# Patient Record
Sex: FEMALE | Race: WHITE | Employment: OTHER | URBAN - METROPOLITAN AREA
[De-identification: names, ages, dates, MRNs, and addresses within clinical notes are randomized per-mention and may not be internally consistent; named-entity substitution may affect disease eponyms.]

---

## 2017-04-09 ENCOUNTER — HOSPITAL ENCOUNTER (INPATIENT)
Age: 77
LOS: 1 days | Discharge: HOME | DRG: 066 | End: 2017-04-11
Attending: EMERGENCY MEDICINE | Admitting: FAMILY MEDICINE
Payer: MEDICARE

## 2017-04-09 DIAGNOSIS — R53.1 RIGHT SIDED WEAKNESS: Primary | ICD-10-CM

## 2017-04-09 DIAGNOSIS — I15.9 SECONDARY HYPERTENSION: ICD-10-CM

## 2017-04-09 DIAGNOSIS — G45.9 TRANSIENT CEREBRAL ISCHEMIA, UNSPECIFIED TYPE: ICD-10-CM

## 2017-04-09 PROCEDURE — 82962 GLUCOSE BLOOD TEST: CPT

## 2017-04-09 PROCEDURE — 99285 EMERGENCY DEPT VISIT HI MDM: CPT

## 2017-04-09 NOTE — IP AVS SNAPSHOT
Jamie Dose 
 
 
 [address] 1007 [facility] 
[phone] Patient: Jorge Quiñonez MRN: [id number] WMK:[birth year] You are allergic to the following Allergen Reactions Benadryl Allergy/Cold (Diphenhyd-Pe-Acetaminophen) Hives Penicillins Hives Was treated with Augmentin in June 2015, tolerated medication well. Recent Documentation Height Weight BMI OB Status Smoking Status 1.524 m 45.9 kg 19.78 kg/m2 Postmenopausal Former Smoker Unresulted Labs Order Current Status LIPID PANEL In process Emergency Contacts Name Discharge Info Relation Home Work Mobile Deepthi Scott DISCHARGE CAREGIVER [3] Daughter [21] [phone] About your hospitalization You were admitted on:  April 10, 2017 You last received care in the:  [facility]  MED SURG 2 You were discharged on:  April 11, 2017 Unit phone number:  [phone] Why you were hospitalized Your primary diagnosis was:  Acute Cva (Cerebrovascular Accident) (Hcc) Your diagnoses also included:  Hypertension, Cerebral Atrophy, Hyperlipidemia Ldl Goal <70, Cva (Cerebral Vascular Accident) (Hcc) Providers Seen During Your Hospitalizations Provider Role Specialty Primary office phone Dagoberto Iglesias DO Attending Provider Emergency Medicine [phone] Mariajose Newby MD Attending Provider [facility] [phone] Your Primary Care Physician (PCP) Primary Care Physician Office Phone Office Fax Lily Mao [phone] Follow-up Information Follow up With Details Comments Contact Info Duane Jose MD On 5/22/2017 3:20 pm  [address] [place] [phone] 1007 [facility] 
[phone] Layla Llanes NP Go on 4/26/2017 Neurology appt, post stroke, Arrive at: 130pm  for  2pm appt  [address] [address] [phone] Silvino Pool MD On 4/13/2017 hospital follow-up at 10:50am John Nesbitts 906 1007 [facility] 
[phone] Your Appointments Thursday April 13, 2017 10:50 AM EDT TRANSITIONAL CARE MANAGEMENT with Silvino Pool MD  
[address])  
 [address] 1007 [facility]  
[phone] Wednesday April 26, 2017  2:00 PM EDT Any with Dayanara Bourgeois NP Neurology [address]) [place] [address] [facility] 250 [facility] [id number] [phone] Monday May 22, 2017  3:20 PM EDT HOSPITAL DISCHARGE with Sudha Quispe MD  
[facility] [address] [facility] [place] [address] 1007 [facility]  
[phone] Current Discharge Medication List  
  
START taking these medications Dose & Instructions Dispensing Information Comments Morning Noon Evening Bedtime  
 atorvastatin 40 mg tablet Commonly known as:  LIPITOR Your last dose was: Your next dose is:    
   
   
 Dose:  40 mg Take 1 Tab by mouth daily. Quantity:  30 Tab Refills:  2  
     
   
   
   
  
 clopidogrel 75 mg Tab Commonly known as:  PLAVIX Your last dose was: Your next dose is:    
   
   
 Dose:  75 mg Take 1 Tab by mouth daily. Quantity:  30 Tab Refills:  1  
     
   
   
   
  
 lisinopril 20 mg tablet Commonly known as:  Armaan Lofty Your last dose was: Your next dose is:    
   
   
 Dose:  20 mg Take 1 Tab by mouth daily. Quantity:  30 Tab Refills:  2 Where to Get Your Medications Information on where to get these meds will be given to you by the nurse or doctor. ! Ask your nurse or doctor about these medications  
  atorvastatin 40 mg tablet  
 clopidogrel 75 mg Tab  
 lisinopril 20 mg tablet Discharge Instructions Yvette will mail you a heart monitor after discharge. Instructions will be included with the monitor. HOME DISCHARGE INSTRUCTIONS Simona Delvalle / [id number] : 1940 Admission date: 2017 Discharge date: 2017 Please bring this form with you to show your care provider at your follow-up appointment. Primary care provider:  Marcos Poon MD 
 
Discharging provider:  Ar Martinez MD  - Family Medicine Resident Anupam Guzman MD - Attending, Family Medicine You have been admitted to the hospital with the following diagnoses: 
 
ACUTE DIAGNOSES: 
TIA 
CVA (cerebral vascular accident) ([facility] [address] Hedy Jonas . . . . . . . . . . . . . . . . . . . . . . . . . . . . . . . . . . . . . . . . . . . . . . . . . . . . . . . . . . . . . . . . . . . . . . . MEDICATIONS: 
-Start Plavix 75mg - 1 tab daily. This is to prevent future strokes. You will have some labwork done at your neurology appointment to make further adjustments to your medication but this is a lifelong medication so you will need refills by either your neurologist or PCP. I gave you 1 refill.   
-Start Lipitor 40mg -1 tab daily. This is for your cholesterol.  
-Start Lisinopril 20mg - 1 tab daily. This is for your BP. Further adjustments to be made by PCP. -Stop taking Aspirin. FOLLOW-UP CARE RECOMMENDATIONS: 
 
Appointments Follow-up Information Follow up With Details Comments Contact Info Nehemiah Issa MD On 2017 3:20 pm  [address] [facility] [phone] 1007 [facility] 
[phone] Tash Gruber NP Go on 2017 Neurology appt, post stroke, Arrive at: 130pm  for  2pm appt  [place] [address] [place] [address] [address] [address] 
[phone] Marcos Poon MD On 2017 hospital follow-up at 10:50am John Coreasus 906 1007 [facility] 
[phone] Follow-up tests needed: to be determined Pending test results: At the time of your discharge the following test results are still pending: Lipid panel Please make sure you review these results with your outpatient follow-up provider(s). Specific symptoms to watch for: chest pain, shortness of breath, fever, chills, nausea, vomiting, diarrhea, change in mentation, falling, weakness, bleeding. DIET/what to eat:  Low fat, Low cholesterol ACTIVITY:  Activity as tolerated Wound care: None Equipment needed: You will be mailed your heart monitor and it will come with instructions What to do if new or unexpected symptoms occur? If you experience any of the above symptoms (or should other concerns or questions arise after discharge) please call your primary care physician. Return to the emergency room if you cannot get hold of your doctor. · It is very important that you keep your follow-up appointment(s). · Please bring discharge papers, medication list (and/or medication bottles) to your follow-up appointments for review by your outpatient provider(s). · Please check the list of medications and be sure it includes every medication (even non-prescription medications) that your provider wants you to take. · It is important that you take the medication exactly as they are prescribed. · Keep your medication in the bottles provided by the pharmacist and keep a list of the medication names, dosages, and times to be taken in your wallet. · Do not take other medications without consulting your doctor. · If you have any questions about your medications or other instructions, please talk to your nurse or care provider before you leave the hospital.  
 
Information obtained by:  
 
I understand that if any problems occur once I am at home I am to contact my physician. These instructions were explained to me and I had the opportunity to ask questions. I understand and acknowledge receipt of the instructions indicated above. Physician's or R.N.'s Signature                                                                  Date/Time Patient or Representative Signature                                                          Date/Time Stroke: Care Instructions Your Care Instructions You have had a stroke. This means that the blood flow to a part of your brain was blocked for some time, which damages the nerve cells in that part of the brain. The part of your body controlled by that part of your brain may not function properly now. The brain is an amazing organ that can heal itself to some degree. The stroke you had damaged part of your brain. But other parts of your brain may take over in some way for the damaged areas. You have already started this process. Your doctor will talk with you about what you can do to prevent another stroke. High blood pressure, high cholesterol, and diabetes are all risk factors for stroke. If you have any of these conditions, work with your doctor to make sure they are under control. Other risk factors for stroke include being overweight, smoking, and not getting regular exercise. Going home may be hard for you and your family. The more you can try to do for yourself, the better. Remember to take each day one at a time. Follow-up care is a key part of your treatment and safety. Be sure to make and go to all appointments, and call your doctor if you are having problems. It's also a good idea to know your test results and keep a list of the medicines you take. How can you care for yourself at home?  
· Enter a stroke rehabilitation (rehab) program, if your doctor recommends it. Physical, speech, and occupational therapies can help you manage bathing, dressing, eating, and other basics of daily living. · Do not drive until your doctor says it is okay. · It is normal to feel sad or depressed after a stroke. If these feelings last, talk to your doctor. · If you are having problems with urine leakage, go to the bathroom at regular times, including when you first wake up and at bedtime. Also, limit fluids after dinner. · If you are constipated, drink plenty of fluids, enough so that your urine is light yellow or clear like water. If you have kidney, heart, or liver disease and have to limit fluids, talk with your doctor before you increase the amount of fluids you drink. Set up a regular time for using the toilet. If you continue to have constipation, your doctor may suggest using a bulking agent, such as Metamucil, or a stool softener, laxative, or enema. Medicines · Take your medicines exactly as prescribed. Call your doctor if you think you are having a problem with your medicine. You may be taking several medicines. ACE (angiotensin-converting enzyme) inhibitors, angiotensin II receptor blockers (ARBs), beta-blockers, diuretics (water pills), and calcium channel blockers control your blood pressure. Statins help lower cholesterol. Your doctor may also prescribe medicines for depression, pain, sleep problems, anxiety, or agitation. · If your doctor has given you a blood thinner to prevent another stroke, be sure you get instructions about how to take your medicine safely. Blood thinners can cause serious bleeding problems. · Do not take any over-the-counter medicines or herbal products without talking to your doctor first. 
· If you take birth control pills or hormone therapy, talk to your doctor about whether they are right for you. For family members and caregivers · Make the home safe.  Set up a room so that your loved one does not have to climb stairs. Be sure the bathroom is on the same floor. Move throw rugs and furniture that could cause falls. Make sure that the lighting is good. Put grab bars and seats in tubs and showers. · Find out what your loved one can do and what he or she needs help with. Try not to do things for your loved one that your loved one can do on his or her own. Help him or her learn and practice new skills. · Visit and talk with your loved one often. Try doing activities together that you both enjoy, such as playing cards or board games. Keep in touch with your loved one's friends as much as you can. Encourage them to visit. · Take care of yourself. Do not try to do everything yourself. Ask other family members to help. Eat well, get enough rest, and take time to do things that you enjoy. Keep up with your own doctor visits, and make sure to take your medicines regularly. Get out of the house as much as you can. Join a local support group. Find out if you qualify for home health care visits to help with rehab or for adult day care. When should you call for help? Call 911 anytime you think you may need emergency care. For example, call if: 
· You have signs of another stroke. These may include: 
¨ Sudden numbness, tingling, weakness, or loss of movement in your face, arm, or leg, especially on only one side of your body. ¨ Sudden vision changes. ¨ Sudden trouble speaking. ¨ Sudden confusion or trouble understanding simple statements. ¨ Sudden problems with walking or balance. ¨ A sudden, severe headache that is different from past headaches. Call 911 even if these symptoms go away in a few minutes. Call your doctor now or seek immediate medical care if: 
· You have new symptoms that may be related to your stroke, such as falls or trouble swallowing. Watch closely for changes in your health, and be sure to contact your doctor if you have any problems. Where can you learn more? Go to http://isaura-giovanna.info/. Enter M759 in the search box to learn more about \"Stroke: Care Instructions. \" Current as of: June 4, 2016 Content Version: 11.2 © 7218-6213 [employer]. Care instructions adapted under license by [employer] (which disclaims liability or warranty for this information). If you have questions about a medical condition or this instruction, always ask your healthcare professional. [address] any warranty or liability for your use of this information. Learning About Stroke Rehabilitation What is stroke rehabilitation? Stroke rehabilitation (rehab) is training and therapy to help you relearn to do everyday things you have not been able to do since your stroke. The focus will depend on how the stroke has affected your ability to do the things you want and need to do. Rehab begins in the hospital. It starts as soon as you are able. You will have a team of doctors, nurses, and therapists to help you relearn daily activities. This can include eating, bathing, and dressing. You also may need help to learn how to walk or talk again. If the stroke damaged your memory, you will learn ways to improve it. You will get better faster if you begin rehab soon after the stroke. But even with rehab, you may not be able to do all the things you could before the stroke. You may recover the most in the first few weeks or months after your stroke. But you can keep getting better for years. It just may happen more slowly. And it may take a long time and a lot of hard work. Don't give up hope. After the hospital, you may go to a rehab facility or a nursing home for a while. Or you may go home. Wherever you go, keep working on your rehab and do a little every day. It's going to be important for you to get the support you need. Let your loved ones help you. Involve them in your treatment.  Talk to others who have had a stroke, and find out how they handled ups and downs. How can stroke rehab specialists help? Your stroke rehab team will include doctors and nurses who specialize in stroke rehab, as well as other professionals. Each team member will help you in specific ways. The team may include the following professionals. Rehab doctor A rehab doctor is a specialist in charge of your rehab program. He or she may also work on special problems, such as muscle cramps and spasms. Rehab nurses Rehab nurses can help you relearn basic activities of daily life. For example, they can help you learn how to: · Take care of your health, including a schedule for medicine. · Get from your bed to a wheelchair. · Bathe. · Control your bowels or bladder. Physical therapist 
A stroke often takes away a person's ability to move in certain ways. A physical therapist helps you get back as much movement, balance, and coordination as possible. Physical therapy usually includes exercises. The exercises can help you get back your ability to walk and move as much as possible. It's important to practice these exercises over and over again. Your therapist may also help you learn to use a wheelchair or walker. And he or she may teach you how to use stairs safely. Occupational therapist 
An occupational therapist helps you practice daily tasks like eating, bathing, dressing, and writing. For example, he or she may help you learn how to: · Prepare meals and clean your house. · Drive your car. · Use tools and devices that can help if you no longer have full use of both hands. For example, velcro can replace buttons on clothing. · Get grab bars for your bathroom. · Make your home safe if you have strength, balance, or vision problems. Speech therapist 
A speech therapist can help you relearn how to talk or find new ways to express yourself. Swallowing is sometimes a problem after a stroke.  This therapist can help you improve your ability to swallow. A speech therapist can also help you work on reading and writing skills. Psychologist or counselor Emotions like fear, anxiety, anger, sadness, frustration, and grief are common after a stroke. A psychologist or counselor can help you deal with your emotions. They can also help you get treatment if you have depression. Vocational counselor Stroke can leave you with disabilities that make it hard to do your job. A vocational counselor can help you return to your job or find a new one. He or she can help you: 
· Identify your current skills and prepare a new resume. · Search for a job. · Understand the laws that protect disabled workers. Recreational therapist 
A recreational therapist helps you return to doing things you enjoy. This may include the arts, hobbies, sports, or leisure activities. Follow-up care is a key part of your treatment and safety. Be sure to make and go to all appointments, and call your doctor if you are having problems. It's also a good idea to know your test results and keep a list of the medicines you take. Where can you learn more? Go to http://isaura-giovanna.info/. Enter Z606 in the search box to learn more about \"Learning About Stroke Rehabilitation. \" Current as of: November 10, 2016 Content Version: 11.2 © 1936-3520 [employer]. Care instructions adapted under license by [employer] (which disclaims liability or warranty for this information). If you have questions about a medical condition or this instruction, always ask your healthcare professional. [place] [address] any warranty or liability for your use of this information. Learning About Long-Term Care for Stroke What is long-term care for stroke?  
Long-term care for stroke is care for people who are leaving the hospital after a stroke but who still need some medical care or other help while they work on getting better. Choosing the right type of long-term care is a very personal decision. It's important to look carefully at your options. You want to be sure that the level of care is right and that you will feel comfortable. Your doctor or other members of your medical team can help you find which type of long-term care would be best for you. What are the types of long-term care for stroke patients? Each type of care center offers a different level of care. These centers may have shared or private rooms. An assisted living center or residential care center: 
· Has a range of services. These may include meals, cleaning, and laundry. And they may offer help with personal needs like bathing, grooming, and dressing. · Often includes oversight by a nurse. You may be able to get help with basic care, such as getting medicines. A skilled nursing center: · Offers nursing care up to 24 hours a day. · Provides meals and laundry. · Provides help with dressing, bathing, using the toilet, and other daily tasks. · Offers rehab therapy. A long-term acute care hospital: · Is for stroke patients who have special medical problems. This may include things like chronic pain or not being able to breathe on your own. Follow-up care is a key part of your treatment and safety. Be sure to make and go to all appointments, and call your doctor if you are having problems. It's also a good idea to know your test results and keep a list of the medicines you take. Where can you learn more? Go to http://isaura-giovanna.info/. Enter B491 in the search box to learn more about \"Learning About Long-Term Care for Stroke. \" Current as of: October 17, 2016 Content Version: 11.2 © 5917-2591 [employer]. Care instructions adapted under license by [employer] (which disclaims liability or warranty for this information).  If you have questions about a medical condition or this instruction, always ask your healthcare professional. [address] any warranty or liability for your use of this information. Discharge Orders None [facility] Announcement We are excited to announce that we are making your provider's discharge notes available to you in [facility]. You will see these notes when they are completed and signed by the physician that discharged you from your recent hospital stay. If you have any questions or concerns about any information you see in [facility], please call the Health Information Department where you were seen or reach out to your Primary Care Provider for more information about your plan of care. Introducing [facility]! Dear Nehemias Constantino: Thank you for requesting a [facility] account. Our records indicate that you already have an active [facility] account. You can access your account anytime at [email or web address] Did you know that you can access your hospital and ER discharge instructions at any time in [facility]? You can also review all of your test results from your hospital stay or ER visit. Additional Information If you have questions, please visit the Frequently Asked Questions section of the [facility] website at [email or web address] Remember, [facility] is NOT to be used for urgent needs. For medical emergencies, dial 911. Now available from your iPhone and Android! General Information Please provide this summary of care documentation to your next provider. Patient Signature:  ____________________________________________________________ Date:  ____________________________________________________________  
  
Aimee Worthy Provider Signature:  ____________________________________________________________ Date:  ____________________________________________________________

## 2017-04-09 NOTE — IP AVS SNAPSHOT
Current Discharge Medication List  
  
START taking these medications Dose & Instructions Dispensing Information Comments Morning Noon Evening Bedtime  
 atorvastatin 40 mg tablet Commonly known as:  LIPITOR Your last dose was: Your next dose is:    
   
   
 Dose:  40 mg Take 1 Tab by mouth daily. Quantity:  30 Tab Refills:  2  
     
   
   
   
  
 clopidogrel 75 mg Tab Commonly known as:  PLAVIX Your last dose was: Your next dose is:    
   
   
 Dose:  75 mg Take 1 Tab by mouth daily. Quantity:  30 Tab Refills:  1  
     
   
   
   
  
 lisinopril 20 mg tablet Commonly known as:  Jeff Headings Your last dose was: Your next dose is:    
   
   
 Dose:  20 mg Take 1 Tab by mouth daily. Quantity:  30 Tab Refills:  2 Where to Get Your Medications Information on where to get these meds will be given to you by the nurse or doctor. ! Ask your nurse or doctor about these medications  
  atorvastatin 40 mg tablet  
 clopidogrel 75 mg Tab  
 lisinopril 20 mg tablet

## 2017-04-10 ENCOUNTER — APPOINTMENT (OUTPATIENT)
Dept: GENERAL RADIOLOGY | Age: 77
DRG: 066 | End: 2017-04-10
Attending: EMERGENCY MEDICINE
Payer: MEDICARE

## 2017-04-10 ENCOUNTER — APPOINTMENT (OUTPATIENT)
Dept: MRI IMAGING | Age: 77
DRG: 066 | End: 2017-04-10
Attending: HOSPITALIST
Payer: MEDICARE

## 2017-04-10 ENCOUNTER — APPOINTMENT (OUTPATIENT)
Dept: CT IMAGING | Age: 77
DRG: 066 | End: 2017-04-10
Attending: EMERGENCY MEDICINE
Payer: MEDICARE

## 2017-04-10 ENCOUNTER — APPOINTMENT (OUTPATIENT)
Dept: CT IMAGING | Age: 77
DRG: 066 | End: 2017-04-10
Attending: HOSPITALIST
Payer: MEDICARE

## 2017-04-10 PROBLEM — I63.9 ACUTE CVA (CEREBROVASCULAR ACCIDENT) (HCC): Status: ACTIVE | Noted: 2017-04-10

## 2017-04-10 PROBLEM — G31.9 CEREBRAL ATROPHY (HCC): Status: ACTIVE | Noted: 2017-04-10

## 2017-04-10 PROBLEM — I63.9 CVA (CEREBRAL VASCULAR ACCIDENT) (HCC): Status: ACTIVE | Noted: 2017-04-10

## 2017-04-10 PROBLEM — E78.5 HYPERLIPIDEMIA LDL GOAL <70: Status: ACTIVE | Noted: 2017-04-10

## 2017-04-10 LAB
ALBUMIN SERPL BCP-MCNC: 3.4 G/DL (ref 3.5–5)
ALBUMIN/GLOB SERPL: 1 {RATIO} (ref 1.1–2.2)
ALP SERPL-CCNC: 70 U/L (ref 45–117)
ALT SERPL-CCNC: 21 U/L (ref 12–78)
ANION GAP BLD CALC-SCNC: 8 MMOL/L (ref 5–15)
AST SERPL W P-5'-P-CCNC: 21 U/L (ref 15–37)
ATRIAL RATE: 72 BPM
BASOPHILS # BLD AUTO: 0.1 K/UL (ref 0–0.1)
BASOPHILS # BLD: 1 % (ref 0–1)
BILIRUB SERPL-MCNC: 0.4 MG/DL (ref 0.2–1)
BUN SERPL-MCNC: 7 MG/DL (ref 6–20)
BUN/CREAT SERPL: 9 (ref 12–20)
CALCIUM SERPL-MCNC: 8.8 MG/DL (ref 8.5–10.1)
CALCULATED P AXIS, ECG09: 35 DEGREES
CALCULATED R AXIS, ECG10: -3 DEGREES
CALCULATED T AXIS, ECG11: 77 DEGREES
CHLORIDE SERPL-SCNC: 105 MMOL/L (ref 97–108)
CO2 SERPL-SCNC: 28 MMOL/L (ref 21–32)
CREAT SERPL-MCNC: 0.79 MG/DL (ref 0.55–1.02)
DIAGNOSIS, 93000: NORMAL
EOSINOPHIL # BLD: 0.4 K/UL (ref 0–0.4)
EOSINOPHIL NFR BLD: 6 % (ref 0–7)
ERYTHROCYTE [DISTWIDTH] IN BLOOD BY AUTOMATED COUNT: 14.4 % (ref 11.5–14.5)
EST. AVERAGE GLUCOSE BLD GHB EST-MCNC: 128 MG/DL
GLOBULIN SER CALC-MCNC: 3.5 G/DL (ref 2–4)
GLUCOSE BLD STRIP.AUTO-MCNC: 90 MG/DL (ref 65–100)
GLUCOSE SERPL-MCNC: 99 MG/DL (ref 65–100)
HBA1C MFR BLD: 6.1 % (ref 4.2–6.3)
HCT VFR BLD AUTO: 39.2 % (ref 35–47)
HGB BLD-MCNC: 13.6 G/DL (ref 11.5–16)
INR BLD: 1 (ref 0.9–1.2)
LDLC SERPL DIRECT ASSAY-MCNC: 106 MG/DL (ref 0–100)
LYMPHOCYTES # BLD AUTO: 28 % (ref 12–49)
LYMPHOCYTES # BLD: 2 K/UL (ref 0.8–3.5)
MCH RBC QN AUTO: 32.8 PG (ref 26–34)
MCHC RBC AUTO-ENTMCNC: 34.7 G/DL (ref 30–36.5)
MCV RBC AUTO: 94.5 FL (ref 80–99)
MONOCYTES # BLD: 0.7 K/UL (ref 0–1)
MONOCYTES NFR BLD AUTO: 10 % (ref 5–13)
NEUTS SEG # BLD: 3.9 K/UL (ref 1.8–8)
NEUTS SEG NFR BLD AUTO: 55 % (ref 32–75)
P-R INTERVAL, ECG05: 164 MS
PLATELET # BLD AUTO: 224 K/UL (ref 150–400)
POTASSIUM SERPL-SCNC: 3.5 MMOL/L (ref 3.5–5.1)
PROT SERPL-MCNC: 6.9 G/DL (ref 6.4–8.2)
Q-T INTERVAL, ECG07: 436 MS
QRS DURATION, ECG06: 80 MS
QTC CALCULATION (BEZET), ECG08: 477 MS
RBC # BLD AUTO: 4.15 M/UL (ref 3.8–5.2)
SERVICE CMNT-IMP: NORMAL
SODIUM SERPL-SCNC: 141 MMOL/L (ref 136–145)
VENTRICULAR RATE, ECG03: 72 BPM
WBC # BLD AUTO: 7 K/UL (ref 3.6–11)

## 2017-04-10 PROCEDURE — 99218 HC RM OBSERVATION: CPT

## 2017-04-10 PROCEDURE — 65660000000 HC RM CCU STEPDOWN

## 2017-04-10 PROCEDURE — 74011250637 HC RX REV CODE- 250/637: Performed by: FAMILY MEDICINE

## 2017-04-10 PROCEDURE — 70496 CT ANGIOGRAPHY HEAD: CPT

## 2017-04-10 PROCEDURE — 70551 MRI BRAIN STEM W/O DYE: CPT

## 2017-04-10 PROCEDURE — 74011250636 HC RX REV CODE- 250/636: Performed by: NURSE PRACTITIONER

## 2017-04-10 PROCEDURE — G8980 MOBILITY D/C STATUS: HCPCS

## 2017-04-10 PROCEDURE — G8978 MOBILITY CURRENT STATUS: HCPCS

## 2017-04-10 PROCEDURE — G8979 MOBILITY GOAL STATUS: HCPCS

## 2017-04-10 PROCEDURE — 74011636320 HC RX REV CODE- 636/320: Performed by: RADIOLOGY

## 2017-04-10 PROCEDURE — 85025 COMPLETE CBC W/AUTO DIFF WBC: CPT | Performed by: EMERGENCY MEDICINE

## 2017-04-10 PROCEDURE — 97165 OT EVAL LOW COMPLEX 30 MIN: CPT

## 2017-04-10 PROCEDURE — 97530 THERAPEUTIC ACTIVITIES: CPT

## 2017-04-10 PROCEDURE — 71010 XR CHEST PORT: CPT

## 2017-04-10 PROCEDURE — 36415 COLL VENOUS BLD VENIPUNCTURE: CPT | Performed by: EMERGENCY MEDICINE

## 2017-04-10 PROCEDURE — 93005 ELECTROCARDIOGRAM TRACING: CPT

## 2017-04-10 PROCEDURE — 85610 PROTHROMBIN TIME: CPT

## 2017-04-10 PROCEDURE — 92610 EVALUATE SWALLOWING FUNCTION: CPT

## 2017-04-10 PROCEDURE — 83036 HEMOGLOBIN GLYCOSYLATED A1C: CPT | Performed by: NURSE PRACTITIONER

## 2017-04-10 PROCEDURE — 80053 COMPREHEN METABOLIC PANEL: CPT | Performed by: EMERGENCY MEDICINE

## 2017-04-10 PROCEDURE — 97112 NEUROMUSCULAR REEDUCATION: CPT

## 2017-04-10 PROCEDURE — 74011250637 HC RX REV CODE- 250/637: Performed by: SPECIALIST

## 2017-04-10 PROCEDURE — 93306 TTE W/DOPPLER COMPLETE: CPT

## 2017-04-10 PROCEDURE — 97535 SELF CARE MNGMENT TRAINING: CPT

## 2017-04-10 PROCEDURE — 97116 GAIT TRAINING THERAPY: CPT

## 2017-04-10 PROCEDURE — 83721 ASSAY OF BLOOD LIPOPROTEIN: CPT | Performed by: NURSE PRACTITIONER

## 2017-04-10 PROCEDURE — 70450 CT HEAD/BRAIN W/O DYE: CPT

## 2017-04-10 PROCEDURE — 97161 PT EVAL LOW COMPLEX 20 MIN: CPT

## 2017-04-10 RX ORDER — ATORVASTATIN CALCIUM 10 MG/1
40 TABLET, FILM COATED ORAL DAILY
Status: DISCONTINUED | OUTPATIENT
Start: 2017-04-10 | End: 2017-04-11 | Stop reason: HOSPADM

## 2017-04-10 RX ORDER — ACETAMINOPHEN 325 MG/1
650 TABLET ORAL
Status: DISCONTINUED | OUTPATIENT
Start: 2017-04-10 | End: 2017-04-11 | Stop reason: HOSPADM

## 2017-04-10 RX ORDER — LISINOPRIL 20 MG/1
20 TABLET ORAL DAILY
Status: DISCONTINUED | OUTPATIENT
Start: 2017-04-10 | End: 2017-04-11 | Stop reason: HOSPADM

## 2017-04-10 RX ORDER — POTASSIUM CHLORIDE 1.5 G/1.77G
40 POWDER, FOR SOLUTION ORAL ONCE
Status: COMPLETED | OUTPATIENT
Start: 2017-04-10 | End: 2017-04-10

## 2017-04-10 RX ORDER — SODIUM CHLORIDE 0.9 % (FLUSH) 0.9 %
5 SYRINGE (ML) INJECTION EVERY 8 HOURS
Status: DISCONTINUED | OUTPATIENT
Start: 2017-04-10 | End: 2017-04-11 | Stop reason: HOSPADM

## 2017-04-10 RX ORDER — ENOXAPARIN SODIUM 100 MG/ML
40 INJECTION SUBCUTANEOUS EVERY 24 HOURS
Status: DISCONTINUED | OUTPATIENT
Start: 2017-04-10 | End: 2017-04-11 | Stop reason: HOSPADM

## 2017-04-10 RX ORDER — SODIUM CHLORIDE 0.9 % (FLUSH) 0.9 %
5 SYRINGE (ML) INJECTION AS NEEDED
Status: DISCONTINUED | OUTPATIENT
Start: 2017-04-10 | End: 2017-04-11 | Stop reason: HOSPADM

## 2017-04-10 RX ORDER — LABETALOL HYDROCHLORIDE 5 MG/ML
10 INJECTION, SOLUTION INTRAVENOUS
Status: DISCONTINUED | OUTPATIENT
Start: 2017-04-10 | End: 2017-04-10

## 2017-04-10 RX ORDER — SODIUM CHLORIDE 0.9 % (FLUSH) 0.9 %
5-10 SYRINGE (ML) INJECTION AS NEEDED
Status: DISCONTINUED | OUTPATIENT
Start: 2017-04-10 | End: 2017-04-11 | Stop reason: HOSPADM

## 2017-04-10 RX ORDER — TRIPROLIDINE/PSEUDOEPHEDRINE 2.5MG-60MG
600 TABLET ORAL
Status: DISCONTINUED | OUTPATIENT
Start: 2017-04-10 | End: 2017-04-11 | Stop reason: HOSPADM

## 2017-04-10 RX ORDER — ACETAMINOPHEN 650 MG/1
650 SUPPOSITORY RECTAL
Status: DISCONTINUED | OUTPATIENT
Start: 2017-04-10 | End: 2017-04-11 | Stop reason: HOSPADM

## 2017-04-10 RX ORDER — ASPIRIN 325 MG
325 TABLET ORAL DAILY
Status: DISCONTINUED | OUTPATIENT
Start: 2017-04-10 | End: 2017-04-10

## 2017-04-10 RX ORDER — CLOPIDOGREL BISULFATE 75 MG/1
75 TABLET ORAL DAILY
Status: DISCONTINUED | OUTPATIENT
Start: 2017-04-11 | End: 2017-04-11 | Stop reason: HOSPADM

## 2017-04-10 RX ORDER — SODIUM CHLORIDE 0.9 % (FLUSH) 0.9 %
5-10 SYRINGE (ML) INJECTION EVERY 8 HOURS
Status: DISCONTINUED | OUTPATIENT
Start: 2017-04-10 | End: 2017-04-11 | Stop reason: HOSPADM

## 2017-04-10 RX ORDER — PANTOPRAZOLE SODIUM 40 MG/1
40 TABLET, DELAYED RELEASE ORAL DAILY
Status: DISCONTINUED | OUTPATIENT
Start: 2017-04-10 | End: 2017-04-10

## 2017-04-10 RX ADMIN — PANTOPRAZOLE SODIUM 40 MG: 40 TABLET, DELAYED RELEASE ORAL at 06:52

## 2017-04-10 RX ADMIN — Medication 10 ML: at 14:00

## 2017-04-10 RX ADMIN — POTASSIUM CHLORIDE 40 MEQ: 1.5 POWDER, FOR SOLUTION ORAL at 06:53

## 2017-04-10 RX ADMIN — ASPIRIN 325 MG: 325 TABLET ORAL at 11:09

## 2017-04-10 RX ADMIN — Medication 5 ML: at 14:00

## 2017-04-10 RX ADMIN — ENOXAPARIN SODIUM 40 MG: 40 INJECTION SUBCUTANEOUS at 11:09

## 2017-04-10 RX ADMIN — Medication 10 ML: at 22:24

## 2017-04-10 RX ADMIN — Medication 5 ML: at 04:31

## 2017-04-10 RX ADMIN — Medication 5 ML: at 22:24

## 2017-04-10 RX ADMIN — LISINOPRIL 20 MG: 20 TABLET ORAL at 12:27

## 2017-04-10 RX ADMIN — IOPAMIDOL 100 ML: 755 INJECTION, SOLUTION INTRAVENOUS at 08:49

## 2017-04-10 RX ADMIN — ATORVASTATIN CALCIUM 40 MG: 20 TABLET, FILM COATED ORAL at 11:09

## 2017-04-10 NOTE — CONSULTS
[facility]  [address]  [phone]     Inpatient Neurology Consult  ISABEL Perez    Name:   Pietro Oreilly   Medical record #: [id number]  Admission Date: 4/9/2017  Consult Date:  04/10/17    Referring Provider: Dr. Yovany Jc  Chief Complaint:  R facial droop, R arm weakness   Source of Hx:  Chart, pt  _____________________________________________________________________    HISTORY OF PRESENT ILLNESS:   Subjective  Pietro Oreilly is a 64 y.o. female with PMH of HTN, tobacco abuse hx and HLD. The Neurology Service is asked to evaluate for admitting symptoms of R arm weakness and R facial droop. Pt states she was at home last night and all of a sudden lost her balance around 11pm (1hr prior arriving to ED at 12pm) and fell onto the bed. She can't recall if she was weak on one side or another. States she isn't weak at this time. Per ED chart note she had R arm weakness and R facial droop in ED but it acutely resolved prior to her receiving TPA. Denies other events like this happening in past.  Was not taking medications on medication list PTA but states she had a RX for Lipitor 20mg PTA but wasn't taking it regularly. No ASA PTA.     Past Medical History:   Diagnosis Date    Acute CVA (cerebrovascular accident) ([address] 4/10/2017    Per MRI:  Posterior L frontal/parietal territory    Acute CVA (cerebrovascular accident) ([address] 4/10/2017    MRI review:  Posterior L frontal/parietal infarct    Cerebral atrophy 4/10/2017    Hypercholesteremia     Hyperlipidemia LDL goal <70 4/10/2017    Hypertension     TIA (transient ischemic attack)      Past Surgical History:   Procedure Laterality Date    HX HEENT      Tonsilectomy age 8     Family History   Problem Relation Age of Onset    Dementia Mother 61    Heart Attack Father 79     Social History     Social History    Marital status:      Spouse name: N/A    Number of children: N/A    Years of education: N/A     Occupational History    Not on file. Social History Main Topics    Smoking status: Former Smoker     Quit date: 7/7/1999    Smokeless tobacco: Never Used    Alcohol use 10.5 oz/week     21 Glasses of wine per week      Comment: 3 glasses of port per day    Drug use: No    Sexual activity: No     Other Topics Concern    Not on file     Social History Narrative       Objective  Allergies: Allergies   Allergen Reactions    Benadryl Allergy/Cold [Diphenhyd-Pe-Acetaminophen] Hives    Penicillins Hives     Was treated with Augmentin in June 2015, tolerated medication well. Outpatient Meds  No current facility-administered medications on file prior to encounter. No current outpatient prescriptions on file prior to encounter.        Inpatient Meds    Current Facility-Administered Medications:     sodium chloride (NS) flush 5-10 mL, 5-10 mL, IntraVENous, Q8H, Morna Kamron, DO    sodium chloride (NS) flush 5-10 mL, 5-10 mL, IntraVENous, PRN, Morna Kamron, DO    sodium chloride (NS) flush 5 mL, 5 mL, IntraVENous, Q8H, Morna Kamron, DO, 5 mL at 04/10/17 0431    sodium chloride (NS) flush 5 mL, 5 mL, IntraVENous, PRN, Morna Kamron, DO    sodium chloride (NS) flush 5-10 mL, 5-10 mL, IntraVENous, Q8H, Yossi Stephen MD    sodium chloride (NS) flush 5-10 mL, 5-10 mL, IntraVENous, PRN, Yossi Stephen MD    acetaminophen (TYLENOL) tablet 650 mg, 650 mg, Oral, Q4H PRN **OR** acetaminophen (TYLENOL) solution 650 mg, 650 mg, Per NG tube, Q4H PRN **OR** acetaminophen (TYLENOL) suppository 650 mg, 650 mg, Rectal, Q4H PRN, Yossi Stephen MD    ibuprofen (MOTRIN) tablet 600 mg, 600 mg, Oral, Q6H PRN **OR** ibuprofen (ADVIL;MOTRIN) 100 mg/5 mL oral suspension 600 mg, 600 mg, Per NG tube, Q6H PRN, Yossi Stephen MD    meperidine (DEMEROL) injection 12.5 mg, 12.5 mg, IntraVENous, Q4H PRN, Yossi Stephen MD    pantoprazole (PROTONIX) tablet 40 mg, 40 mg, Oral, DAILY, Xenia Lynnae Fothergill, MD, 40 mg at 04/10/17 3297    atorvastatin (LIPITOR) tablet 40 mg, 40 mg, Oral, DAILY, Monroe Haynes MD, 40 mg at 04/10/17 1109    aspirin (ASPIRIN) tablet 325 mg, 325 mg, Oral, DAILY, Monroe Haynes MD, 325 mg at 04/10/17 1109    enoxaparin (LOVENOX) injection 40 mg, 40 mg, SubCUTAneous, Q24H, Zeeshan Arteaga NP, 40 mg at 04/10/17 1109    lisinopril (PRINIVIL, ZESTRIL) tablet 20 mg, 20 mg, Oral, DAILY, Sade Ac MD, 20 mg at 04/10/17 1227    PHYSICAL EXAM  Patient Vitals for the past 12 hrs:   Temp Pulse Resp BP SpO2   04/10/17 1115 97.6 °F (36.4 °C) 87 17 (!) 180/94 97 %   04/10/17 0814 97.9 °F (36.6 °C) 77 18 (!) 163/99 96 %   04/10/17 0700 - (!) 102 - - -   04/10/17 0600 - 84 - 171/85 95 %   04/10/17 0500 - 94 - 179/90 92 %   04/10/17 0400 - 78 - 159/87 93 %   04/10/17 0350 98 °F (36.7 °C) 85 20 160/82 95 %   04/10/17 0332 - 88 - - -   04/10/17 0300 - 82 - (!) 182/98 96 %   04/10/17 0245 - 77 20 178/90 95 %   04/10/17 0215 - 81 24 (!) 174/95 94 %   04/10/17 0147 - 80 18 (!) 182/107 96 %   04/10/17 0115 - 82 21 (!) 172/100 95 %        General: older thin white female in NAD, providing decent history    Psych: Affect is calm, cooperative, pleasant   Neck: supple, nontender,  No bruit   Heart: regular rhythm and rate    Lungs: clear BBS   Extremities: no LE edema   Skin: no rashes      Neurological Examination:    Mental Status:  Alert, oriented x 3, decent insight and judgement    Commands:  following    Language: Comprehension: intact    Dysarthria: none                  Speech:   no  aphasia     Cranial Nerves:            I: smell   Not tested    II: visual acuity    deferred    II: visual fields   Full to confrontation    II: pupils   Equal, round, reactive to light    II: optic disc   Not examined    III,VII:   no ptosis of either eyelid    III,IV,VI: extraocular muscles    Full EOM, no nystagmus, no intranuclear opthalmoplegia    V: mastication   symmetrical    V: facial sensation:    Equal V1, V2 and V3 bilaterally with LT    VII: facial muscle function     Symmetric, no facial droop    VIII: hearing   Equal bilaterally    IX: soft palate elevation    Uvula midline, elevates symetrically    XI: trapezius strength    5/5    XI: sternocleidomastoid strength   5/5    XI: neck flexion strength    5/5     XII: tongue    Protrudes midline, no fasciculations or atrophy      Strength/Motor   Drift:       None         Bulk:  appears symmetric            Tone:  normal      Deltoid Biceps Triceps Wrist Extension Finger Abduction   L 5 5 5 5 5   R 5 5 5 5 5      Hip Flexion Hip Extension Knee Flexion Knee Extension Ankle Dorsiflexion Ankle Plantarflexion   L 5 5 5 5 5 5   R 5 5 5 5 5 5      Reflexes:    BR Brachial Patellar Achilles Babinski Startle Glabellar   L 2/4 2/4 2/4 NT  downgoing NT NT   R 2/4 2/4 2/4 NT downgoing NT NT      Sensory: intact on proximal & distal extremity w/ LT, pressure, temp, and proprioception bilaterally   Coordination: FNF: Intact bilaterally    Heel to shin:  Intact bilaterally    Tremors:  no resting tremors, no intention tremors   Gait: deferred. Labs Reviewed  No results found for this or any previous visit (from the past 12 hour(s)). Imaging  Reviewed:   CT Results (recent):    Results from Hospital Encounter encounter on 04/09/17   CTA HEAD NECK W CONT   Narrative EXAM:  CTA HEAD NECK W CONT  INDICATION:  Patient with prior history of TIA presented this morning acute  onset of extremity weakness. Patient found unable to use right upper extremity. Disoriented and slurred speech. TECHNIQUE: Axial spiral acquired CT angiography was performed from the aortic  arch up through the calvarium. This was performed during intravenous bolus  infusion 100 mL of Isovue 370. Post-processing with  MIP reconstructions as well  as 3 D surface shaded reconstructions  from aortic arch to the skull base.  CT  dose reduction was achieved through use of a standardized protocol tailored for  this examination and automatic exposure control for dose modulation. COMPARISON: CT head 4/10/17  FINDINGS:  Atherosclerotic calcification in aortic arch and proximal brachiocephalic  arteries. No significant stenosis at the origin of the brachiocephalic arteries. Left vertebral artery is dominant. Atherosclerotic calcified plaque at the  origin of both vertebral arteries. This is resulted in up to 50% stenosis of the  origin of both vertebral arteries. The cervical vertebral arteries are  unremarkable. Both vertebral arteries contribute supply to the basilar artery. The basilar artery is normal and supplies both posterior cerebral arteries. The common carotid arteries are normal.  Atherosclerotic calcified plaque is present in both carotid bifurcations. No  stenosis on the right by NASCET criteria. . On the left minimum diameter stenosis  of 3.6 mm with distal left cervical internal carotid artery measuring 4.5 mm  corresponding to a 20% stenosis by NASCET criteria. The cervical internal carotid arteries are mildly tortuous and otherwise  unremarkable. Atherosclerotic calcification in the siphon without associated stenosis. Symmetric opacification of middle and anterior cerebral arteries. No intraluminal filling defect, stenosis or occlusion of the arteries  demonstrated. Again noted is prominent multifocal white matter small vessel ischemic change. No abnormal intracranial enhancement. Chronic findings of cervical spondylosis and spinal stenosis. Impression IMPRESSION:  1. Approximately 20% left internal carotid artery origin stenosis by NASCET  criteria. 2. Mild stenosis origin of bilateral vertebral arteries. 3. No arterial occlusion, intraluminal filling defect or other acute arterial  abnormality.         MRI Results (recent):    Results from [place] encounter on 04/09/17   MRI BRAIN WO CONT   Narrative EXAM:  MRI BRAIN WO CONT  INDICATION:  TIA, acute right upper extremity weakness, slurred speech,  TECHNIQUE: Sagittal T1, axial FLAIR, T2,T1 and gradient echo images as well as  coronal T2 weighted images and axial diffusion weighted images of the head were  obtained. COMPARISON:  CTA head. MRI 7/7/13  FINDINGS:  Generalized prominence of ventricles and sulci consistent with cerebral volume  loss. Findings similar to the prior exam.  Multiple small foci of restricted diffusion and some associated T2  hyperintensity in the left posterior frontal cortex. This is the left middle  cerebral artery territory and would be consistent with acute cortical  infarction. Multifocal and confluent T2 hyperintensity in the cerebral white matter in a  pattern consistent with chronic small vessel type ischemic change similar to  prior exams. No evidence of hemorrhage, mass or abnormal extra-axial fluid collections. Flow voids are present in the vertebral basilar and carotid artery systems. The craniocervical junction is normal.   The structures at the cranial base including paranasal sinuses are unremarkable. Impression IMPRESSION:   1. Multiple foci of acute infarction left posterior frontal lobe and left middle  cerebral artery territory. 2. Again noted is chronic small vessel ischemic change and cerebral volume loss  greater than expected for age.        _____________________________________________________________________    Review of Systems: 10 point ROS was performed. Pertinent positives listed in HPI. Denies:  angina, palpitations, paresthesias, vision loss, aphasia, fever, chills, falls, headache, diplopia, back pain, neck pain, prior episodes of vertigo, hallucinations, new medications or dosage changes. _____________________________________________________________________  Impression  64 y.o. female with onset of R facial droop and R arm weakness. Exam findings of mild R facial droop. Imaging reviewed:  CT head without acute findings and MRI brain with acute LMCA infarct.    Labs are stable other than elevated LDL. Pt had tobacco abuse hx and likely HTN that wasn't diagnosed PTA which increased her risk of her acute CVA--BP in ED was 166/106. Refer to plan below. Assessment:  1. Acute CVA:  L frontoparietal  2.   R arm weakness (in ED)-- resolved  3.  R facial droop--very mild  4.  hyperlipidemia, LDL goal <70  5. Tobacco abuse hx    Plan  Testing Pending:  OT cx    NEEDS improved BP mgmt with home HTN meds before discharge after 24 hour hypertensive allowance--post CVA s/s onset    · Medications now and post discharge:   Agree with starting ASA 325mg and Lipitor 40mg for LDL > then LDL goal of < 70  · Neurovascular checks and fall precautions  · BP goals x 24hr post CVA: GOAL:  BP <220/120   Post CVA 24hr BP goal= < 140/90 or defined by IM/FP/Cardiology (hx DM/geriatric etc)  · ST, OT, PT CX: 12hrs of CVA or TIA.:   Do not  feel IP rehab is needed BUT will await notes from THERAPY CX for their input  · Case management consult:  discharge planning  · Daily stroke education by RN in chart please. Educated on BEFAST and when to call 911. · Risk factor discussion: medication changes per Plan, individual TIA or CVA risk factors noted in Assessment and secondary risk factor reduction on OP basis with PCP  Scheduled OP Neurology appt in Clinic, 2w post discharge, placed in discharge ppw  ·   ·   Continue great care by collaborating care team and nursing staff. ·  Testing results discussed with patient and/or family and any questions were answered. May be discharged after hearing Therapy Recs  ---if needs therapy IP or OP, Attending team will need to take care of ordering  My collaborating care team physician may have further recommendations.     On DVT Prophylaxis yes no   Continue lovenox while inpatient x      Care Plan discussed with:  Patient x   Family    RN x   Care Manager    Consultant/Specialist:  x   Patient will be discussed with  Shazia  ______________________________________________________________  Hospital Problems  Date Reviewed: 4/10/2017          Codes Class Noted POA    * (Principal)Acute CVA (cerebrovascular accident) [facility]) ICD-10-CM: I63.9  ICD-9-CM: 434.91  4/10/2017 Yes    Overview Signed 4/10/2017 12:14 PM by Rajinder Blanton NP     MRI review:  Posterior L frontal/parietal infarct             Cerebral atrophy ICD-10-CM: G31.9  ICD-9-CM: 331.9  4/10/2017 Yes    Overview Signed 4/10/2017  1:01 PM by Rajinder Blanton NP     Greater than expected for age             Hyperlipidemia LDL goal <70 ICD-10-CM: E78.5  ICD-9-CM: 272.4  4/10/2017 Yes        Hypertension ICD-10-CM: I10  ICD-9-CM: 401.9  11/15/2013 Yes              *Thank you for allowing [facility], to participate in the care of your patient.    ================================================    ADDENDUM--> Collaborating Care Team Physician:    I have reviewed the documentation provided by the nurse practitioner, Ms. Hitesh Gentile, and we have discussed her findings and the clinical impression. I have formulated with her the proposed management plans for this patient. Additionally,  I have personally evaluated the patient to verify the history and to confirm physical findings.  Below are my additional comments:    Pt with acute onset right sided stroke sxs and had NIHSS 3 and no exclusions to TPA  TPA being prepared and sxs resolved and TPA held  MRI with left sided infarcts MCA territory  CTA Head and neck without significant stenosis  Echo EF 40% and global hypokinesis  Being seen by therapies    On ASA 325mg at home  Smoker but quit 20 yrs ago she says  Cards has seen and will arrange 30 day event monitor    She is a pleasant lady  Awake, alert and oriented  nml speech and language  A bilt clumsy right with some drift    Will change to plavix  cont statin  30 day event monitor  OP therapy as determined by PT/OT  Follow in stroke clinic at [facility] with me to discharge home Tues if no other issues    Carlene Hickey MD

## 2017-04-10 NOTE — H&P
[facility]   Admission H&P    Date of admission: 4/9/2017    Patient name: Monique Gupta  MRN: [id number]  YOB: 1940  Age: 64 y.o. Primary care provider:  Benjamin Vivas MD     Source of Information: patient, medical records    Chief complaint:  \"I think I had a stroke\"    History of Present Illness  Monique Gupta is a 64 y.o. female with history of HTN, HLD, TIA (2013) who presents to the ER complaining of R facial droop, right arm weakness below the elbow, and a GLF. She was last seen normal at 10:30pm. Pt walked upstairs; about 15 minutes later, her daughter heard a thump and found her mother on the ground. Pt had GLF; was found to be face down with contusion on R forehead. Daughter is unsure if there was LOC; pt was not initially verbally responsive when found face down. No urinary or bowel incontinence. From the time pt was found to ER arrival was about 1 hour. Of note, pt has been off anti-hypertensives for several months b/c she had been unable to reschedule an appointment with PCP and refill BP medications; pt has BP monitor at home, but has not checked her BP and is unsure of its normal range. Pt has been taking daily aspirin 325mg and Lipitor 20 mg. She is only followed by PCP; no other specialists. On arrival patient had significant focal neurological deficits and code stroke was called at 11:56 PM; while in ED, pt was still seen to have R facial droop and R arm paralysis from elbow down. tPA was prepared per Teleneuro recommendations, Dr. Kiesha Woo; However, before it could be administered, patient recovered right hand strength, facial droop, speech, and confusion improved. At that point Neuro was reconsulted and recommended to discontinue the tPA order. Patient denies headache, dizziness, lightheadedness, vision change, dysphagia, CP, palpitations, SOB, abdominal pain, n/v.      In the ER, vital signs were remarkable for HTN 160s-180s/90s-100s. Labs remarkable for K 3.5; LA, glucose wnl. CT head w/o contrast showed no acute abnormality. Pt did not require treatment in ED; no tPA given. Home Medications   Prior to Admission medications    Medication Sig Start Date End Date Taking? Authorizing Provider   hydrochlorothiazide (HYDRODIURIL) 25 mg tablet Take 1 Tab by mouth daily. 1/6/16   Otho Meigs, MD   MULTIVIT &MINERALS/FERROUS FUM (MULTI VITAMIN PO) Take  by mouth. Historical Provider   ZINC PO Take  by mouth. Historical Provider   doxycycline (VIBRAMYCIN) 100 mg capsule Take two capsules now for tick exposure, then take one capsule twice daily for 10 days for cellulitis. 8/8/15   Marta Sharma MD   atorvastatin (LIPITOR) 20 mg tablet Take 1 tablet by mouth daily. 2/2/15   Ivy Price MD   aspirin (ASPIRIN) 325 mg tablet Take 325 mg by mouth daily. Historical Provider     Allergies   Allergies   Allergen Reactions    Benadryl Allergy/Cold [Diphenhyd-Pe-Acetaminophen] Hives    Penicillins Hives     Was treated with Augmentin in June 2015, tolerated medication well. Past Medical History:   Diagnosis Date    Hypercholesteremia     Hypertension     TIA (transient ischemic attack)      Past Surgical History:   Procedure Laterality Date    HX HEENT      Tonsilectomy age 8     Family History   Problem Relation Age of Onset    Dementia Mother 61    Heart Attack Father 79     Social History   Patient resides    Independently    x  With family care      Assisted living      SNF      Ambulates  x  Independently      With cane       Assisted walker           Alcohol history     None   x  Social - 1 glass of wine nightly     Chronic     Smoking history    None   x  Former smoker - quit 1999     Current smoker     Drug history  x  None     Former drug user     Current drug user     Code status  x  Full code     DNR/DNI     Partial      Code status discussed with the patient/caregivers.   Status is tentative. Patient indicates she isn't totally sure what she'd like to do and wants time to think, but notes some concerns with resuscitation. Review of Systems  Review of Systems   Constitutional: Negative for chills, fever and weight loss. HENT: Negative for sore throat. Eyes: Negative for blurred vision and double vision. Respiratory: Negative for cough, hemoptysis and wheezing. Cardiovascular: Negative for chest pain, orthopnea and leg swelling. Gastrointestinal: Negative for abdominal pain, diarrhea, heartburn, nausea and vomiting. Genitourinary: Negative for dysuria, frequency and urgency. Musculoskeletal: Negative for joint pain and myalgias. Skin: Negative for itching and rash. Neurological: Positive for speech change and focal weakness. Negative for dizziness, tingling, tremors, sensory change, seizures and headaches. Endo/Heme/Allergies: Negative for polydipsia. Does not bruise/bleed easily. Psychiatric/Behavioral: Negative for depression, substance abuse and suicidal ideas. Physical Exam  Visit Vitals    /90    Pulse 77    Temp 97.7 °F (36.5 °C)    Resp 20    Ht 5' (1.524 m)    Wt 103 lb 6.3 oz (46.9 kg)    SpO2 95%    BMI 20.19 kg/m2      Physical Exam   Constitutional: She appears well-developed and well-nourished. No distress. HENT:   Normocephalic. Area of erythema on R forehead, ~ 2x2\"; no lacerations. External ear normal b/l, nose normal. Moist mucus membrane. Eyes: Conjunctivae and EOM are normal. No scleral icterus. Neck: Normal range of motion. Cardiovascular: Normal rate, regular rhythm and intact distal pulses. No murmur heard. Pulmonary/Chest: Effort normal and breath sounds normal. No respiratory distress. She has no wheezes. She exhibits no tenderness. Abdominal: Soft. She exhibits no distension. There is no tenderness. There is no rebound and no guarding.    Genitourinary:   Genitourinary Comments: Deferred    Musculoskeletal: R hand  4/5, L hand  5/5;  improved from on admission. BLE 5/5 strength, 5/5 plantar and dorsiflexion. Sensation intact. No pronator drift. R hand with questionable ability to supinate at baseline. Neurological:   Mild R facial droop; POA, improved at time of exam. CN 2-12 grossly intact. Sensation intact. Finger to nose intact. Normal gait; witnessed pt ambulating to restroom with assistance from ED nurse. Skin: Skin is warm and dry. She is not diaphoretic. No erythema. Psychiatric: She has a normal mood and affect. Her behavior is normal. Judgment and thought content normal.   Nursing note and vitals reviewed. Laboratory Data  Recent Results (from the past 24 hour(s))   GLUCOSE, POC    Collection Time: 04/09/17 11:58 PM   Result Value Ref Range    Glucose (POC) 90 65 - 100 mg/dL    Performed by Swetha Peñaloza    POC LACTIC ACID    Collection Time: 04/10/17 12:01 AM   Result Value Ref Range    Lactic Acid (POC) 1.3 0.4 - 2.0 mmol/L   CBC WITH AUTOMATED DIFF    Collection Time: 04/10/17 12:16 AM   Result Value Ref Range    WBC 7.0 3.6 - 11.0 K/uL    RBC 4.15 3.80 - 5.20 M/uL    HGB 13.6 11.5 - 16.0 g/dL    HCT 39.2 35.0 - 47.0 %    MCV 94.5 80.0 - 99.0 FL    MCH 32.8 26.0 - 34.0 PG    MCHC 34.7 30.0 - 36.5 g/dL    RDW 14.4 11.5 - 14.5 %    PLATELET 938 463 - 743 K/uL    NEUTROPHILS 55 32 - 75 %    LYMPHOCYTES 28 12 - 49 %    MONOCYTES 10 5 - 13 %    EOSINOPHILS 6 0 - 7 %    BASOPHILS 1 0 - 1 %    ABS. NEUTROPHILS 3.9 1.8 - 8.0 K/UL    ABS. LYMPHOCYTES 2.0 0.8 - 3.5 K/UL    ABS. MONOCYTES 0.7 0.0 - 1.0 K/UL    ABS. EOSINOPHILS 0.4 0.0 - 0.4 K/UL    ABS.  BASOPHILS 0.1 0.0 - 0.1 K/UL   METABOLIC PANEL, COMPREHENSIVE    Collection Time: 04/10/17 12:16 AM   Result Value Ref Range    Sodium 141 136 - 145 mmol/L    Potassium 3.5 3.5 - 5.1 mmol/L    Chloride 105 97 - 108 mmol/L    CO2 28 21 - 32 mmol/L    Anion gap 8 5 - 15 mmol/L    Glucose 99 65 - 100 mg/dL    BUN 7 6 - 20 MG/DL    Creatinine 0. 79 0.55 - 1.02 MG/DL    BUN/Creatinine ratio 9 (L) 12 - 20      GFR est AA >60 >60 ml/min/1.73m2    GFR est non-AA >60 >60 ml/min/1.73m2    Calcium 8.8 8.5 - 10.1 MG/DL    Bilirubin, total 0.4 0.2 - 1.0 MG/DL    ALT (SGPT) 21 12 - 78 U/L    AST (SGOT) 21 15 - 37 U/L    Alk. phosphatase 70 45 - 117 U/L    Protein, total 6.9 6.4 - 8.2 g/dL    Albumin 3.4 (L) 3.5 - 5.0 g/dL    Globulin 3.5 2.0 - 4.0 g/dL    A-G Ratio 1.0 (L) 1.1 - 2.2     POC INR    Collection Time: 04/10/17 12:23 AM   Result Value Ref Range    INR (POC) 1.0 <1.2     EKG, 12 LEAD, INITIAL    Collection Time: 04/10/17 12:50 AM   Result Value Ref Range    Ventricular Rate 72 BPM    Atrial Rate 72 BPM    P-R Interval 164 ms    QRS Duration 80 ms    Q-T Interval 436 ms    QTC Calculation (Bezet) 477 ms    Calculated P Axis 35 degrees    Calculated R Axis -3 degrees    Calculated T Axis 77 degrees    Diagnosis       Normal sinus rhythm  Possible Left atrial enlargement  Left ventricular hypertrophy  Anteroseptal infarct (cited on or before 07-JUL-2013)  Abnormal ECG  When compared with ECG of 07-JUL-2013 00:38,  Questionable change in initial forces of Septal leads         Imaging  CT Results  (Last 48 hours)               04/10/17 0008  CT CODE NEURO HEAD WO CONTRAST Final result    Impression:  IMPRESSION:    There is no acute intracranial abnormality. Narrative:  EXAM:  CT code neuro head without contrast       INDICATION: Extremity weakness. COMPARISON: CT head and MRI brain 7/7/2013       TECHNIQUE: Axial noncontrast head CT from foramen magnum to vertex. Coronal and   sagittal reformatted images were obtained. CT dose reduction was achieved   through use of a standardized protocol tailored for this examination and   automatic exposure control for dose modulation. FINDINGS:  There is diffuse age-related parenchymal volume loss. The ventricles   and sulci are age-appropriate without hydrocephalus.  There is no mass effect or midline shift. There is no intracranial hemorrhage or extra-axial fluid   collection. Areas of low attenuation in the periventricular white matter   represent stable chronic microvascular ischemic changes. The gray-white matter   differentiation is maintained. The basal cisterns are patent. There is   intracranial atherosclerosis. The osseous structures are intact. The visualized paranasal sinuses and mastoid   air cells are clear. CXR Results  (Last 48 hours)               04/10/17 0045  XR CHEST PORT Final result    Impression:  IMPRESSION:       No acute process. Stable exam.           Narrative:  EXAM:  XR CHEST PORT       INDICATION:  Chest pain. COMPARISON: 7/7/2013       TECHNIQUE: Portable AP upright chest view at 0036 hours       FINDINGS: Cardiac monitoring wires overlie the thorax. The cardiomediastinal   contours are stable. The pulmonary vasculature is within normal limits. The lungs and pleural spaces are clear. The bones and upper abdomen are stable. EKG: NSR, regular rhythm. Rate 72 bpm. Normal axis, no ST changes. LVH, possible LAE, anteroseptal infarct. Assessment and Plan   Sharyn Ferreira is a 64 y.o. female with hx of TIA (2013), HTN, HLD who is admitted for TIA workup. Pt improved, but not at baseline, while in ED and did not require tPA. TIA - POA; improved in terms of strength, facial droop, and speech from on admission. Possibly 2/2 uncontrolled HTN not on medication. Also will need to r/o sources of emboli, as this is her 2nd TIA; will evaluate for paroxysmal Afib and carotid stenosis as possible sources of emboli. CT negative for acute intracranial process. Vahid Engel was consulted while in ED and agreed with not requiring tPA as pt drastically improved while in ED. NIHSS 3 at time of evaluation.   - admit to stepdown    - bedside swallow  - continue aspirin 325mg daily   - increase to Lipitor 40 mg daily   - MRI w and wo contrast - Carotid doppler to evaluate for carotid stenosis   - TTE with bubble study to evaluate for cardiac etiology  - f/u HbA1c, lipid panel   - allow for permissive HTN during initial 24 hour period. Allow for /120  - consult to neurology, appreciate recs  - consult to cardiology for possible cardiac source of emboli, appreciate recs. Although pt NSR on initial EKG, could consider outpatient holter monitor  - consult to SLP, PT, OT    Hypertension - 166/106 POA; has ranged from 160s-180s/ 90s-100s during admission. As code stroke initially called and pt with TIA, will allow for permissive hypertension for the initial 24 hour period. Pt off anti-hypertensives at home for the last several months, likely contributing to her TIA. - after outside of permissive HTN window, consider resuming home HCTZ. HLD - on Lipitor 20mg at home. Last lipid panel (8/2014) Total 203, , LDL 82, TG 74  - increased to Lipitor 40 mg, given her 2nd known TIA episode       FEN/GI - CLD after passing bedside swallow. SLP consulted. Advance as tolerated  Activity - ambulate with assistance.  PT/ OT consult  DVT prophylaxis - SCDs  GI prophylaxis -  Protonix  Disposition - Plan to d/c to home when stable    CODE STATUS:  Currently Full Code; pt stated she would be amenable to being full code currently, but would like time to be able to reassess her decision       Patient discussed Dr. Leonard Lebron, [facility] Problems  Date Reviewed: 1/9/2016          Codes Class Noted POA    Hypertension ICD-10-CM: I10  ICD-9-CM: 401.9  11/15/2013 Yes        * (Principal)TIA (transient ischemic attack) ICD-10-CM: G45.9  ICD-9-CM: 435.9  7/7/2013 Yes

## 2017-04-10 NOTE — PROGRESS NOTES
Problem: Dysphagia (Adult)  Goal: *Acute Goals and Plan of Care (Insert Text)  Swallowing goals initiated 4-10-16:  1) TOLERATE dental soft diet, thin liquids without s/s aspiration by 6-46-48  SPEECH LANGUAGE PATHOLOGY BEDSIDE SWALLOW EVALUATION  Patient: Haleigh Stark (44 y.o. female)  Date: 4/10/2017  Primary Diagnosis: TIA        Precautions: aspiration         ASSESSMENT :  Based on the objective data described below, the patient presents with functional swallow. She needs her lower dentures. Patient c/o aphasia or speech apraxia event yesterday. Patient admitted with CVA/TIA. Patient will benefit from skilled intervention to address the above impairments. Patients rehabilitation potential is considered to be Good  Factors which may influence rehabilitation potential include:   [ ]            None noted  [X]            Mental ability/status  [X]            Medical condition  [ ]            Home/family situation and support systems  [ ]            Safety awareness  [ ]            Pain tolerance/management  [ ]            Other:        PLAN :  Recommendations and Planned Interventions:  mech soft diet, thins (due to missing dentures)  Frequency/Duration: Patient will be followed by speech-language pathology 2 times a week to address goals. Discharge Recommendations: To Be Determined       SUBJECTIVE:   Patient stated yesterday, I had trouble talking, but it didn't last long.       OBJECTIVE:       Past Medical History:   Diagnosis Date    Hypercholesteremia      Hypertension      TIA (transient ischemic attack)       Past Surgical History:   Procedure Laterality Date    HX HEENT         Tonsilectomy age 8     Prior Level of Function/Home Situation:   Home Situation  Home Environment: Private residence  # Steps to Enter: 5  One/Two Story Residence: Two story  Living Alone: No  Support Systems: Child(viviana)  Patient Expects to be Discharged to[de-identified] Private residence  Current DME Used/Available at Home: None  Diet prior to admission: regular, thins  Current Diet:  Clear liquids   Cognitive and Communication Status:  Neurologic State: Alert  Orientation Level: Oriented to person, Oriented to place, Oriented to situation, Oriented to time  Cognition: Follows commands  Perception: Appears intact  Perseveration: No perseveration noted     Oral Assessment:  Oral Assessment  Labial: No impairment  Dentition: Limited;Natural;Upper dentures (she has lower teeth which she needs to eat )  Oral Hygiene: WFL  Lingual: No impairment  Mandible: No impairment  P.O. Trials:  Patient Position: upright in bed  Vocal quality prior to P.O.: No impairment  Consistency Presented: Thin liquid; Solid;Puree;Pill/Tablet (with RN)  How Presented: Self-fed/presented;Spoon;Straw   ORAL PHASE:   Bolus Acceptance:  (diffiuclty feeding self pills with nondominant, affected hand)  Bolus Formation/Control: Impaired (c/o slow chewing without lower dentures)  Type of Impairment: Mastication  Propulsion: Delayed (# of seconds)  Oral Residue: None   PHARYNGEAL PHASE:   Initiation of Swallow: No impairment  Laryngeal Elevation: Weak (mildly)  Aspiration Signs/Symptoms: None  Pharyngeal Phase Characteristics: No impairment, issues, or problems                        NOMS:   The NOMS functional outcome measure was used to quantify this patient's level of swallowing impairment. Based on the NOMS, the patient was determined to be at level 6 for swallow function      G Codes: In compliance with CMSs Claims Based Outcome Reporting, the following G-code set was chosen for this patient based the use of the NOMS functional outcome to quantify this patient's level of swallowing impairment. Using the NOMS, the patient was determined to be at level 6 for swallow function which correlates with the CI= 1-19% level of severity.      Based on the objective assessment provided within this note, the current, goal, and discharge g-codes are as follows: Swallow  Swallowing:   Swallow Current Status CI= 1-19%   Swallow Goal Status CI= 1-19%         NOMS Swallowing Levels:  Level 1 (CN): NPO  Level 2 (CM): NPO but takes consistency in therapy  Level 3 (CL): Takes less than 50% of nutrition p.o. and continues with nonoral feedings; and/or safe with mod cues; and/or max diet restriction  Level 4 (CK): Safe swallow but needs mod cues; and/or mod diet restriction; and/or still requires some nonoral feeding/supplements  Level 5 (CJ): Safe swallow with min diet restriction; and/or needs min cues  Level 6 (CI): Independent with p.o.; rare cues; usually self cues; may need to avoid some foods or needs extra time  Level 7 ([address] [place] [address]): Independent for all p.o.  ALEJANDRO. (2003). National Outcomes Measurement System (NOMS): Adult Speech-Language Pathology User's Guide. Pain:  Pain Scale 1: Numeric (0 - 10)  Pain Intensity 1: 0     After treatment:   [ ]            Patient left in no apparent distress sitting up in chair  [ ]            Patient left in no apparent distress in bed  [ ]            Call bell left within reach  [ ]            Nursing notified  [ ]            Caregiver present  [ ]            Bed alarm activated      COMMUNICATION/EDUCATION:   The patients plan of care including recommendations, planned interventions, and recommended diet changes were discussed with: Registered Nurse. Patient was educated regarding Her deficit(s) of POSSIBLE DYSPHAGIA as this relates to Her diagnosis of TIA/CVA. She demonstrated Good understanding as evidenced by DISCUSSION. Lova Mean \"I eat for nutrition; not for pleasure:  [ ]            Posted safety precautions in patient's room. [X]            Patient/family have participated as able in goal setting and plan of care. [X]            Patient/family agree to work toward stated goals and plan of care. [ ]            Patient understands intent and goals of therapy, but is neutral about his/her participation.   [ ] Patient is unable to participate in goal setting and plan of care.      Thank you for this referral.  LUIS Hoffman  Time Calculation: 20 mins

## 2017-04-10 NOTE — PROGRESS NOTES
04/10/17  2:46 PM    MSW met with the patient to complete initial assessment, discharge discharge plans and notify of OBS status. Address on face sheet confirmed. Patient lives with her daughter in a two story home with 5 steps to enter and 12 steps to her 2nd floor bedroom. She has been independent and driving PTA. She does not use any medical equipment or had [facility] and she does not want [facility] upon discharge. She has a prescription plan goes to [address] [place] [address] on [facility]. She was going to Dr. Sudeep Gr but wants to change doctors. MSW offered to make a follow up appt but she declined saying she wants to make it herself. She also declined on  Physician list. OBS letters given, signed and in chart. Daughter will transport her home. At this time, no discharge needs are expected. Care Management Interventions  PCP Verified by CM: Yes (Dr. Sudeep Gr)  Palliative Care Consult (Criteria: CHF and RRAT>21): No  Reason for No Palliative Care Consult:  Other (see comment) (not relevant)  Transition of Care Consult (CM Consult): Discharge Planning (obs letter)  Discharge Durable Medical Equipment: No  Physical Therapy Consult: Yes  Occupational Therapy Consult: Yes  Speech Therapy Consult: Yes  Current Support Network: Relative's Home (daughter)  Plan discussed with Pt/Family/Caregiver: Yes  Discharge Location  Discharge Placement: Home       KELTON Cheng

## 2017-04-10 NOTE — PROGRESS NOTES
TRANSFER - IN REPORT:    Verbal report received from Joy(name) on Nereida May  being received from ED(unit) for routine progression of care      Report consisted of patients Situation, Background, Assessment and   Recommendations(SBAR). Information from the following report(s) SBAR, Kardex, Intake/Output, MAR and Cardiac Rhythm NSR was reviewed with the receiving nurse. Opportunity for questions and clarification was provided. Assessment completed upon patients arrival to unit and care assumed. Dual Skin assessment performed by Kady Ochoa (primary nurse) and Kailey Ross (secondary nurse). Patient noted to have multiple scabs and scratches, including a small laceration on posterior thigh. Please see skin doc for detailed report. maddy score = 20    0730  Bedside and Verbal shift change report given to Kassidy Cristobal (oncoming nurse) by Jose D Youssef (offgoing nurse). Report included the following information SBAR, Kardex, MAR and Recent Results.

## 2017-04-10 NOTE — PROGRESS NOTES
Occupational Therapy order received and acknowledged. Chart reviewed, nursing consulted. Patient off floor for tests, will continue to follow and complete evaluation as appropriate/available.

## 2017-04-10 NOTE — PROGRESS NOTES
[facility] MEDICINE RESIDENCY PROGRAM   Daily Progress Note    Date: 4/10/2017    Assessment/Plan:   Yovanny Juan is a 64 y.o. female with hx of TIA (2013), HTN, HLD who is admitted for TIA workup. Pt improved, but not at baseline, while in ED and did not require tPA. Overnight Events: No acute events.     TIA - Right upper extremity weakness persists. CT head negative. - Continue aspirin 325mg daily   - increase to Lipitor 40 mg daily   - MRI wo contrast and CTA head    - TTE with bubble study to evaluate for cardiac etiology  - f/u HbA1c, lipid panel   - Will be cautious with decreasing BP  - Consult to neurology, appreciate recs  - Consult to cardiology for possible cardiac source of emboli, appreciate recs. Although pt NSR on initial EKG, could consider outpatient holter monitor  - Consult to SLP, PT, OT     Hypertension - 166/106 POA; has ranged from 160s-180s/ 90s-100s during admission. As code stroke initially called and pt with TIA, will allow for permissive hypertension for the initial 24 hour period. Pt off anti-hypertensives at home for the last several months, likely contributing to her TIA. - after outside of permissive HTN window, consider resuming home HCTZ.      HLD - on Lipitor 20mg at home. Last lipid panel (8/2014) Total 203, , LDL 82, TG 74  - increased to Lipitor 40 mg, given her 2nd known TIA episode      FEN/GI - CLD   Activity - Ambulate with assistance. PT/ OT consult  DVT prophylaxis - SCDs  GI prophylaxis - Protonix  Disposition - Plan to d/c to home when stable     CODE STATUS:  Currently Full Code; pt stated she would be amenable to being full code currently, but would like time to be able to reassess her decision    James Bond MD  Family Medicine Resident          Subjective  No acute events overnight. Says she has an improvement of symptoms from yesterday but right UE still weak.  Patient denies chills, headaches, chest pain, shortness of breath, palpitations, abdominal pain, nausea and vomiting, and LE edema. Tolerating CLD diet. Inpatient Medications  Current Facility-Administered Medications   Medication Dose Route Frequency    sodium chloride (NS) flush 5-10 mL  5-10 mL IntraVENous Q8H    sodium chloride (NS) flush 5-10 mL  5-10 mL IntraVENous PRN    sodium chloride (NS) flush 5 mL  5 mL IntraVENous Q8H    sodium chloride (NS) flush 5 mL  5 mL IntraVENous PRN    sodium chloride (NS) flush 5-10 mL  5-10 mL IntraVENous Q8H    sodium chloride (NS) flush 5-10 mL  5-10 mL IntraVENous PRN    acetaminophen (TYLENOL) tablet 650 mg  650 mg Oral Q4H PRN    Or    acetaminophen (TYLENOL) solution 650 mg  650 mg Per NG tube Q4H PRN    Or    acetaminophen (TYLENOL) suppository 650 mg  650 mg Rectal Q4H PRN    ibuprofen (MOTRIN) tablet 600 mg  600 mg Oral Q6H PRN    Or    ibuprofen (ADVIL;MOTRIN) 100 mg/5 mL oral suspension 600 mg  600 mg Per NG tube Q6H PRN    meperidine (DEMEROL) injection 12.5 mg  12.5 mg IntraVENous Q4H PRN    pantoprazole (PROTONIX) tablet 40 mg  40 mg Oral DAILY    atorvastatin (LIPITOR) tablet 40 mg  40 mg Oral DAILY    aspirin (ASPIRIN) tablet 325 mg  325 mg Oral DAILY    potassium chloride (KLOR-CON) packet 40 mEq  40 mEq Oral ONCE         Allergies  Allergies   Allergen Reactions    Benadryl Allergy/Cold [Diphenhyd-Pe-Acetaminophen] Hives    Penicillins Hives     Was treated with Augmentin in June 2015, tolerated medication well.           Objective  Vitals:  Patient Vitals for the past 8 hrs:   Temp Pulse Resp BP SpO2   04/10/17 0500 - 94 - 179/90 92 %   04/10/17 0400 - 78 - 159/87 93 %   04/10/17 0350 98 °F (36.7 °C) 85 20 160/82 95 %   04/10/17 0332 - 88 - - -   04/10/17 0300 - 82 - (!) 182/98 96 %   04/10/17 0245 - 77 20 178/90 95 %   04/10/17 0215 - 81 24 (!) 174/95 94 %   04/10/17 0147 - 80 18 (!) 182/107 96 %   04/10/17 0115 - 82 21 (!) 172/100 95 %   04/10/17 0047 - 74 18 168/89 96 %   04/10/17 0037 - 86 21 - 96 %   04/10/17 0036 - 86 26 (!) 170/92 96 %   04/09/17 2357 97.7 °F (36.5 °C) 87 18 (!) 166/106 98 %         I/O:  No intake or output data in the 24 hours ending 04/10/17 0634  Last shift:       Last 3 shifts:         Physical Exam:  Constitutional: She appears well-developed and well-nourished. No distress. HENT:   Normocephalic. Area of erythema on R forehead, ~ 2x2\"; no lacerations. External ear normal b/l, nose normal. Moist mucus membrane. Eyes: Conjunctivae and EOM are normal. No scleral icterus. Neck: Normal range of motion. Cardiovascular: Normal rate, regular rhythm and intact distal pulses. No murmur heard. Pulmonary/Chest: Effort normal and breath sounds normal. No respiratory distress. She has no wheezes. She exhibits no tenderness. Abdominal: Soft. She exhibits no distension. There is no tenderness. There is no rebound and no guarding. Musculoskeletal:   R hand  3/5, L hand  5/5. BLE 5/5 strength, 5/5 plantar and dorsiflexion. Sensation intact. No pronator drift. R hand with questionable ability to supinate. Neurological:   CN 2-12 grossly intact. Sensation intact. Finger to nose intact. Skin: Skin is warm and dry. She is not diaphoretic. No erythema. Psychiatric: She has a normal mood and affect. Her behavior is normal. Judgment and thought content normal.   Nursing note and vitals reviewed.     Laboratory Data  Recent Results (from the past 12 hour(s))   GLUCOSE, POC    Collection Time: 04/09/17 11:58 PM   Result Value Ref Range    Glucose (POC) 90 65 - 100 mg/dL    Performed by Cristin Mast    POC LACTIC ACID    Collection Time: 04/10/17 12:01 AM   Result Value Ref Range    Lactic Acid (POC) 1.3 0.4 - 2.0 mmol/L   CBC WITH AUTOMATED DIFF    Collection Time: 04/10/17 12:16 AM   Result Value Ref Range    WBC 7.0 3.6 - 11.0 K/uL    RBC 4.15 3.80 - 5.20 M/uL    HGB 13.6 11.5 - 16.0 g/dL    HCT 39.2 35.0 - 47.0 %    MCV 94.5 80.0 - 99.0 FL    MCH 32.8 26.0 - 34.0 PG    MCHC 34.7 30.0 - 36.5 g/dL    RDW 14.4 11.5 - 14.5 %    PLATELET 430 081 - 015 K/uL    NEUTROPHILS 55 32 - 75 %    LYMPHOCYTES 28 12 - 49 %    MONOCYTES 10 5 - 13 %    EOSINOPHILS 6 0 - 7 %    BASOPHILS 1 0 - 1 %    ABS. NEUTROPHILS 3.9 1.8 - 8.0 K/UL    ABS. LYMPHOCYTES 2.0 0.8 - 3.5 K/UL    ABS. MONOCYTES 0.7 0.0 - 1.0 K/UL    ABS. EOSINOPHILS 0.4 0.0 - 0.4 K/UL    ABS. BASOPHILS 0.1 0.0 - 0.1 K/UL   METABOLIC PANEL, COMPREHENSIVE    Collection Time: 04/10/17 12:16 AM   Result Value Ref Range    Sodium 141 136 - 145 mmol/L    Potassium 3.5 3.5 - 5.1 mmol/L    Chloride 105 97 - 108 mmol/L    CO2 28 21 - 32 mmol/L    Anion gap 8 5 - 15 mmol/L    Glucose 99 65 - 100 mg/dL    BUN 7 6 - 20 MG/DL    Creatinine 0.79 0.55 - 1.02 MG/DL    BUN/Creatinine ratio 9 (L) 12 - 20      GFR est AA >60 >60 ml/min/1.73m2    GFR est non-AA >60 >60 ml/min/1.73m2    Calcium 8.8 8.5 - 10.1 MG/DL    Bilirubin, total 0.4 0.2 - 1.0 MG/DL    ALT (SGPT) 21 12 - 78 U/L    AST (SGOT) 21 15 - 37 U/L    Alk.  phosphatase 70 45 - 117 U/L    Protein, total 6.9 6.4 - 8.2 g/dL    Albumin 3.4 (L) 3.5 - 5.0 g/dL    Globulin 3.5 2.0 - 4.0 g/dL    A-G Ratio 1.0 (L) 1.1 - 2.2     POC INR    Collection Time: 04/10/17 12:23 AM   Result Value Ref Range    INR (POC) 1.0 <1.2     EKG, 12 LEAD, INITIAL    Collection Time: 04/10/17 12:50 AM   Result Value Ref Range    Ventricular Rate 72 BPM    Atrial Rate 72 BPM    P-R Interval 164 ms    QRS Duration 80 ms    Q-T Interval 436 ms    QTC Calculation (Bezet) 477 ms    Calculated P Axis 35 degrees    Calculated R Axis -3 degrees    Calculated T Axis 77 degrees    Diagnosis       Normal sinus rhythm  Possible Left atrial enlargement  Left ventricular hypertrophy  Anteroseptal infarct (cited on or before 07-JUL-2013)  Abnormal ECG  When compared with ECG of 07-JUL-2013 00:38,  Questionable change in initial forces of Septal leads         Imaging      Hospital Problems:  Hospital Problems  Date Reviewed: 1/9/2016 Codes Class Noted POA    Hypertension ICD-10-CM: I10  ICD-9-CM: 401.9  11/15/2013 Yes        * (Principal)TIA (transient ischemic attack) ICD-10-CM: G45.9  ICD-9-CM: 435.9  7/7/2013 Yes

## 2017-04-10 NOTE — PROGRESS NOTES
[facility]: MED RECONCILIATION    Comments/Recommendations:    Patient is alert, awake and aware of the visit. Allergies verified; patient is unsure about the allergy to penicillins.  Patient does not take any medications at home.  She has no fill records at [facility] in term of maintenance medications.  All of her medications are OTC (aspirin 325 mg, multivitamin, zinc, vitamin B, C, E, potassium, calcium) which she only takes when she remembers.  Patient does not have a PCP now. It has been more than a year since she last saw a PCP.  Patient does not want to be a burden to her daughter, and she would like to take care of things herself ( such as driving, medication management, making appointment with the physician)   She was counseled on importance of maintenance medications on prevention of cardiovascular disease especially stroke. Patient states she is not very concern about it due to \"almost being [de-identified]years old\"     Medications added:     · None     Medications removed:    · Aspirin 325 mg daily  · Atorvastatin 20 mg daily   · HCTZ 25 mg daily   · Multivitamin   · Zinc  · Doxycycline 100 mg    Medications adjusted:    · None     Information obtained from: Patient, rx query     Chief Complaint for this Admission:   Chief Complaint   Patient presents with    Extremity Weakness         Allergies: Benadryl allergy/cold [diphenhyd-pe-acetaminophen] and Penicillins    Prior to Admission Medications:     Medication Documentation Review Audit       Reviewed by Dayday Silver (Pharmacy Student) on 04/10/17 at 0836         Medication Sig Documenting Provider Last Dose Status Taking? **No Medications to Display**                                    Thank you  Judy Bustillos  Pharm. D.  Candidate 2017

## 2017-04-10 NOTE — PROGRESS NOTES
CV addendum:  Echo reviewed: Compared to 2013, there is clearcut new diffuse mild LV hypocontractility, EF about 40%. Most likely from hypertensive heart disease, could be from CAD but given frail status and absence of symptoms, I'm not inclined to pursue this with Cardiolite at present. Sugg: Control BP with ACEI. Betablocker not necessary in absence of CHF decompensation. Previously on Prinivil 20mg daily, will resume now. ? Home tomorrow with FU in our office after Loop recorder.   Future Appointments  Date Time Provider Juanis Bustillo   5/22/2017 3:20 PM Sylvia Alegria MD [address] [place] [address]

## 2017-04-10 NOTE — PROGRESS NOTES
physical Therapy EVALUATION/Discharge neuro population    Patient: Antonio Dhillon (20 y.o. female)  Date: 4/10/2017  Primary Diagnosis: TIA        Precautions:   Fall    ASSESSMENT :  ASSESSMENT :  Based on the objective data described below, the patient presents with Left UE coordination, strength deficits following admission for TIA. Patient presented to the ER with right facial droop and  deficit. Patient fell at home and has abrasions to her LE. She presented with increased blood pressure that required medications for control. She has been ambulating in room, no deficits per nursing. Patient received exiting the bathroom. Overall she is MOD I for transfers and ambulation. She had no loss of balance or instability with upright activity. Her KU balance test places her at a low risk for falls with a score of 50/56. Her blood pressure with activity is improved but still with an elevated diastolic rate, see below- and nursing notified. She has continued Right hand strength and coordination deficits where she is unable to hold or manipulate objects. Patient would benefit from outpatient NEURO OT at discharge. Skilled physical therapy is not indicated at this time. Patient will benefit from skilled intervention to address the above impairments.   Patients rehabilitation potential is considered to be Good  Factors which may influence rehabilitation potential include:   [x]           None noted  []           Mental ability/status  []           Medical condition  []           Home/family situation and support systems  []           Safety awareness  []           Pain tolerance/management  []           Other:      PLAN :  Recommendations and Planned Interventions:    Discharge Recommendations: Outpatient Neuro OT  Further Equipment Recommendations for Discharge:           PLAN :  Discharge Recommendations: Outpatient Neuro OT  Further Equipment Recommendations for Discharge: none     SUBJECTIVE: Patient stated Manolo Dooley can't keep the hair out of my face.     OBJECTIVE DATA SUMMARY:   HISTORY:    Past Medical History:   Diagnosis Date    Acute CVA (cerebrovascular accident) ([facility] [address] 4/10/2017    Per MRI:  Posterior L frontal/parietal territory    Acute CVA (cerebrovascular accident) ([facility] [address] 4/10/2017    MRI review:  Posterior L frontal/parietal infarct    Cerebral atrophy 4/10/2017    Hypercholesteremia     Hyperlipidemia LDL goal <70 4/10/2017    Hypertension     TIA (transient ischemic attack)      Past Surgical History:   Procedure Laterality Date    HX HEENT      Tonsilectomy age 8     Prior Level of Function/Home Situation: see above  Personal factors and/or comorbidities impacting plan of care:     Home Situation  Home Environment: Private residence  # Steps to Enter: 5  One/Two Story Residence: Two story  Living Alone: No  Support Systems: Child(viviana)  Patient Expects to be Discharged to[de-identified] Private residence  Current DME Used/Available at Home: None    EXAMINATION/PRESENTATION/DECISION MAKING:   Vitals   Blood pressure  Heart rate  Initial 180/63   102  During 170/94   94  Final 180/113   98    Critical Behavior:  Neurologic State: Alert  Orientation Level: Oriented to person, Oriented to place, Oriented to situation, Oriented to time  Cognition: Follows commands     Hearing: Auditory  Auditory Impairment: None  Skin:  All exposed intact  Edema: none noted  Range Of Motion:  AROM: Within functional limits           PROM: Within functional limits           Strength:    Strength:  Within functional limits        RLE Strength  R Hip Flexion: 4+  R Knee Flexion: 4+  R Knee Extension: 4+  R Ankle Dorsiflexion: 4+  R Ankle Plantar Flexion: 4+        LLE Strength  L Hip Flexion: 4+  L Knee Flexion: 5  L Knee Extension: 5  L Ankle Dorsiflexion: 5  L Ankle Plantar Flexion: 5  Tone & Sensation:   Tone: Normal              Sensation: Intact               Coordination:  Coordination: Within functional limits  Vision:      Functional Mobility:  Bed Mobility:  Rolling: Independent  Supine to Sit: Independent  Sit to Supine: Independent     Transfers:  Sit to Stand: Independent  Stand to Sit: Independent        Bed to Chair: Independent              Balance:   Sitting: Intact  Standing: Intact  Ambulation/Gait Training:  Distance (ft): 100 Feet (ft)  Assistive Device: Gait belt  Ambulation - Level of Assistance: Supervision     Gait Description (WDL): Exceptions to WDL                                              Stairs: Therapeutic Exercises:       Functional Measure:  Ku Balance Test:    Sitting to Standin  Standing Unsupported: 4  Sitting with Back Unsupported: 4  Standing to Sittin  Transfers: 4  Standing Unsupported with Eyes Closed: 4  Standing Unsupported with Feet Together: 4  Reach Forward with Outstretched Arm: 4   Object: 3  Turn to Look Over Shoulders: 4  Turn 360 Degrees: 4  Alternate Foot on Step/Stool: 3  Standing Unsupported One Foot in Front: 2  Stand on One Le  Total: 50         56=Maximum possible score;   0-20=High fall risk  21-40=Moderate fall risk   41-56=Low fall risk     Ku Balance Test and G-code impairment scale:  Percentage of Impairment CH    0%   CI    1-19% CJ    20-39% CK    40-59% CL    60-79% CM    80-99% CN     100%   Ku   Score 0-56 56 45-55 34-44 23-33 12-22 1-11 0         G codes: In compliance with CMSs Claims Based Outcome Reporting, the following G-code set was chosen for this patient based on their primary functional limitation being treated: The outcome measure chosen to determine the severity of the functional limitation was the KU with a score of 50/56 which was correlated with the impairment scale.     ? Mobility - Walking and Moving Around:     - CURRENT STATUS: CI - 1%-19% impaired, limited or restricted    - GOAL STATUS: CI - 1%-19% impaired, limited or restricted    - D/C STATUS:  CI - 1%-19% impaired, limited or restricted        Physical Therapy Evaluation Charge Determination   History Examination Presentation Decision-Making   HIGH Complexity :3+ comorbidities / personal factors will impact the outcome/ POC  MEDIUM Complexity : 3 Standardized tests and measures addressing body structure, function, activity limitation and / or participation in recreation  LOW Complexity : Stable, uncomplicated  Other outcome measures KU  LOW       Based on the above components, the patient evaluation is determined to be of the following complexity level: LOW     Pain:  Pain Scale 1: Numeric (0 - 10)  Pain Intensity 1: 0     Activity Tolerance:   No complications with upright activity  Please refer to the flowsheet for vital signs taken during this treatment. After treatment:   [x]   Patient left in no apparent distress sitting up in chair  []   Patient left in no apparent distress in bed  [x]   Call bell left within reach  [x]   Nursing notified  []   Caregiver present  []   Bed alarm activated    COMMUNICATION/EDUCATION:   The patients plan of care was discussed with: Registered Nurse. Patient was educated regarding Her deficit(s) of strength and UE as this relates to Her diagnosis of CVA. She demonstrated Fair understanding as evidenced by verbal feedback. Patient and/or family was verbally educated on the BE FAST acronym for signs/symptoms of CVA and TIA. All questions answered with patient indicating good understanding. [x]  Fall prevention education was provided and the patient/caregiver indicated understanding. [x]  Patient/family have participated as able in goal setting and plan of care. [x]  Patient/family agree to work toward stated goals and plan of care. []  Patient understands intent and goals of therapy, but is neutral about his/her participation. []  Patient is unable to participate in goal setting and plan of care.     Thank you for this referral.  Alfredo Kee, PT, DPT   Time Calculation: 22 mins

## 2017-04-10 NOTE — PROGRESS NOTES
1700: Primary Nurse Eugenia Moss, RN and Jessy Beasley RN performed a dual skin assessment on this patient. Impairment noted- see wound doc flow sheet. Pt claims her cat scratched the back of her legs. Diego score is 21.  1940: Bedside and Verbal shift change report given to [address] [place] [address] Drive (oncoming nurse) by Diamond WONG (offgoing nurse). Report included the following information SBAR, Kardex, Intake/Output, Recent Results and Cardiac Rhythm NSR.

## 2017-04-10 NOTE — ED NOTES
In with patient to assist with bedpan. Patient able to use right hand and resolving of symptoms presented with initially. Dr. Jerome Adams made aware of this; spoke with Neurology and gives orders to hold TPA at this time.

## 2017-04-10 NOTE — DISCHARGE SUMMARY
[facility] FAMILY MEDICINE RESIDENCY PROGRAM   Discharge/Transfer/Off-Service Note    Date: April 10, 2017    Janette Garcia  MRN: [id number]  YOB: 1940  Age: 64 y.o. Date of admission: 4/9/2017     Date of discharge/transfer: 4/10/2017    Primary Care Physician: Tanner Boyd MD     Attending physician at admission: Luís Charles MD     Attending physician at discharge/transfer: uLís Charles MD     Resident physician at discharge/transfer: Brad Tamez MD     Consultants during hospitalization  Neurlogy: Dr. Beth Ards  Cardiology: Dr. Cabrera Francis     Admission diagnoses   TIA    Discharge diagnoses  Hospital Problems  Date Reviewed: 4/10/2017          Codes Class Noted POA    * (Principal)Acute CVA (cerebrovascular accident) [facility]) ICD-10-CM: I63.9  ICD-9-CM: 434.91  4/10/2017 Yes    Overview Signed 4/10/2017 12:14 PM by Martina Tinsley NP     MRI review:  Posterior L frontal/parietal infarct             Cerebral atrophy ICD-10-CM: G31.9  ICD-9-CM: 331.9  4/10/2017 Yes    Overview Signed 4/10/2017  1:01 PM by Martina Tinsley NP     Greater than expected for age             Hyperlipidemia LDL goal <70 ICD-10-CM: E78.5  ICD-9-CM: 272.4  4/10/2017 Yes        Hypertension ICD-10-CM: I10  ICD-9-CM: 401.9  11/15/2013 Yes               History of Present Illness  Per Dr. Basil Pham:  Aretha Jordan is a 64 y.o. female with history of HTN, HLD, TIA (2013) who presents to the ER complaining of R facial droop, right arm weakness below the elbow, and a GLF. She was last seen normal at 10:30pm. Pt walked upstairs; about 15 minutes later, her daughter heard a thump and found her mother on the ground. Pt had GLF; was found to be face down with contusion on R forehead. Daughter is unsure if there was LOC; pt was not initially verbally responsive when found face down. No urinary or bowel incontinence. From the time pt was found to ER arrival was about 1 hour.  Of note, pt has been off anti-hypertensives for several months b/c she had been unable to reschedule an appointment with PCP and refill BP medications; pt has BP monitor at home, but has not checked her BP and is unsure of its normal range. Pt has been taking daily aspirin 325mg and Lipitor 20 mg. She is only followed by PCP; no other specialists.      On arrival patient had significant focal neurological deficits and code stroke was called at 11:56 PM; while in ED, pt was still seen to have R facial droop and R arm paralysis from elbow down. tPA was prepared per Teleneuro recommendations, Dr. Diana Simon; However, before it could be administered, patient recovered right hand strength, facial droop, speech, and confusion improved. At that point Neuro was reconsulted and recommended to discontinue the tPA order. Patient denies headache, dizziness, lightheadedness, vision change, dysphagia, CP, palpitations, SOB, abdominal pain, n/v.      In the ER, vital signs were remarkable for HTN 160s-180s/90s-100s. Labs remarkable for K 3.5; LA, glucose wnl. CT head w/o contrast showed no acute abnormality. Pt did not require treatment in ED; no tPA given. \"    Hospital course  Amy Kaplan is a 64 y.o. female with hx of TIA (2013), HTN, HLD who is admitted for TIA workup. Pt improved, but not at baseline, while in ED and did not require tPA since she showed significant improvement.       CVA- Right upper extremity weakness and clumsiness. CT head negative. MRI showed left sided infarcts MCA territory. Echo EF 40% and global hypokinesis. . Followed by neurology who started plavix and lipitor while inpatient. Per neurology recs only treatment with plavix and will hold aspirin for now. Will need lifelong treatment with plavix-will have P2Y12 checked at her outpatient neurology appt to see if she is a plavix responder. PT/OT recommended outpatient occupational therapy.  Also consulted cardiology to assess for cardiac etiology of CVA and pt has been set up with an outpatient event monitor.   - Start Plavix 75mg daily  - Start Lipitor 40mg daily   - Hold aspirin   - F/U appts made with neurology and cardiology    - Provided patient script for outpatient occupational therapy      Hypertension - Initially elevated on admission. Per pt, she has been out of her antihypertensive for months. Lisinopril started by cardiology during this admission and BP well controlled since then. - Lisinopril 20mg daily; follow-up BP at outpatient clinic appt and further adjustments to be made by PCP      HLD - . Lipid panel still pending. Goal LDL <70.  - Lipitor 40 mg daily  - F/U lipid panel     Physical exam at discharge:   Constitutional: She appears well-developed and well-nourished. No distress. HENT:  Normocephalic. Area of erythema on R forehead, ~ 2x2\"; no lacerations. External ear normal b/l, nose normal. Moist mucus membrane. Eyes: Conjunctivae and EOM are normal. No scleral icterus. Cardiovascular: Normal rate, regular rhythm and intact distal pulses. No murmur heard. Pulmonary/Chest: Effort normal and breath sounds normal. No respiratory distress. She has no wheezes. She exhibits no tenderness. Abdominal: Soft. She exhibits no distension. There is no tenderness. There is no rebound and no guarding. Neurological: CN 2-12 grossly intact. Sensation intact. Finger to nose intact on right; diminished on left. R hand  3/5, L hand  5/5. BLE 5/5 strength, 5/5 plantar and dorsiflexion. Sensation intact. No pronator drift. R hand with questionable ability to pronate and supinate; finger  on left diminished; improvement in ability to make fist today but still with diminished princer grasp. Skin: Skin is warm and dry. She is not diaphoretic. No erythema. Psychiatric: She has a normal mood and affect.  Her behavior is normal. Judgment and thought content normal.   Nursing note and vitals reviewed.     Condition at discharge: Stable    Labs  Recent Labs      04/10/17   0016   WBC  7.0   HGB  13.6   HCT  39.2   PLT  224     Recent Labs      04/10/17   0016   NA  141   K  3.5   CL  105   CO2  28   BUN  7   CREA  0.79   GLU  99   CA  8.8     Recent Labs      04/10/17   0016   SGOT  21   ALT  21   AP  70   TBILI  0.4   TP  6.9   ALB  3.4*   GLOB  3.5     Recent Labs      04/10/17   0023   INR  1.0      No results for input(s): FE, TIBC, PSAT, FERR in the last 72 hours. No results for input(s): PH, PCO2, PO2 in the last 72 hours. No results for input(s): CPK, CKMB in the last 72 hours. No lab exists for component: TROPONINI  Lab Results   Component Value Date/Time    Glucose (POC) 90 04/09/2017 11:58 PM    Glucose (POC) 110 07/07/2013 12:47 AM       Diagnostic studies  -CT head w/o contrast (4/9): There is no acute intracranial abnormality  -CXR (4/10): No acute process. Stable exam.  -CTA head and neck with contrast (4/10): 1. Approximately 20% left internal carotid artery origin stenosis by NASCET Criteria. Mild stenosis origin of bilateral vertebral arteries. No arterial occlusion, intraluminal filling defect or other acute arterial abnormality. -MRI head w/o contrast (4/10): 1. Multiple foci of acute infarction left posterior frontal lobe and left middle  cerebral artery territory.  Again noted is chronic small vessel ischemic change and cerebral volume loss  greater than expected for age.     Procedures  -None    Disposition:  Home    Discharge/Transfer Medications  Current Discharge Medication List          Recommended follow-up tests after discharge  · BP check, other lab tests to be determined by neurolgy      Diet:  Regular diet    Activity:  As tolerated     Discharge instructions to patient/family  Please seek medical attention for any new or worsening symptoms particularly fever, chest pain, shortness of breath, abdominal pain, nausea, vomiting    Follow up plans/appointments  Follow-up Information     Follow up With Details Comments Contact Info Charles Billy MD On 5/22/2017 3:20 pm  [address] [place] [address]  [phone]      Marylou Cao NP Go on 4/26/2017 Neurology appt, post stroke, Arrive at: 130pm  for  2pm appt  [address]  [facility] [address] [facility] 170      Ary Oh MD On 4/13/2017 hospital follow-up at 10:50am [address] [facility] 11               Jose Be MD  Family Medicine Resident    Cc: Ary Oh MD

## 2017-04-10 NOTE — PROGRESS NOTES
Chart accessed. Patient being transferred during nurse change over. This nurse did not take over due to this new transfer order.

## 2017-04-10 NOTE — PROGRESS NOTES
0700: Bedside SBAR report received from JUDITH Paz. Labs and plan of care reviewed at this time. Pt stable. ADLs addressed. Call light within reach, bed exit alarm on. Will continue to monitor. 0805: Initial assessment completed  0825: Spoke with Family practice and received order that patient may travel to MRI and CT without RN. Patient on telemetry monitor. 0830: Transport at bedside to take patient to CT   1055:Patient back in room from MRI and CT. Patient tolerated well. 1130: Xu Alan with speech therapy at bedside doing a swallow evaluation  1230: Melinda Samano NP with neurology at bedside speaking with patient. 1255: Bedside SBAR report given to Antonette Siddiqui. Labs and plan of care reviewed at this time. Call light within reach, bed exit alarm on. Will continue to monitor. 1450: TRANSFER - OUT REPORT:    Verbal report given to Brad Garcia on Sharyn Ferreira  being received from [phone] for routine progression of care      Report consisted of patients Situation, Background, Assessment and   Recommendations(SBAR). Information from the following report(s) SBAR, ED Summary, Med Rec Status and Cardiac Rhythm SR was reviewed with the receiving nurse. Opportunity for questions and clarification was provided.

## 2017-04-10 NOTE — PROGRESS NOTES
Senior resident admission addendum    Patient w hx of TIA 2013, brief staring episode followed by admission and workup. Was off HTN meds due to miscommunication with PCP office, patient called for refill but says she never was called back. Last normal at 1030 last night presented \"an hour\" after she fell from a sitting position and was immediately attended to by her daughter. Right  deficit and facial droop on admission. TPA prepared but patient made marked improvement and recommendation per Tele Neuro consultant to hold alteplase. Markedly hypertensive  Patient in NAD  Right  strength 4/5   Slight downsloping of right corner of mouth at rest with some slurring of words, symmetric smile, other neuro exam intact    Admitting patient for TIA/stroke, depending on full or partial resolution of deficits. Not requiring alteplase. Extremely hypertensive, but not greater than 220/120.   NSR on monitor.  -stepdown level of care  -monitor BP  -image head and neck, Echo, lipids, A1c, etc  -Neuro consult for AM  -Cardiology consulted to be on board for eval for PAF in case patient completes workup and is discharged before adequate time on telemetry monitoring to assess    Please see Dr. Miki Goddard H&P for full details

## 2017-04-10 NOTE — ED NOTES
TRANSFER - OUT REPORT:    Verbal report given to JUDITH Paz (name) on Rico Vela  being transferred to third floor room 322. (unit) for routine progression of care       Report consisted of patients Situation, Background, Assessment and   Recommendations(SBAR). Information from the following report(s) SBAR, Kardex, ED Summary, Procedure Summary, Intake/Output, MAR, Recent Results, Med Rec Status and Cardiac Rhythm NSR was reviewed with the receiving nurse. Lines:   Peripheral IV 07/07/13 Right Antecubital (Active)       Peripheral IV 04/10/17 Left Antecubital (Active)   Site Assessment Clean, dry, & intact 4/10/2017 12:11 AM   Phlebitis Assessment 0 4/10/2017 12:11 AM   Infiltration Assessment 0 4/10/2017 12:11 AM   Dressing Status Clean, dry, & intact 4/10/2017 12:11 AM   Dressing Type Transparent 4/10/2017 12:11 AM   Hub Color/Line Status Green;Flushed;Patent 4/10/2017 12:11 AM   Action Taken Blood drawn 4/10/2017 12:11 AM        Opportunity for questions and clarification was provided.       Patient transported with:   Monitor  Registered Nurse

## 2017-04-10 NOTE — PROGRESS NOTES
Physical Therapy:    Patient currently off the floor for test, we will continue to follow and attempt evaluation later as able.   Thank you  Perla Moffett PT,DPT

## 2017-04-10 NOTE — CONSULTS
Margot Villalba MD   [facility] [address]    Office [phone]  Mobile [phone]   Cardiology Consultation:    Re Flores is a 64 y.o. female admitted on 4/9/2017 11:56 PM for TIA. I am asked by [facility] to evaluate for possible AFib, cardiogenic source of embolus      IMPRESSION:   1: CVA yesterday, largely recovered, with previous TIA:      Likely primary vascular event, but could have been cardiogenic, possibly from occult PAFib.  2: HBP with medication noncompliance  3: No obvious structural heart disease with echo 2013 normal, repeat pending. RECOMMENDATIONS / PLAN:   Optimize HBP in standard fashion. Check current repeat echo. If not MAJOR surprises on the echo, then no obstacle to discharge. My office will arrange for a 30day loop recorder to be sent to her at home to look for occult PAFib, with FU office visit to discuss results. History of present illness:  Hx remote TIA. HBP with poor medication compliance. Yesterday episode of fall and right arm weakness, latter largely resolving in ER. Question of possible cardiogenic embolus has arisen. Past Medical History:   Diagnosis Date    Hypercholesteremia     Hypertension     TIA (transient ischemic attack)       Past Surgical History:   Procedure Laterality Date    HX HEENT      Tonsilectomy age 8     Family History   Problem Relation Age of Onset    Dementia Mother 61    Heart Attack Father 79      Social History   Substance Use Topics    Smoking status: Former Smoker     Quit date: 7/7/1999    Smokeless tobacco: Never Used    Alcohol use 10.5 oz/week     21 Glasses of wine per week      Comment: 3 glasses of port per day       Primary care physician:  Justice Jean-Baptiste MD     Medications before admission:  No prescriptions prior to admission. Review of systems:  No chest pains, SOB, palpitations, dizzy spells.   All other systems reviewed and are negative except as above.    Physical Exam:  Visit Vitals    BP (!) 163/99    Pulse 77    Temp 97.9 °F (36.6 °C)    Resp 18    Ht 5' (1.524 m)    Wt 101 lb 4.8 oz (45.9 kg)    SpO2 96%    BMI 19.78 kg/m2        Appearance: frail    Cardiovascular: normal pulses and heart sounds without murmurs    Lungs: clear    Abdomen: nontender    Extremities: no edema       Laboratory and Imaging have been personally reviewed and are notable for:    EKG: Telem NSR. EKG NSR, possible old ASMI, similar to 2013 when normal WM on echo.     X Ray: clear    Labs:    Recent Labs      04/10/17   0016   NA  141   K  3.5   CL  105   BUN  7   CREA  0.79   GLU  99   CA  8.8     Recent Labs      04/10/17   0016   WBC  7.0   HGB  13.6   HCT  39.2   PLT  224

## 2017-04-10 NOTE — ED TRIAGE NOTES
Patient arrives with daughter who states \"I think my mother had a stroke\"; last seen normal approximately an hour ago; daughter heard a \"thump\" upstairs and found patient face down on the floor; per daughter, patient was alert but disoriented. Patient presents with slurred speech and evident weakness to R hand; AOx3. Patient taken immediately to CT after BG.

## 2017-04-10 NOTE — PROGRESS NOTES
Occupational Therapy EVALUATION/Discharge  Patient: Sharyn Ferreira (60 y.o. female)  Date: 4/10/2017  Primary Diagnosis: TIA        Precautions:  Fall, Stroke    ASSESSMENT :  Based on the objective data described below, the patient presents with right sided weakness and r/o for TIA/CVA. Patient is alert and oriented, follows commands. Patient has no c/o pain, tremor or numbness to RUE, scores 46/66 for RUE with significant radial side deficits with thumb and index finger, pronation/supination of forearm. Patient describes prior limitations to BUE ROm and strength in  degrees of flexion, full strength at beginning and end ROM. Patient states she ran out of her blood pressure medication and had been taking smaller doses as the medication ran out prior to this episode. Patient lives with her daughter, has assist as needed at home. Patient and/or family was verbally educated on the BE FAST acronym for signs/symptoms of CVA and TIA. BE FAST was written on patient's communication board  for visual education and reinforcement. All questions answered with patient indicating understanding. Recommend outpatient neuro OT and follow up for hypertension. Discharge Recommendations: Outpatient  Further Equipment Recommendations for Discharge: none     SUBJECTIVE:   Patient stated I guess i shouldn't hacve just stopped taking that medicine.     OBJECTIVE DATA SUMMARY:   HISTORY:   Past Medical History:   Diagnosis Date    Acute CVA (cerebrovascular accident) ([address] 4/10/2017    Per MRI:  Posterior L frontal/parietal territory    Acute CVA (cerebrovascular accident) ([address] 4/10/2017    MRI review:  Posterior L frontal/parietal infarct    Cerebral atrophy 4/10/2017    Hypercholesteremia     Hyperlipidemia LDL goal <70 4/10/2017    Hypertension     TIA (transient ischemic attack)      Past Surgical History:   Procedure Laterality Date    HX HEENT      Tonsilectomy age 8       Prior Level of Function/Home Situation: I with ADLS and IADLS  Expanded or extensive additional review of patient history:     Home Situation  Home Environment: Private residence  # Steps to Enter: 5  One/Two Story Residence: Two story  Living Alone: No  Support Systems: Child(viviana)  Patient Expects to be Discharged to[de-identified] Private residence  Current DME Used/Available at Home: None  [x]  Right hand dominant   []  Left hand dominant    EXAMINATION OF PERFORMANCE DEFICITS:  Cognitive/Behavioral Status:  Neurologic State: Alert  Orientation Level: Oriented to person;Oriented to place;Oriented to situation;Oriented to time  Cognition: Follows commands  Perception: Appears intact  Perseveration: No perseveration noted       Skin: appears intact UE    Edema: none noted UE    Hearing: Auditory  Auditory Impairment: None    Vision/Perceptual:       WDL                               Range of Motion:    AROM: Within functional limits, limited thumb and index finger  and adduction right hand  PROM: Within functional limits                      Strength:    Strength: Within functional limits                Coordination:  Coordination: Within functional limits            Tone & Sensation:    Tone: Normal  Sensation: Intact                      Balance:  Sitting: Intact  Standing: Intact    Functional Mobility and Transfers for ADLs:  Bed Mobility:  Rolling: Independent  Supine to Sit: Independent  Sit to Supine: Independent    Transfers:  Sit to Stand: Contact guard assistance       ADL Assessment:   I with UE and LE ADLS, difficulty with tying laces with limited RUE  strength and fine motor control                                         ADL Intervention and task modifications:    Patient instructed to don all clothing while sitting prior to standing, doff all clothing to knees while standing, then sit to doff clothing off from knees to feet in order to facilitate fall prevention, pain management, and energy conservation with ADLS.  Patient indicated understanding/recalled strategies with min instruction. Patient instructed and indicated understanding the benefits of maintaining activity tolerance, functional mobility, and independence with self care tasks during acute stay to ensure safe return home and to baseline. Encouraged patient to increase frequency and duration OOB, be out of bed for all meals, perform daily ADLs (as approved by RN/MD regarding bathing etc), and performing functional mobility to/from bathroom.                                                    Functional Measure:   Fugl-Ontiveros Assessment of Motor Recovery after Stroke:     Reflex Activity  Flexors/Biceps/Fingers: Can be elicited  Extensors/Triceps: Can be elicited  Reflex Subtotal: 4    Volitional Movement Within Synergies  Shoulder Retraction: Full  Shoulder Elevation: Full  Shoulder Abduction (90 degrees): Full  Shoulder External Rotation: Partial  Elbow Flexion: Partial  Forearm Supination: Partial  Shoulder Adduction/Internal Rotation: Full  Elbow Extension: Full  Forearm Pronation: Partial  Subtotal: 14    Volitional Movement Mixing Synergies  Hand to Lumbar Spine: Full  Shoulder Flexion (0-90 degrees): Full  Pronation-Supination: Partial  Subtotal: 5    Volitional Movement With Little or No Synergy  Shoulder Abduction (0-90 degrees): Full  Shoulder Flexion ( degrees): Full  Pronation/Supination: Full  Subtotal : 6    Normal Reflex Activity  Biceps, Triceps, Finger Flexors: Partial  Subtotal : 1    Upper Extremity Total   Upper Extremity Total: 30    Wrist  Stability at 15 Degree Dorsiflexion: Partial  Repeated Dorsiflexion/ Volar Flexion: Partial  Stability at 15 Degree Dorsiflexion: Partial  Repeated Dorsiflexion/ Volar Flexion: Partial  Circumduction: Full  Wrist Total: 6    Hand  Mass Flexion: Partial  Mass Extension: Partial  Grasp A: Partial  Grasp B: Partial  Grasp C: Partial  Grasp D: Partial  Grasp E: Partial  Hand Total: 7    Coordination/Speed  Tremor: None  Dysmetria: Slight  Time: >5s  Coordination/Speed Total : 3    Total A-D  Total A-D (Motor Function): 46/66     Percentage of impairment CH  0% CI  1-19% CJ  20-39% CK  40-59% CL  60-79% CM  80-99% CN  100%   Fugl-Ontiveros score: 0-66 66 53-65 39-52 26-38 13-25 1-12   0      This is a reliable/valid measure of arm function after a neurological event. It has established value to characterize functional status and for measuring spontaneous and therapy-induced recovery; tests proximal and distal motor functions. Fugl-Ontiveros Assessment  UE scores recorded between five and 30 days post neurologic event can be used to predict UE recovery at six months post neurologic event. Severe = 0-21 points   Moderately Severe = 22-33 points   Moderate = 34-47 points   Mild = 48-66 points  MIAH Benson, YOLIS Verduzco, & SANTOSH Ngo (1992). Measurement of motor recovery after stroke: Outcome assessment and sample size requirements. Stroke, 23, pp. 7534-6494.   ------------------------------------------------------------------------------------------------------------------------------------------------------------------  MCID:  Stroke:   Charbel Bazzi et al, 2001; n = 171; mean age 79 (5) years; assessed within 16 (12) days of stroke, Acute Stroke)  FMA Motor Scores from Admission to Discharge   10 point increase in FMA Upper Extremity = 1.5 change in discharge FIM   10 point increase in FMA Lower Extremity = 1.9 change in discharge FIM  MDC:   Stroke:   Gladys Mckeon et al, 2008, n = 14, mean age = 59.9 (14.6) years, assessed on average 14 (6.5) months post stroke, Chronic Stroke)   FMA = 5.2 points for the Upper Extremity portion of the assessment     G codes: In compliance with CMSs Claims Based Outcome Reporting, the following G-code set was chosen for this patient based on their primary functional limitation being treated:     The outcome measure chosen to determine the severity of the functional limitation was the [employer] with a score of 46/66 which was correlated with the impairment scale. ? Other PT/OT Primary Functional Limitations:     - CURRENT STATUS: CJ - 20%-39% impaired, limited or restricted    - GOAL STATUS: CJ - 20%-39% impaired, limited or restricted    - D/C STATUS:  CJ - 20%-39% impaired, limited or restricted      Occupational Therapy Evaluation Charge Determination   History Examination Decision-Making   LOW Complexity : Brief history review  LOW Complexity : 1-3 performance deficits relating to physical, cognitive , or psychosocial skils that result in activity limitations and / or participation restrictions  LOW Complexity : No comorbidities that affect functional and no verbal or physical assistance needed to complete eval tasks       Based on the above components, the patient evaluation is determined to be of the following complexity level: LOW     Pain:  Pain Scale 1: Numeric (0 - 10)  Pain Intensity 1: 0              Activity Tolerance:   Patient completed multiple sit to stand transfers, mobility around room and bathroom  in completion of ADLS with no LOB or break  Please refer to the flowsheet for vital signs taken during this treatment. After treatment:   []  Patient left in no apparent distress sitting up in chair  [x]  Patient left in no apparent distress to transfer to fifth floor  [x]  Call bell left within reach  [x]  Nursing notified  []  Caregiver present  []  Bed alarm activated    COMMUNICATION/EDUCATION:   The patients plan of care was discussed with: Physical Therapist, Registered Nurse and . Patient was educated regarding Her deficit(s) of RUE ROM as this relates to Her diagnosis of stroke. She demonstrated Good understanding. Patient and/or family was verbally educated on the BE FAST acronym for signs/symptoms of CVA and TIA. BE FAST was written on patient's communication board  for visual education and reinforcement.   All questions answered with patient indicating understanding. [x]      Home safety education was provided and the patient/caregiver indicated understanding. [x]      Patient/family have participated as able and agree with findings and recommendations. []      Patient is unable to participate in plan of care at this time.         Thank you for this referral.  Uri Said, OT

## 2017-04-10 NOTE — ED PROVIDER NOTES
HPI Comments: 64 y.o. female with past medical history significant for HTN, hypercholesteremia, TIA who presents accompanied by daughter with chief complaint of extremity weakness. Daughter states patient went up to her bed approx 1 hour ago when she heard a \"thump\" like she had fallen and found her face down in a sitting position next to the bed and unable to use her right arm. Daughter states patient was disoriented and her speech is slurred now. Daughter states patient is able to lift her arm now slightly which is a slight improvement. Daughter states patient has not been on blood pressure medications for a few weeks and is not on blood thinners, \"just vitamins. \" BG 90 in the ED. Patient denies any pain. There are no other acute medical concerns at this time. Social hx: former smoker, current alcohol drinker  PCP: Marcos Poon MD    Note written by Claudette Ng, as dictated by Roberta Arellano, DO 12:00 AM     The history is provided by the patient and a relative. No  was used. Past Medical History:   Diagnosis Date    Hypercholesteremia     Hypertension     TIA (transient ischemic attack)        Past Surgical History:   Procedure Laterality Date    HX HEENT      Tonsilectomy age 8         Family History:   Problem Relation Age of Onset    Dementia Mother 61    Heart Attack Father 79       Social History     Social History    Marital status:      Spouse name: N/A    Number of children: N/A    Years of education: N/A     Occupational History    Not on file.      Social History Main Topics    Smoking status: Former Smoker     Quit date: 7/7/1999    Smokeless tobacco: Never Used    Alcohol use 10.5 oz/week     21 Glasses of wine per week      Comment: 3 glasses of port per day    Drug use: No    Sexual activity: No     Other Topics Concern    Not on file     Social History Narrative     ALLERGIES: Benadryl allergy/cold [diphenhyd-pe-acetaminophen] and Penicillins    Review of Systems   Constitutional: Negative for appetite change, chills, fever and unexpected weight change. HENT: Negative for ear pain, hearing loss, rhinorrhea and trouble swallowing. Eyes: Negative for pain and visual disturbance. Respiratory: Negative for cough, chest tightness and shortness of breath. Cardiovascular: Negative for chest pain and palpitations. Gastrointestinal: Negative for abdominal distention, abdominal pain, blood in stool and vomiting. Genitourinary: Negative for dysuria, hematuria and urgency. Musculoskeletal: Negative for back pain and myalgias. Skin: Negative for rash. Neurological: Positive for speech difficulty and weakness. Negative for dizziness, syncope and numbness. Psychiatric/Behavioral: Positive for confusion. Negative for suicidal ideas. All other systems reviewed and are negative. Vitals:    04/09/17 2357 04/10/17 0034   BP: (!) 166/106    Pulse: 87    Resp: 18    Temp: 97.7 °F (36.5 °C)    SpO2: 98%    Weight:  46.9 kg (103 lb 6.3 oz)   Height:  5' (1.524 m)            Physical Exam   Constitutional: She is oriented to person, place, and time. She appears well-developed and well-nourished. No distress. HENT:   Head: Normocephalic and atraumatic. Right Ear: External ear normal.   Left Ear: External ear normal.   Nose: Nose normal.   Mouth/Throat: Oropharynx is clear and moist. No oropharyngeal exudate. Eyes: Conjunctivae and EOM are normal. Pupils are equal, round, and reactive to light. Right eye exhibits no discharge. Left eye exhibits no discharge. No scleral icterus. Neck: Normal range of motion. Neck supple. No JVD present. No tracheal deviation present. Cardiovascular: Normal rate, regular rhythm, normal heart sounds and intact distal pulses. Exam reveals no gallop and no friction rub. No murmur heard. Pulmonary/Chest: Effort normal and breath sounds normal. No stridor.  No respiratory distress. She has no decreased breath sounds. She has no wheezes. She has no rhonchi. She has no rales. She exhibits no tenderness. Abdominal: Soft. Bowel sounds are normal. She exhibits no distension. There is no tenderness. There is no rebound and no guarding. Musculoskeletal: Normal range of motion. She exhibits no edema or tenderness. Neurological: She is alert and oriented to person, place, and time. She has normal strength and normal reflexes. No cranial nerve deficit or sensory deficit. She exhibits normal muscle tone. GCS eye subscore is 4. GCS verbal subscore is 5. GCS motor subscore is 6. Moderate right sided weakness with mild drift in right lower extremity  Right sided facial droop with forehead sparing  Slightly slurred speech   Skin: Skin is warm and dry. No rash noted. She is not diaphoretic. No erythema. No pallor. Psychiatric: She has a normal mood and affect. Her behavior is normal. Judgment and thought content normal.   Nursing note and vitals reviewed.   Note written by Claudette Pitts, as dictated by Chad Wise DO 12:04 AM      MDM  Number of Diagnoses or Management Options  Right sided weakness:   Secondary hypertension:   Transient cerebral ischemia, unspecified type:      Amount and/or Complexity of Data Reviewed  Clinical lab tests: ordered and reviewed  Tests in the radiology section of CPT®: ordered and reviewed  Tests in the medicine section of CPT®: ordered and reviewed  Discussion of test results with the performing providers: yes  Decide to obtain previous medical records or to obtain history from someone other than the patient: yes  Obtain history from someone other than the patient: yes (daughter)  Review and summarize past medical records: yes  Discuss the patient with other providers: yes (Family practice, tele-neurology)  Independent visualization of images, tracings, or specimens: yes (ekg)    Risk of Complications, Morbidity, and/or Mortality  Presenting problems: high  Diagnostic procedures: moderate  Management options: high    Critical Care  Total time providing critical care: 30-74 minutes (45 minutes of CCT regardless of any procedures that might have been done.)    Patient Progress  Patient progress: improved    ED Course       Procedures    CONSULT NOTE:  12:02 AM Daleen Sicard, DO spoke with Dr. Hiwot Jessica for Neurology. Discussed available diagnostic tests and clinical findings. He is in agreement with care plans as outlined. Dr. Sade Conroy will evaluate the patient. PROGRESS NOTE:  12:45 AM  Discussed risks and benefits of TPA with patient. Patient consents so will proceed with TPA and admission to [facility] for further evaluation and treatment. CONSULT NOTE:  12:47 AM Daleen Sicard, DO spoke with Consult for [facility]. Discussed available diagnostic tests and clinical findings. He is in agreement with care plans as outlined. Family Practice will admit for stroke workup and treatment. PROGRESS NOTE:  12:58 AM  Patient has increased strength in right upper extremity and less pronounced right facial droop. Will consult Neurology. CONSULT NOTE:  1:00 AM Daleen Sicard, DO spoke with Dr. Sade Conroy, Consult for Neurology. Discussed available diagnostic tests and clinical findings. He is in agreement with care plans as outlined. Dr. Sade Conroy recommends holding off on TPA due to the patient's increased strength. Chief Complaint   Patient presents with    Extremity Weakness       2:04 AM  The patients presenting problems have been discussed, and they are in agreement with the care plan formulated and outlined with them. I have encouraged them to ask questions as they arise throughout their visit.     MEDICATIONS GIVEN:  Medications   sodium chloride (NS) flush 5-10 mL (not administered)   sodium chloride (NS) flush 5-10 mL (not administered)   sodium chloride (NS) flush 5 mL (not administered) sodium chloride (NS) flush 5 mL (not administered)       LABS REVIEWED:  Recent Results (from the past 24 hour(s))   GLUCOSE, POC    Collection Time: 04/09/17 11:58 PM   Result Value Ref Range    Glucose (POC) 90 65 - 100 mg/dL    Performed by Kale Hamilton    POC LACTIC ACID    Collection Time: 04/10/17 12:01 AM   Result Value Ref Range    Lactic Acid (POC) 1.3 0.4 - 2.0 mmol/L   CBC WITH AUTOMATED DIFF    Collection Time: 04/10/17 12:16 AM   Result Value Ref Range    WBC 7.0 3.6 - 11.0 K/uL    RBC 4.15 3.80 - 5.20 M/uL    HGB 13.6 11.5 - 16.0 g/dL    HCT 39.2 35.0 - 47.0 %    MCV 94.5 80.0 - 99.0 FL    MCH 32.8 26.0 - 34.0 PG    MCHC 34.7 30.0 - 36.5 g/dL    RDW 14.4 11.5 - 14.5 %    PLATELET 604 796 - 541 K/uL    NEUTROPHILS 55 32 - 75 %    LYMPHOCYTES 28 12 - 49 %    MONOCYTES 10 5 - 13 %    EOSINOPHILS 6 0 - 7 %    BASOPHILS 1 0 - 1 %    ABS. NEUTROPHILS 3.9 1.8 - 8.0 K/UL    ABS. LYMPHOCYTES 2.0 0.8 - 3.5 K/UL    ABS. MONOCYTES 0.7 0.0 - 1.0 K/UL    ABS. EOSINOPHILS 0.4 0.0 - 0.4 K/UL    ABS. BASOPHILS 0.1 0.0 - 0.1 K/UL   METABOLIC PANEL, COMPREHENSIVE    Collection Time: 04/10/17 12:16 AM   Result Value Ref Range    Sodium 141 136 - 145 mmol/L    Potassium 3.5 3.5 - 5.1 mmol/L    Chloride 105 97 - 108 mmol/L    CO2 28 21 - 32 mmol/L    Anion gap 8 5 - 15 mmol/L    Glucose 99 65 - 100 mg/dL    BUN 7 6 - 20 MG/DL    Creatinine 0.79 0.55 - 1.02 MG/DL    BUN/Creatinine ratio 9 (L) 12 - 20      GFR est AA >60 >60 ml/min/1.73m2    GFR est non-AA >60 >60 ml/min/1.73m2    Calcium 8.8 8.5 - 10.1 MG/DL    Bilirubin, total 0.4 0.2 - 1.0 MG/DL    ALT (SGPT) 21 12 - 78 U/L    AST (SGOT) 21 15 - 37 U/L    Alk.  phosphatase 70 45 - 117 U/L    Protein, total 6.9 6.4 - 8.2 g/dL    Albumin 3.4 (L) 3.5 - 5.0 g/dL    Globulin 3.5 2.0 - 4.0 g/dL    A-G Ratio 1.0 (L) 1.1 - 2.2     POC INR    Collection Time: 04/10/17 12:23 AM   Result Value Ref Range    INR (POC) 1.0 <1.2     EKG, 12 LEAD, INITIAL    Collection Time: 04/10/17 12:50 AM   Result Value Ref Range    Ventricular Rate 72 BPM    Atrial Rate 72 BPM    P-R Interval 164 ms    QRS Duration 80 ms    Q-T Interval 436 ms    QTC Calculation (Bezet) 477 ms    Calculated P Axis 35 degrees    Calculated R Axis -3 degrees    Calculated T Axis 77 degrees    Diagnosis       Normal sinus rhythm  Possible Left atrial enlargement  Left ventricular hypertrophy  Anteroseptal infarct (cited on or before 07-JUL-2013)  Abnormal ECG  When compared with ECG of 07-JUL-2013 00:38,  Questionable change in initial forces of Septal leads         VITAL SIGNS:  Patient Vitals for the past 12 hrs:   Temp Pulse Resp BP SpO2   04/10/17 0147 - 80 18 (!) 182/107 96 %   04/10/17 0115 - 82 21 (!) 172/100 95 %   04/10/17 0047 - 74 18 168/89 96 %   04/10/17 0037 - 86 21 - 96 %   04/10/17 0036 - 86 26 (!) 170/92 96 %   04/09/17 2357 97.7 °F (36.5 °C) 87 18 (!) 166/106 98 %       RADIOLOGY RESULTS:  The following have been ordered and reviewed:  XR CHEST PORT   Final Result      CT CODE NEURO HEAD WO CONTRAST   Final Result        Study Result      EXAM: CT code neuro head without contrast     INDICATION: Extremity weakness.     COMPARISON: CT head and MRI brain 7/7/2013     TECHNIQUE: Axial noncontrast head CT from foramen magnum to vertex. Coronal and  sagittal reformatted images were obtained. CT dose reduction was achieved  through use of a standardized protocol tailored for this examination and  automatic exposure control for dose modulation.     FINDINGS: There is diffuse age-related parenchymal volume loss. The ventricles  and sulci are age-appropriate without hydrocephalus. There is no mass effect or  midline shift. There is no intracranial hemorrhage or extra-axial fluid  collection. Areas of low attenuation in the periventricular white matter  represent stable chronic microvascular ischemic changes. The gray-white matter  differentiation is maintained. The basal cisterns are patent.  There is  intracranial atherosclerosis.     The osseous structures are intact. The visualized paranasal sinuses and mastoid  air cells are clear.     IMPRESSION  IMPRESSION:   There is no acute intracranial abnormality. Study Result      EXAM: XR CHEST PORT     INDICATION: Chest pain.     COMPARISON: 7/7/2013     TECHNIQUE: Portable AP upright chest view at 0036 hours     FINDINGS: Cardiac monitoring wires overlie the thorax. The cardiomediastinal  contours are stable. The pulmonary vasculature is within normal limits.      The lungs and pleural spaces are clear. The bones and upper abdomen are stable.     IMPRESSION  IMPRESSION:     No acute process. Stable exam.     ED EKG interpretation:  Rhythm: normal sinus rhythm; and regular . Rate (approx.): 72; Axis: normal; P wave: normal; QRS interval: normal ; ST/T wave: normal; Other findings: left ventricular hypertrophy, possible LAE, anteroseptal infarct. This EKG was interpreted by Alvaro Darling DO,ED Provider. CONSULTATIONS:   Family practice, tele-neurology    PROGRESS NOTES:  Pt's symptoms drastically improved just prior to tPA. Discussed results and plan with patient. Patient will be admitted/observed for further evaluation and treatment. DIAGNOSIS:    1. Right sided weakness    2. Transient cerebral ischemia, unspecified type    3. Secondary hypertension        PLAN:  Admit/obs    ED COURSE: The patients hospital course has been uncomplicated.

## 2017-04-11 VITALS
SYSTOLIC BLOOD PRESSURE: 133 MMHG | HEIGHT: 60 IN | OXYGEN SATURATION: 95 % | WEIGHT: 101.3 LBS | TEMPERATURE: 97.9 F | BODY MASS INDEX: 19.89 KG/M2 | HEART RATE: 66 BPM | DIASTOLIC BLOOD PRESSURE: 62 MMHG | RESPIRATION RATE: 18 BRPM

## 2017-04-11 LAB
ANION GAP BLD CALC-SCNC: 7 MMOL/L (ref 5–15)
BUN SERPL-MCNC: 11 MG/DL (ref 6–20)
BUN/CREAT SERPL: 13 (ref 12–20)
CALCIUM SERPL-MCNC: 8.9 MG/DL (ref 8.5–10.1)
CHLORIDE SERPL-SCNC: 105 MMOL/L (ref 97–108)
CHOLEST SERPL-MCNC: 208 MG/DL
CO2 SERPL-SCNC: 29 MMOL/L (ref 21–32)
CREAT SERPL-MCNC: 0.86 MG/DL (ref 0.55–1.02)
ERYTHROCYTE [DISTWIDTH] IN BLOOD BY AUTOMATED COUNT: 14.7 % (ref 11.5–14.5)
GLUCOSE SERPL-MCNC: 103 MG/DL (ref 65–100)
HCT VFR BLD AUTO: 39.9 % (ref 35–47)
HDLC SERPL-MCNC: 109 MG/DL
HDLC SERPL: 1.9 {RATIO} (ref 0–5)
HGB BLD-MCNC: 13.4 G/DL (ref 11.5–16)
LDLC SERPL CALC-MCNC: 87.8 MG/DL (ref 0–100)
LIPID PROFILE,FLP: ABNORMAL
MCH RBC QN AUTO: 31.8 PG (ref 26–34)
MCHC RBC AUTO-ENTMCNC: 33.6 G/DL (ref 30–36.5)
MCV RBC AUTO: 94.5 FL (ref 80–99)
PLATELET # BLD AUTO: 202 K/UL (ref 150–400)
POTASSIUM SERPL-SCNC: 4.2 MMOL/L (ref 3.5–5.1)
RBC # BLD AUTO: 4.22 M/UL (ref 3.8–5.2)
SODIUM SERPL-SCNC: 141 MMOL/L (ref 136–145)
TRIGL SERPL-MCNC: 56 MG/DL (ref ?–150)
VLDLC SERPL CALC-MCNC: 11.2 MG/DL
WBC # BLD AUTO: 7.8 K/UL (ref 3.6–11)

## 2017-04-11 PROCEDURE — 36415 COLL VENOUS BLD VENIPUNCTURE: CPT | Performed by: FAMILY MEDICINE

## 2017-04-11 PROCEDURE — G8998 SWALLOW D/C STATUS: HCPCS | Performed by: PHYSICAL MEDICINE & REHABILITATION

## 2017-04-11 PROCEDURE — 80048 BASIC METABOLIC PNL TOTAL CA: CPT | Performed by: FAMILY MEDICINE

## 2017-04-11 PROCEDURE — 74011250637 HC RX REV CODE- 250/637: Performed by: FAMILY MEDICINE

## 2017-04-11 PROCEDURE — 85027 COMPLETE CBC AUTOMATED: CPT | Performed by: FAMILY MEDICINE

## 2017-04-11 PROCEDURE — 74011250637 HC RX REV CODE- 250/637: Performed by: SPECIALIST

## 2017-04-11 PROCEDURE — 80061 LIPID PANEL: CPT | Performed by: FAMILY MEDICINE

## 2017-04-11 PROCEDURE — 74011250636 HC RX REV CODE- 250/636: Performed by: NURSE PRACTITIONER

## 2017-04-11 PROCEDURE — 74011250637 HC RX REV CODE- 250/637: Performed by: PSYCHIATRY & NEUROLOGY

## 2017-04-11 PROCEDURE — 92526 ORAL FUNCTION THERAPY: CPT | Performed by: PHYSICAL MEDICINE & REHABILITATION

## 2017-04-11 RX ORDER — ATORVASTATIN CALCIUM 40 MG/1
40 TABLET, FILM COATED ORAL DAILY
Qty: 30 TAB | Refills: 2 | Status: SHIPPED | OUTPATIENT
Start: 2017-04-11 | End: 2017-04-28 | Stop reason: SDUPTHER

## 2017-04-11 RX ORDER — LISINOPRIL 20 MG/1
20 TABLET ORAL DAILY
Qty: 30 TAB | Refills: 2 | Status: SHIPPED | OUTPATIENT
Start: 2017-04-11 | End: 2017-04-24 | Stop reason: SDUPTHER

## 2017-04-11 RX ORDER — CLOPIDOGREL BISULFATE 75 MG/1
75 TABLET ORAL DAILY
Qty: 30 TAB | Refills: 1 | Status: SHIPPED | OUTPATIENT
Start: 2017-04-11 | End: 2017-04-28 | Stop reason: SDUPTHER

## 2017-04-11 RX ADMIN — ATORVASTATIN CALCIUM 40 MG: 20 TABLET, FILM COATED ORAL at 09:05

## 2017-04-11 RX ADMIN — CLOPIDOGREL 75 MG: 75 TABLET, FILM COATED ORAL at 09:04

## 2017-04-11 RX ADMIN — Medication 10 ML: at 06:00

## 2017-04-11 RX ADMIN — ENOXAPARIN SODIUM 40 MG: 40 INJECTION SUBCUTANEOUS at 09:05

## 2017-04-11 RX ADMIN — Medication 5 ML: at 06:00

## 2017-04-11 RX ADMIN — LISINOPRIL 20 MG: 20 TABLET ORAL at 09:05

## 2017-04-11 NOTE — PROGRESS NOTES
Problem: Dysphagia (Adult)  Goal: *Acute Goals and Plan of Care (Insert Text)  Swallowing goals initiated 4-10-16:  1) TOLERATE dental soft diet, thin liquids without s/s aspiration by 4-17-17 MET 4/11/17  SPEECH LANGUAGE PATHOLOGY DYSPHAGIA TREATMENT/DISCHARGE  Patient: Milad Amaro (75 y.o. female)  Date: 4/11/2017  Diagnosis: TIA  CVA (cerebral vascular accident) ([facility] [address] Acute CVA (cerebrovascular accident) [facility])       Precautions:  Fall      ASSESSMENT:  Patient tolerating current diet without difficulty. She continues with mild oral phase dysphagia secondary to slow but thorough mastication. This is related to decreased dentition. Patient stated her lower dentures are at home. Patient with good oral clearance and timely propulsion. No pharyngeal deficits noted. Observed patient take medication whole with thins without difficulty. Progression toward goals:  [X]           Improving appropriately and progressing toward goals  [ ]           Improving slowly and progressing toward goals  [ ]           Not making progress toward goals and plan of care will be adjusted       PLAN:  Continue soft diet secondary to lower dentures not at hospital.  Patient will be discharged from speech therapy at this time. Rationale for discharge:  [X]      Goals Achieved  [ ]      Rachel Corral  [ ]      Patient not participating in therapy  [ ]      Other:  Discharge Recommendations:  None       SUBJECTIVE:   Patient stated I eat because I have to not because I enjoy eating. OBJECTIVE:   Cognitive and Communication Status:  Neurologic State: Alert  Orientation Level: Oriented X4  Cognition: Follows commands    Perception: Appears intact    Perseveration: No perseveration noted       Dysphagia Treatment:     P.O. Trials:  Patient Position: sitting edge of bed  Vocal quality prior to P.O.: No impairment  Consistency Presented: Solid; Thin liquid;Pill/Tablet  How Presented: Cup/sip; Successive swallows; Self-fed/presented;Spoon     Bolus Acceptance: No impairment  Bolus Formation/Control: Impaired  Type of Impairment: Delayed;Mastication  Propulsion: No impairment  Oral Residue: None  Initiation of Swallow: No impairment  Laryngeal Elevation: Functional  Aspiration Signs/Symptoms: None  Pharyngeal Phase Characteristics: No impairment, issues, or problems            Oral Phase Severity: Mild  Pharyngeal Phase Severity : No impairment                                                                                                           G Codes: In compliance with CMSs Claims Based Outcome Reporting, the following G-code set was chosen for this patient based the use of the NOMS functional outcome to quantify this patient's level of swallowing impairment. Using the NOMS, the patient was determined to be at level 6 for swallow function which correlates with the CI= 1-19% level of severity. Based on the objective assessment provided within this note, the current, goal, and discharge g-codes are as follows:     Swallow  Swallowing:   Swallow D/C Status CI= 1-19%         NOMS Swallowing Levels:  Level 1 (CN): NPO  Level 2 (CM): NPO but takes consistency in therapy  Level 3 (CL): Takes less than 50% of nutrition p.o. and continues with nonoral feedings; and/or safe with mod cues; and/or max diet restriction  Level 4 (CK): Safe swallow but needs mod cues; and/or mod diet restriction; and/or still requires some nonoral feeding/supplements  Level 5 (CJ): Safe swallow with min diet restriction; and/or needs min cues  Level 6 (CI): Independent with p.o.; rare cues; usually self cues; may need to avoid some foods or needs extra time  Level 7 ([address] [place] [address]): Independent for all p.o.  MIKO (2003). National Outcomes Measurement System (NOMS): Adult Speech-Language Pathology User's Guide.            Pain:  Pain Scale 1: Numeric (0 - 10)  Pain Intensity 1: 0     After treatment:   [ ]                Patient left in no apparent distress sitting up in chair  [X]                Patient left in no apparent distress in bed  [X]                Call bell left within reach  [X]                Nursing notified  [ ]                Caregiver present  [ ]                Bed alarm activated      COMMUNICATION/EDUCATION:   Patient was educated regarding Her deficit(s) of dysphagia as this relates to Her diagnosis. She demonstrated Good understanding as evidenced by verbalization. The patients plan of care including recommendations, planned interventions, and recommended diet changes were discussed with: Registered Nurse.         LUIS Brennan  Time Calculation: 10 mins

## 2017-04-11 NOTE — PROGRESS NOTES
Pharmacist Discharge Medication Reconciliation    Discharge Provider:  Veronica Jay MD       Discharge Medications:      Current Discharge Medication List        START taking these medications         Dose & Instructions Dispensing Information Comments   Morning Noon Evening Bedtime      atorvastatin 40 mg tablet   Commonly known as:  LIPITOR       Your last dose was: Your next dose is:              Dose:  40 mg   Take 1 Tab by mouth daily. Quantity:  30 Tab   Refills:  2                         clopidogrel 75 mg Tab   Commonly known as:  PLAVIX       Your last dose was: Your next dose is:              Dose:  75 mg   Take 1 Tab by mouth daily. Quantity:  30 Tab   Refills:  1                         lisinopril 20 mg tablet   Commonly known as:  Salvadore Dine       Your last dose was: Your next dose is:              Dose:  20 mg   Take 1 Tab by mouth daily. Quantity:  30 Tab   Refills:  2                                  Where to Get Your Medications        Information on where to get these meds will be given to you by the nurse or doctor. !  Ask your nurse or doctor about these medications     atorvastatin 40 mg tablet    clopidogrel 75 mg Tab    lisinopril 20 mg tablet             The patient's chart, MAR, and AVS were reviewed by   JERRY Dunn [facility],   Contact: [phone]

## 2017-04-11 NOTE — PROGRESS NOTES
Bedside and Verbal shift change report given to JUDITH Matta (oncoming nurse) by Kandace Walton (offgoing nurse). Report included the following information SBAR, Kardex, Procedure Summary, Intake/Output, MAR, Recent Results and Med Rec Status.

## 2017-04-11 NOTE — CDMP QUERY
Dr. Desirae Vazquez:    The diagnoses of TIA/CVA have been documented for this patient who presented with right sided weakness. Based on your medical judgment, could you please clarify if     =>Right sided weakness due to CVA  =>Right sided weakness due to TIA  =>Other Explanation of clinical findings  =>Unable to Determine (no explanation of clinical findings)    Please clarify and document your clinical opinion in the progress notes and discharge summary including the definitive and/or presumptive diagnosis, (suspected or probable), related to the above clinical findings. Please include clinical findings supporting your diagnosis.     Jaida Ontiveros [facility], [address] [place] [address]  [email or web address]

## 2017-04-11 NOTE — DISCHARGE SUMMARY
[facility] FAMILY MEDICINE RESIDENCY PROGRAM   Discharge/Transfer/Off-Service Note    Date: April 11, 2017    Khanh Monae  MRN: [id number]  YOB: 1940  Age: 64 y.o. Date of admission: 4/9/2017     Date of discharge/transfer: 4/11/2017    Primary Care Physician: Maki Miller MD     Attending physician at admission: Silvia Murray MD     Attending physician at discharge/transfer: Silvia Murray MD     Resident physician at discharge/transfer: Brittnee Alvarez MD     Consultants during hospitalization  Neurlogy: Dr. Manuel Su  Cardiology: Dr. Kaden Chairez     Admission diagnoses   TIA  CVA (cerebral vascular accident) [facility])    Discharge diagnoses  Hospital Problems  Date Reviewed: 4/10/2017          Codes Class Noted POA    * (Principal)Acute CVA (cerebrovascular accident) [facility]) ICD-10-CM: I63.9  ICD-9-CM: 434.91  4/10/2017 Yes    Overview Signed 4/10/2017 12:14 PM by Mellissa Campos NP     MRI review:  Posterior L frontal/parietal infarct             Cerebral atrophy ICD-10-CM: G31.9  ICD-9-CM: 331.9  4/10/2017 Yes    Overview Signed 4/10/2017  1:01 PM by Mellissa Campos NP     Greater than expected for age             Hyperlipidemia LDL goal <70 ICD-10-CM: E78.5  ICD-9-CM: 272.4  4/10/2017 Yes        CVA (cerebral vascular accident) [facility]) ICD-10-CM: I63.9  ICD-9-CM: 434.91  4/10/2017 Unknown        Hypertension ICD-10-CM: I10  ICD-9-CM: 401.9  11/15/2013 Yes               History of Present Illness  Per Dr. Bear Trevino:  Karen Casey is a 64 y.o. female with history of HTN, HLD, TIA (2013) who presents to the ER complaining of R facial droop, right arm weakness below the elbow, and a GLF. She was last seen normal at 10:30pm. Pt walked upstairs; about 15 minutes later, her daughter heard a thump and found her mother on the ground. Pt had GLF; was found to be face down with contusion on R forehead.  Daughter is unsure if there was LOC; pt was not initially verbally responsive when found face down. No urinary or bowel incontinence. From the time pt was found to ER arrival was about 1 hour. Of note, pt has been off anti-hypertensives for several months b/c she had been unable to reschedule an appointment with PCP and refill BP medications; pt has BP monitor at home, but has not checked her BP and is unsure of its normal range. Pt has been taking daily aspirin 325mg and Lipitor 20 mg. She is only followed by PCP; no other specialists.      On arrival patient had significant focal neurological deficits and code stroke was called at 11:56 PM; while in ED, pt was still seen to have R facial droop and R arm paralysis from elbow down. tPA was prepared per Teleneuro recommendations, Dr. Milla Baptiste; However, before it could be administered, patient recovered right hand strength, facial droop, speech, and confusion improved. At that point Neuro was reconsulted and recommended to discontinue the tPA order. Patient denies headache, dizziness, lightheadedness, vision change, dysphagia, CP, palpitations, SOB, abdominal pain, n/v.      In the ER, vital signs were remarkable for HTN 160s-180s/90s-100s. Labs remarkable for K 3.5; LA, glucose wnl. CT head w/o contrast showed no acute abnormality. Pt did not require treatment in ED; no tPA given. \"    Hospital course  Dalia Brown is a 64 y.o. female with hx of TIA (2013), HTN, HLD who is admitted for TIA workup. Pt improved, but not at baseline, while in ED and did not require tPA since she showed significant improvement.       CVA- Right upper extremity weakness and clumsiness. CT head negative. MRI showed left sided infarcts MCA territory. Echo EF 40% and global hypokinesis. . Followed by neurology who started plavix and lipitor while inpatient. Per neurology recs only treatment with plavix and will hold aspirin for now.  Will need lifelong treatment with plavix-will have P2Y12 checked at her outpatient neurology appt to see if she is a plavix responder. PT/OT recommended outpatient occupational therapy. Also consulted cardiology to assess for cardiac etiology of CVA and pt has been set up with an outpatient event monitor.   - Start Plavix 75mg daily  - Start Lipitor 40mg daily   - Hold aspirin   - F/U appts made with neurology and cardiology    - Provided patient script for outpatient occupational therapy      Hypertension - Initially elevated on admission. Per pt, she has been out of her antihypertensive for months. Lisinopril started by cardiology during this admission and BP well controlled since then. - Lisinopril 20mg daily; follow-up BP at outpatient clinic appt and further adjustments to be made by PCP      HLD - . Lipid panel still pending. Goal LDL <70.  - Lipitor 40 mg daily  - F/U lipid panel     Physical exam at discharge:   Constitutional: She appears well-developed and well-nourished. No distress. HENT:  Normocephalic. Area of erythema on R forehead, ~ 2x2\"; no lacerations. External ear normal b/l, nose normal. Moist mucus membrane. Eyes: Conjunctivae and EOM are normal. No scleral icterus. Cardiovascular: Normal rate, regular rhythm and intact distal pulses. No murmur heard. Pulmonary/Chest: Effort normal and breath sounds normal. No respiratory distress. She has no wheezes. She exhibits no tenderness. Abdominal: Soft. She exhibits no distension. There is no tenderness. There is no rebound and no guarding. Neurological: CN 2-12 grossly intact. Sensation intact. Finger to nose intact on right; diminished on left. R hand  3/5, L hand  5/5. BLE 5/5 strength, 5/5 plantar and dorsiflexion. Sensation intact. No pronator drift. R hand with questionable ability to pronate and supinate; finger  on left diminished; improvement in ability to make fist today but still with diminished princer grasp. Skin: Skin is warm and dry. She is not diaphoretic. No erythema. Psychiatric: She has a normal mood and affect.  Her behavior is normal. Judgment and thought content normal.   Nursing note and vitals reviewed.     Condition at discharge: Stable    Labs  Recent Labs      04/11/17   0625  04/10/17   0016   WBC  7.8  7.0   HGB  13.4  13.6   HCT  39.9  39.2   PLT  202  224     Recent Labs      04/11/17   0625  04/10/17   0016   NA  141  141   K  4.2  3.5   CL  105  105   CO2  29  28   BUN  11  7   CREA  0.86  0.79   GLU  103*  99   CA  8.9  8.8     Recent Labs      04/10/17   0016   SGOT  21   ALT  21   AP  70   TBILI  0.4   TP  6.9   ALB  3.4*   GLOB  3.5     Recent Labs      04/10/17   0023   INR  1.0      No results for input(s): FE, TIBC, PSAT, FERR in the last 72 hours. No results for input(s): PH, PCO2, PO2 in the last 72 hours. No results for input(s): CPK, CKMB in the last 72 hours. No lab exists for component: TROPONINI  Lab Results   Component Value Date/Time    Glucose (POC) 90 04/09/2017 11:58 PM    Glucose (POC) 110 07/07/2013 12:47 AM       Diagnostic studies  -CT head w/o contrast (4/9): There is no acute intracranial abnormality  -CXR (4/10): No acute process. Stable exam.  -CTA head and neck with contrast (4/10): 1. Approximately 20% left internal carotid artery origin stenosis by NASCET Criteria. Mild stenosis origin of bilateral vertebral arteries. No arterial occlusion, intraluminal filling defect or other acute arterial abnormality. -MRI head w/o contrast (4/10): 1. Multiple foci of acute infarction left posterior frontal lobe and left middle  cerebral artery territory. Again noted is chronic small vessel ischemic change and cerebral volume loss  greater than expected for age.     Procedures  -None    Disposition:  Home    Discharge/Transfer Medications  Current Discharge Medication List      START taking these medications    Details   lisinopril (PRINIVIL, ZESTRIL) 20 mg tablet Take 1 Tab by mouth daily. Qty: 30 Tab, Refills: 2      clopidogrel (PLAVIX) 75 mg tab Take 1 Tab by mouth daily.   Qty: 30 Tab, Refills: 1      atorvastatin (LIPITOR) 40 mg tablet Take 1 Tab by mouth daily.   Qty: 30 Tab, Refills: 2             Recommended follow-up tests after discharge  · BP check, other lab tests to be determined by neurolgy      Diet:  Regular diet    Activity:  As tolerated     Discharge instructions to patient/family  Please seek medical attention for any new or worsening symptoms particularly fever, chest pain, shortness of breath, abdominal pain, nausea, vomiting    Follow up plans/appointments  Follow-up Information     Follow up With Details Comments Contact Info    Rudy Larsen MD On 5/22/2017 3:20 pm  [id number] [address] [facility] [address]  [phone]      Demario Kang NP Go on 4/26/2017 Neurology appt, post stroke, Arrive at: 130pm  for  2pm appt  [place] [address]  [facility] [address]      Benjamin Vivas MD On 4/13/2017 hospital follow-up at 10:50am [address] [facility] [address]               Fredis Granados MD  Family Medicine Resident    Cc: Benjamin Vivas MD

## 2017-04-11 NOTE — CDMP QUERY
Dr. Rustam Gr:    Please clarify if this patient is being treated/managed for:    =>Chronic HFrEF  =>Chronic systolic CHF  =>Other Explanation of clinical findings  =>Unable to Determine (no explanation of clinical findings)    The medical record reflects the following clinical findings, treatment, and risk factors:    Risk Factors:  HTN, XOL  Clinical Indicators: Echo: LVEF 40% with mild diffuse hypokinesis  Treatment: Cardiology consulted, Zestril 20 qday. Per cardiology, betablocker not necessary. Please clarify and document your clinical opinion in the progress notes and discharge summary including the definitive and/or presumptive diagnosis, (suspected or probable), related to the above clinical findings. Please include clinical findings supporting your diagnosis.     Timothy Pop, On license of [facility], [address] [place] [address]  [email or web address]

## 2017-04-11 NOTE — CDMP QUERY
Dr. Alvaro Anderson:    This patient presented with slurred speech and right sided weakness. Dr. Michelle Roberts diagnosed \"TIA\" while Dr. Victoria Beckham diagnosed this patient with \"CVA\"  Based on your medical judgment, can you please clarify in the medical record, whether you agree with the diagnosis of     =>CVA  =>Other Explanation of clinical findings  =>Unable to Determine (no explanation of clinical findings)    The medical record reflects the following clinical findings, treatment, and risk factors:    Risk Factors:  PMH HTN, XOL, TIA  Clinical Indicators: presents with slurred speech, R sided weakness. MRI brain showed multiple focal of acute infarct left posterior frontal lobe & left MCA territory. Treatment: Neuro consult, Plavix, Lipitor, PT/OT/SLP c/s, CVA education    Please clarify and document your clinical opinion in the progress notes and discharge summary including the definitive and/or presumptive diagnosis, (suspected or probable), related to the above clinical findings. Please include clinical findings supporting your diagnosis.     Mary GoodwinJefferson Abington Hospital, [address] [place] [address]  [email or web address]

## 2017-04-11 NOTE — DISCHARGE INSTRUCTIONS
[facility] will mail you a heart monitor after discharge. Instructions will be included with the monitor. HOME DISCHARGE INSTRUCTIONS    Charissa Andrade / [id number] : 1940    Admission date: 2017 Discharge date: 2017     Please bring this form with you to show your care provider at your follow-up appointment. Primary care provider:  Noah Pizarro MD    Discharging provider:  Bipin Banerjee MD  - Family Medicine Resident  Genevieve Richardson MD - Attending, Family Medicine     You have been admitted to the hospital with the following diagnoses:    ACUTE DIAGNOSES:  TIA  CVA (cerebral vascular accident) ([facility] [address]  . . . . . . . . . . . . . . . . . . . . . . . . . . . . . . . . . . . . . . . . . . . . . . . . . . . . . . . . . . . . . . . . . . . . . . . . MEDICATIONS:  -Start Plavix 75mg - 1 tab daily. This is to prevent future strokes. You will have some labwork done at your neurology appointment to make further adjustments to your medication but this is a lifelong medication so you will need refills by either your neurologist or PCP. I gave you 1 refill.    -Start Lipitor 40mg -1 tab daily. This is for your cholesterol.   -Start Lisinopril 20mg - 1 tab daily. This is for your BP. Further adjustments to be made by PCP. -Stop taking Aspirin. FOLLOW-UP CARE RECOMMENDATIONS:    Appointments  Follow-up Information     Follow up With Details Comments Contact Info    Pam Hyde MD On 2017 3:20 pm  [address] [facility] [address]  [phone]      Eligio Barrera NP Go on 2017 Neurology appt, post stroke, Arrive at: 130pm  for  2pm appt  [place] [address]  [facility] [place] [address]      Noah Pizarro MD On 2017 hospital follow-up at 10:50am [address] [place] [address] 33  [phone]             Follow-up tests needed: to be determined     Pending test results:    At the time of your discharge the following test results are still pending: Lipid panel  Please make sure you review these results with your outpatient follow-up provider(s). Specific symptoms to watch for: chest pain, shortness of breath, fever, chills, nausea, vomiting, diarrhea, change in mentation, falling, weakness, bleeding. DIET/what to eat:  Low fat, Low cholesterol    ACTIVITY:  Activity as tolerated    Wound care: None    Equipment needed: You will be mailed your heart monitor and it will come with instructions    What to do if new or unexpected symptoms occur? If you experience any of the above symptoms (or should other concerns or questions arise after discharge) please call your primary care physician. Return to the emergency room if you cannot get hold of your doctor. · It is very important that you keep your follow-up appointment(s). · Please bring discharge papers, medication list (and/or medication bottles) to your follow-up appointments for review by your outpatient provider(s). · Please check the list of medications and be sure it includes every medication (even non-prescription medications) that your provider wants you to take. · It is important that you take the medication exactly as they are prescribed. · Keep your medication in the bottles provided by the pharmacist and keep a list of the medication names, dosages, and times to be taken in your wallet. · Do not take other medications without consulting your doctor. · If you have any questions about your medications or other instructions, please talk to your nurse or care provider before you leave the hospital.     Information obtained by:     I understand that if any problems occur once I am at home I am to contact my physician. These instructions were explained to me and I had the opportunity to ask questions. I understand and acknowledge receipt of the instructions indicated above. [de-identified] or R.N.'s Signature                                                                  Date/Time                                                                                                                                              Patient or Representative Signature                                                          Date/Time           Stroke: Care Instructions  Your Care Instructions    You have had a stroke. This means that the blood flow to a part of your brain was blocked for some time, which damages the nerve cells in that part of the brain. The part of your body controlled by that part of your brain may not function properly now. The brain is an amazing organ that can heal itself to some degree. The stroke you had damaged part of your brain. But other parts of your brain may take over in some way for the damaged areas. You have already started this process. Your doctor will talk with you about what you can do to prevent another stroke. High blood pressure, high cholesterol, and diabetes are all risk factors for stroke. If you have any of these conditions, work with your doctor to make sure they are under control. Other risk factors for stroke include being overweight, smoking, and not getting regular exercise. Going home may be hard for you and your family. The more you can try to do for yourself, the better. Remember to take each day one at a time. Follow-up care is a key part of your treatment and safety. Be sure to make and go to all appointments, and call your doctor if you are having problems. It's also a good idea to know your test results and keep a list of the medicines you take. How can you care for yourself at home? · Enter a stroke rehabilitation (rehab) program, if your doctor recommends it.  Physical, speech, and occupational therapies can help you manage bathing, dressing, eating, and other basics of daily living. · Do not drive until your doctor says it is okay. · It is normal to feel sad or depressed after a stroke. If these feelings last, talk to your doctor. · If you are having problems with urine leakage, go to the bathroom at regular times, including when you first wake up and at bedtime. Also, limit fluids after dinner. · If you are constipated, drink plenty of fluids, enough so that your urine is light yellow or clear like water. If you have kidney, heart, or liver disease and have to limit fluids, talk with your doctor before you increase the amount of fluids you drink. Set up a regular time for using the toilet. If you continue to have constipation, your doctor may suggest using a bulking agent, such as Metamucil, or a stool softener, laxative, or enema. Medicines  · Take your medicines exactly as prescribed. Call your doctor if you think you are having a problem with your medicine. You may be taking several medicines. ACE (angiotensin-converting enzyme) inhibitors, angiotensin II receptor blockers (ARBs), beta-blockers, diuretics (water pills), and calcium channel blockers control your blood pressure. Statins help lower cholesterol. Your doctor may also prescribe medicines for depression, pain, sleep problems, anxiety, or agitation. · If your doctor has given you a blood thinner to prevent another stroke, be sure you get instructions about how to take your medicine safely. Blood thinners can cause serious bleeding problems. · Do not take any over-the-counter medicines or herbal products without talking to your doctor first.  · If you take birth control pills or hormone therapy, talk to your doctor about whether they are right for you. For family members and caregivers  · Make the home safe. Set up a room so that your loved one does not have to climb stairs. Be sure the bathroom is on the same floor. Move throw rugs and furniture that could cause falls.  Make sure that the lighting is good. Put grab bars and seats in tubs and showers. · Find out what your loved one can do and what he or she needs help with. Try not to do things for your loved one that your loved one can do on his or her own. Help him or her learn and practice new skills. · Visit and talk with your loved one often. Try doing activities together that you both enjoy, such as playing cards or board games. Keep in touch with your loved one's friends as much as you can. Encourage them to visit. · Take care of yourself. Do not try to do everything yourself. Ask other family members to help. Eat well, get enough rest, and take time to do things that you enjoy. Keep up with your own doctor visits, and make sure to take your medicines regularly. Get out of the house as much as you can. Join a local support group. Find out if you qualify for home health care visits to help with rehab or for adult day care. When should you call for help? Call 911 anytime you think you may need emergency care. For example, call if:  · You have signs of another stroke. These may include:  ¨ Sudden numbness, tingling, weakness, or loss of movement in your face, arm, or leg, especially on only one side of your body. ¨ Sudden vision changes. ¨ Sudden trouble speaking. ¨ Sudden confusion or trouble understanding simple statements. ¨ Sudden problems with walking or balance. ¨ A sudden, severe headache that is different from past headaches. Call 911 even if these symptoms go away in a few minutes. Call your doctor now or seek immediate medical care if:  · You have new symptoms that may be related to your stroke, such as falls or trouble swallowing. Watch closely for changes in your health, and be sure to contact your doctor if you have any problems. Where can you learn more? Go to http://isaura-giovanna.info/. Enter Q060 in the search box to learn more about \"Stroke: Care Instructions. \"  Current as of: June 4, 2016  Content Version: 11.2  © 2204-6865 [employer], Incorporated. Care instructions adapted under license by [employer] (which disclaims liability or warranty for this information). If you have questions about a medical condition or this instruction, always ask your healthcare professional. [address] any warranty or liability for your use of this information. Learning About Stroke Rehabilitation  What is stroke rehabilitation? Stroke rehabilitation (rehab) is training and therapy to help you relearn to do everyday things you have not been able to do since your stroke. The focus will depend on how the stroke has affected your ability to do the things you want and need to do. Rehab begins in the hospital. It starts as soon as you are able. You will have a team of doctors, nurses, and therapists to help you relearn daily activities. This can include eating, bathing, and dressing. You also may need help to learn how to walk or talk again. If the stroke damaged your memory, you will learn ways to improve it. You will get better faster if you begin rehab soon after the stroke. But even with rehab, you may not be able to do all the things you could before the stroke. You may recover the most in the first few weeks or months after your stroke. But you can keep getting better for years. It just may happen more slowly. And it may take a long time and a lot of hard work. Don't give up hope. After the hospital, you may go to a rehab facility or a nursing home for a while. Or you may go home. Wherever you go, keep working on your rehab and do a little every day. It's going to be important for you to get the support you need. Let your loved ones help you. Involve them in your treatment. Talk to others who have had a stroke, and find out how they handled ups and downs. How can stroke rehab specialists help?   Your stroke rehab team will include doctors and nurses who specialize in stroke rehab, as well as other professionals. Each team member will help you in specific ways. The team may include the following professionals. Rehab doctor  A rehab doctor is a specialist in charge of your rehab program. He or she may also work on special problems, such as muscle cramps and spasms. Rehab nurses  Rehab nurses can help you relearn basic activities of daily life. For example, they can help you learn how to:  · Take care of your health, including a schedule for medicine. · Get from your bed to a wheelchair. · Bathe. · Control your bowels or bladder. Physical therapist  A stroke often takes away a person's ability to move in certain ways. A physical therapist helps you get back as much movement, balance, and coordination as possible. Physical therapy usually includes exercises. The exercises can help you get back your ability to walk and move as much as possible. It's important to practice these exercises over and over again. Your therapist may also help you learn to use a wheelchair or walker. And he or she may teach you how to use stairs safely. Occupational therapist  An occupational therapist helps you practice daily tasks like eating, bathing, dressing, and writing. For example, he or she may help you learn how to:  · Prepare meals and clean your house. · Drive your car. · Use tools and devices that can help if you no longer have full use of both hands. For example, velcro can replace buttons on clothing. · Get grab bars for your bathroom. · Make your home safe if you have strength, balance, or vision problems. Speech therapist  A speech therapist can help you relearn how to talk or find new ways to express yourself. Swallowing is sometimes a problem after a stroke. This therapist can help you improve your ability to swallow. A speech therapist can also help you work on reading and writing skills.   Psychologist or counselor  Emotions like fear, anxiety, anger, sadness, frustration, and grief are common after a stroke. A psychologist or counselor can help you deal with your emotions. They can also help you get treatment if you have depression. Vocational counselor  Stroke can leave you with disabilities that make it hard to do your job. A vocational counselor can help you return to your job or find a new one. He or she can help you:  · Identify your current skills and prepare a new resume. · Search for a job. · Understand the laws that protect disabled workers. Recreational therapist  A recreational therapist helps you return to doing things you enjoy. This may include the arts, hobbies, sports, or leisure activities. Follow-up care is a key part of your treatment and safety. Be sure to make and go to all appointments, and call your doctor if you are having problems. It's also a good idea to know your test results and keep a list of the medicines you take. Where can you learn more? Go to http://isaura-giovanna.info/. Enter P807 in the search box to learn more about \"Learning About Stroke Rehabilitation. \"  Current as of: November 10, 2016  Content Version: 11.2  © 9360-6965 [employer]. Care instructions adapted under license by [employer] (which disclaims liability or warranty for this information). If you have questions about a medical condition or this instruction, always ask your healthcare professional. [address] any warranty or liability for your use of this information. Learning About Long-Term Care for Stroke  What is long-term care for stroke? Long-term care for stroke is care for people who are leaving the hospital after a stroke but who still need some medical care or other help while they work on getting better. Choosing the right type of long-term care is a very personal decision. It's important to look carefully at your options.  You want to be sure that the level of care is right and that you will feel comfortable. Your doctor or other members of your medical team can help you find which type of long-term care would be best for you. What are the types of long-term care for stroke patients? Each type of care center offers a different level of care. These centers may have shared or private rooms. An assisted living center or residential care center:  · Has a range of services. These may include meals, cleaning, and laundry. And they may offer help with personal needs like bathing, grooming, and dressing. · Often includes oversight by a nurse. You may be able to get help with basic care, such as getting medicines. A skilled nursing center:  · Offers nursing care up to 24 hours a day. · Provides meals and laundry. · Provides help with dressing, bathing, using the toilet, and other daily tasks. · Offers rehab therapy. A long-term acute care hospital:  · Is for stroke patients who have special medical problems. This may include things like chronic pain or not being able to breathe on your own. Follow-up care is a key part of your treatment and safety. Be sure to make and go to all appointments, and call your doctor if you are having problems. It's also a good idea to know your test results and keep a list of the medicines you take. Where can you learn more? Go to http://isaura-giovanna.info/. Enter W641 in the search box to learn more about \"Learning About Long-Term Care for Stroke. \"  Current as of: October 17, 2016  Content Version: 11.2  © 6138-0371 [employer]. Care instructions adapted under license by [employer] (which disclaims liability or warranty for this information). If you have questions about a medical condition or this instruction, always ask your healthcare professional. [address] any warranty or liability for your use of this information.

## 2017-04-11 NOTE — PROGRESS NOTES
4/11/2017 12:48 PM Script for outpatient therapy has been faxed to [facility]. OLGA Marie     4/11/2017 12:07 PM SW met with this pt. Pt aware that she is going home. Pt reports that she understands that she has been given a script for outpatient therapy. Md wrote a script for outpatient occupational therapy at the [address] [place] [address] location. Pt states that she will ask her dtr how to get there.    OLGA Marie

## 2017-04-12 ENCOUNTER — PATIENT OUTREACH (OUTPATIENT)
Dept: FAMILY MEDICINE CLINIC | Age: 77
End: 2017-04-12

## 2017-04-12 LAB — LACTATE BLD-SCNC: NORMAL MMOL/L (ref 0.4–2)

## 2017-04-12 NOTE — PROGRESS NOTES
NNTOCIP (Transitions of Care Inpatient)    Hospital Discharge Follow-Up      Date/Time:     2017 3:01 PM    Patient listed on [facility] report on 17. Patient discharged from [address] [place] [address] for Right sided weakness. RRAT score: Low Risk            12       Total Score        4 More than 1 Admission in calendar year    8 Charlson Comorbidity Score        Criteria that do not apply:    Relationship with PCP    Patient Living Status    Patient Length of Stay > 5    Patient Insurance is Medicare, Medicaid or Self Pay        Medical History:     Past Medical History:   Diagnosis Date    Acute CVA (cerebrovascular accident) ([facility] [address] 4/10/2017    Per MRI:  Posterior L frontal/parietal territory    Acute CVA (cerebrovascular accident) ([facility] [address] 4/10/2017    MRI review:  Posterior L frontal/parietal infarct    Cerebral atrophy 4/10/2017    Hypercholesteremia     Hyperlipidemia LDL goal <70 4/10/2017    Hypertension     TIA (transient ischemic attack)        Nurse Navigator(NN) contacted the patient by telephone to perform post hospital discharge assessment. Verified  and address with patient as identifiers. Provided introduction to self, and explanation of the Nurses Navigator role. Diet:   Patient reports: Regular Diet    Activity:    Patient reports: mostly moving around the house and somewalking outside the house    Medication:   Performed medication reconciliation with patient, and patient verbalizes understanding of administration of home medications. There were no barriers to obtaining medications identified at this time. Support system:  Patient    Discharge Instructions :  Reviewed discharge instructions with patient. Patient verbalizes understanding of discharge instructions and follow-up care. Red Flags:  · You have signs of another stroke.  These may include:  ¨ Sudden numbness, tingling, weakness, or loss of movement in your face, arm, or leg, especially on only one side of your body. ¨ Sudden vision changes. ¨ Sudden trouble speaking. ¨ Sudden confusion or trouble understanding simple statements. ¨ Sudden problems with walking or balance. ¨ A sudden, severe headache that is different from past headaches. Labs Reviewed:  Recent Labs      04/11/17   0625  04/10/17   0016   WBC  7.8  7.0   HGB  13.4  13.6   HCT  39.9  39.2   PLT  202  224     Recent Labs      04/11/17   0625  04/10/17   0023  04/10/17   0016   NA  141   --   141   K  4.2   --   3.5   CL  105   --   105   CO2  29   --   28   GLU  103*   --   99   BUN  11   --   7   CREA  0.86   --   0.79   CA  8.9   --   8.8   ALB   --    --   3.4*   SGOT   --    --   21   ALT   --    --   21   INR   --   1.0   --      Recent Labs      04/10/17   0023   INR  1.0     Advance Care Planning:   Patient was offered the opportunity to discuss advance care planning:  yes     Does patient have an Advance Directive:  no   If no, did you provide information on Caring Connections? yes     PCP/Specialist follow up: Patient scheduled to follow up with Adam No MD on 4/17/17 at 10:50 AM.  Reviewed red flags with patient, and patient verbalizes understanding. Patient given an opportunity to ask questions. No other clinical/social/functional needs noted. The patient agrees to contact the PCP office for questions related to their healthcare. The patient expressed thanks, offered no additional questions and ended the call.

## 2017-04-12 NOTE — PATIENT INSTRUCTIONS
Goals        General     Follow ups (pt-stated)            Patient states that she will make it to her follow up appointment with PCP and neurology. Post Hospitalization     Attends follow-up appointments as directed.  Identification of barriers to adherence to a plan of care such as inability to afford medications, lack of insurance, lack of transportation, etc.      Prevent complications post hospitalization.

## 2017-04-17 ENCOUNTER — OFFICE VISIT (OUTPATIENT)
Dept: FAMILY MEDICINE CLINIC | Age: 77
End: 2017-04-17

## 2017-04-17 ENCOUNTER — TELEPHONE (OUTPATIENT)
Dept: FAMILY MEDICINE CLINIC | Age: 77
End: 2017-04-17

## 2017-04-17 VITALS
BODY MASS INDEX: 20.96 KG/M2 | SYSTOLIC BLOOD PRESSURE: 138 MMHG | DIASTOLIC BLOOD PRESSURE: 54 MMHG | OXYGEN SATURATION: 95 % | WEIGHT: 104 LBS | RESPIRATION RATE: 18 BRPM | HEART RATE: 80 BPM | TEMPERATURE: 98.3 F | HEIGHT: 59 IN

## 2017-04-17 DIAGNOSIS — I10 ESSENTIAL HYPERTENSION: ICD-10-CM

## 2017-04-17 DIAGNOSIS — I63.9 ACUTE CVA (CEREBROVASCULAR ACCIDENT) (HCC): Primary | ICD-10-CM

## 2017-04-17 DIAGNOSIS — E78.5 HYPERLIPIDEMIA LDL GOAL <70: ICD-10-CM

## 2017-04-17 RX ORDER — ASPIRIN 325 MG
325 TABLET ORAL DAILY
COMMUNITY
End: 2017-04-17

## 2017-04-17 NOTE — PROGRESS NOTES
Chief Complaint   Patient presents with   Marychuy Srinivas Cerebrovascular Accident     4/9/17     1. Have you been to the ER, urgent care clinic since your last visit? Hospitalized since your last visit? Yes. [facility]. 2. Have you seen or consulted any other health care providers outside of the [address] [place] [address] since your last visit? Include any pap smears or colon screening.  No.

## 2017-04-17 NOTE — MR AVS SNAPSHOT
Visit Information Date & Time Provider Department Dept. Phone Encounter #  
 4/17/2017 10:50 AM Ary Oh MD [address] [place] [address] [phone] [id number] Follow-up Instructions Return in about 1 week (around 4/24/2017) for Blood pressure follow up. Your Appointments 4/26/2017  2:00 PM  
Any with Marylou Cao NP Neurology [facility] [address]) Appt Note: hospital f/up stroke 04/10/17 hb  
 [place] [address] [facility] [address] [facility] 99 [id number] [phone]  
  
   
 [place] [address] [id number] 5/22/2017  3:20 PM  
HOSPITAL DISCHARGE with Charles Billy MD  
[facility] ([address]) Appt Note: hospital follow up from [facility] [address] [facility] [address] [place] [address] Road  
54 [facility] [address] [address] Upcoming Health Maintenance Date Due ZOSTER VACCINE AGE 60> 11/7/1998 GLAUCOMA SCREENING Q2Y 11/7/2003 OSTEOPOROSIS SCREENING (DEXA) 11/7/2003 MEDICARE YEARLY EXAM 11/7/2003 INFLUENZA AGE 9 TO ADULT 8/1/2016 Pneumococcal 65+ Low/Medium Risk (2 of 2 - PPSV23) 12/21/2016 DTaP/Tdap/Td series (2 - Td) 6/22/2024 Allergies as of 4/17/2017  Review Complete On: 4/17/2017 By: Kaelyn Baca LPN Severity Noted Reaction Type Reactions Benadryl Allergy/cold [Diphenhyd-pe-acetaminophen]  06/11/2013    Hives Penicillins  06/11/2013    Hives Was treated with Augmentin in June 2015, tolerated medication well. Current Immunizations  Never Reviewed Name Date Pneumococcal Vaccine (Unspecified Type) 12/21/2011 TD Vaccine 12/21/2011 Tdap 6/22/2014 Not reviewed this visit You Were Diagnosed With   
  
 Codes Comments Acute CVA (cerebrovascular accident) ([facility] [address]    -  Primary ICD-10-CM: I63.9 ICD-9-CM: 434.91   
 Vitals BP Pulse Temp Resp Height(growth percentile) Weight(growth percentile) 138/54 80 98.3 °F (36.8 °C) (Oral) 18 4' 11\" (1.499 m) 104 lb (47.2 kg) SpO2 BMI OB Status Smoking Status 95% 21.01 kg/m2 Postmenopausal Former Smoker Vitals History BMI and BSA Data Body Mass Index Body Surface Area 21.01 kg/m 2 1.4 m 2 Preferred Pharmacy Pharmacy Name Phone [facility] [address], [address] [facility] [address] [phone] Your Updated Medication List  
  
   
This list is accurate as of: 4/17/17 12:13 PM.  Always use your most recent med list.  
  
  
  
  
 aspirin 325 mg tablet Commonly known as:  ASPIRIN Take 325 mg by mouth daily. atorvastatin 40 mg tablet Commonly known as:  LIPITOR Take 1 Tab by mouth daily. clopidogrel 75 mg Tab Commonly known as:  PLAVIX Take 1 Tab by mouth daily. lisinopril 20 mg tablet Commonly known as:  Joann Gear Take 1 Tab by mouth daily. We Performed the Following REFERRAL TO OCCUPATIONAL THERAPY [REF53 Custom] Comments:  
 Please evaluate patient for CVA rehab. REFERRAL TO PHYSICAL THERAPY [YTZ31 Custom] Comments:  
 Please evaluate patient for CVA rehab. Follow-up Instructions Return in about 1 week (around 4/24/2017) for Blood pressure follow up. Referral Information Referral ID Referred By Referred To  
  
 [id number] Ciaraia Phlegm L Not Available Visits Status Start Date End Date 1 New Request 4/17/17 4/17/18 If your referral has a status of pending review or denied, additional information will be sent to support the outcome of this decision. Referral ID Referred By Referred To  
 [id number] Ciaraia Phlegm L Not Available Visits Status Start Date End Date 1 New Request 4/17/17 4/17/18  If your referral has a status of pending review or denied, additional information will be sent to support the outcome of this decision. Patient Instructions Your Neurology Appointment is on 4/26 at 2 pm at 
[address] [facility] [address], [place], [address] [place] [address] Street Your Cardiology Appointment is on 5/22 at 3:20 pm at [address] [facility] at [address], [address] [place], [address] [facility] Phone: [phone] Fax: [phone] Hours: Monday Thursday 7:00 am  7:00 pm, Friday 7:00 am  1:30 pm 
 
Please check your blood pressure daily before you take your blood pressure medicine. Keep a log of these numbers. Please bring this log and your blood pressure monitoring device with you to your next appointment. Your blood pressure goal is less than 140/90. Please let me know if your blood pressure is consistently higher than this at home. DASH Diet: Care Instructions Your Care Instructions The DASH diet is an eating plan that can help lower your blood pressure. DASH stands for Dietary Approaches to Stop Hypertension. Hypertension is high blood pressure. The DASH diet focuses on eating foods that are high in calcium, potassium, and magnesium. These nutrients can lower blood pressure. The foods that are highest in these nutrients are fruits, vegetables, low-fat dairy products, nuts, seeds, and legumes. But taking calcium, potassium, and magnesium supplements instead of eating foods that are high in those nutrients does not have the same effect. The DASH diet also includes whole grains, fish, and poultry. The DASH diet is one of several lifestyle changes your doctor may recommend to lower your high blood pressure. Your doctor may also want you to decrease the amount of sodium in your diet. Lowering sodium while following the DASH diet can lower blood pressure even further than just the DASH diet alone. Follow-up care is a key part of your treatment and safety.  Be sure to make and go to all appointments, and call your doctor if you are having problems. It's also a good idea to know your test results and keep a list of the medicines you take. How can you care for yourself at home? Following the DASH diet · Eat 4 to 5 servings of fruit each day. A serving is 1 medium-sized piece of fruit, ½ cup chopped or canned fruit, 1/4 cup dried fruit, or 4 ounces (½ cup) of fruit juice. Choose fruit more often than fruit juice. · Eat 4 to 5 servings of vegetables each day. A serving is 1 cup of lettuce or raw leafy vegetables, ½ cup of chopped or cooked vegetables, or 4 ounces (½ cup) of vegetable juice. Choose vegetables more often than vegetable juice. · Get 2 to 3 servings of low-fat and fat-free dairy each day. A serving is 8 ounces of milk, 1 cup of yogurt, or 1 ½ ounces of cheese. · Eat 6 to 8 servings of grains each day. A serving is 1 slice of bread, 1 ounce of dry cereal, or ½ cup of cooked rice, pasta, or cooked cereal. Try to choose whole-grain products as much as possible. · Limit lean meat, poultry, and fish to 2 servings each day. A serving is 3 ounces, about the size of a deck of cards. · Eat 4 to 5 servings of nuts, seeds, and legumes (cooked dried beans, lentils, and split peas) each week. A serving is 1/3 cup of nuts, 2 tablespoons of seeds, or ½ cup of cooked beans or peas. · Limit fats and oils to 2 to 3 servings each day. A serving is 1 teaspoon of vegetable oil or 2 tablespoons of salad dressing. · Limit sweets and added sugars to 5 servings or less a week. A serving is 1 tablespoon jelly or jam, ½ cup sorbet, or 1 cup of lemonade. · Eat less than 2,300 milligrams (mg) of sodium a day. If you limit your sodium to 1,500 mg a day, you can lower your blood pressure even more. Tips for success · Start small. Do not try to make dramatic changes to your diet all at once.  You might feel that you are missing out on your favorite foods and then be more likely to not follow the plan. Make small changes, and stick with them. Once those changes become habit, add a few more changes. · Try some of the following: ¨ Make it a goal to eat a fruit or vegetable at every meal and at snacks. This will make it easy to get the recommended amount of fruits and vegetables each day. ¨ Try yogurt topped with fruit and nuts for a snack or healthy dessert. ¨ Add lettuce, tomato, cucumber, and onion to sandwiches. ¨ Combine a ready-made pizza crust with low-fat mozzarella cheese and lots of vegetable toppings. Try using tomatoes, squash, spinach, broccoli, carrots, cauliflower, and onions. ¨ Have a variety of cut-up vegetables with a low-fat dip as an appetizer instead of chips and dip. ¨ Sprinkle sunflower seeds or chopped almonds over salads. Or try adding chopped walnuts or almonds to cooked vegetables. ¨ Try some vegetarian meals using beans and peas. Add garbanzo or kidney beans to salads. Make burritos and tacos with mashed kilgore beans or black beans. Where can you learn more? Go to http://isaura-giovanna.info/. Enter G738 in the search box to learn more about \"DASH Diet: Care Instructions. \" Current as of: March 23, 2016 Content Version: 11.2 © 5822-5998 [employer]. Care instructions adapted under license by [employer] (which disclaims liability or warranty for this information). If you have questions about a medical condition or this instruction, always ask your healthcare professional. [place] [address] any warranty or liability for your use of this information. Learning About FAST: Stroke Warning Signs What is FAST? FAST is a simple way to remember the main symptoms of stroke. Recognizing these symptoms helps you know when to call for medical help. FAST stands for: 
· Face drooping. · Arm weakness. · Speech difficulty. · Time to call 911. What happens when you have a stroke? A stroke occurs when a blood vessel to the brain bursts or is blocked by a blood clot. Within minutes, the nerve cells in that part of the brain die. As a result, the part of the body controlled by those cells cannot work properly. The effects of a stroke may range from mild to severe. They may get better, or they may last the rest of your life. A stroke can affect vision, speech, behavior, thought processes, and your ability to move. It can cause symptoms that may include: 
· Sudden numbness, tingling, weakness, or loss of movement in your face, arm, or leg, especially on only one side of your body. · Sudden vision changes. · Sudden trouble speaking. · Sudden confusion or trouble understanding simple statements. · Sudden problems with walking or balance. · A sudden, severe headache that is different from past headaches. Why is it important to get help FAST? Quick treatment may save your life. And it may reduce the damage in your brain so that you have fewer problems after the stroke. When you know the FAST symptoms, you will know when it's important to call for medical help. Where can you learn more? Go to http://isaura-giovanna.info/. Enter N514 in the search box to learn more about \"Learning About FAST: Stroke Warning Signs. \" Current as of: June 4, 2016 Content Version: 11.2 © 6800-9702 [employer]. Care instructions adapted under license by YABUY (which disclaims liability or warranty for this information). If you have questions about a medical condition or this instruction, always ask your healthcare professional. [place] [address] any warranty or liability for your use of this information. Introducing [facility]! Dear Kiara Jacinto: Thank you for requesting a ENOVIX account. Our records indicate that you already have an active ENOVIX account. You can access your account anytime at [email or web address] Did you know that you can access your hospital and ER discharge instructions at any time in PerSay? You can also review all of your test results from your hospital stay or ER visit. Additional Information If you have questions, please visit the Frequently Asked Questions section of the PerSay website at [email or web address] Remember, PerSay is NOT to be used for urgent needs. For medical emergencies, dial 911. Now available from your iPhone and Android! Please provide this summary of care documentation to your next provider. Your primary care clinician is listed as Adam No. If you have any questions after today's visit, please call [phone].

## 2017-04-17 NOTE — PROGRESS NOTES
Hospital follow-up visit  HPI today  [place] Johnson is a 64 y.o. female who presents with daughter Fabiano Garcia for transition of care. See details of her admission below. Left-sided MCA territory infarcts on MRI: Taking plavix, lipitor, lisinopril as listed below without negative SE. Today pt says numbness and weakness of her right hand are much better. Can squeeze her hands fully. Carrying pails of food to feed animals at home. She is left handed, so she can do most things as normal. Pt says she doesn't know when her Neuro and Cards f/u appts are. Cardio will arrange for 30-day event monitor. HTN: Was not regularly taking her BP medication prior to CVA (ran out and did not f/u). Her daughter has BP cuff available to check her BP. HLD: Taking lipitor since hospital without negative SE. LDL was 88 in hospital.    ROS:   Review of Systems   Constitutional: Negative for fever. HENT: Negative for congestion. Eyes: Negative for blurred vision. Respiratory: Negative for shortness of breath. Cardiovascular: Negative for chest pain and leg swelling. Gastrointestinal: Negative for abdominal pain. Musculoskeletal: Negative for falls, myalgias and neck pain. Neurological: Positive for weakness. Negative for dizziness, sensory change, speech change and headaches. Psychiatric/Behavioral: Negative for substance abuse. Transition of Care documentation:  Date of hospital discharge: 4/11 (admitted: 4/9)  Date of professional contact: 4/12  Copy details of contact:  Below  \"Patient scheduled to follow up with Janel Patrick MD on 4/17/17 at 10:50 AM.  Reviewed red flags with patient, and patient verbalizes understanding.  Patient given an opportunity to ask questions. No other clinical/social/functional needs noted. The patient agrees to contact the PCP office for questions related to their healthcare.  The patient expressed thanks, offered no additional questions and ended the call. \"    FTF visit: Patient was instructed to f/u with PT/OT as an outpatient. Complexity of MDM: moderate    Hospital Course per discharge summary:  Joshua Garcia is a 64 y.o. female with hx of TIA (2013), HTN, HLD who is admitted for TIA workup. Pt improved, but not at baseline, while in ED and did not require tPA since she showed significant improvement.       CVA- Right upper extremity weakness and clumsiness. CT head negative. MRI showed left sided infarcts MCA territory. Echo EF 40% and global hypokinesis. . Followed by neurology who started plavix and lipitor while inpatient. Per neurology recs only treatment with plavix and will hold aspirin for now. Will need lifelong treatment with plavix-will have P2Y12 checked at her outpatient neurology appt to see if she is a plavix responder. PT/OT recommended outpatient occupational therapy. Also consulted cardiology to assess for cardiac etiology of CVA and pt has been set up with an outpatient event monitor.   - Start Plavix 75mg daily  - Start Lipitor 40mg daily   - Hold aspirin   - F/U appts made with neurology and cardiology   - Provided patient script for outpatient occupational therapy      Hypertension - Initially elevated on admission. Per pt, she has been out of her antihypertensive for months. Lisinopril started by cardiology during this admission and BP well controlled since then. - Lisinopril 20mg daily; follow-up BP at outpatient clinic appt and further adjustments to be made by PCP      HLD - . Lipid panel still pending. Goal LDL <70.  - Lipitor 40 mg daily  - F/U lipid panel \"    Allergies: Allergies   Allergen Reactions    Benadryl Allergy/Cold [Diphenhyd-Pe-Acetaminophen] Hives    Penicillins Hives     Was treated with Augmentin in June 2015, tolerated medication well. Meds:   Current Outpatient Prescriptions   Medication Sig Dispense Refill    lisinopril (PRINIVIL, ZESTRIL) 20 mg tablet Take 1 Tab by mouth daily.  30 Tab 2  clopidogrel (PLAVIX) 75 mg tab Take 1 Tab by mouth daily. 30 Tab 1    atorvastatin (LIPITOR) 40 mg tablet Take 1 Tab by mouth daily. 27 Tab 2       PMH:  Past Medical History:   Diagnosis Date    Acute CVA (cerebrovascular accident) ([facility] [address] 4/10/2017    Per MRI:  Posterior L frontal/parietal territory    Acute CVA (cerebrovascular accident) ([facility] [address] 4/10/2017    MRI review:  Posterior L frontal/parietal infarct    Cerebral atrophy 4/10/2017    Hypercholesteremia     Hyperlipidemia LDL goal <70 4/10/2017    Hypertension     TIA (transient ischemic attack)        SH:  Smoker:  History   Smoking Status    Former Smoker    Quit date: 7/7/1999   Smokeless Tobacco    Never Used       ETOH:   History   Alcohol Use    10.5 oz/week    21 Glasses of wine per week     Comment: 3 glasses of port per day       FH:   Family History   Problem Relation Age of Onset    Dementia Mother 61    Heart Attack Father 79       Physical Exam:  Visit Vitals    /54    Pulse 80    Temp 98.3 °F (36.8 °C) (Oral)    Resp 18    Ht 4' 11\" (1.499 m)    Wt 104 lb (47.2 kg)    SpO2 95%    BMI 21.01 kg/m2     Gen: No apparent distress. Pleasant and conversational.  HEENT: Normocephalic, pupils equally round and reactive, extraocular eye movements intact, hearing grossly normal bilaterally, nose normal and patent, mucous membranes moist, pharynx normal without lesions  Neck: Supple, no lymph nodes, masses, or thyromegaly appreciated  Lungs: Respirations unlabored, clear to auscultation bilaterally  Cardio: Regular, rate, and rhythm without murmurs, rubs, or gallops   Abdomen: Normoactive bowel sounds, soft, nontender, nondistended  Ext: No peripheral edema  Neuro: Alert and responds to all questions appropriately with normal speech and language. No facial droop. CN II-XII intact. Strength and sensation appear are grossly equal bilaterally and 5/5 throughout UE and LE. Gait grossly intact.     Assessment and Plan:     Encounter Diagnoses:    ICD-10-CM ICD-9-CM    1. Acute CVA (cerebrovascular accident) ([facility] [address] I63.9 434.91 REFERRAL TO PHYSICAL THERAPY      REFERRAL TO OCCUPATIONAL THERAPY   2. Essential hypertension I10 401.9    3. Hyperlipidemia LDL goal <70 E78.5 272.4      Sxs from acute CVA continue to improve. BP initially above goal when pt first roomed but improved on recheck toward end of visit. Continue current BP medication regimen. Instructions given to log home BP and rtc in one week with log to determine if need to adjust medication dose. Reviewed goal BP is <140/90 after CVA to reduce CV risk. Continue statin. Goal LDL <70. Referrals to PT/OT given. Instructions on f/u Neuro and Cards appts given. Discussed diagnoses in detail with patient. Patient expressed understanding of and agreement to above plan. All questions and concerns addressed. Medication risks/benefits/side effects discussed with patient. Patient is counseled to return to the office and/or ED if symptoms do not improve as expected. Patient discussed and seen with Dr. Rupert Montelongo, Attending Physician.     Prince Morales MD  Family Medicine Resident, PGY-2

## 2017-04-17 NOTE — TELEPHONE ENCOUNTER
----- Message from Maria C Holcomb sent at 4/17/2017  4:45 PM EDT -----  Regarding: Dr. Pretty Ansari  Pt is calling back to let the practice know that she is not allergic to \"Penicillin\" or \"Benadryl\". Pt stated that she was asked that question when she was in the office earlier but forgot to let them know. Best contact number is [phone].

## 2017-04-17 NOTE — PATIENT INSTRUCTIONS
Your Neurology Appointment is on 4/26 at 2 pm at  [address] [facility] [address], [place], [address] [place] [address] Street    Your Cardiology Appointment is on 5/22 at 3:20 pm at [address] at [address]  [place] [place], [address] [facility]  Phone: [phone]  Fax: [phone]  Hours: Monday- Thursday 7:00 am - 7:00 pm, Friday 7:00 am - 1:30 pm    Please check your blood pressure daily before you take your blood pressure medicine. Keep a log of these numbers. Please bring this log and your blood pressure monitoring device with you to your next appointment. Your blood pressure goal is less than 140/90. Please let me know if your blood pressure is consistently higher than this at home. DASH Diet: Care Instructions  Your Care Instructions  The DASH diet is an eating plan that can help lower your blood pressure. DASH stands for Dietary Approaches to Stop Hypertension. Hypertension is high blood pressure. The DASH diet focuses on eating foods that are high in calcium, potassium, and magnesium. These nutrients can lower blood pressure. The foods that are highest in these nutrients are fruits, vegetables, low-fat dairy products, nuts, seeds, and legumes. But taking calcium, potassium, and magnesium supplements instead of eating foods that are high in those nutrients does not have the same effect. The DASH diet also includes whole grains, fish, and poultry. The DASH diet is one of several lifestyle changes your doctor may recommend to lower your high blood pressure. Your doctor may also want you to decrease the amount of sodium in your diet. Lowering sodium while following the DASH diet can lower blood pressure even further than just the DASH diet alone. Follow-up care is a key part of your treatment and safety. Be sure to make and go to all appointments, and call your doctor if you are having problems.  It's also a good idea to know your test results and keep a list of the medicines you take. How can you care for yourself at home? Following the DASH diet  · Eat 4 to 5 servings of fruit each day. A serving is 1 medium-sized piece of fruit, ½ cup chopped or canned fruit, 1/4 cup dried fruit, or 4 ounces (½ cup) of fruit juice. Choose fruit more often than fruit juice. · Eat 4 to 5 servings of vegetables each day. A serving is 1 cup of lettuce or raw leafy vegetables, ½ cup of chopped or cooked vegetables, or 4 ounces (½ cup) of vegetable juice. Choose vegetables more often than vegetable juice. · Get 2 to 3 servings of low-fat and fat-free dairy each day. A serving is 8 ounces of milk, 1 cup of yogurt, or 1 ½ ounces of cheese. · Eat 6 to 8 servings of grains each day. A serving is 1 slice of bread, 1 ounce of dry cereal, or ½ cup of cooked rice, pasta, or cooked cereal. Try to choose whole-grain products as much as possible. · Limit lean meat, poultry, and fish to 2 servings each day. A serving is 3 ounces, about the size of a deck of cards. · Eat 4 to 5 servings of nuts, seeds, and legumes (cooked dried beans, lentils, and split peas) each week. A serving is 1/3 cup of nuts, 2 tablespoons of seeds, or ½ cup of cooked beans or peas. · Limit fats and oils to 2 to 3 servings each day. A serving is 1 teaspoon of vegetable oil or 2 tablespoons of salad dressing. · Limit sweets and added sugars to 5 servings or less a week. A serving is 1 tablespoon jelly or jam, ½ cup sorbet, or 1 cup of lemonade. · Eat less than 2,300 milligrams (mg) of sodium a day. If you limit your sodium to 1,500 mg a day, you can lower your blood pressure even more. Tips for success  · Start small. Do not try to make dramatic changes to your diet all at once. You might feel that you are missing out on your favorite foods and then be more likely to not follow the plan. Make small changes, and stick with them. Once those changes become habit, add a few more changes.   · Try some of the following:  ¨ Make it a goal to eat a fruit or vegetable at every meal and at snacks. This will make it easy to get the recommended amount of fruits and vegetables each day. ¨ Try yogurt topped with fruit and nuts for a snack or healthy dessert. ¨ Add lettuce, tomato, cucumber, and onion to sandwiches. ¨ Combine a ready-made pizza crust with low-fat mozzarella cheese and lots of vegetable toppings. Try using tomatoes, squash, spinach, broccoli, carrots, cauliflower, and onions. ¨ Have a variety of cut-up vegetables with a low-fat dip as an appetizer instead of chips and dip. ¨ Sprinkle sunflower seeds or chopped almonds over salads. Or try adding chopped walnuts or almonds to cooked vegetables. ¨ Try some vegetarian meals using beans and peas. Add garbanzo or kidney beans to salads. Make burritos and tacos with mashed kilgore beans or black beans. Where can you learn more? Go to http://isauraTotus Powergiovanna.info/. Enter K552 in the search box to learn more about \"DASH Diet: Care Instructions. \"  Current as of: March 23, 2016  Content Version: 11.2  © 6350-0987 [employer]. Care instructions adapted under license by [employer] (which disclaims liability or warranty for this information). If you have questions about a medical condition or this instruction, always ask your healthcare professional. [place] [address] any warranty or liability for your use of this information. Learning About FAST: Stroke Warning Signs  What is FAST? FAST is a simple way to remember the main symptoms of stroke. Recognizing these symptoms helps you know when to call for medical help. FAST stands for:  · Face drooping. · Arm weakness. · Speech difficulty. · Time to call 911. What happens when you have a stroke? A stroke occurs when a blood vessel to the brain bursts or is blocked by a blood clot. Within minutes, the nerve cells in that part of the brain die.  As a result, the part of the body controlled by those cells cannot work properly. The effects of a stroke may range from mild to severe. They may get better, or they may last the rest of your life. A stroke can affect vision, speech, behavior, thought processes, and your ability to move. It can cause symptoms that may include:  · Sudden numbness, tingling, weakness, or loss of movement in your face, arm, or leg, especially on only one side of your body. · Sudden vision changes. · Sudden trouble speaking. · Sudden confusion or trouble understanding simple statements. · Sudden problems with walking or balance. · A sudden, severe headache that is different from past headaches. Why is it important to get help FAST? Quick treatment may save your life. And it may reduce the damage in your brain so that you have fewer problems after the stroke. When you know the FAST symptoms, you will know when it's important to call for medical help. Where can you learn more? Go to http://isaura-giovanna.info/. Enter K022 in the search box to learn more about \"Learning About FAST: Stroke Warning Signs. \"  Current as of: June 4, 2016  Content Version: 11.2  © 1177-8284 [employer]. Care instructions adapted under license by [employer] (which disclaims liability or warranty for this information). If you have questions about a medical condition or this instruction, always ask your healthcare professional. [place] [address] any warranty or liability for your use of this information.

## 2017-04-18 ENCOUNTER — TELEPHONE (OUTPATIENT)
Dept: FAMILY MEDICINE CLINIC | Age: 77
End: 2017-04-18

## 2017-04-24 ENCOUNTER — TELEPHONE (OUTPATIENT)
Dept: FAMILY MEDICINE CLINIC | Age: 77
End: 2017-04-24

## 2017-04-24 ENCOUNTER — OFFICE VISIT (OUTPATIENT)
Dept: FAMILY MEDICINE CLINIC | Age: 77
End: 2017-04-24

## 2017-04-24 VITALS
HEART RATE: 100 BPM | DIASTOLIC BLOOD PRESSURE: 85 MMHG | TEMPERATURE: 97.7 F | OXYGEN SATURATION: 98 % | HEIGHT: 59 IN | RESPIRATION RATE: 16 BRPM | WEIGHT: 105 LBS | SYSTOLIC BLOOD PRESSURE: 172 MMHG | BODY MASS INDEX: 21.17 KG/M2

## 2017-04-24 DIAGNOSIS — E78.5 HYPERLIPIDEMIA LDL GOAL <70: ICD-10-CM

## 2017-04-24 DIAGNOSIS — I63.9 ACUTE CVA (CEREBROVASCULAR ACCIDENT) (HCC): ICD-10-CM

## 2017-04-24 DIAGNOSIS — I10 ESSENTIAL HYPERTENSION: Primary | ICD-10-CM

## 2017-04-24 RX ORDER — ACETAMINOPHEN 500 MG
1 TABLET ORAL ONCE
Qty: 1 KIT | Refills: 0 | Status: SHIPPED | OUTPATIENT
Start: 2017-04-24 | End: 2017-04-24 | Stop reason: SDUPTHER

## 2017-04-24 RX ORDER — ACETAMINOPHEN 500 MG
1 TABLET ORAL ONCE
Qty: 1 KIT | Refills: 0 | Status: SHIPPED | OUTPATIENT
Start: 2017-04-24 | End: 2017-04-24

## 2017-04-24 RX ORDER — LISINOPRIL 40 MG/1
40 TABLET ORAL DAILY
Qty: 30 TAB | Refills: 5 | Status: SHIPPED | OUTPATIENT
Start: 2017-04-24 | End: 2017-08-05

## 2017-04-24 NOTE — PROGRESS NOTES
ALEK Dhillon is a 64 y.o. female who presents for f/u of HTN. Was recently admitted for CVA with uncontrolled BP likely contributing. Khanh Owusu HTN: Taking lisinopril 20mg/d since hospitalization. Home BPs usually 140s/80s or higher (pt brought log today). Doesn't eat out much. Tries to eat fruit and veggies. Drinking plenty of water. Uses cherry juice. Hasn't had PT come by yet to help with safe physical activity. CVA: Left-sided MCA infarcts. See hospital DC summary from 4/12 for full details. Taking plavix and lipitor as prescribed without negative SE. Feels like  and strength in right hand continue to get better. Will call to schedule PT appointment. Has Neuro and Cards f/u planned. SH: Has been living with daughter Ortiz Vincent since 2011. ROS:   No fevers  No chest pain  No SOB  No dizziness or lightheadedness    Allergies: Allergies   Allergen Reactions    Benadryl Allergy/Cold [Diphenhyd-Pe-Acetaminophen] Hives    Penicillins Hives     Was treated with Augmentin in June 2015, tolerated medication well. Meds:   Current Outpatient Prescriptions   Medication Sig Dispense Refill    lisinopril (PRINIVIL, ZESTRIL) 40 mg tablet Take 1 Tab by mouth daily. 30 Tab 5    clopidogrel (PLAVIX) 75 mg tab Take 1 Tab by mouth daily. 30 Tab 1    atorvastatin (LIPITOR) 40 mg tablet Take 1 Tab by mouth daily. 30 Tab 2    Blood Pressure Monitor (BLOOD PRESSURE KIT) kit 1 Units by Does Not Apply route once for 1 dose. 1 Kit 0    miscellaneous medical supply misc Please dispense one shower chair for patient with history of cerebrovascular accident.  1 Each 0       PMH:  Past Medical History:   Diagnosis Date    Acute CVA (cerebrovascular accident) ([address] 4/10/2017    Per MRI:  Posterior L frontal/parietal territory    Acute CVA (cerebrovascular accident) ([address] 4/10/2017    MRI review:  Posterior L frontal/parietal infarct    Cerebral atrophy 4/10/2017    Hypercholesteremia     Hyperlipidemia LDL goal <70 4/10/2017    Hypertension     TIA (transient ischemic attack)        SH:  Smoker:  History   Smoking Status    Former Smoker    Quit date: 7/7/1999   Smokeless Tobacco    Never Used       ETOH:   History   Alcohol Use    10.5 oz/week    21 Glasses of wine per week     Comment: 3 glasses of port per day       FH:   Family History   Problem Relation Age of Onset    Dementia Mother 61    Heart Attack Father 79       Physical Exam:  Visit Vitals    /85    Pulse 100    Temp 97.7 °F (36.5 °C) (Oral)    Resp 16    Ht 4' 11\" (1.499 m)    Wt 105 lb (47.6 kg)    SpO2 98%    BMI 21.21 kg/m2     Gen: No apparent distress. Pleasant and conversational.  HEENT: Normocephalic, pupils equally round and reactive, extraocular eye movements intact, hearing grossly normal bilaterally, nose normal and patent, mucous membranes moist, pharynx normal without lesions  Neck: Supple, no lymph nodes, masses, or thyromegaly appreciated  Lungs: Respirations unlabored, clear to auscultation bilaterally  Cardio: Regular, rate, and rhythm without murmurs, rubs, or gallops   Ext: No peripheral edema  Neuro: Alert and responds to all questions appropriately with normal speech and language. No facial droop. CN II-XII grossly intact. Strength and sensation appear are grossly equal bilaterally and 5/5 throughout UE and LE. Gait grossly intact. Assessment and Plan:     Encounter Diagnoses:    ICD-10-CM ICD-9-CM    1. Essential hypertension I10 401.9    2. Acute CVA (cerebrovascular accident) ([facility] [address] I63.9 434.91    3. Hyperlipidemia LDL goal <70 E78.5 272.4      1. BP not at the goal s/p CVA of <140/90 today (also not at goal at home), so will increase lisinopril to 40mg/d. Pt to let me know if she experiences negative SE such as dizziness. Script and coupon for home BP cuff given because it appears that pt's home monitor results does not match our office readings (home log is consistently above goal).  Healthy diet reviewed. 2. Sxs from acute CVA continue to improve. Pt will f/u with Neuro and Cards as scheduled and call PT to set up home appt. Finger exercises given. Script given for shower chair. 3. Continue statin. Rtc in 1-2 weeks for Medicare Wellness visit. Will f/u BP at that time. Discussed diagnoses in detail with patient. Patient expressed understanding of and agreement to above plan. All questions and concerns addressed. Medication risks/benefits/side effects discussed with patient. Patient is counseled to return to the office if symptoms do not improve as expected. Patient seen and discussed with Dr. Karina Calderon, Attending Physician.     Janel Patrick MD  Family Medicine Resident, PGY-2

## 2017-04-24 NOTE — MR AVS SNAPSHOT
Visit Information Date & Time Provider Department Dept. Phone Encounter #  
 4/24/2017  9:10 AM Constantin Vicente MD [address] [place] [address] [phone] [id number] Follow-up Instructions Return in about 1 week (around 5/1/2017) for Medicare Wellness. Your Appointments 4/28/2017 11:30 AM  
Any with Ignacio Jamison NP Neurology [address] [facility]) Appt Note: hospital f/up stroke 04/10/17 hb; hospital f/up stroke 04/10/17, ET, 04/17/17  
 [place] [address] [facility] [address] [place] [address] [phone]  
  
   
 Bari Zepeda  
  
    
 4/28/2017  1:40 PM  
HOSPITAL DISCHARGE with Nilsa Manriquez MD  
[facility] ([facility]) Appt Note: hospital follow up from - PAS  - per Atha Deeds; hospital follow up from - PAS - per Atha Deeds [address] [facility] 600 1007 [facility]  
54 [facility] [address] [address] Upcoming Health Maintenance Date Due ZOSTER VACCINE AGE 60> 11/7/1998 GLAUCOMA SCREENING Q2Y 11/7/2003 OSTEOPOROSIS SCREENING (DEXA) 11/7/2003 MEDICARE YEARLY EXAM 11/7/2003 INFLUENZA AGE 9 TO ADULT 8/1/2016 Pneumococcal 65+ Low/Medium Risk (2 of 2 - PPSV23) 12/21/2016 DTaP/Tdap/Td series (2 - Td) 6/22/2024 Allergies as of 4/24/2017  Review Complete On: 4/24/2017 By: Mireille Gtz LPN Severity Noted Reaction Type Reactions Benadryl Allergy/cold [Diphenhyd-pe-acetaminophen]  06/11/2013    Hives Penicillins  06/11/2013    Hives Was treated with Augmentin in June 2015, tolerated medication well. Current Immunizations  Never Reviewed Name Date Pneumococcal Vaccine (Unspecified Type) 12/21/2011 TD Vaccine 12/21/2011 Tdap 6/22/2014 Not reviewed this visit You Were Diagnosed With   
  
 Codes Comments Essential hypertension    -  Primary ICD-10-CM: I10 
ICD-9-CM: 401.9 Acute CVA (cerebrovascular accident) ([facility] [address]     ICD-10-CM: I63.9 ICD-9-CM: 434.91 Vitals BP Pulse Temp Resp Height(growth percentile) Weight(growth percentile) 172/85 100 97.7 °F (36.5 °C) (Oral) 16 4' 11\" (1.499 m) 105 lb (47.6 kg) SpO2 BMI OB Status Smoking Status 98% 21.21 kg/m2 Postmenopausal Former Smoker Vitals History BMI and BSA Data Body Mass Index Body Surface Area  
 21.21 kg/m 2 1.41 m 2 Preferred Pharmacy Pharmacy Name [facility] [address], [address] [phone] Your Updated Medication List  
  
   
This list is accurate as of: 17  9:59 AM.  Always use your most recent med list.  
  
  
  
  
 atorvastatin 40 mg tablet Commonly known as:  LIPITOR Take 1 Tab by mouth daily. Blood Pressure Monitor Kit Commonly known as:  BLOOD PRESSURE KIT  
1 Units by Does Not Apply route once for 1 dose. clopidogrel 75 mg Tab Commonly known as:  PLAVIX Take 1 Tab by mouth daily. lisinopril 40 mg tablet Commonly known as:  Roxianne Daughters Take 1 Tab by mouth daily. miscellaneous medical supply Misc Please dispense one shower chair for patient with history of cerebrovascular accident. Prescriptions Printed Refills  
 miscellaneous medical supply misc 0 Sig: Please dispense one shower chair for patient with history of cerebrovascular accident. Class: Print Prescriptions Sent to Pharmacy Refills  
 lisinopril (PRINIVIL, ZESTRIL) 40 mg tablet 5 Sig: Take 1 Tab by mouth daily. Class: Normal  
 Pharmacy: [facility]  [address], [address] Ph #: [phone] Route: Oral  
 Blood Pressure Monitor (BLOOD PRESSURE KIT) kit 0 Si Units by Does Not Apply route once for 1 dose.   
 Class: Normal  
 Pharmacy: [address], [address] [facility] #: [phone] Route: Does Not Apply Follow-up Instructions Return in about 1 week (around 5/1/2017) for Medicare Wellness. Patient Instructions Medical Supply Stores:  
 
[facility] (CPAP supplies) [address] [facility] Phone: [phone] Fax: [phone] [facility] [address] [facility] [facility] [address] Phone: [phone] Note: does supply wheelchairs [employer] [address] [facility] [place], [address] [place] [address] Phone: [phone] Same fax number Note: does not supply wheelchairs [facility] [address] [facility], [address] [place] [address] Phone: [phone] 
[employer] Note: does supply wheelchairs; does not bill [facility], [address] [place] [address] Avenue Phone: [phone] Fax: [phone] or [phone] [facility] Henrique Foy [address] [place] [address] Phone: [phone] DASH Diet: Care Instructions Your Care Instructions The DASH diet is an eating plan that can help lower your blood pressure. DASH stands for Dietary Approaches to Stop Hypertension. Hypertension is high blood pressure. The DASH diet focuses on eating foods that are high in calcium, potassium, and magnesium. These nutrients can lower blood pressure. The foods that are highest in these nutrients are fruits, vegetables, low-fat dairy products, nuts, seeds, and legumes. But taking calcium, potassium, and magnesium supplements instead of eating foods that are high in those nutrients does not have the same effect. The DASH diet also includes whole grains, fish, and poultry. The DASH diet is one of several lifestyle changes your doctor may recommend to lower your high blood pressure.  Your doctor may also want you to decrease the amount of sodium in your diet. Lowering sodium while following the DASH diet can lower blood pressure even further than just the DASH diet alone. Follow-up care is a key part of your treatment and safety. Be sure to make and go to all appointments, and call your doctor if you are having problems. It's also a good idea to know your test results and keep a list of the medicines you take. How can you care for yourself at home? Following the DASH diet · Eat 4 to 5 servings of fruit each day. A serving is 1 medium-sized piece of fruit, ½ cup chopped or canned fruit, 1/4 cup dried fruit, or 4 ounces (½ cup) of fruit juice. Choose fruit more often than fruit juice. · Eat 4 to 5 servings of vegetables each day. A serving is 1 cup of lettuce or raw leafy vegetables, ½ cup of chopped or cooked vegetables, or 4 ounces (½ cup) of vegetable juice. Choose vegetables more often than vegetable juice. · Get 2 to 3 servings of low-fat and fat-free dairy each day. A serving is 8 ounces of milk, 1 cup of yogurt, or 1 ½ ounces of cheese. · Eat 6 to 8 servings of grains each day. A serving is 1 slice of bread, 1 ounce of dry cereal, or ½ cup of cooked rice, pasta, or cooked cereal. Try to choose whole-grain products as much as possible. · Limit lean meat, poultry, and fish to 2 servings each day. A serving is 3 ounces, about the size of a deck of cards. · Eat 4 to 5 servings of nuts, seeds, and legumes (cooked dried beans, lentils, and split peas) each week. A serving is 1/3 cup of nuts, 2 tablespoons of seeds, or ½ cup of cooked beans or peas. · Limit fats and oils to 2 to 3 servings each day. A serving is 1 teaspoon of vegetable oil or 2 tablespoons of salad dressing. · Limit sweets and added sugars to 5 servings or less a week. A serving is 1 tablespoon jelly or jam, ½ cup sorbet, or 1 cup of lemonade. · Eat less than 2,300 milligrams (mg) of sodium a day.  If you limit your sodium to 1,500 mg a day, you can lower your blood pressure even more. Tips for success · Start small. Do not try to make dramatic changes to your diet all at once. You might feel that you are missing out on your favorite foods and then be more likely to not follow the plan. Make small changes, and stick with them. Once those changes become habit, add a few more changes. · Try some of the following: ¨ Make it a goal to eat a fruit or vegetable at every meal and at snacks. This will make it easy to get the recommended amount of fruits and vegetables each day. ¨ Try yogurt topped with fruit and nuts for a snack or healthy dessert. ¨ Add lettuce, tomato, cucumber, and onion to sandwiches. ¨ Combine a ready-made pizza crust with low-fat mozzarella cheese and lots of vegetable toppings. Try using tomatoes, squash, spinach, broccoli, carrots, cauliflower, and onions. ¨ Have a variety of cut-up vegetables with a low-fat dip as an appetizer instead of chips and dip. ¨ Sprinkle sunflower seeds or chopped almonds over salads. Or try adding chopped walnuts or almonds to cooked vegetables. ¨ Try some vegetarian meals using beans and peas. Add garbanzo or kidney beans to salads. Make burritos and tacos with mashed kilgore beans or black beans. Where can you learn more? Go to http://isaura-giovanna.info/. Enter B899 in the search box to learn more about \"DASH Diet: Care Instructions. \" Current as of: March 23, 2016 Content Version: 11.2 © 7257-0633 [employer]. Care instructions adapted under license by Likewise Software (which disclaims liability or warranty for this information). If you have questions about a medical condition or this instruction, always ask your healthcare professional. [place] [address] any warranty or liability for your use of this information. Finger: Exercises Your Care Instructions Here are some examples of exercises for your fingers. Start each exercise slowly. Ease off the exercise if you start to have pain. Your doctor or your physical or occupational therapist will tell you when you can start these exercises and which ones will work best for you. How to do the exercises Tendon glides In this exercise, the steps follow one another to make a continuous movement. 1. With one hand, point your fingers and thumb straight up. Your wrist should be relaxed, following the line of your fingers and thumb. 2. Curl your fingers so that the top two joints in them are bent, and your fingers wrap down. Your fingertips should touch or be near the base of your fingers. Your fingers will look like a hook. 3. Make a fist by bending your knuckles. Your thumb can gently rest against your index (pointing) finger. 4. Unwind your fingers slightly so that your fingertips can touch the base of your palm. Your thumb can rest against your index finger. Hold that position for about 6 seconds. 5. Move back to your starting position, with your fingers and thumb pointing up. 6. Repeat the series of motions 8 to 12 times. 7. Switch hands, and repeat steps 1 through 6. Thumb flexion/extension 1. Place your forearm and hand on a table with your thumb pointing up. 2. Bend your thumb downward and across your palm so that your thumb touches the base of your little finger. Hold that position for about 6 seconds. Then straighten your thumb. 3. Repeat 8 to 12 times. 4. Switch hands, and repeat steps 1 through 3. Thumb abduction/adduction 1. With one hand, point your fingers and thumb straight up. Your wrist should be relaxed, following the line of your fingers and thumb. 2. Pull your thumb away from your palm as far as you can. Hold that position for about 6 seconds. Then slowly move your thumb back to the starting position, with your thumb resting against your index (pointing) finger. 3. Repeat 8 to 12 times. 4. Switch hands, and repeat steps 1 through 3. Finger opposition 1. With one hand, point your fingers and thumb straight up. Your wrist should be relaxed, following the line of your fingers and thumb. 2. Touch your thumb to each finger, one finger at a time. This will look like an \"okay\" sign, but try to keep your other fingers straight and pointing upward as much as you can. 3. Repeat 8 to 12 times. 4. Switch hands, and repeat steps 1 through 3. Follow-up care is a key part of your treatment and safety. Be sure to make and go to all appointments, and call your doctor if you are having problems. It's also a good idea to know your test results and keep a list of the medicines you take. Where can you learn more? Go to http://isaura-giovanna.info/. Enter N858 in the search box to learn more about \"Finger: Exercises. \" Current as of: May 23, 2016 Content Version: 11.2 © 7765-2782 [employer]. Care instructions adapted under license by [employer] (which disclaims liability or warranty for this information). If you have questions about a medical condition or this instruction, always ask your healthcare professional. [address] any warranty or liability for your use of this information. Introducing \[facility] [place] [facility]\"" & HEALTH SERVICES! Dear Lori Smith: Thank you for requesting a [employer] account. Our records indicate that you already have an active UserApp account. You can access your account anytime at [email or web address] Did you know that you can access your hospital and ER discharge instructions at any time in UserApp? You can also review all of your test results from your hospital stay or ER visit. Additional Information If you have questions, please visit the Frequently Asked Questions section of the UserApp website at [email or web address] Remember, MyChart is NOT to be used for urgent needs. For medical emergencies, dial 911. Now available from your iPhone and Android! Please provide this summary of care documentation to your next provider. Your primary care clinician is listed as Bevely Ellis. If you have any questions after today's visit, please call [phone].

## 2017-04-24 NOTE — PROGRESS NOTES
Chief Complaint   Patient presents with    Blood Pressure Check     1. Have you been to the ER, urgent care clinic since your last visit? Hospitalized since your last visit? No    2. Have you seen or consulted any other health care providers outside of the [address] [place] [address] since your last visit? Include any pap smears or colon screening. Going to [facility] f/u this week.

## 2017-04-24 NOTE — PATIENT INSTRUCTIONS
Medical Supply Stores:     [facility] (CPAP supplies)   [address]   Phone: [phone]   Fax: [phone]     [address] [facility]  [address] [place], [address]  Phone: [phone]  Note: does supply wheelchairs    [facility]   [address] [facility], [place], [address] [facility]   Phone: [phone]   Same fax number   Note: does not supply wheelchairs    [facility]   [address] [facility] [facility] [address]  Phone: [phone]  [place]   Note: does supply wheelchairs; does not bill [address]  Phone: [phone]  Fax: [phone] or [phone]   Phone: [phone]          DASH Diet: Care Instructions  Your Care Instructions  The DASH diet is an eating plan that can help lower your blood pressure. DASH stands for Dietary Approaches to Stop Hypertension. Hypertension is high blood pressure. The DASH diet focuses on eating foods that are high in calcium, potassium, and magnesium. These nutrients can lower blood pressure. The foods that are highest in these nutrients are fruits, vegetables, low-fat dairy products, nuts, seeds, and legumes. But taking calcium, potassium, and magnesium supplements instead of eating foods that are high in those nutrients does not have the same effect. The DASH diet also includes whole grains, fish, and poultry. The DASH diet is one of several lifestyle changes your doctor may recommend to lower your high blood pressure. Your doctor may also want you to decrease the amount of sodium in your diet. Lowering sodium while following the DASH diet can lower blood pressure even further than just the DASH diet alone. Follow-up care is a key part of your treatment and safety.  Be sure to make and go to all appointments, and call your doctor if you are having problems. It's also a good idea to know your test results and keep a list of the medicines you take. How can you care for yourself at home? Following the DASH diet  · Eat 4 to 5 servings of fruit each day. A serving is 1 medium-sized piece of fruit, ½ cup chopped or canned fruit, 1/4 cup dried fruit, or 4 ounces (½ cup) of fruit juice. Choose fruit more often than fruit juice. · Eat 4 to 5 servings of vegetables each day. A serving is 1 cup of lettuce or raw leafy vegetables, ½ cup of chopped or cooked vegetables, or 4 ounces (½ cup) of vegetable juice. Choose vegetables more often than vegetable juice. · Get 2 to 3 servings of low-fat and fat-free dairy each day. A serving is 8 ounces of milk, 1 cup of yogurt, or 1 ½ ounces of cheese. · Eat 6 to 8 servings of grains each day. A serving is 1 slice of bread, 1 ounce of dry cereal, or ½ cup of cooked rice, pasta, or cooked cereal. Try to choose whole-grain products as much as possible. · Limit lean meat, poultry, and fish to 2 servings each day. A serving is 3 ounces, about the size of a deck of cards. · Eat 4 to 5 servings of nuts, seeds, and legumes (cooked dried beans, lentils, and split peas) each week. A serving is 1/3 cup of nuts, 2 tablespoons of seeds, or ½ cup of cooked beans or peas. · Limit fats and oils to 2 to 3 servings each day. A serving is 1 teaspoon of vegetable oil or 2 tablespoons of salad dressing. · Limit sweets and added sugars to 5 servings or less a week. A serving is 1 tablespoon jelly or jam, ½ cup sorbet, or 1 cup of lemonade. · Eat less than 2,300 milligrams (mg) of sodium a day. If you limit your sodium to 1,500 mg a day, you can lower your blood pressure even more. Tips for success  · Start small. Do not try to make dramatic changes to your diet all at once. You might feel that you are missing out on your favorite foods and then be more likely to not follow the plan. Make small changes, and stick with them.  Once those changes become habit, add a few more changes. · Try some of the following:  ¨ Make it a goal to eat a fruit or vegetable at every meal and at snacks. This will make it easy to get the recommended amount of fruits and vegetables each day. ¨ Try yogurt topped with fruit and nuts for a snack or healthy dessert. ¨ Add lettuce, tomato, cucumber, and onion to sandwiches. ¨ Combine a ready-made pizza crust with low-fat mozzarella cheese and lots of vegetable toppings. Try using tomatoes, squash, spinach, broccoli, carrots, cauliflower, and onions. ¨ Have a variety of cut-up vegetables with a low-fat dip as an appetizer instead of chips and dip. ¨ Sprinkle sunflower seeds or chopped almonds over salads. Or try adding chopped walnuts or almonds to cooked vegetables. ¨ Try some vegetarian meals using beans and peas. Add garbanzo or kidney beans to salads. Make burritos and tacos with mashed kilgore beans or black beans. Where can you learn more? Go to http://isauraXangagiovanna.info/. Enter E526 in the search box to learn more about \"DASH Diet: Care Instructions. \"  Current as of: March 23, 2016  Content Version: 11.2  © 2551-4440 [employer]. Care instructions adapted under license by Strev[employer] (which disclaims liability or warranty for this information). If you have questions about a medical condition or this instruction, always ask your healthcare professional. [place] [address] any warranty or liability for your use of this information. Finger: Exercises  Your Care Instructions  Here are some examples of exercises for your fingers. Start each exercise slowly. Ease off the exercise if you start to have pain. Your doctor or your physical or occupational therapist will tell you when you can start these exercises and which ones will work best for you.   How to do the exercises  Tendon natalie    In this exercise, the steps follow one another to make a continuous movement. 1. With one hand, point your fingers and thumb straight up. Your wrist should be relaxed, following the line of your fingers and thumb. 2. Curl your fingers so that the top two joints in them are bent, and your fingers wrap down. Your fingertips should touch or be near the base of your fingers. Your fingers will look like a hook. 3. Make a fist by bending your knuckles. Your thumb can gently rest against your index (pointing) finger. 4. Unwind your fingers slightly so that your fingertips can touch the base of your palm. Your thumb can rest against your index finger. Hold that position for about 6 seconds. 5. Move back to your starting position, with your fingers and thumb pointing up. 6. Repeat the series of motions 8 to 12 times. 7. Switch hands, and repeat steps 1 through 6. Thumb flexion/extension    1. Place your forearm and hand on a table with your thumb pointing up. 2. Bend your thumb downward and across your palm so that your thumb touches the base of your little finger. Hold that position for about 6 seconds. Then straighten your thumb. 3. Repeat 8 to 12 times. 4. Switch hands, and repeat steps 1 through 3. Thumb abduction/adduction    1. With one hand, point your fingers and thumb straight up. Your wrist should be relaxed, following the line of your fingers and thumb. 2. Pull your thumb away from your palm as far as you can. Hold that position for about 6 seconds. Then slowly move your thumb back to the starting position, with your thumb resting against your index (pointing) finger. 3. Repeat 8 to 12 times. 4. Switch hands, and repeat steps 1 through 3. Finger opposition    1. With one hand, point your fingers and thumb straight up. Your wrist should be relaxed, following the line of your fingers and thumb. 2. Touch your thumb to each finger, one finger at a time.  This will look like an \"okay\" sign, but try to keep your other fingers straight and pointing upward as much as you can. 3. Repeat 8 to 12 times. 4. Switch hands, and repeat steps 1 through 3. Follow-up care is a key part of your treatment and safety. Be sure to make and go to all appointments, and call your doctor if you are having problems. It's also a good idea to know your test results and keep a list of the medicines you take. Where can you learn more? Go to http://isaura-giovanna.info/. Enter W449 in the search box to learn more about \"Finger: Exercises. \"  Current as of: May 23, 2016  Content Version: 11.2  © 6486-2876 Spark [employer]. Care instructions adapted under license by Plynked (which disclaims liability or warranty for this information). If you have questions about a medical condition or this instruction, always ask your healthcare professional. [address] any warranty or liability for your use of this information.

## 2017-04-24 NOTE — TELEPHONE ENCOUNTER
Per call from patient daughter Daniel Lanza) states that [address] [facility] doesn't dispense blood pressure monitor kit or shower chair. [facility] will dispense. Daughter asking if you can resend to [address] [place] [address].     [address] [phone]      thanks

## 2017-04-25 NOTE — TELEPHONE ENCOUNTER
Coni Rodriguez  Cell [phone],  called to say that [facility] did not receive the order for the glucose monitor. Told her that they did receive only one order and it was  for the chair    Please call the pharmacy as physician notes stated that both were ordered at the same time. Daughter wants a call back regarding this concern for her mother so she can go to the pharmacy and get it.

## 2017-04-28 ENCOUNTER — OFFICE VISIT (OUTPATIENT)
Dept: NEUROLOGY | Age: 77
End: 2017-04-28

## 2017-04-28 ENCOUNTER — TELEPHONE (OUTPATIENT)
Dept: NEUROLOGY | Age: 77
End: 2017-04-28

## 2017-04-28 ENCOUNTER — OFFICE VISIT (OUTPATIENT)
Dept: CARDIOLOGY CLINIC | Age: 77
End: 2017-04-28

## 2017-04-28 VITALS
BODY MASS INDEX: 20.76 KG/M2 | OXYGEN SATURATION: 96 % | HEART RATE: 70 BPM | HEIGHT: 59 IN | DIASTOLIC BLOOD PRESSURE: 80 MMHG | WEIGHT: 103 LBS | RESPIRATION RATE: 20 BRPM | SYSTOLIC BLOOD PRESSURE: 124 MMHG

## 2017-04-28 VITALS
DIASTOLIC BLOOD PRESSURE: 70 MMHG | SYSTOLIC BLOOD PRESSURE: 160 MMHG | BODY MASS INDEX: 21.17 KG/M2 | RESPIRATION RATE: 20 BRPM | HEIGHT: 59 IN | WEIGHT: 105 LBS

## 2017-04-28 DIAGNOSIS — I63.9 ACUTE CVA (CEREBROVASCULAR ACCIDENT) (HCC): Primary | ICD-10-CM

## 2017-04-28 DIAGNOSIS — I10 ESSENTIAL HYPERTENSION: ICD-10-CM

## 2017-04-28 DIAGNOSIS — E78.5 HYPERLIPIDEMIA LDL GOAL <70: ICD-10-CM

## 2017-04-28 RX ORDER — CLOPIDOGREL BISULFATE 75 MG/1
75 TABLET ORAL DAILY
Qty: 30 TAB | Refills: 5 | Status: SHIPPED | OUTPATIENT
Start: 2017-04-28 | End: 2017-08-08 | Stop reason: SDUPTHER

## 2017-04-28 RX ORDER — ATORVASTATIN CALCIUM 40 MG/1
40 TABLET, FILM COATED ORAL DAILY
Qty: 30 TAB | Refills: 5 | Status: SHIPPED | OUTPATIENT
Start: 2017-04-28 | End: 2017-11-03 | Stop reason: SDUPTHER

## 2017-04-28 NOTE — MR AVS SNAPSHOT
Visit Information Date & Time Provider Department Dept. Phone Encounter #  
 4/28/2017 11:30 AM Saul Felipe NP Neurology [address] [facility] [phone] [id number] Follow-up Instructions Return if symptoms worsen or fail to improve. Your Appointments 4/28/2017  1:40 PM  
HOSPITAL DISCHARGE with Annel Piper MD  
[facility] ([facility]AGE SCHEDULING) Appt Note: hospital follow up from - PAS  - per Cathye Pill; hospital follow up from - PAS - per Cathye Pill [address] [place] [address] [facility] [address]  
[phone]  
  
   
 [address] [facility] [place] [address] [address] [place]  
  
    
 5/12/2017 10:50 AM  
Medicare Physical with Kwesi Quigley MD  
[facility]) Appt Note: per Dr. Rama Esqueda pt is due for Medicare Wellness [address] [place], [place] [email or web address] [address] [place] [id number] [place], [place] [address] Clide Duty [address] Upcoming Health Maintenance Date Due ZOSTER VACCINE AGE 60> 11/7/1998 GLAUCOMA SCREENING Q2Y 11/7/2003 OSTEOPOROSIS SCREENING (DEXA) 11/7/2003 MEDICARE YEARLY EXAM 11/7/2003 INFLUENZA AGE 9 TO ADULT 8/1/2016 Pneumococcal 65+ Low/Medium Risk (2 of 2 - PPSV23) 12/21/2016 DTaP/Tdap/Td series (2 - Td) 6/22/2024 Allergies as of 4/28/2017  Review Complete On: 4/24/2017 By: Kwesi Quigley MD  
  
 Severity Noted Reaction Type Reactions Benadryl Allergy/cold [Diphenhyd-pe-acetaminophen]  06/11/2013    Hives Penicillins  06/11/2013    Hives Was treated with Augmentin in June 2015, tolerated medication well. Current Immunizations  Never Reviewed Name Date Pneumococcal Vaccine (Unspecified Type) 12/21/2011 TD Vaccine 12/21/2011 Tdap 6/22/2014 Not reviewed this visit You Were Diagnosed With   
  
 Codes Comments Acute CVA (cerebrovascular accident) ([facility] [address]    -  Primary ICD-10-CM: I63.9 ICD-9-CM: 434.91 Vitals BP Resp Height(growth percentile) Weight(growth percentile) BMI OB Status 160/70 20 4' 11\" (1.499 m) 105 lb (47.6 kg) 21.21 kg/m2 Postmenopausal  
 Smoking Status Former Smoker Vitals History BMI and BSA Data Body Mass Index Body Surface Area  
 21.21 kg/m 2 1.41 m 2 Preferred Pharmacy Pharmacy Name Phone [facility] [address], [address] [phone] Your Updated Medication List  
  
   
This list is accurate as of: 4/28/17 12:06 PM.  Always use your most recent med list.  
  
  
  
  
 atorvastatin 40 mg tablet Commonly known as:  LIPITOR Take 1 Tab by mouth daily. clopidogrel 75 mg Tab Commonly known as:  PLAVIX Take 1 Tab by mouth daily. lisinopril 40 mg tablet Commonly known as:  Sabrina Anthony Take 1 Tab by mouth daily. miscellaneous medical supply Misc Please dispense one shower chair for patient with history of cerebrovascular accident. Prescriptions Sent to Pharmacy Refills  
 clopidogrel (PLAVIX) 75 mg tab 5 Sig: Take 1 Tab by mouth daily. Class: Normal  
 Pharmacy: [facility] [address], [facility] Ph #: [phone] Route: Oral  
 atorvastatin (LIPITOR) 40 mg tablet 5 Sig: Take 1 Tab by mouth daily. Class: Normal  
 Pharmacy: [facility] [address], [facility] [facility] [address] Ph #: [phone] Route: Oral  
  
Follow-up Instructions Return if symptoms worsen or fail to improve. To-Do List   
 04/28/2017 Imaging:  DUPLEX CAROTID BILATERAL AMB NEURO   
  
 05/01/2017 3:00 PM  
  Appointment with Alanna Underwood at [facility] [place] [address] [address] ([phone]) Patient Instructions PRESCRIPTION REFILL POLICY [facility] Statement to Patients April 1, 2014 In an effort to ensure the large volume of patient prescription refills is processed in the most efficient and expeditious manner, we are asking our patients to assist us by calling your Pharmacy for all prescription refills, this will include also your  Mail Order Pharmacy. The pharmacy will contact our office electronically to continue the refill process. Please do not wait until the last minute to call your pharmacy. We need at least 48 hours (2days) to fill prescriptions. We also encourage you to call your pharmacy before going to  your prescription to make sure it is ready. With regard to controlled substance prescription refill requests (narcotic refills) that need to be picked up at our office, we ask your cooperation by providing us with at least 72 hours (3days) notice that you will need a refill. We will not refill narcotic prescription refill requests after 4:00pm on any weekday, Monday through Thursday, or after 2:00pm on Fridays, or on the weekends. We encourage everyone to explore another way of getting your prescription refill request processed using [employer], our patient web portal through our electronic medical record system. [employer] is an efficient and effective way to communicate your medication request directly to the office and  downloadable as an dedrick on your smart phone . [employer] also features a review functionality that allows you to view your medication list as well as leave messages for your physician. Are you ready to get connected? If so please review the attatched instructions or speak to any of our staff to get you set up right away! Thank you so much for your cooperation. Should you have any questions please contact our Practice Administrator. The Physicians and Staff,  [facility] John Cook 0[address] What is a living will?  
A living will is a legal form you use to write down the kind of care you want at the end of your life. It is used by the health professionals who will treat you if you aren't able to decide for yourself. If you put your wishes in writing, your loved ones and others will know what kind of care you want. They won't need to guess. This can ease your mind and be helpful to others. A living will is not the same as an estate or property will. An estate will explains what you want to happen with your money and property after you die. Is a living will a legal document? A living will is a legal document. Each state has its own laws about living jensen. If you move to another state, make sure that your living will is legal in the state where you now live. Or you might use a universal form that has been approved by many states. This kind of form can sometimes be completed and stored online. Your electronic copy will then be available wherever you have a connection to the Internet. In most cases, doctors will respect your wishes even if you have a form from a different state. · You don't need an  to complete a living will. But legal advice can be helpful if your state's laws are unclear, your health history is complicated, or your family can't agree on what should be in your living will. · You can change your living will at any time. Some people find that their wishes about end-of-life care change as their health changes. · In addition to making a living will, think about completing a medical power of  form. This form lets you name the person you want to make end-of-life treatment decisions for you (your \"health care agent\") if you're not able to. Many hospitals and nursing homes will give you the forms you need to complete a living will and a medical power of . · Your living will is used only if you can't make or communicate decisions for yourself anymore.  If you become able to make decisions again, you can accept or refuse any treatment, no matter what you wrote in your living will. · Your state may offer an online registry. This is a place where you can store your living will online so the doctors and nurses who need to treat you can find it right away. What should you think about when creating a living will? Talk about your end-of-life wishes with your family members and your doctor. Let them know what you want. That way the people making decisions for you won't be surprised by your choices. Think about these questions as you make your living will: · Do you know enough about life support methods that might be used? If not, talk to your doctor so you know what might be done if you can't breathe on your own, your heart stops, or you're unable to swallow. · What things would you still want to be able to do after you receive life-support methods? Would you want to be able to walk? To speak? To eat on your own? To live without the help of machines? · If you have a choice, where do you want to be cared for? In your home? At a hospital or nursing home? · Do you want certain Scientology practices performed if you become very ill? · If you have a choice at the end of your life, where would you prefer to die? At home? In a hospital or nursing home? Somewhere else? · Would you prefer to be buried or cremated? · Do you want your organs to be donated after you die? What should you do with your living will? · Make sure that your family members and your health care agent have copies of your living will. · Give your doctor a copy of your living will to keep in your medical record. If you have more than one doctor, make sure that each one has a copy. · You may want to put a copy of your living will where it can be easily found. Where can you learn more? Go to http://isaura-giovanna.info/. Enter E982 in the search box to learn more about \"Learning About Living Perroy. \" 
 Current as of: February 24, 2016 Content Version: 11.2 © 4457-9636 [employer]. Care instructions adapted under license by [employer] (which disclaims liability or warranty for this information). If you have questions about a medical condition or this instruction, always ask your healthcare professional. [address] any warranty or liability for your use of this information. John Cook 1451 What is a living will? A living will is a legal form you use to write down the kind of care you want at the end of your life. It is used by the health professionals who will treat you if you aren't able to decide for yourself. If you put your wishes in writing, your loved ones and others will know what kind of care you want. They won't need to guess. This can ease your mind and be helpful to others. A living will is not the same as an estate or property will. An estate will explains what you want to happen with your money and property after you die. Is a living will a legal document? A living will is a legal document. Each state has its own laws about living jensen. If you move to another state, make sure that your living will is legal in the state where you now live. Or you might use a universal form that has been approved by many states. This kind of form can sometimes be completed and stored online. Your electronic copy will then be available wherever you have a connection to the Internet. In most cases, doctors will respect your wishes even if you have a form from a different state. · You don't need an  to complete a living will. But legal advice can be helpful if your state's laws are unclear, your health history is complicated, or your family can't agree on what should be in your living will. · You can change your living will at any time. Some people find that their wishes about end-of-life care change as their health changes. · In addition to making a living will, think about completing a medical power of  form. This form lets you name the person you want to make end-of-life treatment decisions for you (your \"health care agent\") if you're not able to. Many hospitals and nursing homes will give you the forms you need to complete a living will and a medical power of . · Your living will is used only if you can't make or communicate decisions for yourself anymore. If you become able to make decisions again, you can accept or refuse any treatment, no matter what you wrote in your living will. · Your state may offer an online registry. This is a place where you can store your living will online so the doctors and nurses who need to treat you can find it right away. What should you think about when creating a living will? Talk about your end-of-life wishes with your family members and your doctor. Let them know what you want. That way the people making decisions for you won't be surprised by your choices. Think about these questions as you make your living will: · Do you know enough about life support methods that might be used? If not, talk to your doctor so you know what might be done if you can't breathe on your own, your heart stops, or you're unable to swallow. · What things would you still want to be able to do after you receive life-support methods? Would you want to be able to walk? To speak? To eat on your own? To live without the help of machines? · If you have a choice, where do you want to be cared for? In your home? At a hospital or nursing home? · Do you want certain Tenriism practices performed if you become very ill? · If you have a choice at the end of your life, where would you prefer to die? At home? In a hospital or nursing home? Somewhere else? · Would you prefer to be buried or cremated? · Do you want your organs to be donated after you die? What should you do with your living will? · Make sure that your family members and your health care agent have copies of your living will. · Give your doctor a copy of your living will to keep in your medical record. If you have more than one doctor, make sure that each one has a copy. · You may want to put a copy of your living will where it can be easily found. Where can you learn more? Go to http://isaura-giovanna.info/. Enter K527 in the search box to learn more about \"Learning About Living Iliana. \" Current as of: February 24, 2016 Content Version: 11.2 © 6610-8602 [employer]. Care instructions adapted under license by [employer] (which disclaims liability or warranty for this information). If you have questions about a medical condition or this instruction, always ask your healthcare professional. [address] any warranty or liability for your use of this information. John Cook 1849 What is a living will? A living will is a legal form you use to write down the kind of care you want at the end of your life. It is used by the health professionals who will treat you if you aren't able to decide for yourself. If you put your wishes in writing, your loved ones and others will know what kind of care you want. They won't need to guess. This can ease your mind and be helpful to others. A living will is not the same as an estate or property will. An estate will explains what you want to happen with your money and property after you die. Is a living will a legal document? A living will is a legal document. Each state has its own laws about living jensen. If you move to another state, make sure that your living will is legal in the state where you now live. Or you might use a universal form that has been approved by many states. This kind of form can sometimes be completed and stored online.  Your electronic copy will then be available wherever you have a connection to the Internet. In most cases, doctors will respect your wishes even if you have a form from a different state. · You don't need an  to complete a living will. But legal advice can be helpful if your state's laws are unclear, your health history is complicated, or your family can't agree on what should be in your living will. · You can change your living will at any time. Some people find that their wishes about end-of-life care change as their health changes. · In addition to making a living will, think about completing a medical power of  form. This form lets you name the person you want to make end-of-life treatment decisions for you (your \"health care agent\") if you're not able to. Many hospitals and nursing homes will give you the forms you need to complete a living will and a medical power of . · Your living will is used only if you can't make or communicate decisions for yourself anymore. If you become able to make decisions again, you can accept or refuse any treatment, no matter what you wrote in your living will. · Your state may offer an online registry. This is a place where you can store your living will online so the doctors and nurses who need to treat you can find it right away. What should you think about when creating a living will? Talk about your end-of-life wishes with your family members and your doctor. Let them know what you want. That way the people making decisions for you won't be surprised by your choices. Think about these questions as you make your living will: · Do you know enough about life support methods that might be used? If not, talk to your doctor so you know what might be done if you can't breathe on your own, your heart stops, or you're unable to swallow.  
· What things would you still want to be able to do after you receive life-support methods? Would you want to be able to walk? To speak? To eat on your own? To live without the help of machines? · If you have a choice, where do you want to be cared for? In your home? At a hospital or nursing home? · Do you want certain Buddhism practices performed if you become very ill? · If you have a choice at the end of your life, where would you prefer to die? At home? In a hospital or nursing home? Somewhere else? · Would you prefer to be buried or cremated? · Do you want your organs to be donated after you die? What should you do with your living will? · Make sure that your family members and your health care agent have copies of your living will. · Give your doctor a copy of your living will to keep in your medical record. If you have more than one doctor, make sure that each one has a copy. · You may want to put a copy of your living will where it can be easily found. Where can you learn more? Go to http://isaura-giovanna.info/. Enter S277 in the search box to learn more about \"Learning About Living Perrosameera. \" Current as of: February 24, 2016 Content Version: 11.2 © 4896-5433 [employer]. Care instructions adapted under license by [employer] (which disclaims liability or warranty for this information). If you have questions about a medical condition or this instruction, always ask your healthcare professional. [place] [address] any warranty or liability for your use of this information. Advance Directives: Care Instructions Your Care Instructions An advance directive is a legal way to state your wishes at the end of your life. It tells your family and your doctor what to do if you can no longer say what you want. There are two main types of advance directives. You can change them any time that your wishes change.  
· A living will tells your family and your doctor your wishes about life support and other treatment. · A durable power of  for health care lets you name a person to make treatment decisions for you when you can't speak for yourself. This person is called a health care agent. If you do not have an advance directive, decisions about your medical care may be made by a doctor or a  who doesn't know you. It may help to think of an advance directive as a gift to the people who care for you. If you have one, they won't have to make tough decisions by themselves. Follow-up care is a key part of your treatment and safety. Be sure to make and go to all appointments, and call your doctor if you are having problems. It's also a good idea to know your test results and keep a list of the medicines you take. How can you care for yourself at home? · Discuss your wishes with your loved ones and your doctor. This way, there are no surprises. · Many states have a unique form. Or you might use a universal form that has been approved by many states. This kind of form can sometimes be completed and stored online. Your electronic copy will then be available wherever you have a connection to the Internet. In most cases, doctors will respect your wishes even if you have a form from a different state. · You don't need a  to do an advance directive. But you may want to get legal advice. · Think about these questions when you prepare an advance directive: ¨ Who do you want to make decisions about your medical care if you are not able to? Many people choose a family member or close friend. ¨ Do you know enough about life support methods that might be used? If not, talk to your doctor so you understand. ¨ What are you most afraid of that might happen? You might be afraid of having pain, losing your independence, or being kept alive by machines. ¨ Where would you prefer to die? Choices include your home, a hospital, or a nursing home. ¨ Would you like to have information about hospice care to support you and your family? ¨ Do you want to donate organs when you die? ¨ Do you want certain Mu-ism practices performed before you die? If so, put your wishes in the advance directive. · Read your advance directive every year, and make changes as needed. When should you call for help? Be sure to contact your doctor if you have any questions. Where can you learn more? Go to http://isaura-giovanna.info/. Enter R264 in the search box to learn more about \"Advance Directives: Care Instructions. \" Current as of: November 17, 2016 Content Version: 11.2 © 1546-5984 [employer]. Care instructions adapted under license by [employer] (which disclaims liability or warranty for this information). If you have questions about a medical condition or this instruction, always ask your healthcare professional. [address] any warranty or liability for your use of this information. Introducing [facility] & HEALTH SERVICES! Dear Alec Ortiz: Thank you for requesting a [employer] account. Our records indicate that you already have an active [employer] account. You can access your account anytime at [email or web address] Did you know that you can access your hospital and ER discharge instructions at any time in [employer]? You can also review all of your test results from your hospital stay or ER visit. Additional Information If you have questions, please visit the Frequently Asked Questions section of the [employer] website at [email or web address] Remember, [employer] is NOT to be used for urgent needs. For medical emergencies, dial 911. Now available from your iPhone and Android! Please provide this summary of care documentation to your next provider. Your primary care clinician is listed as Josue Osuna.  If you have any questions after today's visit, please call [phone].

## 2017-04-28 NOTE — PATIENT INSTRUCTIONS
[employer] Activation    Thank you for requesting access to Wapi. Please follow the instructions below to securely access and download your online medical record. Wapi allows you to send messages to your doctor, view your test results, renew your prescriptions, schedule appointments, and more. How Do I Sign Up? 1. In your internet browser, go to www.[employer] [place] [employer] [place]  2. Click on the First Time User? Click Here link in the Sign In box. You will be redirect to the New Member Sign Up page. 3. Enter your Wapi Access Code exactly as it appears below. You will not need to use this code after youve completed the sign-up process. If you do not sign up before the expiration date, you must request a new code. Wapi Access Code: Activation code not generated  Current Wapi Status: Active (This is the date your Wapi access code will )    4. Enter the last four digits of your Social Security Number (xxxx) and Date of Birth (mm/dd/yyyy) as indicated and click Submit. You will be taken to the next sign-up page. 5. Create a Wapi ID. This will be your Wapi login ID and cannot be changed, so think of one that is secure and easy to remember. 6. Create a Wapi password. You can change your password at any time. 7. Enter your Password Reset Question and Answer. This can be used at a later time if you forget your password. 8. Enter your e-mail address. You will receive e-mail notification when new information is available in [address] 9. Click Sign Up. You can now view and download portions of your medical record. 10. Click the Download Summary menu link to download a portable copy of your medical information. Additional Information    If you have questions, please visit the Frequently Asked Questions section of the Wapi website at [email or web address] Remember, Wapi is NOT to be used for urgent needs. For medical emergencies, dial 911.            DASH Diet: Care Instructions  Your Care Instructions  The DASH diet is an eating plan that can help lower your blood pressure. DASH stands for Dietary Approaches to Stop Hypertension. Hypertension is high blood pressure. The DASH diet focuses on eating foods that are high in calcium, potassium, and magnesium. These nutrients can lower blood pressure. The foods that are highest in these nutrients are fruits, vegetables, low-fat dairy products, nuts, seeds, and legumes. But taking calcium, potassium, and magnesium supplements instead of eating foods that are high in those nutrients does not have the same effect. The DASH diet also includes whole grains, fish, and poultry. The DASH diet is one of several lifestyle changes your doctor may recommend to lower your high blood pressure. Your doctor may also want you to decrease the amount of sodium in your diet. Lowering sodium while following the DASH diet can lower blood pressure even further than just the DASH diet alone. Follow-up care is a key part of your treatment and safety. Be sure to make and go to all appointments, and call your doctor if you are having problems. It's also a good idea to know your test results and keep a list of the medicines you take. How can you care for yourself at home? Following the DASH diet  · Eat 4 to 5 servings of fruit each day. A serving is 1 medium-sized piece of fruit, ½ cup chopped or canned fruit, 1/4 cup dried fruit, or 4 ounces (½ cup) of fruit juice. Choose fruit more often than fruit juice. · Eat 4 to 5 servings of vegetables each day. A serving is 1 cup of lettuce or raw leafy vegetables, ½ cup of chopped or cooked vegetables, or 4 ounces (½ cup) of vegetable juice. Choose vegetables more often than vegetable juice. · Get 2 to 3 servings of low-fat and fat-free dairy each day. A serving is 8 ounces of milk, 1 cup of yogurt, or 1 ½ ounces of cheese. · Eat 6 to 8 servings of grains each day.  A serving is 1 slice of bread, 1 ounce of dry cereal, or ½ cup of cooked rice, pasta, or cooked cereal. Try to choose whole-grain products as much as possible. · Limit lean meat, poultry, and fish to 2 servings each day. A serving is 3 ounces, about the size of a deck of cards. · Eat 4 to 5 servings of nuts, seeds, and legumes (cooked dried beans, lentils, and split peas) each week. A serving is 1/3 cup of nuts, 2 tablespoons of seeds, or ½ cup of cooked beans or peas. · Limit fats and oils to 2 to 3 servings each day. A serving is 1 teaspoon of vegetable oil or 2 tablespoons of salad dressing. · Limit sweets and added sugars to 5 servings or less a week. A serving is 1 tablespoon jelly or jam, ½ cup sorbet, or 1 cup of lemonade. · Eat less than 2,300 milligrams (mg) of sodium a day. If you limit your sodium to 1,500 mg a day, you can lower your blood pressure even more. Tips for success  · Start small. Do not try to make dramatic changes to your diet all at once. You might feel that you are missing out on your favorite foods and then be more likely to not follow the plan. Make small changes, and stick with them. Once those changes become habit, add a few more changes. · Try some of the following:  ¨ Make it a goal to eat a fruit or vegetable at every meal and at snacks. This will make it easy to get the recommended amount of fruits and vegetables each day. ¨ Try yogurt topped with fruit and nuts for a snack or healthy dessert. ¨ Add lettuce, tomato, cucumber, and onion to sandwiches. ¨ Combine a ready-made pizza crust with low-fat mozzarella cheese and lots of vegetable toppings. Try using tomatoes, squash, spinach, broccoli, carrots, cauliflower, and onions. ¨ Have a variety of cut-up vegetables with a low-fat dip as an appetizer instead of chips and dip. ¨ Sprinkle sunflower seeds or chopped almonds over salads. Or try adding chopped walnuts or almonds to cooked vegetables. ¨ Try some vegetarian meals using beans and peas.  Add garbanzo or kidney beans to salads. Make burritos and tacos with mashed kilgore beans or black beans. Where can you learn more? Go to http://isaura-giovanna.info/. Enter J851 in the search box to learn more about \"DASH Diet: Care Instructions. \"  Current as of: March 23, 2016  Content Version: 11.2  © 3524-9492 [employer]. Care instructions adapted under license by [employer] (which disclaims liability or warranty for this information). If you have questions about a medical condition or this instruction, always ask your healthcare professional. [place] [address] any warranty or liability for your use of this information.

## 2017-04-28 NOTE — PROGRESS NOTES
Subjective:     Problem List  Date Reviewed: 4/28/2017          Codes Class Noted    H/O CVA (4/2017) ICD-10-CM: I63.9  ICD-9-CM: 434.91  4/10/2017    Overview Signed 4/10/2017 12:14 PM by Ashanti Hannah NP     MRI review:  Posterior L frontal/parietal infarct             Cerebral atrophy ICD-10-CM: G31.9  ICD-9-CM: 331.9  4/10/2017    Overview Signed 4/10/2017  1:01 PM by Ashanti Hannah NP     Greater than expected for age             Hyperlipidemia LDL goal <70 ICD-10-CM: E78.5  ICD-9-CM: 272.4  4/10/2017        Adjustment disorder ICD-10-CM: F43.20  ICD-9-CM: 309.9  4/10/2014        Anxiety ICD-10-CM: F41.9  ICD-9-CM: 300.00  4/10/2014        Hypertension ICD-10-CM: I10  ICD-9-CM: 401.9  11/15/2013    Overview Addendum 4/28/2017  1:45 PM by Dylon Blackman MD     A. Goal BP <140/90 s/p CVA in 2017. B. Echo (4/10/17):  EF 40% with mild GHK. Mild MR. Mild to mod TR. PASP 53. Neg bubble. Ms. Samual Alpers is a 64 y.o. woman with the above past medical history, who presents for follow up of her cerebrovascular accident and mild cardiomyopathy in the setting of hypertension. She is doing well from a cardiac standpoint. She denies any chest pain, chest discomfort, shortness of breath, dyspnea on exertion, orthopnea, paroxysmal nocturnal dyspnea, lower extremity swelling, palpitations, syncope or near syncope. History   Smoking Status    Former Smoker    Quit date: 7/7/1999   Smokeless Tobacco    Never Used       Current Outpatient Prescriptions   Medication Sig Dispense Refill    clopidogrel (PLAVIX) 75 mg tab Take 1 Tab by mouth daily. 30 Tab 5    atorvastatin (LIPITOR) 40 mg tablet Take 1 Tab by mouth daily. 30 Tab 5    lisinopril (PRINIVIL, ZESTRIL) 40 mg tablet Take 1 Tab by mouth daily. 30 Tab 5    miscellaneous medical supply misc Please dispense one shower chair for patient with history of cerebrovascular accident.  1 Each 0       Objective:     Visit Vitals  /80 (BP 1 Location: Left arm, BP Patient Position: Sitting)    Pulse 70    Resp 20    Ht 4' 11\" (1.499 m)    Wt 103 lb (46.7 kg)    SpO2 96%    BMI 20.8 kg/m2       HEENT Exam:     Normocephalic, atraumatic. EOMI. Oropharynx negative. Neck supple. No lymphadenopathy. Lung Exam:     The patient is not dyspneic. There is no cough. The lungs are clear to percussion. Breath sounds are heard equally in all lung fields. There are no wheezes, rales, rhonchi, or rubs heard on auscultation. Heart Exam:     The rhythm is regular. The PMI is in the 5th intercostal space of the MCL. Apical impulse is normal. S1 is regular. S2 is physiologic. There is no S3, S4 gallop, murmur, click, or rub. Abdomen Exam:     Bowel sounds are normoactive. Abdomen benign. Extremities Exam:     The extremities are atraumatic appearing. There is no clubbing, cyanosis, edema, ulcers, varicose veins, rash, swelling, erythemia noted in the extremities. The neurovascular status is grossly intact with normal distal sensation and pulses. Vascular Exam:     The radial, brachial, dorsalis pedis, posterior tibial, are equal and strong bilaterally The carotids are equal bilaterally with left bruits. EKG: Lorenso Frankel Assessment/Plan:     Mr. Robbin Gentile appears stable from a cardiac standpoint. She is going to continue her current medication regimen. I am going to ask her to wear a 30 day auto-triggered event monitor to ensure she is not having episodes of atrial fibrillation. She is going to follow up with me in two months' time to review all of this and see how she is doing. We discussed diet and exercise. Plan:  1. Continue outpatient medication regimen. 2. 30 day auto-triggered event monitor. 3. Follow up with me in two months' time. 4. Diet and exercise. 5. Call my office, call her primary care physician or return to the hospital should any concerning symptomatology arise. 6. Ms. Randolph indicated that she understood this plan and wished to proceed ahead.              Patient Care Team:  Roberto Shafer MD as PCP - General Casey Elise RN as Abdoulaye Tracy NP (Nurse Practitioner)

## 2017-04-28 NOTE — MR AVS SNAPSHOT
Visit Information Date & Time Provider Department Dept. Phone Encounter #  
 4/28/2017  1:40 PM Salas Delvalle MD [facility] Mamie Ayala [phone] [id number] Follow-up Instructions Return in about 2 months (around 6/28/2017). Your Appointments 5/12/2017 10:50 AM  
Medicare Physical with Elodia Lin MD  
[address] [place] [address]) Appt Note: per Dr. Renetta Cevallos pt is due for Medicare Wellness [address] [place], [place] [address] [address]  
[phone]  
  
   
 [address] [place] [address] [phone] Upcoming Health Maintenance Date Due ZOSTER VACCINE AGE 60> 11/7/1998 GLAUCOMA SCREENING Q2Y 11/7/2003 OSTEOPOROSIS SCREENING (DEXA) 11/7/2003 MEDICARE YEARLY EXAM 11/7/2003 INFLUENZA AGE 9 TO ADULT 8/1/2016 Pneumococcal 65+ Low/Medium Risk (2 of 2 - PPSV23) 12/21/2016 DTaP/Tdap/Td series (2 - Td) 6/22/2024 Allergies as of 4/28/2017  Review Complete On: 4/28/2017 By: Salas Delvalle MD  
  
 Severity Noted Reaction Type Reactions Benadryl Allergy/cold [Diphenhyd-pe-acetaminophen]  06/11/2013    Hives Penicillins  06/11/2013    Hives Was treated with Augmentin in June 2015, tolerated medication well. Current Immunizations  Never Reviewed Name Date Pneumococcal Vaccine (Unspecified Type) 12/21/2011 TD Vaccine 12/21/2011 Tdap 6/22/2014 Not reviewed this visit You Were Diagnosed With   
  
 Codes Comments Acute CVA (cerebrovascular accident) ([facility] [address]    -  Primary ICD-10-CM: I63.9 ICD-9-CM: 434.91 Hyperlipidemia LDL goal <70     ICD-10-CM: E78.5 ICD-9-CM: 272.4 Essential hypertension     ICD-10-CM: I10 
ICD-9-CM: 401.9 Vitals BP Pulse Resp Height(growth percentile) Weight(growth percentile) SpO2  
 124/80 (BP 1 Location: Left arm, BP Patient Position: Sitting) 70 20 4' 11\" (1.499 m) 103 lb (46.7 kg) 96% BMI OB Status Smoking Status 20.8 kg/m2 Postmenopausal Former Smoker Vitals History BMI and BSA Data Body Mass Index Body Surface Area  
 20.8 kg/m 2 1.39 m 2 Preferred Pharmacy Pharmacy Name Phone [facility] [address], [address] [phone] Your Updated Medication List  
  
   
This list is accurate as of: 17  1:52 PM.  Always use your most recent med list.  
  
  
  
  
 atorvastatin 40 mg tablet Commonly known as:  LIPITOR Take 1 Tab by mouth daily. clopidogrel 75 mg Tab Commonly known as:  PLAVIX Take 1 Tab by mouth daily. lisinopril 40 mg tablet Commonly known as:  Armaan Lofty Take 1 Tab by mouth daily. miscellaneous medical supply Misc Please dispense one shower chair for patient with history of cerebrovascular accident. Follow-up Instructions Return in about 2 months (around 2017). To-Do List   
 2017 3:00 PM  
  Appointment with [facility] Madisyn Joel [place] [facility] [place] [address] [address] ([phone]) Patient Instructions [employer] Activation Thank you for requesting access to Center'd. Please follow the instructions below to securely access and download your online medical record. Center'd allows you to send messages to your doctor, view your test results, renew your prescriptions, schedule appointments, and more. How Do I Sign Up? 1. In your internet browser, go to [email or web address] 
2. Click on the First Time User? Click Here link in the Sign In box. You will be redirect to the New Member Sign Up page. 3. Enter your Center'd Access Code exactly as it appears below. You will not need to use this code after youve completed the sign-up process. If you do not sign up before the expiration date, you must request a new code. Center'd Access Code: Activation code not generated Current Center'd Status: Active (This is the date your Center'd access code will ) 4. Enter the last four digits of your Social Security Number (xxxx) and Date of Birth (mm/dd/yyyy) as indicated and click Submit. You will be taken to the next sign-up page. 5. Create a [employer] ID. This will be your [employer] login ID and cannot be changed, so think of one that is secure and easy to remember. 6. Create a [employer] password. You can change your password at any time. 7. Enter your Password Reset Question and Answer. This can be used at a later time if you forget your password. 8. Enter your e-mail address. You will receive e-mail notification when new information is available in [address] 9. Click Sign Up. You can now view and download portions of your medical record. 10. Click the Download Summary menu link to download a portable copy of your medical information. Additional Information If you have questions, please visit the Frequently Asked Questions section of the [employer] website at [email or web address] Remember, [employer] is NOT to be used for urgent needs. For medical emergencies, dial 911. DASH Diet: Care Instructions Your Care Instructions The DASH diet is an eating plan that can help lower your blood pressure. DASH stands for Dietary Approaches to Stop Hypertension. Hypertension is high blood pressure. The DASH diet focuses on eating foods that are high in calcium, potassium, and magnesium. These nutrients can lower blood pressure. The foods that are highest in these nutrients are fruits, vegetables, low-fat dairy products, nuts, seeds, and legumes. But taking calcium, potassium, and magnesium supplements instead of eating foods that are high in those nutrients does not have the same effect. The DASH diet also includes whole grains, fish, and poultry. The DASH diet is one of several lifestyle changes your doctor may recommend to lower your high blood pressure.  Your doctor may also want you to decrease the amount of sodium in your diet. Lowering sodium while following the DASH diet can lower blood pressure even further than just the DASH diet alone. Follow-up care is a key part of your treatment and safety. Be sure to make and go to all appointments, and call your doctor if you are having problems. It's also a good idea to know your test results and keep a list of the medicines you take. How can you care for yourself at home? Following the DASH diet · Eat 4 to 5 servings of fruit each day. A serving is 1 medium-sized piece of fruit, ½ cup chopped or canned fruit, 1/4 cup dried fruit, or 4 ounces (½ cup) of fruit juice. Choose fruit more often than fruit juice. · Eat 4 to 5 servings of vegetables each day. A serving is 1 cup of lettuce or raw leafy vegetables, ½ cup of chopped or cooked vegetables, or 4 ounces (½ cup) of vegetable juice. Choose vegetables more often than vegetable juice. · Get 2 to 3 servings of low-fat and fat-free dairy each day. A serving is 8 ounces of milk, 1 cup of yogurt, or 1 ½ ounces of cheese. · Eat 6 to 8 servings of grains each day. A serving is 1 slice of bread, 1 ounce of dry cereal, or ½ cup of cooked rice, pasta, or cooked cereal. Try to choose whole-grain products as much as possible. · Limit lean meat, poultry, and fish to 2 servings each day. A serving is 3 ounces, about the size of a deck of cards. · Eat 4 to 5 servings of nuts, seeds, and legumes (cooked dried beans, lentils, and split peas) each week. A serving is 1/3 cup of nuts, 2 tablespoons of seeds, or ½ cup of cooked beans or peas. · Limit fats and oils to 2 to 3 servings each day. A serving is 1 teaspoon of vegetable oil or 2 tablespoons of salad dressing. · Limit sweets and added sugars to 5 servings or less a week. A serving is 1 tablespoon jelly or jam, ½ cup sorbet, or 1 cup of lemonade. · Eat less than 2,300 milligrams (mg) of sodium a day.  If you limit your sodium to 1,500 mg a day, you can lower your blood pressure even more. Tips for success · Start small. Do not try to make dramatic changes to your diet all at once. You might feel that you are missing out on your favorite foods and then be more likely to not follow the plan. Make small changes, and stick with them. Once those changes become habit, add a few more changes. · Try some of the following: ¨ Make it a goal to eat a fruit or vegetable at every meal and at snacks. This will make it easy to get the recommended amount of fruits and vegetables each day. ¨ Try yogurt topped with fruit and nuts for a snack or healthy dessert. ¨ Add lettuce, tomato, cucumber, and onion to sandwiches. ¨ Combine a ready-made pizza crust with low-fat mozzarella cheese and lots of vegetable toppings. Try using tomatoes, squash, spinach, broccoli, carrots, cauliflower, and onions. ¨ Have a variety of cut-up vegetables with a low-fat dip as an appetizer instead of chips and dip. ¨ Sprinkle sunflower seeds or chopped almonds over salads. Or try adding chopped walnuts or almonds to cooked vegetables. ¨ Try some vegetarian meals using beans and peas. Add garbanzo or kidney beans to salads. Make burritos and tacos with mashed kilgore beans or black beans. Where can you learn more? Go to http://isaura-giovanna.info/. Enter S018 in the search box to learn more about \"DASH Diet: Care Instructions. \" Current as of: March 23, 2016 Content Version: 11.2 © 7329-9135 [employer]. Care instructions adapted under license by [employer] (which disclaims liability or warranty for this information). If you have questions about a medical condition or this instruction, always ask your healthcare professional. Aidan Carrera any warranty or liability for your use of this information. Introducing [facility]! Dear Lori Smith: Thank you for requesting a [email or web address] account. Our records indicate that you already have an active [email or web address] account. You can access your account anytime at [email or web address] Did you know that you can access your hospital and ER discharge instructions at any time in [email or web address]? You can also review all of your test results from your hospital stay or ER visit. Additional Information If you have questions, please visit the Frequently Asked Questions section of the [email or web address] website at [email or web address] Remember, [email or web address] is NOT to be used for urgent needs. For medical emergencies, dial 911. Now available from your iPhone and Android! Please provide this summary of care documentation to your next provider. Your primary care clinician is listed as Omaira Somers. If you have any questions after today's visit, please call [phone].

## 2017-04-28 NOTE — PATIENT INSTRUCTIONS
[facility]   Statement to Patients  April 1, 2014      In an effort to ensure the large volume of patient prescription refills is processed in the most efficient and expeditious manner, we are asking our patients to assist us by calling your Pharmacy for all prescription refills, this will include also your  Mail Order Pharmacy. The pharmacy will contact our office electronically to continue the refill process. Please do not wait until the last minute to call your pharmacy. We need at least 48 hours (2days) to fill prescriptions. We also encourage you to call your pharmacy before going to  your prescription to make sure it is ready. With regard to controlled substance prescription refill requests (narcotic refills) that need to be picked up at our office, we ask your cooperation by providing us with at least 72 hours (3days) notice that you will need a refill. We will not refill narcotic prescription refill requests after 4:00pm on any weekday, Monday through Thursday, or after 2:00pm on Fridays, or on the weekends. We encourage everyone to explore another way of getting your prescription refill request processed using [employer], our patient web portal through our electronic medical record system. [employer] is an efficient and effective way to communicate your medication request directly to the office and  downloadable as an dedrick on your smart phone . [employer] also features a review functionality that allows you to view your medication list as well as leave messages for your physician. Are you ready to get connected? If so please review the attatched instructions or speak to any of our staff to get you set up right away! Thank you so much for your cooperation. Should you have any questions please contact our Practice Administrator. The Physicians and Staff,  [facility] [address]  What is a living will?   A living will is a legal form you use to write down the kind of care you want at the end of your life. It is used by the health professionals who will treat you if you aren't able to decide for yourself. If you put your wishes in writing, your loved ones and others will know what kind of care you want. They won't need to guess. This can ease your mind and be helpful to others. A living will is not the same as an estate or property will. An estate will explains what you want to happen with your money and property after you die. Is a living will a legal document? A living will is a legal document. Each state has its own laws about living jensen. If you move to another state, make sure that your living will is legal in the state where you now live. Or you might use a universal form that has been approved by many states. This kind of form can sometimes be completed and stored online. Your electronic copy will then be available wherever you have a connection to the Internet. In most cases, doctors will respect your wishes even if you have a form from a different state. · You don't need an  to complete a living will. But legal advice can be helpful if your state's laws are unclear, your health history is complicated, or your family can't agree on what should be in your living will. · You can change your living will at any time. Some people find that their wishes about end-of-life care change as their health changes. · In addition to making a living will, think about completing a medical power of  form. This form lets you name the person you want to make end-of-life treatment decisions for you (your \"health care agent\") if you're not able to. Many hospitals and nursing homes will give you the forms you need to complete a living will and a medical power of . · Your living will is used only if you can't make or communicate decisions for yourself anymore.  If you become able to make decisions again, you can accept or refuse any treatment, no matter what you wrote in your living will. · Your state may offer an online registry. This is a place where you can store your living will online so the doctors and nurses who need to treat you can find it right away. What should you think about when creating a living will? Talk about your end-of-life wishes with your family members and your doctor. Let them know what you want. That way the people making decisions for you won't be surprised by your choices. Think about these questions as you make your living will:  · Do you know enough about life support methods that might be used? If not, talk to your doctor so you know what might be done if you can't breathe on your own, your heart stops, or you're unable to swallow. · What things would you still want to be able to do after you receive life-support methods? Would you want to be able to walk? To speak? To eat on your own? To live without the help of machines? · If you have a choice, where do you want to be cared for? In your home? At a hospital or nursing home? · Do you want certain Druze practices performed if you become very ill? · If you have a choice at the end of your life, where would you prefer to die? At home? In a hospital or nursing home? Somewhere else? · Would you prefer to be buried or cremated? · Do you want your organs to be donated after you die? What should you do with your living will? · Make sure that your family members and your health care agent have copies of your living will. · Give your doctor a copy of your living will to keep in your medical record. If you have more than one doctor, make sure that each one has a copy. · You may want to put a copy of your living will where it can be easily found. Where can you learn more? Go to http://isaura-giovanna.info/. Enter Z248 in the search box to learn more about \"Learning About Living Perbreann. \"  Current as of: February 24, 2016  Content Version: 11.2  © 5562-3668 [employer]. Care instructions adapted under license by [employer] (which disclaims liability or warranty for this information). If you have questions about a medical condition or this instruction, always ask your healthcare professional. [address] any warranty or liability for your use of this information. Learning About Living Perroy  What is a living will? A living will is a legal form you use to write down the kind of care you want at the end of your life. It is used by the health professionals who will treat you if you aren't able to decide for yourself. If you put your wishes in writing, your loved ones and others will know what kind of care you want. They won't need to guess. This can ease your mind and be helpful to others. A living will is not the same as an estate or property will. An estate will explains what you want to happen with your money and property after you die. Is a living will a legal document? A living will is a legal document. Each state has its own laws about living jensen. If you move to another state, make sure that your living will is legal in the state where you now live. Or you might use a universal form that has been approved by many states. This kind of form can sometimes be completed and stored online. Your electronic copy will then be available wherever you have a connection to the Internet. In most cases, doctors will respect your wishes even if you have a form from a different state. · You don't need an  to complete a living will. But legal advice can be helpful if your state's laws are unclear, your health history is complicated, or your family can't agree on what should be in your living will. · You can change your living will at any time. Some people find that their wishes about end-of-life care change as their health changes.   · In addition to making a living will, think about completing a medical power of  form. This form lets you name the person you want to make end-of-life treatment decisions for you (your \"health care agent\") if you're not able to. Many hospitals and nursing homes will give you the forms you need to complete a living will and a medical power of . · Your living will is used only if you can't make or communicate decisions for yourself anymore. If you become able to make decisions again, you can accept or refuse any treatment, no matter what you wrote in your living will. · Your state may offer an online registry. This is a place where you can store your living will online so the doctors and nurses who need to treat you can find it right away. What should you think about when creating a living will? Talk about your end-of-life wishes with your family members and your doctor. Let them know what you want. That way the people making decisions for you won't be surprised by your choices. Think about these questions as you make your living will:  · Do you know enough about life support methods that might be used? If not, talk to your doctor so you know what might be done if you can't breathe on your own, your heart stops, or you're unable to swallow. · What things would you still want to be able to do after you receive life-support methods? Would you want to be able to walk? To speak? To eat on your own? To live without the help of machines? · If you have a choice, where do you want to be cared for? In your home? At a hospital or nursing home? · Do you want certain Yazidism practices performed if you become very ill? · If you have a choice at the end of your life, where would you prefer to die? At home? In a hospital or nursing home? Somewhere else? · Would you prefer to be buried or cremated? · Do you want your organs to be donated after you die? What should you do with your living will?   · Make sure that your family members and your health care agent have copies of your living will. · Give your doctor a copy of your living will to keep in your medical record. If you have more than one doctor, make sure that each one has a copy. · You may want to put a copy of your living will where it can be easily found. Where can you learn more? Go to http://isaura-giovanna.info/. Enter R650 in the search box to learn more about \"Learning About Brian Mckenna. \"  Current as of: February 24, 2016  Content Version: 11.2  © 1109-9940 [employer]. Care instructions adapted under license by [employer] (which disclaims liability or warranty for this information). If you have questions about a medical condition or this instruction, always ask your healthcare professional. [address] any warranty or liability for your use of this information. Learning About Brian Mckenna  What is a living will? A living will is a legal form you use to write down the kind of care you want at the end of your life. It is used by the health professionals who will treat you if you aren't able to decide for yourself. If you put your wishes in writing, your loved ones and others will know what kind of care you want. They won't need to guess. This can ease your mind and be helpful to others. A living will is not the same as an estate or property will. An estate will explains what you want to happen with your money and property after you die. Is a living will a legal document? A living will is a legal document. Each state has its own laws about living jensen. If you move to another state, make sure that your living will is legal in the state where you now live. Or you might use a universal form that has been approved by many states. This kind of form can sometimes be completed and stored online. Your electronic copy will then be available wherever you have a connection to the Internet.  In most cases, doctors will respect your wishes even if you have a form from a different state. · You don't need an  to complete a living will. But legal advice can be helpful if your state's laws are unclear, your health history is complicated, or your family can't agree on what should be in your living will. · You can change your living will at any time. Some people find that their wishes about end-of-life care change as their health changes. · In addition to making a living will, think about completing a medical power of  form. This form lets you name the person you want to make end-of-life treatment decisions for you (your \"health care agent\") if you're not able to. Many hospitals and nursing homes will give you the forms you need to complete a living will and a medical power of . · Your living will is used only if you can't make or communicate decisions for yourself anymore. If you become able to make decisions again, you can accept or refuse any treatment, no matter what you wrote in your living will. · Your state may offer an online registry. This is a place where you can store your living will online so the doctors and nurses who need to treat you can find it right away. What should you think about when creating a living will? Talk about your end-of-life wishes with your family members and your doctor. Let them know what you want. That way the people making decisions for you won't be surprised by your choices. Think about these questions as you make your living will:  · Do you know enough about life support methods that might be used? If not, talk to your doctor so you know what might be done if you can't breathe on your own, your heart stops, or you're unable to swallow. · What things would you still want to be able to do after you receive life-support methods? Would you want to be able to walk? To speak? To eat on your own? To live without the help of machines?   · If you have a choice, where do you want to be cared for? In your home? At a hospital or nursing home? · Do you want certain Congregational practices performed if you become very ill? · If you have a choice at the end of your life, where would you prefer to die? At home? In a hospital or nursing home? Somewhere else? · Would you prefer to be buried or cremated? · Do you want your organs to be donated after you die? What should you do with your living will? · Make sure that your family members and your health care agent have copies of your living will. · Give your doctor a copy of your living will to keep in your medical record. If you have more than one doctor, make sure that each one has a copy. · You may want to put a copy of your living will where it can be easily found. Where can you learn more? Go to http://isauraPhysihomegiovanna.info/. Enter G458 in the search box to learn more about \"Learning About Living Perrosameera. \"  Current as of: February 24, 2016  Content Version: 11.2  © 7627-4330 [employer]. Care instructions adapted under license by [employer] (which disclaims liability or warranty for this information). If you have questions about a medical condition or this instruction, always ask your healthcare professional. [address] any warranty or liability for your use of this information. Advance Directives: Care Instructions  Your Care Instructions  An advance directive is a legal way to state your wishes at the end of your life. It tells your family and your doctor what to do if you can no longer say what you want. There are two main types of advance directives. You can change them any time that your wishes change. · A living will tells your family and your doctor your wishes about life support and other treatment. · A durable power of  for health care lets you name a person to make treatment decisions for you when you can't speak for yourself.  This person is called a health care agent. If you do not have an advance directive, decisions about your medical care may be made by a doctor or a  who doesn't know you. It may help to think of an advance directive as a gift to the people who care for you. If you have one, they won't have to make tough decisions by themselves. Follow-up care is a key part of your treatment and safety. Be sure to make and go to all appointments, and call your doctor if you are having problems. It's also a good idea to know your test results and keep a list of the medicines you take. How can you care for yourself at home? · Discuss your wishes with your loved ones and your doctor. This way, there are no surprises. · Many states have a unique form. Or you might use a universal form that has been approved by many states. This kind of form can sometimes be completed and stored online. Your electronic copy will then be available wherever you have a connection to the Internet. In most cases, doctors will respect your wishes even if you have a form from a different state. · You don't need a  to do an advance directive. But you may want to get legal advice. · Think about these questions when you prepare an advance directive:  ¨ Who do you want to make decisions about your medical care if you are not able to? Many people choose a family member or close friend. ¨ Do you know enough about life support methods that might be used? If not, talk to your doctor so you understand. ¨ What are you most afraid of that might happen? You might be afraid of having pain, losing your independence, or being kept alive by machines. ¨ Where would you prefer to die? Choices include your home, a hospital, or a nursing home. ¨ Would you like to have information about hospice care to support you and your family? ¨ Do you want to donate organs when you die? ¨ Do you want certain Congregation practices performed before you die?  If so, put your wishes in the advance directive. · Read your advance directive every year, and make changes as needed. When should you call for help? Be sure to contact your doctor if you have any questions. Where can you learn more? Go to http://isaura-giovanna.info/. Enter R264 in the search box to learn more about \"Advance Directives: Care Instructions. \"  Current as of: November 17, 2016  Content Version: 11.2  © 5014-6753 [employer]. Care instructions adapted under license by [employer] (which disclaims liability or warranty for this information). If you have questions about a medical condition or this instruction, always ask your healthcare professional. [address] any warranty or liability for your use of this information.

## 2017-04-28 NOTE — PROGRESS NOTES
Fredy Vallejo is a 64 y.o. female who presents with the following  No chief complaint on file. HPI Patient comes in for a follow up for stroke. She states she was sitting on the side of her bed one morning and then got up and fell over into her dresser. Daughter was home and came up and called 911. Found a acute CVA on left side. She has been since doing well. Currently on Lipitor 40 mg and Plavix 75 mg without any issues. She has been set up for therapy to help with her right hand weakness and this will start next week. Otherwise has no other deficits and feels like things are going well. Daughter agrees. Allergies   Allergen Reactions    Benadryl Allergy/Cold [Diphenhyd-Pe-Acetaminophen] Hives    Penicillins Hives     Was treated with Augmentin in June 2015, tolerated medication well. Current Outpatient Prescriptions   Medication Sig    clopidogrel (PLAVIX) 75 mg tab Take 1 Tab by mouth daily.  atorvastatin (LIPITOR) 40 mg tablet Take 1 Tab by mouth daily.  lisinopril (PRINIVIL, ZESTRIL) 40 mg tablet Take 1 Tab by mouth daily.  miscellaneous medical supply misc Please dispense one shower chair for patient with history of cerebrovascular accident. No current facility-administered medications for this visit.         History   Smoking Status    Former Smoker    Quit date: 7/7/1999   Smokeless Tobacco    Never Used       Past Medical History:   Diagnosis Date    Acute CVA (cerebrovascular accident) ([address] 4/10/2017    Per MRI:  Posterior L frontal/parietal territory    Acute CVA (cerebrovascular accident) ([address] 4/10/2017    MRI review:  Posterior L frontal/parietal infarct    Cerebral atrophy 4/10/2017    Hypercholesteremia     Hyperlipidemia LDL goal <70 4/10/2017    Hypertension     TIA (transient ischemic attack)        Past Surgical History:   Procedure Laterality Date    HX HEENT      Tonsilectomy age 8       Family History   Problem Relation Age of Onset    Dementia Mother 61    Heart Attack Father 79       Social History     Social History    Marital status:      Spouse name: N/A    Number of children: N/A    Years of education: N/A     Social History Main Topics    Smoking status: Former Smoker     Quit date: 7/7/1999    Smokeless tobacco: Never Used    Alcohol use 10.5 oz/week     21 Glasses of wine per week      Comment: 3 glasses of port per day    Drug use: No    Sexual activity: No     Other Topics Concern    Not on file     Social History Narrative       Review of Systems   HENT: Negative for hearing loss and tinnitus. Eyes: Negative for blurred vision, double vision and photophobia. Respiratory: Negative for shortness of breath and wheezing. Gastrointestinal: Negative for nausea and vomiting. Neurological: Positive for weakness. Negative for seizures, loss of consciousness and headaches. Psychiatric/Behavioral: Negative for depression and memory loss. The patient does not have insomnia. Remainder of comprehensive review is negative. Physical Exam :    Visit Vitals    /70    Resp 20    Ht 4' 11\" (1.499 m)    Wt 47.6 kg (105 lb)    BMI 21.21 kg/m2       General: Well defined, nourished, and groomed individual in no acute distress.    Neck: Supple, nontender, no bruits, no pain with resistance to active range of motion.    Heart: Regular rate and rhythm, no murmurs, rub, or gallop. Normal S1S2. Lungs: Clear to auscultation bilaterally with equal chest expansion, no cough, no wheeze  Musculoskeletal: Extremities revealed no edema and had full range of motion of joints.    Psych: Good mood and bright affect    NEUROLOGICAL EXAMINATION:    Mental Status: Alert and oriented to person, place, and time    Cranial Nerves:    II, III, IV, VI: Visual acuity grossly intact. Visual fields are normal.    Pupils are equal, round, and reactive to light and accommodation.    Extra-ocular movements are full and fluid.  Fundoscopic exam was benign, no ptosis or nystagmus.    V-XII: Hearing is grossly intact. Facial features are symmetric, with normal sensation and strength. The palate rises symmetrically and the tongue protrudes midline. Sternocleidomastoids 5/5. Motor Examination: Normal tone, bulk, and strength, 5/5 muscle strength throughout. Coordination: Finger to nose was normal. No resting or intention tremor    Gait and Station: Steady while walking. Normal arm swing. No pronator drift. No muscle wasting or fasiculations noted. Reflexes: DTRs 2+ throughout. Shelbi Snooks Results for orders placed or performed during the hospital encounter of 04/09/17   CBC WITH AUTOMATED DIFF   Result Value Ref Range    WBC 7.0 3.6 - 11.0 K/uL    RBC 4.15 3.80 - 5.20 M/uL    HGB 13.6 11.5 - 16.0 g/dL    HCT 39.2 35.0 - 47.0 %    MCV 94.5 80.0 - 99.0 FL    MCH 32.8 26.0 - 34.0 PG    MCHC 34.7 30.0 - 36.5 g/dL    RDW 14.4 11.5 - 14.5 %    PLATELET 961 986 - 401 K/uL    NEUTROPHILS 55 32 - 75 %    LYMPHOCYTES 28 12 - 49 %    MONOCYTES 10 5 - 13 %    EOSINOPHILS 6 0 - 7 %    BASOPHILS 1 0 - 1 %    ABS. NEUTROPHILS 3.9 1.8 - 8.0 K/UL    ABS. LYMPHOCYTES 2.0 0.8 - 3.5 K/UL    ABS. MONOCYTES 0.7 0.0 - 1.0 K/UL    ABS. EOSINOPHILS 0.4 0.0 - 0.4 K/UL    ABS. BASOPHILS 0.1 0.0 - 0.1 K/UL   METABOLIC PANEL, COMPREHENSIVE   Result Value Ref Range    Sodium 141 136 - 145 mmol/L    Potassium 3.5 3.5 - 5.1 mmol/L    Chloride 105 97 - 108 mmol/L    CO2 28 21 - 32 mmol/L    Anion gap 8 5 - 15 mmol/L    Glucose 99 65 - 100 mg/dL    BUN 7 6 - 20 MG/DL    Creatinine 0.79 0.55 - 1.02 MG/DL    BUN/Creatinine ratio 9 (L) 12 - 20      GFR est AA >60 >60 ml/min/1.73m2    GFR est non-AA >60 >60 ml/min/1.73m2    Calcium 8.8 8.5 - 10.1 MG/DL    Bilirubin, total 0.4 0.2 - 1.0 MG/DL    ALT (SGPT) 21 12 - 78 U/L    AST (SGOT) 21 15 - 37 U/L    Alk.  phosphatase 70 45 - 117 U/L    Protein, total 6.9 6.4 - 8.2 g/dL    Albumin 3.4 (L) 3.5 - 5.0 g/dL    Globulin 3.5 2.0 - 4.0 g/dL A-G Ratio 1.0 (L) 1.1 - 2.2     LDL, DIRECT   Result Value Ref Range    LDL,Direct 106 (H) 0 - 100 mg/dl   HEMOGLOBIN A1C WITH EAG   Result Value Ref Range    Hemoglobin A1c 6.1 4.2 - 6.3 %    Est. average glucose 128 mg/dL   LIPID PANEL   Result Value Ref Range    LIPID PROFILE          Cholesterol, total 208 (H) <200 MG/DL    Triglyceride 56 <150 MG/DL    HDL Cholesterol 109 MG/DL    LDL, calculated 87.8 0 - 100 MG/DL    VLDL, calculated 11.2 MG/DL    CHOL/HDL Ratio 1.9 0 - 5.0     CBC W/O DIFF   Result Value Ref Range    WBC 7.8 3.6 - 11.0 K/uL    RBC 4.22 3.80 - 5.20 M/uL    HGB 13.4 11.5 - 16.0 g/dL    HCT 39.9 35.0 - 47.0 %    MCV 94.5 80.0 - 99.0 FL    MCH 31.8 26.0 - 34.0 PG    MCHC 33.6 30.0 - 36.5 g/dL    RDW 14.7 (H) 11.5 - 14.5 %    PLATELET 377 809 - 625 K/uL   METABOLIC PANEL, BASIC   Result Value Ref Range    Sodium 141 136 - 145 mmol/L    Potassium 4.2 3.5 - 5.1 mmol/L    Chloride 105 97 - 108 mmol/L    CO2 29 21 - 32 mmol/L    Anion gap 7 5 - 15 mmol/L    Glucose 103 (H) 65 - 100 mg/dL    BUN 11 6 - 20 MG/DL    Creatinine 0.86 0.55 - 1.02 MG/DL    BUN/Creatinine ratio 13 12 - 20      GFR est AA >60 >60 ml/min/1.73m2    GFR est non-AA >60 >60 ml/min/1.73m2    Calcium 8.9 8.5 - 10.1 MG/DL   GLUCOSE, POC   Result Value Ref Range    Glucose (POC) 90 65 - 100 mg/dL    Performed by Carmelo Jacques    POC LACTIC ACID   Result Value Ref Range    Lactic Acid (POC) PLEASE DISREGARD RESULTS 0.4 - 2.0 mmol/L   POC INR   Result Value Ref Range    INR (POC) 1.0 <1.2     EKG, 12 LEAD, INITIAL   Result Value Ref Range    Ventricular Rate 72 BPM    Atrial Rate 72 BPM    P-R Interval 164 ms    QRS Duration 80 ms    Q-T Interval 436 ms    QTC Calculation (Yasminet) 477 ms    Calculated P Axis 35 degrees    Calculated R Axis -3 degrees    Calculated T Axis 77 degrees    Diagnosis       Normal sinus rhythm  Possible Left atrial enlargement  Left ventricular hypertrophy  Anteroseptal infarct (cited on or before 07-JUL-2013)  Borderline QT interval  Abnormal ECG  When compared with ECG of 07-JUL-2013 00:38,  Questionable change in initial forces of Septal leads  QT has lengthened  Confirmed by Laura Barba M.D., Dean Demarfloyd ([id number]) on 4/10/2017 8:35:37 AM         Orders Placed This Encounter    DUPLEX CAROTID BILATERAL AMB NEURO ([id number])     Standing Status:   Future     Number of Occurrences:   1     Standing Expiration Date:   4/28/2018     Order Specific Question:   Reason for Exam:     Answer:   acute CVA    clopidogrel (PLAVIX) 75 mg tab     Sig: Take 1 Tab by mouth daily. Dispense:  30 Tab     Refill:  5    atorvastatin (LIPITOR) 40 mg tablet     Sig: Take 1 Tab by mouth daily. Dispense:  30 Tab     Refill:  5       1. Acute CVA (cerebrovascular accident) [facility])        Follow-up Disposition:  Return if symptoms worsen or fail to improve. Post stroke. She understands the s/s of a stroke and when to call 911. Continue Plavix 75 mg and Lipitor 40 mg. Want LDL below 70 per stroke guidelines and we will monitor this as she has been in the 80's. Will titrate accordingly. She is to get a doppler to evaluate for risk of stroke further. She will continue to stay active and understands lifestyle changes. No other questions.      Ginger Bosch NP        This note will not be viewable in [address]

## 2017-04-30 NOTE — PROCEDURES
Carotid Doppler:     Date:  04/28/17    Requesting Physician:  Darnell Cade NP     Indication:  Stroke. B-mode imaging reveals mixed plaque at the bifurcations extending into the proximal internal carotid artery segments bilaterally. Doppler spectral analysis reveals no significantly elevated velocities. Vertebral artery flow antegrade bilaterally. Interpretation:  Plaque as noted. Stenosis estimated at 16-49% bilateral ICA.

## 2017-05-01 ENCOUNTER — HOSPITAL ENCOUNTER (OUTPATIENT)
Dept: PHYSICAL THERAPY | Age: 77
Discharge: HOME | End: 2017-05-01
Payer: MEDICARE

## 2017-05-01 PROCEDURE — 97162 PT EVAL MOD COMPLEX 30 MIN: CPT | Performed by: PHYSICAL THERAPIST

## 2017-05-01 PROCEDURE — G8985 CARRY GOAL STATUS: HCPCS | Performed by: PHYSICAL THERAPIST

## 2017-05-01 PROCEDURE — G8984 CARRY CURRENT STATUS: HCPCS | Performed by: PHYSICAL THERAPIST

## 2017-05-01 PROCEDURE — 97110 THERAPEUTIC EXERCISES: CPT | Performed by: PHYSICAL THERAPIST

## 2017-05-01 NOTE — PROGRESS NOTES
PT INITIAL EVALUATION NOTE - [facility] 2-15    Patient Name: Haleigh Stark  Date:2017  : 1940  [x]  Patient  Verified  Payor: Lelia Aase / Plan: [facility] [employer] MEDICARE CHOICE PPO/PFFS / Product Type: Managed Care Medicare /    In time:3:15 pm  Out time:4:15 pm  Total Treatment Time (min): 60  Total Timed Codes (min): 15  1:1 Treatment Time ( only): 45   Visit #: 1     Treatment Area: Right hand weakness [R29.898]    SUBJECTIVE  Pain Level (0-10 scale): 0/10  Any medication changes, allergies to medications, adverse drug reactions, diagnosis change, or new procedure performed?: [] No    [x] Yes (see summary sheet for update)  Subjective:    Pt was sitting on the side of the bed and went to stand and fell forward onto the dresser in front of her. Her daughter is present and helps with history. She is present with her today.   PLOF: gardening, N ADL skills, care of wildlife that comes into their home  Mechanism of Injury: CVA  Previous Treatment/Compliance: none stated  PMHx/Surgical Hx: TIA 2013  Work Hx: not currently working at this time  Living Situation: lives with her daughter and son in law  Pt Goals: full use of fingers and hands and full strength right UE  Barriers: possible cognitive impairment, however this is yet to be determine  Motivation: good   Substance use: none stated  FABQ Score: 83(18)  Cognition: A & O x 3-4        OBJECTIVE/EXAMINATION  Posture:  Right sided lean with min slumped posture present  Other Observations:  Pt holds right UE into her stomach when at rest  Functional  and Pinch:  Poor quality and speed with synergy patterns present      AROM shoulder:         R   L  Flexion    150   150  Abd    130   Unable secondary to weakness (possible RTC tear)      UPPER QUARTER   MUSCLE STRENGTH  KEY       R  L  0 - No Contraction  C1, C2 Neck Flex 5  5  1 - Trace   C3 Side Flex  5  5  2 - Poor   C4 Sh Elev  4-  3+  3 - Fair    C5 Deltoid/Biceps 4-  4+  4 - Good   C6 Wrist Ext  4-  5  5 - Normal   C7 Triceps  3+  5      C8 Thumb Ext  3+  5      T1 Hand Inst  3+  5    MMT: see UE myotomes  Neurological: Reflexes / Sensations: all WNL  Special Tests:  R 31.8 lb, L 39.6 lb    Pt moves with flexor synergies right UE and has difficulty dissociating movements.       15 min Therapeutic Exercise:  [x] See flow sheet :   Rationale: increase ROM, increase strength, improve coordination and increase proprioception to improve the patients ability to return to N ADL skills             With   [x] TE   [] TA   [] neuro   [] other: Patient Education: [x] Review HEP    [] Progressed/Changed HEP based on:   [] positioning   [] body mechanics   [] transfers   [] heat/ice application    [] other:      Other Objective/Functional Measures:see FOTO scanned into chart    Pain Level (0-10 scale) post treatment: 0/10      ASSESSMENT/Changes in Function:     [x]  See Plan of Care      Clifton Perez PT, DPT, [facility]  5/1/2017  3:11 PM

## 2017-05-01 NOTE — PROGRESS NOTES
[address] [facility]  [address]  Phone: [phone]  Fax: [phone]    Plan of Care/Statement of Necessity for Physical Therapy Services  2-15    Patient name: Antonio Dhillon  : 1940  Provider#: [id number]  Referral source: Andreas Dandy, MD      Medical/Treatment Diagnosis: Right hand weakness [R29.898]     Prior Hospitalization: see medical history     Comorbidities: TIA  Prior Level of Function: lives with family that states she was independent in all ADL skills and was driving  Medications: Verified on Patient Summary List  Start of Care: 2017      Onset Date: 2017   The Plan of Care and following information is based on the information from the initial evaluation. Assessment/ key information: Pt presents with fine motor weakness and poor quality as well as right UE weakness present secondary to L CVA 2017. Evaluation Complexity History MEDIUM  Complexity : 1-2 comorbidities / personal factors will impact the outcome/ POC ; Examination HIGH Complexity : 4+ Standardized tests and measures addressing body structure, function, activity limitation and / or participation in recreation  ;Presentation MEDIUM Complexity : Evolving with changing characteristics  ; Clinical Decision Making MEDIUM Complexity : FOTO score of 26-74  Overall Complexity Rating: MEDIUM    Problem List: decrease ROM, decrease strength, decrease ADL/ functional abilitiies, decrease activity tolerance and decrease flexibility/ joint mobility   Treatment Plan may include any combination of the following: Therapeutic exercise, Therapeutic activities, Neuromuscular re-education, Self Care training, Functional mobility training and Home safety training  Patient / Family readiness to learn indicated by: asking questions, trying to perform skills and interest  Persons(s) to be included in education: patient (P) and family support person (FSP);list daughter  Barriers to Learning/Limitations: None  Patient Goal (s): to get my right hand stronger and to work like my left one  Patient Self Reported Health Status: good  Rehabilitation Potential: good    Short Term Goals: To be accomplished in 3 weeks:  Pt will demo all ADL skills without assistance needed  Pt will demo a min of 3 lb increase right hand  to allow for ADL completion  Pt will demo independence with HEP    Long Term Goals: To be accomplished in up to 12 treatments:  Pt will demo the ability to tie her shoes   Pt will demo the ability to button her shirt without compensation  Pt will demo right hand  within 10% of left hand  Pt will demo key and precise pinches with right hand without v.c. Or compensation    Frequency / Duration: Patient to be seen 1 times per week for up to 12 treatments. Patient/ Caregiver education and instruction: self care, activity modification and exercises    [x]  Plan of care has been reviewed with PTA    G-Codes (GP)  Carry   Current  CJ= 20-39%    Goal  CJ= 20-39%    The severity rating is based on clinical judgment and the FOTO Score score. Certification Period: 5/1/2017 - 8/1/2017      Yecenia Alexandra, PT, DPT, [facility] 5/1/2017 3:11 PM    ________________________________________________________________________    I certify that the above Therapy Services are being furnished while the patient is under my care. I agree with the treatment plan and certify that this therapy is necessary.     [de-identified] Signature:____________________  Date:____________Time: _________

## 2017-05-08 ENCOUNTER — TELEPHONE (OUTPATIENT)
Dept: NEUROLOGY | Age: 77
End: 2017-05-08

## 2017-05-08 ENCOUNTER — HOSPITAL ENCOUNTER (OUTPATIENT)
Dept: PHYSICAL THERAPY | Age: 77
Discharge: HOME | End: 2017-05-08
Payer: MEDICARE

## 2017-05-08 PROCEDURE — 97112 NEUROMUSCULAR REEDUCATION: CPT | Performed by: PHYSICAL THERAPIST

## 2017-05-08 PROCEDURE — 97110 THERAPEUTIC EXERCISES: CPT | Performed by: PHYSICAL THERAPIST

## 2017-05-08 NOTE — TELEPHONE ENCOUNTER
----- Message from Ebenezer Ryder sent at 5/8/2017 10:29 AM EDT -----  Regarding: GUADALUPE Babb/ Telephone  Pt would like a callback regarding monitor that she is supposed to wear for a month. Best (C) [phone].

## 2017-05-08 NOTE — TELEPHONE ENCOUNTER
----- Message from Anil Burks sent at 5/8/2017 10:58 AM EDT -----  Regarding: GUADALUPE Babb/Telephone  Contact: [phone]  Suzette Diamond is returning a call from the practice 5/8/2017 at 10:59 AM call may have been regarding a monitor that the pt has been advised she must wear for a month. She would like someone to call her back to assist in scheduling a time in which the instructions can be given.

## 2017-05-08 NOTE — TELEPHONE ENCOUNTER
Called and spoke to patient made her aware that this office didn't order any monitor she may need to contact her PCP  She states understanding

## 2017-05-08 NOTE — PROGRESS NOTES
PT DAILY TREATMENT NOTE - [facility] 2-15    Patient Name: Pietro Oreilly  Date:2017  : 1940  [x]  Patient  Verified  Payor: Carolyn Cortez / Plan: [facility] HUMAN MEDICARE CHOICE PPO/PFFS / Product Type: Managed Care Medicare /    In time:1:00 pm  Out time:1:55 pm  Total Treatment Time (min): 55  Total Timed Codes (min): 55  1:1 Treatment Time ( [address] only): 54   Visit #: 2     Treatment Area: Right hand weakness [R29.898]    SUBJECTIVE  Pain Level (0-10 scale): 0/10  Any medication changes, allergies to medications, adverse drug reactions, diagnosis change, or new procedure performed?: [x] No    [] Yes (see summary sheet for update)  Subjective functional status/changes:   [] No changes reported  Pt states that she thought her exercises were boring. She reports that she thinks she can button her shirt better than before. She thinks her hand might be functioning better than at the initial evaluation.     OBJECTIVE       30 min Therapeutic Exercise:  [x] See flow sheet :   Rationale: increase strength to improve the patients ability to return to N ADL skills     25 min Neuromuscular Re-education:  [x]  See flow sheet :   Rationale: improve coordination, improve balance and increase proprioception  to improve the patients ability to return to N ADL skills            With   [x] TE   [] TA   [x] neuro   [] other: Patient Education: [x] Review HEP    [] Progressed/Changed HEP based on:   [] positioning   [] body mechanics   [] transfers   [] heat/ice application    [] other:      Other Objective/Functional Measures:  R hand to 37.8 lb, finger approximation with noted ability to touch thumb to each finger pad with intention and slowly     Pain Level (0-10 scale) post treatment: 0/10    ASSESSMENT/Changes in Function:     Patient will continue to benefit from skilled PT services to address ROM deficits, address strength deficits, analyze and cue movement patterns, analyze and modify body mechanics/ergonomics, assess and modify postural abnormalities and instruct in home and community integration to attain remaining goals. []  See Plan of Care  []  See progress note/recertification  []  See Discharge Summary         Progress towards goals / Updated goals:  Pt demonstrated difficulty with attention to counting while performing a physical exercise. Pt has limited awareness of her current cognitive deficits as noted at today's visit.     PLAN  []  Upgrade activities as tolerated     [x]  Continue plan of care  [x]  Update interventions per flow sheet       []  Discharge due to:_  []  Other:_      Yecenia Alexandra PT, DPT, [facility] 5/8/2017  5:01 PM

## 2017-05-12 ENCOUNTER — OFFICE VISIT (OUTPATIENT)
Dept: FAMILY MEDICINE CLINIC | Age: 77
End: 2017-05-12

## 2017-05-12 VITALS
OXYGEN SATURATION: 100 % | RESPIRATION RATE: 16 BRPM | WEIGHT: 101 LBS | DIASTOLIC BLOOD PRESSURE: 73 MMHG | BODY MASS INDEX: 20.36 KG/M2 | SYSTOLIC BLOOD PRESSURE: 125 MMHG | HEIGHT: 59 IN | HEART RATE: 88 BPM

## 2017-05-12 DIAGNOSIS — Z13.31 SCREENING FOR DEPRESSION: ICD-10-CM

## 2017-05-12 DIAGNOSIS — I63.9 ACUTE CVA (CEREBROVASCULAR ACCIDENT) (HCC): ICD-10-CM

## 2017-05-12 DIAGNOSIS — Z00.00 MEDICARE ANNUAL WELLNESS VISIT, SUBSEQUENT: Primary | ICD-10-CM

## 2017-05-12 DIAGNOSIS — I10 ESSENTIAL HYPERTENSION: ICD-10-CM

## 2017-05-12 NOTE — PROGRESS NOTES
[address]  [address] [address] Raheem Balderas   [phone]             Date of visit: 5/13/2017       This is an Subsequent Medicare Annual Wellness Visit (AWV), (Performed more than 12 months after effective date of Medicare Part B enrollment and 12 months after last preventive visit, Once in a lifetime)    I have reviewed the patient's medical history in detail and updated the computerized patient record. History obtained from: child and the patient. Concerns today   (Patient understands that medical problems addressed today may incur additional cost as this is a preventive visit)  -Pt expressed understanding    HTN: Taking lisinopril 40mg/d without negative SE. Checking BPs daily at home. Home BP log shows values typically low 140s/70s-80s. ROS: No HAs, vision changes, chest pain, SOB.     CVA: Following with outpatient PT, going well. Strength is getting better. History     Patient Active Problem List   Diagnosis Code    Hypertension I10    Adjustment disorder F43.20    Anxiety F41.9    H/O CVA (4/2017) I63.9    Cerebral atrophy G31.9    Hyperlipidemia LDL goal <70 E78.5     Past Medical History:   Diagnosis Date    Acute CVA (cerebrovascular accident) ([facility] [address] 4/10/2017    Per MRI:  Posterior L frontal/parietal territory    Acute CVA (cerebrovascular accident) ([facility] [address] 4/10/2017    MRI review:  Posterior L frontal/parietal infarct    Cerebral atrophy 4/10/2017    Hypercholesteremia     Hyperlipidemia LDL goal <70 4/10/2017    Hypertension     TIA (transient ischemic attack)       Past Surgical History:   Procedure Laterality Date    HX HEENT      Tonsilectomy age 8     Allergies   Allergen Reactions    Benadryl Allergy/Cold [Diphenhyd-Pe-Acetaminophen] Hives    Penicillins Hives     Was treated with Augmentin in June 2015, tolerated medication well.       Current Outpatient Prescriptions   Medication Sig Dispense Refill    clopidogrel (PLAVIX) 75 mg tab Take 1 Tab by mouth daily. 30 Tab 5    atorvastatin (LIPITOR) 40 mg tablet Take 1 Tab by mouth daily. 30 Tab 5    lisinopril (PRINIVIL, ZESTRIL) 40 mg tablet Take 1 Tab by mouth daily. 30 Tab 5    miscellaneous medical supply misc Please dispense one shower chair for patient with history of cerebrovascular accident. 1 Each 0     Family History   Problem Relation Age of Onset    Dementia Mother 61    Heart Attack Father 79     Social History   Substance Use Topics    Smoking status: Former Smoker     Quit date: 7/7/1999    Smokeless tobacco: Never Used    Alcohol use 10.5 oz/week     21 Glasses of wine per week      Comment: 3 glasses of port per day       Specialists/Care Team   Dee Gómez has established care with the following healthcare providers:  PCP Dr. Ya Solomon [facility])  Cardiology Dr. Aidan Reich  Neurology Dr. Salvatore Lucia   female  64 y[address]o. [address] Questions   -During the past 4 weeks:   -how would you rate your health in general? Fair   -how often have you been bothered by feeling dizzy when standing up? never   -how much have you been bothered by bodily pain? not   -Have you noticed any hearing difficulties? no   -has your physical and emotional health limited your social activities with family or friends? no    Emotional Health Questions   -Do you have a history of depression, anxiety, or emotional problems? no  -Over the past 2 weeks, have you felt down, depressed or hopeless? no  -Over the past 2 weeks, have you felt little interest or pleasure in doing things? no    Health Habits   Please describe your diet habits: mostly vegetarian, doesn't eat out a lot or consume sugary drinks  Do you get 5 servings of fruits or vegetables daily? no  Do you exercise regularly?  no    Activities of Daily Living and Functional Status   -Do you need help with eating, walking, dressing, bathing, toileting, the phone, transportation, shopping, preparing meals, housework, laundry, medications or managing money? no  -In the past four weeks, was someone available to help you if you needed and wanted help with anything? yes  -Are you confident are you that you can control and manage most of your health problems? yes  -Have you been given information to help you keep track of your medications? yes  -How often do you have trouble taking your medications as prescribed? never    Fall Risk and Home Safety   Have you fallen 2 or more times in the past year? no  Does your home have rugs in the hallway, lack grab bars in the bathroom, lack handrails on the stairs or have poor lighting? Yes rugs, yes have grab bars and handraisl  Do you have smoke detectors and check them regularly? yes  Do you have difficulties driving a car? no  Do you always fasten your seat belt when you are in a car? yes    Review of Systems (if indicated for problems addressed today)   See above. Physical Examination     Vitals:    05/12/17 1110 05/12/17 1133   BP: 147/80 125/73   Pulse: 88    Resp: 16    SpO2: 100%    Weight: 101 lb (45.8 kg)    Height: 4' 11\" (1.499 m)      Body mass index is 20.4 kg/(m^2). No exam data present  Was the patient's timed Up & Go test unsteady or longer than 30 seconds? no    Evaluation of Cognitive Function   Mood/affect:  neutral  Orientation: Person, Place, Time and Situation  Appearance: age appropriate  Family member/caregiver input: No concerns. Will use the new blood pressure cuff because it seems to match our readings better. Additional exam if indicated for problems addressed today:  Gen: NAD, pleasant and conversational  Eyes: Conjunctiva clear  Mouth: MMM  Card: RRR, no m/r/g  Resp: CTAB, no w/r/r  Abd: Soft, NT, ND  Ext: No LE edema or calf tenderness  Skin: Warm and dry  Neuro: A&Ox4, gait grossly intact  Psych: Makes good eye contact.  Appearance, behavior, and conversation appropriate with normal speech rate, fluency, content. Advice/Referrals/Counseling (as indicated)   Education and counseling provided for any problems identified above: See A&P    Preventive Services     (Preventive care checklist to be included in patient instructions)  Discussed today Done Previously Not Needed    x   Pneumococcal vaccines   x   Flu vaccine   x   Hepatitis B vaccine (if at risk)   x   Shingles vaccine   x   TDAP vaccine   X 2010 in [place] was normal   Mammogram   X 2010 in [place] was normal   Pap smear   X 2010 in [place] was normal   Colorectal cancer screening   X former smoker, quit almost 20 years ago   Low-dose CT for lung cancer screening   X patient had it before and was told it was normal   Bone density test   X getting eye exam soon   Glaucoma screening   x  x Cholesterol test     x Diabetes screening test      x Diabetes self-management class     x Nutritionist referral for diabetes or renal disease     Discussion of Advance Directive   Discussed with Ambrocio Aranda her ability to prepare and advance directive in the case that an injury or illness causes her to be unable to make health care decisions. Patient says she doesn't have one. Assessment/Plan   V70.0    ICD-10-CM ICD-9-CM    1. Medicare annual wellness visit, subsequent Z00.00 V70.0    2. Essential hypertension I10 401.9    3. H/O CVA (4/2017) I63.9 434.91      1. Will need to confirm which pneumonia vaccine pt received in our office in 2011 so she can get a prescription for the other. Prescription for zoster vaccine given. Pt is planning to get eye exam soon. Discussed importance of advanced directive and provided pt written materials to review. 2. BPs very close to goal 140/90 (given h/o CVA). Continue current medication regimen.  Asked pt to make sure to rest for 5 minutes prior to checking BPs and to let me know if she remains consistently above goal. If no issues prior, f/u in 1-2 months and can space visits to longer intervals if doing well.  3. Continues to recover well and is planning to get PT. Orders Placed This Encounter    varicella zoster vacine live (ZOSTAVAX) 19,400 unit/0.65 mL susr injection       Follow-up Disposition:  Return in about 1 month (around 6/12/2017) for Blood pressure check.     Roberto Shafer MD

## 2017-05-12 NOTE — MR AVS SNAPSHOT
Visit Information Date & Time Provider Department Dept. Phone Encounter #  
 5/12/2017 10:50 AM Yfn Brown MD [facility] [phone] [id number] Follow-up Instructions Return in about 1 month (around 6/12/2017) for Blood pressure check. Your Appointments 6/12/2017  2:00 PM  
ESTABLISHED PATIENT with Daniel Major MD  
[facility] ([facility] SCHEDUL[facility]) Appt Note: 2 mo fup  
 [address] [facility] [address] [facility]  
[address] [facility] [address] [address] Upcoming Health Maintenance Date Due ZOSTER VACCINE AGE 60> 11/7/1998 GLAUCOMA SCREENING Q2Y 11/7/2003 OSTEOPOROSIS SCREENING (DEXA) 11/7/2003 Pneumococcal 65+ Low/Medium Risk (2 of 2 - PPSV23) 12/21/2016 INFLUENZA AGE 9 TO ADULT 8/1/2017 MEDICARE YEARLY EXAM 5/13/2018 DTaP/Tdap/Td series (2 - Td) 6/22/2024 Allergies as of 5/12/2017  Review Complete On: 5/12/2017 By: Yfn Brown MD  
  
 Severity Noted Reaction Type Reactions Benadryl Allergy/cold [Diphenhyd-pe-acetaminophen]  06/11/2013    Hives Penicillins  06/11/2013    Hives Was treated with Augmentin in June 2015, tolerated medication well. Current Immunizations  Never Reviewed Name Date Pneumococcal Vaccine (Unspecified Type) 12/21/2011 TD Vaccine 12/21/2011 Tdap 6/22/2014 Not reviewed this visit You Were Diagnosed With   
  
 Codes Comments Medicare annual wellness visit, subsequent    -  Primary ICD-10-CM: Z00.00 ICD-9-CM: V70.0 Essential hypertension     ICD-10-CM: I10 
ICD-9-CM: 401.9 Acute CVA (cerebrovascular accident) ([facility] [address]     ICD-10-CM: I63.9 ICD-9-CM: 434.91 Vitals BP Pulse Resp Height(growth percentile) Weight(growth percentile) SpO2  
 125/73 88 16 4' 11\" (1.499 m) 101 lb (45.8 kg) 100% BMI OB Status Smoking Status 20.4 kg/m2 Postmenopausal Former Smoker Vitals History BMI and BSA Data Body Mass Index Body Surface Area  
 20.4 kg/m 2 1.38 m 2 Preferred Pharmacy Pharmacy Name Phone [facility] [address], [address] [phone] Your Updated Medication List  
  
   
This list is accurate as of: 17 12:08 PM.  Always use your most recent med list.  
  
  
  
  
 atorvastatin 40 mg tablet Commonly known as:  LIPITOR Take 1 Tab by mouth daily. clopidogrel 75 mg Tab Commonly known as:  PLAVIX Take 1 Tab by mouth daily. lisinopril 40 mg tablet Commonly known as:  Joann Gear Take 1 Tab by mouth daily. miscellaneous medical supply Misc Please dispense one shower chair for patient with history of cerebrovascular accident. varicella zoster vacine live 19,400 unit/0.65 mL Susr injection Commonly known as:  ZOSTAVAX  
1 Vial by SubCUTAneous route once for 1 dose. Prescriptions Printed Refills  
 varicella zoster vacine live (ZOSTAVAX) 19,400 unit/0.65 mL susr injection 0 Si Vial by SubCUTAneous route once for 1 dose. Class: Print Route: SubCUTAneous Follow-up Instructions Return in about 1 month (around 2017) for Blood pressure check. To-Do List   
 2017 11:00 AM  
  Appointment with Maikel Gustafson at [facility] [place] [address] ([phone]) Please remember to arrive at the hospital at least 30 minutes prior to your scheduled appointment time. When you come in for your appointment, please be sure to bring the therapy prescription the doctor gave you, your insurance card, and a list of the medicines you are taking. Also, please remember to wear comfortable, loose- fitting clothes. We look forward to seeing you. 2017 10:00 AM  
  Appointment with Maikel Gustafson at [facility] [place] [address] [address] ([phone]) Please remember to arrive at the hospital at least 30 minutes prior to your scheduled appointment time. When you come in for your appointment, please be sure to bring the therapy prescription the doctor gave you, your insurance card, and a list of the medicines you are taking. Also, please remember to wear comfortable, loose- fitting clothes. We look forward to seeing you. Patient Instructions   
-Please make sure to rest for 5 minutes before checking your blood pressure. 
 
-Please let me know if you consistently get blood pressure readings greater than 140/90. 
 
-Remember to get your eye exam soon. High Blood Pressure: Care Instructions Your Care Instructions If your blood pressure is usually above 140/90, you have high blood pressure, or hypertension. That means the top number is 140 or higher or the bottom number is 90 or higher, or both. Despite what a lot of people think, high blood pressure usually doesn't cause headaches or make you feel dizzy or lightheaded. It usually has no symptoms. But it does increase your risk for heart attack, stroke, and kidney or eye damage. The higher your blood pressure, the more your risk increases. Your doctor will give you a goal for your blood pressure. Your goal will be based on your health and your age. An example of a goal is to keep your blood pressure below 140/90. Lifestyle changes, such as eating healthy and being active, are always important to help lower blood pressure. You might also take medicine to reach your blood pressure goal. 
Follow-up care is a key part of your treatment and safety. Be sure to make and go to all appointments, and call your doctor if you are having problems. It's also a good idea to know your test results and keep a list of the medicines you take. How can you care for yourself at home? Medical treatment · If you stop taking your medicine, your blood pressure will go back up. You may take one or more types of medicine to lower your blood pressure. Be safe with medicines. Take your medicine exactly as prescribed. Call your doctor if you think you are having a problem with your medicine. · Talk to your doctor before you start taking aspirin every day. Aspirin can help certain people lower their risk of a heart attack or stroke. But taking aspirin isn't right for everyone, because it can cause serious bleeding. · See your doctor regularly. You may need to see the doctor more often at first or until your blood pressure comes down. · If you are taking blood pressure medicine, talk to your doctor before you take decongestants or anti-inflammatory medicine, such as ibuprofen. Some of these medicines can raise blood pressure. · Learn how to check your blood pressure at home. Lifestyle changes · Stay at a healthy weight. This is especially important if you put on weight around the waist. Losing even 10 pounds can help you lower your blood pressure. · If your doctor recommends it, get more exercise. Walking is a good choice. Bit by bit, increase the amount you walk every day. Try for at least 30 minutes on most days of the week. You also may want to swim, bike, or do other activities. · Avoid or limit alcohol. Talk to your doctor about whether you can drink any alcohol. · Try to limit how much sodium you eat to less than 2,300 milligrams (mg) a day. Your doctor may ask you to try to eat less than 1,500 mg a day. · Eat plenty of fruits (such as bananas and oranges), vegetables, legumes, whole grains, and low-fat dairy products. · Lower the amount of saturated fat in your diet. Saturated fat is found in animal products such as milk, cheese, and meat. Limiting these foods may help you lose weight and also lower your risk for heart disease. · Do not smoke. Smoking increases your risk for heart attack and stroke.  If you need help quitting, talk to your doctor about stop-smoking programs and medicines. These can increase your chances of quitting for good. When should you call for help? Call 911 anytime you think you may need emergency care. This may mean having symptoms that suggest that your blood pressure is causing a serious heart or blood vessel problem. Your blood pressure may be over 180/110. For example, call 911 if: 
· You have symptoms of a heart attack. These may include: ¨ Chest pain or pressure, or a strange feeling in the chest. 
¨ Sweating. ¨ Shortness of breath. ¨ Nausea or vomiting. ¨ Pain, pressure, or a strange feeling in the back, neck, jaw, or upper belly or in one or both shoulders or arms. ¨ Lightheadedness or sudden weakness. ¨ A fast or irregular heartbeat. · You have symptoms of a stroke. These may include: 
¨ Sudden numbness, tingling, weakness, or loss of movement in your face, arm, or leg, especially on only one side of your body. ¨ Sudden vision changes. ¨ Sudden trouble speaking. ¨ Sudden confusion or trouble understanding simple statements. ¨ Sudden problems with walking or balance. ¨ A sudden, severe headache that is different from past headaches. · You have severe back or belly pain. Do not wait until your blood pressure comes down on its own. Get help right away. Call your doctor now or seek immediate care if: 
· Your blood pressure is much higher than normal (such as 180/110 or higher), but you don't have symptoms. · You think high blood pressure is causing symptoms, such as: ¨ Severe headache. ¨ Blurry vision. Watch closely for changes in your health, and be sure to contact your doctor if: 
· Your blood pressure measures 140/90 or higher at least 2 times. That means the top number is 140 or higher or the bottom number is 90 or higher, or both. · You think you may be having side effects from your blood pressure medicine. · Your blood pressure is usually normal, but it goes above normal at least 2 times. Where can you learn more? Go to http://isaura-giovanna.info/. Enter F333 in the search box to learn more about \"High Blood Pressure: Care Instructions. \" Current as of: August 8, 2016 Content Version: 11.2 © 3881-5079 [employer]. Care instructions adapted under license by [employer] (which disclaims liability or warranty for this information). If you have questions about a medical condition or this instruction, always ask your healthcare professional. [address] any warranty or liability for your use of this information. Introducing [facility] HE[facility]! Dear Sebastian Franklin: Thank you for requesting a [employer] account. Our records indicate that you already have an active [employer] account. You can access your account anytime at [email or web address] Did you know that you can access your hospital and ER discharge instructions at any time in [employer]? You can also review all of your test results from your hospital stay or ER visit. Additional Information If you have questions, please visit the Frequently Asked Questions section of the [employer] website at [email or web address] Remember, [employer] is NOT to be used for urgent needs. For medical emergencies, dial 911. Now available from your iPhone and Android! Please provide this summary of care documentation to your next provider. Your primary care clinician is listed as Constantin Vicente. If you have any questions after today's visit, please call [phone].

## 2017-05-12 NOTE — PATIENT INSTRUCTIONS
-Please make sure to rest for 5 minutes before checking your blood pressure.    -Please let me know if you consistently get blood pressure readings greater than 140/90.    -Remember to get your eye exam soon. High Blood Pressure: Care Instructions  Your Care Instructions  If your blood pressure is usually above 140/90, you have high blood pressure, or hypertension. That means the top number is 140 or higher or the bottom number is 90 or higher, or both. Despite what a lot of people think, high blood pressure usually doesn't cause headaches or make you feel dizzy or lightheaded. It usually has no symptoms. But it does increase your risk for heart attack, stroke, and kidney or eye damage. The higher your blood pressure, the more your risk increases. Your doctor will give you a goal for your blood pressure. Your goal will be based on your health and your age. An example of a goal is to keep your blood pressure below 140/90. Lifestyle changes, such as eating healthy and being active, are always important to help lower blood pressure. You might also take medicine to reach your blood pressure goal.  Follow-up care is a key part of your treatment and safety. Be sure to make and go to all appointments, and call your doctor if you are having problems. It's also a good idea to know your test results and keep a list of the medicines you take. How can you care for yourself at home? Medical treatment  · If you stop taking your medicine, your blood pressure will go back up. You may take one or more types of medicine to lower your blood pressure. Be safe with medicines. Take your medicine exactly as prescribed. Call your doctor if you think you are having a problem with your medicine. · Talk to your doctor before you start taking aspirin every day. Aspirin can help certain people lower their risk of a heart attack or stroke. But taking aspirin isn't right for everyone, because it can cause serious bleeding.   · See your doctor regularly. You may need to see the doctor more often at first or until your blood pressure comes down. · If you are taking blood pressure medicine, talk to your doctor before you take decongestants or anti-inflammatory medicine, such as ibuprofen. Some of these medicines can raise blood pressure. · Learn how to check your blood pressure at home. Lifestyle changes  · Stay at a healthy weight. This is especially important if you put on weight around the waist. Losing even 10 pounds can help you lower your blood pressure. · If your doctor recommends it, get more exercise. Walking is a good choice. Bit by bit, increase the amount you walk every day. Try for at least 30 minutes on most days of the week. You also may want to swim, bike, or do other activities. · Avoid or limit alcohol. Talk to your doctor about whether you can drink any alcohol. · Try to limit how much sodium you eat to less than 2,300 milligrams (mg) a day. Your doctor may ask you to try to eat less than 1,500 mg a day. · Eat plenty of fruits (such as bananas and oranges), vegetables, legumes, whole grains, and low-fat dairy products. · Lower the amount of saturated fat in your diet. Saturated fat is found in animal products such as milk, cheese, and meat. Limiting these foods may help you lose weight and also lower your risk for heart disease. · Do not smoke. Smoking increases your risk for heart attack and stroke. If you need help quitting, talk to your doctor about stop-smoking programs and medicines. These can increase your chances of quitting for good. When should you call for help? Call 911 anytime you think you may need emergency care. This may mean having symptoms that suggest that your blood pressure is causing a serious heart or blood vessel problem. Your blood pressure may be over 180/110. For example, call 911 if:  · You have symptoms of a heart attack.  These may include:  ¨ Chest pain or pressure, or a strange feeling in the chest.  ¨ Sweating. ¨ Shortness of breath. ¨ Nausea or vomiting. ¨ Pain, pressure, or a strange feeling in the back, neck, jaw, or upper belly or in one or both shoulders or arms. ¨ Lightheadedness or sudden weakness. ¨ A fast or irregular heartbeat. · You have symptoms of a stroke. These may include:  ¨ Sudden numbness, tingling, weakness, or loss of movement in your face, arm, or leg, especially on only one side of your body. ¨ Sudden vision changes. ¨ Sudden trouble speaking. ¨ Sudden confusion or trouble understanding simple statements. ¨ Sudden problems with walking or balance. ¨ A sudden, severe headache that is different from past headaches. · You have severe back or belly pain. Do not wait until your blood pressure comes down on its own. Get help right away. Call your doctor now or seek immediate care if:  · Your blood pressure is much higher than normal (such as 180/110 or higher), but you don't have symptoms. · You think high blood pressure is causing symptoms, such as:  ¨ Severe headache. ¨ Blurry vision. Watch closely for changes in your health, and be sure to contact your doctor if:  · Your blood pressure measures 140/90 or higher at least 2 times. That means the top number is 140 or higher or the bottom number is 90 or higher, or both. · You think you may be having side effects from your blood pressure medicine. · Your blood pressure is usually normal, but it goes above normal at least 2 times. Where can you learn more? Go to http://isaura-giovanna.info/. Enter J231 in the search box to learn more about \"High Blood Pressure: Care Instructions. \"  Current as of: August 8, 2016  Content Version: 11.2  © 0858-6994 [employer]. Care instructions adapted under license by [employer] (which disclaims liability or warranty for this information).  If you have questions about a medical condition or this instruction, always ask your healthcare professional. [address] any warranty or liability for your use of this information.

## 2017-05-19 ENCOUNTER — HOSPITAL ENCOUNTER (OUTPATIENT)
Dept: PHYSICAL THERAPY | Age: 77
Discharge: HOME | End: 2017-05-19
Payer: MEDICARE

## 2017-05-19 PROCEDURE — 97112 NEUROMUSCULAR REEDUCATION: CPT | Performed by: PHYSICAL THERAPIST

## 2017-05-19 NOTE — PROGRESS NOTES
PT DAILY TREATMENT NOTE - [facility] 2-15    Patient Name: Re Flores  Date:2017  : 1940  [x]  Patient  Verified  Payor: Elena Mares / Plan: [facility] HUMANA MEDICARE CHOICE PPO/PFFS / Product Type: Managed Care Medicare /    In time: 11:00 am  Out time:12:00 pm  Total Treatment Time (min): 55  Total Timed Codes (min): 45  1:1 Treatment Time ( only): 45   Visit #: 3     Treatment Area: Right hand weakness [R29.898]    SUBJECTIVE  Pain Level (0-10 scale): 0/10  Any medication changes, allergies to medications, adverse drug reactions, diagnosis change, or new procedure performed?: [x] No    [] Yes (see summary sheet for update)  Subjective functional status/changes:   [] No changes reported  Pt states that she is feeling much better overall and that her right hand is working well. Her left shoulder is painful at this time. OBJECTIVE       45 min Neuromuscular Re-education:  [x]  See flow sheet :   Rationale: improve coordination, improve balance and increase proprioception  to improve the patients ability to return to N ADL skills            With   [x] TE   [] TA   [x] neuro   [] other: Patient Education: [x] Review HEP    [] Progressed/Changed HEP based on:   [] positioning   [] body mechanics   [] transfers   [] heat/ice application    [] other:      Other Objective/Functional Measures: opposition to digits 4 and 5 with poor quality and poor speed present. Pain Level (0-10 scale) post treatment: 0/10    ASSESSMENT/Changes in Function:     Patient will continue to benefit from skilled PT services to address ROM deficits, address strength deficits, analyze and cue movement patterns, analyze and modify body mechanics/ergonomics, assess and modify postural abnormalities and instruct in home and community integration to attain remaining goals.      []  See Plan of Care  []  See progress note/recertification  []  See Discharge Summary         Progress towards goals / Updated goals:  Pt demonstrated improvement in opposition right hand and is able to perform keypinch and precise pinch to 5/5 strength levels.     PLAN  []  Upgrade activities as tolerated     [x]  Continue plan of care  [x]  Update interventions per flow sheet       []  Discharge due to:_  []  Other:_      Radha Osullivan, PT, DPT, [facility] 5/19/2017  5:01 PM

## 2017-05-24 ENCOUNTER — APPOINTMENT (OUTPATIENT)
Dept: PHYSICAL THERAPY | Age: 77
End: 2017-05-24
Payer: MEDICARE

## 2017-06-02 ENCOUNTER — TELEPHONE (OUTPATIENT)
Dept: FAMILY MEDICINE CLINIC | Age: 77
End: 2017-06-02

## 2017-06-02 ENCOUNTER — DOCUMENTATION ONLY (OUTPATIENT)
Dept: FAMILY MEDICINE CLINIC | Age: 77
End: 2017-06-02

## 2017-06-02 ENCOUNTER — HOSPITAL ENCOUNTER (OUTPATIENT)
Dept: PHYSICAL THERAPY | Age: 77
Discharge: HOME | End: 2017-06-02
Payer: MEDICARE

## 2017-06-02 PROCEDURE — G8985 CARRY GOAL STATUS: HCPCS | Performed by: PHYSICAL THERAPIST

## 2017-06-02 PROCEDURE — G8986 CARRY D/C STATUS: HCPCS | Performed by: PHYSICAL THERAPIST

## 2017-06-02 PROCEDURE — 97112 NEUROMUSCULAR REEDUCATION: CPT | Performed by: PHYSICAL THERAPIST

## 2017-06-02 NOTE — PROGRESS NOTES
PT DAILY TREATMENT NOTE - [facility] 2-15    Patient Name: Clemente Severance  Date:2017  : 1940  [x]  Patient  Verified  Payor: Mary Jane Louie / Plan: [facility] HUMANA MEDICARE CHOICE PPO/PFFS / Product Type: Managed Care Medicare /    In time: 10:00 am  Out time:11:00 pm  Total Treatment Time (min): 60  Total Timed Codes (min): 60  1:1 Treatment Time ( only): 60   Visit #: 4    Treatment Area: Right hand weakness [R29.898]    SUBJECTIVE  Pain Level (0-10 scale): 0/10  Any medication changes, allergies to medications, adverse drug reactions, diagnosis change, or new procedure performed?: [x] No    [] Yes (see summary sheet for update)  Subjective functional status/changes:   [] No changes reported  Pt states that she is able to do all that she needs to independently at home. OBJECTIVE    60 min Neuromuscular Re-education:  [x]  See flow sheet :   Rationale: improve coordination, improve balance and increase proprioception  to improve the patients ability to return to N ADL skills            With   [x] TE   [] TA   [x] neuro   [] other: Patient Education: [x] Review HEP    [] Progressed/Changed HEP based on:   [] positioning   [] body mechanics   [] transfers   [] heat/ice application    [] other:      Other Objective/Functional Measures: opposition to digits 4 and 5 with fair to good quality and good speed present.   Right  35.6 lb and left  38.3 lb    Pain Level (0-10 scale) post treatment: 0/10    ASSESSMENT/Changes in Function:      []  See Plan of Care  []  See progress note/recertification  [x]  See Discharge Summary         Progress towards goals / Updated goals:  See discharge summary    PLAN  []  Upgrade activities as tolerated     [x]  Continue plan of care  [x]  Update interventions per flow sheet       []  Discharge due to:_  []  Other:_        Silvia Lopez, PT, DPT, [facility] 2017  5:01 PM

## 2017-06-02 NOTE — ANCILLARY DISCHARGE INSTRUCTIONS
[address] [facility]. Sandra [address] [place] [address] Zamzam Weiss 55  Phone: [phone] Fax: [phone]      Discharge Summary 2-15    Patient name: Clemente Severance  : 1940  Provider#: [id number]  Referral source: Clarence Buchanan MD      Medical/Treatment Diagnosis: Right hand weakness [R29.898]     Prior Hospitalization: see medical history     Comorbidities: See Plan of Care  Prior Level of Function: See Plan of Care  Medications: Verified on Patient Summary List    Start of Care: 2017      Onset Date:2017   Visits from Start of Care: 4     Missed Visits: 0  Reporting Period : 2017 to 2017    Assessment/Summary of care: Pt has demonstrated compliance with HEP and with attendance to PT. Pt has demonstrated some cognitive impairment as it relates to multiple step command following, word recall and attention to task (especially remembering to count repetitions). Pt has achieved all LTG and STG and is independent with ADL skills as reported and demonstrated. Goal:Pt will demo the ability to tie her shoes   Status at last note/certification:  Status at discharge: met with velcro closures today    Goal:Pt will demo the ability to button her shirt without compensation  Status at last note/certification:  Status at discharge: met with demo of therapist's shirt    Goal:Pt will demo right hand  within 10% of left hand  Status at last note/certification:  Status at discharge: met    Goal:Pt will demo key and precise pinches with right hand without v.c. Or compensation  Status at last note/certification:  Status at discharge: met    G-Codes (GP)  Carry    Goal  CJ= 20-39%   D/C  CJ= 20-39%      The severity rating is based on clinical judgment and the FOTO Score score.     RECOMMENDATIONS:  [x]Discontinue therapy: [x]Patient has reached or is progressing toward set goals     []Patient is non-compliant or has abdicated     []Due to lack of appreciable progress towards set goals     []Other      Sav Arreaga PT, DPT, [facility] 6/2/2017 10:44 AM

## 2017-08-03 ENCOUNTER — HOSPITAL ENCOUNTER (INPATIENT)
Age: 77
LOS: 1 days | Discharge: HOME | DRG: 312 | End: 2017-08-05
Attending: EMERGENCY MEDICINE | Admitting: FAMILY MEDICINE
Payer: MEDICARE

## 2017-08-03 DIAGNOSIS — R56.9 SEIZURE (HCC): ICD-10-CM

## 2017-08-03 DIAGNOSIS — R55 SYNCOPE AND COLLAPSE: Primary | ICD-10-CM

## 2017-08-03 DIAGNOSIS — F41.9 ANXIETY: ICD-10-CM

## 2017-08-03 PROCEDURE — 99285 EMERGENCY DEPT VISIT HI MDM: CPT

## 2017-08-03 PROCEDURE — 94761 N-INVAS EAR/PLS OXIMETRY MLT: CPT

## 2017-08-03 PROCEDURE — 93005 ELECTROCARDIOGRAM TRACING: CPT

## 2017-08-03 NOTE — IP AVS SNAPSHOT
[address] 
[phone] Patient: Sindi Workman MRN: [id number] JBM:58/9/0493 Current Discharge Medication List  
  
START taking these medications Dose & Instructions Dispensing Information Comments Morning Noon Evening Bedtime  
 carvedilol 3.125 mg tablet Commonly known as:  Kristina Campa Your last dose was: Your next dose is:    
   
   
 Dose:  3.125 mg Take 1 Tab by mouth two (2) times daily (with meals). Quantity:  60 Tab Refills:  2 CONTINUE these medications which have CHANGED Dose & Instructions Dispensing Information Comments Morning Noon Evening Bedtime  
 lisinopril 10 mg tablet Commonly known as:  Kanwal Kinney What changed:   
- medication strength 
- how much to take Your last dose was: Your next dose is:    
   
   
 Dose:  10 mg Take 1 Tab by mouth daily. Quantity:  30 Tab Refills:  2 CONTINUE these medications which have NOT CHANGED Dose & Instructions Dispensing Information Comments Morning Noon Evening Bedtime  
 atorvastatin 40 mg tablet Commonly known as:  LIPITOR Your last dose was: Your next dose is:    
   
   
 Dose:  40 mg Take 1 Tab by mouth daily. Quantity:  30 Tab Refills:  5 Calcium-Cholecalciferol (D3) 600 mg(1,500mg) -400 unit Cap Your last dose was: Your next dose is:    
   
   
 Dose:  1 Tab Take 1 Tab by mouth daily. Refills:  0  
     
   
   
   
  
 clopidogrel 75 mg Tab Commonly known as:  PLAVIX Your last dose was: Your next dose is:    
   
   
 Dose:  75 mg Take 1 Tab by mouth daily. Quantity:  30 Tab Refills:  5  
     
   
   
   
  
 multivitamin tablet Commonly known as:  ONE A DAY Your last dose was: Your next dose is:    
   
   
 Dose:  1 Tab Take 1 Tab by mouth daily. Refills:  0 Omega-3-DHA-EPA-Fish Oil 1,000 mg (120 mg-180 mg) Cap Your last dose was: Your next dose is:    
   
   
 Dose:  1 Cap Take 1 Cap by mouth every other day. Refills:  0 STOP taking these medications   
 aspirin 325 mg tablet Commonly known as:  ASPIRIN Where to Get Your Medications Information on where to get these meds will be given to you by the nurse or doctor. ! Ask your nurse or doctor about these medications  
  carvedilol 3.125 mg tablet  
 lisinopril 10 mg tablet

## 2017-08-03 NOTE — IP AVS SNAPSHOT
[address] [place] [address] 
[phone] Patient: Agapito Schwab MRN: [id number] EIE:02/[birth year]/3834 You are allergic to the following Allergen Reactions Benadryl Allergy/Cold (Diphenhyd-Pe-Acetaminophen) Hives Penicillins Hives Was treated with Augmentin in June 2015, tolerated medication well. Recent Documentation Height Weight Breastfeeding? BMI OB Status Smoking Status 1.499 m 47.6 kg No 21.21 kg/m2 Postmenopausal Former Smoker Emergency Contacts Name Discharge Info Relation Home Work Mobile Deepthi Scott DISCHARGE CAREGIVER [3] Daughter [21] [phone] Shelton Isabel  Child [2] [phone] About your hospitalization You were admitted on:  August 4, 2017 You last received care in the:  [facility]  MED SURG 2 You were discharged on:  August 5, 2017 Unit phone number:  [phone] Why you were hospitalized Your primary diagnosis was:  Unresponsive Episode Your diagnoses also included:  Hypertension, Adjustment Disorder, Anxiety, Hyperlipidemia Ldl Goal <70, History Of Cva (Cerebrovascular Accident), Tia (Transient Ischemic Attack), Cardiomyopathy (Hcc) Providers Seen During Your Hospitalizations Provider Role Specialty Primary office phone Altagracia Vega MD Attending Provider Emergency Medicine [phone] Brian Gusman MD Attending Provider Family Practice [phone] Your Primary Care Physician (PCP) Primary Care Physician Office Phone Office Fax Himayolanda Juan [phone] Follow-up Information Follow up With Details Comments Contact Info Baldemar Longoria MD Schedule an appointment as soon as possible for a visit today Post hospital discharge follow up [address] [place] [address] 100[address] [facility] 
[phone] Baldemar Longoria, [address] [facility] 1007 [facility] 
[phone] Current Discharge Medication List  
  
START taking these medications Dose & Instructions Dispensing Information Comments Morning Noon Evening Bedtime  
 carvedilol 3.125 mg tablet Commonly known as:  Barbara Gatica Your last dose was: Your next dose is:    
   
   
 Dose:  3.125 mg Take 1 Tab by mouth two (2) times daily (with meals). Quantity:  60 Tab Refills:  2 CONTINUE these medications which have CHANGED Dose & Instructions Dispensing Information Comments Morning Noon Evening Bedtime  
 lisinopril 10 mg tablet Commonly known as:  Андрей Huang What changed:   
- medication strength 
- how much to take Your last dose was: Your next dose is:    
   
   
 Dose:  10 mg Take 1 Tab by mouth daily. Quantity:  30 Tab Refills:  2 CONTINUE these medications which have NOT CHANGED Dose & Instructions Dispensing Information Comments Morning Noon Evening Bedtime  
 atorvastatin 40 mg tablet Commonly known as:  LIPITOR Your last dose was: Your next dose is:    
   
   
 Dose:  40 mg Take 1 Tab by mouth daily. Quantity:  30 Tab Refills:  5 Calcium-Cholecalciferol (D3) 600 mg(1,500mg) -400 unit Cap Your last dose was: Your next dose is:    
   
   
 Dose:  1 Tab Take 1 Tab by mouth daily. Refills:  0  
     
   
   
   
  
 clopidogrel 75 mg Tab Commonly known as:  PLAVIX Your last dose was: Your next dose is:    
   
   
 Dose:  75 mg Take 1 Tab by mouth daily. Quantity:  30 Tab Refills:  5  
     
   
   
   
  
 multivitamin tablet Commonly known as:  ONE A DAY Your last dose was: Your next dose is:    
   
   
 Dose:  1 Tab Take 1 Tab by mouth daily. Refills:  0 Omega-3-DHA-EPA-Fish Oil 1,000 mg (120 mg-180 mg) Cap Your last dose was: Your next dose is:    
   
   
 Dose:  1 Cap Take 1 Cap by mouth every other day. Refills:  0 STOP taking these medications   
 aspirin 325 mg tablet Commonly known as:  ASPIRIN Where to Get Your Medications Information on where to get these meds will be given to you by the nurse or doctor. ! Ask your nurse or doctor about these medications  
  carvedilol 3.125 mg tablet  
 lisinopril 10 mg tablet Discharge Instructions HOME DISCHARGE INSTRUCTIONS Samanta Patel / [id number] : 1940 Admission date: 8/3/2017 Discharge date: 2017 Please bring this form with you to show your care provider at your follow-up appointment. Primary care provider:  Rolan Stephenson MD 
 
Discharging provider:  Rosio Quinn MD  - Family Medicine Resident Pipe Crow MD - Attending, Family Medicine You have been admitted to the hospital with the following diagnoses: 
 
ACUTE DIAGNOSES: 
· SYNCOPE OF UNKNOWN ETIOLOGY Susan Christiansen . . . . . . . . . . . . . . . . . . . . . . . . . . . . . . . . . . . . . . . . . . . . . . . . . . . . . . . . . . . . . . . . . . . . . . Susan Christiansen FOLLOW-UP CARE RECOMMENDATIONS: 
 
Appointments Follow-up Information Follow up With Details Comments Contact Info Rolan Stephenson MD Schedule an appointment as soon as possible for a visit with the next 2 days Post hospital discharge follow up John Rodriguez [address] 
[phone] NEW MEDICATION: 
 
- START COREG (CARVEDILOL) 3.125mg TWO TIMES PER DAY . MODIFIED MEDICATION: 
 
-LISINOPRIL WAS LOWERED TO 10 mg daily. Follow-up tests needed: As per follow up physicians. Pending test results: At the time of your discharge the following test results are still pending: None. Please make sure you review these results with your outpatient follow-up provider(s). Specific symptoms to watch for: chest pain, shortness of breath, fever, chills, nausea, vomiting, diarrhea, change in mentation, falling, weakness, bleeding. DIET/what to eat:  Cardiac Diet ACTIVITY:  Activity as tolerated Wound care: None Equipment needed:  None What to do if new or unexpected symptoms occur? If you experience any of the above symptoms (or should other concerns or questions arise after discharge) please call your primary care physician. Return to the emergency room if you cannot get hold of your doctor. · It is very important that you keep your follow-up appointment(s). · Please bring discharge papers, medication list (and/or medication bottles) to your follow-up appointments for review by your outpatient provider(s). · Please check the list of medications and be sure it includes every medication (even non-prescription medications) that your provider wants you to take. · It is important that you take the medication exactly as they are prescribed. · Keep your medication in the bottles provided by the pharmacist and keep a list of the medication names, dosages, and times to be taken in your wallet. · Do not take other medications without consulting your doctor. · If you have any questions about your medications or other instructions, please talk to your nurse or care provider before you leave the hospital.  
 
Information obtained by:  
 
I understand that if any problems occur once I am at home I am to contact my physician. These instructions were explained to me and I had the opportunity to ask questions. I understand and acknowledge receipt of the instructions indicated above. Physician's or R.N.'s Signature                                                                  Date/Time Patient or Representative Signature                                                          Date/Time Discharge Orders None Introducing [facility]! Dear Scar Guerin: Thank you for requesting a [employer] account. Our records indicate that you already have an active [employer] account. You can access your account anytime at [email or web address] Did you know that you can access your hospital and ER discharge instructions at any time in [employer]? You can also review all of your test results from your hospital stay or ER visit. Additional Information If you have questions, please visit the Frequently Asked Questions section of the [employer] website at [email or web address] Remember, [employer] is NOT to be used for urgent needs. For medical emergencies, dial 911. Now available from your iPhone and Android! General Information Please provide this summary of care documentation to your next provider. Patient Signature:  ____________________________________________________________ Date:  ____________________________________________________________  
  
Claudene Sierra [place] Provider Signature:  ____________________________________________________________ Date:  ____________________________________________________________

## 2017-08-04 ENCOUNTER — APPOINTMENT (OUTPATIENT)
Dept: GENERAL RADIOLOGY | Age: 77
DRG: 312 | End: 2017-08-04
Attending: EMERGENCY MEDICINE
Payer: MEDICARE

## 2017-08-04 ENCOUNTER — APPOINTMENT (OUTPATIENT)
Dept: CT IMAGING | Age: 77
DRG: 312 | End: 2017-08-04
Attending: EMERGENCY MEDICINE
Payer: MEDICARE

## 2017-08-04 PROBLEM — R41.89 UNRESPONSIVE EPISODE: Status: ACTIVE | Noted: 2017-08-03

## 2017-08-04 PROBLEM — R55 SYNCOPE: Status: ACTIVE | Noted: 2017-08-04

## 2017-08-04 PROBLEM — Z86.73 HISTORY OF CVA (CEREBROVASCULAR ACCIDENT): Chronic | Status: ACTIVE | Noted: 2017-04-10

## 2017-08-04 PROBLEM — I42.9 CARDIOMYOPATHY (HCC): Status: ACTIVE | Noted: 2017-08-04

## 2017-08-04 LAB
ALBUMIN SERPL BCP-MCNC: 3.3 G/DL (ref 3.5–5)
ALBUMIN/GLOB SERPL: 0.9 {RATIO} (ref 1.1–2.2)
ALP SERPL-CCNC: 61 U/L (ref 45–117)
ALT SERPL-CCNC: 30 U/L (ref 12–78)
ANION GAP BLD CALC-SCNC: 8 MMOL/L (ref 5–15)
APPEARANCE UR: CLEAR
APTT PPP: 26.1 SEC (ref 22.1–32.5)
AST SERPL W P-5'-P-CCNC: 31 U/L (ref 15–37)
ATRIAL RATE: 66 BPM
BACTERIA URNS QL MICRO: NEGATIVE /HPF
BASOPHILS # BLD AUTO: 0.1 K/UL (ref 0–0.1)
BASOPHILS # BLD: 1 % (ref 0–1)
BILIRUB SERPL-MCNC: 0.3 MG/DL (ref 0.2–1)
BILIRUB UR QL: NEGATIVE
BUN SERPL-MCNC: 13 MG/DL (ref 6–20)
BUN/CREAT SERPL: 19 (ref 12–20)
CALCIUM SERPL-MCNC: 8.6 MG/DL (ref 8.5–10.1)
CALCULATED P AXIS, ECG09: 18 DEGREES
CALCULATED P AXIS, ECG09: 36 DEGREES
CALCULATED P AXIS, ECG09: 39 DEGREES
CALCULATED R AXIS, ECG10: 2 DEGREES
CALCULATED R AXIS, ECG10: 2 DEGREES
CALCULATED R AXIS, ECG10: 4 DEGREES
CALCULATED T AXIS, ECG11: 108 DEGREES
CALCULATED T AXIS, ECG11: 64 DEGREES
CALCULATED T AXIS, ECG11: 71 DEGREES
CHLORIDE SERPL-SCNC: 103 MMOL/L (ref 97–108)
CK MB CFR SERPL CALC: 3.1 % (ref 0–2.5)
CK MB SERPL-MCNC: 4.8 NG/ML (ref 5–25)
CK SERPL-CCNC: 156 U/L (ref 26–192)
CO2 SERPL-SCNC: 28 MMOL/L (ref 21–32)
COLOR UR: ABNORMAL
CREAT SERPL-MCNC: 0.7 MG/DL (ref 0.55–1.02)
DIAGNOSIS, 93000: NORMAL
EOSINOPHIL # BLD: 0.2 K/UL (ref 0–0.4)
EOSINOPHIL NFR BLD: 3 % (ref 0–7)
EPITH CASTS URNS QL MICRO: ABNORMAL /LPF
ERYTHROCYTE [DISTWIDTH] IN BLOOD BY AUTOMATED COUNT: 14.1 % (ref 11.5–14.5)
GLOBULIN SER CALC-MCNC: 3.5 G/DL (ref 2–4)
GLUCOSE SERPL-MCNC: 112 MG/DL (ref 65–100)
GLUCOSE UR STRIP.AUTO-MCNC: NEGATIVE MG/DL
HCT VFR BLD AUTO: 39.1 % (ref 35–47)
HGB BLD-MCNC: 13.1 G/DL (ref 11.5–16)
HGB UR QL STRIP: ABNORMAL
HYALINE CASTS URNS QL MICRO: ABNORMAL /LPF (ref 0–5)
INR PPP: 1 (ref 0.9–1.1)
KETONES UR QL STRIP.AUTO: NEGATIVE MG/DL
LEUKOCYTE ESTERASE UR QL STRIP.AUTO: ABNORMAL
LYMPHOCYTES # BLD AUTO: 20 % (ref 12–49)
LYMPHOCYTES # BLD: 1.3 K/UL (ref 0.8–3.5)
MAGNESIUM SERPL-MCNC: 1.8 MG/DL (ref 1.6–2.4)
MCH RBC QN AUTO: 32 PG (ref 26–34)
MCHC RBC AUTO-ENTMCNC: 33.5 G/DL (ref 30–36.5)
MCV RBC AUTO: 95.4 FL (ref 80–99)
MONOCYTES # BLD: 1.2 K/UL (ref 0–1)
MONOCYTES NFR BLD AUTO: 18 % (ref 5–13)
NEUTS SEG # BLD: 3.9 K/UL (ref 1.8–8)
NEUTS SEG NFR BLD AUTO: 58 % (ref 32–75)
NITRITE UR QL STRIP.AUTO: NEGATIVE
P-R INTERVAL, ECG05: 140 MS
P-R INTERVAL, ECG05: 154 MS
P-R INTERVAL, ECG05: 162 MS
PH UR STRIP: 5.5 [PH] (ref 5–8)
PHOSPHATE SERPL-MCNC: 3.3 MG/DL (ref 2.6–4.7)
PLATELET # BLD AUTO: 172 K/UL (ref 150–400)
POTASSIUM SERPL-SCNC: 3.5 MMOL/L (ref 3.5–5.1)
PROT SERPL-MCNC: 6.8 G/DL (ref 6.4–8.2)
PROT UR STRIP-MCNC: NEGATIVE MG/DL
PROTHROMBIN TIME: 10 SEC (ref 9–11.1)
Q-T INTERVAL, ECG07: 422 MS
Q-T INTERVAL, ECG07: 446 MS
Q-T INTERVAL, ECG07: 474 MS
QRS DURATION, ECG06: 74 MS
QRS DURATION, ECG06: 78 MS
QRS DURATION, ECG06: 86 MS
QTC CALCULATION (BEZET), ECG08: 442 MS
QTC CALCULATION (BEZET), ECG08: 467 MS
QTC CALCULATION (BEZET), ECG08: 496 MS
RBC # BLD AUTO: 4.1 M/UL (ref 3.8–5.2)
RBC #/AREA URNS HPF: ABNORMAL /HPF (ref 0–5)
SODIUM SERPL-SCNC: 139 MMOL/L (ref 136–145)
SP GR UR REFRACTOMETRY: 1.01 (ref 1–1.03)
THERAPEUTIC RANGE,PTTT: NORMAL SECS (ref 58–77)
TROPONIN I SERPL-MCNC: <0.04 NG/ML
TSH SERPL DL<=0.05 MIU/L-ACNC: 1.85 UIU/ML (ref 0.36–3.74)
UA: UC IF INDICATED,UAUC: ABNORMAL
UROBILINOGEN UR QL STRIP.AUTO: 0.2 EU/DL (ref 0.2–1)
VENTRICULAR RATE, ECG03: 66 BPM
WBC # BLD AUTO: 6.7 K/UL (ref 3.6–11)
WBC URNS QL MICRO: ABNORMAL /HPF (ref 0–4)

## 2017-08-04 PROCEDURE — 97161 PT EVAL LOW COMPLEX 20 MIN: CPT

## 2017-08-04 PROCEDURE — 84443 ASSAY THYROID STIM HORMONE: CPT | Performed by: FAMILY MEDICINE

## 2017-08-04 PROCEDURE — 65660000000 HC RM CCU STEPDOWN

## 2017-08-04 PROCEDURE — 85025 COMPLETE CBC W/AUTO DIFF WBC: CPT | Performed by: EMERGENCY MEDICINE

## 2017-08-04 PROCEDURE — 85730 THROMBOPLASTIN TIME PARTIAL: CPT | Performed by: EMERGENCY MEDICINE

## 2017-08-04 PROCEDURE — 93880 EXTRACRANIAL BILAT STUDY: CPT

## 2017-08-04 PROCEDURE — 70450 CT HEAD/BRAIN W/O DYE: CPT

## 2017-08-04 PROCEDURE — 82550 ASSAY OF CK (CPK): CPT | Performed by: EMERGENCY MEDICINE

## 2017-08-04 PROCEDURE — 74011250637 HC RX REV CODE- 250/637: Performed by: FAMILY MEDICINE

## 2017-08-04 PROCEDURE — 93005 ELECTROCARDIOGRAM TRACING: CPT

## 2017-08-04 PROCEDURE — 36415 COLL VENOUS BLD VENIPUNCTURE: CPT | Performed by: FAMILY MEDICINE

## 2017-08-04 PROCEDURE — 74011250636 HC RX REV CODE- 250/636: Performed by: FAMILY MEDICINE

## 2017-08-04 PROCEDURE — 97165 OT EVAL LOW COMPLEX 30 MIN: CPT

## 2017-08-04 PROCEDURE — 84484 ASSAY OF TROPONIN QUANT: CPT | Performed by: EMERGENCY MEDICINE

## 2017-08-04 PROCEDURE — 84100 ASSAY OF PHOSPHORUS: CPT | Performed by: EMERGENCY MEDICINE

## 2017-08-04 PROCEDURE — 71010 XR CHEST PORT: CPT

## 2017-08-04 PROCEDURE — 80053 COMPREHEN METABOLIC PANEL: CPT | Performed by: EMERGENCY MEDICINE

## 2017-08-04 PROCEDURE — 85610 PROTHROMBIN TIME: CPT | Performed by: EMERGENCY MEDICINE

## 2017-08-04 PROCEDURE — 83735 ASSAY OF MAGNESIUM: CPT | Performed by: EMERGENCY MEDICINE

## 2017-08-04 PROCEDURE — 95816 EEG AWAKE AND DROWSY: CPT | Performed by: NURSE PRACTITIONER

## 2017-08-04 PROCEDURE — 93306 TTE W/DOPPLER COMPLETE: CPT

## 2017-08-04 PROCEDURE — 97116 GAIT TRAINING THERAPY: CPT

## 2017-08-04 PROCEDURE — 81001 URINALYSIS AUTO W/SCOPE: CPT | Performed by: EMERGENCY MEDICINE

## 2017-08-04 PROCEDURE — 97535 SELF CARE MNGMENT TRAINING: CPT

## 2017-08-04 RX ORDER — GLUCOSAM/CHONDRO/HERB 149/HYAL 750-100 MG
1 TABLET ORAL AS NEEDED
COMMUNITY
End: 2020-01-13

## 2017-08-04 RX ORDER — ASPIRIN 325 MG
325 TABLET ORAL DAILY
COMMUNITY
End: 2017-08-05

## 2017-08-04 RX ORDER — ASPIRIN 325 MG
325 TABLET ORAL DAILY
Status: DISCONTINUED | OUTPATIENT
Start: 2017-08-04 | End: 2017-08-04

## 2017-08-04 RX ORDER — ENOXAPARIN SODIUM 100 MG/ML
40 INJECTION SUBCUTANEOUS EVERY 24 HOURS
Status: DISCONTINUED | OUTPATIENT
Start: 2017-08-04 | End: 2017-08-04

## 2017-08-04 RX ORDER — SODIUM CHLORIDE 0.9 % (FLUSH) 0.9 %
5-10 SYRINGE (ML) INJECTION AS NEEDED
Status: DISCONTINUED | OUTPATIENT
Start: 2017-08-04 | End: 2017-08-05 | Stop reason: HOSPADM

## 2017-08-04 RX ORDER — ATORVASTATIN CALCIUM 10 MG/1
40 TABLET, FILM COATED ORAL DAILY
Status: DISCONTINUED | OUTPATIENT
Start: 2017-08-04 | End: 2017-08-05 | Stop reason: HOSPADM

## 2017-08-04 RX ORDER — SODIUM CHLORIDE 9 MG/ML
100 INJECTION, SOLUTION INTRAVENOUS CONTINUOUS
Status: DISCONTINUED | OUTPATIENT
Start: 2017-08-04 | End: 2017-08-04

## 2017-08-04 RX ORDER — BISMUTH SUBSALICYLATE 262 MG
1 TABLET,CHEWABLE ORAL DAILY
COMMUNITY
End: 2020-01-13

## 2017-08-04 RX ORDER — CHROMIUM PICOLINATE 200 MCG
1 TABLET ORAL DAILY
COMMUNITY
End: 2020-01-13

## 2017-08-04 RX ORDER — CLOPIDOGREL BISULFATE 75 MG/1
75 TABLET ORAL DAILY
Status: DISCONTINUED | OUTPATIENT
Start: 2017-08-04 | End: 2017-08-05 | Stop reason: HOSPADM

## 2017-08-04 RX ORDER — CARVEDILOL 3.12 MG/1
3.12 TABLET ORAL 2 TIMES DAILY WITH MEALS
Status: DISCONTINUED | OUTPATIENT
Start: 2017-08-04 | End: 2017-08-05 | Stop reason: HOSPADM

## 2017-08-04 RX ORDER — LISINOPRIL 5 MG/1
10 TABLET ORAL DAILY
Status: DISCONTINUED | OUTPATIENT
Start: 2017-08-05 | End: 2017-08-05 | Stop reason: HOSPADM

## 2017-08-04 RX ORDER — ASPIRIN 325 MG
325 TABLET ORAL ONCE
Status: COMPLETED | OUTPATIENT
Start: 2017-08-04 | End: 2017-08-04

## 2017-08-04 RX ORDER — ENOXAPARIN SODIUM 100 MG/ML
40 INJECTION SUBCUTANEOUS EVERY 24 HOURS
Status: DISCONTINUED | OUTPATIENT
Start: 2017-08-05 | End: 2017-08-05 | Stop reason: HOSPADM

## 2017-08-04 RX ORDER — SODIUM CHLORIDE 0.9 % (FLUSH) 0.9 %
5-10 SYRINGE (ML) INJECTION EVERY 8 HOURS
Status: DISCONTINUED | OUTPATIENT
Start: 2017-08-04 | End: 2017-08-05 | Stop reason: HOSPADM

## 2017-08-04 RX ADMIN — Medication 10 ML: at 06:00

## 2017-08-04 RX ADMIN — ENOXAPARIN SODIUM 40 MG: 40 INJECTION SUBCUTANEOUS at 03:20

## 2017-08-04 RX ADMIN — SODIUM CHLORIDE 100 ML/HR: 900 INJECTION, SOLUTION INTRAVENOUS at 03:07

## 2017-08-04 RX ADMIN — CARVEDILOL 3.12 MG: 3.12 TABLET, FILM COATED ORAL at 16:09

## 2017-08-04 RX ADMIN — ASPIRIN 325 MG: 325 TABLET, COATED ORAL at 16:08

## 2017-08-04 RX ADMIN — CLOPIDOGREL 75 MG: 75 TABLET, FILM COATED ORAL at 11:31

## 2017-08-04 RX ADMIN — ATORVASTATIN CALCIUM 40 MG: 10 TABLET, FILM COATED ORAL at 11:31

## 2017-08-04 NOTE — CONSULTS
[facility]  [address] [place] [address]  [phone]     Inpatient Neurology Consult  [facility]    Name:   Nicolle Betts   Medical record #: [id number]  Admission Date: 8/3/2017  Consult Date:  08/04/17    Referring Provider: Dr. Edda Swanson  Chief Complaint:    transient unresponsiveness pr potential syncope  Source of Hx:  Chart, pt couldn't provide much hx about event, daughter via telephone  _____________________________________________________________________    HISTORY OF PRESENT ILLNESS:   Subjective  Nicolle Betts is a 64 y.o. female with PMH of CVA 4/17, cerebral atrophy, HTN, HLD, and TIA. The Neurology Service is asked to evaluate for potential syncope but it sounds like transient unresponsiveness. Pt was at home with daughter Kyle Fleming who she lives with. Patient was called earlier in day and was in distress emotionally, after being told about the death of a 30yo neighbor--per daughter. She was sitting at the kitchen table around 1045 with daughter, complained of her foot and hand cramping, patient tried to rub her hand, daughter went upstairs to get a laser to help relax the R hand cramping, once daughter then became unresponsive, limp and wasn't responding to verbal or tactile. At 1048 patient started talking again, she was able to start talking, stated she felt fine and didn't recall what happened. EMS was at home, patient refused going to ED, when EMS went to get signature pad patient started slumping forward, was not responding again for ~ 90s and came to with mild confusion again. Pt had similar events in July 2013 -- after stressful event occurred of son-in-laws death.       Past Medical History:   Diagnosis Date    Acute CVA (cerebrovascular accident) ([facility] [address] 4/10/2017    Per MRI:  Posterior L frontal/parietal territory    Cerebral atrophy 4/10/2017    History of CVA (cerebrovascular accident) 4/10/2017    --L frontal acute infarct and LMCA    Hyperlipidemia LDL goal <70 4/10/2017    Hypertension     TIA (transient ischemic attack)     Unresponsive episode 8/3/2017    transient     Past Surgical History:   Procedure Laterality Date    HX HEENT      Tonsilectomy age 8     Family History   Problem Relation Age of Onset    Dementia Mother 61    Heart Attack Father 79     Social History     Social History    Marital status:      Spouse name: N/A    Number of children: N/A    Years of education: N/A     Occupational History    Not on file. Social History Main Topics    Smoking status: Former Smoker     Quit date: 7/7/1999    Smokeless tobacco: Never Used    Alcohol use 10.5 oz/week     21 Glasses of wine per week      Comment: 3 glasses of port per day    Drug use: No    Sexual activity: No     Other Topics Concern    Not on file     Social History Narrative       Objective  Allergies: Allergies   Allergen Reactions    Benadryl Allergy/Cold [Diphenhyd-Pe-Acetaminophen] Hives    Penicillins Hives     Was treated with Augmentin in June 2015, tolerated medication well. Outpatient Meds  No current facility-administered medications on file prior to encounter. Current Outpatient Prescriptions on File Prior to Encounter   Medication Sig Dispense Refill    clopidogrel (PLAVIX) 75 mg tab Take 1 Tab by mouth daily. 30 Tab 5    atorvastatin (LIPITOR) 40 mg tablet Take 1 Tab by mouth daily. 30 Tab 5    lisinopril (PRINIVIL, ZESTRIL) 40 mg tablet Take 1 Tab by mouth daily.  30 Tab 5       Inpatient Meds    Current Facility-Administered Medications:     sodium chloride (NS) flush 5-10 mL, 5-10 mL, IntraVENous, Q8H, Rachelle Cohen DO, 10 mL at 08/04/17 0600    sodium chloride (NS) flush 5-10 mL, 5-10 mL, IntraVENous, PRN, Chales Brandi, DO    atorvastatin (LIPITOR) tablet 40 mg, 40 mg, Oral, DAILY, Chales Brandi, DO    clopidogrel (PLAVIX) tablet 75 mg, 75 mg, Oral, DAILY, Chales Brandi, DO    [START ON 8/5/2017] enoxaparin (LOVENOX) injection 40 mg, 40 mg, SubCUTAneous, Q24H, Efren Millan, NP    PHYSICAL EXAM  Patient Vitals for the past 12 hrs:   Temp Pulse Resp BP SpO2   08/04/17 0843 98.9 °F (37.2 °C) 71 20 141/75 96 %   08/04/17 0354 - 78 - - -   08/04/17 0350 98.1 °F (36.7 °C) 79 20 157/75 98 %   08/04/17 0330 - 86 22 162/83 96 %   08/04/17 0319 - 92 18 179/78 99 %   08/04/17 0300 - 71 22 146/80 96 %   08/04/17 0115 - 64 18 138/77 97 %   08/04/17 0045 - 71 15 123/60 96 %   08/04/17 0030 - 72 19 111/77 96 %   08/04/17 0015 - 73 18 144/64 97 %   08/04/17 0001 98.3 °F (36.8 °C) 66 13 135/55 97 %   08/04/17 0000 - 67 24 135/55 -        General:  Elderly WF in NAD, providing minimal history    Psych: Affect is calm, cooperative, pleasant   Neck: supple, nontender,  No bruit   Heart: regular rhythm and rate       Lungs: clear BBS   Extremities: no LE edema          Skin: no rashes      Neurological Examination:    Mental Status:  Alert, oriented x 4, reduced insight and judgement      Commands:  following    Language:  Comprehension: intact Dysarthria: none                  Speech:  no  aphasia     Cranial Nerves:            I: smell   Not tested    II: visual acuity    deferred    II: visual fields   Full to confrontation    II: pupils   Equal, round, reactive to light    II: optic disc   Not examined    III,VII:   no ptosis of either eyelid    III,IV,VI: extraocular muscles    Full EOM, no nystagmus, no intranuclear opthalmoplegia    V: mastication   symmetrical    V: facial sensation:    Equal V1, V2 and V3 bilaterally with LT    VII: facial muscle function     Symmetric, no facial droop    VIII: hearing   Equal bilaterally    IX: soft palate elevation    Uvula midline, elevates symetrically    XI: trapezius strength    5/5    XI: sternocleidomastoid strength   5/5    XI: neck flexion strength    5/5     XII: tongue    Protrudes midline, no fasciculations or atrophy      Strength/Motor  Drift:       None Bulk:  appears symmetric            Tone:  normal      Deltoid Biceps Triceps Wrist Extension Finger Abduction   L 5 5 5 5 5   R 5 5 5 5 5      Hip Flexion Hip Extension Knee Flexion Knee Extension Ankle Dorsiflexion Ankle Plantarflexion   L 5 5 5 5 5 5   R 5 5 5 5 5 5      Reflexes:    BR Brachial Patellar Achilles Babinski Startle Glabellar   L 2/4+ 2/4+ 1/4+ NT  downgoing NT NT   R 2/4+ 2/4+ 1/4+ NT downgoing NT NT      Sensory: intact on proximal & distal extremity w/ LT, pressure, temp, and proprioception bilaterally   Coordination: FNF: Intact bilaterally    Heel to shin:  Intact bilaterally     Tremors:  no tremors   Gait: steady without help      Labs Reviewed  Recent Results (from the past 12 hour(s))   EKG, 12 LEAD, INITIAL    Collection Time: 08/03/17 11:55 PM   Result Value Ref Range    Ventricular Rate 66 BPM    Atrial Rate 66 BPM    P-R Interval 140 ms    QRS Duration 74 ms    Q-T Interval 422 ms    QTC Calculation (Bezet) 442 ms    Calculated P Axis 18 degrees    Calculated R Axis 4 degrees    Calculated T Axis 108 degrees    Diagnosis       Normal sinus rhythm  Possible Left atrial enlargement  Anteroseptal infarct (cited on or before 07-JUL-2013)  Abnormal ECG  When compared with ECG of 10-APR-2017 00:50,  ST now depressed in Anterior leads  T wave inversion now evident in Anterior leads  Confirmed by Greta Galindo MD [facility] ([id number]) on 8/4/2017 8:09:52 AM     CBC WITH AUTOMATED DIFF    Collection Time: 08/04/17 12:19 AM   Result Value Ref Range    WBC 6.7 3.6 - 11.0 K/uL    RBC 4.10 3.80 - 5.20 M/uL    HGB 13.1 11.5 - 16.0 g/dL    HCT 39.1 35.0 - 47.0 %    MCV 95.4 80.0 - 99.0 FL    MCH 32.0 26.0 - 34.0 PG    MCHC 33.5 30.0 - 36.5 g/dL    RDW 14.1 11.5 - 14.5 %    PLATELET 350 360 - 078 K/uL    NEUTROPHILS 58 32 - 75 %    LYMPHOCYTES 20 12 - 49 %    MONOCYTES 18 (H) 5 - 13 %    EOSINOPHILS 3 0 - 7 %    BASOPHILS 1 0 - 1 %    ABS. NEUTROPHILS 3.9 1.8 - 8.0 K/UL    ABS.  LYMPHOCYTES 1.3 0.8 - 3.5 K/UL    ABS. MONOCYTES 1.2 (H) 0.0 - 1.0 K/UL    ABS. EOSINOPHILS 0.2 0.0 - 0.4 K/UL    ABS. BASOPHILS 0.1 0.0 - 0.1 K/UL   CK W/ CKMB & INDEX    Collection Time: 08/04/17 12:19 AM   Result Value Ref Range     26 - 192 U/L    CK - MB 4.8 (H) <3.6 NG/ML    CK-MB Index 3.1 (H) 0 - 2.5     METABOLIC PANEL, COMPREHENSIVE    Collection Time: 08/04/17 12:19 AM   Result Value Ref Range    Sodium 139 136 - 145 mmol/L    Potassium 3.5 3.5 - 5.1 mmol/L    Chloride 103 97 - 108 mmol/L    CO2 28 21 - 32 mmol/L    Anion gap 8 5 - 15 mmol/L    Glucose 112 (H) 65 - 100 mg/dL    BUN 13 6 - 20 MG/DL    Creatinine 0.70 0.55 - 1.02 MG/DL    BUN/Creatinine ratio 19 12 - 20      GFR est AA >60 >60 ml/min/1.73m2    GFR est non-AA >60 >60 ml/min/1.73m2    Calcium 8.6 8.5 - 10.1 MG/DL    Bilirubin, total 0.3 0.2 - 1.0 MG/DL    ALT (SGPT) 30 12 - 78 U/L    AST (SGOT) 31 15 - 37 U/L    Alk.  phosphatase 61 45 - 117 U/L    Protein, total 6.8 6.4 - 8.2 g/dL    Albumin 3.3 (L) 3.5 - 5.0 g/dL    Globulin 3.5 2.0 - 4.0 g/dL    A-G Ratio 0.9 (L) 1.1 - 2.2     PROTHROMBIN TIME + INR    Collection Time: 08/04/17 12:19 AM   Result Value Ref Range    INR 1.0 0.9 - 1.1      Prothrombin time 10.0 9.0 - 11.1 sec   PTT    Collection Time: 08/04/17 12:19 AM   Result Value Ref Range    aPTT 26.1 22.1 - 32.5 sec    aPTT, therapeutic range     58.0 - 77.0 SECS   TROPONIN I    Collection Time: 08/04/17 12:19 AM   Result Value Ref Range    Troponin-I, Qt. <0.04 <0.05 ng/mL   MAGNESIUM    Collection Time: 08/04/17 12:19 AM   Result Value Ref Range    Magnesium 1.8 1.6 - 2.4 mg/dL   PHOSPHORUS    Collection Time: 08/04/17 12:19 AM   Result Value Ref Range    Phosphorus 3.3 2.6 - 4.7 MG/DL   URINALYSIS W/ REFLEX CULTURE    Collection Time: 08/04/17  2:59 AM   Result Value Ref Range    Color YELLOW/STRAW      Appearance CLEAR CLEAR      Specific gravity 1.015 1.003 - 1.030      pH (UA) 5.5 5.0 - 8.0      Protein NEGATIVE  NEG mg/dL    Glucose NEGATIVE  NEG mg/dL    Ketone NEGATIVE  NEG mg/dL    Bilirubin NEGATIVE  NEG      Blood TRACE (A) NEG      Urobilinogen 0.2 0.2 - 1.0 EU/dL    Nitrites NEGATIVE  NEG      Leukocyte Esterase TRACE (A) NEG      WBC 0-4 0 - 4 /hpf    RBC 5-10 0 - 5 /hpf    Epithelial cells FEW FEW /lpf    Bacteria NEGATIVE  NEG /hpf    UA:UC IF INDICATED CULTURE NOT INDICATED BY UA RESULT CNI      Hyaline cast 0-2 0 - 5 /lpf   TSH 3RD GENERATION    Collection Time: 08/04/17  3:24 AM   Result Value Ref Range    TSH 1.85 0.36 - 3.74 uIU/mL     Imaging  Reviewed:   CT Results (recent):    Results from Hospital Encounter encounter on 08/03/17   CT HEAD WO CONT   Narrative INDICATION:   Syncope    EXAM:  HEAD CT WITHOUT CONTRAST    COMPARISON:  April 10, 2017    TECHNIQUE:  Routine noncontrast axial head CT was performed. Sagittal and  coronal reconstructions were generated. CT dose reduction was achieved through use of a standardized protocol tailored  for this examination and automatic exposure control for dose modulation. FINDINGS:    Ventricles:  Midline, no hydrocephalus. Intracranial Hemorrhage:  None. Brain Parenchyma/Brainstem:  Periventricular white matter hypodensities are  unchanged. Basal Cisterns:  Normal.   Paranasal Sinuses:  Visualized sinuses are clear. Additional Comments:  N/A. Impression IMPRESSION:    No acute process. MRI Results (recent):    Results from [place] encounter on 04/09/17   MRI BRAIN WO CONT   Narrative EXAM:  MRI BRAIN WO CONT  INDICATION:  TIA, acute right upper extremity weakness, slurred speech,  TECHNIQUE: Sagittal T1, axial FLAIR, T2,T1 and gradient echo images as well as  coronal T2 weighted images and axial diffusion weighted images of the head were  obtained. COMPARISON:  CTA head. MRI 7/7/13  FINDINGS:  Generalized prominence of ventricles and sulci consistent with cerebral volume  loss.  Findings similar to the prior exam.  Multiple small foci of restricted diffusion and some associated T2  hyperintensity in the left posterior frontal cortex. This is the left middle  cerebral artery territory and would be consistent with acute cortical  infarction. Multifocal and confluent T2 hyperintensity in the cerebral white matter in a  pattern consistent with chronic small vessel type ischemic change similar to  prior exams. No evidence of hemorrhage, mass or abnormal extra-axial fluid collections. Flow voids are present in the vertebral basilar and carotid artery systems. The craniocervical junction is normal.   The structures at the cranial base including paranasal sinuses are unremarkable. Impression IMPRESSION:   1. Multiple foci of acute infarction left posterior frontal lobe and left middle  cerebral artery territory. 2. Again noted is chronic small vessel ischemic change and cerebral volume loss  greater than expected for age.          _____________________________________________________________________    Review of Systems: 10 point ROS was performed. Pertinent positives listed in HPI. Denies: balance difficulties, angina, palpitations, paresthesias, vision loss, slurred speech, aphasia, fever, chills, diplopia, back pain, neck pain, prior episodes of vertigo, hallucinations, new medications or dosage changes. _____________________________________________________________________  Impression  64 y.o. female with onset of two episodes of acute unresponsiveness witnessed by family both lasting at close to 90 seconds. Exam findings of non-focal other than impaired recall. Imaging reviewed:  CT head no acute findings, MRI brain 4/17 with L frontal acute infarcts. Labs are stable despite elevated CKMB. Feel patient either had pseudoseizure with similar events happening after stressful situation in 2013 and event yesterday happened after news of a stressful situation; or potential seizure from hx of stroke in 4/17. Refer to plan below. Assessment:  1. Seizure vs. pseudoseizure  2. CVA hx-- 4/17-- L frontal lobe   3.  cerebral atrophy    Plan  · EEG performed--awaiting results  · Medications:  Continue Lipitor and Plavix  · If abnormal findings on EEG, will consider AED with hx of recent CVA  ·   Continue great care by collaborating care team and nursing staff. ·  Testing results discussed with patient -- any questions answered. My collaborating care team physician may have further recommendations. On DVT Prophylaxis yes no   Continue lovenox while inpatient x      Care Plan discussed with:  Patient x   Family--Janan, [phone] x   RN x   Care Manager    Consultant/Specialist:     Patient will be discussed with Dr. Armin Quintanilla  ______________________________________________________________  Hospital Problems  Date Reviewed: 5/12/2017          Codes Class Noted POA    TIA (transient ischemic attack) ICD-10-CM: G45.9  ICD-9-CM: 435.9  Unknown Yes        * (Principal)Unresponsive episode ICD-10-CM: R41.89  ICD-9-CM: 780.09  8/3/2017 Yes    Overview Signed 8/4/2017  8:50 AM by Keny Spauldign NP     transient             Hyperlipidemia LDL goal <70 ICD-10-CM: E78.5  ICD-9-CM: 272.4  4/10/2017 Yes        History of CVA (cerebrovascular accident) (Chronic) ICD-10-CM: W34.95  ICD-9-CM: V12.54  4/10/2017 Yes    Overview Signed 8/4/2017  8:58 AM by Keny Spaulding NP     --L frontal acute infarct and LMCA             Adjustment disorder ICD-10-CM: F43.20  ICD-9-CM: 309.9  4/10/2014 Yes        Anxiety ICD-10-CM: F41.9  ICD-9-CM: 300.00  4/10/2014 Yes        Hypertension ICD-10-CM: I10  ICD-9-CM: 401.9  11/15/2013 Yes    Overview Addendum 4/28/2017  1:45 PM by Sarah Ivory MD     A. Goal BP <140/90 s/p CVA in 2017. B. Echo (4/10/17):  EF 40% with mild GHK. Mild MR. Mild to mod TR. PASP 53. Neg bubble. *Thank you for allowing [facility], to participate in the care of your patient.     ---Evelyn Vivar, ACNP  ================================================    ADDENDUM--> Collaborating Care Team Physician:  ==============================================================    Attending Addendum    Reviewed consult note by NP Ilan Martin. Agree with hx as obtained by her. Patient independently interviewed and examined. HPI  75-year-old female admitted after 2 episodes of being unresponsive for roughly 90 seconds. Patient was sitting at the time when she felt funny and then became unresponsive. Patient is alone in the room when I came to see her so she is unable to provide history about the events. Please see above history from nurse practitioner. Epilepsy risk factors: Stroke  Stroke risk factors hyperlipidemia, hypertension, prior stroke  Patient was taking Plavix and statin up until this admission. Patient was most of a 24-hour Holter monitor done as an outpatient after her previous stroke but this was not completed either. Patient is completely back to her baseline at this time. MEDS  No current facility-administered medications on file prior to encounter. Current Outpatient Prescriptions on File Prior to Encounter   Medication Sig Dispense Refill    clopidogrel (PLAVIX) 75 mg tab Take 1 Tab by mouth daily. 30 Tab 5    atorvastatin (LIPITOR) 40 mg tablet Take 1 Tab by mouth daily. 30 Tab 5    lisinopril (PRINIVIL, ZESTRIL) 40 mg tablet Take 1 Tab by mouth daily.  30 Tab 5       CLINICAL DATA REVIEW  IMAGING: CT head: Negative (I personally reviewed these images in PACS and this is my impression)  CAROTIDS less than 50% stenosis  ECHO done in April showed EF 40%  TELEMETRY with normal sinus rhythm  EEG: Normal    LABS  Results for orders placed or performed during the hospital encounter of 08/03/17   CBC WITH AUTOMATED DIFF   Result Value Ref Range    WBC 6.7 3.6 - 11.0 K/uL    RBC 4.10 3.80 - 5.20 M/uL    HGB 13.1 11.5 - 16.0 g/dL    HCT 39.1 35.0 - 47.0 %    MCV 95.4 80.0 - 99.0 FL    MCH 32.0 26.0 - 34.0 PG    MCHC 33.5 30.0 - 36.5 g/dL    RDW 14.1 11.5 - 14.5 %    PLATELET 406 969 - 967 K/uL    NEUTROPHILS 58 32 - 75 %    LYMPHOCYTES 20 12 - 49 %    MONOCYTES 18 (H) 5 - 13 %    EOSINOPHILS 3 0 - 7 %    BASOPHILS 1 0 - 1 %    ABS. NEUTROPHILS 3.9 1.8 - 8.0 K/UL    ABS. LYMPHOCYTES 1.3 0.8 - 3.5 K/UL    ABS. MONOCYTES 1.2 (H) 0.0 - 1.0 K/UL    ABS. EOSINOPHILS 0.2 0.0 - 0.4 K/UL    ABS. BASOPHILS 0.1 0.0 - 0.1 K/UL   CK W/ CKMB & INDEX   Result Value Ref Range     26 - 192 U/L    CK - MB 4.8 (H) <3.6 NG/ML    CK-MB Index 3.1 (H) 0 - 2.5     METABOLIC PANEL, COMPREHENSIVE   Result Value Ref Range    Sodium 139 136 - 145 mmol/L    Potassium 3.5 3.5 - 5.1 mmol/L    Chloride 103 97 - 108 mmol/L    CO2 28 21 - 32 mmol/L    Anion gap 8 5 - 15 mmol/L    Glucose 112 (H) 65 - 100 mg/dL    BUN 13 6 - 20 MG/DL    Creatinine 0.70 0.55 - 1.02 MG/DL    BUN/Creatinine ratio 19 12 - 20      GFR est AA >60 >60 ml/min/1.73m2    GFR est non-AA >60 >60 ml/min/1.73m2    Calcium 8.6 8.5 - 10.1 MG/DL    Bilirubin, total 0.3 0.2 - 1.0 MG/DL    ALT (SGPT) 30 12 - 78 U/L    AST (SGOT) 31 15 - 37 U/L    Alk.  phosphatase 61 45 - 117 U/L    Protein, total 6.8 6.4 - 8.2 g/dL    Albumin 3.3 (L) 3.5 - 5.0 g/dL    Globulin 3.5 2.0 - 4.0 g/dL    A-G Ratio 0.9 (L) 1.1 - 2.2     PROTHROMBIN TIME + INR   Result Value Ref Range    INR 1.0 0.9 - 1.1      Prothrombin time 10.0 9.0 - 11.1 sec   PTT   Result Value Ref Range    aPTT 26.1 22.1 - 32.5 sec    aPTT, therapeutic range     58.0 - 77.0 SECS   TROPONIN I   Result Value Ref Range    Troponin-I, Qt. <0.04 <0.05 ng/mL   URINALYSIS W/ REFLEX CULTURE   Result Value Ref Range    Color YELLOW/STRAW      Appearance CLEAR CLEAR      Specific gravity 1.015 1.003 - 1.030      pH (UA) 5.5 5.0 - 8.0      Protein NEGATIVE  NEG mg/dL    Glucose NEGATIVE  NEG mg/dL    Ketone NEGATIVE  NEG mg/dL    Bilirubin NEGATIVE  NEG      Blood TRACE (A) NEG      Urobilinogen 0.2 0.2 - 1.0 EU/dL    Nitrites NEGATIVE NEG      Leukocyte Esterase TRACE (A) NEG      WBC 0-4 0 - 4 /hpf    RBC 5-10 0 - 5 /hpf    Epithelial cells FEW FEW /lpf    Bacteria NEGATIVE  NEG /hpf    UA:UC IF INDICATED CULTURE NOT INDICATED BY UA RESULT CNI      Hyaline cast 0-2 0 - 5 /lpf   MAGNESIUM   Result Value Ref Range    Magnesium 1.8 1.6 - 2.4 mg/dL   PHOSPHORUS   Result Value Ref Range    Phosphorus 3.3 2.6 - 4.7 MG/DL   TSH 3RD GENERATION   Result Value Ref Range    TSH 1.85 0.36 - 3.74 uIU/mL   EKG, 12 LEAD, INITIAL   Result Value Ref Range    Ventricular Rate 66 BPM    Atrial Rate 66 BPM    P-R Interval 140 ms    QRS Duration 74 ms    Q-T Interval 422 ms    QTC Calculation (Bezet) 442 ms    Calculated P Axis 18 degrees    Calculated R Axis 4 degrees    Calculated T Axis 108 degrees    Diagnosis       Normal sinus rhythm  Possible Left atrial enlargement  Anteroseptal infarct (cited on or before 07-JUL-2013)  Abnormal ECG  When compared with ECG of 10-APR-2017 00:50,  ST now depressed in Anterior leads  T wave inversion now evident in Anterior leads  Confirmed by Satish Galindo MD ([id number]) on 8/4/2017 8:09:52 AM     EKG, 12 LEAD, INITIAL   Result Value Ref Range    Ventricular Rate 66 BPM    Atrial Rate 66 BPM    P-R Interval 154 ms    QRS Duration 86 ms    Q-T Interval 474 ms    QTC Calculation (Bezet) 496 ms    Calculated P Axis 36 degrees    Calculated R Axis 2 degrees    Calculated T Axis 64 degrees    Diagnosis       Sinus rhythm with occasional premature ventricular complexes  Moderate voltage criteria for LVH, may be normal variant  Cannot rule out Septal infarct , age undetermined  Abnormal ECG    Confirmed by Carley Brasher MD., Raymond ([id number]) on 8/4/2017 8:49:47 PM     EKG, 12 LEAD, INITIAL   Result Value Ref Range    Ventricular Rate 66 BPM    Atrial Rate 66 BPM    P-R Interval 162 ms    QRS Duration 78 ms    Q-T Interval 446 ms    QTC Calculation (Bezet) 467 ms    Calculated P Axis 39 degrees    Calculated R Axis 2 degrees    Calculated T Axis 71 degrees    Diagnosis       Normal sinus rhythm  Moderate voltage criteria for LVH, may be normal variant  Cannot rule out Septal infarct (cited on or before 07-JUL-2013)  Abnormal ECG  When compared with ECG of 03-AUG-2017 23:55,  ST no longer depressed in Anterior leads  T wave inversion no longer evident in Anterior leads  Confirmed by Raymond Clark MD. ([id number]) on 8/4/2017 8:48:18 PM         PHYSICAL EXAM  Patient Vitals for the past 8 hrs:   Temp Pulse Resp BP SpO2   08/04/17 2232 - 78 - - -   08/04/17 1917 98.2 °F (36.8 °C) 78 18 134/68 96 %   08/04/17 1500 - 76 - - -       General:  Alert, cooperative, no distress. Head:  Normocephalic, without obvious abnormality, atraumatic. Eyes:  Conjunctivae/corneas clear. Pupils equal, round, reactive to light. Extraocular movements intact, VFF, NO papilledema   Lungs:  Heart:   Non labored breathing  Regular rate and rhythm, no carotid bruits   Abdomen:   Soft, non-distended   Extremities: Extremities normal, atraumatic, no cyanosis or edema. Pulses: 2+ and symmetric all extremities. Skin: Skin color, texture, turgor normal. No rashes or lesions.    Neurologic:  Gen: Attention normal             Language: naming, repetition, fluency normal             Memory: intact recent and remote memory  Cranial Nerves:  I: smell Not tested   II: visual fields Full to confrontation   II: pupils Equal, round, reactive to light   II: optic disc No papilledema   III,VII: ptosis none   III,IV,VI: extraocular muscles  Full ROM   V: mastication normal   V: facial light touch sensation  normal   VII: facial muscle function   symmetric   VIII: hearing symmetric   IX: soft palate elevation  normal   XI: trapezius strength  5/5   XI: sternocleidomastoid strength 5/5   XI: neck flexion strength  5/5   XII: tongue  midline     Motor: normal bulk and tone, no tremor              Strength: 5/5 all four extremities except 5-/5 right IHM  Sensory: intact to LT, PP, vibration, and temperature  Coordination: FTN intact  Gait: normal gait   Reflexes: 2+ throughout       IMPRESSION:  This is a 58-year-old female admitted after 2 episodes of being unresponsive. There is concern for seizure versus psychogenic nonepileptic spells. She does have epilepsy risk factor prior stroke. At this point, given her normal CT head and EEG, I would wait on treatment for seizure. Discussed with patient if she has recurrent events that we would need to do further monitoring as an outpatient and potentially start medication at that time. Agree with cardiac workup as scheduled. RECOMMENDATIONS:  1. CT head neg  2. EEG normal  3. Telemetry NSR. Agree with 24hr holter  4. Good BP control  5. No AEDs at this time  6. Cont plavix and statin  7. Recommend outpt 24hr ambulatory EEG if has recurrent events    Thank you very much for this consultation. No further neuro workup recommended. Will sign off the please call with further questions. Patient should follow-up in the neurology clinic in 1-2 weeks.       London Franks MD  August 4, 2017

## 2017-08-04 NOTE — CONSULTS
Lester Shelton MD. [facility] [place]      Patient: Sindi Workman  : 1940    Today's Date: 2017    HISTORY OF PRESENT ILLNESS:     History of Present Illness:    Sindi Workman is a 64 y.o. female presenting for evaluation of syncopal episodes. Ms. Sandoval Garcia has a history of HTN, HLD, and previous TIA/CVA. Patient says that yesterday afternoon around 2-3 PM she was sitting talking with family when all of a sudden she felt kind of \"foggy\". She states she was still alert and aware of her surroundings, but just felt off. She says it lasted for a few seconds, went away but then occurred again for about 10 seconds. She did not have any chest pain or palpitations during this episodes. She also states she did not pass out nor did she feel like she was about to pass out. She denies numbness or tingling in her limbs but says it was all kind of \"fuzzy\" at that time. She says she also asked her family members that were present and they told her she did not pass out, but that she was staring into space, and they asked her questions, but she did not respond. She then just came out of it/back to reality, but came to ER for evaluation. Of note, patient was seen in April and was supposed to complete a 30 day holter after sustaining a known CVA, but she states she did not complete this.        PAST MEDICAL HISTORY:     Past Medical History:   Diagnosis Date    Acute CVA (cerebrovascular accident) ([address] 4/10/2017    Per MRI:  Posterior L frontal/parietal territory    Cerebral atrophy 4/10/2017    History of CVA (cerebrovascular accident) 4/10/2017    --L frontal acute infarct and LMCA    Hyperlipidemia LDL goal <70 4/10/2017    Hypertension     TIA (transient ischemic attack)     Unresponsive episode 8/3/2017    transient       Past Surgical History:   Procedure Laterality Date    HX HEENT      Tonsilectomy age 8         MEDICATIONS:     Current Facility-Administered Medications   Medication Dose Route Frequency Provider Last Rate Last Dose    sodium chloride (NS) flush 5-10 mL  5-10 mL IntraVENous Q8H Rosealee Lora, DO   10 mL at 08/04/17 0600    sodium chloride (NS) flush 5-10 mL  5-10 mL IntraVENous PRN Rosealee Lora, DO        atorvastatin (LIPITOR) tablet 40 mg  40 mg Oral DAILY Rosealee Lora, DO   40 mg at 08/04/17 1131    clopidogrel (PLAVIX) tablet 75 mg  75 mg Oral DAILY Rosealee Lora, DO   75 mg at 08/04/17 1131    [START ON 8/5/2017] enoxaparin (LOVENOX) injection 40 mg  40 mg SubCUTAneous Q24H Barbara Bejarano NP        carvedilol (COREG) tablet 3.125 mg  3.125 mg Oral BID WITH MEALS Ash Goss MD        Rafaela Sero ON 8/5/2017] lisinopril (PRINIVIL, ZESTRIL) tablet 10 mg  10 mg Oral DAILY Ash Goss MD           Prior to Admission Medications:   Prior to Admission Medications   Prescriptions Last Dose Informant Patient Reported? Taking? Calcium-Cholecalciferol, D3, 600 mg(1,500mg) -400 unit cap 8/2/2017 at AM   Yes Yes   Sig: Take 1 Tab by mouth daily. Omega-3-DHA-EPA-Fish Oil 1,000 mg (120 mg-180 mg) cap 8/2/2017 at AM   Yes Yes   Sig: Take 1 Cap by mouth every other day. aspirin (ASPIRIN) 325 mg tablet 8/2/2017 at AM   Yes Yes   Sig: Take 325 mg by mouth daily. atorvastatin (LIPITOR) 40 mg tablet 8/2/2017 at AM   No Yes   Sig: Take 1 Tab by mouth daily. clopidogrel (PLAVIX) 75 mg tab 8/2/2017 at AM   No Yes   Sig: Take 1 Tab by mouth daily. lisinopril (PRINIVIL, ZESTRIL) 40 mg tablet 8/2/2017 at AM   No Yes   Sig: Take 1 Tab by mouth daily. multivitamin (ONE A DAY) tablet 8/2/2017 at AM   Yes Yes   Sig: Take 1 Tab by mouth daily.       Facility-Administered Medications: None            Allergies   Allergen Reactions    Benadryl Allergy/Cold [Diphenhyd-Pe-Acetaminophen] Hives    Penicillins Hives     Was treated with Augmentin in June 2015, tolerated medication well.           SOCIAL HISTORY:     Social History   Substance Use Topics    Smoking status: Former Smoker     Quit date: 7/7/1999    Smokeless tobacco: Never Used    Alcohol use 10.5 oz/week     21 Glasses of wine per week      Comment: 3 glasses of port per day         FAMILY HISTORY:     Family History   Problem Relation Age of Onset    Dementia Mother 61    Heart Attack Father 79         REVIEW OF SYMPTOMS:     Review of Symptoms:  Constitutional: Negative for fever, chills  HEENT: Negative for nosebleeds, tinnitus, and vision changes. Respiratory: Negative for cough, wheezing  Cardiovascular: Negative for orthopnea, claudication, syncope, and PND. Gastrointestinal: Negative for abdominal pain, diarrhea, melena. Genitourinary: Negative for dysuria  Musculoskeletal: Negative for myalgias. Skin: Negative for rash  Heme: No problems bleeding. Neurological: Negative for speech change and focal weakness. PHYSICAL EXAM:     Physical Exam:  Visit Vitals    /77 (BP 1 Location: Right arm, BP Patient Position: Standing)    Pulse 81    Temp 97.4 °F (36.3 °C)    Resp 20    Ht 4' 11\" (1.499 m)    Wt 105 lb (47.6 kg)    SpO2 98%    Breastfeeding No    BMI 21.21 kg/m2     Patient appears generally well, mood and affect are appropriate and pleasant. HEENT:  Hearing intact, non-icteric, normocephalic, atraumatic. Neck Exam: Supple, No JVD or carotid bruits. Lung Exam: Clear to auscultation, even breath sounds. Cardiac Exam: Regular rate and rhythm with no murmur  Abdomen: Soft, non-tender, normal bowel sounds. No bruits or masses. Extremities: Moves all ext well. No lower extremity edema. Vascular: 2+ dorsalis pedis pulses bilaterally. Psych: Appropriate affect  Neuro - Grossly intact. A&Ox4. Speech fluent.  Strength 5/5 in upper and lower extremities bilaterally      LABS / OTHER STUDIES:     Recent Results (from the past 24 hour(s))   EKG, 12 LEAD, INITIAL    Collection Time: 08/03/17 11:55 PM   Result Value Ref Range    Ventricular Rate 66 BPM    Atrial Rate 66 BPM    P-R Interval 140 ms    QRS Duration 74 ms    Q-T Interval 422 ms    QTC Calculation (Bezet) 442 ms    Calculated P Axis 18 degrees    Calculated R Axis 4 degrees    Calculated T Axis 108 degrees    Diagnosis       Normal sinus rhythm  Possible Left atrial enlargement  Anteroseptal infarct (cited on or before 07-JUL-2013)  Abnormal ECG  When compared with ECG of 10-APR-2017 00:50,  ST now depressed in Anterior leads  T wave inversion now evident in Anterior leads  Confirmed by Mateo SANDERSON, venia Head ([id number]) on 8/4/2017 8:09:52 AM     CBC WITH AUTOMATED DIFF    Collection Time: 08/04/17 12:19 AM   Result Value Ref Range    WBC 6.7 3.6 - 11.0 K/uL    RBC 4.10 3.80 - 5.20 M/uL    HGB 13.1 11.5 - 16.0 g/dL    HCT 39.1 35.0 - 47.0 %    MCV 95.4 80.0 - 99.0 FL    MCH 32.0 26.0 - 34.0 PG    MCHC 33.5 30.0 - 36.5 g/dL    RDW 14.1 11.5 - 14.5 %    PLATELET 119 104 - 571 K/uL    NEUTROPHILS 58 32 - 75 %    LYMPHOCYTES 20 12 - 49 %    MONOCYTES 18 (H) 5 - 13 %    EOSINOPHILS 3 0 - 7 %    BASOPHILS 1 0 - 1 %    ABS. NEUTROPHILS 3.9 1.8 - 8.0 K/UL    ABS. LYMPHOCYTES 1.3 0.8 - 3.5 K/UL    ABS. MONOCYTES 1.2 (H) 0.0 - 1.0 K/UL    ABS. EOSINOPHILS 0.2 0.0 - 0.4 K/UL    ABS.  BASOPHILS 0.1 0.0 - 0.1 K/UL   CK W/ CKMB & INDEX    Collection Time: 08/04/17 12:19 AM   Result Value Ref Range     26 - 192 U/L    CK - MB 4.8 (H) <3.6 NG/ML    CK-MB Index 3.1 (H) 0 - 2.5     METABOLIC PANEL, COMPREHENSIVE    Collection Time: 08/04/17 12:19 AM   Result Value Ref Range    Sodium 139 136 - 145 mmol/L    Potassium 3.5 3.5 - 5.1 mmol/L    Chloride 103 97 - 108 mmol/L    CO2 28 21 - 32 mmol/L    Anion gap 8 5 - 15 mmol/L    Glucose 112 (H) 65 - 100 mg/dL    BUN 13 6 - 20 MG/DL    Creatinine 0.70 0.55 - 1.02 MG/DL    BUN/Creatinine ratio 19 12 - 20      GFR est AA >60 >60 ml/min/1.73m2    GFR est non-AA >60 >60 ml/min/1.73m2    Calcium 8.6 8.5 - 10.1 MG/DL    Bilirubin, total 0.3 0.2 - 1.0 MG/DL    ALT (SGPT) 30 12 - 78 U/L    AST (SGOT) 31 15 - 37 U/L    Alk. phosphatase 61 45 - 117 U/L    Protein, total 6.8 6.4 - 8.2 g/dL    Albumin 3.3 (L) 3.5 - 5.0 g/dL    Globulin 3.5 2.0 - 4.0 g/dL    A-G Ratio 0.9 (L) 1.1 - 2.2     PROTHROMBIN TIME + INR    Collection Time: 08/04/17 12:19 AM   Result Value Ref Range    INR 1.0 0.9 - 1.1      Prothrombin time 10.0 9.0 - 11.1 sec   PTT    Collection Time: 08/04/17 12:19 AM   Result Value Ref Range    aPTT 26.1 22.1 - 32.5 sec    aPTT, therapeutic range     58.0 - 77.0 SECS   TROPONIN I    Collection Time: 08/04/17 12:19 AM   Result Value Ref Range    Troponin-I, Qt. <0.04 <0.05 ng/mL   MAGNESIUM    Collection Time: 08/04/17 12:19 AM   Result Value Ref Range    Magnesium 1.8 1.6 - 2.4 mg/dL   PHOSPHORUS    Collection Time: 08/04/17 12:19 AM   Result Value Ref Range    Phosphorus 3.3 2.6 - 4.7 MG/DL   URINALYSIS W/ REFLEX CULTURE    Collection Time: 08/04/17  2:59 AM   Result Value Ref Range    Color YELLOW/STRAW      Appearance CLEAR CLEAR      Specific gravity 1.015 1.003 - 1.030      pH (UA) 5.5 5.0 - 8.0      Protein NEGATIVE  NEG mg/dL    Glucose NEGATIVE  NEG mg/dL    Ketone NEGATIVE  NEG mg/dL    Bilirubin NEGATIVE  NEG      Blood TRACE (A) NEG      Urobilinogen 0.2 0.2 - 1.0 EU/dL    Nitrites NEGATIVE  NEG      Leukocyte Esterase TRACE (A) NEG      WBC 0-4 0 - 4 /hpf    RBC 5-10 0 - 5 /hpf    Epithelial cells FEW FEW /lpf    Bacteria NEGATIVE  NEG /hpf    UA:UC IF INDICATED CULTURE NOT INDICATED BY UA RESULT CNI      Hyaline cast 0-2 0 - 5 /lpf   TSH 3RD GENERATION    Collection Time: 08/04/17  3:24 AM   Result Value Ref Range    TSH 1.85 0.36 - 3.74 uIU/mL         CARDIAC DIAGNOSTICS:     Cardiac Evaluation Includes:  ECHO 7/13 - LVEF 60%  ECHO April 2017: EF 40% with mild diffuse hypokinesis.  No Left to right shunt, mild-moderate regurgitation of tricuspid valve    EKG 8/4: Normal sinus rhythm   Possible Left atrial enlargement   Anteroseptal infarct   ST now depressed in anterior leads w/ T wave inversion now in anterior leads (not significantly changed from prior, reviewed with my Attending). ASSESSMENT AND PLAN:     Assessment and Plan:  64year old female admitted for evaluation of syncope vs change in mental status    Altered Mental Status: history not indicative of syncope nor pre syncope. Patient did not have any loss of consciousness. Troponin drawn about 8 hours (>6 hours) from time of event was negative. No significant EKG changes noted on EKG done at this admission. She has not had any chest pain, palpitations or other cardiac symptoms. Orthostatics completed and are negative. Cardiac workup thus far is negative, and history points more to possible seizure/pseudoseizure. - Continue neurology work up  - Follow up ECHO that was ordered  - Continue to closely monitor patient     Cardiomyopathy: LVEF in April was 40% (down from 60% in 2013)  - Patient should be on Beta blocker and ACE-I. ACE was held on admission. Orthostatics negative  - Start coreg 3.125mg BID and titrate over next several weeks  - Restart lisinopril at reduced dose of 10mg daily (since starting Coreg)   - Will need CAD eval given drop in LVEF as an outpatient     History of CVA: continue statin. She was not any any antiplatelet at time of CVA in 4/17. A single antiplatelet (ie Plavix) OK for now. - Will proceed with the 30 day event monitor as previously planned to look for PAF (also can look into any causes of potential syncope)     Patient needs follow up in clinic. Will arrange for 30 day holter monitor to be completed as it was not previously done. Resident: MD Jayden Hernandez MD, [address], [address] [place] [address]  Ph: [phone]   Ph [phone]      CARDIOLOGY ATTENDING  Patient personally seen and examined. All the elements of history and examination were personally performed. Assessment and plan was discussed and agree as written above    Ms. Pushpa Bob had an unresponsive spell, but no clear LOC or syncope. I edited plans above. Will start Coreg and continue lower dose of ACE-I. Outpatient CAD workup given cardiomyopathy. Will proceed with 30 day event monitor as outpatient. Will sign off. Patient can be discharged from cardiac standpoint. Will see back as outpatient.      Destinee Etienne MD, [facility] [place]

## 2017-08-04 NOTE — PROGRESS NOTES
[facility]: MED RECONCILIATION    Comments/Recommendations:    Patient states she only takes 3 RX medications. Pt was not the best historian but states she takes whatever is \"in the bottles. \" RP called [facility] to confirm.  Patient confirmed she takes a full strength ASA daily vs 81 mg      Medications added:     · MVI  · Calcium carbonate with vitamin d  · Fish oil every other day  · Asa 325 mg daily --confirmed full strength vs 81 mg. Information obtained from: patient, [address] [facility] pharmacy in [place]    Significant PMH/Disease States:   Past Medical History:   Diagnosis Date    Acute CVA (cerebrovascular accident) ([facility] [address] 4/10/2017    Per MRI:  Posterior L frontal/parietal territory    Cerebral atrophy 4/10/2017    History of CVA (cerebrovascular accident) 4/10/2017    --L frontal acute infarct and LMCA    Hyperlipidemia LDL goal <70 4/10/2017    Hypertension     TIA (transient ischemic attack)     Unresponsive episode 8/3/2017    transient         Chief Complaint for this Admission:   Chief Complaint   Patient presents with    Syncope       Allergies: Benadryl allergy/cold [diphenhyd-pe-acetaminophen] and Penicillins    Prior to Admission Medications:   Prior to Admission Medications   Prescriptions Last Dose Informant Patient Reported? Taking? Calcium-Cholecalciferol, D3, 600 mg(1,500mg) -400 unit cap 8/2/2017 at AM  Yes Yes   Sig: Take 1 Tab by mouth daily. Omega-3-DHA-EPA-Fish Oil 1,000 mg (120 mg-180 mg) cap 8/2/2017 at AM  Yes Yes   Sig: Take 1 Cap by mouth every other day. aspirin (ASPIRIN) 325 mg tablet 8/2/2017 at AM  Yes Yes   Sig: Take 325 mg by mouth daily. atorvastatin (LIPITOR) 40 mg tablet 8/2/2017 at AM  No Yes   Sig: Take 1 Tab by mouth daily. clopidogrel (PLAVIX) 75 mg tab 8/2/2017 at AM  No Yes   Sig: Take 1 Tab by mouth daily. lisinopril (PRINIVIL, ZESTRIL) 40 mg tablet 8/2/2017 at AM  No Yes   Sig: Take 1 Tab by mouth daily.    multivitamin (ONE A DAY) tablet 8/2/2017 at AM  Yes Yes   Sig: Take 1 Tab by mouth daily.       Facility-Administered Medications: None         DUSTIN Pastor   Contact: [phone]

## 2017-08-04 NOTE — PROGRESS NOTES
Occupational Therapy EVALUATION/discharge  Patient: Sonal Baca (59 y.o. female)  Date: 8/4/2017  Primary Diagnosis: Syncope        Precautions:   Fall    ASSESSMENT:   Based on the objective data described below, the patient presents with hospital admission secondary to syncope. Patient able to demonstrate ability to perform ADL tasks without physical assistance today. She reports she is able to take care of herself at home,but does not perform tasks such as cooking/laundry, and states her daughter with whom she lives, assists. Patient reports \"weakness\" when holding arms in 90degrees forward flexion, but she is functionally able to perform all tasks. Patient reports feeling weakness for over 1 year. Patient able to stand for functional tasks for greater than 10 minutes during evaluation with no LOB or complaint of fatigue. Will sign off as no further skilled acute occupational therapy is not indicated at this time. Discharge Recommendations: None  Further Equipment Recommendations for Discharge: none noted       SUBJECTIVE:   Patient stated I am feeling fine. A little annoyed they wont let me go home just yet.     OBJECTIVE DATA SUMMARY:   HISTORY:   Past Medical History:   Diagnosis Date    Acute CVA (cerebrovascular accident) ([address] 4/10/2017    Per MRI:  Posterior L frontal/parietal territory    Cerebral atrophy 4/10/2017    History of CVA (cerebrovascular accident) 4/10/2017    --L frontal acute infarct and LMCA    Hyperlipidemia LDL goal <70 4/10/2017    Hypertension     TIA (transient ischemic attack)     Unresponsive episode 8/3/2017    transient     Past Surgical History:   Procedure Laterality Date    HX HEENT      Tonsilectomy age 8       Prior Level of Function/Home Situation: no assist for ADLs.    Expanded or extensive additional review of patient history:     Home Situation  Home Environment: Private residence  # Steps to Enter: 5  Rails to Enter: Yes  [employer] : Bilateral  Wheelchair Ramp: No  One/Two Story Residence: Two story  # of Interior Steps: 15  Interior Rails: Right  Lift Chair Available: No  Living Alone: No (lives with DTR)  Support Systems: Child(viviana)  Patient Expects to be Discharged to[de-identified] Private residence  Current DME Used/Available at Home: Blood pressure cuff  Tub or Shower Type: Tub/Shower combination  []  Right hand dominant   [x]  Left hand dominant    EXAMINATION OF PERFORMANCE DEFICITS:  Cognitive/Behavioral Status:  Neurologic State: Alert  Orientation Level: Oriented X4  Cognition: Appropriate for age attention/concentration  Perception: Appears intact  Perseveration: No perseveration noted  Safety/Judgement: Good awareness of safety precautions    Skin: intact as seen    Edema: none noted     Hearing: Auditory  Auditory Impairment: None    Range of Motion:  AROM: Generally decreased, functional  PROM: Within functional limits       Strength:  Strength: Generally decreased, functional       Coordination:  Coordination: Generally decreased, functional            Tone & Sensation:  Tone: Normal  Sensation: Intact       Balance:  Sitting: Intact; Without support  Standing: Intact; Without support    Functional Mobility and Transfers for ADLs:  Bed Mobility:  Rolling: Supervision  Supine to Sit: Supervision  Sit to Supine: Supervision  Scooting: Supervision    Transfers:  Sit to Stand: Supervision  Stand to Sit: Supervision  Bed to Chair: Supervision  Toilet Transfer : Supervision    ADL Assessment:  Feeding: Independent    Oral Facial Hygiene/Grooming: Supervision    Bathing: Supervision    Upper Body Dressing: Supervision    Lower Body Dressing: Supervision    Toileting: Supervision                ADL Intervention and task modifications:          Cognitive Retraining  Safety/Judgement: Good awareness of safety precautions    Therapeutic Exercise:    Functional Measure:  Barthel Index:    Bathin  Bladder: 10  Bowels: 10  Groomin  Dressing: 10  Feeding: 10  Mobility: 15  Stairs: 5  Toilet Use: 10  Transfer (Bed to Chair and Back): 15  Total: 95       Barthel and G-code impairment scale:  Percentage of impairment CH  0% CI  1-19% CJ  20-39% CK  40-59% CL  60-79% CM  80-99% CN  100%   Barthel Score 0-100 100 99-80 79-60 59-40 20-39 1-19   0   Barthel Score 0-20 20 17-19 13-16 9-12 5-8 1-4 0      The Barthel ADL Index: Guidelines  1. The index should be used as a record of what a patient does, not as a record of what a patient could do. 2. The main aim is to establish degree of independence from any help, physical or verbal, however minor and for whatever reason. 3. The need for supervision renders the patient not independent. 4. A patient's performance should be established using the best available evidence. Asking the patient, friends/relatives and nurses are the usual sources, but direct observation and common sense are also important. However direct testing is not needed. 5. Usually the patient's performance over the preceding 24-48 hours is important, but occasionally longer periods will be relevant. 6. Middle categories imply that the patient supplies over 50 per cent of the effort. 7. Use of aids to be independent is allowed. Nolvia Arzate., Barthel, D.W. ([id number]). Functional evaluation: the Barthel Index. [address] [facility] (14)2. Lalo Fernando johanna MARIA C Maza, Justa Ring., Alli Humphrey., [facility], [address] [place] [address] (1999). Measuring the change indisability after inpatient rehabilitation; comparison of the responsiveness of the Barthel Index and Functional [place] Measure. Journal of Neurology, Neurosurgery, and Psychiatry, 66(4), 795-524. Bryan Alfonso, N.J.A, FLAKO Kohler.MELISSA, & Sherrill Colon M.A. (2004.) Assessment of post-stroke quality of life in cost-effectiveness studies: The usefulness of the Barthel Index and the EuroQoL-5D. Quality of Life Research, 13, 746-82         G codes:   In compliance with CMSs Claims Based Outcome Reporting, the following G-code set was chosen for this patient based on their primary functional limitation being treated: The outcome measure chosen to determine the severity of the functional limitation was the Barthel Index with a score of 95/100 which was correlated with the impairment scale. ? Self Care:     - CURRENT STATUS: CI - 1%-19% impaired, limited or restricted    - GOAL STATUS: CI - 1%-19% impaired, limited or restricted    - D/C STATUS:  CI - 1%-19% impaired, limited or restricted       Occupational Therapy Evaluation Charge Determination   History Examination Decision-Making   LOW Complexity : Brief history review  LOW Complexity : 1-3 performance deficits relating to physical, cognitive , or psychosocial skils that result in activity limitations and / or participation restrictions  LOW Complexity : No comorbidities that affect functional and no verbal or physical assistance needed to complete eval tasks       Based on the above components, the patient evaluation is determined to be of the following complexity level: LOW   Pain:                  Activity Tolerance:   VSS  Please refer to the flowsheet for vital signs taken during this treatment. After treatment:   []  Patient left in no apparent distress sitting up in chair  [x]  Patient left in no apparent distress in bed  [x]  Call bell left within reach  [x]  Nursing notified  []  Caregiver present  []  Bed alarm activated    COMMUNICATION/EDUCATION:   Communication/Collaboration:  [x]      Home safety education was provided and the patient/caregiver indicated understanding. [x]      Patient/family have participated as able and agree with findings and recommendations. []      Patient is unable to participate in plan of care at this time.   Findings and recommendations were discussed with: Physical Therapist and Registered Nurse    ANTHONY Amaya/L  Time Calculation: 20 mins

## 2017-08-04 NOTE — PROCEDURES
[address]  *** FINAL REPORT ***    Name: Daiana Jimenez  MRN: [id number]    Inpatient  : 1940  HIS Order #: [id number]  [id number] [facility] Visit #: [id number]  Date: 04 Aug 2017    TYPE OF TEST: Cerebrovascular Duplex    REASON FOR TEST  Syncope    Right Carotid:-             Proximal               Mid                 Distal  cm/s  Systolic  Diastolic  Systolic  Diastolic  Systolic  Diastolic  CCA:     59.3      11.5                            62.0      10.2  Bulb:  ECA:     86.6       5.0  ICA:     74.9      15.4       96.8      22.5  ICA/CCA:  1.2       1.5    ICA Stenosis: <50%    Right Vertebral:-  Finding: Antegrade  Sys:       41.7  Cherelle:        9.8    Right Subclavian:    Left Carotid:-            Proximal                Mid                 Distal  cm/s  Systolic  Diastolic  Systolic  Diastolic  Systolic  Diastolic  CCA:     11.6       0.7                            92.7      23.0  Bulb:  ECA:    120.6       0.0  ICA:    123.8       7.7       78.6      21.5  ICA/CCA:  1.3       0.3    ICA Stenosis: <50%    Left Vertebral:-  Finding: Antegrade  Sys:       50.5  Cherelle:       10.9    Left Subclavian:    INTERPRETATION/FINDINGS  PROCEDURE:  Evaluation of the extracranial cerebrovascular arteries  with ultrasound (B-mode imaging, pulsed Doppler, color Doppler). Includes the common carotid, internal carotid, external carotid, and  vertebral arteries. FINDINGS:   Unable to visualize the distal internal carotid arteries  due to patient body habitus. Bilateral intimal thickening noted within   the common carotid . Calcific plaque noted throughout the right  common carotid artery and bilateral bulb proximal internal and  external carotids. Gray scale analysis shows severe plaquing in the  0-49% range present on both the left and right sides. Color flow  analysis shows decreased color flow in the areas of plaquing. Patent  vertebral arteries with normal antegrade flow bilaterally.     MPRESSION: Technically difficult study due to patient body  habitus. Findings are consistent with 0-49% stenosis of the right  internal carotid and 0-49% stenosis of the left internal carotid,  thought severe plaquing was noted bilaterally. Love Manner are patent   with antegrade flow. ADDITIONAL COMMENTS    I have personally reviewed the data relevant to the interpretation of  this  study. TECHNOLOGIST: Linda Sanchez RVT  Signed: 08/04/2017 10:58 AM    PHYSICIAN: Dayna James.  Corrina Lorenzo MD  Signed: 08/07/2017 07:30 AM

## 2017-08-04 NOTE — ED PROVIDER NOTES
HPI Comments: 64 y.o. female with past medical history significant for HTN, CVA, TIA, Cerebral atrophy, Hyperlipidemia who presents from home via EMS with chief complaint of syncope. Per daughter, pt began to c/o \"cramping\" to her hands, legs and feet this evening around 2145. Daughter left the patients side to go get a device to assist with symptoms. When she returned, pt was watching her set up the device at which time \"she stopped responding, her eyes were open but she wouldn't answer any of my questions\". EMS was immediately contacted. Pt came to while EMS was on the way. On arrival, pt was back to baseline. Pt felt better. Cramping recurred and pt sustained an additional syncopal event. Per EMS, pt lost conciousness x 1.5 minutes. While in ED, pt has no complaints. She states that she head a headache earlier in the day but not presently. Daughter denies any current deficit. Per daughter, pt has hx of similar symptoms (July 2013). She has a hx of recent TIA (April 2017). Pt denies numbness, weakness, abdominal pain, nausea, vomiting, diarrhea, constipation, vision change, back pain, neck pain or any other acute medical concerns at this time. PCP: Paxton Barnett MD    Note written by Claudette Humphrey, as dictated by Maria Del Rosario Cortez MD 12:33 AM    The history is provided by the patient. No  was used.         Past Medical History:   Diagnosis Date    Acute CVA (cerebrovascular accident) ([address] 4/10/2017    Per MRI:  Posterior L frontal/parietal territory    Acute CVA (cerebrovascular accident) ([address] 4/10/2017    MRI review:  Posterior L frontal/parietal infarct    Cerebral atrophy 4/10/2017    Hypercholesteremia     Hyperlipidemia LDL goal <70 4/10/2017    Hypertension     TIA (transient ischemic attack)        Past Surgical History:   Procedure Laterality Date    HX HEENT      Tonsilectomy age 8         Family History:   Problem Relation Age of Onset    Dementia Mother 61    Heart Attack Father 79       Social History     Social History    Marital status:      Spouse name: N/A    Number of children: N/A    Years of education: N/A     Occupational History    Not on file. Social History Main Topics    Smoking status: Former Smoker     Quit date: 7/7/1999    Smokeless tobacco: Never Used    Alcohol use 10.5 oz/week     21 Glasses of wine per week      Comment: 3 glasses of port per day    Drug use: No    Sexual activity: No     Other Topics Concern    Not on file     Social History Narrative         ALLERGIES: Benadryl allergy/cold [diphenhyd-pe-acetaminophen] and Penicillins    Review of Systems   Constitutional: Negative for fever. HENT: Negative for congestion and sore throat. Eyes: Negative for photophobia. Respiratory: Negative for cough and shortness of breath. Cardiovascular: Negative for chest pain and leg swelling. Gastrointestinal: Negative for abdominal pain, constipation, diarrhea, nausea and vomiting. Genitourinary: Negative for difficulty urinating, dysuria and hematuria. Musculoskeletal: Negative for back pain and neck pain. Skin: Negative for rash. Neurological: Positive for syncope. Negative for dizziness, weakness, numbness and headaches. All other systems reviewed and are negative. Vitals:    08/04/17 0001   BP: 135/55   Pulse: 66   Resp: 13   Temp: 98.3 °F (36.8 °C)   SpO2: 97%   Weight: 47.6 kg (105 lb)   Height: 4' 11\" (1.499 m)            Physical Exam   Nursing note and vitals reviewed. CONSTITUTIONAL: Frail and elderly; in no apparent distress  HEAD: Normocephalic; atraumatic  EYES: PERRL; EOM intact; conjunctiva and sclera are clear bilaterally. ENT: No rhinorrhea; normal pharynx with no tonsillar hypertrophy; mucous membranes pink/moist, no erythema, no exudate. NECK: Supple; non-tender; no cervical lymphadenopathy  CARD: Normal S1, S2; no murmurs, rubs, or gallops. Regular rate and rhythm.   RESP: Normal respiratory effort; breath sounds clear and equal bilaterally; no wheezes, rhonchi, or rales. ABD: Normal bowel sounds; non-distended; non-tender; no palpable organomegaly, no masses, no bruits. Back Exam: Normal inspection; no vertebral point tenderness, no CVA tenderness. Normal range of motion. EXT: Normal ROM in all four extremities; non-tender to palpation; no swelling or deformity; distal pulses are normal, no edema. SKIN: Warm; dry; no rash. NEURO:Alert and oriented x 3, coherent, EFRAÍN-XII grossly intact, sensory and motor are non-focal.        MDM  Number of Diagnoses or Management Options  Seizure [facility]):   Syncope and collapse:   Diagnosis management comments: Assessment: Assessment: 73-year-old female, who presents with syncope versus seizure prior to arrival to the ED. The patient appeared hemodynamically stable. She has no recollection of the event. The patient needs evaluation for ICH, CVA, and a slight abnormality, ACS, infection, and admission to the hospital by PCP, for evaluation from seizure and cardiac dysrhythmia. Plan: EKG/ chest x-ray/ lab/ IVF/ CT scan of the head/ consult. PCP for evaluation/ serial exam/ monitor and reevaluate               Amount and/or Complexity of Data Reviewed  Clinical lab tests: ordered and reviewed  Tests in the radiology section of CPT®: ordered and reviewed  Tests in the medicine section of CPT®: reviewed and ordered  Discussion of test results with the performing providers: yes  Obtain history from someone other than the patient: yes  Review and summarize past medical records: yes  Discuss the patient with other providers: yes  Independent visualization of images, tracings, or specimens: yes    Risk of Complications, Morbidity, and/or Mortality  Presenting problems: moderate  Diagnostic procedures: moderate  Management options: moderate      ED Course       Procedures     . ED EKG interpretation:  Rhythm: normal sinus rhythm; and regular .  Rate (approx.): 66; Axis: left axis deviation; P wave: normal; QRS interval: normal ; ST/T wave: normal; in  Lead: Diffusely; Q waves septal leads; Other findings: abnormal ekg. This EKG was interpreted by Yan Phillips MD,ED Provider. XRAY INTERPRETATION (ED MD)  Chest Xray  No acute process seen. Normal heart size. No bony abnormalities. No infiltrate. Yan Phillips MD 12:12 AM    PROGRESS NOTE:  Pt has been reexamined by Yan Phillips MD all available results have been reviewed with pt and any available family. Pt understands sx, dx, and tx in ED. Care plan has been outlined and questions have been answered. Pt and any available family understands and agrees to need for admission to hospital for further tx not available in ED. Pt is ready for admission. Written by Yan Phillips MD,  2:00 AM    CONSULT NOTE:  Yan Phillips MD spoke with Dr. Sharda Browne of the [facility] residency  team. Discussed patient's presentation, history, physical assessment, and available diagnostic results.  He will evaluate, write orders and admit the patient to the hospital. 2:05 AM    .

## 2017-08-04 NOTE — PROGRESS NOTES
Problem: Mobility Impaired (Adult and Pediatric)  Goal: *Therapy Goal (Edit Goal, Insert Text)  PHYSICAL THERAPY EVALUATION/DISCHARGE  Patient: Clotilde Baum (71 y.o. female)  Date: 8/4/2017  Primary Diagnosis: Syncope        Precautions:   Fall  ASSESSMENT :  Based on the objective data described below, the patient was admitted to ER with syncope and presents with no functional limitations at this time. Pt is functioning at her baseline level as she is distant supervision for all bed mobility, transfers and ambulation. Pt able to ambulate x 100 ft with no AD with distant supervision. Pt has a normal gait pattern and she did not have any +LOB during activity. Pt provided safety instructions related orthostatic hypotension for when she is bending over to pet a cat and then stands back up. Pt refused to use an appropriate AD for increased safety. Pt does not require skilled PT and will discharge PT at this time. Skilled physical therapy is not indicated at this time. PLAN :  Discharge Recommendations: None  Further Equipment Recommendations for Discharge: none       SUBJECTIVE:   Patient stated I don't need any therapy; I can do everything by myself.       OBJECTIVE DATA SUMMARY:   HISTORY:    Past Medical History:   Diagnosis Date    Acute CVA (cerebrovascular accident) ([address] 4/10/2017     Per MRI:  Posterior L frontal/parietal territory    Cerebral atrophy 4/10/2017    History of CVA (cerebrovascular accident) 4/10/2017     --L frontal acute infarct and LMCA    Hyperlipidemia LDL goal <70 4/10/2017    Hypertension      TIA (transient ischemic attack)      Unresponsive episode 8/3/2017     transient     Past Surgical History:   Procedure Laterality Date    HX HEENT         Tonsilectomy age 8     Prior Level of Function/Home Situation: independent with all functional mobility  Personal factors and/or comorbidities impacting plan of care:      Home Situation  Home Environment: Private residence  # Steps to Enter: 5  Rails to Enter: Yes  Hand Rails : Bilateral  Wheelchair Ramp: No  One/Two Story Residence: Two story  # of Interior Steps: 15  Interior Rails: Right  Lift Chair Available: No  Living Alone: No (lives with DTR)  Support Systems: Child(viviana)  Patient Expects to be Discharged to[de-identified] Private residence  Current DME Used/Available at Home: Blood pressure cuff  Tub or Shower Type: Tub/Shower combination     EXAMINATION/PRESENTATION/DECISION MAKING:   Critical Behavior:  Neurologic State: Alert  Orientation Level: Oriented X4  Cognition: Appropriate for age attention/concentration  Safety/Judgement: Good awareness of safety precautions  Hearing: Auditory  Auditory Impairment: None  Skin:  Age appropriate  Edema: no edema noted  Range Of Motion:  AROM: Generally decreased, functional           PROM: Within functional limits           Strength:    Strength: Generally decreased, functional                    Tone & Sensation:   Tone: Normal              Sensation: Intact               Coordination:  Coordination: Generally decreased, functional  Vision:      Functional Mobility:  Bed Mobility:  Rolling: Supervision  Supine to Sit: Supervision  Sit to Supine: Supervision  Scooting: Supervision  Transfers:  Sit to Stand: Supervision  Stand to Sit: Supervision  Stand Pivot Transfers: Supervision     Bed to Chair: Supervision              Balance:   Sitting: Intact; Without support  Standing: Intact; Without support  Ambulation/Gait Training:  Distance (ft): 100 Feet (ft)  Assistive Device: Gait belt  Ambulation - Level of Assistance: Supervision     Gait Description (WDL): Exceptions to WDL  Gait Abnormalities: Decreased step clearance; Path deviations        Base of Support: Narrowed     Speed/Maura: Accelerated  Step Length: Left lengthened;Right lengthened                                                          Stairs:      Not assessed during this initial assessment Therapeutic Exercises:   Not done during this Rx session. Functional Measure:  Barthel Index:      Bathin  Bladder: 10  Bowels: 10  Groomin  Dressing: 10  Feeding: 10  Mobility: 15  Stairs: 5  Toilet Use: 10  Transfer (Bed to Chair and Back): 15  Total: 95         Barthel and G-code impairment scale:  Percentage of impairment CH  0% CI  1-19% CJ  20-39% CK  40-59% CL  60-79% CM  80-99% CN  100%   Barthel Score 0-100 100 99-80 79-60 59-40 20-39 1-19    0   Barthel Score 0-20 20 17-19 13-16 9-12 5-8 1-4 0      The Barthel ADL Index: Guidelines  1. The index should be used as a record of what a patient does, not as a record of what a patient could do. 2. The main aim is to establish degree of independence from any help, physical or verbal, however minor and for whatever reason. 3. The need for supervision renders the patient not independent. 4. A patient's performance should be established using the best available evidence. Asking the patient, friends/relatives and nurses are the usual sources, but direct observation and common sense are also important. However direct testing is not needed. 5. Usually the patient's performance over the preceding 24-48 hours is important, but occasionally longer periods will be relevant. 6. Middle categories imply that the patient supplies over 50 per cent of the effort. 7. Use of aids to be independent is allowed. Raphael Daniel., Barthel, D.W. ([id number]). Functional evaluation: the Barthel Index. [address] [facility] (14)2. Amanda Dias johanna MARIA C Maza, Genaro Baker., Nikolay Noriega, [place], [address] [place] [address] (). Measuring the change indisability after inpatient rehabilitation; comparison of the responsiveness of the Barthel Index and Functional [place] Measure. Journal of Neurology, Neurosurgery, and Psychiatry, 66(4), 309-965. JANICE Mora, PASCUAL Kohler, & Michelle Simmons MJaylaA. (2004.) Assessment of post-stroke quality of life in cost-effectiveness studies:  The usefulness of the Barthel Index and the EuroQoL-5D. Quality of Life Research, 13, 217-04         G codes: In compliance with CMSs Claims Based Outcome Reporting, the following G-code set was chosen for this patient based on their primary functional limitation being treated: The outcome measure chosen to determine the severity of the functional limitation was the Barthel Index with a score of 95/100 which was correlated with the impairment scale. · Mobility - Walking and Moving Around:               - CURRENT STATUS:    CI - 1%-19% impaired, limited or restricted               - GOAL STATUS:           CI - 1%-19% impaired, limited or restricted               - D/C STATUS:                       CI - 1%-19% impaired, limited or restricted         Physical Therapy Evaluation Charge Determination   History Examination Presentation Decision-Making   LOW Complexity : Zero comorbidities / personal factors that will impact the outcome / POC LOW Complexity : 1-2 Standardized tests and measures addressing body structure, function, activity limitation and / or participation in recreation  LOW Complexity : Stable, uncomplicated  LOW Complexity : FOTO score of       Based on the above components, the patient evaluation is determined to be of the following complexity level: LOW      Pain:  Pain Scale 1: Numeric (0 - 10)  Pain Intensity 1: 0     Activity Tolerance:   Pt tolerated Rx well with no complaints  Please refer to the flowsheet for vital signs taken during this treatment. After treatment:   [X]   Patient left in no apparent distress sitting up in chair  [ ]   Patient left in no apparent distress in bed  [X]   Call bell left within reach  [X]   Nursing notified  [ ]   Caregiver present  [ ]   Bed alarm activated      COMMUNICATION/EDUCATION:   Communication/Collaboration:  [X]   Fall prevention education was provided and the patient/caregiver indicated understanding.   [X]   Patient/family have participated as able and agree with findings and recommendations. [X]   Patient is unable to participate in plan of care at this time.   Findings and recommendations were discussed with: Registered Nurse     Thank you for this referral.  Surya Delgado, PT   Time Calculation: 24 mins

## 2017-08-04 NOTE — ACP (ADVANCE CARE PLANNING)
Request through in basket order to assist with advance medical directive. Consulted with patients chart. Explained document to patient, who were present in the room. Pt notes she would like to review paperwork and complete at a later time.  explained options for completing paperwork.      Mague Cohen M.Div, M.S, [place] 600 available at 6-[facility]([phone]) 6

## 2017-08-04 NOTE — H&P
[facility]   Admission H&P    Date of admission: 8/3/2017    Patient name: Nioclle Betts  MRN: [id number]  YOB: 1940  Age: 64 y.o. Primary care provider:  Patric Lennox, MD     Source of Information: patient, medical records    Chief complaint:  Syncopal Episode    History of Present Illness  Nicolle Betts is a 64 y.o. female who presents to the ER complaining of Syncope. Happed on two occasions lasting a few minutes each and 15 minute between. Had been having headaches off and on for the last couple days. Denies weakness, facial droop or slurred speech with episodes. Patient was sitting down, didn't LOC or hit her head. Unsure if she took her medications yesterday. Pt endorses a current headache, 20 lb weight loss in the last year or two. Patient states she has had good PO intake. Pt denies fever, chills, SOB, chest pain, palpitations, abdominal pain, melena, dysuria, hematuria, heat/cold intolerance, weakness, numbness, vision changes. Patient is accompanied by her daughter. When gathering history patient appears to have some difficulty recalling events throughout the day or if she took her medications. States she hasn't noticed any changes with her ADLs or memory. Daughter also states she hasn't noticed any significant changes. In the ER, vital signs were WNL. Labs were remarkable for mild hyperglycemia 112, all electrolytes were WNL. UA showed trace blood and trace LE. CXR and Head CT showed no acute abnormality. Home Medications   Prior to Admission medications    Medication Sig Start Date End Date Taking? Authorizing Provider   clopidogrel (PLAVIX) 75 mg tab Take 1 Tab by mouth daily. 4/28/17   Mark Romero NP   atorvastatin (LIPITOR) 40 mg tablet Take 1 Tab by mouth daily. 4/28/17   Lakesha Angel NP   lisinopril (PRINIVIL, ZESTRIL) 40 mg tablet Take 1 Tab by mouth daily.  4/24/17   Patric Lennox, MD miscellaneous medical supply misc Please dispense one shower chair for patient with history of cerebrovascular accident. 4/24/17   Stepan Rice MD       Allergies   Allergies   Allergen Reactions    Benadryl Allergy/Cold [Diphenhyd-Pe-Acetaminophen] Hives    Penicillins Hives     Was treated with Augmentin in June 2015, tolerated medication well. Patient states she does not believe she is allergic to Benadryl. Past Medical History:   Diagnosis Date    Acute CVA (cerebrovascular accident) ([facility] [address] 4/10/2017    Per MRI:  Posterior L frontal/parietal territory    Acute CVA (cerebrovascular accident) ([address] 4/10/2017    MRI review:  Posterior L frontal/parietal infarct    Cerebral atrophy 4/10/2017    Hypercholesteremia     Hyperlipidemia LDL goal <70 4/10/2017    Hypertension     TIA (transient ischemic attack)        Past Surgical History:   Procedure Laterality Date    HX HEENT      Tonsilectomy age 8       Family History   Problem Relation Age of Onset    Dementia Mother 61    Heart Attack Father 79       Social History   Patient resides    Independently    X  With family care      Assisted living      SNF      Ambulates  X  Independently      With cane       Assisted walker           Alcohol history     None     Social   X  Chronic     1-2 glasses wine/ day    Smoking history    None   X  Former smoker     Current smoker     Quit 20 years ago, 36 years, 1-2 ppd    History   Smoking Status    Former Smoker    Quit date: 7/7/1999   Smokeless Tobacco    Never Used       Drug history  X  None     Former drug user     Current drug user       Code status    Full code     DNR/DNI   X  Partial    Code status discussed with the patient/caregivers - patient states she does not want to be intubated.      Daughter, Narinder Turner [phone]       Review of Systems  General ROS: denies fever, chills  Ophthalmic ROS: denies vision changes, floaters  Hematological and Lymphatic ROS: denies active bleeding, melena  Endocrine ROS: denies heat/ cold intolerance, endorses 20 lb weight loss in last 1-2 years  Respiratory ROS: denies shortness of breath, cough  Cardiovascular ROS: denies chest pain or dyspnea on exertion  Gastrointestinal ROS: denies abdominal pain, change in bowel habits, or black or bloody stools  Genito-Urinary ROS: denies dysuria, trouble voiding, or hematuria  Musculoskeletal ROS: denies weakness or numbness  Neurological ROS: endorses headaches and syncopal episode      Physical Exam  Visit Vitals    /80    Pulse 71    Temp 98.3 °F (36.8 °C)    Resp 22    Ht 4' 11\" (1.499 m)    Wt 105 lb (47.6 kg)    SpO2 96%    BMI 21.21 kg/m2        General: Thin elderly appearing woman in NAD. Alert. Cooperative. Head: Normocephalic. Atraumatic. Eyes:  Conjunctiva pink. Sclera white. PERRL. Lymphatic: No cervical lymphadenopathy, neck supple. Nose:  Septum midline. Mucosa pink. No drainage. Throat: Mucosa pink. Moist mucous membranes. No tonsillar exudates or erythema. Palate movement equal bilaterally. Neck: Supple. Normal ROM. No stiffness. Respiratory: CTAB. No w/r/r/c.   Cardiovascular: RRR. Normal S1,S2. No m/r/g. Pulses 2+ throughout. GI: + bowel sounds. Nontender. No rebound tenderness or guarding. Nondistended. Extremities: No edema. No palpable cord. No tenderness. Musculoskeletal: Full ROM in all extremities. Neuro: A&Ox3. CN II-XII intact. Gross sensation intact. Strength 5/5 in upper and lower extremities. Skin: Clear. Poor skin turgor. No rashes. No ulcers.     : Deferred   Rectal: Deferred       Laboratory Data  Recent Results (from the past 24 hour(s))   EKG, 12 LEAD, INITIAL    Collection Time: 08/03/17 11:55 PM   Result Value Ref Range    Ventricular Rate 66 BPM    Atrial Rate 66 BPM    P-R Interval 140 ms    QRS Duration 74 ms    Q-T Interval 422 ms    QTC Calculation (Bezet) 442 ms    Calculated P Axis 18 degrees    Calculated R Axis 4 degrees Calculated T Axis 108 degrees    Diagnosis       Normal sinus rhythm  Possible Left atrial enlargement  Anteroseptal infarct (cited on or before 07-JUL-2013)  Abnormal ECG  When compared with ECG of 10-APR-2017 00:50,  ST now depressed in Anterior leads  T wave inversion now evident in Anterior leads     CBC WITH AUTOMATED DIFF    Collection Time: 08/04/17 12:19 AM   Result Value Ref Range    WBC 6.7 3.6 - 11.0 K/uL    RBC 4.10 3.80 - 5.20 M/uL    HGB 13.1 11.5 - 16.0 g/dL    HCT 39.1 35.0 - 47.0 %    MCV 95.4 80.0 - 99.0 FL    MCH 32.0 26.0 - 34.0 PG    MCHC 33.5 30.0 - 36.5 g/dL    RDW 14.1 11.5 - 14.5 %    PLATELET 555 292 - 111 K/uL    NEUTROPHILS 58 32 - 75 %    LYMPHOCYTES 20 12 - 49 %    MONOCYTES 18 (H) 5 - 13 %    EOSINOPHILS 3 0 - 7 %    BASOPHILS 1 0 - 1 %    ABS. NEUTROPHILS 3.9 1.8 - 8.0 K/UL    ABS. LYMPHOCYTES 1.3 0.8 - 3.5 K/UL    ABS. MONOCYTES 1.2 (H) 0.0 - 1.0 K/UL    ABS. EOSINOPHILS 0.2 0.0 - 0.4 K/UL    ABS. BASOPHILS 0.1 0.0 - 0.1 K/UL   CK W/ CKMB & INDEX    Collection Time: 08/04/17 12:19 AM   Result Value Ref Range     26 - 192 U/L    CK - MB 4.8 (H) <3.6 NG/ML    CK-MB Index 3.1 (H) 0 - 2.5     METABOLIC PANEL, COMPREHENSIVE    Collection Time: 08/04/17 12:19 AM   Result Value Ref Range    Sodium 139 136 - 145 mmol/L    Potassium 3.5 3.5 - 5.1 mmol/L    Chloride 103 97 - 108 mmol/L    CO2 28 21 - 32 mmol/L    Anion gap 8 5 - 15 mmol/L    Glucose 112 (H) 65 - 100 mg/dL    BUN 13 6 - 20 MG/DL    Creatinine 0.70 0.55 - 1.02 MG/DL    BUN/Creatinine ratio 19 12 - 20      GFR est AA >60 >60 ml/min/1.73m2    GFR est non-AA >60 >60 ml/min/1.73m2    Calcium 8.6 8.5 - 10.1 MG/DL    Bilirubin, total 0.3 0.2 - 1.0 MG/DL    ALT (SGPT) 30 12 - 78 U/L    AST (SGOT) 31 15 - 37 U/L    Alk.  phosphatase 61 45 - 117 U/L    Protein, total 6.8 6.4 - 8.2 g/dL    Albumin 3.3 (L) 3.5 - 5.0 g/dL    Globulin 3.5 2.0 - 4.0 g/dL    A-G Ratio 0.9 (L) 1.1 - 2.2     PROTHROMBIN TIME + INR    Collection Time: 08/04/17 12:19 AM   Result Value Ref Range    INR 1.0 0.9 - 1.1      Prothrombin time 10.0 9.0 - 11.1 sec   PTT    Collection Time: 08/04/17 12:19 AM   Result Value Ref Range    aPTT 26.1 22.1 - 32.5 sec    aPTT, therapeutic range     58.0 - 77.0 SECS   TROPONIN I    Collection Time: 08/04/17 12:19 AM   Result Value Ref Range    Troponin-I, Qt. <0.04 <0.05 ng/mL   MAGNESIUM    Collection Time: 08/04/17 12:19 AM   Result Value Ref Range    Magnesium 1.8 1.6 - 2.4 mg/dL   PHOSPHORUS    Collection Time: 08/04/17 12:19 AM   Result Value Ref Range    Phosphorus 3.3 2.6 - 4.7 MG/DL   URINALYSIS W/ REFLEX CULTURE    Collection Time: 08/04/17  2:59 AM   Result Value Ref Range    Color YELLOW/STRAW      Appearance CLEAR CLEAR      Specific gravity 1.015 1.003 - 1.030      pH (UA) 5.5 5.0 - 8.0      Protein NEGATIVE  NEG mg/dL    Glucose NEGATIVE  NEG mg/dL    Ketone NEGATIVE  NEG mg/dL    Bilirubin NEGATIVE  NEG      Blood TRACE (A) NEG      Urobilinogen 0.2 0.2 - 1.0 EU/dL    Nitrites NEGATIVE  NEG      Leukocyte Esterase TRACE (A) NEG      WBC 0-4 0 - 4 /hpf    RBC 5-10 0 - 5 /hpf    Epithelial cells FEW FEW /lpf    Bacteria NEGATIVE  NEG /hpf    UA:UC IF INDICATED CULTURE NOT INDICATED BY UA RESULT CNI      Hyaline cast 0-2 0 - 5 /lpf       Imaging  CXR normal   Head CT w/o Contrast normal    EKG:  Normal sinus rhythm, possible Left atrial enlargement. Anteroseptal infarct (cited on or before 07-JUL-2013). Abnormal ECG. When compared with ECG of 10-APR-2017 00:50, ST now depressed in Anterior leads and T wave inversion now evident in Anterior leads. Assessment and Plan   Diamante Sommers is a 64 y.o. female with hx of TIA/CVA, HTN and HLD who is admitted for syncopal episode of unknown cause.     1. Syncopal episode of unknown cause  - Admit to remote [facility]  - [facility] NS 100mL/hr  - Orthostatic vitals, VS per unit routine  - F/u Carotid Dopplers and Echo  - TSH, mag, phos  - Daily CMP and replete electrolytes PRN  - Neuro cx for potential EEC for seizure eval    4. Hx of TIA/ CVA   - Hx of recent TIA (04/2017)  - Continue Plavix 75mg and Lipitor 40mg QD    2. HLD -  04/2017  - Continue home Lipitor    3. HTN - BP WNL on admission  - Will hold Lisinopril    FEN/GI - NS at 100 mL/hr. Activity - Ambulate with Assistance  DVT prophylaxis - Appropriately anticoagulated with Plavix. GI prophylaxis -  None indicated  Disposition - Admit to remote tele. Plan to D/C home. CODE STATUS:  Partial code - no intubation.        Patient discussed with Dr. Jamaica Pimentel MD (PGY2) and Attending, MD Augustine Teixeira [address] Problems  Date Reviewed: 5/12/2017          Codes Class Noted POA    Syncope ICD-10-CM: R55  ICD-9-CM: 780.2  8/4/2017 Unknown

## 2017-08-04 NOTE — PROGRESS NOTES
Spiritual Care Assessment/Progress Notes    Jojo Vogel [id number]  [id number]    1940  64 y.o.  female    Patient Telephone Number: [phone] (home)   Rastafari Affiliation: Moravian   Language: English   Extended Emergency Contact Information  Primary Emergency Contact: [address] Phone: [phone]  Relation: Daughter  [facility] [address] [facility]: [phone]  Relation: Child   Patient Active Problem List    Diagnosis Date Noted    TIA (transient ischemic attack)     Unresponsive episode 08/03/2017    H/O CVA (4/2017) 04/10/2017    Cerebral atrophy 04/10/2017    Hyperlipidemia LDL goal <70 04/10/2017    History of CVA (cerebrovascular accident) 04/10/2017    Adjustment disorder 04/10/2014    Anxiety 04/10/2014    Hypertension 11/15/2013        Date: 8/4/2017       Level of Rastafari/Spiritual Activity:  []         Involved in tad tradition/spiritual practice    []         Not involved in tad tradition/spiritual practice  [x]         Spiritually oriented    []         Claims no spiritual orientation    []         seeking spiritual identity  []         Feels alienated from Church practice/tradition  []         Feels angry about Church practice/tradition  [x]         Spirituality/Church tradition is a resource for coping at this time.   []         Not able to assess due to medical condition    Services Provided Today:  []         crisis intervention    []         reading Scriptures  [x]         spiritual assessment    [x]         prayer  [x]         empathic listening/emotional support  []         rites and rituals (cite in comments)  [x]         life review     []         Church support  []         theological development   []         advocacy  []         ethical dialog     []         blessing  []         bereavement support    []         support to family  []         anticipatory grief support   [x] help with AMD  []         spiritual guidance    []         meditation      Spiritual Care Needs  [x]         Emotional Support  [x]         Spiritual/Christian Care  []         Loss/Adjustment  []         Advocacy/Referral                /Ethics  []         No needs expressed at               this time  []         Other: (note in               comments)  [address]  []         Follow up visits with               pt/family  []         Provide materials  []         Schedule sacraments  []         Contact Community               Clergy  [x]         Follow up as needed  []         Other: (note in               comments)     Comments: Request through in basket order to assist with advance medical directive. Consulted with patients chart. Explained document to patient, who were present in the room. Pt notes she would like to review paperwork and complete at a later time.  explained options for completing paperwork. Additionally, pt talked about some of her related medical concerns. She shared about some of the losses she has been through, as well as her daughter's loss of spouse several years ago. Pt became particularly tearful in reflection on a young neighbor, who recently  suddenly.  offered a supportive and listening presence. Prayer provided with pt by bedside.      MELISSA Haile.Madhavi, M.S, Virginia Denny6 available at [address] [place] [phone])

## 2017-08-04 NOTE — ED NOTES
TRANSFER - OUT REPORT:    Verbal report given to Crescencio Mcgrath RN(name) on Agapito Schwab  being transferred to 5th floor(unit) for routine progression of care       Report consisted of patients Situation, Background, Assessment and   Recommendations(SBAR). Information from the following report(s) SBAR, MAR and Recent Results was reviewed with the receiving nurse. Lines:   Peripheral IV 08/03/17 Left Forearm (Active)   Site Assessment Clean, dry, & intact 8/3/2017 11:51 PM   Phlebitis Assessment 0 8/3/2017 11:51 PM   Infiltration Assessment 0 8/3/2017 11:51 PM   Dressing Status Clean, dry, & intact 8/3/2017 11:51 PM   Dressing Type Transparent 8/3/2017 11:51 PM   Hub Color/Line Status Pink 8/3/2017 11:51 PM        Opportunity for questions and clarification was provided.       Patient transported with:   Monitor  Registered Nurse

## 2017-08-04 NOTE — PROGRESS NOTES
Bedside and Verbal shift change report given to Kyree Nicolas (oncoming nurse) by Hanh Bishop (offgoing nurse). Report included the following information SBAR, Kardex, Intake/Output, MAR, Accordion, Recent Results and Med Rec Status.

## 2017-08-04 NOTE — PROGRESS NOTES
[facility]   Senior Resident Admission Note    CC: Syncope     HPI:  Agnieszka Hua is a 64 y.o. female who presents to the ER complaining of syncopal like episodes. Per family had two episodes earlier this evening each lasting a few minutes, patient stopped responding to questions and her eyes remained open. She did not fall down or injure herself  CT head and XR chest obtained in the ED showed no acute process. Chart reviewed. Patient seen, examined, and discussed with Dr. Yani Stearns (PGY-1). Visit Vitals    /75 (BP 1 Location: Left arm, BP Patient Position: Sitting)    Pulse 78    Temp 98.1 °F (36.7 °C)    Resp 20    Ht 4' 11\" (1.499 m)    Wt 105 lb (47.6 kg)    SpO2 98%    Breastfeeding No    BMI 21.21 kg/m2       Physical Exam   Constitutional: She is oriented to person, place, and time. No distress. Cardiovascular: Normal rate, regular rhythm and normal heart sounds. Exam reveals no gallop and no friction rub. No murmur heard. Pulmonary/Chest: Effort normal and breath sounds normal. No respiratory distress. She has no wheezes. She has no rales. Abdominal: Soft. She exhibits no distension. There is no tenderness. Neurological: She is alert and oriented to person, place, and time. Skin: She is not diaphoretic. A/P: 63 yo female with history of hypertension, CVA who is admitted for syncopal episodes. Syncopal episodes:   - Admit to remote telemetry   - Vital signs per unit protocol  - IV hydration    - Follow up carotid dopplers and Echo   - TSH   - Mag, Phosphorus   - Daily BMP and replete electrolytes prn   - Hold home BP medications of Lisinopril for now  - Neurology consult for further workup, including potential EEG for seizure evaluation     2. History of CVA    - Continue Plavix 75 mg and Lipitor 40 mg daily     I agree with remaining assessment and plan as documented in Dr. Dorian Terrazas note.       Pt to be discussed with Dr Elena Marquez (on-call attending physician)    Charissa Royal MD  Family Medicine Resident

## 2017-08-04 NOTE — ED TRIAGE NOTES
patient arrives via ems c/o syncope x1 min, before she passed out she was having cramping in her hands. While ems was at her house patient had a LOC for about 1.5 min. H/o TIA.

## 2017-08-04 NOTE — PROGRESS NOTES
TRANSFER - IN REPORT:    Verbal report received from [address] [place] [address],[address] (name) on Maximino Howard  being received from ED(unit) for routine progression of care      Report consisted of patients Situation, Background, Assessment and   Recommendations(SBAR). Information from the following report(s) SBAR, Kardex, ED Summary, Intake/Output, MAR, Accordion, Recent Results and Med Rec Status was reviewed with the receiving nurse. Opportunity for questions and clarification was provided. Assessment completed upon patients arrival to unit and care assumed.      Primary Nurse Juanita Galvan and Yovany Webster RN performed a dual skin assessment on this patient No impairment noted  Diego score is 23

## 2017-08-05 VITALS
HEIGHT: 59 IN | WEIGHT: 105 LBS | SYSTOLIC BLOOD PRESSURE: 105 MMHG | DIASTOLIC BLOOD PRESSURE: 51 MMHG | TEMPERATURE: 98.2 F | RESPIRATION RATE: 16 BRPM | BODY MASS INDEX: 21.17 KG/M2 | OXYGEN SATURATION: 95 % | HEART RATE: 70 BPM

## 2017-08-05 LAB
ALBUMIN SERPL BCP-MCNC: 2.8 G/DL (ref 3.5–5)
ALBUMIN/GLOB SERPL: 0.8 {RATIO} (ref 1.1–2.2)
ALP SERPL-CCNC: 59 U/L (ref 45–117)
ALT SERPL-CCNC: 30 U/L (ref 12–78)
ANION GAP BLD CALC-SCNC: 7 MMOL/L (ref 5–15)
AST SERPL W P-5'-P-CCNC: 35 U/L (ref 15–37)
BILIRUB SERPL-MCNC: 0.5 MG/DL (ref 0.2–1)
BNP SERPL-MCNC: 591 PG/ML (ref 0–450)
BUN SERPL-MCNC: 14 MG/DL (ref 6–20)
BUN/CREAT SERPL: 20 (ref 12–20)
CALCIUM SERPL-MCNC: 8.5 MG/DL (ref 8.5–10.1)
CHLORIDE SERPL-SCNC: 104 MMOL/L (ref 97–108)
CO2 SERPL-SCNC: 28 MMOL/L (ref 21–32)
CREAT SERPL-MCNC: 0.7 MG/DL (ref 0.55–1.02)
ERYTHROCYTE [DISTWIDTH] IN BLOOD BY AUTOMATED COUNT: 14 % (ref 11.5–14.5)
GLOBULIN SER CALC-MCNC: 3.3 G/DL (ref 2–4)
GLUCOSE SERPL-MCNC: 99 MG/DL (ref 65–100)
HCT VFR BLD AUTO: 35.3 % (ref 35–47)
HGB BLD-MCNC: 11.9 G/DL (ref 11.5–16)
MCH RBC QN AUTO: 31.7 PG (ref 26–34)
MCHC RBC AUTO-ENTMCNC: 33.7 G/DL (ref 30–36.5)
MCV RBC AUTO: 94.1 FL (ref 80–99)
PLATELET # BLD AUTO: 184 K/UL (ref 150–400)
POTASSIUM SERPL-SCNC: 3.7 MMOL/L (ref 3.5–5.1)
PROT SERPL-MCNC: 6.1 G/DL (ref 6.4–8.2)
RBC # BLD AUTO: 3.75 M/UL (ref 3.8–5.2)
SODIUM SERPL-SCNC: 139 MMOL/L (ref 136–145)
TROPONIN I SERPL-MCNC: <0.04 NG/ML
WBC # BLD AUTO: 6.6 K/UL (ref 3.6–11)

## 2017-08-05 PROCEDURE — 83880 ASSAY OF NATRIURETIC PEPTIDE: CPT | Performed by: FAMILY MEDICINE

## 2017-08-05 PROCEDURE — 36415 COLL VENOUS BLD VENIPUNCTURE: CPT | Performed by: FAMILY MEDICINE

## 2017-08-05 PROCEDURE — 74011250636 HC RX REV CODE- 250/636: Performed by: NURSE PRACTITIONER

## 2017-08-05 PROCEDURE — 74011250637 HC RX REV CODE- 250/637: Performed by: FAMILY MEDICINE

## 2017-08-05 PROCEDURE — 85027 COMPLETE CBC AUTOMATED: CPT | Performed by: FAMILY MEDICINE

## 2017-08-05 PROCEDURE — 80053 COMPREHEN METABOLIC PANEL: CPT | Performed by: FAMILY MEDICINE

## 2017-08-05 PROCEDURE — 84484 ASSAY OF TROPONIN QUANT: CPT | Performed by: FAMILY MEDICINE

## 2017-08-05 RX ORDER — LISINOPRIL 10 MG/1
10 TABLET ORAL DAILY
Qty: 30 TAB | Refills: 2 | Status: SHIPPED | OUTPATIENT
Start: 2017-08-05 | End: 2017-08-05

## 2017-08-05 RX ORDER — CARVEDILOL 3.12 MG/1
3.12 TABLET ORAL 2 TIMES DAILY WITH MEALS
Qty: 60 TAB | Refills: 2 | Status: SHIPPED | OUTPATIENT
Start: 2017-08-05 | End: 2018-10-23 | Stop reason: SDUPTHER

## 2017-08-05 RX ORDER — CARVEDILOL 3.12 MG/1
3.12 TABLET ORAL 2 TIMES DAILY WITH MEALS
Qty: 60 TAB | Refills: 2 | Status: SHIPPED | OUTPATIENT
Start: 2017-08-05 | End: 2017-08-05

## 2017-08-05 RX ORDER — LISINOPRIL 10 MG/1
10 TABLET ORAL DAILY
Qty: 30 TAB | Refills: 2 | Status: SHIPPED | OUTPATIENT
Start: 2017-08-05 | End: 2017-11-24 | Stop reason: SDUPTHER

## 2017-08-05 RX ADMIN — CARVEDILOL 3.12 MG: 3.12 TABLET, FILM COATED ORAL at 09:35

## 2017-08-05 RX ADMIN — LISINOPRIL 10 MG: 5 TABLET ORAL at 09:35

## 2017-08-05 RX ADMIN — Medication 10 ML: at 07:07

## 2017-08-05 RX ADMIN — ENOXAPARIN SODIUM 40 MG: 40 INJECTION SUBCUTANEOUS at 09:34

## 2017-08-05 RX ADMIN — ATORVASTATIN CALCIUM 40 MG: 10 TABLET, FILM COATED ORAL at 09:35

## 2017-08-05 RX ADMIN — CLOPIDOGREL 75 MG: 75 TABLET, FILM COATED ORAL at 09:35

## 2017-08-05 NOTE — PROGRESS NOTES
1:52 PM Md is working on DC, still waiting for DC order. Called daughter Navin Osullivan, informed daughter that patient will be DC and waiting for DC order, daughter will be here within the hour. 1:53 PM DC order placed. Will prepare for DC.   3:28 PM I have reviewed discharge instructions with the patient and caregiver. The patient and caregiver verbalized understanding. Gave opportunity for questions. Removed peripheral IV and patient declined transport by [facility], patient ambulated downstairs with daughter Navin Osullivan and PCT.

## 2017-08-05 NOTE — DISCHARGE INSTRUCTIONS
HOME DISCHARGE INSTRUCTIONS    Carlitos Cos / [id number] : 1940    Admission date: 8/3/2017 Discharge date: 2017     Please bring this form with you to show your care provider at your follow-up appointment. Primary care provider:  Paty Shipley MD    Discharging provider:  Amandeep Tran MD  - Family Medicine Resident  Jose Mcdonald MD - Attending, Family Medicine     You have been admitted to the hospital with the following diagnoses:    ACUTE DIAGNOSES:  · SYNCOPE OF UNKNOWN ETIOLOGY   . . . . . . . . . . . . . . . . . . . . . . . . . . . . . . . . . . . . . . . . . . . . . . . . . . . . . . . . . . . . . . . . . . . . . . . Raphael Denny FOLLOW-UP CARE RECOMMENDATIONS:    Appointments  Follow-up Information     Follow up With Details Comments Abdoulaye Kirkland MD Schedule an appointment as soon as possible for a visit with the next 2 days Post hospital discharge follow up [address]  [phone]             NEW MEDICATION:    - START COREG (CARVEDILOL) 3.125mg TWO TIMES PER DAY . MODIFIED MEDICATION:    -LISINOPRIL WAS LOWERED TO 10 mg daily. Follow-up tests needed: As per follow up physicians. Pending test results: At the time of your discharge the following test results are still pending: None. Please make sure you review these results with your outpatient follow-up provider(s). Specific symptoms to watch for: chest pain, shortness of breath, fever, chills, nausea, vomiting, diarrhea, change in mentation, falling, weakness, bleeding. DIET/what to eat:  Cardiac Diet    ACTIVITY:  Activity as tolerated    Wound care: None    Equipment needed:  None    What to do if new or unexpected symptoms occur? If you experience any of the above symptoms (or should other concerns or questions arise after discharge) please call your primary care physician.  Return to the emergency room if you cannot get hold of your doctor. · It is very important that you keep your follow-up appointment(s). · Please bring discharge papers, medication list (and/or medication bottles) to your follow-up appointments for review by your outpatient provider(s). · Please check the list of medications and be sure it includes every medication (even non-prescription medications) that your provider wants you to take. · It is important that you take the medication exactly as they are prescribed. · Keep your medication in the bottles provided by the pharmacist and keep a list of the medication names, dosages, and times to be taken in your wallet. · Do not take other medications without consulting your doctor. · If you have any questions about your medications or other instructions, please talk to your nurse or care provider before you leave the hospital.     Information obtained by:     I understand that if any problems occur once I am at home I am to contact my physician. These instructions were explained to me and I had the opportunity to ask questions. I understand and acknowledge receipt of the instructions indicated above.                                                                                                                                                Physician's or R.N.'s Signature                                                                  Date/Time                                                                                                                                              Patient or Representative Signature                                                          Date/Time

## 2017-08-05 NOTE — PROCEDURES
Patient Name: Angela Mendoza  : 1940  Age: 64 y.o. Ordering physician: No ref. provider found  Date of EE2017  EEG procedure number: [id number]  Diagnosis:syncope  Interpreting physician: Maico Mohamud MD      ELECTROENCEPHALOGRAM REPORT     PROCEDURE: EEG. CLINICAL INDICATION: The patient is a 64 y.o. female with a history of   possible seizures. EEG to rule out seizures, rule out stroke, rule out   cortical abnormality. EEG CLASSIFICATION: Essentially normal    DESCRIPTION OF THE RECORD:   The background of this recording contains a posteriorly-located occipital alpha rhythm of 9 Hz that attenuates with eye opening. Throughout the recording, there were no clear areas of focal slowing nor spike or spike-and-wave discharges seen. Hyperventilation was not performed. Photic stimulation produced a minimal driving response in the posterior head regions. During the recording, the patient did enter prolonged states of sleep with K-complexes and sleep spindles seen in the central head regions. INTERPRETATION: This is a normal electroencephalogram showing no clear focal abnormalities or epileptiform activity. A normal EEG doesn't not rule out seizures. Clinical correlation recommended.       Ana Paula Yeager MD  2017  10:34 PM

## 2017-08-05 NOTE — ROUTINE PROCESS
Bedside and Verbal shift change report given to [facility] (oncoming nurse) by Prince Carrion (offgoing nurse). Report included the following information SBAR, Kardex, Intake/Output, MAR and Recent Results.

## 2017-08-05 NOTE — PROGRESS NOTES
Pt restless at beginning of shift. Confused and walking the halls. Pt stating that she was looking for a room to go into. Reoriented. Seems to get confused in the evening. Pt eventually feel asleep around midnight and slept through the night. Notified by telemetry that pt had a 6 minute interval where her pacemaker was not capturing and pt had a few pauses or missed beats. Strips are located on chart. Discussed with rounding FP resident who will relay to group. No other changes overnight. Will continue to monitor.

## 2017-08-05 NOTE — PROGRESS NOTES
Called for \"Pacer not capturing\". Patient does not have a pacemaker. Call if any other questions. I reviewed tele - Just sinus with artifacts. No pauses or asystole. No Afib either.

## 2017-08-06 NOTE — DISCHARGE SUMMARY
[address] [facility] [address] [place] [address]   Office [phone]  Fax [phone]       Discharge / Transfer / Off-Service Note     Name: Simi Morocho MRN: [id number]  Sex: Female   YOB: 1940  Age: 64 y.o. PCP: John Paul Demarco MD     Date of admission: 8/3/2017  Date of discharge/transfer: 8/5/2017    Attending physician at admission: Dr. Sandip Verdugo  Attending physician at discharge/transfer: Dr. Sandip Verdugo  Resident physician at discharge/transfer: Favian Moya MD     Consultants during hospitalization  NEUROLOGY  CARDIOLOGY     Admission diagnoses   Syncope unspecified cause    Recommended follow-up after discharge  -Primary care physician  -Cardiology     History of Present Illness    Per admitting provider, Dr. Abimbola Hollis:  Dann Marquez is a 64 y.o. female who presents to the ER complaining of Syncope. Happed on two occasions lasting a few minutes each and 15 minute between. Had been having headaches off and on for the last couple days. Denies weakness, facial droop or slurred speech with episodes. Patient was sitting down, didn't LOC or hit her head. Unsure if she took her medications yesterday. Pt endorses a current headache, 20 lb weight loss in the last year or two. Patient states she has had good PO intake. Pt denies fever, chills, SOB, chest pain, palpitations, abdominal pain, melena, dysuria, hematuria, heat/cold intolerance, weakness, numbness, vision changes. Patient is accompanied by her daughter. When gathering history patient appears to have some difficulty recalling events throughout the day or if she took her medications. States she hasn't noticed any changes with her ADLs or memory. Daughter also states she hasn't noticed any significant changes.    In the ER, vital signs were WNL. Labs were remarkable for mild hyperglycemia 112, all electrolytes were WNL. UA showed trace blood and trace LE.  CXR and Head CT showed no acute abnormality\". Conditions treated during the Hospital course    1. Syncopal episode of unspecified origin:  - Admit to remote telemetry  - MIVF NS 100mL/hr  - Orthostatic vitals, VS per unit routine  - F/u  Echo (see results below)  - TSH, mag, phos: All within normal limits  - Daily CMP and replete electrolytes PRN  - Neuro consult      2. Hx of TIA/ CVA   - Hx of recent TIA (04/2017)  - Continue Plavix 75mg and Lipitor 40mg QD     3. HLD -  04/2017  - Continue home Lipitor     4. HTN - BP WNL on admission  - Will hold Lisinopril    Physical exam at discharge:    Vitals Reviewed. General Well-appearing in no distress. HENT Head Normocephalic and atraumatic. Eyes Conjunctivae are normal, no discharge. No scleral icterus. Nose Nose normal, clear turbinates. Oral Oropharynx is clear and moist. No oropharyngeal exudate. Neck No thyromegaly present. No cervical adenopathy. Cardio Normal rate, regular rhythm. Exam reveals no gallop and no friction rub. No murmur heard. No chest wall tenderness. Pulmonary Effort normal and breath sounds normal. No respiratory distress. No wheezes, no rales. Abdominal Soft. Bowel sounds normal. No distension. No tenderness.  Deferred. Extremities No edema of lower extremities. No tenderness. Distal pulses intact. Neurological Alert and oriented to person, place, and time. Dermatology Skin is warm and dry. No rash noted. No erythema or pallor. Condition at discharge: Stable.     Labs  Recent Labs      08/05/17 0423 08/04/17 0019   WBC  6.6  6.7   HGB  11.9  13.1   HCT  35.3  39.1   PLT  184  172     Recent Labs      08/05/17 0423 08/04/17   0019   NA  139  139   K  3.7  3.5   CL  104  103   CO2  28  28   BUN  14  13   CREA  0.70  0.70   GLU  99  112*   CA  8.5  8.6   MG   --   1.8   PHOS   --   3.3     Recent Labs      08/05/17 0423  08/04/17   0019   SGOT  35  31   ALT  30  30   AP  59  61   TBILI  0.5  0.3   TP  6.1*  6.8 ALB  2.8*  3.3*   GLOB  3.3  3.5     Recent Labs      08/05/17   0424  08/04/17   0019   TROIQ  <0.04  <0.04   INR   --   1.0   PTP   --   10.0   APTT   --   26.1     Imaging    Results from [place] encounter on 08/03/17   XR CHEST PORT   Narrative INDICATION:   Chest Pain and syncope    EXAM:  AP CHEST RADIOGRAPH    COMPARISON: April 10, 2017    FINDINGS:    AP portable view of the chest demonstrates a normal cardiomediastinal  silhouette. The lungs are adequately expanded with no edema, effusion,  consolidation, or pneumothorax. The osseous structures are unremarkable. Impression IMPRESSION:  No acute process. No results found for this or any previous visit. Results from Hospital Encounter encounter on 08/03/17   CT HEAD WO CONT   Narrative INDICATION:   Syncope    EXAM:  HEAD CT WITHOUT CONTRAST    COMPARISON:  April 10, 2017    TECHNIQUE:  Routine noncontrast axial head CT was performed. Sagittal and  coronal reconstructions were generated. CT dose reduction was achieved through use of a standardized protocol tailored  for this examination and automatic exposure control for dose modulation. FINDINGS:    Ventricles:  Midline, no hydrocephalus. Intracranial Hemorrhage:  None. Brain Parenchyma/Brainstem:  Periventricular white matter hypodensities are  unchanged. Basal Cisterns:  Normal.   Paranasal Sinuses:  Visualized sinuses are clear. Additional Comments:  N/A. Impression IMPRESSION:    No acute process.                        Chronic diagnoses   Problem List as of 8/5/2017  Date Reviewed: 8/4/2017          Codes Class Noted - Resolved    TIA (transient ischemic attack) ICD-10-CM: G45.9  ICD-9-CM: 435.9  Unknown - Present        Cardiomyopathy ([facility] [address] ICD-10-CM: I42.9  ICD-9-CM: 425.4  8/4/2017 - Present        * (Principal)Unresponsive episode ICD-10-CM: R41.89  ICD-9-CM: 780.09  8/3/2017 - Present    Overview Signed 8/4/2017  8:50 AM by Rosa May NP     transient             H/O CVA (4/2017) ICD-10-CM: I63.9  ICD-9-CM: 434.91  4/10/2017 - Present    Overview Signed 4/10/2017 12:14 PM by Rosa May NP     MRI review:  Posterior L frontal/parietal infarct             Cerebral atrophy ICD-10-CM: G31.9  ICD-9-CM: 331.9  4/10/2017 - Present    Overview Signed 4/10/2017  1:01 PM by Rosa May NP     Greater than expected for age             Hyperlipidemia LDL goal <70 ICD-10-CM: E78.5  ICD-9-CM: 272.4  4/10/2017 - Present        History of CVA (cerebrovascular accident) (Chronic) ICD-10-CM: Z86.73  ICD-9-CM: V12.54  4/10/2017 - Present    Overview Signed 8/4/2017  8:58 AM by Rosa May NP     --L frontal acute infarct and LMCA             Adjustment disorder ICD-10-CM: F43.20  ICD-9-CM: 309.9  4/10/2014 - Present        Anxiety ICD-10-CM: F41.9  ICD-9-CM: 300.00  4/10/2014 - Present        Hypertension ICD-10-CM: I10  ICD-9-CM: 401.9  11/15/2013 - Present    Overview Addendum 4/28/2017  1:45 PM by Claude Bay, MD     A. Goal BP <140/90 s/p CVA in 2017. B. Echo (4/10/17):  EF 40% with mild GHK. Mild MR. Mild to mod TR. PASP 53. Neg bubble. RESOLVED: CVA (cerebral vascular accident) [facility]) ICD-10-CM: I63.9  ICD-9-CM: 434.91  4/10/2017 - 4/28/2017        RESOLVED: [place] ([address] ICD-10-CM: K56.7  ICD-9-CM: 560.1  7/7/2013 - 4/10/2017              Discharge/Transfer Medications  Discharge Medication List as of 8/5/2017  2:27 PM      CONTINUE these medications which have CHANGED    Details   carvedilol (COREG) 3.125 mg tablet Take 1 Tab by mouth two (2) times daily (with meals). , Print, Disp-60 Tab, R-2      lisinopril (PRINIVIL, ZESTRIL) 10 mg tablet Take 1 Tab by mouth daily. , Print, Disp-30 Tab, R-2         CONTINUE these medications which have NOT CHANGED    Details   multivitamin (ONE A DAY) tablet Take 1 Tab by mouth daily. , Historical Med      Omega-3-DHA-EPA-Fish Oil 1,000 mg (120 mg-180 mg) cap Take 1 Cap by mouth every other day., Historical Med      Calcium-Cholecalciferol, D3, 600 mg(1,500mg) -400 unit cap Take 1 Tab by mouth daily. , Historical Med      clopidogrel (PLAVIX) 75 mg tab Take 1 Tab by mouth daily. , Normal, Disp-30 Tab, R-5      atorvastatin (LIPITOR) 40 mg tablet Take 1 Tab by mouth daily. , Normal, Disp-30 Tab, R-5         STOP taking these medications       aspirin (ASPIRIN) 325 mg tablet Comments:   Reason for Stopping:                Diet:  Cardiac diet.     Activity:  As tolerated    Disposition: Home or Self Care    Discharge instructions to patient/family  Please seek medical attention for any new or worsening symptoms particularly fever, chest pain, shortness of breath, abdominal pain, nausea, vomiting    Follow up plans/appointments  Follow-up Information     Follow up With Details Comments Abdoulaye Kirkland MD Schedule an appointment as soon as possible for a visit today Post hospital discharge follow up [address]      [place], [place]  [phone]             Arely Guajardo MD  Family Medicine Resident       For Billing    Chief Complaint   Patient presents with   [facility] Syncope       [address] Problems  Date Reviewed: 8/4/2017          Codes Class Noted POA    TIA (transient ischemic attack) ICD-10-CM: G45.9  ICD-9-CM: 435.9  Unknown Yes        Cardiomyopathy ([facility] [address] ICD-10-CM: I42.9  ICD-9-CM: 425.4  8/4/2017 Yes        * (Principal)Unresponsive episode ICD-10-CM: R41.89  ICD-9-CM: 780.09  8/3/2017 Yes    Overview Signed 8/4/2017  8:50 AM by Pina Jang NP     transient             Hyperlipidemia LDL goal <70 ICD-10-CM: E78.5  ICD-9-CM: 272.4  4/10/2017 Yes        History of CVA (cerebrovascular accident) (Chronic) ICD-10-CM: M22.57  ICD-9-CM: V12.54  4/10/2017 Yes    Overview Signed 8/4/2017  8:58 AM by Pina Jang NP     --L frontal acute infarct and LMCA             Adjustment disorder ICD-10-CM: F43.20  ICD-9-CM: 309.9  4/10/2014 Yes        Anxiety ICD-10-CM: F41.9  ICD-9-CM: 300.00  4/10/2014 Yes        Hypertension ICD-10-CM: I10  ICD-9-CM: 401.9  11/15/2013 Yes    Overview Addendum 4/28/2017  1:45 PM by Adan Delgado MD     A. Goal BP <140/90 s/p CVA in 2017. B. Echo (4/10/17):  EF 40% with mild GHK. Mild MR. Mild to mod TR. PASP 53. Neg bubble.

## 2017-08-07 ENCOUNTER — PATIENT OUTREACH (OUTPATIENT)
Dept: FAMILY MEDICINE CLINIC | Age: 77
End: 2017-08-07

## 2017-08-07 NOTE — PROGRESS NOTES
NNTOCIP (Transitions of Care Inpatient)    Hospital Discharge Follow-Up      Date/Time:     2017 5:25 PM    Patient listed on discharge [facility]) report on 17. Patient discharged from [address] [place] [address] for Syncope and collapse. RRAT score: Medium Risk            15       Total Score        3 Has Seen PCP in Last 6 Months (Yes=3, No=0)    4 IP Visits Last 12 Months (1-3=4, 4=9, >4=11)    8 Charlson Comorbidity Score (Age + Comorbid Conditions)        Criteria that do not apply:    . Living with Significant Other. Assisted Living. LTAC. SNF. or   Rehab    Patient Length of Stay (>5 days = 3)    Pt. Coverage (Medicare=5 , Medicaid, or Self-Pay=4)        Medical History:     Past Medical History:   Diagnosis Date    Acute CVA (cerebrovascular accident) ([facility] [address] 4/10/2017    Per MRI:  Posterior L frontal/parietal territory    Cerebral atrophy 4/10/2017    History of CVA (cerebrovascular accident) 4/10/2017    --L frontal acute infarct and LMCA    Hyperlipidemia LDL goal <70 4/10/2017    Hypertension     TIA (transient ischemic attack)     Unresponsive episode 8/3/2017    transient       Nurse Navigator(NN) contacted the patient by telephone to perform post hospital discharge assessment. Verified  and address with patient as identifiers. Provided introduction to self, and explanation of the Nurses Navigator role. Diet:   Patient reports: Regular Diet    Activity:    Patient reports: mostly moving around the house and somewalking outside the house    Medication:   Performed medication reconciliation with patient, and patient verbalizes understanding of administration of home medications. There were no barriers to obtaining medications identified at this time. Support system:  Patient and daughter    Discharge Instructions :  Reviewed discharge instructions with patient. Patient verbalizes understanding of discharge instructions and follow-up care.        Red Flags:  Fever, chest pain, shortness of breath, abdominal pain, nausea, vomiting    Labs Reviewed:  Recent Labs      08/05/17   0423   WBC  6.6   HGB  11.9   HCT  35.3   PLT  184     Recent Labs      08/05/17   0423   NA  139   K  3.7   CL  104   CO2  28   GLU  99   BUN  14   CREA  0.70   CA  8.5   ALB  2.8*   SGOT  35   ALT  30     Advance Care Planning:   Patient was offered the opportunity to discuss advance care planning:  yes     Does patient have an Advance Directive:  no   If no, did you provide information on [facility]? yes     PCP/Specialist follow up: Patient scheduled to follow up with Fernando Padilla MD on 8/14/17 at 10:20 AM.  Reviewed red flags with patient, and patient verbalizes understanding. Patient given an opportunity to ask questions. No other clinical/social/functional needs noted. The patient agrees to contact the PCP office for questions related to their healthcare. The patient expressed thanks, offered no additional questions and ended the call.

## 2017-08-08 DIAGNOSIS — I63.9 ACUTE CVA (CEREBROVASCULAR ACCIDENT) (HCC): ICD-10-CM

## 2017-08-08 RX ORDER — CLOPIDOGREL BISULFATE 75 MG/1
75 TABLET ORAL DAILY
Qty: 90 TAB | Refills: 1 | Status: SHIPPED | OUTPATIENT
Start: 2017-08-08 | End: 2018-10-16 | Stop reason: SDUPTHER

## 2017-08-14 ENCOUNTER — OFFICE VISIT (OUTPATIENT)
Dept: FAMILY MEDICINE CLINIC | Age: 77
End: 2017-08-14

## 2017-08-14 VITALS
DIASTOLIC BLOOD PRESSURE: 74 MMHG | HEIGHT: 59 IN | BODY MASS INDEX: 19.96 KG/M2 | OXYGEN SATURATION: 96 % | WEIGHT: 99 LBS | HEART RATE: 75 BPM | TEMPERATURE: 97.6 F | SYSTOLIC BLOOD PRESSURE: 145 MMHG | RESPIRATION RATE: 16 BRPM

## 2017-08-14 DIAGNOSIS — R55 SYNCOPE, UNSPECIFIED SYNCOPE TYPE: ICD-10-CM

## 2017-08-14 DIAGNOSIS — I10 ESSENTIAL HYPERTENSION: ICD-10-CM

## 2017-08-14 DIAGNOSIS — Z09 HOSPITAL DISCHARGE FOLLOW-UP: Primary | ICD-10-CM

## 2017-08-14 RX ORDER — AMPICILLIN TRIHYDRATE 250 MG
600 CAPSULE ORAL AS NEEDED
COMMUNITY
End: 2018-03-30

## 2017-08-14 NOTE — PROGRESS NOTES
Chief Complaint   Patient presents with    Syncope     Hosp F/U     1. Have you been to the ER, urgent care clinic since your last visit? Hospitalized since your last visit? Yes. [facility]. 2. Have you seen or consulted any other health care providers outside of the [address] [place] [address] since your last visit? No  Include any pap smears or colon screening.

## 2017-08-14 NOTE — PROGRESS NOTES
Hospital follow-up visit:  Transition of Care documentation:  Date of hospital admission: 8/3  Date of hospital discharge: 8/5  Date of professional contact: 8/7, please see telephone encounter note from NN (personally reviewed)    FTF visit: today   Complexity of MDM: moderate    Was home health instituted? no  (If so document needs and indications)    HPI today:  Doing \"fine\" since going home from the hospital. No issues. Tells me the reason she went to the hospital is that she tripped, fell, and hit her head on dresser/desk. Taking her carvedilol, lisinopril, plavix, atorvastatin as prescribed. Doesn't check her BP reguarly at home. Discussed today how she likes living in [place] better than [place] for various reasons. Wants to move back. Has a 10-year black and grey tabby cat. Daughter Delmer Rubio is getting  soon. ROS:   No fevers or chills  No chest pain  No SOB  No falls or LOC    Allergies: Allergies   Allergen Reactions    Benadryl Allergy/Cold [Diphenhyd-Pe-Acetaminophen] Hives    Penicillins Hives     Was treated with Augmentin in June 2015, tolerated medication well. Meds:   Current Outpatient Prescriptions   Medication Sig Dispense Refill    red yeast rice extract 600 mg cap Take 600 mg by mouth now.  clopidogrel (PLAVIX) 75 mg tab Take 1 Tab by mouth daily. 90 Tab 1    carvedilol (COREG) 3.125 mg tablet Take 1 Tab by mouth two (2) times daily (with meals). 60 Tab 2    lisinopril (PRINIVIL, ZESTRIL) 10 mg tablet Take 1 Tab by mouth daily. 30 Tab 2    multivitamin (ONE A DAY) tablet Take 1 Tab by mouth daily.  Omega-3-DHA-EPA-Fish Oil 1,000 mg (120 mg-180 mg) cap Take 1 Cap by mouth every other day.  Calcium-Cholecalciferol, D3, 600 mg(1,500mg) -400 unit cap Take 1 Tab by mouth daily.  atorvastatin (LIPITOR) 40 mg tablet Take 1 Tab by mouth daily.  27 Tab 5       PMH:  Past Medical History:   Diagnosis Date    Acute CVA (cerebrovascular accident) ([facility] [address] 4/10/2017 Per MRI:  Posterior L frontal/parietal territory    Cerebral atrophy 4/10/2017    History of CVA (cerebrovascular accident) 4/10/2017    --L frontal acute infarct and LMCA    Hyperlipidemia LDL goal <70 4/10/2017    Hypertension     TIA (transient ischemic attack)     Unresponsive episode 8/3/2017    transient       SH:  Smoker:  History   Smoking Status    Former Smoker    Quit date: 7/7/1999   Smokeless Tobacco    Never Used       ETOH:   History   Alcohol Use    10.5 oz/week    21 Glasses of wine per week     Comment: 3 glasses of port per day       FH:   Family History   Problem Relation Age of Onset    Dementia Mother 61    Heart Attack Father 79       Physical Exam:  Visit Vitals    /74    Pulse 75    Temp 97.6 °F (36.4 °C) (Oral)    Resp 16    Ht 4' 11\" (1.499 m)    Wt 99 lb (44.9 kg)    SpO2 96%    BMI 20 kg/m2     Gen: No apparent distress. Pleasant. Neck: Supple, no lymph nodes, masses, or thyromegaly appreciated  Lungs: Respirations unlabored, clear to auscultation bilaterally  Cardio: Regular, rate, and rhythm without murmurs, rubs, or gallops   Abdomen: Normoactive bowel sounds, soft, nontender, nondistended  Ext: No peripheral edema  Neuro: Alert and responds to all questions appropriately. Psych: Makes good eye contact. Smiles and laughs appropriately to conversation. Appearance, behavior, and conversation appropriate with normal speech rate, fluency, content. Assessment and Plan:     Encounter Diagnoses:    ICD-10-CM ICD-9-CM    1. Hospital discharge follow-up Z09 V67.59    2. Essential hypertension I10 401.9    3. Syncope, unspecified syncope type R55 780.2 REFERRAL TO NEUROLOGY     Discharge summary personally reviewed. Items to be followed up on include:  Patient was admitted for potential syncope of unknown cause. CT head and carotid dopplers were negative. Neurology evaluated pt while inpatient.  There was concern for seizure vs psychogenic nonepileptic spells. Prior stroke is a RF for epilepsy. Subsequent EEG did not show evidence of seizure. Pt was recommended to cont plavix and statin as well as f/u with Neuro in 1-2 weeks. Neuro recommended outpt 24hr ambulatory EEG if recurrent events. Referral to Neurology and contact information provided in the office today. Cardiology evaluated pt while inpatient. Negative orthostatics. EF 50%, G1DD, possible hypokinesis of basal inferoseptal walls. Patient was discharged on coreg and lower dose of ACE-I. Outpatient w/u for CAD is indicated given CM. Planning for 30-day event monitor as outpatient to evaluate for pAF. Appointment with Dr. Yareli Ingram set for 8/21/17. I reviewed with pt that she could stop red yeast rice since she is on statin. Continue current medications from hospital discharge. Discussed diagnoses in detail with patient. Patient expressed understanding of and agreement to above plan. All questions and concerns addressed. Patient seen and discussed with Dr. Latonya Chang, Attending Physician.     Baldemar Longoria MD  Family Medicine Resident, PGY-3

## 2017-08-14 NOTE — MR AVS SNAPSHOT
Visit Information Date & Time Provider Department Dept. Phone Encounter #  
 8/14/2017 10:20 AM Abhi Huffman MD [facility] [phone] [id number] Your Appointments 8/21/2017  3:40 PM  
HOSPITAL DISCHARGE with Devang Gill MD  
[facility] ([facility] SCH[facility]) Appt Note: hosp fu appt sll 354 [facility] [place] [address]7 [address] [address] [address] Upcoming Health Maintenance Date Due ZOSTER VACCINE AGE 60> 9/7/1998 GLAUCOMA SCREENING Q2Y 11/7/2003 Pneumococcal 65+ Low/Medium Risk (2 of 2 - PPSV23) 12/21/2016 INFLUENZA AGE 9 TO ADULT 8/1/2017 MEDICARE YEARLY EXAM 5/13/2018 DTaP/Tdap/Td series (2 - Td) 6/22/2024 Allergies as of 8/14/2017  Review Complete On: 8/14/2017 By: Wilburn Canavan, LPN Severity Noted Reaction Type Reactions Benadryl Allergy/cold [Diphenhyd-pe-acetaminophen]  06/11/2013    Hives Penicillins  06/11/2013    Hives Was treated with Augmentin in June 2015, tolerated medication well. Current Immunizations  Never Reviewed Name Date  
 TD Vaccine 12/21/2011 Tdap 6/22/2014 ZZZ-RETIRED (DO NOT USE) Pneumococcal Vaccine (Unspecified Type) 12/21/2011 Not reviewed this visit You Were Diagnosed With   
  
 Codes Comments Hospital discharge follow-up    -  Primary ICD-10-CM: 593 [facility] ICD-9-CM: V67.59 Essential hypertension     ICD-10-CM: I10 
ICD-9-CM: 401.9 Syncope, unspecified syncope type     ICD-10-CM: R55 
ICD-9-CM: 780. 2 Vitals BP Pulse Temp Resp Height(growth percentile) Weight(growth percentile) 145/74 75 97.6 °F (36.4 °C) (Oral) 16 4' 11\" (1.499 m) 99 lb (44.9 kg) SpO2 BMI OB Status Smoking Status 96% 20 kg/m2 Postmenopausal Former Smoker BMI and BSA Data  Body Mass Index Body Surface Area  
 20 kg/m 2 1.37 m 2  
  
  
 Preferred Pharmacy Pharmacy Name Phone [facility] [address], [address] [phone] Your Updated Medication List  
  
   
This list is accurate as of: 8/14/17 12:12 PM.  Always use your most recent med list.  
  
  
  
  
 atorvastatin 40 mg tablet Commonly known as:  LIPITOR Take 1 Tab by mouth daily. Calcium-Cholecalciferol (D3) 600 mg(1,500mg) -400 unit Cap Take 1 Tab by mouth daily. carvedilol 3.125 mg tablet Commonly known as:  Zhang Hamper Take 1 Tab by mouth two (2) times daily (with meals). clopidogrel 75 mg Tab Commonly known as:  PLAVIX Take 1 Tab by mouth daily. lisinopril 10 mg tablet Commonly known as:  Leighton Peek Take 1 Tab by mouth daily. multivitamin tablet Commonly known as:  ONE A DAY Take 1 Tab by mouth daily. Omega-3-DHA-EPA-Fish Oil 1,000 mg (120 mg-180 mg) Cap Take 1 Cap by mouth every other day. red yeast rice extract 600 mg Cap Take 600 mg by mouth now. We Performed the Following REFERRAL TO NEUROLOGY [RUL94 Custom] Comments:  
 Please evaluate patient for recent admission for syncope. Referral Information Referral ID Referred By Referred To  
  
 [id number] Anay MENON MD   
   [place] [address] [address] [facility], [address] [facility] Phone: [phone] Fax: [phone] Visits Status Start Date End Date 1 New Request 8/14/17 8/14/18 If your referral has a status of pending review or denied, additional information will be sent to support the outcome of this decision. Patient Instructions [facility]  
Melvi Ibanez [address] [facility] [address] [place], [address] [facility] Phone: [phone] Fax: [phone] DASH Diet: Care Instructions Your Care Instructions The DASH diet is an eating plan that can help lower your blood pressure. DASH stands for Dietary Approaches to Stop Hypertension. Hypertension is high blood pressure. The DASH diet focuses on eating foods that are high in calcium, potassium, and magnesium. These nutrients can lower blood pressure. The foods that are highest in these nutrients are fruits, vegetables, low-fat dairy products, nuts, seeds, and legumes. But taking calcium, potassium, and magnesium supplements instead of eating foods that are high in those nutrients does not have the same effect. The DASH diet also includes whole grains, fish, and poultry. The DASH diet is one of several lifestyle changes your doctor may recommend to lower your high blood pressure. Your doctor may also want you to decrease the amount of sodium in your diet. Lowering sodium while following the DASH diet can lower blood pressure even further than just the DASH diet alone. Follow-up care is a key part of your treatment and safety. Be sure to make and go to all appointments, and call your doctor if you are having problems. It's also a good idea to know your test results and keep a list of the medicines you take. How can you care for yourself at home? Following the DASH diet · Eat 4 to 5 servings of fruit each day. A serving is 1 medium-sized piece of fruit, ½ cup chopped or canned fruit, 1/4 cup dried fruit, or 4 ounces (½ cup) of fruit juice. Choose fruit more often than fruit juice. · Eat 4 to 5 servings of vegetables each day. A serving is 1 cup of lettuce or raw leafy vegetables, ½ cup of chopped or cooked vegetables, or 4 ounces (½ cup) of vegetable juice. Choose vegetables more often than vegetable juice. · Get 2 to 3 servings of low-fat and fat-free dairy each day. A serving is 8 ounces of milk, 1 cup of yogurt, or 1 ½ ounces of cheese. · Eat 6 to 8 servings of grains each day.  A serving is 1 slice of bread, 1 ounce of dry cereal, or ½ cup of cooked rice, pasta, or cooked cereal. Try to choose whole-grain products as much as possible. · Limit lean meat, poultry, and fish to 2 servings each day. A serving is 3 ounces, about the size of a deck of cards. · Eat 4 to 5 servings of nuts, seeds, and legumes (cooked dried beans, lentils, and split peas) each week. A serving is 1/3 cup of nuts, 2 tablespoons of seeds, or ½ cup of cooked beans or peas. · Limit fats and oils to 2 to 3 servings each day. A serving is 1 teaspoon of vegetable oil or 2 tablespoons of salad dressing. · Limit sweets and added sugars to 5 servings or less a week. A serving is 1 tablespoon jelly or jam, ½ cup sorbet, or 1 cup of lemonade. · Eat less than 2,300 milligrams (mg) of sodium a day. If you limit your sodium to 1,500 mg a day, you can lower your blood pressure even more. Tips for success · Start small. Do not try to make dramatic changes to your diet all at once. You might feel that you are missing out on your favorite foods and then be more likely to not follow the plan. Make small changes, and stick with them. Once those changes become habit, add a few more changes. · Try some of the following: ¨ Make it a goal to eat a fruit or vegetable at every meal and at snacks. This will make it easy to get the recommended amount of fruits and vegetables each day. ¨ Try yogurt topped with fruit and nuts for a snack or healthy dessert. ¨ Add lettuce, tomato, cucumber, and onion to sandwiches. ¨ Combine a ready-made pizza crust with low-fat mozzarella cheese and lots of vegetable toppings. Try using tomatoes, squash, spinach, broccoli, carrots, cauliflower, and onions. ¨ Have a variety of cut-up vegetables with a low-fat dip as an appetizer instead of chips and dip. ¨ Sprinkle sunflower seeds or chopped almonds over salads. Or try adding chopped walnuts or almonds to cooked vegetables. ¨ Try some vegetarian meals using beans and peas. Add garbanzo or kidney beans to salads. Make burritos and tacos with mashed kilgore beans or black beans. Where can you learn more? Go to http://isaura-giovanna.info/. Enter H616 in the search box to learn more about \"DASH Diet: Care Instructions. \" Current as of: April 3, 2017 Content Version: 11.3 © 8580-2476 Solum. Care instructions adapted under license by Sedicii (which disclaims liability or warranty for this information). If you have questions about a medical condition or this instruction, always ask your healthcare professional. [place] [address] any warranty or liability for your use of this information. Introducing [facility]! Dear Noah Teresa: Thank you for requesting a Boost My Ads account. Our records indicate that you already have an active Boost My Ads account. You can access your account anytime at [email or web address] [employer] [place]/Work For Pie Did you know that you can access your hospital and ER discharge instructions at any time in Boost My Ads? You can also review all of your test results from your hospital stay or ER visit. Additional Information If you have questions, please visit the Frequently Asked Questions section of the Boost My Ads website at [email or web address] Remember, Boost My Ads is NOT to be used for urgent needs. For medical emergencies, dial 911. Now available from your iPhone and Android! Please provide this summary of care documentation to your next provider. Your primary care clinician is listed as Patric Lennox. If you have any questions after today's visit, please call [phone].

## 2017-08-14 NOTE — PATIENT INSTRUCTIONS
[facility]   Cayden Gastelum, [address] [place] [address] [facility]  [address] [place] [address]  Phone: [phone]  Fax: [phone]           DASH Diet: Care Instructions  Your Care Instructions  The DASH diet is an eating plan that can help lower your blood pressure. DASH stands for Dietary Approaches to Stop Hypertension. Hypertension is high blood pressure. The DASH diet focuses on eating foods that are high in calcium, potassium, and magnesium. These nutrients can lower blood pressure. The foods that are highest in these nutrients are fruits, vegetables, low-fat dairy products, nuts, seeds, and legumes. But taking calcium, potassium, and magnesium supplements instead of eating foods that are high in those nutrients does not have the same effect. The DASH diet also includes whole grains, fish, and poultry. The DASH diet is one of several lifestyle changes your doctor may recommend to lower your high blood pressure. Your doctor may also want you to decrease the amount of sodium in your diet. Lowering sodium while following the DASH diet can lower blood pressure even further than just the DASH diet alone. Follow-up care is a key part of your treatment and safety. Be sure to make and go to all appointments, and call your doctor if you are having problems. It's also a good idea to know your test results and keep a list of the medicines you take. How can you care for yourself at home? Following the DASH diet  · Eat 4 to 5 servings of fruit each day. A serving is 1 medium-sized piece of fruit, ½ cup chopped or canned fruit, 1/4 cup dried fruit, or 4 ounces (½ cup) of fruit juice. Choose fruit more often than fruit juice. · Eat 4 to 5 servings of vegetables each day. A serving is 1 cup of lettuce or raw leafy vegetables, ½ cup of chopped or cooked vegetables, or 4 ounces (½ cup) of vegetable juice. Choose vegetables more often than vegetable juice.   · Get 2 to 3 servings of low-fat and fat-free dairy each day. A serving is 8 ounces of milk, 1 cup of yogurt, or 1 ½ ounces of cheese. · Eat 6 to 8 servings of grains each day. A serving is 1 slice of bread, 1 ounce of dry cereal, or ½ cup of cooked rice, pasta, or cooked cereal. Try to choose whole-grain products as much as possible. · Limit lean meat, poultry, and fish to 2 servings each day. A serving is 3 ounces, about the size of a deck of cards. · Eat 4 to 5 servings of nuts, seeds, and legumes (cooked dried beans, lentils, and split peas) each week. A serving is 1/3 cup of nuts, 2 tablespoons of seeds, or ½ cup of cooked beans or peas. · Limit fats and oils to 2 to 3 servings each day. A serving is 1 teaspoon of vegetable oil or 2 tablespoons of salad dressing. · Limit sweets and added sugars to 5 servings or less a week. A serving is 1 tablespoon jelly or jam, ½ cup sorbet, or 1 cup of lemonade. · Eat less than 2,300 milligrams (mg) of sodium a day. If you limit your sodium to 1,500 mg a day, you can lower your blood pressure even more. Tips for success  · Start small. Do not try to make dramatic changes to your diet all at once. You might feel that you are missing out on your favorite foods and then be more likely to not follow the plan. Make small changes, and stick with them. Once those changes become habit, add a few more changes. · Try some of the following:  ¨ Make it a goal to eat a fruit or vegetable at every meal and at snacks. This will make it easy to get the recommended amount of fruits and vegetables each day. ¨ Try yogurt topped with fruit and nuts for a snack or healthy dessert. ¨ Add lettuce, tomato, cucumber, and onion to sandwiches. ¨ Combine a ready-made pizza crust with low-fat mozzarella cheese and lots of vegetable toppings. Try using tomatoes, squash, spinach, broccoli, carrots, cauliflower, and onions.   ¨ Have a variety of cut-up vegetables with a low-fat dip as an appetizer instead of chips and dip.  ¨ Sprinkle sunflower seeds or chopped almonds over salads. Or try adding chopped walnuts or almonds to cooked vegetables. ¨ Try some vegetarian meals using beans and peas. Add garbanzo or kidney beans to salads. Make burritos and tacos with mashed kilgore beans or black beans. Where can you learn more? Go to http://isaura-giovanna.info/. Enter T366 in the search box to learn more about \"DASH Diet: Care Instructions. \"  Current as of: April 3, 2017  Content Version: 11.3  © 8745-3814 GuiaBolso. Care instructions adapted under license by e-Zassi (which disclaims liability or warranty for this information). If you have questions about a medical condition or this instruction, always ask your healthcare professional. [address] any warranty or liability for your use of this information.

## 2017-08-21 ENCOUNTER — OFFICE VISIT (OUTPATIENT)
Dept: CARDIOLOGY CLINIC | Age: 77
End: 2017-08-21

## 2017-08-21 VITALS
BODY MASS INDEX: 20.36 KG/M2 | SYSTOLIC BLOOD PRESSURE: 118 MMHG | DIASTOLIC BLOOD PRESSURE: 70 MMHG | RESPIRATION RATE: 18 BRPM | HEIGHT: 59 IN | OXYGEN SATURATION: 97 % | WEIGHT: 101 LBS | HEART RATE: 76 BPM

## 2017-08-21 DIAGNOSIS — Z86.73 HISTORY OF CVA (CEREBROVASCULAR ACCIDENT): ICD-10-CM

## 2017-08-21 DIAGNOSIS — I42.9 CARDIOMYOPATHY, UNSPECIFIED TYPE (HCC): Primary | ICD-10-CM

## 2017-08-21 DIAGNOSIS — I10 ESSENTIAL HYPERTENSION: ICD-10-CM

## 2017-08-21 NOTE — PROGRESS NOTES
Subjective:     Problem List  Date Reviewed: 8/21/2017          Codes Class Noted    TIA (transient ischemic attack) ICD-10-CM: G45.9  ICD-9-CM: 435.9  Unknown        Cardiomyopathy ([address] ICD-10-CM: I42.9  ICD-9-CM: 425.4  8/4/2017        Unresponsive episode ICD-10-CM: R41.89  ICD-9-CM: 780.09  8/3/2017    Overview Signed 8/4/2017  8:50 AM by Pina Jang NP     transient             H/O CVA (4/2017) ICD-10-CM: I63.9  ICD-9-CM: 434.91  4/10/2017    Overview Signed 4/10/2017 12:14 PM by Pina Jang NP     MRI review:  Posterior L frontal/parietal infarct             Cerebral atrophy ICD-10-CM: G31.9  ICD-9-CM: 331.9  4/10/2017    Overview Signed 4/10/2017  1:01 PM by Pina Jang NP     Greater than expected for age             Hyperlipidemia LDL goal <70 ICD-10-CM: E78.5  ICD-9-CM: 272.4  4/10/2017        History of CVA (cerebrovascular accident) (Chronic) ICD-10-CM: O35.02  ICD-9-CM: V12.54  4/10/2017    Overview Signed 8/4/2017  8:58 AM by Pina Jang NP     --L frontal acute infarct and LMCA             Adjustment disorder ICD-10-CM: F43.20  ICD-9-CM: 309.9  4/10/2014        Anxiety ICD-10-CM: F41.9  ICD-9-CM: 300.00  4/10/2014        Hypertension ICD-10-CM: I10  ICD-9-CM: 401.9  11/15/2013    Overview Addendum 4/28/2017  1:45 PM by Margarita Garcia MD     A. Goal BP <140/90 s/p CVA in 2017. B. Echo (4/10/17):  EF 40% with mild GHK. Mild MR. Mild to mod TR. PASP 53. Neg bubble.  is a 64 y.o. woman with the above past medical history, who presents for follow up after hospitalization. She us having some altered mental status. It seemed as though this was due to a neurologic issue. She has an echocardiogram, which showed actual improvement in the left ventricular systolic function. She has no active cardiopulmonary symptoms currently.   She denies any chest pain, chest discomfort, shortness of breath, dyspnea on exertion, orthopnea, paroxysmal nocturnal dyspnea, lower extremity swelling, palpitations, syncope or near syncope. We are going to ask her to wear a 30-day auto-triggered event monitor to assess for possible atrial fibrillation, given a stroke back in April. She, however, did not wish to wear this. History   Smoking Status    Former Smoker    Quit date: 7/7/1999   Smokeless Tobacco    Never Used       Current Outpatient Prescriptions   Medication Sig Dispense Refill    carvedilol (COREG) 3.125 mg tablet Take 1 Tab by mouth two (2) times daily (with meals). 60 Tab 2    lisinopril (PRINIVIL, ZESTRIL) 10 mg tablet Take 1 Tab by mouth daily. 30 Tab 2    atorvastatin (LIPITOR) 40 mg tablet Take 1 Tab by mouth daily. 30 Tab 5    red yeast rice extract 600 mg cap Take 600 mg by mouth as needed.  clopidogrel (PLAVIX) 75 mg tab Take 1 Tab by mouth daily. 90 Tab 1    multivitamin (ONE A DAY) tablet Take 1 Tab by mouth daily.  Omega-3-DHA-EPA-Fish Oil 1,000 mg (120 mg-180 mg) cap Take 1 Cap by mouth as needed.  Calcium-Cholecalciferol, D3, 600 mg(1,500mg) -400 unit cap Take 1 Tab by mouth daily. Objective:     Visit Vitals    /70 (BP 1 Location: Left arm, BP Patient Position: Sitting)    Pulse 76    Resp 18    Ht 4' 11\" (1.499 m)    Wt 101 lb (45.8 kg)    SpO2 97%    BMI 20.4 kg/m2       HEENT Exam:     Normocephalic, atraumatic. EOMI. Oropharynx negative. Neck supple. No lymphadenopathy. Lung Exam:     The patient is not dyspneic. There is no cough. The lungs are clear to percussion. Breath sounds are heard equally in all lung fields. There are no wheezes, rales, rhonchi, or rubs heard on auscultation. Heart Exam:     The rhythm is regular. The PMI is in the 5th intercostal space of the MCL. Apical impulse is normal. S1 is regular. S2 is physiologic. There is no S3, S4 gallop, murmur, click, or rub. Abdomen Exam:     Bowel sounds are normoactive. Abdomen benign. Extremities Exam:     The extremities are atraumatic appearing. There is no clubbing, cyanosis, edema, ulcers, varicose veins, rash, swelling, erythemia noted in the extremities. The neurovascular status is grossly intact with normal distal sensation and pulses. Vascular Exam:     The radial, brachial, dorsalis pedis, posterior tibial, are equal and strong bilaterally The carotids are equal bilaterally without bruits. EKG: Sofia Clay Assessment/Plan:     Ms. Bird Salas appears stable from a cardiac standpoint. We are going to stay the course with her current medication regimen, and she is going to follow up with me in one year's time. We discussed diet and exercise. Plan:  1. Continue outpatient medication regimen. 2. Follow up with me in one year's time. 3. Diet and exercise. 4. Call my office, call her primary care physician, or return to the hospital should any concerning symptomatology arise. Ms. Bird Salas indicated that she understood this plan and wished to proceed ahead.              Patient Care Team:  Abbie Chaney MD as PCP - General Erika Carlos RN as Abdoulaye Tracy NP (Nurse Practitioner)

## 2017-08-21 NOTE — MR AVS SNAPSHOT
Visit Information Date & Time Provider Department Dept. Phone Encounter #  
 8/21/2017  3:40 PM Cherylene Hails, MD [facility] [place] [phone] [id number] Your Appointments 8/27/2018  1:00 PM  
ESTABLISHED PATIENT with Cherylene Hails, MD  
[facility] ([facility] SCH[facility]UL[facility]) Appt Note: ANNUAL  
 [address] [facility] [place] [address] 1007 [facility]  
[address] [facility] [place] [address] [address] Upcoming Health Maintenance Date Due ZOSTER VACCINE AGE 60> 9/7/1998 GLAUCOMA SCREENING Q2Y 11/7/2003 Pneumococcal 65+ Low/Medium Risk (2 of 2 - PPSV23) 12/21/2016 INFLUENZA AGE 9 TO ADULT 8/1/2017 MEDICARE YEARLY EXAM 5/13/2018 DTaP/Tdap/Td series (2 - Td) 6/22/2024 Allergies as of 8/21/2017  Review Complete On: 8/21/2017 By: Cherylene Hails, MD  
  
 Severity Noted Reaction Type Reactions Benadryl Allergy/cold [Diphenhyd-pe-acetaminophen]  06/11/2013    Hives Penicillins  06/11/2013    Hives Was treated with Augmentin in June 2015, tolerated medication well. Current Immunizations  Never Reviewed Name Date  
 TD Vaccine 12/21/2011 Tdap 6/22/2014 ZZZ-RETIRED (DO NOT USE) Pneumococcal Vaccine (Unspecified Type) 12/21/2011 Not reviewed this visit Vitals BP Pulse Resp Height(growth percentile) Weight(growth percentile) SpO2  
 118/70 (BP 1 Location: Left arm, BP Patient Position: Sitting) 76 18 4' 11\" (1.499 m) 101 lb (45.8 kg) 97% BMI OB Status Smoking Status 20.4 kg/m2 Postmenopausal Former Smoker Vitals History BMI and BSA Data Body Mass Index Body Surface Area  
 20.4 kg/m 2 1.38 m 2 Preferred Pharmacy Pharmacy Name Phone [facility] [address], [address] [facility] [address] [phone] Your Updated Medication List  
  
   
 This list is accurate as of: 8/21/17  4:27 PM.  Always use your most recent med list.  
  
  
  
  
 atorvastatin 40 mg tablet Commonly known as:  LIPITOR Take 1 Tab by mouth daily. Calcium-Cholecalciferol (D3) 600 mg(1,500mg) -400 unit Cap Take 1 Tab by mouth daily. carvedilol 3.125 mg tablet Commonly known as:  Maybelle Pallas Take 1 Tab by mouth two (2) times daily (with meals). clopidogrel 75 mg Tab Commonly known as:  PLAVIX Take 1 Tab by mouth daily. lisinopril 10 mg tablet Commonly known as:  Mechele Livings Take 1 Tab by mouth daily. multivitamin tablet Commonly known as:  ONE A DAY Take 1 Tab by mouth daily. Omega-3-DHA-EPA-Fish Oil 1,000 mg (120 mg-180 mg) Cap Take 1 Cap by mouth as needed. red yeast rice extract 600 mg Cap Take 600 mg by mouth as needed. Introducing [facility]! Dear Jonathon: Thank you for requesting a Builk account. Our records indicate that you already have an active Builk account. You can access your account anytime at [email or web address] Mining Did you know that you can access your hospital and ER discharge instructions at any time in Builk? You can also review all of your test results from your hospital stay or ER visit. Additional Information If you have questions, please visit the Frequently Asked Questions section of the Builk website at [email or web address]/. Remember, Builk is NOT to be used for urgent needs. For medical emergencies, dial 911. Now available from your iPhone and Android! Please provide this summary of care documentation to your next provider. Your primary care clinician is listed as Candi Brooks. If you have any questions after today's visit, please call [phone].

## 2017-08-21 NOTE — PATIENT INSTRUCTIONS
[email or web address] Activation    Thank you for requesting access to [employer] Please follow the instructions below to securely access and download your online medical record. [employer] allows you to send messages to your doctor, view your test results, renew your prescriptions, schedule appointments, and more. How Do I Sign Up? 1. In your internet browser, go to [email or web address]  2. Click on the First Time User? Click Here link in the Sign In box. You will be redirect to the New Member Sign Up page. 3. Enter your [employer] Access Code exactly as it appears below. You will not need to use this code after youve completed the sign-up process. If you do not sign up before the expiration date, you must request a new code. [employer] Access Code: Activation code not generated  Current [employer] Status: Active (This is the date your [employer] access code will )    4. Enter the last four digits of your Social Security Number (xxxx) and Date of Birth (mm/dd/yyyy) as indicated and click Submit. You will be taken to the next sign-up page. 5. Create a [employer] ID. This will be your [employer] login ID and cannot be changed, so think of one that is secure and easy to remember. 6. Create a [employer] password. You can change your password at any time. 7. Enter your Password Reset Question and Answer. This can be used at a later time if you forget your password. 8. Enter your e-mail address. You will receive e-mail notification when new information is available in [address] 9. Click Sign Up. You can now view and download portions of your medical record. 10. Click the Download Summary menu link to download a portable copy of your medical information. Additional Information    If you have questions, please visit the Frequently Asked Questions section of the [employer] website at [email or web address] Remember, [employer] is NOT to be used for urgent needs. For medical emergencies, dial 911.            High Blood Pressure: Care Instructions  Your Care Instructions  If your blood pressure is usually above 140/90, you have high blood pressure, or hypertension. That means the top number is 140 or higher or the bottom number is 90 or higher, or both. Despite what a lot of people think, high blood pressure usually doesn't cause headaches or make you feel dizzy or lightheaded. It usually has no symptoms. But it does increase your risk for heart attack, stroke, and kidney or eye damage. The higher your blood pressure, the more your risk increases. Your doctor will give you a goal for your blood pressure. Your goal will be based on your health and your age. An example of a goal is to keep your blood pressure below 140/90. Lifestyle changes, such as eating healthy and being active, are always important to help lower blood pressure. You might also take medicine to reach your blood pressure goal.  Follow-up care is a key part of your treatment and safety. Be sure to make and go to all appointments, and call your doctor if you are having problems. It's also a good idea to know your test results and keep a list of the medicines you take. How can you care for yourself at home? Medical treatment  · If you stop taking your medicine, your blood pressure will go back up. You may take one or more types of medicine to lower your blood pressure. Be safe with medicines. Take your medicine exactly as prescribed. Call your doctor if you think you are having a problem with your medicine. · Talk to your doctor before you start taking aspirin every day. Aspirin can help certain people lower their risk of a heart attack or stroke. But taking aspirin isn't right for everyone, because it can cause serious bleeding. · See your doctor regularly. You may need to see the doctor more often at first or until your blood pressure comes down.   · If you are taking blood pressure medicine, talk to your doctor before you take decongestants or anti-inflammatory medicine, such as ibuprofen. Some of these medicines can raise blood pressure. · Learn how to check your blood pressure at home. Lifestyle changes  · Stay at a healthy weight. This is especially important if you put on weight around the waist. Losing even 10 pounds can help you lower your blood pressure. · If your doctor recommends it, get more exercise. Walking is a good choice. Bit by bit, increase the amount you walk every day. Try for at least 30 minutes on most days of the week. You also may want to swim, bike, or do other activities. · Avoid or limit alcohol. Talk to your doctor about whether you can drink any alcohol. · Try to limit how much sodium you eat to less than 2,300 milligrams (mg) a day. Your doctor may ask you to try to eat less than 1,500 mg a day. · Eat plenty of fruits (such as bananas and oranges), vegetables, legumes, whole grains, and low-fat dairy products. · Lower the amount of saturated fat in your diet. Saturated fat is found in animal products such as milk, cheese, and meat. Limiting these foods may help you lose weight and also lower your risk for heart disease. · Do not smoke. Smoking increases your risk for heart attack and stroke. If you need help quitting, talk to your doctor about stop-smoking programs and medicines. These can increase your chances of quitting for good. When should you call for help? Call 911 anytime you think you may need emergency care. This may mean having symptoms that suggest that your blood pressure is causing a serious heart or blood vessel problem. Your blood pressure may be over 180/110. For example, call 911 if:  · You have symptoms of a heart attack. These may include:  ¨ Chest pain or pressure, or a strange feeling in the chest.  ¨ Sweating. ¨ Shortness of breath. ¨ Nausea or vomiting. ¨ Pain, pressure, or a strange feeling in the back, neck, jaw, or upper belly or in one or both shoulders or arms.   ¨ Lightheadedness or sudden weakness. ¨ A fast or irregular heartbeat. · You have symptoms of a stroke. These may include:  ¨ Sudden numbness, tingling, weakness, or loss of movement in your face, arm, or leg, especially on only one side of your body. ¨ Sudden vision changes. ¨ Sudden trouble speaking. ¨ Sudden confusion or trouble understanding simple statements. ¨ Sudden problems with walking or balance. ¨ A sudden, severe headache that is different from past headaches. · You have severe back or belly pain. Do not wait until your blood pressure comes down on its own. Get help right away. Call your doctor now or seek immediate care if:  · Your blood pressure is much higher than normal (such as 180/110 or higher), but you don't have symptoms. · You think high blood pressure is causing symptoms, such as:  ¨ Severe headache. ¨ Blurry vision. Watch closely for changes in your health, and be sure to contact your doctor if:  · Your blood pressure measures 140/90 or higher at least 2 times. That means the top number is 140 or higher or the bottom number is 90 or higher, or both. · You think you may be having side effects from your blood pressure medicine. · Your blood pressure is usually normal, but it goes above normal at least 2 times. Where can you learn more? Go to http://isaura-giovanna.info/. Enter P082 in the search box to learn more about \"High Blood Pressure: Care Instructions. \"  Current as of: August 8, 2016  Content Version: 11.3  © 9726-5149 [employer]. Care instructions adapted under license by [employer] (which disclaims liability or warranty for this information). If you have questions about a medical condition or this instruction, always ask your healthcare professional. [place] [address] any warranty or liability for your use of this information.

## 2017-11-03 DIAGNOSIS — I63.9 ACUTE CVA (CEREBROVASCULAR ACCIDENT) (HCC): ICD-10-CM

## 2017-11-06 RX ORDER — ATORVASTATIN CALCIUM 40 MG/1
40 TABLET, FILM COATED ORAL DAILY
Qty: 90 TAB | Refills: 1 | Status: SHIPPED | OUTPATIENT
Start: 2017-11-06 | End: 2018-10-23 | Stop reason: SDUPTHER

## 2017-11-07 DIAGNOSIS — I63.9 ACUTE CVA (CEREBROVASCULAR ACCIDENT) (HCC): ICD-10-CM

## 2017-11-24 ENCOUNTER — OFFICE VISIT (OUTPATIENT)
Dept: FAMILY MEDICINE CLINIC | Age: 77
End: 2017-11-24

## 2017-11-24 VITALS
RESPIRATION RATE: 16 BRPM | HEIGHT: 59 IN | TEMPERATURE: 96.7 F | WEIGHT: 105 LBS | OXYGEN SATURATION: 95 % | HEART RATE: 85 BPM | SYSTOLIC BLOOD PRESSURE: 172 MMHG | DIASTOLIC BLOOD PRESSURE: 77 MMHG | BODY MASS INDEX: 21.17 KG/M2

## 2017-11-24 DIAGNOSIS — I10 ESSENTIAL HYPERTENSION: Primary | ICD-10-CM

## 2017-11-24 DIAGNOSIS — Z86.73 HISTORY OF CVA (CEREBROVASCULAR ACCIDENT): Chronic | ICD-10-CM

## 2017-11-24 DIAGNOSIS — E78.5 HYPERLIPIDEMIA LDL GOAL <70: ICD-10-CM

## 2017-11-24 RX ORDER — LISINOPRIL 20 MG/1
20 TABLET ORAL DAILY
Qty: 90 TAB | Refills: 1 | Status: SHIPPED | OUTPATIENT
Start: 2017-11-24 | End: 2018-09-07 | Stop reason: SDUPTHER

## 2017-11-24 NOTE — PATIENT INSTRUCTIONS

## 2017-11-24 NOTE — PROGRESS NOTES
Chief Complaint   Patient presents with    Hypertension     1. Have you been to the ER, urgent care clinic since your last visit? Hospitalized since your last visit? No    2. Have you seen or consulted any other health care providers outside of the [address] [place] [address] since your last visit? Include any pap smears or colon screening.  No

## 2017-11-24 NOTE — MR AVS SNAPSHOT
Visit Information Date & Time Provider Department Dept. Phone Encounter #  
 11/24/2017  1:20 PM Leti Russo MD [address] [facility] [phone] [id number] Follow-up Instructions Return in about 4 weeks (around 12/22/2017) for Blood pressure check. Your Appointments 8/27/2018  1:00 PM  
ESTABLISHED PATIENT with Kym Riley MD  
[facility] ([facility]) Appt Note: ANNUAL  
 [address] [facility] [address] [facility]  
[address] [facility] [address] [address] Upcoming Health Maintenance Date Due ZOSTER VACCINE AGE 60> 9/7/1998 GLAUCOMA SCREENING Q2Y 11/7/2003 Pneumococcal 65+ Low/Medium Risk (2 of 2 - PPSV23) 12/21/2016 Influenza Age 5 to Adult 8/1/2017 MEDICARE YEARLY EXAM 5/13/2018 DTaP/Tdap/Td series (2 - Td) 6/22/2024 Allergies as of 11/24/2017  Review Complete On: 11/24/2017 By: Leti Russo MD  
  
 Severity Noted Reaction Type Reactions Benadryl Allergy/cold [Diphenhyd-pe-acetaminophen]  06/11/2013    Hives Penicillins  06/11/2013    Hives Was treated with Augmentin in June 2015, tolerated medication well. Current Immunizations  Never Reviewed Name Date  
 TD Vaccine 12/21/2011 Tdap 6/22/2014 ZZZ-RETIRED (DO NOT USE) Pneumococcal Vaccine (Unspecified Type) 12/21/2011 Not reviewed this visit You Were Diagnosed With   
  
 Codes Comments Essential hypertension    -  Primary ICD-10-CM: I10 
ICD-9-CM: 401.9 Hyperlipidemia LDL goal <70     ICD-10-CM: E78.5 ICD-9-CM: 272.4 History of CVA (cerebrovascular accident)     ICD-10-CM: Z86.73 
ICD-9-CM: V12.54 Vitals BP Pulse Temp Resp Height(growth percentile) Weight(growth percentile) 173/78 85 96.7 °F (35.9 °C) (Oral) 16 4' 11\" (1.499 m) 105 lb (47.6 kg) SpO2 BMI OB Status Smoking Status 95% 21.21 kg/m2 Postmenopausal Former Smoker BMI and BSA Data Body Mass Index Body Surface Area  
 21.21 kg/m 2 1.41 m 2 Preferred Pharmacy Pharmacy Name Phone [facility] [address], [address] [phone] Your Updated Medication List  
  
   
This list is accurate as of: 11/24/17  2:00 PM.  Always use your most recent med list.  
  
  
  
  
 atorvastatin 40 mg tablet Commonly known as:  LIPITOR Take 1 Tab by mouth daily. Calcium-Cholecalciferol (D3) 600 mg(1,500mg) -400 unit Cap Take 1 Tab by mouth daily. carvedilol 3.125 mg tablet Commonly known as:  Macel Robbi Take 1 Tab by mouth two (2) times daily (with meals). clopidogrel 75 mg Tab Commonly known as:  PLAVIX Take 1 Tab by mouth daily. lisinopril 20 mg tablet Commonly known as:  Zaynab Needy Take 1 Tab by mouth daily. multivitamin tablet Commonly known as:  ONE A DAY Take 1 Tab by mouth daily. Omega-3-DHA-EPA-Fish Oil 1,000 mg (120 mg-180 mg) Cap Take 1 Cap by mouth as needed. red yeast rice extract 600 mg Cap Take 600 mg by mouth as needed. Prescriptions Sent to Pharmacy Refills  
 lisinopril (PRINIVIL, ZESTRIL) 20 mg tablet 1 Sig: Take 1 Tab by mouth daily. Class: Normal  
 Pharmacy: [facility] [address], [address] Ph #: [phone] Route: Oral  
  
Follow-up Instructions Return in about 4 weeks (around 12/22/2017) for Blood pressure check. Patient Instructions DASH Diet: Care Instructions Your Care Instructions The DASH diet is an eating plan that can help lower your blood pressure. DASH stands for Dietary Approaches to Stop Hypertension. Hypertension is high blood pressure. The DASH diet focuses on eating foods that are high in calcium, potassium, and magnesium. These nutrients can lower blood pressure.  The foods that are highest in these nutrients are fruits, vegetables, low-fat dairy products, nuts, seeds, and legumes. But taking calcium, potassium, and magnesium supplements instead of eating foods that are high in those nutrients does not have the same effect. The DASH diet also includes whole grains, fish, and poultry. The DASH diet is one of several lifestyle changes your doctor may recommend to lower your high blood pressure. Your doctor may also want you to decrease the amount of sodium in your diet. Lowering sodium while following the DASH diet can lower blood pressure even further than just the DASH diet alone. Follow-up care is a key part of your treatment and safety. Be sure to make and go to all appointments, and call your doctor if you are having problems. It's also a good idea to know your test results and keep a list of the medicines you take. How can you care for yourself at home? Following the DASH diet · Eat 4 to 5 servings of fruit each day. A serving is 1 medium-sized piece of fruit, ½ cup chopped or canned fruit, 1/4 cup dried fruit, or 4 ounces (½ cup) of fruit juice. Choose fruit more often than fruit juice. · Eat 4 to 5 servings of vegetables each day. A serving is 1 cup of lettuce or raw leafy vegetables, ½ cup of chopped or cooked vegetables, or 4 ounces (½ cup) of vegetable juice. Choose vegetables more often than vegetable juice. · Get 2 to 3 servings of low-fat and fat-free dairy each day. A serving is 8 ounces of milk, 1 cup of yogurt, or 1 ½ ounces of cheese. · Eat 6 to 8 servings of grains each day. A serving is 1 slice of bread, 1 ounce of dry cereal, or ½ cup of cooked rice, pasta, or cooked cereal. Try to choose whole-grain products as much as possible. · Limit lean meat, poultry, and fish to 2 servings each day. A serving is 3 ounces, about the size of a deck of cards.  
· Eat 4 to 5 servings of nuts, seeds, and legumes (cooked dried beans, lentils, and split peas) each week. A serving is 1/3 cup of nuts, 2 tablespoons of seeds, or ½ cup of cooked beans or peas. · Limit fats and oils to 2 to 3 servings each day. A serving is 1 teaspoon of vegetable oil or 2 tablespoons of salad dressing. · Limit sweets and added sugars to 5 servings or less a week. A serving is 1 tablespoon jelly or jam, ½ cup sorbet, or 1 cup of lemonade. · Eat less than 2,300 milligrams (mg) of sodium a day. If you limit your sodium to 1,500 mg a day, you can lower your blood pressure even more. Tips for success · Start small. Do not try to make dramatic changes to your diet all at once. You might feel that you are missing out on your favorite foods and then be more likely to not follow the plan. Make small changes, and stick with them. Once those changes become habit, add a few more changes. · Try some of the following: ¨ Make it a goal to eat a fruit or vegetable at every meal and at snacks. This will make it easy to get the recommended amount of fruits and vegetables each day. ¨ Try yogurt topped with fruit and nuts for a snack or healthy dessert. ¨ Add lettuce, tomato, cucumber, and onion to sandwiches. ¨ Combine a ready-made pizza crust with low-fat mozzarella cheese and lots of vegetable toppings. Try using tomatoes, squash, spinach, broccoli, carrots, cauliflower, and onions. ¨ Have a variety of cut-up vegetables with a low-fat dip as an appetizer instead of chips and dip. ¨ Sprinkle sunflower seeds or chopped almonds over salads. Or try adding chopped walnuts or almonds to cooked vegetables. ¨ Try some vegetarian meals using beans and peas. Add garbanzo or kidney beans to salads. Make burritos and tacos with mashed kilgore beans or black beans. Where can you learn more? Go to http://isaura-giovanna.info/. Enter C252 in the search box to learn more about \"DASH Diet: Care Instructions. \" Current as of: September 21, 2016 Content Version: 11.4 © 9872-3827 [employer]. Care instructions adapted under license by [employer] (which disclaims liability or warranty for this information). If you have questions about a medical condition or this instruction, always ask your healthcare professional. [address] any warranty or liability for your use of this information. Introducing [facility]! Dear Zeeshan Talbert: Thank you for requesting a MyClean account. Our records indicate that you already have an active MyClean account. You can access your account anytime at [email or web address] Did you know that you can access your hospital and ER discharge instructions at any time in MyClean? You can also review all of your test results from your hospital stay or ER visit. Additional Information If you have questions, please visit the Frequently Asked Questions section of the MyClean website at [email or web address] Remember, MyClean is NOT to be used for urgent needs. For medical emergencies, dial 911. Now available from your iPhone and Android! Please provide this summary of care documentation to your next provider. Your primary care clinician is listed as Senthil January. If you have any questions after today's visit, please call [phone].

## 2017-11-24 NOTE — PROGRESS NOTES
Chief Complaint:  Chief Complaint   Patient presents with    Hypertension     HPI  Laurel Villavicencio is a 76 y.o. female who presents for f/u of hypertension. HTN: States she never took lisinopril because she wasn't sure it is safe. She has the prescription. She is taking to coreg 3.125 mg bid per Cards since August. She does not check BP at home. No vision changes, dizziness, chest pain. H/o CVA: Taking lipitor and plavix. Has no upcoming appts with Neuro. States she was discharged from regular appts. SH:  Lives with daughter  Daughter is engaged  No tobacco or drug use  Small amount of port wine 1-2x/day    ROS:   No fevers  No chest pain  No SOB  No leg swelling    Allergies: Allergies   Allergen Reactions    Benadryl Allergy/Cold [Diphenhyd-Pe-Acetaminophen] Hives    Penicillins Hives     Was treated with Augmentin in June 2015, tolerated medication well. Meds:   Current Outpatient Prescriptions   Medication Sig Dispense Refill    lisinopril (PRINIVIL, ZESTRIL) 20 mg tablet Take 1 Tab by mouth daily. 90 Tab 1    atorvastatin (LIPITOR) 40 mg tablet Take 1 Tab by mouth daily. 90 Tab 1    red yeast rice extract 600 mg cap Take 600 mg by mouth as needed.  clopidogrel (PLAVIX) 75 mg tab Take 1 Tab by mouth daily. 90 Tab 1    carvedilol (COREG) 3.125 mg tablet Take 1 Tab by mouth two (2) times daily (with meals). 60 Tab 2    multivitamin (ONE A DAY) tablet Take 1 Tab by mouth daily.  Omega-3-DHA-EPA-Fish Oil 1,000 mg (120 mg-180 mg) cap Take 1 Cap by mouth as needed.  Calcium-Cholecalciferol, D3, 600 mg(1,500mg) -400 unit cap Take 1 Tab by mouth daily.          PMH:  Past Medical History:   Diagnosis Date    Acute CVA (cerebrovascular accident) ([facility] [address] 4/10/2017    Per MRI:  Posterior L frontal/parietal territory    Cerebral atrophy 4/10/2017    History of CVA (cerebrovascular accident) 4/10/2017    --L frontal acute infarct and LMCA    Hyperlipidemia LDL goal <70 4/10/2017  Hypertension     TIA (transient ischemic attack)     Unresponsive episode 8/3/2017    transient       FH:   Family History   Problem Relation Age of Onset    Dementia Mother 61    Heart Attack Father 79       Physical Exam:  Visit Vitals    /77    Pulse 85    Temp 96.7 °F (35.9 °C) (Oral)    Resp 16    Ht 4' 11\" (1.499 m)    Wt 105 lb (47.6 kg)    SpO2 95%    BMI 21.21 kg/m2     Gen: No apparent distress. Pleasant. HEENT: Normocephalic, pupils equally round and reactive, extraocular eye movements intact, hearing grossly normal bilaterally, nose normal, mucous membranes moist  Neck: Supple, no lymph nodes, masses, or thyromegaly appreciated  Lungs: Respirations unlabored, clear to auscultation bilaterally  Cardio: Regular rate and rhythm without murmurs, rubs, or gallops   Abdomen: Normoactive bowel sounds, soft, nontender, nondistended  Ext: No peripheral edema, erythema, or tenderness  Skin: Warm and dry  Neuro: Alert and responds to all questions appropriately. CN 2-12, sensation, strength, gait grossly intact. Psych: Makes good eye contact. Appearance, behavior, and conversation appropriate with normal speech rate, fluency, content. Assessment and Plan:     Encounter Diagnoses:    ICD-10-CM ICD-9-CM    1. Essential hypertension I10 401.9    2. Hyperlipidemia LDL goal <70 E78.5 272.4    3. History of CVA (cerebrovascular accident) Z86.73 V12.54      1. BP not at goal, especially given h/o CVA. Resume lisinopril at 20 mg/d (reviewed indications, safety, possible SEs). Continue coreg. Rtc 2-4 weeks for blood pressure recheck. Encouraged checking BP. Encouraged low-salt, heart healthy diet and exercise. 2. Continue statin. .    3. Continue plavix and statin. Pt declined flu shot. Discussed diagnoses in detail with patient. Patient expressed understanding of and agreement to above plan. All questions and concerns addressed.      Patient discussed and seen with Dr. Nohemy Lutz, Attending Physician.     Alexandra Dooley MD  Family Medicine Resident, PGY-3

## 2018-02-22 ENCOUNTER — OFFICE VISIT (OUTPATIENT)
Dept: FAMILY MEDICINE CLINIC | Age: 78
End: 2018-02-22

## 2018-02-22 VITALS
BODY MASS INDEX: 21.57 KG/M2 | DIASTOLIC BLOOD PRESSURE: 57 MMHG | RESPIRATION RATE: 16 BRPM | WEIGHT: 107 LBS | SYSTOLIC BLOOD PRESSURE: 136 MMHG | TEMPERATURE: 98.7 F | OXYGEN SATURATION: 97 % | HEART RATE: 83 BPM | HEIGHT: 59 IN

## 2018-02-22 DIAGNOSIS — Z86.73 HISTORY OF CVA (CEREBROVASCULAR ACCIDENT): ICD-10-CM

## 2018-02-22 DIAGNOSIS — Z91.81 RISK FOR FALLS: ICD-10-CM
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
## 2018-02-22 DIAGNOSIS — Z91.89 DRIVING SAFETY ISSUE: ICD-10-CM

## 2018-02-22 NOTE — PATIENT INSTRUCTIONS
Take tylenol 500 mg. DO not take more than 4000 mg per day    DMV for form to assess risk of driving    Avoid Asparin    Physical therapy    If pain not better by next week then need reassessment. Acetaminophen (By mouth)   Acetaminophen (z-opal-t-MIN-oh-fen)  Treats minor aches and pain and reduces fever. Brand Name(s): 8 Hour Pain Relief, Acetaminophen Children's, Acetaminophen Infant's, Arthritis Pain Formula, Arthritis Pain Relief, Cetafen, Cetafen Extra, Children's Acetaminophen, Children's Dye Free Pain and Fever, Children's Mapap, Children's Pain & Fever, Children's Pain Relief, Children's Pain Reliever, Children's Tylenol, Comtrex Sore Throat Relief   There may be other brand names for this medicine. When This Medicine Should Not Be Used: This medicine is not right for everyone. Do not use it if you had an allergic reaction to acetaminophen. How to Use This Medicine:   Capsule, Liquid Filled Capsule, Liquid, Powder, Tablet, Chewable Tablet, Dissolving Tablet, Fizzy Tablet, Long Acting Tablet  · Your doctor will tell you how much medicine to use. Do not use more than directed. · If you are taking this medicine without the advice of your doctor, carefully read and follow the Drug Facts label and dosing instructions on the medicine package. Ask your doctor or pharmacist if you are not sure how to use this medicine. · Do not take this medicine for more than 10 days in a row, unless directed by your doctor. · The chewable tablet should be chewed or crushed before you swallow it. · Oral liquids: Measure the oral liquid medicine with a marked measuring spoon, oral syringe, or medicine cup. Do not use a spoon, syringe, or cup that came with a different medicine. · Oral liquid (with syringe): ¨ Shake the bottle well before each use. ¨ Remove the cap. Attach the syringe to the flow restrictor. Turn the bottle upside down. ¨ Pull back the syringe plunger until it is filled with the correct dose.  Ask your doctor or pharmacist if you are not sure how much medicine to use. ¨ Slowly give the medicine into your child's mouth. Point the syringe so the medicine goes toward the inner cheek. · Oral liquid (with dropper): ¨ Shake the bottle well before each use. ¨ Remove the cap. Insert the dropper into the bottle. Withdraw the correct dose. Ask your doctor or pharmacist if you are not sure how much medicine to use. ¨ Slowly give the medicine into your child's mouth. Point the dropper so the medicine goes toward the inner cheek. · Extended-release tablet: Swallow the extended-release tablet whole. Do not crush, break, or chew it. Take this medicine with a full glass of water. · Use only the brand of medicine your doctor prescribed. Other brands may not work the same way. · Missed dose: Take a dose as soon as you remember. If it is almost time for your next dose, wait until then and take a regular dose. Do not take extra medicine to make up for a missed dose. · Store the medicine in a closed container at room temperature, away from heat, moisture, and direct light. Drugs and Foods to Avoid:   Ask your doctor or pharmacist before using any other medicine, including over-the-counter medicines, vitamins, and herbal products. · Some medicines and foods can affect how acetaminophen works. Tell your doctor if you are taking a blood thinner, such as warfarin. · Do not drink alcohol while you are using this medicine. Acetaminophen can damage your liver, and alcohol can increase this risk. Do not take acetaminophen without asking your doctor if you have 3 or more drinks of alcohol every day. Warnings While Using This Medicine:   · Tell your doctor if you are pregnant or breastfeeding, or if you have kidney or liver disease. Tell your doctor if you have phenylketonuria (PKU). Some brands of acetaminophen contain aspartame, which can make PKU worse. · This medicine contains acetaminophen.  Read the labels of all other medicines you are using to see if they also contain acetaminophen, or ask your doctor or pharmacist. Do not use more than 4 grams (4,000 milligrams) total of acetaminophen in one day. For Tylenol® Extra Strength, it is not safe to take more than 3 grams (3,000 milligrams) in 1 day. · Call your doctor if your symptoms do not improve or if they get worse. · Tell any doctor or dentist who treats you that you are using this medicine. This medicine may affect certain medical test results. · Keep all medicine out of the reach of children. Never share your medicine with anyone. Possible Side Effects While Using This Medicine:   Call your doctor right away if you notice any of these side effects:  · Allergic reaction: Itching or hives, swelling in your face or hands, swelling or tingling in your mouth or throat, chest tightness, trouble breathing  · Bloody or black, tarry stools  · Dark urine or pale stools, nausea, vomiting, loss of appetite, severe stomach pain, yellow skin or eyes  · Fever or a sore throat that lasts longer than 3 days, or pain that lasts longer than 5 days  · Lightheadedness, fainting, sweating, or weakness  · Unusual bleeding or bruising  · Vomiting blood or material that looks like coffee grounds  If you notice other side effects that you think are caused by this medicine, tell your doctor. Call your doctor for medical advice about side effects. You may report side effects to FDA at 0-411-FDA-3156  © 2017 [facility] Information is for End User's use only and may not be sold, redistributed or otherwise used for commercial purposes. The above information is an  only. It is not intended as medical advice for individual conditions or treatments. Talk to your doctor, nurse or pharmacist before following any medical regimen to see if it is safe and effective for you.        Hip Pain: Care Instructions  Your Care Instructions    Hip pain may be caused by many things, including overuse, a fall, or a twisting movement. Another cause of hip pain is arthritis. Your pain may increase when you stand up, walk, or squat. The pain may come and go or may be constant. Home treatment can help relieve hip pain, swelling, and stiffness. If your pain is ongoing, you may need more tests and treatment. Follow-up care is a key part of your treatment and safety. Be sure to make and go to all appointments, and call your doctor if you are having problems. It's also a good idea to know your test results and keep a list of the medicines you take. How can you care for yourself at home? · Take pain medicines exactly as directed. ¨ If the doctor gave you a prescription medicine for pain, take it as prescribed. ¨ If you are not taking a prescription pain medicine, ask your doctor if you can take an over-the-counter medicine. · Rest and protect your hip. Take a break from any activity, including standing or walking, that may cause pain. · Put ice or a cold pack against your hip for 10 to 20 minutes at a time. Try to do this every 1 to 2 hours for the next 3 days (when you are awake) or until the swelling goes down. Put a thin cloth between the ice and your skin. · Sleep on your healthy side with a pillow between your knees, or sleep on your back with pillows under your knees. · If there is no swelling, you can put moist heat, a heating pad, or a warm cloth on your hip. Do gentle stretching exercises to help keep your hip flexible. · Learn how to prevent falls. Have your vision and hearing checked regularly. Wear slippers or shoes with a nonskid sole. · Stay at a healthy weight. · Wear comfortable shoes. When should you call for help? Call 911 anytime you think you may need emergency care. For example, call if:  ? · You have sudden chest pain and shortness of breath, or you cough up blood. ? · You are not able to stand or walk or bear weight.    ? · Your buttocks, legs, or feet feel numb or tingly. ? · Your leg or foot is cool or pale or changes color. ? · You have severe pain. ?Call your doctor now or seek immediate medical care if:  ? · You have signs of infection, such as:  ¨ Increased pain, swelling, warmth, or redness in the hip area. ¨ Red streaks leading from the hip area. ¨ Pus draining from the hip area. ¨ A fever. ? · You have signs of a blood clot, such as:  ¨ Pain in your calf, back of the knee, thigh, or groin. ¨ Redness and swelling in your leg or groin. ? · You are not able to bend, straighten, or move your leg normally. ? · You have trouble urinating or having bowel movements. ? Watch closely for changes in your health, and be sure to contact your doctor if:  ? · You do not get better as expected. Where can you learn more? Go to http://isaura-giovanna.info/. Enter M197 in the search box to learn more about \"Hip Pain: Care Instructions. \"  Current as of: March 20, 2017  Content Version: 11.4  © 9184-8212 [employer]. Care instructions adapted under license by [employer] (which disclaims liability or warranty for this information). If you have questions about a medical condition or this instruction, always ask your healthcare professional. [address] any warranty or liability for your use of this information. Preventing Falls: Care Instructions  Your Care Instructions    Getting around your home safely can be a challenge if you have injuries or health problems that make it easy for you to fall. Loose rugs and furniture in walkways are among the dangers for many older people who have problems walking or who have poor eyesight. People who have conditions such as arthritis, osteoporosis, or dementia also have to be careful not to fall. You can make your home safer with a few simple measures. Follow-up care is a key part of your treatment and safety.  Be sure to make and go to all appointments, and call your doctor if you are having problems. It's also a good idea to know your test results and keep a list of the medicines you take. How can you care for yourself at home? Taking care of yourself  · You may get dizzy if you do not drink enough water. To prevent dehydration, drink plenty of fluids, enough so that your urine is light yellow or clear like water. Choose water and other caffeine-free clear liquids. If you have kidney, heart, or liver disease and have to limit fluids, talk with your doctor before you increase the amount of fluids you drink. · Exercise regularly to improve your strength, muscle tone, and balance. Walk if you can. Swimming may be a good choice if you cannot walk easily. · Have your vision and hearing checked each year or any time you notice a change. If you have trouble seeing and hearing, you might not be able to avoid objects and could lose your balance. · Know the side effects of the medicines you take. Ask your doctor or pharmacist whether the medicines you take can affect your balance. Sleeping pills or sedatives can affect your balance. · Limit the amount of alcohol you drink. Alcohol can impair your balance and other senses. · Ask your doctor whether calluses or corns on your feet need to be removed. If you wear loose-fitting shoes because of calluses or corns, you can lose your balance and fall. · Talk to your doctor if you have numbness in your feet. Preventing falls at home  · Remove raised doorway thresholds, throw rugs, and clutter. Repair loose carpet or raised areas in the floor. · Move furniture and electrical cords to keep them out of walking paths. · Use nonskid floor wax, and wipe up spills right away, especially on ceramic tile floors. · If you use a walker or cane, put rubber tips on it. If you use crutches, clean the bottoms of them regularly with an abrasive pad, such as steel wool.   · Keep your house well lit, especially Edmundo Michael, and outside walkways. Use night-lights in areas such as hallways and bathrooms. Add extra light switches or use remote switches (such as switches that go on or off when you clap your hands) to make it easier to turn lights on if you have to get up during the night. · Install sturdy handrails on stairways. · Move items in your cabinets so that the things you use a lot are on the lower shelves (about waist level). · Keep a cordless phone and a flashlight with new batteries by your bed. If possible, put a phone in each of the main rooms of your house, or carry a cell phone in case you fall and cannot reach a phone. Or, you can wear a device around your neck or wrist. You push a button that sends a signal for help. · Wear low-heeled shoes that fit well and give your feet good support. Use footwear with nonskid soles. Check the heels and soles of your shoes for wear. Repair or replace worn heels or soles. · Do not wear socks without shoes on wood floors. · Walk on the grass when the sidewalks are slippery. If you live in an area that gets snow and ice in the winter, sprinkle salt on slippery steps and sidewalks. Preventing falls in the bath  · Install grab bars and nonskid mats inside and outside your shower or tub and near the toilet and sinks. · Use shower chairs and bath benches. · Use a hand-held shower head that will allow you to sit while showering. · Get into a tub or shower by putting the weaker leg in first. Get out of a tub or shower with your strong side first.  · Repair loose toilet seats and consider installing a raised toilet seat to make getting on and off the toilet easier. · Keep your bathroom door unlocked while you are in the shower. Where can you learn more? Go to http://isaura-giovanna.info/. Enter [id number] [id number] in the search box to learn more about \"Preventing Falls: Care Instructions. \"  Current as of: May 12, 2017  Content Version: 11.4  © 4926-7108 [employer], [facility].  Care instructions adapted under license by [employer] (which disclaims liability or warranty for this information). If you have questions about a medical condition or this instruction, always ask your healthcare professional. [address] any warranty or liability for your use of this information.

## 2018-02-22 NOTE — PROGRESS NOTES
Janice Velarde is an 76 y.o. female who presents with CC of fall. Pt states that 5 days ago she was going down the stairs in backyard. Saw deer and lost footing. Fell on patio, tailbone made direct contact with wood. Denies hitting head, LOC, or syncope. She was able to get up and walk inside. A few hours later began to have pain over the left hip and tailbone. 5/10 pain when she moves. Was treating at home with rest, and ASA. Got worse. Family insisted that she come for evaluation. Denies loss of sensation, weakness, or control of bladder/bowls after fall. Pt lives with daughter. She does not know the number of her daughter as pt brought house phone rather than cellphone. States that daughter asked pt to let doctor call her. Recent hospitalizations in 2017, pt does not recall reasons or type of care given. Per chart review they were for CVA and synope. Chart review also showed that pt was supposed to have a DEXA scan in 2011 but it was never done. Pt taking Ca and Vit at home    Allergies - reviewed: Allergies   Allergen Reactions    Benadryl Allergy/Cold [Diphenhyd-Pe-Acetaminophen] Hives    Penicillins Hives     Was treated with Augmentin in June 2015, tolerated medication well. Medications - reviewed:   Current Outpatient Prescriptions   Medication Sig    lisinopril (PRINIVIL, ZESTRIL) 20 mg tablet Take 1 Tab by mouth daily.  atorvastatin (LIPITOR) 40 mg tablet Take 1 Tab by mouth daily.  red yeast rice extract 600 mg cap Take 600 mg by mouth as needed.  clopidogrel (PLAVIX) 75 mg tab Take 1 Tab by mouth daily.  carvedilol (COREG) 3.125 mg tablet Take 1 Tab by mouth two (2) times daily (with meals).  multivitamin (ONE A DAY) tablet Take 1 Tab by mouth daily.  Omega-3-DHA-EPA-Fish Oil 1,000 mg (120 mg-180 mg) cap Take 1 Cap by mouth as needed.  Calcium-Cholecalciferol, D3, 600 mg(1,500mg) -400 unit cap Take 1 Tab by mouth daily.      No current facility-administered medications for this visit. Past Medical History - reviewed:  Past Medical History:   Diagnosis Date    Acute CVA (cerebrovascular accident) ([facility] [address] 4/10/2017    Per MRI:  Posterior L frontal/parietal territory    Cerebral atrophy 4/10/2017    History of CVA (cerebrovascular accident) 4/10/2017    --L frontal acute infarct and LMCA    Hyperlipidemia LDL goal <70 4/10/2017    Hypertension     TIA (transient ischemic attack)     Unresponsive episode 8/3/2017    transient         Past Surgical History - reviewed:   Past Surgical History:   Procedure Laterality Date    HX HEENT      Tonsilectomy age 8         Social History - reviewed:  Social History     Social History    Marital status:      Spouse name: N/A    Number of children: N/A    Years of education: N/A     Occupational History    Not on file. Social History Main Topics    Smoking status: Former Smoker     Quit date: 7/7/1999    Smokeless tobacco: Never Used    Alcohol use 10.5 oz/week     21 Glasses of wine per week      Comment: 3 glasses of port per day    Drug use: No    Sexual activity: No     Other Topics Concern    Not on file     Social History Narrative         Family History - reviewed:  Family History   Problem Relation Age of Onset    Dementia Mother 61    Heart Attack Father 79         Immunizations - reviewed:   Immunization History   Administered Date(s) Administered    TD Vaccine 12/21/2011    Tdap 06/22/2014    ZZZ-RETIRED (DO NOT USE) Pneumococcal Vaccine (Unspecified Type) 12/21/2011         ROS  No fevers  No chest pain  No SOB  No leg swelling  No loss of bladder or bowl control      Physical Exam  Visit Vitals    /57 (BP 1 Location: Left arm, BP Patient Position: Sitting)    Pulse 83    Temp 98.7 °F (37.1 °C)    Resp 16    Ht 4' 11\" (1.499 m)    Wt 107 lb (48.5 kg)    SpO2 97%    BMI 21.61 kg/m2       Gen: No apparent distress. Pleasant.    HEENT: Normocephalic, pupils equally round and reactive, extraocular eye movements intact, hearing grossly normal bilaterally, nose normal, mucous membranes moist  Neck: Supple, no lymph nodes, masses, or thyromegaly appreciated  Lungs: Respirations unlabored, clear to auscultation bilaterally  Cardio: Regular rate and rhythm without murmurs, rubs, or gallops   Abdomen: Normoactive bowel sounds, soft, nontender, nondistended  Back: no ecchymosis. No tenderness on palpation on lumbar spine, sacral spine and over area of coccyx. AROM and PROM intact  MSK: Hips without ecchymosis, no tenderness to palpation, AROM and PROM equal b/l. Neg straight leg test. Unable to evaluate gait as pt unable to bare weight on the left LE. Skin: Warm and dry  Neuro: Alert and responds to all questions appropriately. CN 2-12, sensation, strength, gait grossly intact. Psych: Makes good eye contact. Oriented to place time and self. Appearance, behavior, and conversation appropriate with normal speech rate, fluency, however content choice odd at times. Personally reviewed xrays, no signs of acute changes. Assessment/Plan    ICD-10-CM ICD-9-CM    1. Fall, initial encounter Trixie Kingsley. XXXA E888.9 XR PELV AP ONLY      XR SACRUM AND COCCYX      REFERRAL TO PHYSICAL THERAPY   2. Risk for falls Z91.81 V15.88    3. History of CVA (cerebrovascular accident) Z86.73 V12.54    4. Driving safety issue Z91.89 V15.89      Xray today did not show acute fx. Will treat by referring to PT, use tylenol, ICE, and Home exercise program. Avoid ASA for pain control as pt is high fall risk. Precautions given. If pain not better by next week then need reassessment. Pt presented alone to clinic and needed wheelchair to get around clinic. After numerous attempts to get intouch with her daughter, I was finally able to communicate with her. Insisted that she come to clinic to discuss pt's plan.  She agreed and came to clinic.   -With the no acute findings on exam and on imaging, there is a need for PT for recovery, avoid worsening of ROM of joint, and fall prevention.    -Pt to continue taking vit D and Calcium   -There is a concern that pt still driving a car: With hx of CVA, ability to bare weight, poor insight (brought house phone rather than cellphone to clinic, unusual tangents in conversion). -Made daughter aware of this concern, she states that her mother was told by police not to drive at night. I informed daughter that pt should not drive until she has gone to [facility] for reassessment. I also made daughter aware that I sent DMV my concern (faxed form which is to be scanned in system). Daughter agreed and pt was hesitant but stated understanding. Daughter plans to drive pt home and pick-up pt's car later. Pt to follow-up with neurology as she has not seen since discharge from hospital in 8/2017. Follow-up Disposition:  Return in about 1 week (around 3/1/2018), or if symptoms worsen or fail to improve, for for re-eval.      I have discussed the diagnosis with the patient and the intended plan as seen in the above orders. Patient verbalized understanding of the plan and agrees with the plan. The patient has received an after-visit summary and questions were answered concerning future plans. I have discussed medication side effects and warnings with the patient as well. Informed patient to return to the office if new symptoms arise. Attending evaluated pt and agreed with plan.     Haley Dorsey DO  Family Medicine Resident

## 2018-02-22 NOTE — PROGRESS NOTES
Here for pain from a fall 5 days ago    Lives with her daughter    States not able to walk now after the fall    Denies bowel or bladder control    No obvious injury upon physical exam    Oriented to place, time, self (unable to tell date)    Hx CVA, syncope  Takes calcium and vitamin D  DEXA scan 2011 ordered but never done    Will obtain xrays    I saw and evaluated the patient, performing the key elements of the service. I discussed the findings, assessment and plan with the resident and agree with the resident's findings and plan as documented in the resident's note.

## 2018-02-22 NOTE — MR AVS SNAPSHOT
[address] [phone] Patient: Karoline Long MRN: [id number] PKN:[id number] Visit Information Date & Time Provider Department Dept. Phone Encounter #  
 2/22/2018  1:30 PM Mariia Cordova, [facility]5 [facility] [phone] [id number] Your Appointments 8/27/2018  1:20 PM  
ESTABLISHED PATIENT with Tana Dawkins MD  
[facility] ([address]) Appt Note: ANNUAL; annual  
 [address] [facility] [place] [address] 1007 [facility]  
[address] [facility] [place] [address] [address] Upcoming Health Maintenance Date Due ZOSTER VACCINE AGE 60> 9/7/1998 GLAUCOMA SCREENING Q2Y 11/7/2003 Pneumococcal 65+ Low/Medium Risk (2 of 2 - PPSV23) 12/21/2016 MEDICARE YEARLY EXAM 5/13/2018 DTaP/Tdap/Td series (2 - Td) 6/22/2024 Allergies as of 2/22/2018  Review Complete On: 2/22/2018 By: Socorro Cavanaugh Severity Noted Reaction Type Reactions Benadryl Allergy/cold [Diphenhyd-pe-acetaminophen]  06/11/2013    Hives Penicillins  06/11/2013    Hives Was treated with Augmentin in June 2015, tolerated medication well. Current Immunizations  Never Reviewed Name Date  
 TD Vaccine 12/21/2011 Tdap 6/22/2014 ZZZ-RETIRED (DO NOT USE) Pneumococcal Vaccine (Unspecified Type) 12/21/2011 Not reviewed this visit You Were Diagnosed With   
  
 Codes Comments Fall, initial encounter    -  Primary ICD-10-CM: W19. Christina Prima ICD-9-CM: E888.9 Vitals BP Pulse Temp Resp Height(growth percentile) Weight(growth percentile) 136/57 (BP 1 Location: Left arm, BP Patient Position: Sitting) 83 98.7 °F (37.1 °C) 16 4' 11\" (1.499 m) 107 lb (48.5 kg) SpO2 BMI OB Status Smoking Status 97% 21.61 kg/m2 Postmenopausal Former Smoker BMI and BSA Data Body Mass Index Body Surface Area 21.61 kg/m 2 1.42 m 2 Preferred Pharmacy Pharmacy Name Phone [facility] [address] [place] [address] Drive, [address] [phone] Your Updated Medication List  
  
   
This list is accurate as of 2/22/18  5:19 PM.  Always use your most recent med list.  
  
  
  
  
 atorvastatin 40 mg tablet Commonly known as:  LIPITOR Take 1 Tab by mouth daily. Calcium-Cholecalciferol (D3) 600 mg(1,500mg) -400 unit Cap Take 1 Tab by mouth daily. carvedilol 3.125 mg tablet Commonly known as:  Unruly Dine Take 1 Tab by mouth two (2) times daily (with meals). clopidogrel 75 mg Tab Commonly known as:  PLAVIX Take 1 Tab by mouth daily. lisinopril 20 mg tablet Commonly known as:  Enrigue Sails Take 1 Tab by mouth daily. multivitamin tablet Commonly known as:  ONE A DAY Take 1 Tab by mouth daily. Omega-3-DHA-EPA-Fish Oil 1,000 mg (120 mg-180 mg) Cap Take 1 Cap by mouth as needed. red yeast rice extract 600 mg Cap Take 600 mg by mouth as needed. We Performed the Following REFERRAL TO PHYSICAL THERAPY [VHF27 Custom] Comments:  
 Please evaluate patient for fall prevention and R hip therapy Pivot To-Do List   
 02/22/2018 Imaging:  XR PELV AP ONLY   
  
 02/22/2018 Imaging:  XR SACRUM AND COCCYX Referral Information Referral ID Referred By Referred To  
  
 [id number] Fadi Dunn Not Available Visits Status Start Date End Date 1 New Request 2/22/18 2/22/19 If your referral has a status of pending review or denied, additional information will be sent to support the outcome of this decision. Patient Instructions Take tylenol 500 mg. DO not take more than 4000 mg per day DMV for form to assess risk of driving Avoid Asparin Physical therapy If pain not better by next week then need reassessment. Acetaminophen (By mouth) Acetaminophen (n-lsqh-i-MIN-oh-fen) Treats minor aches and pain and reduces fever. Brand Name(s): 8 Hour Pain Relief, Acetaminophen Children's, Acetaminophen Infant's, Arthritis Pain Formula, Arthritis Pain Relief, Cetafen, Cetafen Extra, Children's Acetaminophen, Children's Dye Free Pain and Fever, Children's Mapap, Children's Pain & Fever, Children's Pain Relief, Children's Pain Reliever, Children's Tylenol, Comtrex Sore Throat Relief There may be other brand names for this medicine. When This Medicine Should Not Be Used: This medicine is not right for everyone. Do not use it if you had an allergic reaction to acetaminophen. How to Use This Medicine:  
Capsule, Liquid Filled Capsule, Liquid, Powder, Tablet, Chewable Tablet, Dissolving Tablet, Fizzy Tablet, Long Acting Tablet · Your doctor will tell you how much medicine to use. Do not use more than directed. · If you are taking this medicine without the advice of your doctor, carefully read and follow the Drug Facts label and dosing instructions on the medicine package. Ask your doctor or pharmacist if you are not sure how to use this medicine. · Do not take this medicine for more than 10 days in a row, unless directed by your doctor. · The chewable tablet should be chewed or crushed before you swallow it. · Oral liquids: Measure the oral liquid medicine with a marked measuring spoon, oral syringe, or medicine cup. Do not use a spoon, syringe, or cup that came with a different medicine. · Oral liquid (with syringe): ¨ Shake the bottle well before each use. ¨ Remove the cap. Attach the syringe to the flow restrictor. Turn the bottle upside down. ¨ Pull back the syringe plunger until it is filled with the correct dose. Ask your doctor or pharmacist if you are not sure how much medicine to use. ¨ Slowly give the medicine into your child's mouth. Point the syringe so the medicine goes toward the inner cheek.  
· Oral liquid (with dropper):  
 ¨ Shake the bottle well before each use. ¨ Remove the cap. Insert the dropper into the bottle. Withdraw the correct dose. Ask your doctor or pharmacist if you are not sure how much medicine to use. ¨ Slowly give the medicine into your child's mouth. Point the dropper so the medicine goes toward the inner cheek. · Extended-release tablet: Swallow the extended-release tablet whole. Do not crush, break, or chew it. Take this medicine with a full glass of water. · Use only the brand of medicine your doctor prescribed. Other brands may not work the same way. · Missed dose: Take a dose as soon as you remember. If it is almost time for your next dose, wait until then and take a regular dose. Do not take extra medicine to make up for a missed dose. · Store the medicine in a closed container at room temperature, away from heat, moisture, and direct light. Drugs and Foods to Avoid: Ask your doctor or pharmacist before using any other medicine, including over-the-counter medicines, vitamins, and herbal products. · Some medicines and foods can affect how acetaminophen works. Tell your doctor if you are taking a blood thinner, such as warfarin. · Do not drink alcohol while you are using this medicine. Acetaminophen can damage your liver, and alcohol can increase this risk. Do not take acetaminophen without asking your doctor if you have 3 or more drinks of alcohol every day. Warnings While Using This Medicine: · Tell your doctor if you are pregnant or breastfeeding, or if you have kidney or liver disease. Tell your doctor if you have phenylketonuria (PKU). Some brands of acetaminophen contain aspartame, which can make PKU worse. · This medicine contains acetaminophen.  Read the labels of all other medicines you are using to see if they also contain acetaminophen, or ask your doctor or pharmacist. Eamon Davisells not use more than 4 grams (4,000 milligrams) total of acetaminophen in one day. For Tylenol® Extra Strength, it is not safe to take more than 3 grams (3,000 milligrams) in 1 day. · Call your doctor if your symptoms do not improve or if they get worse. · Tell any doctor or dentist who treats you that you are using this medicine. This medicine may affect certain medical test results. · Keep all medicine out of the reach of children. Never share your medicine with anyone. Possible Side Effects While Using This Medicine:  
Call your doctor right away if you notice any of these side effects: · Allergic reaction: Itching or hives, swelling in your face or hands, swelling or tingling in your mouth or throat, chest tightness, trouble breathing · Bloody or black, tarry stools · Dark urine or pale stools, nausea, vomiting, loss of appetite, severe stomach pain, yellow skin or eyes · Fever or a sore throat that lasts longer than 3 days, or pain that lasts longer than 5 days · Lightheadedness, fainting, sweating, or weakness · Unusual bleeding or bruising · Vomiting blood or material that looks like coffee grounds If you notice other side effects that you think are caused by this medicine, tell your doctor. Call your doctor for medical advice about side effects. You may report side effects to FDA at 5-085-WSX-4378 © 2017 [facility] Information is for End User's use only and may not be sold, redistributed or otherwise used for commercial purposes. The above information is an  only. It is not intended as medical advice for individual conditions or treatments. Talk to your doctor, nurse or pharmacist before following any medical regimen to see if it is safe and effective for you. Hip Pain: Care Instructions Your Care Instructions Hip pain may be caused by many things, including overuse, a fall, or a twisting movement. Another cause of hip pain is arthritis.  Your pain may increase when you stand up, walk, or squat. The pain may come and go or may be constant. Home treatment can help relieve hip pain, swelling, and stiffness. If your pain is ongoing, you may need more tests and treatment. Follow-up care is a key part of your treatment and safety. Be sure to make and go to all appointments, and call your doctor if you are having problems. It's also a good idea to know your test results and keep a list of the medicines you take. How can you care for yourself at home? · Take pain medicines exactly as directed. ¨ If the doctor gave you a prescription medicine for pain, take it as prescribed. ¨ If you are not taking a prescription pain medicine, ask your doctor if you can take an over-the-counter medicine. · Rest and protect your hip. Take a break from any activity, including standing or walking, that may cause pain. · Put ice or a cold pack against your hip for 10 to 20 minutes at a time. Try to do this every 1 to 2 hours for the next 3 days (when you are awake) or until the swelling goes down. Put a thin cloth between the ice and your skin. · Sleep on your healthy side with a pillow between your knees, or sleep on your back with pillows under your knees. · If there is no swelling, you can put moist heat, a heating pad, or a warm cloth on your hip. Do gentle stretching exercises to help keep your hip flexible. · Learn how to prevent falls. Have your vision and hearing checked regularly. Wear slippers or shoes with a nonskid sole. · Stay at a healthy weight. · Wear comfortable shoes. When should you call for help? Call 911 anytime you think you may need emergency care. For example, call if: 
? · You have sudden chest pain and shortness of breath, or you cough up blood. ? · You are not able to stand or walk or bear weight. ? · Your buttocks, legs, or feet feel numb or tingly. ? · Your leg or foot is cool or pale or changes color. ? · You have severe pain. ?Call your doctor now or seek immediate medical care if: 
? · You have signs of infection, such as: 
¨ Increased pain, swelling, warmth, or redness in the hip area. ¨ Red streaks leading from the hip area. ¨ Pus draining from the hip area. ¨ A fever. ? · You have signs of a blood clot, such as: 
¨ Pain in your calf, back of the knee, thigh, or groin. ¨ Redness and swelling in your leg or groin. ? · You are not able to bend, straighten, or move your leg normally. ? · You have trouble urinating or having bowel movements. ? Watch closely for changes in your health, and be sure to contact your doctor if: 
? · You do not get better as expected. Where can you learn more? Go to http://isaura-giovanna.info/. Enter N633 in the search box to learn more about \"Hip Pain: Care Instructions. \" Current as of: March 20, 2017 Content Version: 11.4 © 2791-6159 [employer]. Care instructions adapted under license by [employer] (which disclaims liability or warranty for this information). If you have questions about a medical condition or this instruction, always ask your healthcare professional. [address] [address] any warranty or liability for your use of this information. Preventing Falls: Care Instructions Your Care Instructions Getting around your home safely can be a challenge if you have injuries or health problems that make it easy for you to fall. Loose rugs and furniture in walkways are among the dangers for many older people who have problems walking or who have poor eyesight. People who have conditions such as arthritis, osteoporosis, or dementia also have to be careful not to fall. You can make your home safer with a few simple measures. Follow-up care is a key part of your treatment and safety.  Be sure to make and go to all appointments, and call your doctor if you are having problems. It's also a good idea to know your test results and keep a list of the medicines you take. How can you care for yourself at home? Taking care of yourself · You may get dizzy if you do not drink enough water. To prevent dehydration, drink plenty of fluids, enough so that your urine is light yellow or clear like water. Choose water and other caffeine-free clear liquids. If you have kidney, heart, or liver disease and have to limit fluids, talk with your doctor before you increase the amount of fluids you drink. · Exercise regularly to improve your strength, muscle tone, and balance. Walk if you can. Swimming may be a good choice if you cannot walk easily. · Have your vision and hearing checked each year or any time you notice a change. If you have trouble seeing and hearing, you might not be able to avoid objects and could lose your balance. · Know the side effects of the medicines you take. Ask your doctor or pharmacist whether the medicines you take can affect your balance. Sleeping pills or sedatives can affect your balance. · Limit the amount of alcohol you drink. Alcohol can impair your balance and other senses. · Ask your doctor whether calluses or corns on your feet need to be removed. If you wear loose-fitting shoes because of calluses or corns, you can lose your balance and fall. · Talk to your doctor if you have numbness in your feet. Preventing falls at home · Remove raised doorway thresholds, throw rugs, and clutter. Repair loose carpet or raised areas in the floor. · Move furniture and electrical cords to keep them out of walking paths. · Use nonskid floor wax, and wipe up spills right away, especially on ceramic tile floors. · If you use a walker or cane, put rubber tips on it. If you use crutches, clean the bottoms of them regularly with an abrasive pad, such as steel wool.  
· Keep your house well lit, especially [facility], and outside walkways. Use night-lights in areas such as hallways and bathrooms. Add extra light switches or use remote switches (such as switches that go on or off when you clap your hands) to make it easier to turn lights on if you have to get up during the night. · Install sturdy handrails on stairways. · Move items in your cabinets so that the things you use a lot are on the lower shelves (about waist level). · Keep a cordless phone and a flashlight with new batteries by your bed. If possible, put a phone in each of the main rooms of your house, or carry a cell phone in case you fall and cannot reach a phone. Or, you can wear a device around your neck or wrist. You push a button that sends a signal for help. · Wear low-heeled shoes that fit well and give your feet good support. Use footwear with nonskid soles. Check the heels and soles of your shoes for wear. Repair or replace worn heels or soles. · Do not wear socks without shoes on wood floors. · Walk on the grass when the sidewalks are slippery. If you live in an area that gets snow and ice in the winter, sprinkle salt on slippery steps and sidewalks. Preventing falls in the bath · Install grab bars and nonskid mats inside and outside your shower or tub and near the toilet and sinks. · Use shower chairs and bath benches. · Use a hand-held shower head that will allow you to sit while showering. · Get into a tub or shower by putting the weaker leg in first. Get out of a tub or shower with your strong side first. 
· Repair loose toilet seats and consider installing a raised toilet seat to make getting on and off the toilet easier. · Keep your bathroom door unlocked while you are in the shower. Where can you learn more? Go to http://isaura-giovanna.info/. Enter [id number] [id number] in the search box to learn more about \"Preventing Falls: Care Instructions. \" Current as of: May 12, 2017 Content Version: 11.4 © 2181-1595 [employer]. Care instructions adapted under license by [employer] (which disclaims liability or warranty for this information). If you have questions about a medical condition or this instruction, always ask your healthcare professional. [address] any warranty or liability for your use of this information. Introducing [facility]! Dear Hal Nowak: Thank you for requesting a [email or web address] account. Our records indicate that you already have an active [email or web address] account. You can access your account anytime at [email or web address] Did you know that you can access your hospital and ER discharge instructions at any time in [email or web address] You can also review all of your test results from your hospital stay or ER visit. Additional Information If you have questions, please visit the Frequently Asked Questions section of the [email or web address] website at [email or web address] Remember, [email or web address] is NOT to be used for urgent needs. For medical emergencies, dial 911. Now available from your iPhone and Android! Please provide this summary of care documentation to your next provider. Your primary care clinician is listed as Mike Freed. If you have any questions after today's visit, please call [phone].

## 2018-02-22 NOTE — PROGRESS NOTES
Chief Complaint   Patient presents with    Fall     Pt fell earlier this week anf fell on tailbone twice

## 2018-03-02 ENCOUNTER — TELEPHONE (OUTPATIENT)
Dept: FAMILY MEDICINE CLINIC | Age: 78
End: 2018-03-02

## 2018-03-02 NOTE — TELEPHONE ENCOUNTER
Daughter calling advising she has looked into it and would like a prescription for her mother to receive a walker. Please call daughter at [phone] to discuss any further, thank you.

## 2018-03-05 NOTE — TELEPHONE ENCOUNTER
Patient also has an appointment  8/27/2018 1:20 PM Susan Murdock MD [address]     don't have a date for PT  Patient has [facility]

## 2018-03-30 ENCOUNTER — OFFICE VISIT (OUTPATIENT)
Dept: FAMILY MEDICINE CLINIC | Age: 78
End: 2018-03-30

## 2018-03-30 VITALS
RESPIRATION RATE: 16 BRPM | DIASTOLIC BLOOD PRESSURE: 63 MMHG | TEMPERATURE: 97.6 F | WEIGHT: 105 LBS | HEART RATE: 70 BPM | HEIGHT: 59 IN | OXYGEN SATURATION: 96 % | BODY MASS INDEX: 21.17 KG/M2 | SYSTOLIC BLOOD PRESSURE: 135 MMHG

## 2018-03-30 DIAGNOSIS — Z02.89 ENCOUNTER FOR COMPLETION OF FORM WITH PATIENT: ICD-10-CM

## 2018-03-30 DIAGNOSIS — I10 ESSENTIAL HYPERTENSION: Primary | ICD-10-CM

## 2018-03-30 DIAGNOSIS — Z91.81 HISTORY OF FALL: ICD-10-CM

## 2018-03-30 DIAGNOSIS — Z86.73 HISTORY OF CVA (CEREBROVASCULAR ACCIDENT): Chronic | ICD-10-CM

## 2018-03-30 NOTE — MR AVS SNAPSHOT
[address] 
[phone] Patient: Ravinder Camarena MRN: [id number] JJL:[birth year] Visit Information Date & Time Provider Department Dept. Phone Encounter #  
 3/30/2018  8:20 AM Mike Freed MD [facility] [phone] [id number] Follow-up Instructions Return in about 3 months (around 6/30/2018) for Follow up. Your Appointments 8/27/2018  1:20 PM  
ESTABLISHED PATIENT with Essence Ramírez MD  
[facility] ([address]) Appt Note: ANNUAL; annual  
 [address] [facility] 600 1007 [facility]  
[address] [facility] [address] [address] Upcoming Health Maintenance Date Due ZOSTER VACCINE AGE 60> 9/7/1998 GLAUCOMA SCREENING Q2Y 11/7/2003 Pneumococcal 65+ Low/Medium Risk (2 of 2 - PPSV23) 12/21/2016 MEDICARE YEARLY EXAM 5/13/2018 DTaP/Tdap/Td series (2 - Td) 6/22/2024 Allergies as of 3/30/2018  Review Complete On: 3/30/2018 By: Mike Freed MD  
  
 Severity Noted Reaction Type Reactions Benadryl Allergy/cold [Diphenhyd-pe-acetaminophen]  06/11/2013    Hives Penicillins  06/11/2013    Hives Was treated with Augmentin in June 2015, tolerated medication well. Current Immunizations  Never Reviewed Name Date  
 TD Vaccine 12/21/2011 Tdap 6/22/2014 ZZZ-RETIRED (DO NOT USE) Pneumococcal Vaccine (Unspecified Type) 12/21/2011 Not reviewed this visit You Were Diagnosed With   
  
 Codes Comments Essential hypertension    -  Primary ICD-10-CM: I10 
ICD-9-CM: 401.9 History of CVA (cerebrovascular accident)     ICD-10-CM: Z86.73 
ICD-9-CM: V12.54 History of fall     ICD-10-CM: Z91.81 
ICD-9-CM: V15.88 Encounter for completion of form with patient     ICD-10-CM: Z02.89 ICD-9-CM: V68.89 Vitals BP Pulse Temp Resp Height(growth percentile) Weight(growth percentile) 145/70 71 97.6 °F (36.4 °C) (Oral) 16 4' 11\" (1.499 m) 105 lb (47.6 kg) SpO2 BMI OB Status Smoking Status 96% 21.21 kg/m2 Postmenopausal Former Smoker Vitals History BMI and BSA Data Body Mass Index Body Surface Area  
 21.21 kg/m 2 1.41 m 2 Preferred Pharmacy Pharmacy Name Phone [address], [address] [facility] [address] [phone] Your Updated Medication List  
  
   
This list is accurate as of 3/30/18  9:16 AM.  Always use your most recent med list.  
  
  
  
  
 atorvastatin 40 mg tablet Commonly known as:  LIPITOR Take 1 Tab by mouth daily. Calcium-Cholecalciferol (D3) 600 mg(1,500mg) -400 unit Cap Take 1 Tab by mouth daily. carvedilol 3.125 mg tablet Commonly known as:  Libra Khadijah Take 1 Tab by mouth two (2) times daily (with meals). clopidogrel 75 mg Tab Commonly known as:  PLAVIX Take 1 Tab by mouth daily. lisinopril 20 mg tablet Commonly known as:  Cleotis Narayanan Take 1 Tab by mouth daily. multivitamin tablet Commonly known as:  ONE A DAY Take 1 Tab by mouth daily. omega 3-DHA-EPA-fish oil 1,000 mg (120 mg-180 mg) capsule Take 1 Cap by mouth as needed. Follow-up Instructions Return in about 3 months (around 6/30/2018) for Follow up. Patient Instructions DASH Diet: Care Instructions Your Care Instructions The DASH diet is an eating plan that can help lower your blood pressure. DASH stands for Dietary Approaches to Stop Hypertension. Hypertension is high blood pressure. The DASH diet focuses on eating foods that are high in calcium, potassium, and magnesium. These nutrients can lower blood pressure. The foods that are highest in these nutrients are fruits, vegetables, low-fat dairy products, nuts, seeds, and legumes.  But taking calcium, potassium, and magnesium supplements instead of eating foods that are high in those nutrients does not have the same effect. The DASH diet also includes whole grains, fish, and poultry. The DASH diet is one of several lifestyle changes your doctor may recommend to lower your high blood pressure. Your doctor may also want you to decrease the amount of sodium in your diet. Lowering sodium while following the DASH diet can lower blood pressure even further than just the DASH diet alone. Follow-up care is a key part of your treatment and safety. Be sure to make and go to all appointments, and call your doctor if you are having problems. It's also a good idea to know your test results and keep a list of the medicines you take. How can you care for yourself at home? Following the DASH diet · Eat 4 to 5 servings of fruit each day. A serving is 1 medium-sized piece of fruit, ½ cup chopped or canned fruit, 1/4 cup dried fruit, or 4 ounces (½ cup) of fruit juice. Choose fruit more often than fruit juice. · Eat 4 to 5 servings of vegetables each day. A serving is 1 cup of lettuce or raw leafy vegetables, ½ cup of chopped or cooked vegetables, or 4 ounces (½ cup) of vegetable juice. Choose vegetables more often than vegetable juice. · Get 2 to 3 servings of low-fat and fat-free dairy each day. A serving is 8 ounces of milk, 1 cup of yogurt, or 1 ½ ounces of cheese. · Eat 6 to 8 servings of grains each day. A serving is 1 slice of bread, 1 ounce of dry cereal, or ½ cup of cooked rice, pasta, or cooked cereal. Try to choose whole-grain products as much as possible. · Limit lean meat, poultry, and fish to 2 servings each day. A serving is 3 ounces, about the size of a deck of cards. · Eat 4 to 5 servings of nuts, seeds, and legumes (cooked dried beans, lentils, and split peas) each week. A serving is 1/3 cup of nuts, 2 tablespoons of seeds, or ½ cup of cooked beans or peas. · Limit fats and oils to 2 to 3 servings each day. A serving is 1 teaspoon of vegetable oil or 2 tablespoons of salad dressing. · Limit sweets and added sugars to 5 servings or less a week. A serving is 1 tablespoon jelly or jam, ½ cup sorbet, or 1 cup of lemonade. · Eat less than 2,300 milligrams (mg) of sodium a day. If you limit your sodium to 1,500 mg a day, you can lower your blood pressure even more. Tips for success · Start small. Do not try to make dramatic changes to your diet all at once. You might feel that you are missing out on your favorite foods and then be more likely to not follow the plan. Make small changes, and stick with them. Once those changes become habit, add a few more changes. · Try some of the following: ¨ Make it a goal to eat a fruit or vegetable at every meal and at snacks. This will make it easy to get the recommended amount of fruits and vegetables each day. ¨ Try yogurt topped with fruit and nuts for a snack or healthy dessert. ¨ Add lettuce, tomato, cucumber, and onion to sandwiches. ¨ Combine a ready-made pizza crust with low-fat mozzarella cheese and lots of vegetable toppings. Try using tomatoes, squash, spinach, broccoli, carrots, cauliflower, and onions. ¨ Have a variety of cut-up vegetables with a low-fat dip as an appetizer instead of chips and dip. ¨ Sprinkle sunflower seeds or chopped almonds over salads. Or try adding chopped walnuts or almonds to cooked vegetables. ¨ Try some vegetarian meals using beans and peas. Add garbanzo or kidney beans to salads. Make burritos and tacos with mashed kilgore beans or black beans. Where can you learn more? Go to http://isaura-giovanna.info/. Enter P177 in the search box to learn more about \"DASH Diet: Care Instructions. \" Current as of: September 21, 2016 Content Version: 11.4 © 4182-5914 Healthwise, CallFire.  Care instructions adapted under license by [address] (which disclaims liability or warranty for this information). If you have questions about a medical condition or this instruction, always ask your healthcare professional. [address] any warranty or liability for your use of this information. Preventing Falls: Care Instructions Your Care Instructions Getting around your home safely can be a challenge if you have injuries or health problems that make it easy for you to fall. Loose rugs and furniture in walkways are among the dangers for many older people who have problems walking or who have poor eyesight. People who have conditions such as arthritis, osteoporosis, or dementia also have to be careful not to fall. You can make your home safer with a few simple measures. Follow-up care is a key part of your treatment and safety. Be sure to make and go to all appointments, and call your doctor if you are having problems. It's also a good idea to know your test results and keep a list of the medicines you take. How can you care for yourself at home? Taking care of yourself · You may get dizzy if you do not drink enough water. To prevent dehydration, drink plenty of fluids, enough so that your urine is light yellow or clear like water. Choose water and other caffeine-free clear liquids. If you have kidney, heart, or liver disease and have to limit fluids, talk with your doctor before you increase the amount of fluids you drink. · Exercise regularly to improve your strength, muscle tone, and balance. Walk if you can. Swimming may be a good choice if you cannot walk easily. · Have your vision and hearing checked each year or any time you notice a change. If you have trouble seeing and hearing, you might not be able to avoid objects and could lose your balance. · Know the side effects of the medicines you take. Ask your doctor or pharmacist whether the medicines you take can affect your balance. Sleeping pills or sedatives can affect your balance. · Limit the amount of alcohol you drink. Alcohol can impair your balance and other senses. · Ask your doctor whether calluses or corns on your feet need to be removed. If you wear loose-fitting shoes because of calluses or corns, you can lose your balance and fall. · Talk to your doctor if you have numbness in your feet. Preventing falls at home · Remove raised doorway thresholds, throw rugs, and clutter. Repair loose carpet or raised areas in the floor. · Move furniture and electrical cords to keep them out of walking paths. · Use nonskid floor wax, and wipe up spills right away, especially on ceramic tile floors. · If you use a walker or cane, put rubber tips on it. If you use crutches, clean the bottoms of them regularly with an abrasive pad, such as steel wool. · Keep your house well lit, especially [place], and outside walkways. Use night-lights in areas such as hallways and bathrooms. Add extra light switches or use remote switches (such as switches that go on or off when you clap your hands) to make it easier to turn lights on if you have to get up during the night. · Install sturdy handrails on stairways. · Move items in your cabinets so that the things you use a lot are on the lower shelves (about waist level). · Keep a cordless phone and a flashlight with new batteries by your bed. If possible, put a phone in each of the main rooms of your house, or carry a cell phone in case you fall and cannot reach a phone. Or, you can wear a device around your neck or wrist. You push a button that sends a signal for help. · Wear low-heeled shoes that fit well and give your feet good support. Use footwear with nonskid soles. Check the heels and soles of your shoes for wear. Repair or replace worn heels or soles. · Do not wear socks without shoes on wood floors. · Walk on the grass when the sidewalks are slippery.  If you live in an area that gets snow and ice in the winter, sprinkle salt on slippery steps and sidewalks. Preventing falls in the bath · Install grab bars and nonskid mats inside and outside your shower or tub and near the toilet and sinks. · Use shower chairs and bath benches. · Use a hand-held shower head that will allow you to sit while showering. · Get into a tub or shower by putting the weaker leg in first. Get out of a tub or shower with your strong side first. 
· Repair loose toilet seats and consider installing a raised toilet seat to make getting on and off the toilet easier. · Keep your bathroom door unlocked while you are in the shower. Where can you learn more? Go to http://isaura-giovanna.info/. Enter 0476 [id number] 69 [id number] in the search box to learn more about \"Preventing Falls: Care Instructions. \" Current as of: May 12, 2017 Content Version: 11.4 © 6394-6250 [employer]ozy. Care instructions adapted under license by [employer] (which disclaims liability or warranty for this information). If you have questions about a medical condition or this instruction, always ask your healthcare professional. [place] [address] any warranty or liability for your use of this information. Introducing [facility]! Dear Yuliet Abernathy: Thank you for requesting a ScraperWiki account. Our records indicate that you already have an active ScraperWiki account. You can access your account anytime at [email or web address] Did you know that you can access your hospital and ER discharge instructions at any time in ScraperWiki? You can also review all of your test results from your hospital stay or ER visit. Additional Information If you have questions, please visit the Frequently Asked Questions section of the ScraperWiki website at [email or web address] Remember, ScraperWiki is NOT to be used for urgent needs. For medical emergencies, dial 911. Now available from your iPhone and Android! Please provide this summary of care documentation to your next provider. Your primary care clinician is listed as Dee Ordoñez. If you have any questions after today's visit, please call [phone].

## 2018-03-30 NOTE — PATIENT INSTRUCTIONS
DASH Diet: Care Instructions  Your Care Instructions    The DASH diet is an eating plan that can help lower your blood pressure. DASH stands for Dietary Approaches to Stop Hypertension. Hypertension is high blood pressure. The DASH diet focuses on eating foods that are high in calcium, potassium, and magnesium. These nutrients can lower blood pressure. The foods that are highest in these nutrients are fruits, vegetables, low-fat dairy products, nuts, seeds, and legumes. But taking calcium, potassium, and magnesium supplements instead of eating foods that are high in those nutrients does not have the same effect. The DASH diet also includes whole grains, fish, and poultry. The DASH diet is one of several lifestyle changes your doctor may recommend to lower your high blood pressure. Your doctor may also want you to decrease the amount of sodium in your diet. Lowering sodium while following the DASH diet can lower blood pressure even further than just the DASH diet alone. Follow-up care is a key part of your treatment and safety. Be sure to make and go to all appointments, and call your doctor if you are having problems. It's also a good idea to know your test results and keep a list of the medicines you take. How can you care for yourself at home? Following the DASH diet  · Eat 4 to 5 servings of fruit each day. A serving is 1 medium-sized piece of fruit, ½ cup chopped or canned fruit, 1/4 cup dried fruit, or 4 ounces (½ cup) of fruit juice. Choose fruit more often than fruit juice. · Eat 4 to 5 servings of vegetables each day. A serving is 1 cup of lettuce or raw leafy vegetables, ½ cup of chopped or cooked vegetables, or 4 ounces (½ cup) of vegetable juice. Choose vegetables more often than vegetable juice. · Get 2 to 3 servings of low-fat and fat-free dairy each day. A serving is 8 ounces of milk, 1 cup of yogurt, or 1 ½ ounces of cheese. · Eat 6 to 8 servings of grains each day.  A serving is 1 slice of bread, 1 ounce of dry cereal, or ½ cup of cooked rice, pasta, or cooked cereal. Try to choose whole-grain products as much as possible. · Limit lean meat, poultry, and fish to 2 servings each day. A serving is 3 ounces, about the size of a deck of cards. · Eat 4 to 5 servings of nuts, seeds, and legumes (cooked dried beans, lentils, and split peas) each week. A serving is 1/3 cup of nuts, 2 tablespoons of seeds, or ½ cup of cooked beans or peas. · Limit fats and oils to 2 to 3 servings each day. A serving is 1 teaspoon of vegetable oil or 2 tablespoons of salad dressing. · Limit sweets and added sugars to 5 servings or less a week. A serving is 1 tablespoon jelly or jam, ½ cup sorbet, or 1 cup of lemonade. · Eat less than 2,300 milligrams (mg) of sodium a day. If you limit your sodium to 1,500 mg a day, you can lower your blood pressure even more. Tips for success  · Start small. Do not try to make dramatic changes to your diet all at once. You might feel that you are missing out on your favorite foods and then be more likely to not follow the plan. Make small changes, and stick with them. Once those changes become habit, add a few more changes. · Try some of the following:  ¨ Make it a goal to eat a fruit or vegetable at every meal and at snacks. This will make it easy to get the recommended amount of fruits and vegetables each day. ¨ Try yogurt topped with fruit and nuts for a snack or healthy dessert. ¨ Add lettuce, tomato, cucumber, and onion to sandwiches. ¨ Combine a ready-made pizza crust with low-fat mozzarella cheese and lots of vegetable toppings. Try using tomatoes, squash, spinach, broccoli, carrots, cauliflower, and onions. ¨ Have a variety of cut-up vegetables with a low-fat dip as an appetizer instead of chips and dip. ¨ Sprinkle sunflower seeds or chopped almonds over salads. Or try adding chopped walnuts or almonds to cooked vegetables.   ¨ Try some vegetarian meals using beans and peas. Add garbanzo or kidney beans to salads. Make burritos and tacos with mashed kilgore beans or black beans. Where can you learn more? Go to http://isaura-giovanna.info/. Enter B367 in the search box to learn more about \"DASH Diet: Care Instructions. \"  Current as of: September 21, 2016  Content Version: 11.4  © 2161-5665 Kingsoft. Care instructions adapted under license by [employer] (which disclaims liability or warranty for this information). If you have questions about a medical condition or this instruction, always ask your healthcare professional. [address] any warranty or liability for your use of this information. Preventing Falls: Care Instructions  Your Care Instructions    Getting around your home safely can be a challenge if you have injuries or health problems that make it easy for you to fall. Loose rugs and furniture in walkways are among the dangers for many older people who have problems walking or who have poor eyesight. People who have conditions such as arthritis, osteoporosis, or dementia also have to be careful not to fall. You can make your home safer with a few simple measures. Follow-up care is a key part of your treatment and safety. Be sure to make and go to all appointments, and call your doctor if you are having problems. It's also a good idea to know your test results and keep a list of the medicines you take. How can you care for yourself at home? Taking care of yourself  · You may get dizzy if you do not drink enough water. To prevent dehydration, drink plenty of fluids, enough so that your urine is light yellow or clear like water. Choose water and other caffeine-free clear liquids. If you have kidney, heart, or liver disease and have to limit fluids, talk with your doctor before you increase the amount of fluids you drink. · Exercise regularly to improve your strength, muscle tone, and balance.  Walk if you can. Swimming may be a good choice if you cannot walk easily. · Have your vision and hearing checked each year or any time you notice a change. If you have trouble seeing and hearing, you might not be able to avoid objects and could lose your balance. · Know the side effects of the medicines you take. Ask your doctor or pharmacist whether the medicines you take can affect your balance. Sleeping pills or sedatives can affect your balance. · Limit the amount of alcohol you drink. Alcohol can impair your balance and other senses. · Ask your doctor whether calluses or corns on your feet need to be removed. If you wear loose-fitting shoes because of calluses or corns, you can lose your balance and fall. · Talk to your doctor if you have numbness in your feet. Preventing falls at home  · Remove raised doorway thresholds, throw rugs, and clutter. Repair loose carpet or raised areas in the floor. · Move furniture and electrical cords to keep them out of walking paths. · Use nonskid floor wax, and wipe up spills right away, especially on ceramic tile floors. · If you use a walker or cane, put rubber tips on it. If you use crutches, clean the bottoms of them regularly with an abrasive pad, such as steel wool. · Keep your house well lit, especially [place], and outside walkways. Use night-lights in areas such as hallways and bathrooms. Add extra light switches or use remote switches (such as switches that go on or off when you clap your hands) to make it easier to turn lights on if you have to get up during the night. · Install sturdy handrails on stairways. · Move items in your cabinets so that the things you use a lot are on the lower shelves (about waist level). · Keep a cordless phone and a flashlight with new batteries by your bed. If possible, put a phone in each of the main rooms of your house, or carry a cell phone in case you fall and cannot reach a phone.  Or, you can wear a device around your neck or wrist. You push a button that sends a signal for help. · Wear low-heeled shoes that fit well and give your feet good support. Use footwear with nonskid soles. Check the heels and soles of your shoes for wear. Repair or replace worn heels or soles. · Do not wear socks without shoes on wood floors. · Walk on the grass when the sidewalks are slippery. If you live in an area that gets snow and ice in the winter, sprinkle salt on slippery steps and sidewalks. Preventing falls in the bath  · Install grab bars and nonskid mats inside and outside your shower or tub and near the toilet and sinks. · Use shower chairs and bath benches. · Use a hand-held shower head that will allow you to sit while showering. · Get into a tub or shower by putting the weaker leg in first. Get out of a tub or shower with your strong side first.  · Repair loose toilet seats and consider installing a raised toilet seat to make getting on and off the toilet easier. · Keep your bathroom door unlocked while you are in the shower. Where can you learn more? Go to http://isaura-giovanna.info/. Enter [id number] [id number] in the search box to learn more about \"Preventing Falls: Care Instructions. \"  Current as of: May 12, 2017  Content Version: 11.4  © 9781-9194 Pagido. Care instructions adapted under license by [employer] (which disclaims liability or warranty for this information). If you have questions about a medical condition or this instruction, always ask your healthcare professional. [place] [address] any warranty or liability for your use of this information.
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
## 2018-03-30 NOTE — PROGRESS NOTES
Chief Complaint   Patient presents with    Other     requesting paperwork to be filled out for DMV     1. Have you been to the ER, urgent care clinic since your last visit? Hospitalized since your last visit? No    2. Have you seen or consulted any other health care providers outside of the [address] [place] [address] since your last visit? Include any pap smears or colon screening.  No

## 2018-04-10 NOTE — PROGRESS NOTES
I reviewed with the resident the medical history and the resident's findings on the physical examination. I discussed with the resident the patient's diagnosis and concur with the plan. Agree with clearance from medical standpoint as pt doing much better compared to last visit, but would defer further clearance to DMV. Hx CVA not contraindication to driving. Forms filled out as such.

## 2018-04-13 ENCOUNTER — TELEPHONE (OUTPATIENT)
Dept: FAMILY MEDICINE CLINIC | Age: 78
End: 2018-04-13

## 2018-04-13 NOTE — TELEPHONE ENCOUNTER
Alexa Sep - All, ([phone]    Daughter called to say that she spoke to them and  the [facility] form is not fully accepted on page 9 as stating it is OK for the patient to drive. She said that someone can just write on a script pad or on the form the reason she is able to drive. Etc, coherent, and or other reason.     GÓMEZ--[phone], attn:  Anna Lucas--[phone]--DMV

## 2018-04-18 NOTE — TELEPHONE ENCOUNTER
Will you please let patient/daughter know I am out of office until Monday and cannot complete paperwork until then?  AH

## 2018-06-14 NOTE — TELEPHONE ENCOUNTER
I spoke with patient's daughter. They do not need any further DMV paperwork from our office. The patient needs to retake the written portion of the driving test before she can drive (she did not pass the first time). Daughter expressed her thanks for our help with the paperwork. Daughter has no questions or concerns at this time.     06/13/18  8:02 PM

## 2018-09-07 ENCOUNTER — TELEPHONE (OUTPATIENT)
Dept: FAMILY MEDICINE CLINIC | Age: 78
End: 2018-09-07

## 2018-09-07 ENCOUNTER — OFFICE VISIT (OUTPATIENT)
Dept: FAMILY MEDICINE CLINIC | Age: 78
End: 2018-09-07

## 2018-09-07 VITALS
DIASTOLIC BLOOD PRESSURE: 80 MMHG | OXYGEN SATURATION: 95 % | BODY MASS INDEX: 21.57 KG/M2 | SYSTOLIC BLOOD PRESSURE: 155 MMHG | RESPIRATION RATE: 18 BRPM | TEMPERATURE: 97.8 F | HEART RATE: 98 BPM | WEIGHT: 107 LBS | HEIGHT: 59 IN

## 2018-09-07 DIAGNOSIS — I10 ESSENTIAL HYPERTENSION: ICD-10-CM

## 2018-09-07 DIAGNOSIS — M70.22 OLECRANON BURSITIS OF LEFT ELBOW: Primary | ICD-10-CM

## 2018-09-07 RX ORDER — LISINOPRIL 20 MG/1
20 TABLET ORAL DAILY
Qty: 30 TAB | Refills: 1 | Status: SHIPPED | OUTPATIENT
Start: 2018-09-07 | End: 2018-10-23 | Stop reason: SDUPTHER

## 2018-09-07 NOTE — PROGRESS NOTES
Subjective    Chief complaint: left elbow swelling     Laurel Villavicencio is an 76 y.o. female here for elbow swelling. Left elbow swelling:   Bumped elbow about a week ago, no pain or swelling at that time. No difficulty with ROM. Daughter noticed swelling a few days ago which has decreasedwith ice. Denies fever, chills, tenderness at that site. Elevated BP:   -BP elevated today as she ran out of her medications. Will refill her medications. Hasn't been checking BP at home. Denies chest pain, SOB, headache, dizziness. Allergies - reviewed: Allergies   Allergen Reactions    Benadryl Allergy/Cold [Diphenhyd-Pe-Acetaminophen] Hives    Penicillins Hives     Was treated with Augmentin in June 2015, tolerated medication well. Medications - reviewed:   Current Outpatient Prescriptions   Medication Sig    lisinopril (PRINIVIL, ZESTRIL) 20 mg tablet Take 1 Tab by mouth daily.  atorvastatin (LIPITOR) 40 mg tablet Take 1 Tab by mouth daily.  clopidogrel (PLAVIX) 75 mg tab Take 1 Tab by mouth daily.  multivitamin (ONE A DAY) tablet Take 1 Tab by mouth daily.  Omega-3-DHA-EPA-Fish Oil 1,000 mg (120 mg-180 mg) cap Take 1 Cap by mouth as needed.  Calcium-Cholecalciferol, D3, 600 mg(1,500mg) -400 unit cap Take 1 Tab by mouth daily.  carvedilol (COREG) 3.125 mg tablet Take 1 Tab by mouth two (2) times daily (with meals). No current facility-administered medications for this visit.           Past Medical History - reviewed:  Past Medical History:   Diagnosis Date    Acute CVA (cerebrovascular accident) ([facility] [address] 4/10/2017    Per MRI:  Posterior L frontal/parietal territory    Cerebral atrophy 4/10/2017    History of CVA (cerebrovascular accident) 4/10/2017    --L frontal acute infarct and LMCA    Hyperlipidemia LDL goal <70 4/10/2017    Hypertension     TIA (transient ischemic attack)     Unresponsive episode 8/3/2017    transient         Immunizations - reviewed:   Immunization History   Administered Date(s) Administered    TD Vaccine 12/21/2011    Tdap 06/22/2014    ZZZ-RETIRED (DO NOT USE) Pneumococcal Vaccine (Unspecified Type) 12/21/2011         ROS  Constitutional: negative for fatigue and malaise  Respiratory: negative for shortness of breath  Cardiovascular: negative for chest pain  Gastrointestinal: negative for abdominal pain  Neurological: negative for dizziness/headache    Physical Exam  Visit Vitals    /80 (BP 1 Location: Right arm, BP Patient Position: Sitting)    Pulse 98    Temp 97.8 °F (36.6 °C) (Oral)    Resp 18    Ht 4' 11\" (1.499 m)    Wt 107 lb (48.5 kg)    SpO2 95%    BMI 21.61 kg/m2     General appearance - Alert, NAD. Head: Atraumatic. Normocephalic. Respiratory - LCTAB. No wheeze/rale/rhonchi  Heart - Normal rate, regular rhythm. No m/r/r  MSK: Normal ROM in both UE  Skin - Left elbow pictured below, nontender. Assessment/Plan  1. Olecranon bursitis of left elbow: No signs of infection. Recommended conservative management. Avoid further trauma, NSAIDS as needed for pain. RTC if symptoms worsen. 2. Essential hypertension: BP elevated initially but improved on recheck. Will refill lisinopril 20mg, recommended BP log and return in 2 weeks. - lisinopril (PRINIVIL, ZESTRIL) 20 mg tablet; Take 1 Tab by mouth daily. Dispense: 30 Tab; Refill: 1    Follow-up Disposition:  Return in about 2 weeks (around 9/21/2018) for BP followup . I discussed the aforementioned diagnoses with the patient as well as the plan of care.      Saba Mo MD  Family Medicine Resident  PGY 3

## 2018-09-07 NOTE — MR AVS SNAPSHOT
[address] 
[phone] Patient: Eda Lay MRN: [id number] GTA:[id number] Visit Information Date & Time Provider Department Dept. Phone Encounter #  
 9/7/2018  4:00 PM Yelena Shepard  [facility] [phone] [id number] Follow-up Instructions Return in about 2 weeks (around 9/21/2018) for BP followup . Upcoming Health Maintenance Date Due ZOSTER VACCINE AGE 60> 9/7/1998 GLAUCOMA SCREENING Q2Y 11/7/2003 Pneumococcal 65+ Low/Medium Risk (2 of 2 - PPSV23) 12/21/2016 MEDICARE YEARLY EXAM 5/13/2018 Influenza Age 5 to Adult 8/1/2018 DTaP/Tdap/Td series (2 - Td) 6/22/2024 Allergies as of 9/7/2018  Review Complete On: 9/7/2018 By: Westley Yeh LPN Severity Noted Reaction Type Reactions Benadryl Allergy/cold [Diphenhyd-pe-acetaminophen]  06/11/2013    Hives Penicillins  06/11/2013    Hives Was treated with Augmentin in June 2015, tolerated medication well. Current Immunizations  Never Reviewed Name Date  
 TD Vaccine 12/21/2011 Tdap 6/22/2014 ZZZ-RETIRED (DO NOT USE) Pneumococcal Vaccine (Unspecified Type) 12/21/2011 Not reviewed this visit You Were Diagnosed With   
  
 Codes Comments Essential hypertension    -  Primary ICD-10-CM: I10 
ICD-9-CM: 401.9 Vitals BP Pulse Temp Resp Height(growth percentile) Weight(growth percentile) 155/80 (BP 1 Location: Right arm, BP Patient Position: Sitting) 98 97.8 °F (36.6 °C) (Oral) 18 4' 11\" (1.499 m) 107 lb (48.5 kg) SpO2 BMI OB Status Smoking Status 95% 21.61 kg/m2 Postmenopausal Former Smoker Vitals History BMI and BSA Data Body Mass Index Body Surface Area  
 21.61 kg/m 2 1.42 m 2 Preferred Pharmacy Pharmacy Name Phone [address], [address] [place] [address] [address] [facility] [address] [phone] Your Updated Medication List  
  
   
This list is accurate as of 9/7/18  4:39 PM.  Always use your most recent med list.  
  
  
  
  
 atorvastatin 40 mg tablet Commonly known as:  LIPITOR Take 1 Tab by mouth daily. Calcium-Cholecalciferol (D3) 600 mg(1,500mg) -400 unit Cap Take 1 Tab by mouth daily. carvedilol 3.125 mg tablet Commonly known as:  Phineas Hoar Take 1 Tab by mouth two (2) times daily (with meals). clopidogrel 75 mg Tab Commonly known as:  PLAVIX Take 1 Tab by mouth daily. lisinopril 20 mg tablet Commonly known as:  Stone Mahesh Take 1 Tab by mouth daily. multivitamin tablet Commonly known as:  ONE A DAY Take 1 Tab by mouth daily. omega 3-DHA-EPA-fish oil 1,000 mg (120 mg-180 mg) capsule Take 1 Cap by mouth as needed. Prescriptions Sent to Pharmacy Refills  
 lisinopril (PRINIVIL, ZESTRIL) 20 mg tablet 1 Sig: Take 1 Tab by mouth daily. Class: Normal  
 Pharmacy: [facility] [address] AMANDEEP THOMAS [address] [place] [address], [address] [place] [address] [address] [facility] #: [phone] Route: Oral  
  
Follow-up Instructions Return in about 2 weeks (around 9/21/2018) for BP followup . Patient Instructions Elbow Bursitis: Exercises Your Care Instructions Here are some examples of typical rehabilitation exercises for your condition. Start each exercise slowly. Ease off the exercise if you start to have pain. Your doctor or physical therapist will tell you when you can start these exercises and which ones will work best for you. How to do the exercises Elbow flexion stretch 1. Lift the arm that bothers you, and bend the elbow. Your palm should face toward you. 2. With your other hand, gently push on the back of your affected forearm. Press your hand toward your shoulder until you feel a stretch in the back of your upper arm. 3. Hold for at least 15 to 30 seconds. 4. Repeat 2 to 4 times. Elbow extension stretch 1. Extend your affected arm in front of you with your palm facing away from you. 2. Bend back your wrist, pointing your hand up toward the ceiling. 3. With your other hand, gently bend your wrist farther until you feel a mild to moderate stretch in your forearm. 4. Hold for at least 15 to 30 seconds. 5. Repeat 2 to 4 times. 6. Repeat steps 1 through 5. But this time extend your affected arm in front of you with your palm facing up. Then bend back your wrist, pointing your hand toward the floor. Pronation and supination stretch 1. Keep your affected elbow at your side, bent at about 90 degrees. Grasp a pen, pencil, or stick, and wrap your hand around it. If you don't have something to hold on to, make a fist instead. 2. Slowly turn your forearm as far as you can back and forth in each direction. Your hand should face up and then down. 3. Hold each position for about 6 seconds. 4. Relax for up to 10 seconds between repetitions. 5. Repeat 8 to 12 times. Hand flips 1. While seated, place your affected forearm on your thigh. Your palm should face down. 2. Flip your hand over so the back of your hand rests on your thigh and your palm is up. Alternate between palm up and palm down while keeping your forearm on your thigh. 3. Repeat 8 to 12 times. Follow-up care is a key part of your treatment and safety. Be sure to make and go to all appointments, and call your doctor if you are having problems. It's also a good idea to know your test results and keep a list of the medicines you take. Where can you learn more? Go to http://isaura-giovanna.info/. Enter R433 in the search box to learn more about \"Elbow Bursitis: Exercises. \" Current as of: November 29, 2017 Content Version: 11.7 © 7848-3405 [employer].  Care instructions adapted under license by [employer] (which disclaims liability or warranty for this information). If you have questions about a medical condition or this instruction, always ask your healthcare professional. [address] any warranty or liability for your use of this information. Introducing [facility]! Dear Lady Tierney: Thank you for requesting a [employer] account. Our records indicate that you already have an active [employer] account. You can access your account anytime at [email or web address] Did you know that you can access your hospital and ER discharge instructions at any time in [employer]? You can also review all of your test results from your hospital stay or ER visit. Additional Information If you have questions, please visit the Frequently Asked Questions section of the [employer] website at [email or web address] Remember, [employer] is NOT to be used for urgent needs. For medical emergencies, dial 911. Now available from your iPhone and Android! Please provide this summary of care documentation to your next provider. Your primary care clinician is listed as Francisco Factor. If you have any questions after today's visit, please call [phone].

## 2018-09-07 NOTE — PATIENT INSTRUCTIONS
Elbow Bursitis: Exercises  Your Care Instructions  Here are some examples of typical rehabilitation exercises for your condition. Start each exercise slowly. Ease off the exercise if you start to have pain. Your doctor or physical therapist will tell you when you can start these exercises and which ones will work best for you. How to do the exercises  Elbow flexion stretch    1. Lift the arm that bothers you, and bend the elbow. Your palm should face toward you. 2. With your other hand, gently push on the back of your affected forearm. Press your hand toward your shoulder until you feel a stretch in the back of your upper arm. 3. Hold for at least 15 to 30 seconds. 4. Repeat 2 to 4 times. Elbow extension stretch    1. Extend your affected arm in front of you with your palm facing away from you. 2. Bend back your wrist, pointing your hand up toward the ceiling. 3. With your other hand, gently bend your wrist farther until you feel a mild to moderate stretch in your forearm. 4. Hold for at least 15 to 30 seconds. 5. Repeat 2 to 4 times. 6. Repeat steps 1 through 5. But this time extend your affected arm in front of you with your palm facing up. Then bend back your wrist, pointing your hand toward the floor. Pronation and supination stretch    1. Keep your affected elbow at your side, bent at about 90 degrees. Grasp a pen, pencil, or stick, and wrap your hand around it. If you don't have something to hold on to, make a fist instead. 2. Slowly turn your forearm as far as you can back and forth in each direction. Your hand should face up and then down. 3. Hold each position for about 6 seconds. 4. Relax for up to 10 seconds between repetitions. 5. Repeat 8 to 12 times. Hand flips    1. While seated, place your affected forearm on your thigh. Your palm should face down. 2. Flip your hand over so the back of your hand rests on your thigh and your palm is up.  Alternate between palm up and palm down while keeping your forearm on your thigh. 3. Repeat 8 to 12 times. Follow-up care is a key part of your treatment and safety. Be sure to make and go to all appointments, and call your doctor if you are having problems. It's also a good idea to know your test results and keep a list of the medicines you take. Where can you learn more? Go to http://isaura-giovanna.info/. Enter S522 in the search box to learn more about \"Elbow Bursitis: Exercises. \"  Current as of: November 29, 2017  Content Version: 11.7  © 6938-0651 [employer]. Care instructions adapted under license by [employer] (which disclaims liability or warranty for this information). If you have questions about a medical condition or this instruction, always ask your healthcare professional. [address] any warranty or liability for your use of this information.

## 2018-09-07 NOTE — PROGRESS NOTES
Chief Complaint   Patient presents with    Elbow Pain     1. Have you been to the ER, urgent care clinic since your last visit? Hospitalized since your last visit? No    2. Have you seen or consulted any other health care providers outside of the [address] [place] [address] since your last visit? Include any pap smears or colon screening.  No

## 2018-10-16 DIAGNOSIS — I63.9 ACUTE CVA (CEREBROVASCULAR ACCIDENT) (HCC): ICD-10-CM

## 2018-10-17 ENCOUNTER — TELEPHONE (OUTPATIENT)
Dept: FAMILY MEDICINE CLINIC | Age: 78
End: 2018-10-17

## 2018-10-17 RX ORDER — CLOPIDOGREL BISULFATE 75 MG/1
TABLET ORAL
Qty: 90 TAB | Refills: 1 | Status: SHIPPED | OUTPATIENT
Start: 2018-10-17 | End: 2019-10-25 | Stop reason: SDUPTHER

## 2018-10-17 NOTE — TELEPHONE ENCOUNTER
----- Message from Wayne Jacobson sent at 10/17/2018  7:32 AM EDT -----  Regarding: Dr. Kalia Kelly / gely Hernández called to request refill for RX\"clopidogrel (PLAVIX) 75 mg tab\". Stated per pharmacy request was sent twice and no response has been received. The patient is completely out of medication. Please provide a status update.   Best contact [phone]

## 2018-10-17 NOTE — TELEPHONE ENCOUNTER
Returned call to Omid Cantu regarding refill request for patient. Let her know that this should be addressed at her appointment on 10/23/18 with . She voiced understanding.

## 2018-10-23 ENCOUNTER — OFFICE VISIT (OUTPATIENT)
Dept: FAMILY MEDICINE CLINIC | Age: 78
End: 2018-10-23

## 2018-10-23 VITALS
OXYGEN SATURATION: 98 % | RESPIRATION RATE: 16 BRPM | BODY MASS INDEX: 21.17 KG/M2 | DIASTOLIC BLOOD PRESSURE: 55 MMHG | HEART RATE: 70 BPM | SYSTOLIC BLOOD PRESSURE: 130 MMHG | HEIGHT: 59 IN | WEIGHT: 105 LBS | TEMPERATURE: 97.8 F

## 2018-10-23 DIAGNOSIS — I10 ESSENTIAL HYPERTENSION: ICD-10-CM

## 2018-10-23 DIAGNOSIS — I63.9 ACUTE CVA (CEREBROVASCULAR ACCIDENT) (HCC): ICD-10-CM

## 2018-10-23 DIAGNOSIS — Z00.00 MEDICARE ANNUAL WELLNESS VISIT, SUBSEQUENT: Primary | ICD-10-CM

## 2018-10-23 DIAGNOSIS — Z76.0 ENCOUNTER FOR MEDICATION REFILL: ICD-10-CM

## 2018-10-23 RX ORDER — CARVEDILOL 3.12 MG/1
3.12 TABLET ORAL 2 TIMES DAILY WITH MEALS
Qty: 60 TAB | Refills: 2 | Status: SHIPPED | OUTPATIENT
Start: 2018-10-23 | End: 2019-10-25 | Stop reason: SDUPTHER

## 2018-10-23 RX ORDER — LISINOPRIL 20 MG/1
20 TABLET ORAL DAILY
Qty: 30 TAB | Refills: 1 | Status: SHIPPED | OUTPATIENT
Start: 2018-10-23 | End: 2019-10-25 | Stop reason: SDUPTHER

## 2018-10-23 RX ORDER — ATORVASTATIN CALCIUM 40 MG/1
40 TABLET, FILM COATED ORAL DAILY
Qty: 90 TAB | Refills: 1 | Status: SHIPPED | OUTPATIENT
Start: 2018-10-23 | End: 2019-10-25 | Stop reason: SDUPTHER

## 2018-10-23 NOTE — PROGRESS NOTES
[address]  [address] [facility] [address]  [phone]             Date of visit: 10/24/2018       This is an Subsequent Medicare Annual Wellness Visit (AWV), (Performed more than 12 months after effective date of Medicare Part B enrollment and 12 months after last preventive visit, Once in a lifetime)    I have reviewed the patient's medical history in detail and updated the computerized patient record. Elizabeth Mares is a 76 y.o. female   History obtained from: the patient. Concerns today   (Patient understands that medical problems addressed today may incur additional cost as this is a preventive visit)  -Pt expressed understanding  -No concerns except for medication refills. Pt requests refill on Lisinopril, atorvastatin and coreg    History     Patient Active Problem List   Diagnosis Code    Hypertension I10    Adjustment disorder F43.20    Anxiety F41.9    H/O CVA (4/2017) I63.9    Cerebral atrophy G31.9    Hyperlipidemia LDL goal <70 E78.5    Unresponsive episode R41.89    History of CVA (cerebrovascular accident) Z80.78    TIA (transient ischemic attack) G45.9    Cardiomyopathy ([address] I42.9     Past Medical History:   Diagnosis Date    Acute CVA (cerebrovascular accident) ([address] 4/10/2017    Per MRI:  Posterior L frontal/parietal territory    Cerebral atrophy 4/10/2017    History of CVA (cerebrovascular accident) 4/10/2017    --L frontal acute infarct and LMCA    Hyperlipidemia LDL goal <70 4/10/2017    Hypertension     TIA (transient ischemic attack)     Unresponsive episode 8/3/2017    transient      Past Surgical History:   Procedure Laterality Date    HX HEENT      Tonsilectomy age 8     Allergies   Allergen Reactions    Benadryl Allergy/Cold [Diphenhyd-Pe-Acetaminophen] Hives    Penicillins Hives     Was treated with Augmentin in June 2015, tolerated medication well.       Current Outpatient Medications   Medication Sig Dispense Refill    lisinopril (PRINIVIL, ZESTRIL) 20 mg tablet Take 1 Tab by mouth daily. 30 Tab 1    atorvastatin (LIPITOR) 40 mg tablet Take 1 Tab by mouth daily. 90 Tab 1    carvedilol (COREG) 3.125 mg tablet Take 1 Tab by mouth two (2) times daily (with meals). 60 Tab 2    clopidogrel (PLAVIX) 75 mg tab TAKE ONE TABLET BY MOUTH ONCE DAILY 90 Tab 1    multivitamin (ONE A DAY) tablet Take 1 Tab by mouth daily.  Omega-3-DHA-EPA-Fish Oil 1,000 mg (120 mg-180 mg) cap Take 1 Cap by mouth as needed.  Calcium-Cholecalciferol, D3, 600 mg(1,500mg) -400 unit cap Take 1 Tab by mouth daily. Family History   Problem Relation Age of Onset    Dementia Mother 61    Heart Attack Father 79     Social History     Tobacco Use    Smoking status: Former Smoker     Last attempt to quit: 1999     Years since quittin.3    Smokeless tobacco: Never Used   Substance Use Topics    Alcohol use: Yes     Alcohol/week: 10.5 oz     Types: 21 Glasses of wine per week     Comment: 3 glasses of port per day       Specialists/Care Team   Tamra Rod has established care with the following healthcare providers:  Patient Care Team:  Radha Reeder MD as PCP - General  Anastasiia Harris RN as Ambulatory Care Navigator  Marcelino Xiong NP (Nurse Practitioner)      Health Risk Assessment     Demographics   female  76 y.o.     General Health Questions   -During the past 4 weeks:   -how would you rate your health in general? Good   -how often have you been bothered by feeling dizzy when standing up? never   -how much have you been bothered by bodily pain? not   -Have you noticed any hearing difficulties? no   -has your physical and emotional health limited your social activities with family or friends? no    Emotional Health Questions   -Do you have a history of depression, anxiety, or emotional problems? no  -Over the past 2 weeks, have you felt down, depressed or hopeless? no  -Over the past 2 weeks, have you felt little interest or pleasure in doing things? no    Health Habits   Please describe your diet habits: Two meals per day with some snack  Do you get 5 servings of fruits or vegetables daily? yes  Do you exercise regularly? no     Activities of Daily Living and Functional Status   -Do you need help with eating, walking, dressing, bathing, toileting, the phone, transportation, shopping, preparing meals, housework, laundry, medications or managing money? no  -In the past four weeks, was someone available to help you if you needed and wanted help with anything? yes  -Are you confident are you that you can control and manage most of your health problems? yes  -Have you been given information to help you keep track of your medications? no  -How often do you have trouble taking your medications as prescribed? never    Fall Risk and Home Safety   Have you fallen 2 or more times in the past year? no  Does your home have rugs in the hallway, lack grab bars in the bathroom, lack handrails on the stairs or have poor lighting? yes  Do you have smoke detectors and check them regularly? yes  Do you have difficulties driving a car? no  Do you always fasten your seat belt when you are in a car? yes    Review of Systems (if indicated for problems addressed today)     A comprehensive review of systems was negative except for that written in the History of Present Illness. Physical Examination     Vitals:    10/23/18 1303   BP: 130/55   Pulse: 70   Resp: 16   Temp: 97.8 °F (36.6 °C)   SpO2: 98%   Weight: 105 lb (47.6 kg)   Height: 4' 11\" (1.499 m)     Body mass index is 21.21 kg/m². Was the patient's timed Up & Go test unsteady or longer than 30 seconds? No    Physical Exam:  Patient without distress.   Heart: Regular rate and rhythm, S1S2 present or without murmur or extra heart sounds  Lung: clear to auscultation throughout lung fields, no wheezes, no rales, no rhonchi and normal respiratory effort  Lower Extremities:  - No edema. Skin - Warm and dry    Evaluation of Cognitive Function   Mood/affect:  happy  Orientation: Person, Place, Time and Situation  Appearance: age appropriate  Family member/caregiver input: Patient accompanied by daughter who said she is doing well    Advice/Referrals/Counseling (as indicated)   Education and counseling provided for any problems identified above:     Preventive Services     (Preventive care checklist to be included in patient instructions)  Discussed today Done Previously Not Needed    x   Pneumococcal vaccines   x   Flu vaccine   x   Hepatitis B vaccine (if at risk)   x   Shingles vaccine   x   TDAP vaccine   x   Mammogram   x   Pap smear   x   Colorectal cancer screening   x   Low-dose CT for lung cancer screening   x   Bone density test   X, sees opthalmologist for cataract   Glaucoma screening   x  x Cholesterol test   X, last done in 2017 and A1C was 6.1   Diabetes screening test      x Diabetes self-management class     x Nutritionist referral for diabetes or renal disease     Discussion of Advance Directive   Will call to discuss with Bill Dalton about preparing an advance directive in the case that an injury or illness causes her to be unable to make health care decisions. Assessment/Plan   Z00.00    ICD-10-CM ICD-9-CM    1. Medicare annual wellness visit, subsequent Z00.00 V70.0    2. Encounter for medication refill Z76.0 V68.1    3. Essential hypertension I10 401.9 lisinopril (PRINIVIL, ZESTRIL) 20 mg tablet      METABOLIC PANEL, BASIC   4. Acute CVA (cerebrovascular accident) ([facility] [address] I63.9 434.91 atorvastatin (LIPITOR) 40 mg tablet       1. Medicare annual wellness visit, subsequent  We have not been able to confirm what type of pneumonia vaccine that pt received in 2011.  Will discuss with pt about restarting the vaccine series since we are unable to know whether she received a PCV-13 or PPSV-23.,    2. Encounter for medication refill: See orders above    3. Essential hypertension  - Refilled lisinopril (PRINIVIL, ZESTRIL) 20 mg tablet  - METABOLIC PANEL, BASIC    4.  Acute CVA (cerebrovascular accident) ([address]  - Cont atorvastatin (LIPITOR) 40 mg     Daughter's contact: [phone]      Zee Arreola MD

## 2018-10-23 NOTE — PROGRESS NOTES
76year old female here for Medicare Wellness visit    Declined influenza vaccine    Hx of CVA -- on plavix only    Unclear if she has had pneumonia vaccinations    I reviewed with the resident the medical history and the resident's findings on the physical examination. I discussed with the resident the patient's diagnosis and concur with the plan.

## 2018-10-24 LAB
BUN SERPL-MCNC: 13 MG/DL (ref 8–27)
BUN/CREAT SERPL: 18 (ref 12–28)
CALCIUM SERPL-MCNC: 9.4 MG/DL (ref 8.7–10.3)
CHLORIDE SERPL-SCNC: 103 MMOL/L (ref 96–106)
CO2 SERPL-SCNC: 25 MMOL/L (ref 20–29)
CREAT SERPL-MCNC: 0.73 MG/DL (ref 0.57–1)
GLUCOSE SERPL-MCNC: 95 MG/DL (ref 65–99)
POTASSIUM SERPL-SCNC: 4.4 MMOL/L (ref 3.5–5.2)
SODIUM SERPL-SCNC: 142 MMOL/L (ref 134–144)

## 2019-01-01 NOTE — PROGRESS NOTES
PT and OT have assessed pt. There are no identified home health needs in regards to therapy recommendations. Pt lives with her daughter. EEG results are negative but this per neurologist does not rule out seizures. .Per my discussion with attending this am,pt may be discharged later if cleared by neurology today . Cardiology has seen pt already today . Per neurology's note,pt received stressful news regarding the death of her 35year old neighbor and had the same response when her son-in-law  also. Please consult me if any other needs are identified but the current plan is for pt to discharge home with follow-up with PCP, [facility]. I notified Radha Perez navigator with Dr Romeo Figueroa office regarding pt.'s hospitalizaton. Thank you.   Monse Vo Principal Discharge DX:	 infant of 40 completed weeks of gestation  Goal:	Continued growth and development  Assessment and plan of treatment:	Follow up with PMD 1-2 days  Feeding on demand at least every 3 hrs  Monitor for >5 wet diapers a day Principal Discharge DX:	 infant of 40 completed weeks of gestation  Goal:	Continued growth and development  Assessment and plan of treatment:	Follow up with PMD 1-2 days  Feeding on demand at least every 2-3 hrs  Monitor for >5 wet diapers a day

## 2019-10-13 ENCOUNTER — APPOINTMENT (OUTPATIENT)
Dept: CT IMAGING | Age: 79
End: 2019-10-13
Attending: EMERGENCY MEDICINE
Payer: MEDICARE

## 2019-10-13 ENCOUNTER — HOSPITAL ENCOUNTER (EMERGENCY)
Age: 79
Discharge: HOME | End: 2019-10-13
Attending: EMERGENCY MEDICINE
Payer: MEDICARE

## 2019-10-13 VITALS
DIASTOLIC BLOOD PRESSURE: 73 MMHG | OXYGEN SATURATION: 98 % | HEIGHT: 59 IN | BODY MASS INDEX: 21.17 KG/M2 | WEIGHT: 105 LBS | TEMPERATURE: 98.2 F | SYSTOLIC BLOOD PRESSURE: 159 MMHG | HEART RATE: 96 BPM | RESPIRATION RATE: 18 BRPM
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
## 2019-10-13 DIAGNOSIS — I10 HYPERTENSION, UNSPECIFIED TYPE: ICD-10-CM

## 2019-10-13 DIAGNOSIS — Z91.14 H/O MEDICATION NONCOMPLIANCE: ICD-10-CM

## 2019-10-13 DIAGNOSIS — S00.03XA HEMATOMA OF SCALP, INITIAL ENCOUNTER: ICD-10-CM

## 2019-10-13 DIAGNOSIS — S09.90XA TRAUMATIC INJURY OF HEAD, INITIAL ENCOUNTER: ICD-10-CM

## 2019-10-13 PROCEDURE — 99284 EMERGENCY DEPT VISIT MOD MDM: CPT

## 2019-10-13 PROCEDURE — 70450 CT HEAD/BRAIN W/O DYE: CPT

## 2019-10-14 NOTE — DISCHARGE INSTRUCTIONS
Patient Education        Learning About a Closed Head Injury  What is a closed head injury? A closed head injury happens when your head gets hit hard. The strong force of the blow causes your brain to shake in your skull. This movement can cause the brain to bruise, swell, or tear. Sometimes nerves or blood vessels also get damaged. This can cause bleeding in or around the brain. A concussion is a type of closed head injury. What are the symptoms? If you have a mild concussion, you may have a mild headache or feel \"not quite right. \" These symptoms are common. They usually go away over a few days to 4 weeks. But sometimes after a concussion, you feel like you can't function as well as before the injury. And you have new symptoms. This is called postconcussive syndrome. You may:  · Find it harder to solve problems, think, concentrate, or remember. · Have headaches. · Have changes in your sleep patterns, such as not being able to sleep or sleeping all the time. · Have changes in your personality. · Not be interested in your usual activities. · Feel angry or anxious without a clear reason. · Lose your sense of taste or smell. · Be dizzy, lightheaded, or unsteady. It may be hard to stand or walk. How is a closed head injury treated? Any person who may have a concussion needs to see a doctor. Some people have to stay in the hospital to be watched. Others can go home safely. If you go home, follow your doctor's instructions. He or she will tell you if you need someone to watch you closely for the next 24 hours or longer. Rest is the best treatment. Get plenty of sleep at night. And try to rest during the day. · Avoid activities that are physically or mentally demanding. These include housework, exercise, and schoolwork. And don't play video games, send text messages, or use the computer. You may need to change your school or work schedule to be able to avoid these activities.   · Ask your doctor when it's okay to drive, ride a bike, or operate machinery. · Take an over-the-counter pain medicine, such as acetaminophen (Tylenol), ibuprofen (Advil, Motrin), or naproxen (Aleve). Be safe with medicines. Read and follow all instructions on the label. · Check with your doctor before you use any other medicines for pain. · Do not drink alcohol or use illegal drugs. They can slow recovery. They can also increase your risk of getting a second head injury. Follow-up care is a key part of your treatment and safety. Be sure to make and go to all appointments, and call your doctor if you are having problems. It's also a good idea to know your test results and keep a list of the medicines you take. Where can you learn more? Go to http://isaura-giovanna.info/. Enter E235 in the search box to learn more about \"Learning About a Closed Head Injury. \"  Current as of: March 28, 2019  Content Version: 12.2  © 4185-8770 [employer]. Care instructions adapted under license by [employer] (which disclaims liability or warranty for this information). If you have questions about a medical condition or this instruction, always ask your healthcare professional. [place] [address] any warranty or liability for your use of this information. Patient Education        Acute High Blood Pressure: Care Instructions  Your Care Instructions    Acute high blood pressure is very high blood pressure. It's a serious problem. Very high blood pressure can damage your brain, heart, eyes, and kidneys. You may have been given medicines to lower your blood pressure. You may have gotten them as pills or through a needle in one of your veins. This is called an IV. And maybe you were given other medicines too. These can be needed when high blood pressure causes other problems. To keep your blood pressure at a lower level, you may need to make healthy lifestyle changes.  And you will probably need to take medicines. Be sure to follow up with your doctor about your blood pressure and what you can do about it. Follow-up care is a key part of your treatment and safety. Be sure to make and go to all appointments, and call your doctor if you are having problems. It's also a good idea to know your test results and keep a list of the medicines you take. How can you care for yourself at home? · See your doctor as often as he or she recommends. This is to make sure your blood pressure is under control. You will probably go at least 2 times a year. But it may be more often at first.  · Take your blood pressure medicine exactly as prescribed. You may take one or more types. They include diuretics, beta-blockers, ACE inhibitors, calcium channel blockers, and angiotensin II receptor blockers. Call your doctor if you think you are having a problem with your medicine. · If you take blood pressure medicine, talk to your doctor before you take decongestants or anti-inflammatory medicine, such as ibuprofen. These can raise blood pressure. · Learn how to check your blood pressure at home. Check it often. · Ask your doctor if it's okay to drink alcohol. · Talk to your doctor about lifestyle changes that can help blood pressure. These include being active and managing your weight. · Don't smoke. Smoking increases your risk for heart attack and stroke. When should you call for help? Call  911 anytime you think you may need emergency care. This may mean having symptoms that suggest that your blood pressure is causing a serious heart or blood vessel problem. Your blood pressure may be over 180/120.   For example, call  911 if:    · You have symptoms of a heart attack. These may include:  ? Chest pain or pressure, or a strange feeling in the chest.  ? Sweating. ? Shortness of breath. ? Nausea or vomiting.   ? Pain, pressure, or a strange feeling in the back, neck, jaw, or upper belly or in one or both shoulders or arms.  ? Lightheadedness or sudden weakness. ? A fast or irregular heartbeat.     · You have symptoms of a stroke. These may include:  ? Sudden numbness, tingling, weakness, or loss of movement in your face, arm, or leg, especially on only one side of your body. ? Sudden vision changes. ? Sudden trouble speaking. ? Sudden confusion or trouble understanding simple statements. ? Sudden problems with walking or balance. ? A sudden, severe headache that is different from past headaches.     · You have severe back or belly pain.    Do not wait until your blood pressure comes down on its own. Get help right away.   Call your doctor now or seek immediate care if:    · Your blood pressure is much higher than normal (such as 180/120 or higher), but you don't have symptoms.     · You think high blood pressure is causing symptoms, such as:  ? Severe headache.  ? Blurry vision.    Watch closely for changes in your health, and be sure to contact your doctor if:    · Your blood pressure measures higher than your doctor recommends at least 2 times. That means the top number is higher or the bottom number is higher, or both.     · You think you may be having side effects from your blood pressure medicine. Where can you learn more? Go to http://isaura-giovanna.info/. Enter S832 in the search box to learn more about \"Acute High Blood Pressure: Care Instructions. \"  Current as of: April 9, 2019  Content Version: 12.2  © 8445-0140 [employer]. Care instructions adapted under license by [employer] (which disclaims liability or warranty for this information). If you have questions about a medical condition or this instruction, always ask your healthcare professional. [place] [address] any warranty or liability for your use of this information. Patient Education     Contusion: Care Instructions  Your Care Instructions  Contusion is the medical term for a bruise.  It is the result of a direct blow or an impact, such as a fall. Contusions are common sports injuries. Most people think of a bruise as a black-and-blue spot. This happens when small blood vessels get torn and leak blood under the skin. But bones, muscles, and organs can also get bruised. This may damage deep tissues but not cause a bruise you can see. The doctor will do a physical exam to find the location of your contusion. You may also have tests to make sure you do not have a more serious injury, such as a broken bone or nerve damage. These may include X-rays or other imaging tests like a CT scan or MRI. Deep-tissue contusions may cause pain and swelling. But if there is no serious damage, they will often get better in a few weeks with home treatment. The doctor has checked you carefully, but problems can develop later. If you notice any problems or new symptoms, get medical treatment right away. Follow-up care is a key part of your treatment and safety. Be sure to make and go to all appointments, and call your doctor if you are having problems. It's also a good idea to know your test results and keep a list of the medicines you take. How can you care for yourself at home? · Put ice or a cold pack on the sore area for 10 to 20 minutes at a time to stop swelling. Put a thin cloth between the ice pack and your skin. · Be safe with medicines. Read and follow all instructions on the label. ¨ If the doctor gave you a prescription medicine for pain, take it as prescribed. ¨ If you are not taking a prescription pain medicine, ask your doctor if you can take an over-the-counter medicine. · If you can, prop up the sore area on pillows as much as possible for the next few days. Try to keep the sore area above the level of your heart. When should you call for help? Call your doctor now or seek immediate medical care if:  · Your pain gets worse. · You have new or worse swelling.   · You have tingling, weakness, or numbness in the area near the contusion. · The area near the contusion is cold or pale. Watch closely for changes in your health, and be sure to contact your doctor if:  · You do not get better as expected. Where can you learn more? Go to DealbitHounder.be  Enter [id number] in the search box to learn more about \"Contusion: Care Instructions. \"   © 7744-9781 [employer]. Care instructions adapted under license by [facility] (which disclaims liability or warranty for this information). This care instruction is for use with your licensed healthcare professional. If you have questions about a medical condition or this instruction, always ask your healthcare professional. [address] any warranty or liability for your use of this information.   Content Version: 52.0.805113; Current as of: May 22, 2015

## 2019-10-14 NOTE — ED PROVIDER NOTES
[de-identified] y.o. female with past medical history significant for HTN, TIA, CVA who presents from [facility] with her Daughter with chief complaint of hematoma s/p GLF. Pt's Daughter at bedside provides history given Pt's history of dementia. Daughter states the pt lives with her and their cats do not get along, which they usually keep  in their rooms. Tonight the cats managed to escape their rooms and started fighting. In attempts to separate the cats, the Daughter accidentally \"knocked down\" the pt. She walked away to put the cats in the separate rooms and when coming back to check on the pt noticed she was holding her head. She reports a hematoma to the right posterior head s/p GLF, denies any LOC. She was seen at [facility] after the incident and referred to the ED for further evaluation. Pt has not taken any medications to modify her pain since the accident. She denies any current anticoagulation therapy. Of note, Daughter at bedside states the pt is currently non-compliant with taking her prescribed medications including her hypertension medication. Daughter specifically denies any recent nausea or vomiting. Tetanus is up to date. Full history, physical exam, and ROS unable to be obtained due to:  dementia. PSHx: Significant for tonsillectomy  Social Hx: negative tobacco use(former smoker), occasional EtOH use, negative Illicit Drug use    PCP: Ash Cotto MD    Note written by Claudette Rush, as dictated by Gricel Jaimes MD 10:34 PM    The history is provided by the patient. No  was used.         Past Medical History:   Diagnosis Date    Acute CVA (cerebrovascular accident) ([address] 4/10/2017    Per MRI:  Posterior L frontal/parietal territory    Cerebral atrophy 4/10/2017    History of CVA (cerebrovascular accident) 4/10/2017    --L frontal acute infarct and LMCA    Hyperlipidemia LDL goal <70 4/10/2017    Hypertension     TIA (transient ischemic attack)     Unresponsive episode 8/3/2017    transient       Past Surgical History:   Procedure Laterality Date    HX HEENT      Tonsilectomy age 8         Family History:   Problem Relation Age of Onset    Dementia Mother 61    Heart Attack Father 79       Social History     Socioeconomic History    Marital status:      Spouse name: Not on file    Number of children: Not on file    Years of education: Not on file    Highest education level: Not on file   Occupational History    Not on file   Social Needs    Financial resource strain: Not on file    Food insecurity:     Worry: Not on file     Inability: Not on file    Transportation needs:     Medical: Not on file     Non-medical: Not on file   Tobacco Use    Smoking status: Former Smoker     Last attempt to quit: 1999     Years since quittin.2    Smokeless tobacco: Never Used   Substance and Sexual Activity    Alcohol use:  Yes     Alcohol/week: 17.5 standard drinks     Types: 21 Glasses of wine per week     Comment: 3 glasses of port per day    Drug use: No    Sexual activity: Never   Lifestyle    Physical activity:     Days per week: Not on file     Minutes per session: Not on file    Stress: Not on file   Relationships    Social connections:     Talks on phone: Not on file     Gets together: Not on file     Attends [employer] service: Not on file     Active member of club or organization: Not on file     Attends meetings of clubs or organizations: Not on file     Relationship status: Not on file    Intimate partner violence:     Fear of current or ex partner: Not on file     Emotionally abused: Not on file     Physically abused: Not on file     Forced sexual activity: Not on file   Other Topics Concern    Not on file   Social History Narrative    Not on file         ALLERGIES: Benadryl allergy/cold [diphenhyd-pe-acetaminophen] and Penicillins    Review of Systems   Unable to perform ROS: Dementia       Vitals:    10/13/19 2237   BP: (!) 236/91   Pulse: 96   Resp: 18   Temp: 98.2 °F (36.8 °C)   SpO2: 98%   Weight: 47.6 kg (105 lb)   Height: 4' 11\" (1.499 m)            Physical Exam   Constitutional: She appears well-developed and well-nourished. Ambulated well coming into ED. HENT:   Head: Normocephalic. Head is without raccoon's eyes and without Lundy's sign. Mouth/Throat: Oropharynx is clear and moist.   Right occipital region large hematoma with abrasion. Eyes: Conjunctivae are normal.   Neck: Normal range of motion. Neck supple. No neck tenderness. Cardiovascular: Normal rate and regular rhythm. Pulmonary/Chest: Effort normal and breath sounds normal. No respiratory distress. Abdominal: Soft. Bowel sounds are normal. There is no tenderness. Musculoskeletal: Normal range of motion. Neurological: She is alert. No cranial nerve deficit. Cranial nerves intact. No gross motor or sensory deficits   Skin: Skin is warm. Capillary refill takes less than 2 seconds. No rash noted. Note written by Claudette Rush, as dictated by Gricel Jaimes MD 10:34 PM     [facility]  ED Course as of Oct 13 2346   Sun Oct 13, 2019   2338 No ataxic or abnormal gait no urinary incontinence. Patient has history of dementia and old age enlarging ventriculomegaly likely as result of brain atrophy. Will discuss imaging findings with the patient's daughter was at bedside and advised that they follow-up with her primary care provider. CT HEAD WO CONT [ZD]      ED Course User Index  [ZD] Gricel Jaimes MD       Procedures      11:46 PM  Patient's results have been reviewed with them. Patient and/or family have verbally conveyed their understanding and agreement of the patient's signs, symptoms, diagnosis, treatment and prognosis and additionally agree to follow up as recommended or return to the Emergency Room should their condition change prior to follow-up.   Discharge instructions have also been provided to the patient with some educational information regarding their diagnosis as well a list of reasons why they would want to return to the ER prior to their follow-up appointment should their condition change. No results found for this or any previous visit (from the past 24 hour(s)). Ct Head Wo Cont    Result Date: 10/13/2019  EXAM: CT HEAD WO CONT INDICATION: head trauma COMPARISON: 8/4/2017. CONTRAST: None. TECHNIQUE: Unenhanced CT of the head was performed using 5 mm images. Brain and bone windows were generated. CT dose reduction was achieved through use of a standardized protocol tailored for this examination and automatic exposure control for dose modulation. FINDINGS: The ventricles and sulci are more prominent in size in size, but similar in shape and configuration density seen previously. . There is severe periventricular white matter hypodensity. There is no intracranial hemorrhage, extra-axial collection, mass, mass effect or midline shift. The basilar cisterns are open. No acute infarct is identified. The bone windows demonstrate no abnormalities. The visualized portions of the paranasal sinuses and mastoid air cells are clear. A large right posterior parietal scalp hematoma is noted. Hypertrophic changes of the C1-2 CPPD arthropathy results in a upper cervical stenosis of about 1 cm.      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

## 2019-10-14 NOTE — ED TRIAGE NOTES
Pt here with daughter who is explaining what happened because pt has dementia. .   Pt fell and hit her head on either a wall or the door. Unsure which but has a goose egg on right back side of head. Denies blood thinners. [facility] Then was told to come here. . Pt hasnt been taking her meds.

## 2019-10-25 ENCOUNTER — OFFICE VISIT (OUTPATIENT)
Dept: FAMILY MEDICINE CLINIC | Age: 79
End: 2019-10-25

## 2019-10-25 VITALS
BODY MASS INDEX: 21.57 KG/M2 | TEMPERATURE: 97.3 F | RESPIRATION RATE: 16 BRPM | WEIGHT: 107 LBS | SYSTOLIC BLOOD PRESSURE: 170 MMHG | DIASTOLIC BLOOD PRESSURE: 80 MMHG | OXYGEN SATURATION: 98 % | HEIGHT: 59 IN | HEART RATE: 81 BPM

## 2019-10-25 DIAGNOSIS — I63.9 ACUTE CVA (CEREBROVASCULAR ACCIDENT) (HCC): ICD-10-CM

## 2019-10-25 DIAGNOSIS — E78.5 HYPERLIPIDEMIA, UNSPECIFIED HYPERLIPIDEMIA TYPE: ICD-10-CM

## 2019-10-25 DIAGNOSIS — Z23 ENCOUNTER FOR IMMUNIZATION: ICD-10-CM

## 2019-10-25 DIAGNOSIS — R73.03 PREDIABETES: ICD-10-CM

## 2019-10-25 DIAGNOSIS — I10 ESSENTIAL HYPERTENSION: ICD-10-CM

## 2019-10-25 DIAGNOSIS — Z00.00 MEDICARE ANNUAL WELLNESS VISIT, INITIAL: Primary | ICD-10-CM

## 2019-10-25 DIAGNOSIS — R41.89 COGNITIVE IMPAIRMENT: ICD-10-CM

## 2019-10-25 RX ORDER — CARVEDILOL 3.12 MG/1
3.12 TABLET ORAL 2 TIMES DAILY WITH MEALS
Qty: 180 TAB | Refills: 1 | Status: SHIPPED | OUTPATIENT
Start: 2019-10-25 | End: 2020-04-15 | Stop reason: SDUPTHER

## 2019-10-25 RX ORDER — MEMANTINE HYDROCHLORIDE 5 MG-10 MG
KIT ORAL
Qty: 1 TAB | Refills: 0 | Status: SHIPPED | OUTPATIENT
Start: 2019-10-25 | End: 2019-11-08 | Stop reason: DRUGHIGH

## 2019-10-25 RX ORDER — LISINOPRIL 20 MG/1
20 TABLET ORAL DAILY
Qty: 90 TAB | Refills: 1 | Status: SHIPPED | OUTPATIENT
Start: 2019-10-25 | End: 2020-04-15 | Stop reason: SDUPTHER

## 2019-10-25 RX ORDER — CLOPIDOGREL BISULFATE 75 MG/1
TABLET ORAL
Qty: 90 TAB | Refills: 1 | Status: SHIPPED | OUTPATIENT
Start: 2019-10-25 | End: 2020-04-15 | Stop reason: SDUPTHER

## 2019-10-25 RX ORDER — ATORVASTATIN CALCIUM 40 MG/1
40 TABLET, FILM COATED ORAL DAILY
Qty: 90 TAB | Refills: 1 | Status: SHIPPED | OUTPATIENT
Start: 2019-10-25 | End: 2020-04-15 | Stop reason: SDUPTHER

## 2019-10-25 NOTE — PROGRESS NOTES
Date of visit: 10/28/2019     This is an Subsequent Medicare Annual Wellness Visit (AWV), (Performed more than 12 months after effective date of Medicare Part B enrollment and 12 months after last preventive visit, Once in a lifetime)    I have reviewed the patient's medical history in detail and updated the computerized patient record. Tone Butt is a [de-identified] y.o. female   History obtained from: child and the patient. Concerns today   - Concerns with memory issues, getting progressively worse over the last couple years, misplaces and loses things at home like car keys, house keys. Picks things up in her hands and will forget to let go of it and then loses it in the house. Lives with her daughter since 2011. Does not cook. Handles finances with her daughter. Had issues in 2017 with not paying bills on time. Has been having issues with taking her medications and currently not taking anything. States at home alone during the day while her daughter is at work. She is not currenlty driving.      History     Patient Active Problem List   Diagnosis Code    Hypertension I10    Adjustment disorder F43.20    Anxiety F41.9    H/O CVA (4/2017) I63.9    Cerebral atrophy ([address] G31.9    Hyperlipidemia LDL goal <70 E78.5    Unresponsive episode R41.89    History of CVA (cerebrovascular accident) Z80.78    TIA (transient ischemic attack) G45.9    Cardiomyopathy ([address] I42.9     Past Medical History:   Diagnosis Date    Acute CVA (cerebrovascular accident) ([address] 4/10/2017    Per MRI:  Posterior L frontal/parietal territory    Cerebral atrophy ([address] 4/10/2017    History of CVA (cerebrovascular accident) 4/10/2017    --L frontal acute infarct and LMCA    Hyperlipidemia LDL goal <70 4/10/2017    Hypertension     TIA (transient ischemic attack)     Unresponsive episode 8/3/2017    transient      Past Surgical History:   Procedure Laterality Date    HX HEENT      Tonsilectomy age 8     Allergies   Allergen Reactions    Benadryl Allergy/Cold [Diphenhyd-Pe-Acetaminophen] Hives    Penicillins Hives     Was treated with Augmentin in 2015, tolerated medication well. Current Outpatient Medications   Medication Sig Dispense Refill    NAMENDA TITRATION ESTHER 5-10 mg DsPk Take as directed 1 dose pack 1 Tab 0    atorvastatin (LIPITOR) 40 mg tablet Take 1 Tab by mouth daily. 90 Tab 1    carvedilol (COREG) 3.125 mg tablet Take 1 Tab by mouth two (2) times daily (with meals). 180 Tab 1    clopidogrel (PLAVIX) 75 mg tab TAKE ONE TABLET BY MOUTH ONCE DAILY 90 Tab 1    lisinopril (PRINIVIL, ZESTRIL) 20 mg tablet Take 1 Tab by mouth daily. 90 Tab 1    multivitamin (ONE A DAY) tablet Take 1 Tab by mouth daily.  Omega-3-DHA-EPA-Fish Oil 1,000 mg (120 mg-180 mg) cap Take 1 Cap by mouth as needed.  Calcium-Cholecalciferol, D3, 600 mg(1,500mg) -400 unit cap Take 1 Tab by mouth daily. Family History   Problem Relation Age of Onset    Dementia Mother 61    Heart Attack Father 79     Social History     Tobacco Use    Smoking status: Former Smoker     Last attempt to quit: 1999     Years since quittin.3    Smokeless tobacco: Never Used   Substance Use Topics    Alcohol use: Yes     Alcohol/week: 17.5 standard drinks     Types: 21 Glasses of wine per week     Comment: 3 glasses of port per day       Specialists/Care Team   Monserrat Choudhary has established care with the following healthcare providers:  Patient Care Team:  Ronna Molina MD as PCP - General (Family Practice)  Ronna Molina MD as PCP - [facility] Empaneled Provider  Hellen Navarro NP (Nurse Practitioner)    Health Risk Assessment     Demographics   female  [de-identified] y.o. Review of Systems (if indicated for problems addressed today)   Denies safety issues with driving, getting lost or cooking.      Physical Examination     Vitals:    10/25/19 1627   BP: 170/80   Pulse: 81   Resp: 16   Temp: 97.3 °F (36.3 °C)   TempSrc: Oral SpO2: 98%   Weight: 107 lb (48.5 kg)   Height: 4' 11\" (1.499 m)     Body mass index is 21.61 kg/m². No exam data present  Was the patient's timed Up & Go test unsteady or longer than 30 seconds? no    Evaluation of Cognitive Function   Mood/affect:  neutral  Orientation: Person, Place and Situation  Appearance: age appropriate  Family member/caregiver input: having difficulty with memory    Additional exam if indicated for problems addressed today:    Gen - WNWD, NAD  Heart - RRR, no murmurs  Lungs - CTA no wheezes/ ronchi/ rales  Neuro - alert and oriented to person, place and situation   Ext - no edema    MMSE - 13/30    Advice/Referrals/Counseling (as indicated)   Education and counseling provided for any problems identified above: safety issues related to memory loss. Preventive Services     Discussed today Done Previously Not Needed     2011  Pneumococcal vaccines   X   Flu vaccine      Hepatitis B vaccine (if at risk)   X   Shingles vaccine    2014  TDAP vaccine   X   Mammogram     X Pap smear     X Colorectal cancer screening     X Low-dose CT for lung cancer screening   X   Bone density test   X   Glaucoma screening   X   Cholesterol test    2017 prediabetic  Diabetes screening test      X Diabetes self-management class     X Nutritionist referral for diabetes or renal disease     Discussion of Advance Directive   Discussed with Marisol Rang her ability to prepare and advance directive in the case that an injury or illness causes her to be unable to make health care decisions. Daughter - [phone]  Working on obtaining [address] [facility] forms with . Assessment/Plan       ICD-10-CM ICD-9-CM    1. Medicare annual wellness visit, initial Z00.00 V70.0    2.  Encounter for immunization Z23 V03.89 MD IMMUNIZ ADMIN,1 SINGLE/COMB VAC/TOXOID      varicella-zoster recombinant, PF, (SHINGRIX, PF,) 50 mcg/0.5 mL susr injection      INFLUENZA VACCINE INACTIVATED (IIV), SUBUNIT, ADJUVANTED, IM      CANCELED: INFLUENZA VIRUS VAC QUAD,SPLIT,PRESV FREE SYRINGE IM   3. Acute CVA (cerebrovascular accident) ([facility] [address] I63.9 434.91 LIPID PANEL      atorvastatin (LIPITOR) 40 mg tablet      carvedilol (COREG) 3.125 mg tablet      clopidogrel (PLAVIX) 75 mg tab   4. Essential hypertension I10 401.9 carvedilol (COREG) 3.125 mg tablet      lisinopril (PRINIVIL, ZESTRIL) 20 mg tablet   5. Cognitive impairment R41.89 294.9 NAMENDA TITRATION ESTHER 5-10 mg DsPk   6. Hyperlipidemia, unspecified hyperlipidemia type E78.5 272.4 LIPID PANEL      atorvastatin (LIPITOR) 40 mg tablet   7.  Prediabetes R73.03 790.29 HEMOGLOBIN A1C WITH EAG     Orders Placed This Encounter    [place] IMMUNIZ ADMIN,1 SINGLE/COMB VAC/TOXOID    Influenza Vaccine Inactivated (IIV)(FLUAD), Subunit, Adjuvanted, IM, (18098)    LIPID PANEL    HEMOGLOBIN A1C WITH EAG    varicella-zoster recombinant, PF, (SHINGRIX, PF,) 50 mcg/0.5 mL susr injection    NAMENDA TITRATION ESTHER 5-10 mg DsPk    atorvastatin (LIPITOR) 40 mg tablet    carvedilol (COREG) 3.125 mg tablet    clopidogrel (PLAVIX) 75 mg tab    lisinopril (PRINIVIL, ZESTRIL) 20 mg tablet     Medicare Physical  - Flu shot today  - Declined all preventive care services after discussion of pros vs cons, will consider DEXA scan    HTN  - Refill Lisinopril, Coreg    HLD/ CAD/ Hx of CVA  - Refill Plavix, Coreg and Lipitor  - Check lipid panel    Prediabetes  - check A1c    Severe cognitive impairment  - MMSE 13/30  - Start Namenda dose pack, discussed given the progressive nature and her current disease state this may not help much but will trial given low side effect profile  - Encourage to take medicine as able  - Discussed importance of exercise, socialization, good diet and safety concerns    Follow up in 1 month for reassessment     Patient seen/ discussed with Dr. Dilcia Vaca (Attending)    Ramy Navas DO

## 2019-10-28 ENCOUNTER — TELEPHONE (OUTPATIENT)
Dept: FAMILY MEDICINE CLINIC | Age: 79
End: 2019-10-28

## 2019-10-28 NOTE — TELEPHONE ENCOUNTER
Patient daughter (on hippa)Janna Mohamud states that      Per pharmacy insurance requesting an alternative prior to patient trying medication prescribed for memory.       [phone]

## 2019-10-29 NOTE — PROGRESS NOTES
I saw and evaluated the patient, performing the key elements of the service. I discussed the findings, assessment and plan with the resident and agree with the resident's findings and plan as documented in the resident's note. Alert, elderly lady in NAD.

## 2019-10-30 NOTE — TELEPHONE ENCOUNTER
Rachel Addison called stating she would like a prior auth done for [facility] TITRATION ESTHER 5-10 mg DsPk. Rachel Addison [phone]    She would like a call back with an update when you have one.

## 2019-10-31 NOTE — TELEPHONE ENCOUNTER
----- Message from Ramona Ayoub sent at 10/31/2019  4:12 PM EDT -----  Regarding: Dr. Spring Akhtar first and last name: Vick Barkley , Pt's daughter. Reason for call: Pt's daughter stated the pt's insurance did not approve Namenda Titration. Pt's daughter wants to know if something can be done to change that.        Callback required yes/no and why: yes      Best contact number(s): [phone]      Details to clarify the request:      Javier Owusu

## 2019-10-31 NOTE — TELEPHONE ENCOUNTER
Left message to call office,     Per [facility] is  ready for pick-up. No prior authorization  required.

## 2019-11-01 NOTE — TELEPHONE ENCOUNTER
Left message for patient daughter to call office. Per [facility] patient will need to try Donepezil, Memantine and Rivastigmine.

## 2019-11-01 NOTE — TELEPHONE ENCOUNTER
Deepthi Alexander/Daughter on hippa returned call to nurse. Relayed message per nurse(bennett cai) notes and she is satisfied.

## 2019-11-07 NOTE — TELEPHONE ENCOUNTER
/Telephone   Received: Yesterday      Call patient   Message Contents   Chantal Ellis [address] Message/Vendor Calls     Caller's first and last name:Janna Bell ,Daughter     Reason for call:Rx     Callback required yes/no and why:Yes     Best contact number(s):[phone] or IJ:[phone]     Details to clarify the request:Janna requesting a 90 day supply Rx for \"Memantine\" to be call into [address] [facility] at [phone] due to memory loss. Cathryn Tyler stated the insurance would cover Rx and she been waiting for a week.      Franchesca Restrepo Pouch

## 2019-11-08 ENCOUNTER — TELEPHONE (OUTPATIENT)
Dept: FAMILY MEDICINE CLINIC | Age: 79
End: 2019-11-08

## 2019-11-08 RX ORDER — MEMANTINE HYDROCHLORIDE 5 MG/1
TABLET ORAL
Qty: 70 TAB | Refills: 0 | Status: SHIPPED | OUTPATIENT
Start: 2019-11-08 | End: 2019-12-11 | Stop reason: SDUPTHER

## 2019-11-08 NOTE — TELEPHONE ENCOUNTER
Details to clarify the request:Janna requesting a 90 day supply Rx for \"Memantine\" to be call into [address] [facility] at [phone] due to memory loss. Cara Mcfarland stated the insurance would cover Rx and she been waiting for a week.

## 2019-12-09 ENCOUNTER — TELEPHONE (OUTPATIENT)
Dept: FAMILY MEDICINE CLINIC | Age: 79
End: 2019-12-09

## 2019-12-09 NOTE — TELEPHONE ENCOUNTER
Patient daughter on hippa calling and states that her mother will take her last pill on Tuesday on below medication. States that medication working well and want to keep her on it and not miss a dose.  Asking if you can have refilled for  on tomorrow        memantine [facility]) 5 mg tablet 70 Tab 0 11/8/2019     Sig: Take 1 tab daily x 1 week, then 1 tab BID x 1week, then 2 tab in AM and 1 tab in PM x1 week, then 2 tab BID x1 week    Sent to pharmacy as: memantine (NAMENDA) 5 mg tablet    E-Prescribing Status: Receipt confirmed by pharmacy (11/8/2019  1:17 PM EST)

## 2019-12-10 NOTE — PROGRESS NOTES
HPI       Chief Complaint: Follow up dementia     Tita Samuel is a 78 y.o. female who presents for follow up. Severe cognitive impairment - MMSE 13/30 in 10/2019. Memantine added 11/2019, titrated up. Is currently taking 10mg BID. Working well. Daughter reports pt's memory has improved (remembering where she placed items, etc). Would like for her to stay on it. Has not noticed any negative side effects. HTN - coreg 3.125mg BID, lisinopril 20mg daily. Have a BP cuff but do not check BP at home. /90 today. Reports they had been off all BP meds for about 1 year until her appt 10/2019. No severe headaches, dizziness lightheadness, vision changes, N/V, or recent falls. Has cataract surgery scheduled 1/2020. Had medicare wellness with Dr. Raffaele Ledesma. Forgot about the shingrix vaccination, asking for another copy today. Will also have bloodwork done today - fasting. (Ordered by Dr. Raffaele Ledesma on 10/25) - lipid panel and A1c. PMHx:  Past Medical History:   Diagnosis Date    Acute CVA (cerebrovascular accident) ([facility] [address] 4/10/2017    Per MRI:  Posterior L frontal/parietal territory    Cerebral atrophy ([address] 4/10/2017    History of CVA (cerebrovascular accident) 4/10/2017    --L frontal acute infarct and LMCA    Hyperlipidemia LDL goal <70 4/10/2017    Hypertension     TIA (transient ischemic attack)     Unresponsive episode 8/3/2017    transient     Meds:   Current Outpatient Medications   Medication Sig Dispense Refill    memantine (NAMENDA) 10 mg tablet Take 1 Tab by mouth two (2) times a day. 180 Tab 1    varicella-zoster recombinant, PF, (SHINGRIX, PF,) 50 mcg/0.5 mL susr injection 0.5 mL by IntraMUSCular route every two (2) months. 0.5 mL 1    atorvastatin (LIPITOR) 40 mg tablet Take 1 Tab by mouth daily. 90 Tab 1    carvedilol (COREG) 3.125 mg tablet Take 1 Tab by mouth two (2) times daily (with meals).  180 Tab 1    clopidogrel (PLAVIX) 75 mg tab TAKE ONE TABLET BY MOUTH ONCE DAILY 90 Tab 1    lisinopril (PRINIVIL, ZESTRIL) 20 mg tablet Take 1 Tab by mouth daily. 90 Tab 1    ofloxacin (FLOXIN) 0.3 % ophthalmic solution INSTILL 1 DROP 4 TIMES DAILY INTO SURGERY EYE START 2 DAYS PRIOR TO SURGERY  1    ketorolac (ACULAR) 0.5 % ophthalmic solution INSTILL 1 DROP 4 TIMES DAILY INTO EYE HAVING SURGERY START 2 DAYS PRIOR TO SURGERY  1    multivitamin (ONE A DAY) tablet Take 1 Tab by mouth daily.  Omega-3-DHA-EPA-Fish Oil 1,000 mg (120 mg-180 mg) cap Take 1 Cap by mouth as needed.  Calcium-Cholecalciferol, D3, 600 mg(1,500mg) -400 unit cap Take 1 Tab by mouth daily. Allergies: Allergies   Allergen Reactions    Benadryl Allergy/Cold [Diphenhyd-Pe-Acetaminophen] Hives    Penicillins Hives     Was treated with Augmentin in June 2015, tolerated medication well. ROS  Review of Systems   Constitutional: Negative for chills, fever and weight loss. Eyes: Negative for blurred vision and double vision. Respiratory: Negative for cough, shortness of breath and wheezing. Cardiovascular: Negative for chest pain and palpitations. Gastrointestinal: Negative for abdominal pain, constipation, diarrhea, nausea and vomiting. Neurological: Negative for dizziness, weakness and headaches. Physical Exam:  Visit Vitals  /50   Pulse 78   Temp 96.2 °F (35.7 °C) (Oral)   Resp 18   Ht 4' 11\" (1.499 m)   Wt 106 lb (48.1 kg)   SpO2 98%   BMI 21.41 kg/m²       Wt Readings from Last 3 Encounters:   12/11/19 106 lb (48.1 kg)   10/25/19 107 lb (48.5 kg)   10/13/19 105 lb (47.6 kg)     BP Readings from Last 3 Encounters:   12/11/19 122/50   10/25/19 170/80   10/13/19 159/73      Physical Exam  Vitals signs reviewed. Constitutional:       Appearance: She is well-developed. Neck:      Musculoskeletal: Normal range of motion and neck supple. Thyroid: No thyromegaly. Cardiovascular:      Rate and Rhythm: Normal rate. Rhythm irregular. Heart sounds: No murmur. Comments: Difficult to distinguish irregularly irregular vs regular w ectopy  Pulmonary:      Effort: Pulmonary effort is normal. No respiratory distress. Breath sounds: Normal breath sounds. No wheezing or rales. Abdominal:      General: Bowel sounds are normal. There is no distension. Palpations: Abdomen is soft. Tenderness: There is no tenderness. Skin:     General: Skin is warm and dry. Neurological:      Mental Status: She is alert. Assessment     78 y.o. female with:    ICD-10-CM ICD-9-CM    1. Cognitive impairment R41.89 294.9 memantine (NAMENDA) 10 mg tablet   2. Essential hypertension I10 401.9    3. Irregular heart rhythm I49.9 427.9 AMB POC EKG ROUTINE W/ 12 LEADS, INTER & REP      REFERRAL TO CARDIOLOGY   4. Encounter for immunization Z23 V03.89 varicella-zoster recombinant, PF, (SHINGRIX, PF,) 50 mcg/0.5 mL susr injection              Plan     1. Cognitive impairment  Improving. Continue current medication. - memantine (NAMENDA) 10 mg tablet; Take 1 Tab by mouth two (2) times a day. Dispense: 180 Tab; Refill: 1    2. Essential hypertension  Stable, continue current medication. Asymptomatic. 3. Irregular heart rhythm  Noted on physical exam. Pt asymptomatic and denies prior hx. Per chart review, pt previously followed by Cardiology (last seen 8/2017 by Dr. Marion Price. Hx PVCs but they wanted her to wear 30 day monitor to assess for Afib due to CVA 4/2017, which pt declined. EKG today personally reviewed - sinus rhythm with PVCs and bigeminy. P waves visible. Recommended she f/u with cardiology as she was lost to f/u about 1 year ago. Discussed ED precautions. - AMB POC EKG ROUTINE W/ 12 LEADS, INTER & REP  - REFERRAL TO CARDIOLOGY    4. Encounter for immunization  Printed out script provided at their request.   - varicella-zoster recombinant, PF, (SHINGRIX, PF,) 50 mcg/0.5 mL susr injection; 0.5 mL by IntraMUSCular route every two (2) months.   Dispense: 0.5 mL; Refill: 1      Follow-up and Dispositions    · Return in about 4 months (around 4/11/2020) for follow up chronic conditions . Patient seen and discussed with Dr. Olamide Segal (attending physician)    I have discussed the diagnosis with the patient and the intended plan as seen in the above orders. The patient has received an after-visit summary and questions were answered concerning future plans. I have discussed medication side effects and warnings with the patient as well.     Mayra Ma MD  Family Medicine Resident  PGY-3

## 2019-12-11 ENCOUNTER — TELEPHONE (OUTPATIENT)
Dept: FAMILY MEDICINE CLINIC | Age: 79
End: 2019-12-11

## 2019-12-11 ENCOUNTER — OFFICE VISIT (OUTPATIENT)
Dept: FAMILY MEDICINE CLINIC | Age: 79
End: 2019-12-11

## 2019-12-11 VITALS
WEIGHT: 106 LBS | HEIGHT: 59 IN | BODY MASS INDEX: 21.37 KG/M2 | TEMPERATURE: 96.2 F | OXYGEN SATURATION: 98 % | DIASTOLIC BLOOD PRESSURE: 50 MMHG | HEART RATE: 78 BPM | RESPIRATION RATE: 18 BRPM | SYSTOLIC BLOOD PRESSURE: 122 MMHG

## 2019-12-11 DIAGNOSIS — I49.9 IRREGULAR HEART RHYTHM: ICD-10-CM

## 2019-12-11 DIAGNOSIS — R41.89 COGNITIVE IMPAIRMENT: Primary | ICD-10-CM

## 2019-12-11 DIAGNOSIS — E78.5 HYPERLIPIDEMIA, UNSPECIFIED HYPERLIPIDEMIA TYPE: ICD-10-CM

## 2019-12-11 DIAGNOSIS — I63.9 ACUTE CVA (CEREBROVASCULAR ACCIDENT) (HCC): ICD-10-CM

## 2019-12-11 DIAGNOSIS — I10 ESSENTIAL HYPERTENSION: ICD-10-CM

## 2019-12-11 DIAGNOSIS — R73.03 PREDIABETES: ICD-10-CM

## 2019-12-11 DIAGNOSIS — Z23 ENCOUNTER FOR IMMUNIZATION: ICD-10-CM

## 2019-12-11 RX ORDER — KETOROLAC TROMETHAMINE 5 MG/ML
SOLUTION OPHTHALMIC
Refills: 1 | COMMUNITY
Start: 2019-10-23 | End: 2020-05-20 | Stop reason: CLARIF

## 2019-12-11 RX ORDER — OFLOXACIN 3 MG/ML
SOLUTION/ DROPS OPHTHALMIC
Refills: 1 | COMMUNITY
Start: 2019-10-23 | End: 2020-05-20 | Stop reason: CLARIF

## 2019-12-11 RX ORDER — MEMANTINE HYDROCHLORIDE 10 MG/1
10 TABLET ORAL 2 TIMES DAILY
Qty: 180 TAB | Refills: 1 | Status: SHIPPED | OUTPATIENT
Start: 2019-12-11 | End: 2020-04-15 | Stop reason: SDUPTHER

## 2019-12-11 NOTE — TELEPHONE ENCOUNTER
Janna/Patient daughter calling and states that she was told to call when she got home to look at her mother's medications to make sure they were being taken correctly. She states that \"yes\" medications taken as directed.     She also wants to thank you for information on the cardiologist as well    [phone]

## 2019-12-11 NOTE — PATIENT INSTRUCTIONS
The shingrix vaccination is 2 doses - the first one now, and the 2nd one is 2-6 months later. Home Blood Pressure Test: About This Test  What is it? A home blood pressure test allows you to keep track of your blood pressure at home. Blood pressure is a measure of the force of blood against the walls of your arteries. Blood pressure readings include two numbers, such as 130/80 (say \"130 over 80\"). The first number is the systolic pressure. The second number is the diastolic pressure. Why is this test done? You may do this test at home to:  · Find out if you have high blood pressure. · Track your blood pressure if you have high blood pressure. · Track how well medicine is working to reduce high blood pressure. · Check how lifestyle changes, such as weight loss and exercise, are affecting blood pressure. How can you prepare for the test?  · Do not use caffeine, tobacco, or medicines known to raise blood pressure (such as nasal decongestant sprays) for at least 30 minutes before taking your blood pressure. · Do not exercise for at least 30 minutes before taking your blood pressure. What happens before the test?  Take your blood pressure while you feel comfortable and relaxed. Sit quietly with both feet on the floor for at least 5 minutes before the test.  What happens during the test?  · Sit with your arm slightly bent and resting on a table so that your upper arm is at the same level as your heart. · Roll up your sleeve or take off your shirt to expose your upper arm. · Wrap the blood pressure cuff around your upper arm so that the lower edge of the cuff is about 1 inch above the bend of your elbow. Proceed with the following steps depending on if you are using an automatic or manual pressure monitor. Automatic blood pressure monitors  · Press the on/off button on the automatic monitor and wait until the ready-to-measure \"heart\" symbol appears next to zero in the display window.   · Press the start button. The cuff will inflate and deflate by itself. · Your blood pressure numbers will appear on the screen. · Write your numbers in your log book, along with the date and time. Manual blood pressure monitors  · Place the earpieces of a stethoscope in your ears, and place the bell of the stethoscope over the artery, just below the cuff. · Close the valve on the rubber inflating bulb. · Squeeze the bulb rapidly with your opposite hand to inflate the cuff until the dial or column of mercury reads about 30 mm Hg higher than your usual systolic pressure. If you do not know your usual pressure, inflate the cuff to 210 mm Hg or until the pulse at your wrist disappears. · Open the pressure valve just slightly by twisting or pressing the valve on the bulb. · As you watch the pressure slowly fall, note the level on the dial at which you first start to hear a pulsing or tapping sound through the stethoscope. This is your systolic blood pressure. · Continue letting the air out slowly. The sounds will become muffled and will finally disappear. Note the pressure when the sounds completely disappear. This is your diastolic blood pressure. Let out all the remaining air. · Write your numbers in your log book, along with the date and time. What else should you know about the test?  It is more accurate to take the average of several readings made throughout the day than to rely on a single reading. It's normal for blood pressure to go up and down throughout the day. Follow-up care is a key part of your treatment and safety. Be sure to make and go to all appointments, and call your doctor if you are having problems. It's also a good idea to keep a list of the medicines you take. Where can you learn more? Go to http://isaura-giovanna.info/.   Enter C427 in the search box to learn more about \"Home Blood Pressure Test: About This Test.\"  Current as of: April 9, 2019  Content Version: 12.2  © 7722-9741 Healthwise, Incorporated. Care instructions adapted under license by [employer] (which disclaims liability or warranty for this information). If you have questions about a medical condition or this instruction, always ask your healthcare professional. [address] any warranty or liability for your use of this information. Premature Heartbeat: Care Instructions  Your Care Instructions    A premature heartbeat happens when the heart beats earlier than it should. This briefly interrupts the heart's rhythm. You do not usually feel the early heartbeat, and the next beat is stronger. To many people, this feels like a skipped heartbeat or a flutter. This heartbeat is also called a premature ventricular contraction (PVC). If you have no heart problems, premature heartbeats that happen once in a while are not a cause for concern. Most people have them at some time. They may happen more often if you drink a lot of caffeine or alcohol or are under stress. Usually, no cause for a premature heartbeat is found, and no treatment is needed. Some people may take medicine to prevent these heartbeats and to relieve symptoms. Follow-up care is a key part of your treatment and safety. Be sure to make and go to all appointments, and call your doctor if you are having problems. It's also a good idea to know your test results and keep a list of the medicines you take. How can you care for yourself at home? · Limit caffeine and other stimulants if they trigger premature heartbeats. · Reduce stress. Avoid people and places that make you feel anxious, if you can. Learn ways to reduce stress, such as biofeedback, guided imagery, and meditation. · Do not smoke or allow others to smoke around you. If you need help quitting, talk to your doctor about stop-smoking programs and medicines. These can increase your chances of quitting for good.   · Get at least 30 minutes of exercise on most days of the week. Walking is a good choice. You also may want to do other activities, such as running, swimming, cycling, or playing tennis or team sports. · Eat heart-healthy foods. · Stay at a healthy weight. Lose weight if you need to. · Get enough sleep. Keep your room dark and quiet, and try to go to bed at the same time every night. · Limit alcohol to 2 drinks a day for men and 1 drink a day for women. Too much alcohol can cause health problems. If drinking alcohol causes more premature heartbeats, do not drink it. · If your doctor prescribes medicine, take it exactly as prescribed. Call your doctor if you think you are having a problem with your medicine. When should you call for help? Call 911 anytime you think you may need emergency care. For example, call if:    · You passed out (lost consciousness).    Call your doctor now or seek immediate medical care if:    · You are dizzy or lightheaded, or you feel like you may faint.     · You are short of breath.    Watch closely for changes in your health, and be sure to contact your doctor if you have any problems. Where can you learn more? Go to http://isaura-giovanna.info/. Enter I637 in the search box to learn more about \"Premature Heartbeat: Care Instructions. \"  Current as of: April 9, 2019  Content Version: 12.2  © 3474-3702 Healthwise, Incorporated. Care instructions adapted under license by [employer] (which disclaims liability or warranty for this information). If you have questions about a medical condition or this instruction, always ask your healthcare professional. [place] [address] any warranty or liability for your use of this information. Helping A Person With Dementia: Care Instructions  Your Care Instructions    Dementia is a loss of mental skills that affects daily life. It is different from mild memory loss that occurs with aging.  Dementia can cause problems with memory, thinking clearly, and planning. It is different for everyone. But it usually gets worse slowly. Some people who have dementia can function well for a long time. But at some point it may become hard for the person to care for himself or herself. It can be upsetting to learn that a loved one has this condition. You may be afraid and worried about what will happen. You may wonder how you will care for the person. There is no cure for dementia. But medicine may be able to slow memory loss and improve thinking for a while. Other medicines may help with sleep, depression, and behavior changes. Dementia is different for everyone. In some cases, people can function well for a long time. You can help your loved one by making his or her home life easier and safer. You also need to take care of yourself. Caregiving can be stressful. But support is available to help you and give you a break when you need it. The Alzheimer's Association offers good information and support. If you are caring for someone with dementia, you can help make life safer and more comfortable. You can also help your loved one make decisions about future care. You may also want to bring up legal and financial issues. These are hard but important conversations to have. Follow-up care is a key part of your loved one's treatment and safety. Be sure to make and go to all appointments, and call your doctor if your loved one is having problems. It's also a good idea to know your loved one's test results and keep a list of the medicines he or she takes. How can you care for your loved one at home? Taking care of the person  · If the person takes medicine for dementia, help him or her take it exactly as prescribed. Call the doctor if you notice any problems with the medicine. · Make a list of the person's medicines. Review it with all of his or her doctors. · Help the person eat a balanced diet. Serve plenty of whole grains, fruits, and vegetables every day.  If the person is not hungry at mealtimes, give snacks at midmorning and in the afternoon. Offer drinks such as Boost, Ensure, or Sustacal if the person is losing weight. · Encourage exercise. Walking and other activities may slow the decline of mental ability. Help the person stay active mentally with reading, crossword puzzles, or other hobbies. · Talk openly with the doctor about any behavior changes. Many people who have dementia become easily upset or agitated or feel worried. There are many things that can cause this, such as medicine side effects, confusion, and pain. It may be helpful to:  ? Keep distractions to a minimum. It may also help to keep noise levels low and voices quiet. ? Develop simple daily routines for bathing, dressing, and other activities. And remind your loved one often about upcoming changes to the daily routine, such as trips or appointments. ? Ask what is upsetting him or her. Keep in mind that people who have dementia don't always know why they are upset. · Take steps to help if the person is sundowning. This is the restless behavior and trouble with sleeping that may occur in late afternoon and at night. Try not to let the person nap during the day. Offer a glass of warm milk or caffeine-free tea before bedtime. · Be patient. A task may take the person longer than it used to. · For as long as he or she is able, allow your loved one to make decisions about activities, food, clothing, and other choices. Let him or her be independent, even if tasks take more time or are not done perfectly. Tailor tasks to the person's abilities. For example, if cooking is no longer safe, ask for other help. Your loved one can help set the table, or make simple dishes such as a salad. When the person needs help, offer it gently. Staying safe  · Make your home (or your loved one's home) safe. Tack down rugs, and put no-slip tape in the tub. Install handrails, and put safety switches on stoves and appliances.  Keep rooms free of clutter. Make sure walkways around furniture are clear. Do not move furniture around, because the person may become confused. · Use locks on doors and cupboards. Lock up knives, scissors, medicines, cleaning supplies, and other dangerous things. · Do not let the person drive or cook if he or she can't do it safely. A person with dementia should not drive unless he or she is able to pass an on-road driving test. Your state 's license bureau can do a driving test if there is any question. · Get medical alert jewelry for the person so that you can be contacted if he or she wanders away. If possible, provide a safe place for wandering, such as an enclosed yard or garden. Taking care of yourself  · Ask your doctor about support groups and other resources in your area. · Take care of your health. Be sure to eat healthy foods and get enough rest and exercise. · Take time for yourself. Respite services provide someone to stay with the person for a short time while you get out of the house for a few hours. · Make time for an activity that you enjoy. Read, listen to music, paint, do crafts, or play an instrument, even if it's only for a few minutes a day. · Spend time with family, friends, and others in your support system. When should you call for help? Call 911 anytime you think the person may need emergency care.  For example, call if:    · The person who has dementia wanders away and you can't find him or her.     · The person who has dementia is seriously injured.    Call the doctor now or seek immediate medical care if:    · The person suddenly sees things that are not there (hallucinates).     · The person has a sudden change in his or her behavior.    Watch closely for changes in the person's health, and be sure to contact the doctor if:    · The person has symptoms that could cause injury.     · The person has problems with his or her medicine.     · You need more information to care for a person with dementia.     · You need respite care so you can take a break. Where can you learn more? Go to http://isaura-giovanna.info/. Enter D651 in the search box to learn more about \"Helping A Person With Dementia: Care Instructions. \"  Current as of: May 28, 2019  Content Version: 12.2  © 6975-4385 [employer]. Care instructions adapted under license by [employer] (which disclaims liability or warranty for this information). If you have questions about a medical condition or this instruction, always ask your healthcare professional. [address] any warranty or liability for your use of this information.

## 2019-12-11 NOTE — PROGRESS NOTES
Chief Complaint   Patient presents with    Hypertension     1. Have you been to the ER, urgent care clinic since your last visit? Hospitalized since your last visit? No    2. Have you seen or consulted any other health care providers outside of the [address] [place] [address] since your last visit? Include any pap smears or colon screening.  No

## 2019-12-12 LAB
CHOLEST SERPL-MCNC: 170 MG/DL (ref 100–199)
EST. AVERAGE GLUCOSE BLD GHB EST-MCNC: 134 MG/DL
HBA1C MFR BLD: 6.3 % (ref 4.8–5.6)
HDLC SERPL-MCNC: 79 MG/DL
INTERPRETATION, 910389: NORMAL
LDLC SERPL CALC-MCNC: 81 MG/DL (ref 0–99)
TRIGL SERPL-MCNC: 48 MG/DL (ref 0–149)
VLDLC SERPL CALC-MCNC: 10 MG/DL (ref 5–40)

## 2019-12-19 ENCOUNTER — TELEPHONE (OUTPATIENT)
Dept: FAMILY MEDICINE CLINIC | Age: 79
End: 2019-12-19

## 2019-12-19 NOTE — TELEPHONE ENCOUNTER
[phone]     effective: 10/25/2019 expires: 10/23/2020] Vicky Johns    Daughter called to say she will be bringing to the office the [id number] form for the doctor to complete for patient's upcoming cataract surgeries of dates 01/02 and 01/09. She said the patient was just here and had everything done which was needed for this. Please call her when it will be ready for pickup as that office needs it before   DEC 23.

## 2019-12-19 NOTE — TELEPHONE ENCOUNTER
Radha Lopez brought form to the office, handed it to me at the , and it was put in the nurse folder.

## 2019-12-20 NOTE — TELEPHONE ENCOUNTER
Forms faxed to [facility] surgery with confirmation.  Attempted to contact patient x 2

## 2020-01-13 ENCOUNTER — OFFICE VISIT (OUTPATIENT)
Dept: CARDIOLOGY CLINIC | Age: 80
End: 2020-01-13

## 2020-01-13 VITALS
HEART RATE: 72 BPM | OXYGEN SATURATION: 94 % | SYSTOLIC BLOOD PRESSURE: 130 MMHG | HEIGHT: 59 IN | DIASTOLIC BLOOD PRESSURE: 86 MMHG | WEIGHT: 109 LBS | BODY MASS INDEX: 21.97 KG/M2

## 2020-01-13 DIAGNOSIS — R00.9 ABNORMAL HEART RATE: Primary | ICD-10-CM

## 2020-01-13 NOTE — LETTER
1/13/2020 4:23 PM 
 
Ms. Erich Farley Teuscher 
[address] [facility] [id number] To Whom it may Concern. Bailee Barreto underwent cardiac evaluation and based on the available data he/she is a low risk candidate from cardiac standpoint to undergo an low risk surgery. Bailee Barreto may proceed with his/her planned cataract surgery. Thanks for giving us the opportunity to participate in the care of your patient. Sincerely, Idris Werner MD

## 2020-01-13 NOTE — PROGRESS NOTES
Reason for Consult: Bradycardia. Referring: Ivy Banerjee MD    HPI: Lance Olszewski is a 78 y.o. female with past medical history significant for transient ischemic attacks, CVA, cardiomyopathy, dyslipidemia, hypertension who is here for evaluation of bradycardia. She was scheduled to undergo cataract surgery on January 9 when she was noted to have slow heart rate while being monitored. Her heart rate ranged anywhere between 30 to 50 bpm.  Her procedure was canceled because of the significant bradycardia however she did not have any symptoms of lightheaded, dizziness or syncope. She has not had any of these symptoms previous to or since then. No symptoms of chest pain. No prior history of significant bradycardia. She is accompanied by her daughter who has given most of the history. In past she was seen by Dr. Fermin Hein in the last note from 2017 this is about possible cardiomyopathy with LVEF mild hypokinesis of the basal inferior wall. She was started on Coreg and has been taking this medication since then. Previously her echocardiogram demonstrated LVEF of 40%. No other significant cardiac evaluation has been performed in the past.  She is not known to have significant arrhythmias. EKG from December 11, 2019 also demonstrated PVCs. I was able to personally reviewed the EKG from January 9, 2020 performed at [facility]. This EKG demonstrated sinus rhythm with frequent PVCs in the form of bigeminy. The EKG in my office today also demonstrated sinus rhythm with frequent PVCs in the form of bigeminy. Plan:    1. Bradycardia: This is false bradycardia due to bigeminy. Her sinus node activity is in the range of 60 bpm.  Compensatory pause due to frequent PVC is present. She is currently on Coreg 3.125 mg p.o. twice daily which I will continue. We can certainly increase the Coreg to suppress PVCs however I fear that her sinus node activity will also be also suppressed. Therefore at this time continue Coreg at current dosage. Since this is not true bradycardia she can therefore proceed with her planned cataract surgery. No further cardiac testing is recommended at this time since she is asymptomatic. 2.  History of cardiomyopathy: LVEF had improved to 50%. Continue Coreg. Continue lisinopril. 3.  Hypertension: Blood pressure is controlled. Continue lisinopril. 4. See Dr. Nathalie Carreon as needed. ATTENTION:   This medical record was transcribed using an electronic medical records/speech recognition system. Although proofread, it may and can contain electronic, spelling and other errors. Corrections may be executed at a later time. Please feel free to contact us for any clarifications as needed.       Past Medical History:   Diagnosis Date    Acute CVA (cerebrovascular accident) ([facility] 4/10/2017    Per MRI:  Posterior L frontal/parietal territory    Cerebral atrophy ([facility] [address] 4/10/2017    History of CVA (cerebrovascular accident) 4/10/2017    --L frontal acute infarct and LMCA    Hyperlipidemia LDL goal <70 4/10/2017    Hypertension     TIA (transient ischemic attack)     Unresponsive episode 8/3/2017    transient            Past Surgical History:   Procedure Laterality Date    HX HEENT      Tonsilectomy age 8             Family History   Problem Relation Age of Onset    Dementia Mother 61    Heart Attack Father 79           Social History     Socioeconomic History    Marital status:      Spouse name: Not on file    Number of children: Not on file    Years of education: Not on file    Highest education level: Not on file   Occupational History    Not on file   Social Needs    Financial resource strain: Not on file    Food insecurity:     Worry: Not on file     Inability: Not on file    Transportation needs:     Medical: Not on file     Non-medical: Not on file   Tobacco Use    Smoking status: Former Smoker     Last attempt to quit: 1999     Years since quittin.5    Smokeless tobacco: Never Used   Substance and Sexual Activity    Alcohol use: Yes     Alcohol/week: 17.5 standard drinks     Types: 21 Glasses of wine per week     Comment: 3 glasses of port per day    Drug use: No    Sexual activity: Never   Lifestyle    Physical activity:     Days per week: Not on file     Minutes per session: Not on file    Stress: Not on file   Relationships    Social connections:     Talks on phone: Not on file     Gets together: Not on file     Attends [employer] service: Not on file     Active member of club or organization: Not on file     Attends meetings of clubs or organizations: Not on file     Relationship status: Not on file    Intimate partner violence:     Fear of current or ex partner: Not on file     Emotionally abused: Not on file     Physically abused: Not on file     Forced sexual activity: Not on file   Other Topics Concern    Not on file   Social History Narrative    Not on file         Allergies   Allergen Reactions    Benadryl Allergy/Cold [Diphenhyd-Pe-Acetaminophen] Hives    Penicillins Hives     Was treated with Augmentin in 2015, tolerated medication well. Current Outpatient Medications   Medication Sig Dispense Refill    glucosamine/chondr alvarez A sod (OSTEO BI-FLEX PO) Take  by mouth.  ofloxacin (FLOXIN) 0.3 % ophthalmic solution INSTILL 1 DROP 4 TIMES DAILY INTO SURGERY EYE START 2 DAYS PRIOR TO SURGERY  1    ketorolac (ACULAR) 0.5 % ophthalmic solution INSTILL 1 DROP 4 TIMES DAILY INTO EYE HAVING SURGERY START 2 DAYS PRIOR TO SURGERY  1    memantine (NAMENDA) 10 mg tablet Take 1 Tab by mouth two (2) times a day. 180 Tab 1    varicella-zoster recombinant, PF, (SHINGRIX, PF,) 50 mcg/0.5 mL susr injection 0.5 mL by IntraMUSCular route every two (2) months. 0.5 mL 1    atorvastatin (LIPITOR) 40 mg tablet Take 1 Tab by mouth daily.  90 Tab 1    carvedilol (COREG) 3.125 mg tablet Take 1 Tab by mouth two (2) times daily (with meals). 180 Tab 1    clopidogrel (PLAVIX) 75 mg tab TAKE ONE TABLET BY MOUTH ONCE DAILY 90 Tab 1    lisinopril (PRINIVIL, ZESTRIL) 20 mg tablet Take 1 Tab by mouth daily. 90 Tab 1    multivitamin (ONE A DAY) tablet Take 1 Tab by mouth daily.  Omega-3-DHA-EPA-Fish Oil 1,000 mg (120 mg-180 mg) cap Take 1 Cap by mouth as needed.  Calcium-Cholecalciferol, D3, 600 mg(1,500mg) -400 unit cap Take 1 Tab by mouth daily. ROS:  12 point review of systems was performed.  All negative except for HPI     Physical Exam:  Visit Vitals  /86 (BP 1 Location: Left arm, BP Patient Position: Sitting)   Pulse 72   Ht 4' 11\" (1.499 m)   Wt 109 lb (49.4 kg)   SpO2 94%   BMI 22.02 kg/m²       Gen:  Well-developed, well-nourished, in no acute distress  HEENT:  Pink conjunctivae, PERRL, hearing intact to voice, moist mucous membranes  Neck:  Supple, without masses, thyroid non-tender  Resp:  No accessory muscle use, clear breath sounds without wheezes rales or rhonchi  Card:  No murmurs, normal S1, S2 without thrills, bruits or peripheral edema  Abd:  Soft, non-tender, non-distended, normoactive bowel sounds are present, no palpable organomegaly and no detectable hernias  Lymph:  No cervical or inguinal adenopathy  Musc:  No cyanosis or clubbing  Skin:  No rashes or ulcers, skin turgor is good  Neuro:  Cranial nerves are grossly intact, no focal motor weakness, follows commands appropriately  Psych:  Good insight, oriented to person, place and time, alert     Labs:     Lab Results   Component Value Date/Time    WBC 6.6 08/05/2017 04:23 AM    HGB 11.9 08/05/2017 04:23 AM    Hemoglobin (POC) 13.9 07/07/2013 12:47 AM    HCT 35.3 08/05/2017 04:23 AM    Hematocrit (POC) 41 07/07/2013 12:47 AM    PLATELET 976 42/00/5240 04:23 AM    MCV 94.1 08/05/2017 04:23 AM     Lab Results   Component Value Date/Time    Hemoglobin A1c 6.3 (H) 12/11/2019 10:06 AM    Hemoglobin A1c 6.1 04/10/2017 12:16 AM Glucose 95 10/23/2018 04:37 PM    Glucose (POC) 90 04/09/2017 11:58 PM    LDL,Direct 106 (H) 04/10/2017 12:16 AM    LDL, calculated 81 12/11/2019 10:06 AM    Creatinine (POC) 1.0 07/07/2013 12:47 AM    Creatinine 0.73 10/23/2018 04:37 PM      Lab Results   Component Value Date/Time    Cholesterol, total 170 12/11/2019 10:06 AM    HDL Cholesterol 79 12/11/2019 10:06 AM    LDL,Direct 106 (H) 04/10/2017 12:16 AM    LDL, calculated 81 12/11/2019 10:06 AM    Triglyceride 48 12/11/2019 10:06 AM    CHOL/HDL Ratio 1.9 04/11/2017 06:25 AM     Lab Results   Component Value Date/Time    ALT (SGPT) 30 08/05/2017 04:23 AM    AST (SGOT) 35 08/05/2017 04:23 AM    Alk.  phosphatase 59 08/05/2017 04:23 AM    Bilirubin, total 0.5 08/05/2017 04:23 AM    Albumin 2.8 (L) 08/05/2017 04:23 AM    Protein, total 6.1 (L) 08/05/2017 04:23 AM    INR 1.0 08/04/2017 12:19 AM    Prothrombin time 10.0 08/04/2017 12:19 AM    PLATELET 551 71/30/8021 04:23 AM     Lab Results   Component Value Date/Time    INR 1.0 08/04/2017 12:19 AM    INR (POC) 1.0 04/10/2017 12:23 AM    Prothrombin time 10.0 08/04/2017 12:19 AM      Lab Results   Component Value Date/Time    GFR est non-AA 79 10/23/2018 04:37 PM    GFRNA, POC 54 (L) 07/07/2013 12:47 AM    GFR est AA 91 10/23/2018 04:37 PM    GFRAA, POC >60 07/07/2013 12:47 AM    Creatinine 0.73 10/23/2018 04:37 PM    Creatinine (POC) 1.0 07/07/2013 12:47 AM    BUN 13 10/23/2018 04:37 PM    BUN (POC) 25 (H) 07/07/2013 12:47 AM    Sodium 142 10/23/2018 04:37 PM    Sodium (POC) 134 (L) 07/07/2013 12:47 AM    Potassium 4.4 10/23/2018 04:37 PM    Potassium (POC) 3.7 07/07/2013 12:47 AM    Chloride 103 10/23/2018 04:37 PM    Chloride (POC) 99 07/07/2013 12:47 AM    CO2 25 10/23/2018 04:37 PM    Magnesium 1.8 08/04/2017 12:19 AM    Phosphorus 3.3 08/04/2017 12:19 AM     No results found for: PSA, Darrall Dighton, PSAR3, [id number], [id number], PSALT  Lab Results   Component Value Date/Time    TSH 1.85 08/04/2017 03:24 AM      Lab Results   Component Value Date/Time    Glucose 95 10/23/2018 04:37 PM    Glucose (POC) 90 04/09/2017 11:58 PM      Lab Results   Component Value Date/Time     08/04/2017 12:19 AM    CK - MB 4.8 (H) 08/04/2017 12:19 AM    CK-MB Index 3.1 (H) 08/04/2017 12:19 AM    Troponin-I, Qt. <0.04 08/05/2017 04:24 AM      Lab Results   Component Value Date/Time    NT pro- (H) 08/05/2017 04:24 AM      Lab Results   Component Value Date/Time    Sodium 142 10/23/2018 04:37 PM    Potassium 4.4 10/23/2018 04:37 PM    Chloride 103 10/23/2018 04:37 PM    CO2 25 10/23/2018 04:37 PM    Anion gap 7 08/05/2017 04:23 AM    Glucose 95 10/23/2018 04:37 PM    BUN 13 10/23/2018 04:37 PM    Creatinine 0.73 10/23/2018 04:37 PM    BUN/Creatinine ratio 18 10/23/2018 04:37 PM    GFR est AA 91 10/23/2018 04:37 PM    GFR est non-AA 79 10/23/2018 04:37 PM    Calcium 9.4 10/23/2018 04:37 PM      Lab Results   Component Value Date/Time    Sodium 142 10/23/2018 04:37 PM    Potassium 4.4 10/23/2018 04:37 PM    Chloride 103 10/23/2018 04:37 PM    CO2 25 10/23/2018 04:37 PM    Anion gap 7 08/05/2017 04:23 AM    Glucose 95 10/23/2018 04:37 PM    BUN 13 10/23/2018 04:37 PM    Creatinine 0.73 10/23/2018 04:37 PM    BUN/Creatinine ratio 18 10/23/2018 04:37 PM    GFR est AA 91 10/23/2018 04:37 PM    GFR est non-AA 79 10/23/2018 04:37 PM    Calcium 9.4 10/23/2018 04:37 PM    Bilirubin, total 0.5 08/05/2017 04:23 AM    ALT (SGPT) 30 08/05/2017 04:23 AM    AST (SGOT) 35 08/05/2017 04:23 AM    Alk. phosphatase 59 08/05/2017 04:23 AM    Protein, total 6.1 (L) 08/05/2017 04:23 AM    Albumin 2.8 (L) 08/05/2017 04:23 AM    Globulin 3.3 08/05/2017 04:23 AM    A-G Ratio 0.8 (L) 08/05/2017 04:23 AM      Lab Results   Component Value Date/Time    Hemoglobin A1c 6.3 (H) 12/11/2019 10:06 AM         No results for input(s): CPK, CKMB, TROIQ in the last 72 hours.     No lab exists for component: CKQMB, CPKMB        Problem List: Problem List  Date Reviewed: 1/13/2020          Codes Class Noted    TIA (transient ischemic attack) ICD-10-CM: G45.9  ICD-9-CM: 435.9  Unknown        Cardiomyopathy ([facility] [address] ICD-10-CM: I42.9  ICD-9-CM: 425.4  8/4/2017        Unresponsive episode ICD-10-CM: R41.89  ICD-9-CM: 780.09  8/3/2017    Overview Signed 8/4/2017  8:50 AM by Daniel Chacon     transient             H/O CVA (4/2017) ICD-10-CM: I63.9  ICD-9-CM: 434.91  4/10/2017    Overview Signed 4/10/2017 12:14 PM by Daniel Chacon     MRI review:  Posterior L frontal/parietal infarct             Cerebral atrophy ([facility] [address] ICD-10-CM: G31.9  ICD-9-CM: 331.9  4/10/2017    Overview Signed 4/10/2017  1:01 PM by Sun Beat than expected for age             Hyperlipidemia LDL goal <70 ICD-10-CM: E78.5  ICD-9-CM: 272.4  4/10/2017        History of CVA (cerebrovascular accident) (Chronic) ICD-10-CM: J47.56  ICD-9-CM: V12.54  4/10/2017    Overview Signed 8/4/2017  8:58 AM by Daniel Chacon     --L frontal acute infarct and LMCA             Adjustment disorder ICD-10-CM: F43.20  ICD-9-CM: 309.9  4/10/2014        Anxiety ICD-10-CM: F41.9  ICD-9-CM: 300.00  4/10/2014        Hypertension ICD-10-CM: I10  ICD-9-CM: 401.9  11/15/2013    Overview Addendum 4/28/2017  1:45 PM by Seymour Espinosa MD     A. Goal BP <140/90 s/p CVA in 2017. B. Echo (4/10/17):  EF 40% with mild GHK. Mild MR. Mild to mod TR. PASP 53. Neg bubble. Satish Eduardo MD, [facility]

## 2020-01-13 NOTE — PROGRESS NOTES
Duarte Delarosa is a 78 y.o. female    Chief Complaint   Patient presents with    New Patient     low HR       Chest pain  No    SOB No    Dizziness No    Swelling swelling in her left knee. Refills No    Visit Vitals  /86 (BP 1 Location: Left arm, BP Patient Position: Sitting)   Pulse 72   Ht 4' 11\" (1.499 m)   Wt 109 lb (49.4 kg)   SpO2 94%   BMI 22.02 kg/m²       1. Have you been to the ER, urgent care clinic since your last visit? Hospitalized since your last visit? No    2. Have you seen or consulted any other health care providers outside of the [address] [place] [address] since your last visit? Include any pap smears or colon screening.   no

## 2020-03-12 ENCOUNTER — TELEPHONE (OUTPATIENT)
Dept: FAMILY MEDICINE CLINIC | Age: 80
End: 2020-03-12

## 2020-04-01 ENCOUNTER — TELEPHONE (OUTPATIENT)
Dept: FAMILY MEDICINE CLINIC | Age: 80
End: 2020-04-01

## 2020-04-01 NOTE — TELEPHONE ENCOUNTER
Last seen at Wednesday, December 11, 2019 09:00 AM 4 [facility]MAIN OFFICE, [id number], Routine Care, 15min.   fasting labs, daughter stated one month recheck per Dr. Leana Ferrera (not on schedule this day she wants)

## 2020-04-01 NOTE — TELEPHONE ENCOUNTER
----- Message from Maura Son sent at 4/1/2020  4:34 PM EDT -----  Regarding: Dr. Pato Encinas (if not patient):NOLA WARE  Relationship of caller (if not patient): daughter  Best contact number(s):[phone]  Name of medication and dosage if known: all of her medications for a 3 mth refill.  Clopedigrel, lisinopril, atorvastatin, \"carvedolol\",  \"mantine\"  Is patient out of this medication (yes/no): yes  Pharmacy name:  [facility] listed in chart? (yes/no): yes  Pharmacy phone number: on file  Date of last visit:  Details to clarify the request: Pt is running out soon and due to her old age, caller does not want her to go out due to the [employer] pandemic jules leola

## 2020-04-14 NOTE — TELEPHONE ENCOUNTER
Forwarding this REQUEST OF 4/1 to the doctor. If this doctor is not correct, please forward to another one. Thank you. Dr. Layla Watson   Received: Today   Message Contents   [facility] Front Office             Caller's first and last name: Nilesh Ruano   Reason for call:   f/up on request of refill call from a week ago. The refill is for all her medications to the [facility] and would for it to be refilled for three months due to the [facility]. Due to the [place] virus, daughter does not want to take mother to office for anything. Pharmacy hadn't heard anything back from the office. Mentioned that she had sent a message to Dr. Live Mccracken and Dr. Leno Marques yet Dr. Gabino Keenan was the last provider she saw.    Callback required yes/no and why: yes   Best contact number(s):  [phone]   Details to clarify the request:

## 2020-04-15 ENCOUNTER — DOCUMENTATION ONLY (OUTPATIENT)
Dept: FAMILY MEDICINE CLINIC | Age: 80
End: 2020-04-15

## 2020-04-15 DIAGNOSIS — I10 ESSENTIAL HYPERTENSION: ICD-10-CM

## 2020-04-15 DIAGNOSIS — I63.9 ACUTE CVA (CEREBROVASCULAR ACCIDENT) (HCC): ICD-10-CM

## 2020-04-15 DIAGNOSIS — E78.5 HYPERLIPIDEMIA, UNSPECIFIED HYPERLIPIDEMIA TYPE: ICD-10-CM

## 2020-04-15 DIAGNOSIS — R41.89 COGNITIVE IMPAIRMENT: ICD-10-CM

## 2020-04-15 RX ORDER — MEMANTINE HYDROCHLORIDE 10 MG/1
10 TABLET ORAL 2 TIMES DAILY
Qty: 180 TAB | Refills: 0 | OUTPATIENT
Start: 2020-04-15 | End: 2020-12-03 | Stop reason: SDUPTHER

## 2020-04-15 RX ORDER — ATORVASTATIN CALCIUM 40 MG/1
40 TABLET, FILM COATED ORAL DAILY
Qty: 90 TAB | Refills: 0 | OUTPATIENT
Start: 2020-04-15 | End: 2020-05-20 | Stop reason: CLARIF

## 2020-04-15 RX ORDER — CARVEDILOL 3.12 MG/1
3.12 TABLET ORAL 2 TIMES DAILY WITH MEALS
Qty: 180 TAB | Refills: 0 | OUTPATIENT
Start: 2020-04-15 | End: 2020-12-03 | Stop reason: SDUPTHER

## 2020-04-15 RX ORDER — LISINOPRIL 20 MG/1
20 TABLET ORAL DAILY
Qty: 90 TAB | Refills: 0 | OUTPATIENT
Start: 2020-04-15 | End: 2020-05-20 | Stop reason: CLARIF

## 2020-04-15 RX ORDER — CLOPIDOGREL BISULFATE 75 MG/1
TABLET ORAL
Qty: 90 TAB | Refills: 0 | OUTPATIENT
Start: 2020-04-15 | End: 2020-05-20 | Stop reason: CLARIF

## 2020-04-15 NOTE — PROGRESS NOTES
Refilling medications x 3 months due to current COVID situation  Would recommend she be seen in next 2-3 mos for f/u chronic conditions  Will ask PSR to place tickler for f/u in 2 mos  Will send mychart message     Marion Page MD  04/15/20  11:05 AM

## 2020-04-27 ENCOUNTER — TELEPHONE (OUTPATIENT)
Dept: FAMILY MEDICINE CLINIC | Age: 80
End: 2020-04-27

## 2020-05-20 ENCOUNTER — APPOINTMENT (OUTPATIENT)
Dept: CT IMAGING | Age: 80
DRG: 469 | End: 2020-05-20
Attending: EMERGENCY MEDICINE
Payer: MEDICARE

## 2020-05-20 ENCOUNTER — APPOINTMENT (OUTPATIENT)
Dept: GENERAL RADIOLOGY | Age: 80
DRG: 469 | End: 2020-05-20
Attending: EMERGENCY MEDICINE
Payer: MEDICARE

## 2020-05-20 ENCOUNTER — HOSPITAL ENCOUNTER (INPATIENT)
Age: 80
LOS: 7 days | Discharge: NURSING, REHAB OR LONG-TERM CARE | DRG: 469 | End: 2020-05-27
Attending: EMERGENCY MEDICINE | Admitting: FAMILY MEDICINE
Payer: MEDICARE

## 2020-05-20 DIAGNOSIS — S72.002S CLOSED FRACTURE OF NECK OF LEFT FEMUR, SEQUELA: ICD-10-CM

## 2020-05-20 DIAGNOSIS — Z71.89 DNR (DO NOT RESUSCITATE) DISCUSSION: ICD-10-CM

## 2020-05-20 DIAGNOSIS — Z71.89 ADVANCED CARE PLANNING/COUNSELING DISCUSSION: ICD-10-CM

## 2020-05-20 DIAGNOSIS — Z71.89 GOALS OF CARE, COUNSELING/DISCUSSION: ICD-10-CM

## 2020-05-20 DIAGNOSIS — S72.002A CLOSED FRACTURE OF NECK OF LEFT FEMUR, INITIAL ENCOUNTER (HCC): Primary | ICD-10-CM

## 2020-05-20 DIAGNOSIS — R53.81 DEBILITY: ICD-10-CM

## 2020-05-20 PROBLEM — S72.009A FEMORAL NECK FRACTURE (HCC): Status: ACTIVE | Noted: 2020-05-20

## 2020-05-20 LAB
ALBUMIN SERPL-MCNC: 2.7 G/DL (ref 3.5–5)
ALBUMIN/GLOB SERPL: 0.7 {RATIO} (ref 1.1–2.2)
ALP SERPL-CCNC: 60 U/L (ref 45–117)
ALT SERPL-CCNC: 28 U/L (ref 12–78)
ANION GAP SERPL CALC-SCNC: 6 MMOL/L (ref 5–15)
APPEARANCE UR: ABNORMAL
AST SERPL-CCNC: 80 U/L (ref 15–37)
BACTERIA URNS QL MICRO: ABNORMAL /HPF
BASOPHILS # BLD: 0.1 K/UL (ref 0–0.1)
BASOPHILS NFR BLD: 0 % (ref 0–1)
BILIRUB SERPL-MCNC: 1 MG/DL (ref 0.2–1)
BILIRUB UR QL: NEGATIVE
BUN SERPL-MCNC: 26 MG/DL (ref 6–20)
BUN/CREAT SERPL: 39 (ref 12–20)
CALCIUM SERPL-MCNC: 8.6 MG/DL (ref 8.5–10.1)
CHLORIDE SERPL-SCNC: 107 MMOL/L (ref 97–108)
CK SERPL-CCNC: 1639 U/L (ref 26–192)
CO2 SERPL-SCNC: 27 MMOL/L (ref 21–32)
COLOR UR: ABNORMAL
COMMENT, HOLDF: NORMAL
CREAT SERPL-MCNC: 0.67 MG/DL (ref 0.55–1.02)
DIFFERENTIAL METHOD BLD: ABNORMAL
EOSINOPHIL # BLD: 0.1 K/UL (ref 0–0.4)
EOSINOPHIL NFR BLD: 1 % (ref 0–7)
EPITH CASTS URNS QL MICRO: ABNORMAL /LPF
ERYTHROCYTE [DISTWIDTH] IN BLOOD BY AUTOMATED COUNT: 14.6 % (ref 11.5–14.5)
GLOBULIN SER CALC-MCNC: 3.9 G/DL (ref 2–4)
GLUCOSE SERPL-MCNC: 116 MG/DL (ref 65–100)
GLUCOSE UR STRIP.AUTO-MCNC: NEGATIVE MG/DL
HCT VFR BLD AUTO: 34.6 % (ref 35–47)
HGB BLD-MCNC: 11.3 G/DL (ref 11.5–16)
HGB UR QL STRIP: ABNORMAL
IMM GRANULOCYTES # BLD AUTO: 0.1 K/UL (ref 0–0.04)
IMM GRANULOCYTES NFR BLD AUTO: 1 % (ref 0–0.5)
KETONES UR QL STRIP.AUTO: NEGATIVE MG/DL
LEUKOCYTE ESTERASE UR QL STRIP.AUTO: ABNORMAL
LYMPHOCYTES # BLD: 0.8 K/UL (ref 0.8–3.5)
LYMPHOCYTES NFR BLD: 6 % (ref 12–49)
MCH RBC QN AUTO: 31.7 PG (ref 26–34)
MCHC RBC AUTO-ENTMCNC: 32.7 G/DL (ref 30–36.5)
MCV RBC AUTO: 97.2 FL (ref 80–99)
MONOCYTES # BLD: 1.3 K/UL (ref 0–1)
MONOCYTES NFR BLD: 10 % (ref 5–13)
NEUTS SEG # BLD: 10.6 K/UL (ref 1.8–8)
NEUTS SEG NFR BLD: 82 % (ref 32–75)
NITRITE UR QL STRIP.AUTO: POSITIVE
NRBC # BLD: 0 K/UL (ref 0–0.01)
NRBC BLD-RTO: 0 PER 100 WBC
PH UR STRIP: 5.5 [PH] (ref 5–8)
PLATELET # BLD AUTO: 187 K/UL (ref 150–400)
PMV BLD AUTO: 10.2 FL (ref 8.9–12.9)
POTASSIUM SERPL-SCNC: 3.6 MMOL/L (ref 3.5–5.1)
PROT SERPL-MCNC: 6.6 G/DL (ref 6.4–8.2)
PROT UR STRIP-MCNC: 30 MG/DL
RBC # BLD AUTO: 3.56 M/UL (ref 3.8–5.2)
RBC #/AREA URNS HPF: ABNORMAL /HPF (ref 0–5)
SAMPLES BEING HELD,HOLD: NORMAL
SODIUM SERPL-SCNC: 140 MMOL/L (ref 136–145)
SP GR UR REFRACTOMETRY: 1.02 (ref 1–1.03)
TROPONIN I SERPL-MCNC: <0.05 NG/ML
UR CULT HOLD, URHOLD: NORMAL
UROBILINOGEN UR QL STRIP.AUTO: 1 EU/DL (ref 0.2–1)
WBC # BLD AUTO: 13 K/UL (ref 3.6–11)
WBC URNS QL MICRO: >100 /HPF (ref 0–4)

## 2020-05-20 PROCEDURE — 77030019905 HC CATH URETH INTMIT MDII -A

## 2020-05-20 PROCEDURE — 99285 EMERGENCY DEPT VISIT HI MDM: CPT

## 2020-05-20 PROCEDURE — 70450 CT HEAD/BRAIN W/O DYE: CPT

## 2020-05-20 PROCEDURE — 87077 CULTURE AEROBIC IDENTIFY: CPT

## 2020-05-20 PROCEDURE — 65270000029 HC RM PRIVATE

## 2020-05-20 PROCEDURE — 93005 ELECTROCARDIOGRAM TRACING: CPT

## 2020-05-20 PROCEDURE — 81001 URINALYSIS AUTO W/SCOPE: CPT

## 2020-05-20 PROCEDURE — 74011250636 HC RX REV CODE- 250/636: Performed by: STUDENT IN AN ORGANIZED HEALTH CARE EDUCATION/TRAINING PROGRAM

## 2020-05-20 PROCEDURE — 73502 X-RAY EXAM HIP UNI 2-3 VIEWS: CPT

## 2020-05-20 PROCEDURE — 73080 X-RAY EXAM OF ELBOW: CPT

## 2020-05-20 PROCEDURE — 82550 ASSAY OF CK (CPK): CPT

## 2020-05-20 PROCEDURE — 87086 URINE CULTURE/COLONY COUNT: CPT

## 2020-05-20 PROCEDURE — 36415 COLL VENOUS BLD VENIPUNCTURE: CPT

## 2020-05-20 PROCEDURE — 87186 SC STD MICRODIL/AGAR DIL: CPT

## 2020-05-20 PROCEDURE — 84484 ASSAY OF TROPONIN QUANT: CPT

## 2020-05-20 PROCEDURE — 87635 SARS-COV-2 COVID-19 AMP PRB: CPT

## 2020-05-20 PROCEDURE — 80053 COMPREHEN METABOLIC PANEL: CPT

## 2020-05-20 PROCEDURE — 85025 COMPLETE CBC W/AUTO DIFF WBC: CPT

## 2020-05-20 RX ORDER — LISINOPRIL 20 MG/1
20 TABLET ORAL
Status: DISCONTINUED | OUTPATIENT
Start: 2020-05-21 | End: 2020-05-23

## 2020-05-20 RX ORDER — SODIUM CHLORIDE 0.9 % (FLUSH) 0.9 %
5-40 SYRINGE (ML) INJECTION EVERY 8 HOURS
Status: DISCONTINUED | OUTPATIENT
Start: 2020-05-20 | End: 2020-05-27 | Stop reason: HOSPADM

## 2020-05-20 RX ORDER — GLUCOSAM/CHON-MSM1/C/MANG/BOSW 750-644 MG
1 TABLET ORAL 2 TIMES DAILY
COMMUNITY
End: 2021-02-25

## 2020-05-20 RX ORDER — ATORVASTATIN CALCIUM 20 MG/1
40 TABLET, FILM COATED ORAL
Status: DISCONTINUED | OUTPATIENT
Start: 2020-05-21 | End: 2020-05-27 | Stop reason: HOSPADM

## 2020-05-20 RX ORDER — MEMANTINE HYDROCHLORIDE 10 MG/1
10 TABLET ORAL 2 TIMES DAILY
Status: DISCONTINUED | OUTPATIENT
Start: 2020-05-21 | End: 2020-05-27 | Stop reason: HOSPADM

## 2020-05-20 RX ORDER — MELATONIN
2000 DAILY
COMMUNITY
End: 2020-12-03

## 2020-05-20 RX ORDER — SODIUM CHLORIDE 9 MG/ML
75 INJECTION, SOLUTION INTRAVENOUS CONTINUOUS
Status: DISCONTINUED | OUTPATIENT
Start: 2020-05-20 | End: 2020-05-22

## 2020-05-20 RX ORDER — ATORVASTATIN CALCIUM 40 MG/1
40 TABLET, FILM COATED ORAL
COMMUNITY
End: 2020-12-03 | Stop reason: SDUPTHER

## 2020-05-20 RX ORDER — SODIUM CHLORIDE 0.9 % (FLUSH) 0.9 %
5-40 SYRINGE (ML) INJECTION AS NEEDED
Status: DISCONTINUED | OUTPATIENT
Start: 2020-05-20 | End: 2020-05-27 | Stop reason: HOSPADM

## 2020-05-20 RX ORDER — CLOPIDOGREL BISULFATE 75 MG/1
75 TABLET ORAL
COMMUNITY
End: 2020-05-27

## 2020-05-20 RX ORDER — LISINOPRIL 20 MG/1
20 TABLET ORAL
COMMUNITY
End: 2020-05-27

## 2020-05-20 RX ORDER — CARVEDILOL 3.12 MG/1
3.12 TABLET ORAL 2 TIMES DAILY WITH MEALS
Status: DISCONTINUED | OUTPATIENT
Start: 2020-05-21 | End: 2020-05-21

## 2020-05-20 RX ADMIN — Medication 10 ML: at 23:43

## 2020-05-20 RX ADMIN — SODIUM CHLORIDE 90 ML/HR: 900 INJECTION, SOLUTION INTRAVENOUS at 23:43

## 2020-05-20 NOTE — ED TRIAGE NOTES
Pt arrives via EMS w/ c/c of unwitnessed GLF Sunday and Monday. Daughter reported that pt slid off of the bed during these falls. Pt has abrasion to L elbow. Pt reporting pain to L hip and stated \"it hurts everywhere. \" Hx of Alz.

## 2020-05-20 NOTE — ED PROVIDER NOTES
61-year-old female with a history of dementia, TIA, CVA, hypertension presents with unwitnessed ground-level fall. Patient unable to give history. Called patient's daughter who she lives with. Patient had an unwitnessed fall on Sunday. The daughter was unable to get her up. She gave her blankets and pillows and she spent the evening in the night on the floor. She was able to get her up to her bed the following day. Later that day on Monday she again had another unwitnessed fall off the bed. Patient was stating that she was in pain but could not state where. Eventually the daughter was able to determine that the patient was saying that her left hip hurt. She is on Plavix. She normally does not walk with a walker or cane.       Fall          Past Medical History:   Diagnosis Date    Acute CVA (cerebrovascular accident) ([facility] [address] 4/10/2017    Per MRI:  Posterior L frontal/parietal territory    Cerebral atrophy ([address] 4/10/2017    History of CVA (cerebrovascular accident) 4/10/2017    --L frontal acute infarct and LMCA    Hyperlipidemia LDL goal <70 4/10/2017    Hypertension     TIA (transient ischemic attack)     Unresponsive episode 8/3/2017    transient       Past Surgical History:   Procedure Laterality Date    HX HEENT      Tonsilectomy age 8         Family History:   Problem Relation Age of Onset    Dementia Mother 61    Heart Attack Father 79       Social History     Socioeconomic History    Marital status:      Spouse name: Not on file    Number of children: Not on file    Years of education: Not on file    Highest education level: Not on file   Occupational History    Not on file   Social Needs    Financial resource strain: Not on file    Food insecurity     Worry: Not on file     Inability: Not on file    Transportation needs     Medical: Not on file     Non-medical: Not on file   Tobacco Use    Smoking status: Former Smoker     Last attempt to quit: 7/7/1999     Years since quittin.8    Smokeless tobacco: Never Used   Substance and Sexual Activity    Alcohol use: Yes     Alcohol/week: 17.5 standard drinks     Types: 21 Glasses of wine per week     Comment: 3 glasses of port per day    Drug use: No    Sexual activity: Never   Lifestyle    Physical activity     Days per week: Not on file     Minutes per session: Not on file    Stress: Not on file   Relationships    Social connections     Talks on phone: Not on file     Gets together: Not on file     Attends Scientology service: Not on file     Active member of club or organization: Not on file     Attends meetings of clubs or organizations: Not on file     Relationship status: Not on file    Intimate partner violence     Fear of current or ex partner: Not on file     Emotionally abused: Not on file     Physically abused: Not on file     Forced sexual activity: Not on file   Other Topics Concern    Not on file   Social History Narrative    Not on file         ALLERGIES: Benadryl allergy/cold [diphenhyd-pe-acetaminophen] and Penicillins    Review of Systems   Unable to perform ROS: Dementia       Vitals:    20 1905   BP: 140/57   Pulse: (!) 57   Resp: 16   Temp: 98.7 °F (37.1 °C)   SpO2: 98%   Weight: 49.4 kg (109 lb)   Height: 4' 11\" (1.499 m)            Physical Exam  Vitals signs and nursing note reviewed. Constitutional:       General: She is not in acute distress. HENT:      Head: Normocephalic and atraumatic. Mouth/Throat:      Mouth: Mucous membranes are moist.   Eyes:      General: No scleral icterus. Conjunctiva/sclera: Conjunctivae normal.      Pupils: Pupils are equal, round, and reactive to light. Neck:      Musculoskeletal: Neck supple. Trachea: No tracheal deviation. Comments: No cervical midline tenderness  Cardiovascular:      Rate and Rhythm: Normal rate and regular rhythm. Pulmonary:      Effort: Pulmonary effort is normal. No respiratory distress. Breath sounds:  No wheezing or rales. Abdominal:      General: There is no distension. Palpations: Abdomen is soft. Tenderness: There is no abdominal tenderness. Genitourinary:     Comments: deferred  Musculoskeletal:         General: Tenderness (Left hip) present. No deformity. Comments: Holds left leg in external rotation, shortened. Skin:     General: Skin is warm and dry. Neurological:      General: No focal deficit present. Mental Status: She is alert. Comments: Oriented to person only   Psychiatric:         Mood and Affect: Mood normal.          MDM     20:55  EKG shows sinus bradycardia at rate of 53, normal intervals, normal axis, prior anterior septal infarct, left ventricular hypertrophy, no significant change from January 2020. Procedures    9:23 PM  texted Dr. Leonard Flores with Orthopedics. Hospitalist Abby Serve for Admission  9:24 PM    ED Room Number: TB59/50  Patient Name and age: Mayo Randolph 78 y.o.  female  Working Diagnosis:   1. Closed fracture of neck of left femur, initial [facility]        COVID-19 Suspicion:  no    Code Status:  Partial Code  Readmission: no  Isolation Requirements:  no  Recommended Level of Care:  med/surg  Department:[facility] ED - [phone]  Other:  Unwitnessed GLF, needs cards clearance, NPO       9:26 PM Called patient's daughter to update. LABORATORY TESTS:  Labs Reviewed   CBC WITH AUTOMATED DIFF - Abnormal; Notable for the following components:       Result Value    WBC 13.0 (*)     RBC 3.56 (*)     HGB 11.3 (*)     HCT 34.6 (*)     RDW 14.6 (*)     NEUTROPHILS 82 (*)     LYMPHOCYTES 6 (*)     IMMATURE GRANULOCYTES 1 (*)     ABS. NEUTROPHILS 10.6 (*)     ABS. MONOCYTES 1.3 (*)     ABS. IMM.  GRANS. 0.1 (*)     All other components within normal limits   URINE CULTURE HOLD SAMPLE   METABOLIC PANEL, COMPREHENSIVE   CK   SAMPLES BEING HELD   URINALYSIS W/MICROSCOPIC   TROPONIN I       IMAGING RESULTS:  CT HEAD WO CONT   Final Result   Impression:    1. No evidence of acute infarct or intracranial hemorrhage. 2. Severe periventricular white matter disease is likely secondary to chronic   small vessel ischemic changes. 3. Chronic right frontal and occipital lobe infarctions. 4. Chronic generalized ventriculomegaly. XR HIP LT W OR WO PELV 2-3 VWS   Final Result   IMPRESSION:    Acute left femoral neck fracture. XR ELBOW LT MIN 3 V   Final Result   IMPRESSION: No acute abnormality. MEDICATIONS GIVEN:  Medications - No data to display      IMPRESSION/ PLAN:  1. Closed fracture of neck of left femur, initial encounter ([facility] [address]      - admit to [facility]  - ortho consult     Total critical care time spent exclusive of procedures:  0 minutes      Seth Gillette MD

## 2020-05-21 ENCOUNTER — APPOINTMENT (OUTPATIENT)
Dept: GENERAL RADIOLOGY | Age: 80
DRG: 469 | End: 2020-05-21
Attending: PHYSICIAN ASSISTANT
Payer: MEDICARE

## 2020-05-21 ENCOUNTER — ANESTHESIA (OUTPATIENT)
Dept: SURGERY | Age: 80
DRG: 469 | End: 2020-05-21
Payer: MEDICARE

## 2020-05-21 ENCOUNTER — TELEPHONE (OUTPATIENT)
Dept: CARDIOLOGY CLINIC | Age: 80
End: 2020-05-21

## 2020-05-21 ENCOUNTER — APPOINTMENT (OUTPATIENT)
Dept: GENERAL RADIOLOGY | Age: 80
DRG: 469 | End: 2020-05-21
Attending: ORTHOPAEDIC SURGERY
Payer: MEDICARE

## 2020-05-21 ENCOUNTER — ANESTHESIA EVENT (OUTPATIENT)
Dept: SURGERY | Age: 80
DRG: 469 | End: 2020-05-21
Payer: MEDICARE

## 2020-05-21 LAB
ABO + RH BLD: NORMAL
ANION GAP SERPL CALC-SCNC: 4 MMOL/L (ref 5–15)
ATRIAL RATE: 53 BPM
ATRIAL RATE: 59 BPM
BASOPHILS # BLD: 0.1 K/UL (ref 0–0.1)
BASOPHILS NFR BLD: 0 % (ref 0–1)
BLOOD GROUP ANTIBODIES SERPL: NORMAL
BUN SERPL-MCNC: 24 MG/DL (ref 6–20)
BUN/CREAT SERPL: 44 (ref 12–20)
CALCIUM SERPL-MCNC: 8.2 MG/DL (ref 8.5–10.1)
CALCULATED P AXIS, ECG09: 48 DEGREES
CALCULATED P AXIS, ECG09: 67 DEGREES
CALCULATED R AXIS, ECG10: 17 DEGREES
CALCULATED R AXIS, ECG10: 19 DEGREES
CALCULATED T AXIS, ECG11: 106 DEGREES
CALCULATED T AXIS, ECG11: 132 DEGREES
CHLORIDE SERPL-SCNC: 109 MMOL/L (ref 97–108)
CO2 SERPL-SCNC: 26 MMOL/L (ref 21–32)
CREAT SERPL-MCNC: 0.54 MG/DL (ref 0.55–1.02)
DIAGNOSIS, 93000: NORMAL
DIAGNOSIS, 93000: NORMAL
DIFFERENTIAL METHOD BLD: ABNORMAL
EOSINOPHIL # BLD: 0.3 K/UL (ref 0–0.4)
EOSINOPHIL NFR BLD: 2 % (ref 0–7)
ERYTHROCYTE [DISTWIDTH] IN BLOOD BY AUTOMATED COUNT: 14.6 % (ref 11.5–14.5)
GLUCOSE SERPL-MCNC: 98 MG/DL (ref 65–100)
HCT VFR BLD AUTO: 32.6 % (ref 35–47)
HGB BLD-MCNC: 10.6 G/DL (ref 11.5–16)
IMM GRANULOCYTES # BLD AUTO: 0.1 K/UL (ref 0–0.04)
IMM GRANULOCYTES NFR BLD AUTO: 1 % (ref 0–0.5)
LYMPHOCYTES # BLD: 1.2 K/UL (ref 0.8–3.5)
LYMPHOCYTES NFR BLD: 10 % (ref 12–49)
MCH RBC QN AUTO: 31.5 PG (ref 26–34)
MCHC RBC AUTO-ENTMCNC: 32.5 G/DL (ref 30–36.5)
MCV RBC AUTO: 97 FL (ref 80–99)
MONOCYTES # BLD: 1.4 K/UL (ref 0–1)
MONOCYTES NFR BLD: 12 % (ref 5–13)
NEUTS SEG # BLD: 9.3 K/UL (ref 1.8–8)
NEUTS SEG NFR BLD: 75 % (ref 32–75)
NRBC # BLD: 0 K/UL (ref 0–0.01)
NRBC BLD-RTO: 0 PER 100 WBC
P-R INTERVAL, ECG05: 148 MS
P-R INTERVAL, ECG05: 148 MS
PLATELET # BLD AUTO: 186 K/UL (ref 150–400)
PMV BLD AUTO: 10.2 FL (ref 8.9–12.9)
POTASSIUM SERPL-SCNC: 3.4 MMOL/L (ref 3.5–5.1)
Q-T INTERVAL, ECG07: 466 MS
Q-T INTERVAL, ECG07: 480 MS
QRS DURATION, ECG06: 78 MS
QRS DURATION, ECG06: 98 MS
QTC CALCULATION (BEZET), ECG08: 437 MS
QTC CALCULATION (BEZET), ECG08: 475 MS
RBC # BLD AUTO: 3.36 M/UL (ref 3.8–5.2)
SARS-COV-2, COV2: NOT DETECTED
SODIUM SERPL-SCNC: 139 MMOL/L (ref 136–145)
SPECIMEN EXP DATE BLD: NORMAL
SPECIMEN SOURCE, FCOV2M: NORMAL
VENTRICULAR RATE, ECG03: 53 BPM
VENTRICULAR RATE, ECG03: 59 BPM
WBC # BLD AUTO: 12.3 K/UL (ref 3.6–11)

## 2020-05-21 PROCEDURE — 74011250637 HC RX REV CODE- 250/637: Performed by: STUDENT IN AN ORGANIZED HEALTH CARE EDUCATION/TRAINING PROGRAM

## 2020-05-21 PROCEDURE — 77030008684 HC TU ET CUF COVD -B: Performed by: NURSE ANESTHETIST, CERTIFIED REGISTERED

## 2020-05-21 PROCEDURE — 77030011640 HC PAD GRND REM COVD -A: Performed by: ORTHOPAEDIC SURGERY

## 2020-05-21 PROCEDURE — 74011250636 HC RX REV CODE- 250/636: Performed by: STUDENT IN AN ORGANIZED HEALTH CARE EDUCATION/TRAINING PROGRAM

## 2020-05-21 PROCEDURE — 93005 ELECTROCARDIOGRAM TRACING: CPT

## 2020-05-21 PROCEDURE — 76060000034 HC ANESTHESIA 1.5 TO 2 HR: Performed by: ORTHOPAEDIC SURGERY

## 2020-05-21 PROCEDURE — 77030029883 HC RETRV SUT ARTHSCP HOFFE BEAT -B: Performed by: ORTHOPAEDIC SURGERY

## 2020-05-21 PROCEDURE — C1776 JOINT DEVICE (IMPLANTABLE): HCPCS | Performed by: ORTHOPAEDIC SURGERY

## 2020-05-21 PROCEDURE — 85025 COMPLETE CBC W/AUTO DIFF WBC: CPT

## 2020-05-21 PROCEDURE — 74011250636 HC RX REV CODE- 250/636: Performed by: NURSE ANESTHETIST, CERTIFIED REGISTERED

## 2020-05-21 PROCEDURE — 73502 X-RAY EXAM HIP UNI 2-3 VIEWS: CPT

## 2020-05-21 PROCEDURE — 76210000006 HC OR PH I REC 0.5 TO 1 HR: Performed by: ORTHOPAEDIC SURGERY

## 2020-05-21 PROCEDURE — 77030026438 HC STYL ET INTUB CARD -A: Performed by: NURSE ANESTHETIST, CERTIFIED REGISTERED

## 2020-05-21 PROCEDURE — 65660000000 HC RM CCU STEPDOWN

## 2020-05-21 PROCEDURE — 77030006835 HC BLD SAW SAG STRY -B: Performed by: ORTHOPAEDIC SURGERY

## 2020-05-21 PROCEDURE — 74011000250 HC RX REV CODE- 250: Performed by: NURSE ANESTHETIST, CERTIFIED REGISTERED

## 2020-05-21 PROCEDURE — 76010000153 HC OR TIME 1.5 TO 2 HR: Performed by: ORTHOPAEDIC SURGERY

## 2020-05-21 PROCEDURE — 80048 BASIC METABOLIC PNL TOTAL CA: CPT

## 2020-05-21 PROCEDURE — 86900 BLOOD TYPING SEROLOGIC ABO: CPT

## 2020-05-21 PROCEDURE — 74011250636 HC RX REV CODE- 250/636: Performed by: ANESTHESIOLOGY

## 2020-05-21 PROCEDURE — 77030018673: Performed by: ORTHOPAEDIC SURGERY

## 2020-05-21 PROCEDURE — 74011000250 HC RX REV CODE- 250: Performed by: ORTHOPAEDIC SURGERY

## 2020-05-21 PROCEDURE — 77030010507 HC ADH SKN DERMBND J&J -B: Performed by: ORTHOPAEDIC SURGERY

## 2020-05-21 PROCEDURE — 77030002933 HC SUT MCRYL J&J -A: Performed by: ORTHOPAEDIC SURGERY

## 2020-05-21 PROCEDURE — 77030013708 HC HNDPC SUC IRR PULS STRY –B: Performed by: ORTHOPAEDIC SURGERY

## 2020-05-21 PROCEDURE — 77030033067 HC SUT PDO STRATFX SPIR J&J -B: Performed by: ORTHOPAEDIC SURGERY

## 2020-05-21 PROCEDURE — 77030040241 HC ABD PLLW HIP MDII -B: Performed by: ORTHOPAEDIC SURGERY

## 2020-05-21 PROCEDURE — 36415 COLL VENOUS BLD VENIPUNCTURE: CPT

## 2020-05-21 PROCEDURE — 0SRS01A REPLACEMENT OF LEFT HIP JOINT, FEMORAL SURFACE WITH METAL SYNTHETIC SUBSTITUTE, UNCEMENTED, OPEN APPROACH: ICD-10-PCS | Performed by: ORTHOPAEDIC SURGERY

## 2020-05-21 PROCEDURE — 74011250636 HC RX REV CODE- 250/636: Performed by: ORTHOPAEDIC SURGERY

## 2020-05-21 PROCEDURE — 77030013079 HC BLNKT BAIR HGGR 3M -A: Performed by: NURSE ANESTHETIST, CERTIFIED REGISTERED

## 2020-05-21 PROCEDURE — 74011250637 HC RX REV CODE- 250/637: Performed by: ORTHOPAEDIC SURGERY

## 2020-05-21 PROCEDURE — 77030018836 HC SOL IRR NACL ICUM -A: Performed by: ORTHOPAEDIC SURGERY

## 2020-05-21 PROCEDURE — 77030031139 HC SUT VCRL2 J&J -A: Performed by: ORTHOPAEDIC SURGERY

## 2020-05-21 DEVICE — LFIT V40 FEMORAL HEAD
Type: IMPLANTABLE DEVICE | Site: FEMUR | Status: FUNCTIONAL
Brand: V40 HEAD

## 2020-05-21 DEVICE — BIPOLAR COMPONENT
Type: IMPLANTABLE DEVICE | Site: FEMUR | Status: FUNCTIONAL
Brand: UHR

## 2020-05-21 DEVICE — STEM FEM PRSS FT HIP UNI/BI PLR: Type: IMPLANTABLE DEVICE | Site: FEMUR | Status: FUNCTIONAL

## 2020-05-21 DEVICE — 132 DEGREE NECK ANGLE HIP STEM
Type: IMPLANTABLE DEVICE | Site: FEMUR | Status: FUNCTIONAL
Brand: ACCOLADE

## 2020-05-21 RX ORDER — FERROUS SULFATE, DRIED 160(50) MG
1 TABLET, EXTENDED RELEASE ORAL
Status: DISCONTINUED | OUTPATIENT
Start: 2020-05-22 | End: 2020-05-27 | Stop reason: HOSPADM

## 2020-05-21 RX ORDER — MORPHINE SULFATE 2 MG/ML
1 INJECTION, SOLUTION INTRAMUSCULAR; INTRAVENOUS
Status: DISPENSED | OUTPATIENT
Start: 2020-05-21 | End: 2020-05-22

## 2020-05-21 RX ORDER — MIDAZOLAM HYDROCHLORIDE 1 MG/ML
INJECTION, SOLUTION INTRAMUSCULAR; INTRAVENOUS AS NEEDED
Status: DISCONTINUED | OUTPATIENT
Start: 2020-05-21 | End: 2020-05-21 | Stop reason: HOSPADM

## 2020-05-21 RX ORDER — SODIUM CHLORIDE 9 MG/ML
125 INJECTION, SOLUTION INTRAVENOUS CONTINUOUS
Status: DISCONTINUED | OUTPATIENT
Start: 2020-05-21 | End: 2020-05-21

## 2020-05-21 RX ORDER — ONDANSETRON 4 MG/1
4 TABLET, ORALLY DISINTEGRATING ORAL
Status: DISCONTINUED | OUTPATIENT
Start: 2020-05-21 | End: 2020-05-27 | Stop reason: HOSPADM

## 2020-05-21 RX ORDER — BUPIVACAINE HYDROCHLORIDE AND EPINEPHRINE 5; 5 MG/ML; UG/ML
INJECTION, SOLUTION EPIDURAL; INTRACAUDAL; PERINEURAL AS NEEDED
Status: DISCONTINUED | OUTPATIENT
Start: 2020-05-21 | End: 2020-05-21 | Stop reason: HOSPADM

## 2020-05-21 RX ORDER — SODIUM CHLORIDE 0.9 % (FLUSH) 0.9 %
5-40 SYRINGE (ML) INJECTION AS NEEDED
Status: DISCONTINUED | OUTPATIENT
Start: 2020-05-21 | End: 2020-05-27 | Stop reason: HOSPADM

## 2020-05-21 RX ORDER — SODIUM CHLORIDE, SODIUM LACTATE, POTASSIUM CHLORIDE, CALCIUM CHLORIDE 600; 310; 30; 20 MG/100ML; MG/100ML; MG/100ML; MG/100ML
125 INJECTION, SOLUTION INTRAVENOUS CONTINUOUS
Status: DISCONTINUED | OUTPATIENT
Start: 2020-05-21 | End: 2020-05-21 | Stop reason: HOSPADM

## 2020-05-21 RX ORDER — LEVOFLOXACIN 5 MG/ML
750 INJECTION, SOLUTION INTRAVENOUS
Status: DISCONTINUED | OUTPATIENT
Start: 2020-05-21 | End: 2020-05-24

## 2020-05-21 RX ORDER — ONDANSETRON 2 MG/ML
4 INJECTION INTRAMUSCULAR; INTRAVENOUS
Status: ACTIVE | OUTPATIENT
Start: 2020-05-21 | End: 2020-05-22

## 2020-05-21 RX ORDER — ENOXAPARIN SODIUM 100 MG/ML
40 INJECTION SUBCUTANEOUS DAILY
Status: DISCONTINUED | OUTPATIENT
Start: 2020-05-22 | End: 2020-05-23 | Stop reason: SDUPTHER

## 2020-05-21 RX ORDER — SUCCINYLCHOLINE CHLORIDE 20 MG/ML
INJECTION INTRAMUSCULAR; INTRAVENOUS AS NEEDED
Status: DISCONTINUED | OUTPATIENT
Start: 2020-05-21 | End: 2020-05-21 | Stop reason: HOSPADM

## 2020-05-21 RX ORDER — OXYCODONE HYDROCHLORIDE 5 MG/1
2.5 TABLET ORAL
Status: DISCONTINUED | OUTPATIENT
Start: 2020-05-21 | End: 2020-05-27 | Stop reason: HOSPADM

## 2020-05-21 RX ORDER — FENTANYL CITRATE 50 UG/ML
INJECTION, SOLUTION INTRAMUSCULAR; INTRAVENOUS AS NEEDED
Status: DISCONTINUED | OUTPATIENT
Start: 2020-05-21 | End: 2020-05-21 | Stop reason: HOSPADM

## 2020-05-21 RX ORDER — ROCURONIUM BROMIDE 10 MG/ML
INJECTION, SOLUTION INTRAVENOUS AS NEEDED
Status: DISCONTINUED | OUTPATIENT
Start: 2020-05-21 | End: 2020-05-21 | Stop reason: HOSPADM

## 2020-05-21 RX ORDER — DEXAMETHASONE SODIUM PHOSPHATE 4 MG/ML
INJECTION, SOLUTION INTRA-ARTICULAR; INTRALESIONAL; INTRAMUSCULAR; INTRAVENOUS; SOFT TISSUE AS NEEDED
Status: DISCONTINUED | OUTPATIENT
Start: 2020-05-21 | End: 2020-05-21 | Stop reason: HOSPADM

## 2020-05-21 RX ORDER — CEFAZOLIN SODIUM 1 G/3ML
INJECTION, POWDER, FOR SOLUTION INTRAMUSCULAR; INTRAVENOUS AS NEEDED
Status: DISCONTINUED | OUTPATIENT
Start: 2020-05-21 | End: 2020-05-21 | Stop reason: HOSPADM

## 2020-05-21 RX ORDER — AMOXICILLIN 250 MG
1 CAPSULE ORAL 2 TIMES DAILY
Status: DISCONTINUED | OUTPATIENT
Start: 2020-05-21 | End: 2020-05-27 | Stop reason: HOSPADM

## 2020-05-21 RX ORDER — PROPOFOL 10 MG/ML
INJECTION, EMULSION INTRAVENOUS AS NEEDED
Status: DISCONTINUED | OUTPATIENT
Start: 2020-05-21 | End: 2020-05-21 | Stop reason: HOSPADM

## 2020-05-21 RX ORDER — SODIUM CHLORIDE, SODIUM LACTATE, POTASSIUM CHLORIDE, CALCIUM CHLORIDE 600; 310; 30; 20 MG/100ML; MG/100ML; MG/100ML; MG/100ML
INJECTION, SOLUTION INTRAVENOUS
Status: DISCONTINUED | OUTPATIENT
Start: 2020-05-21 | End: 2020-05-21 | Stop reason: HOSPADM

## 2020-05-21 RX ORDER — SODIUM CHLORIDE 0.9 % (FLUSH) 0.9 %
5-40 SYRINGE (ML) INJECTION EVERY 8 HOURS
Status: DISCONTINUED | OUTPATIENT
Start: 2020-05-21 | End: 2020-05-27 | Stop reason: HOSPADM

## 2020-05-21 RX ORDER — FACIAL-BODY WIPES
10 EACH TOPICAL DAILY PRN
Status: DISCONTINUED | OUTPATIENT
Start: 2020-05-23 | End: 2020-05-27 | Stop reason: HOSPADM

## 2020-05-21 RX ORDER — EPHEDRINE SULFATE/0.9% NACL/PF 50 MG/5 ML
SYRINGE (ML) INTRAVENOUS AS NEEDED
Status: DISCONTINUED | OUTPATIENT
Start: 2020-05-21 | End: 2020-05-21 | Stop reason: HOSPADM

## 2020-05-21 RX ORDER — ONDANSETRON 2 MG/ML
INJECTION INTRAMUSCULAR; INTRAVENOUS AS NEEDED
Status: DISCONTINUED | OUTPATIENT
Start: 2020-05-21 | End: 2020-05-21 | Stop reason: HOSPADM

## 2020-05-21 RX ORDER — TRAMADOL HYDROCHLORIDE 50 MG/1
50 TABLET ORAL
Status: DISCONTINUED | OUTPATIENT
Start: 2020-05-21 | End: 2020-05-27 | Stop reason: HOSPADM

## 2020-05-21 RX ORDER — LIDOCAINE HYDROCHLORIDE 20 MG/ML
INJECTION, SOLUTION EPIDURAL; INFILTRATION; INTRACAUDAL; PERINEURAL AS NEEDED
Status: DISCONTINUED | OUTPATIENT
Start: 2020-05-21 | End: 2020-05-21 | Stop reason: HOSPADM

## 2020-05-21 RX ORDER — NALOXONE HYDROCHLORIDE 0.4 MG/ML
0.4 INJECTION, SOLUTION INTRAMUSCULAR; INTRAVENOUS; SUBCUTANEOUS AS NEEDED
Status: DISCONTINUED | OUTPATIENT
Start: 2020-05-21 | End: 2020-05-27 | Stop reason: HOSPADM

## 2020-05-21 RX ORDER — POLYETHYLENE GLYCOL 3350 17 G/17G
17 POWDER, FOR SOLUTION ORAL DAILY
Status: DISCONTINUED | OUTPATIENT
Start: 2020-05-22 | End: 2020-05-27 | Stop reason: HOSPADM

## 2020-05-21 RX ORDER — HYDROMORPHONE HYDROCHLORIDE 1 MG/ML
.25-1 INJECTION, SOLUTION INTRAMUSCULAR; INTRAVENOUS; SUBCUTANEOUS
Status: DISCONTINUED | OUTPATIENT
Start: 2020-05-21 | End: 2020-05-21 | Stop reason: HOSPADM

## 2020-05-21 RX ADMIN — Medication 10 ML: at 21:25

## 2020-05-21 RX ADMIN — FENTANYL CITRATE 25 MCG: 0.05 INJECTION, SOLUTION INTRAMUSCULAR; INTRAVENOUS at 14:37

## 2020-05-21 RX ADMIN — Medication 10 ML: at 18:28

## 2020-05-21 RX ADMIN — Medication 10 MG: at 14:35

## 2020-05-21 RX ADMIN — FENTANYL CITRATE 25 MCG: 0.05 INJECTION, SOLUTION INTRAMUSCULAR; INTRAVENOUS at 15:37

## 2020-05-21 RX ADMIN — SENNOSIDES AND DOCUSATE SODIUM 1 TABLET: 8.6; 5 TABLET ORAL at 18:27

## 2020-05-21 RX ADMIN — Medication 10 ML: at 06:37

## 2020-05-21 RX ADMIN — LEVOFLOXACIN 750 MG: 5 INJECTION, SOLUTION INTRAVENOUS at 06:36

## 2020-05-21 RX ADMIN — FENTANYL CITRATE 50 MCG: 0.05 INJECTION, SOLUTION INTRAMUSCULAR; INTRAVENOUS at 14:18

## 2020-05-21 RX ADMIN — MIDAZOLAM HYDROCHLORIDE 1 MG: 2 INJECTION, SOLUTION INTRAMUSCULAR; INTRAVENOUS at 14:13

## 2020-05-21 RX ADMIN — SODIUM CHLORIDE 125 ML/HR: 900 INJECTION, SOLUTION INTRAVENOUS at 18:17

## 2020-05-21 RX ADMIN — SODIUM CHLORIDE, SODIUM LACTATE, POTASSIUM CHLORIDE, AND CALCIUM CHLORIDE 125 ML/HR: 600; 310; 30; 20 INJECTION, SOLUTION INTRAVENOUS at 14:08

## 2020-05-21 RX ADMIN — DEXAMETHASONE SODIUM PHOSPHATE 4 MG: 4 INJECTION, SOLUTION INTRAMUSCULAR; INTRAVENOUS at 14:44

## 2020-05-21 RX ADMIN — FENTANYL CITRATE 25 MCG: 0.05 INJECTION, SOLUTION INTRAMUSCULAR; INTRAVENOUS at 14:40

## 2020-05-21 RX ADMIN — ATORVASTATIN CALCIUM 40 MG: 20 TABLET, FILM COATED ORAL at 21:24

## 2020-05-21 RX ADMIN — FENTANYL CITRATE 25 MCG: 0.05 INJECTION, SOLUTION INTRAMUSCULAR; INTRAVENOUS at 15:21

## 2020-05-21 RX ADMIN — Medication 5 MG: at 15:14

## 2020-05-21 RX ADMIN — FENTANYL CITRATE 25 MCG: 0.05 INJECTION, SOLUTION INTRAMUSCULAR; INTRAVENOUS at 14:42

## 2020-05-21 RX ADMIN — Medication 10 MG: at 15:09

## 2020-05-21 RX ADMIN — PROPOFOL 60 MG: 10 INJECTION, EMULSION INTRAVENOUS at 14:18

## 2020-05-21 RX ADMIN — SUCCINYLCHOLINE CHLORIDE 100 MG: 20 INJECTION, SOLUTION INTRAMUSCULAR; INTRAVENOUS; PARENTERAL at 14:18

## 2020-05-21 RX ADMIN — LISINOPRIL 20 MG: 20 TABLET ORAL at 21:24

## 2020-05-21 RX ADMIN — Medication 10 MG: at 14:23

## 2020-05-21 RX ADMIN — MEMANTINE 10 MG: 10 TABLET ORAL at 18:27

## 2020-05-21 RX ADMIN — LIDOCAINE HYDROCHLORIDE 40 MG: 20 INJECTION, SOLUTION INTRAVENOUS at 14:18

## 2020-05-21 RX ADMIN — MEMANTINE 10 MG: 10 TABLET ORAL at 09:10

## 2020-05-21 RX ADMIN — SODIUM CHLORIDE, POTASSIUM CHLORIDE, SODIUM LACTATE AND CALCIUM CHLORIDE: 600; 310; 30; 20 INJECTION, SOLUTION INTRAVENOUS at 13:28

## 2020-05-21 RX ADMIN — CEFAZOLIN SODIUM 2 G: 1 POWDER, FOR SOLUTION INTRAMUSCULAR; INTRAVENOUS at 14:24

## 2020-05-21 RX ADMIN — SODIUM CHLORIDE 115 ML/HR: 900 INJECTION, SOLUTION INTRAVENOUS at 11:26

## 2020-05-21 RX ADMIN — ROCURONIUM BROMIDE 5 MG: 50 INJECTION, SOLUTION INTRAVENOUS at 14:18

## 2020-05-21 RX ADMIN — ONDANSETRON 4 MG: 2 INJECTION INTRAMUSCULAR; INTRAVENOUS at 14:44

## 2020-05-21 RX ADMIN — Medication 5 MG: at 14:56

## 2020-05-21 RX ADMIN — CEFAZOLIN 2 G: 1 INJECTION, POWDER, FOR SOLUTION INTRAMUSCULAR; INTRAVENOUS at 21:25

## 2020-05-21 NOTE — H&P
[address] [place] [address]   Office [phone]  Fax [phone]       Admission H&P     Name: Hillary Henderson MRN: [id number]  Sex: Female   YOB: 1940  Age: 78 y.o. PCP: Cheli Alvarado MD     Source of Information: patient's daughter, medical records    Chief complaint: L hip pain    History of Present Illness    Hillary Henderson is a 78 y.o. female with known hx of TIA/CVA in 2017, HTN, HLD and dementia who presents to the ER complaining of L hip pain. History is obtained from her daughter Joi Pickard . On Sunday the patient had an unwitnessed fall off the side of the bed while watching TV. Her daughter was unable to get her up from the ground, so she let her sleep on the floor overnight with blankets and pillow . The next day she was able to get her up. She was walking slower but without any complaint. The patient typically ambulates without assistance. That evening the patient was found on the floor again. The patient was unsure on how she ended up there. When her daughter tried to lift her up this time she was complained of immense pain all over her body. The patient was unable to move herself since that episode so the daughter sent her to the ED. She denies any SOB, cough, fever, chills, abdominal pain, nausea vomiting, CP, or dysuria. COVID questions: : Denies cough, sob, fever, diarrhea. Lives with her daughter. Denies any sick or COVID contacts. In the ER:    -vitals were remarkable for Temp 98.7 , HR 57 , /57, RR 16 , SpO2 98% on RA   -labs were remarkable for WBC 13.0, Hgb 11.3 (~13), CK 1639  -Imaging that was done includes CT head no acute infarct or intracranial hemorrhage, severe white matter disease 2/2 to chronic small vessel ischemic, chronic R frontal and occipital lobe infarctions, ventriculomegaly. XR hip L acute neck fracture. XR elbow, no acute abnormality  -Treatment was not given    EKG: Sinus bradycardia, rate of 53.  Unchanged depressions in lateral leads    Past Medical History:   Diagnosis Date    Acute CVA (cerebrovascular accident) ([facility] [address] 4/10/2017    Per MRI:  Posterior L frontal/parietal territory    Cerebral atrophy ([facility] [address] 4/10/2017    History of CVA (cerebrovascular accident) 4/10/2017    --L frontal acute infarct and LMCA    Hyperlipidemia LDL goal <70 4/10/2017    Hypertension     TIA (transient ischemic attack)     Unresponsive episode 8/3/2017    transient      Patient Vitals for the past 12 hrs:   Temp Pulse Resp BP SpO2   05/20/20 1915    120/51 96 %   05/20/20 1905 98.7 °F (37.1 °C) (!) 57 16 140/57 98 %       Home Medications   Prior to Admission medications    Medication Sig Start Date End Date Taking? Authorizing Provider   memantine (NAMENDA) 10 mg tablet Take 1 Tab by mouth two (2) times a day. 4/15/20   Yoni Reich MD   atorvastatin (LIPITOR) 40 mg tablet Take 1 Tab by mouth daily. 4/15/20   Yoni Reich MD   carvediloL (COREG) 3.125 mg tablet Take 1 Tab by mouth two (2) times daily (with meals). 4/15/20   Yoni Reich MD   clopidogreL (PLAVIX) 75 mg tab TAKE ONE TABLET BY MOUTH ONCE DAILY 4/15/20   Yoni Reich MD   lisinopriL (PRINIVIL, ZESTRIL) 20 mg tablet Take 1 Tab by mouth daily. 4/15/20   Yoni Reich MD   glucosamine/chondr alvarez A sod (OSTEO BI-FLEX PO) Take  by mouth. Provider, Historical   ofloxacin (FLOXIN) 0.3 % ophthalmic solution INSTILL 1 DROP 4 TIMES DAILY INTO SURGERY EYE START 2 DAYS PRIOR TO SURGERY 10/23/19   Provider, Historical   ketorolac (ACULAR) 0.5 % ophthalmic solution INSTILL 1 DROP 4 TIMES DAILY INTO EYE HAVING SURGERY START 2 DAYS PRIOR TO SURGERY 10/23/19   Provider, Historical   varicella-zoster recombinant, PF, (SHINGRIX, PF,) 50 mcg/0.5 mL susr injection 0.5 mL by IntraMUSCular route every two (2) months.  12/11/19   Yoni Reich MD       Allergies  Allergies   Allergen Reactions    Benadryl Allergy/Cold [Diphenhyd-Pe-Acetaminophen] Hives    Penicillins Hives     Was treated with Augmentin in 2015, tolerated medication well. Past Medical History:   Diagnosis Date    Acute CVA (cerebrovascular accident) ([facility] 4/10/2017    Per MRI:  Posterior L frontal/parietal territory    Cerebral atrophy ([facility] [address] [address] 4/10/2017    History of CVA (cerebrovascular accident) 4/10/2017    --L frontal acute infarct and LMCA    Hyperlipidemia LDL goal <70 4/10/2017    Hypertension     TIA (transient ischemic attack)     Unresponsive episode 8/3/2017    transient       Past Surgical History:   Procedure Laterality Date    HX HEENT      Tonsilectomy age 8       Family History   Problem Relation Age of Onset    Dementia Mother 61    Heart Attack Father 79       Social History  Social History     Socioeconomic History    Marital status:      Spouse name: Not on file    Number of children: Not on file    Years of education: Not on file    Highest education level: Not on file   Occupational History    Not on file   Social Needs    Financial resource strain: Not on file    Food insecurity     Worry: Not on file     Inability: Not on file    Transportation needs     Medical: Not on file     Non-medical: Not on file   Tobacco Use    Smoking status: Former Smoker     Last attempt to quit: 1999     Years since quittin.8    Smokeless tobacco: Never Used   Substance and Sexual Activity    Alcohol use:  Yes     Alcohol/week: 17.5 standard drinks     Types: 21 Glasses of wine per week     Comment: 3 glasses of port per day    Drug use: No    Sexual activity: Never   Lifestyle    Physical activity     Days per week: Not on file     Minutes per session: Not on file    Stress: Not on file   Relationships    Social connections     Talks on phone: Not on file     Gets together: Not on file     Attends Judaism service: Not on file     Active member of club or organization: Not on file     Attends meetings of clubs or organizations: Not on file     Relationship status: Not on file    Intimate partner violence     Fear of current or ex partner: Not on file     Emotionally abused: Not on file     Physically abused: Not on file     Forced sexual activity: Not on file   Other Topics Concern    Not on file   Social History Narrative    Not on file       Alcohol history: Former Alcoholic , 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 Ambulates: Independently     Review of Systems   Constitutional: Negative for chills and fever. HENT: Negative for congestion and sore throat. Eyes: Negative for blurred vision and double vision. Cardiovascular: Negative for chest pain and palpitations. Gastrointestinal: Negative for abdominal pain, constipation, diarrhea, nausea and vomiting. Genitourinary: Negative for frequency, hematuria and urgency. Musculoskeletal: Positive for falls and joint pain. Skin: Negative for itching and rash. Neurological: Negative for dizziness, sensory change, speech change, seizures, weakness and headaches. Physical Exam      O2 Device: Room air     Physical Exam  Constitutional:       General: She is not in acute distress. HENT:      Head: Normocephalic and atraumatic. Cardiovascular:      Rate and Rhythm: Regular rhythm. Bradycardia present. Pulses: Normal pulses. Pulmonary:      Effort: Pulmonary effort is normal.      Breath sounds: No wheezing or rales. Abdominal:      General: Bowel sounds are normal.      Palpations: Abdomen is soft. Tenderness: There is no abdominal tenderness. Musculoskeletal:         General: Tenderness present. No swelling. Skin:     General: Skin is warm. Neurological:      General: No focal deficit present.       Comments: Patient oriented to self, not place and time          Laboratory Data  Recent Results (from the past 8 hour(s))   CBC WITH AUTOMATED DIFF Collection Time: 05/20/20  8:58 PM   Result Value Ref Range    WBC 13.0 (H) 3.6 - 11.0 K/uL    RBC 3.56 (L) 3.80 - 5.20 M/uL    HGB 11.3 (L) 11.5 - 16.0 g/dL    HCT 34.6 (L) 35.0 - 47.0 %    MCV 97.2 80.0 - 99.0 FL    MCH 31.7 26.0 - 34.0 PG    MCHC 32.7 30.0 - 36.5 g/dL    RDW 14.6 (H) 11.5 - 14.5 %    PLATELET 202 224 - 481 K/uL    MPV 10.2 8.9 - 12.9 FL    NRBC 0.0 0  WBC    ABSOLUTE NRBC 0.00 0.00 - 0.01 K/uL    NEUTROPHILS 82 (H) 32 - 75 %    LYMPHOCYTES 6 (L) 12 - 49 %    MONOCYTES 10 5 - 13 %    EOSINOPHILS 1 0 - 7 %    BASOPHILS 0 0 - 1 %    IMMATURE GRANULOCYTES 1 (H) 0.0 - 0.5 %    ABS. NEUTROPHILS 10.6 (H) 1.8 - 8.0 K/UL    ABS. LYMPHOCYTES 0.8 0.8 - 3.5 K/UL    ABS. MONOCYTES 1.3 (H) 0.0 - 1.0 K/UL    ABS. EOSINOPHILS 0.1 0.0 - 0.4 K/UL    ABS. BASOPHILS 0.1 0.0 - 0.1 K/UL    ABS. IMM. GRANS. 0.1 (H) 0.00 - 0.04 K/UL    DF AUTOMATED     SAMPLES BEING HELD    Collection Time: 05/20/20  8:58 PM   Result Value Ref Range    SAMPLES BEING HELD [email or web address]     COMMENT        Add-on orders for these samples will be processed based on acceptable specimen integrity and analyte stability, which may vary by analyte. Imaging  CXR Results  (Last 48 hours)    None        CT Results  (Last 48 hours)               05/20/20 2013  CT HEAD WO CONT Final result    Impression:  Impression:    1. No evidence of acute infarct or intracranial hemorrhage. 2. Severe periventricular white matter disease is likely secondary to chronic   small vessel ischemic changes. 3. Chronic right frontal and occipital lobe infarctions. 4. Chronic generalized ventriculomegaly. Narrative: Indication:  fall with head trauma       Comparison: CT 10/13/2019       Findings: 5 mm axial images were obtained from the skull base through the   vertex. CT dose reduction was achieved through the use of a standardized protocol   tailored for this examination and automatic exposure control for dose   modulation. There is chronic generalized ventriculomegaly. There is no evidence of   intracranial hemorrhage, mass, mass effect, or acute infarct. There is   periventricular white matter disease. There is a chronic right frontal lobe   infarct. There is a chronic right occipital lobe infarct. No extra-axial fluid   collections are seen. The visualized paranasal sinuses and mastoid air cells are   clear. The orbital structures are unremarkable. No osseous abnormalities are   seen. Assessment and Plan     Haylee Prudent is a 78 y.o. female with a hx of TIA/CVA, HTN, HLD, dementia who is admitted for L femoral neck fracture    L Femoral Neck Fracture seen on XR hip. Patient had 2 ground level falls. Ortho consulted in ED, patient to have procedure tomorrow  -Admit to Remote Telemetry  -Vital Signs per unit routine  -Will treat pain prn  - NPO at midnight  -Pre-op COVID testing. Low probability  -Ortho c/s, will need cardiac clearance before procedure  -Daily CBC/BMP    Rhabdomyolysis patient had 2 GLF, during the first was on the ground overnight. CK 1639 without any kidney injury. -MIVF  -F/u UA    Bradycardia with a hx of  bigeminy HR POA 55. Patient seen by Dr. Marilyn Saldivar in the past for cardiology clearance d/t Bradycardia for a cataract procedure. EKG was noted to find PVCs in the form of bigemeny. Bradycardia was false and patient was deemed stable for surgery. - Telemetry monitoring  - Cardiology c/s for cardiac clearance  - F/u trop    Hx of CVA/TIA in 2017. CT scan POA noted chronic ischemic changes  -Hold home Plavix 75mg for surgery    HTN /57 POA  -Continue home Lisinopril 20mg nightly and Coreg 3.125mg BID    HLD last lipid panel 12/2019 all values wnl  -Continue home Lipitor 40mg nightly    Dementia  -Continue home Namenda 10mg BID      FEN/GI - NPO after midnight. NS at 90 mL/hr.   Activity - Bedrest  DVT prophylaxis - SCDs  GI prophylaxis - Not indicated at this time  Fall prophylaxis - Fall precautions ordered. Disposition - Admit to Surgical. Plan to d/c to TBD. Code Status - Full, discussed with patient / caregivers. Next of Kin Name and Henrique Garrick [address] Acosta, daughter [phone]    Patient Jarett Brickeys will be discussed Dr. Lisa Schuster.      9:28 PM, 05/20/20  Billy Edwards DO  Family Medicine Resident       For Billing    Chief Complaint   Patient presents with   [facility] [place] Problems  Date Reviewed: 1/13/2020    None

## 2020-05-21 NOTE — CONSULTS
Reason for Consult: PreOp evaluation      HPI: Debra Foss is a 78 y.o. female with PMH of transient ischemic attacks, CVA, cardiomyopathy, dyslipidemia, hypertension, PVC is admitted with GLF and left femoral neck fracture. She has dementia. She is on Plavix for prior TIA/ CVA and questionable history of PAF. I have been asked for cardiovascular evaluation prior to femoral neck fixation. She is a poor historian but she is partially oriented. I was able to get history from her daughter Damon Velasquez. She has no symptoms of chest pain, SOB, palpitations, dizziness, presyncope or syncope. She had a GLF while sitting on the side of the bed and went sleep and fell down. No prior known CAD or revascularization. On Plavix for CVA/ TIA and possible PAF which is not confirmed. EKG: Sinus rhythm, T inversion anterior leads with LVH and possible strain pattern. Frequent PVC. This is unchanged from prior EKG of 1/13/2020    Plan:    1. PreOp Evaluation: She is an intermediate risk candidate for an intermediate risk surgery. She can proceed for the femoral neck fixation surgery. Plavix can be held for 5 to 7 days while she is on Lovenox post surgery and resume once Lovenox is discontinued. Discussed with Dr. Neysa Meckel. 2. History of cardiomyopathy: LVEF is stable at 50%. Continue Coreg. Continue lisinopril. Stop IVF after surgery and when able to take PO.     3. HTN: BP is controlled at home. Here BP elevated due to pain. Continue Coreg. 4. PVC/ Bradycardia: Stable. 5. Will sign off and check chart tomorrow. Please call if needed. I will see her in office in 1-2 months. ATTENTION:   This medical record was transcribed using an electronic medical records/speech recognition system. Although proofread, it may and can contain electronic, spelling and other errors. Corrections may be executed at a later time. Please feel free to contact us for any clarifications as needed.       Past Medical History: Diagnosis Date    Acute CVA (cerebrovascular accident) ([facility] [place] [address] 4/10/2017    Per MRI:  Posterior L frontal/parietal territory    Cerebral atrophy ([facility] [place] [address] 4/10/2017    History of CVA (cerebrovascular accident) 4/10/2017    --L frontal acute infarct and LMCA    Hyperlipidemia LDL goal <70 4/10/2017    Hypertension     TIA (transient ischemic attack)     Unresponsive episode 8/3/2017    transient            Past Surgical History:   Procedure Laterality Date    HX HEENT      Tonsilectomy age 8             Family History   Problem Relation Age of Onset    Dementia Mother 61    Heart Attack Father 79           Social History     Socioeconomic History    Marital status:      Spouse name: Not on file    Number of children: Not on file    Years of education: Not on file    Highest education level: Not on file   Occupational History    Not on file   Social Needs    Financial resource strain: Not on file    Food insecurity     Worry: Not on file     Inability: Not on file    Transportation needs     Medical: Not on file     Non-medical: Not on file   Tobacco Use    Smoking status: Former Smoker     Last attempt to quit: 1999     Years since quittin.8    Smokeless tobacco: Never Used   Substance and Sexual Activity    Alcohol use:  Yes     Alcohol/week: 17.5 standard drinks     Types: 21 Glasses of wine per week     Comment: 3 glasses of port per day    Drug use: No    Sexual activity: Never   Lifestyle    Physical activity     Days per week: Not on file     Minutes per session: Not on file    Stress: Not on file   Relationships    Social connections     Talks on phone: Not on file     Gets together: Not on file     Attends [employer] service: Not on file     Active member of club or organization: Not on file     Attends meetings of clubs or organizations: Not on file     Relationship status: Not on file    Intimate partner violence     Fear of current or ex partner: Not on file Emotionally abused: Not on file     Physically abused: Not on file     Forced sexual activity: Not on file   Other Topics Concern    Not on file   Social History Narrative    Not on file         Allergies   Allergen Reactions    Benadryl Allergy/Cold [Diphenhyd-Pe-Acetaminophen] Hives     Per patient's daughter, she thinks patient has had benadryl since then and tolerated it.  Penicillins Hives     Was treated with Augmentin in June 2015, tolerated medication well. Current Facility-Administered Medications   Medication Dose Route Frequency Provider Last Rate Last Dose    levoFLOXacin (LEVAQUIN) 750 mg in D5W IVPB  750 mg IntraVENous Q48H Hildy Purl,  mL/hr at 05/21/20 0636 750 mg at 05/21/20 0636    ceFAZolin (ANCEF) 2 g in sterile water (preservative free) 20 mL IV syringe  2 g IntraVENous ON CALL TO OR Peace Toussaint NP        sodium chloride (NS) flush 5-40 mL  5-40 mL IntraVENous Q8H Hildy Purl, DO   10 mL at 05/21/20 2271    sodium chloride (NS) flush 5-40 mL  5-40 mL IntraVENous PRN Hildy Purl, DO        0.9% sodium chloride infusion  115 mL/hr IntraVENous CONTINUOUS Joy Jamison  mL/hr at 05/21/20 0910 115 mL/hr at 05/21/20 0910    atorvastatin (LIPITOR) tablet 40 mg  40 mg Oral QHS Hildy Purl, DO        memantine [facility]) tablet 10 mg  10 mg Oral BID Hildy Purl, DO   10 mg at 05/21/20 0910    lisinopriL (PRINIVIL, ZESTRIL) tablet 20 mg  20 mg Oral QHS Hildy Purl, DO            ROS:  12 point review of systems was performed.  All negative except for HPI     Physical Exam:  Visit Vitals  /67 (BP 1 Location: Right arm, BP Patient Position: At rest)   Pulse (!) 59   Temp 98.4 °F (36.9 °C)   Resp 18   Ht 4' 11\" (1.499 m)   Wt 109 lb (49.4 kg)   SpO2 96%   BMI 22.02 kg/m²       Gen:  Well-developed, well-nourished, in no acute distress  HEENT:  Pink conjunctivae, PERRL, hearing intact to voice, moist mucous membranes  Neck:  Supple, without masses, thyroid non-tender  Resp:  No accessory muscle use, clear breath sounds without wheezes rales or rhonchi  Card:  No murmurs, normal S1, S2 without thrills, bruits or peripheral edema  Abd:  Soft, non-tender, non-distended, normoactive bowel sounds are present, no palpable organomegaly and no detectable hernias  Lymph:  No cervical or inguinal adenopathy  Musc:  No cyanosis or clubbing. Skin:  No rashes or ulcers, skin turgor is good  Neuro:  Cranial nerves are grossly intact, no focal motor weakness, follows commands appropriately  Psych:  Good insight, Partially oriented to herself. Labs:     Lab Results   Component Value Date/Time    WBC 12.3 (H) 05/21/2020 05:31 AM    HGB 10.6 (L) 05/21/2020 05:31 AM    Hemoglobin (POC) 13.9 07/07/2013 12:47 AM    HCT 32.6 (L) 05/21/2020 05:31 AM    Hematocrit (POC) 41 07/07/2013 12:47 AM    PLATELET 207 44/41/4428 05:31 AM    MCV 97.0 05/21/2020 05:31 AM     Lab Results   Component Value Date/Time    Hemoglobin A1c 6.3 (H) 12/11/2019 10:06 AM    Hemoglobin A1c 6.1 04/10/2017 12:16 AM    Glucose 98 05/21/2020 05:31 AM    Glucose (POC) 90 04/09/2017 11:58 PM    LDL,Direct 106 (H) 04/10/2017 12:16 AM    LDL, calculated 81 12/11/2019 10:06 AM    Creatinine (POC) 1.0 07/07/2013 12:47 AM    Creatinine 0.54 (L) 05/21/2020 05:31 AM      Lab Results   Component Value Date/Time    Cholesterol, total 170 12/11/2019 10:06 AM    HDL Cholesterol 79 12/11/2019 10:06 AM    LDL,Direct 106 (H) 04/10/2017 12:16 AM    LDL, calculated 81 12/11/2019 10:06 AM    Triglyceride 48 12/11/2019 10:06 AM    CHOL/HDL Ratio 1.9 04/11/2017 06:25 AM     Lab Results   Component Value Date/Time    ALT (SGPT) 28 05/20/2020 08:58 PM    AST (SGOT) 80 (H) 05/20/2020 08:58 PM    Alk.  phosphatase 60 05/20/2020 08:58 PM    Bilirubin, total 1.0 05/20/2020 08:58 PM    Albumin 2.7 (L) 05/20/2020 08:58 PM    Protein, total 6.6 05/20/2020 08:58 PM    INR 1.0 08/04/2017 12:19 AM    Prothrombin time 10.0 08/04/2017 12:19 AM    PLATELET 736 25/80/3352 05:31 AM     Lab Results   Component Value Date/Time    INR 1.0 08/04/2017 12:19 AM    INR (POC) 1.0 04/10/2017 12:23 AM    Prothrombin time 10.0 08/04/2017 12:19 AM      Lab Results   Component Value Date/Time    GFR est non-AA >60 05/21/2020 05:31 AM    GFRNA, POC 54 (L) 07/07/2013 12:47 AM    GFR est AA >60 05/21/2020 05:31 AM    GFRAA, POC >60 07/07/2013 12:47 AM    Creatinine 0.54 (L) 05/21/2020 05:31 AM    Creatinine (POC) 1.0 07/07/2013 12:47 AM    BUN 24 (H) 05/21/2020 05:31 AM    BUN (POC) 25 (H) 07/07/2013 12:47 AM    Sodium 139 05/21/2020 05:31 AM    Sodium (POC) 134 (L) 07/07/2013 12:47 AM    Potassium 3.4 (L) 05/21/2020 05:31 AM    Potassium (POC) 3.7 07/07/2013 12:47 AM    Chloride 109 (H) 05/21/2020 05:31 AM    Chloride (POC) 99 07/07/2013 12:47 AM    CO2 26 05/21/2020 05:31 AM    Magnesium 1.8 08/04/2017 12:19 AM    Phosphorus 3.3 08/04/2017 12:19 AM     No results found for: Vishnu NAVARRETE, [id number], [id number], PSALT  Lab Results   Component Value Date/Time    TSH 1.85 08/04/2017 03:24 AM      Lab Results   Component Value Date/Time    Glucose 98 05/21/2020 05:31 AM    Glucose (POC) 90 04/09/2017 11:58 PM      Lab Results   Component Value Date/Time    CK 1,639 (H) 05/20/2020 08:58 PM    CK - MB 4.8 (H) 08/04/2017 12:19 AM    CK-MB Index 3.1 (H) 08/04/2017 12:19 AM    Troponin-I, Qt. <0.05 05/20/2020 08:58 PM      Lab Results   Component Value Date/Time    NT pro- (H) 08/05/2017 04:24 AM      Lab Results   Component Value Date/Time    Sodium 139 05/21/2020 05:31 AM    Potassium 3.4 (L) 05/21/2020 05:31 AM    Chloride 109 (H) 05/21/2020 05:31 AM    CO2 26 05/21/2020 05:31 AM    Anion gap 4 (L) 05/21/2020 05:31 AM    Glucose 98 05/21/2020 05:31 AM    BUN 24 (H) 05/21/2020 05:31 AM    Creatinine 0.54 (L) 05/21/2020 05:31 AM    BUN/Creatinine ratio 44 (H) 05/21/2020 05:31 AM GFR est AA >60 05/21/2020 05:31 AM    GFR est non-AA >60 05/21/2020 05:31 AM    Calcium 8.2 (L) 05/21/2020 05:31 AM      Lab Results   Component Value Date/Time    Sodium 139 05/21/2020 05:31 AM    Potassium 3.4 (L) 05/21/2020 05:31 AM    Chloride 109 (H) 05/21/2020 05:31 AM    CO2 26 05/21/2020 05:31 AM    Anion gap 4 (L) 05/21/2020 05:31 AM    Glucose 98 05/21/2020 05:31 AM    BUN 24 (H) 05/21/2020 05:31 AM    Creatinine 0.54 (L) 05/21/2020 05:31 AM    BUN/Creatinine ratio 44 (H) 05/21/2020 05:31 AM    GFR est AA >60 05/21/2020 05:31 AM    GFR est non-AA >60 05/21/2020 05:31 AM    Calcium 8.2 (L) 05/21/2020 05:31 AM    Bilirubin, total 1.0 05/20/2020 08:58 PM    ALT (SGPT) 28 05/20/2020 08:58 PM    AST (SGOT) 80 (H) 05/20/2020 08:58 PM    Alk.  phosphatase 60 05/20/2020 08:58 PM    Protein, total 6.6 05/20/2020 08:58 PM    Albumin 2.7 (L) 05/20/2020 08:58 PM    Globulin 3.9 05/20/2020 08:58 PM    A-G Ratio 0.7 (L) 05/20/2020 08:58 PM      Lab Results   Component Value Date/Time    Hemoglobin A1c 6.3 (H) 12/11/2019 10:06 AM         Recent Labs     05/20/20 2058   CPK 1,639*   TROIQ <0.05           Problem List:     Problem List  Date Reviewed: 1/13/2020          Codes Class Noted    Femoral neck fracture ([facility] [place] [facility] ICD-10-CM: S72.009A  ICD-9-CM: 820.8  5/20/2020        TIA (transient ischemic attack) ICD-10-CM: G45.9  ICD-9-CM: 435.9  Unknown        Cardiomyopathy ([facility] [place] [facility] ICD-10-CM: I42.9  ICD-9-CM: 425.4  8/4/2017        Unresponsive episode ICD-10-CM: R41.89  ICD-9-CM: 780.09  8/3/2017    Overview Signed 8/4/2017  8:50 AM by Steffen Renteria     transient             H/O CVA (4/2017) ICD-10-CM: I63.9  ICD-9-CM: 434.91  4/10/2017    Overview Signed 4/10/2017 12:14 PM by Steffen Renteria     MRI review:  Posterior L frontal/parietal infarct             Cerebral atrophy ([facility] [address] ICD-10-CM: G31.9  ICD-9-CM: 331.9  4/10/2017    Overview Signed 4/10/2017  1:01 PM by Dajuan Pale than expected for age Hyperlipidemia LDL goal <70 ICD-10-CM: E78.5  ICD-9-CM: 272.4  4/10/2017        History of CVA (cerebrovascular accident) (Chronic) ICD-10-CM: R99.30  ICD-9-CM: V12.54  4/10/2017    Overview Signed 8/4/2017  8:58 AM by Shanita Hein     --L frontal acute infarct and LMCA             Adjustment disorder ICD-10-CM: F43.20  ICD-9-CM: 309.9  4/10/2014        Anxiety ICD-10-CM: F41.9  ICD-9-CM: 300.00  4/10/2014        Hypertension ICD-10-CM: I10  ICD-9-CM: 401.9  11/15/2013    Overview Addendum 4/28/2017  1:45 PM by Joann Newberry MD     A. Goal BP <140/90 s/p CVA in 2017. B. Echo (4/10/17):  EF 40% with mild GHK. Mild MR. Mild to mod TR. PASP 53. Neg bubble. Satish Ramirez MD, [facility] [place]

## 2020-05-21 NOTE — OP NOTES
[facility] 77  OPERATIVE REPORT    Name:  Favian Burgos  MR#:  [id number]  :  1940  ACCOUNT #:  [de-identified]  DATE OF SERVICE:  2020    PREOPERATIVE DIAGNOSIS:  Left displaced femoral neck fracture. POSTOPERATIVE DIAGNOSIS:  Left displaced femoral neck fracture. PROCEDURE PERFORMED:  Left hip bipolar hemiarthroplasty. SURGEON:  Ivonne Bridges MD    SURGICAL ASSISTANT:  Elizabeth Lebron PA-C. A skilled assistant was needed for retraction, assistance with limb and implant management as well as closure. ANESTHESIA:  General.    COMPLICATIONS:  None. SPECIMENS REMOVED:  None. IMPLANTS:  [place] Press-Fit Accolade II size 6 femoral stem, 26-mm stem head +0 with a 41- mm shell. ESTIMATED BLOOD LOSS:  200 mL. FINDINGS:  Left displaced femoral neck fracture. INDICATION FOR THE PROCEDURE:  This is an 78-year-old female who suffered ground-level fall. An x-ray at the [facility] Emergency Department on 2020 showed a femoral neck fracture. The patient has dementia, and I discussed the risks and benefits of surgical and conservative management options at length with her daughter who is her power of . I discussed the risks of surgery which include but not limited to complications of anesthesia including death, pain, bleeding, infection, damage to surrounding structures, dislocation, wound healing problems, DVT, PE, and need for further surgery. I recommended surgical fixation to promote healing, restore anatomy and function and allow for early weightbearing and motion and reduce pain. The patient's daughter verbalized understanding and elected to proceed with surgical intervention. DESCRIPTION OF PROCEDURE:  The correct patient, extremity, and operation were identified in the preoperative holding area, and marked her left hip.   The patient was taken back to the operating room and placed successfully under general anesthesia and placed in the right lateral decubitus position and secured to the table with the peg board and a safety strap and all bony prominences were padded well. Her left hip was then prepped and draped in the usual sterile fashion and a preoperative timeout was called. Again, the correct patient, extremity, and operation were identified, all parties were in agreement, and we proceed with operation. The patient received IV antibiotics within 30 minutes to the incision. A curvilinear incision was made and centered over the posterior one-third of the greater trochanter. Sharp dissection was carried down to the level of the skin, and electrocautery was used to dissect down the level of the IT band, and a meticulous hemostasis was maintained. The IT band was then split in line with its fibers directly over the lesser trochanter. The trochanteric bursa was removed, the hip was internally rotated to expose the external rotators and a longitudinal incision was made along the superior border of the piriformis muscle and tendon and carried down the level of the capsule. An L-shaped capsulotomy was then performed, taken down the lateral aspect of the femoral neck. The displaced femoral neck fracture was identified, and a femoral neck cut was made approximately 1.5 cm proximal to the lesser trochanter with approximately 30 to 40 degrees of anteversion. The remaining femoral neck and the femoral head were then removed. The acetabulum was cleared of any bony contents, and the wound was copiously irrigated with the pulse lavage. The acetabulum was inspected and was cleared. The  was used to enter the femoral canal and then the femoral canal finder was then placed, and there was immediate return of femoral canal content as the femoral canal was inserted. Broaching was commenced, taking care to lateralize the implant, so as not to put the stem in varus.   Broaching was taken up to a size 6 which was a good press-fit with good bone at the medial calcar. Trial components were placed with the standard neck, +0 head and 41-mm shell. The hip was then reduced and taken through a range of motion. The hip was stable at external rotation and extension. The leg lengths were grossly normal.  The hip was taken up to 90 degrees and internally rotated to approximately 80 degrees without subluxation or dislocation of the hip. The hip was then dislocated. The calcar bones were removed, and a size 6 femoral stem was impacted in the satisfactory position with press-fit down to the calcar with good fit and no loosening. The acetabulum was then cleared of any bony contents and debris. The trunnion was then cleaned, and the 26-mm +0 head was impacted onto the stem, and the 41-mm shell snapped in place, and the hip was then reduced, taking care to keep any soft tissue out of the acetabulum. Once the hip was reduced, it was taken through a range of motion and was a good fit of the prosthesis. External rotation and extension showed no levering of the hip out of place. The hip was then taken up to 90 degrees of flexion and internal rotation of approximately 80 degrees without any dislocation of the hip. The short external rotators and the capsule were then repaired back to the greater trochanter through a series of drill holes using #5 FiberWire, and the sutures were then tied with the hip in external rotation. The wound was then copiously irrigated with normal saline using a pulse lavage. The IT band was then closed with a running Stratafix suture. The subcutaneous layer closed with 2-0 Vicryl suture, and the skin closed with subcuticular Monocryl suture, running and Dermabond. Sterile dressing was applied, and the patient was placed back in the supine position. The abduction pillow was placed, and she was awaken from anesthesia and taken to recovery room in a hemodynamically stable condition.   All lap and sharp counts were correct at the end of the case. POSTOPERATIVE CARE:  She will be admitted back to the hospitalist service, and she will be weightbearing as tolerated with posterior hip precautions to the left lower extremity. The patient will start Lovenox 40 mg subcu daily for DVT prophylaxis in the morning of postoperative day 1. She will return to clinic in 3 to 4 weeks. Follow up with Yomaira Lowery at the [facility]: [address] office, [phone].       Sylvia Pereyra MD      [id number]  D:  05/21/2020 15:27  T:  05/21/2020 18:48  JOB #:  [id number]

## 2020-05-21 NOTE — ANESTHESIA PREPROCEDURE EVALUATION
Relevant Problems   No relevant active problems       Anesthetic History   No history of anesthetic complications            Review of Systems / Medical History  Patient summary reviewed, nursing notes reviewed and pertinent labs reviewed    Pulmonary  Within defined limits                 Neuro/Psych         TIA     Cardiovascular    Hypertension          Hyperlipidemia         GI/Hepatic/Renal                Endo/Other             Other Findings              Physical Exam    Airway  Mallampati: II  TM Distance: < 4 cm  Neck ROM: normal range of motion   Mouth opening: Normal     Cardiovascular    Rhythm: regular  Rate: normal         Dental    Dentition: Edentulous     Pulmonary  Breath sounds clear to auscultation               Abdominal  GI exam deferred       Other Findings            Anesthetic Plan    ASA: 3  Anesthesia type: general          Induction: Intravenous  Anesthetic plan and risks discussed with: Patient and Son / Daughter      Carry [address] PHONE

## 2020-05-21 NOTE — PROGRESS NOTES
Cannot complete dysphagia screening on pt due to her being NPO for possible surgery today.      Cannot complete admission questions due to pt having dementia

## 2020-05-21 NOTE — PROGRESS NOTES
[address] Medicine Residency  Resident Note in Brief  ----------------------------------------    S: Patient seen and examined at bedside with Dr. Jennifer Stoll (pGY-3) Patient is s/p L hip bipolar hemiarthoplasty. States her pain is well controlled. Per nursing patient did not eat her last dinner tray, but is producing urine      O:  Visit Vitals  /71   Pulse 69   Temp 97.5 °F (36.4 °C)   Resp 16   Ht 4' 11\" (1.499 m)   Wt 109 lb (49.4 kg)   SpO2 100%   BMI 22.02 kg/m²     Physical Exam  Constitutional:       General: She is not in acute distress. HENT:      Head: Normocephalic and atraumatic. Cardiovascular:      Rate and Rhythm: Normal rate and regular rhythm. Heart sounds: No murmur. Pulmonary:      Effort: Pulmonary effort is normal.      Breath sounds: No wheezing or rales. Abdominal:      Palpations: Abdomen is soft. Neurological:      Comments: Oriented to self. Not place           A/P  Hillary Henderson is a 78 y.o. with a hx of HTN, HLD, dementia, and hx of TIA/CVA who was admitted for a L femoral neck fracture. She is s/p L hip bipolar hemiarthroplasty. Patient is doing well however does not have much appetite  -Encouraged po intake  -Continue IVF for now, consider discontinuing them once patient starts to eat  -Continue clear liquid diet  -Continue Levaquin for tx of UTI  -Prn Roxicodone and Tramadol for pain  -F/u Am Hgb      Please see full daily progress note for complete plan.   Gee Hoffmann,   6:39 PM

## 2020-05-21 NOTE — PROGRESS NOTES
[address] [place] [address]   Office [phone]  Fax [phone]          Assessment and Plan     Jacob Torrez is a 78 y.o. female with a hx of TIA/CVA, HTN, HLD, dementia who is admitted for L femoral neck fracture     24 Hour Events: No acute events. L Femoral Neck Fracture seen on XR hip. Patient had 2 ground level falls. Ortho consulted in ED, patient to have procedure today  -NPO   -Pre-op COVID testing. Low probability  -Ortho c/s, will need cardiac clearance before procedure  -Daily CBC/BMP     Rhabdomyolysis patient had 2 GLF, during the first was on the ground overnight. CK 1639 without any kidney injury. UA mod blood  -MIVF     Bradycardia with a hx of  bigeminy HR POA 55. Patient seen by Dr. Mark Shin in the past for cardiology clearance d/t Bradycardia for a cataract procedure. EKG was noted to find PVCs in the form of bigemeny. Bradycardia was false and patient was deemed stable for surgery. Trop neg.   - Telemetry monitoring  - Cardiology c/s for cardiac clearance     Hx of CVA/TIA in 2017. CT scan POA noted chronic ischemic changes  -Hold home Plavix 75mg for surgery    UTI UA pos for 4+ bacteria, positive nitrites, large LE. Patient has baseline dementia and cannot report symptoms  -IV Levaquin  -F/u Ucx     HTN /57 POA  -Continue home Lisinopril 20mg nightly and Coreg 3.125mg BID     HLD last lipid panel 12/2019 all values wnl  -Continue home Lipitor 40mg nightly     Dementia  -Continue home Namenda 10mg BID      FEN/GI - NPO. NS at 90 mL/hr. Activity - Bedrest  DVT prophylaxis - SCDs  GI prophylaxis - Not indicated at this time  Disposition - Plan to d/c to TBD. Code Status - Full    I appreciate the opportunity to participate in the care of this patient,  Wilda Horowitz DO  Family Medicine Resident         Subjective / Objective     Subjective: Patient is feeling well without complaint. Per nursing pain scale was 0/10 overnight.     Temp (24hrs), Av.5 °F (36.9 °C), Min:98.1 °F (36.7 °C), Max:98.7 °F (37.1 °C)     Physical Exam  Constitutional:       General: She is not in acute distress. HENT:      Head: Normocephalic and atraumatic. Cardiovascular:      Rate and Rhythm: Bradycardia present. Heart sounds: No murmur. Pulmonary:      Effort: Pulmonary effort is normal. No respiratory distress. Breath sounds: No wheezing. Abdominal:      Palpations: Abdomen is soft. Tenderness: There is no abdominal tenderness. Musculoskeletal:         General: Tenderness present. Neurological:      Comments: Oriented to self. Not place or time        Respiratory:   O2 Device: Room air     I/O:  Date 20 07 - 20 0620 - 20 0659   Shift 0031-1283 5473-8208 24 Hour Total 2520-9498 7609-8550 24 Hour Total   INTAKE   Shift Total(mL/kg)         OUTPUT   Urine           Urine Occurrence(s)  1 x 1 x      Shift Total(mL/kg)         NET         Weight (kg)  49.4 49.4 49.4 49.4 49.4       Inpatient Medications  Current Facility-Administered Medications   Medication Dose Route Frequency    ceFAZolin (ANCEF) 2 g in sterile water (preservative free) 20 mL IV syringe  2 g IntraVENous ON CALL TO OR    sodium chloride (NS) flush 5-40 mL  5-40 mL IntraVENous Q8H    sodium chloride (NS) flush 5-40 mL  5-40 mL IntraVENous PRN    0.9% sodium chloride infusion  90 mL/hr IntraVENous CONTINUOUS    carvediloL (COREG) tablet 3.125 mg  3.125 mg Oral BID WITH MEALS    atorvastatin (LIPITOR) tablet 40 mg  40 mg Oral QHS    memantine (NAMENDA) tablet 10 mg  10 mg Oral BID    lisinopriL (PRINIVIL, ZESTRIL) tablet 20 mg  20 mg Oral QHS         Allergies  Allergies   Allergen Reactions    Benadryl Allergy/Cold [Diphenhyd-Pe-Acetaminophen] Hives     Per patient's daughter, she thinks patient has had benadryl since then and tolerated it.  Penicillins Hives     Was treated with Augmentin in 2015, tolerated medication well. CBC:  Recent Labs     05/20/20 2058   WBC 13.0*   HGB 11.3*   HCT 34.6*          Metabolic Panel:  Recent Labs     05/20/20 2058      K 3.6      CO2 27   BUN 26*   CREA 0.67   *   CA 8.6   ALB 2.7*   SGOT 80*   ALT 28              For Billing    Chief Complaint   Patient presents with   [facility] [place] Problems  Date Reviewed: 1/13/2020          Codes Class Noted POA    [facility] ICD-10-CM: X03.988N  ICD-9-CM: 820.8  5/20/2020 Unknown

## 2020-05-21 NOTE — ANESTHESIA POSTPROCEDURE EVALUATION
Procedure(s):  LEFT HIP HEMIARTHROPLASTY. general    Anesthesia Post Evaluation      Multimodal analgesia: multimodal analgesia not used between 6 hours prior to anesthesia start to PACU discharge  Patient location during evaluation: PACU  Patient participation: complete - patient participated  Level of consciousness: awake  Pain management: adequate  Airway patency: patent  Anesthetic complications: no  Cardiovascular status: acceptable, blood pressure returned to baseline and hemodynamically stable  Respiratory status: acceptable  Hydration status: acceptable  Post anesthesia nausea and vomiting:  controlled      Vitals Value Taken Time   /82 5/21/2020  4:30 PM   Temp 36.4 °C (97.6 °F) 5/21/2020  3:57 PM   Pulse 59 5/21/2020  4:32 PM   Resp 16 5/21/2020  4:32 PM   SpO2 100 % 5/21/2020  4:32 PM   Vitals shown include unvalidated device data.

## 2020-05-21 NOTE — PROGRESS NOTES
Plavix held for surgery today. Pt. Sitting up in bed, pain controlled. Motor/sensory intact distally BLE's. DP pulses = BLE's. Cardiology consult from [facility] pending. I checked with Unit secretary and consult has not been called to Cardiologist yet. I requested consult to get called as soon as possible as OR case scheduled today.     Alden Landaverde, JERI  Orthopaedic Surgery PA

## 2020-05-21 NOTE — PERIOP NOTES
TRANSFER - OUT REPORT:    Verbal report given to Alissa Mccollum RN on Jarett Dighton  being transferred to [phone] for routine post - op       Report consisted of patients Situation, Background, Assessment and   Recommendations(SBAR). Information from the following report(s) SBAR, Intake/Output and MAR was reviewed with the receiving nurse. Lines:   Peripheral IV 05/20/20 Right Antecubital (Active)   Site Assessment Clean, dry, & intact 5/21/2020  9:01 AM   Phlebitis Assessment 0 5/21/2020  9:01 AM   Infiltration Assessment 0 5/21/2020  9:01 AM   Dressing Status Clean, dry, & intact 5/21/2020  9:01 AM   Dressing Type Transparent 5/21/2020  9:01 AM   Hub Color/Line Status Pink 5/21/2020  9:01 AM   Action Taken Open ports on tubing capped 5/21/2020  4:19 AM   Alcohol Cap Used Yes 5/21/2020  9:01 AM        Opportunity for questions and clarification was provided.       Patient transported with:   O2 @ 2 liters  Registered Nurse

## 2020-05-21 NOTE — PROGRESS NOTES
[address] [facility]   Senior Resident Admission Note    CC: L hip pain    HPI:  Kan Mccracken is a 78 y.o. female who presents to the ER complaining of L hip pain. Chart reviewed. Patient seen, examined, and discussed with Dr. Eunice Morrow (PGY-1). See her note for more details. Hx obtained from daughter via telephone as patient has dementia. Daughter reports pt had unwitnessed GLF Sunday and Monday (3 and 2 days ago), since then has been unable to move much and complaining of L hip pain. Pt lives with daughter. Already took her evening meds prior to arrival to ED. Ambulates independently, refuses to use walker at home. Imaging in ED showed L acute femoral neck fracture, head CT and elbow XR negative. Pt resting comfortably and reports zero pain unless her LLE is manipulated. Is oriented to self and aware she is in a hospital but otherwise unsure of year or which hospital. Says \" I thought I was doing pretty good until you told me I need surgery\"     A/P: 79 yo F with hx of dementia, prior TIA/CVA, HTN found to have acute L femoral neck fracture after GLF    1. Acute femoral neck fracture - after GLF. Ortho planning for surgery tomorrow. NPO at midnight. Pre-Op COVID testing ordered. Will need PT/OT once able, will likely need rehab.     2. Bigeminy - Last seen by Dr. Ana Wolfe 1/2020. Consult cardiology for surgical clearance. EKG in ED with sinus bradycardia. 3. Mild rhabdomyolysis - CK 1639, no renal injury. IVF. 4. Cognitive Impairment - stable, home memantine     5. Hypertension - stable, home coreg 3.125mg BID, lisinopril 20mg daily    6. Hx CVA/TIA - HOLD home plavix 75mg for OR    7. HLD - lipitor 40mg daily     I agree with remaining assessment and plan as documented in Dr. Eunice Morrow note.   Pt discussed with Dr. Liza Combs (on-call attending physician)    Alon Miguel MD  Family Medicine Resident  9:47 PM    -----------------------  Addendum:    UA with large LE, 4+ bacteria. Pt poor historian and not able to adequately report presence or absence of urinary symptoms. UTI may have played a role in her recent falls. Pt currently NPO for surgery today. Prior urine culture with pansensitive E.Coli. Pt with PCN allergy. Will treat with IV levaquin at this time, send for urine culture.      Beck Quiñonez MD  05/21/20  5:55 AM

## 2020-05-21 NOTE — PROGRESS NOTES
Problem: Pressure Injury - Risk of  Goal: *Prevention of pressure injury  Description: Document Diego Scale and appropriate interventions in the flowsheet. Outcome: Progressing Towards Goal  Note: Pressure Injury Interventions:  Sensory Interventions: Assess changes in LOC    Moisture Interventions: Absorbent underpads    Activity Interventions: Pressure redistribution bed/mattress(bed type)    Mobility Interventions: Float heels    Nutrition Interventions: Document food/fluid/supplement intake    Friction and Shear Interventions: Lift team/patient mobility team     Problem: Pain  Goal: *Control of Pain  Outcome: Progressing Towards Goal     Problem: Falls - Risk of  Goal: *Absence of Falls  Description: Document Wali Fall Risk and appropriate interventions in the flowsheet. Outcome: Progressing Towards Goal  Note: Fall Risk Interventions:  Mobility Interventions: Bed/chair exit alarm    Mentation Interventions: Bed/chair exit alarm    Medication Interventions: Bed/chair exit alarm    Elimination Interventions:  Toileting schedule/hourly rounds    History of Falls Interventions: Room close to nurse's station

## 2020-05-21 NOTE — TELEPHONE ENCOUNTER
Carmita-    Please call patient and schedule an appointment with Dr. Marcos Plascencia within the next 1-2 months. Dx: F/u CM, HTN    Thank you!

## 2020-05-21 NOTE — CONSULTS
Orthopedic History and Physical     Patient: Alessia Oglesby MRN: [id number]  SSN: [id number]    YOB: 1940  Age: 78 y.o. Sex: female          Subjective:     Patient is a 78 y.o.  female who complains of left hip pain. Pt with hx of alzheimer's, cardiomyopathy, TIA, hyperlipidemia, CVA, HTN. Pt had a fall on Sunday and Monday both unwitnessed. Pt lives with her daughter who cares for her full time. Due to worsening of pain, patient was brought to the ER. Radiographs reveal left femoral neck fracture. Pt is a poor historian due to dementia. Hx obtained from daughter, [facility]. Pt with hx of cardiomyopathy- sees Dr. Yoni Mazariegos. Pt on plavix 75 mg daily. Pt denies pain when at rest. Pt does not use a cane or walker to ambulate with. Pt is by herself at her daughters home when her daughter is at work. Patient Active Problem List    Diagnosis Date Noted    Cardiomyopathy [facility]) 08/04/2017    TIA (transient ischemic attack)     Unresponsive episode 08/03/2017    H/O CVA (4/2017) 04/10/2017    Cerebral atrophy ([facility] 04/10/2017    Hyperlipidemia LDL goal <70 04/10/2017    History of CVA (cerebrovascular accident) 04/10/2017    Adjustment disorder 04/10/2014    Anxiety 04/10/2014    Hypertension 11/15/2013     Past Medical History:   Diagnosis Date    Acute CVA (cerebrovascular accident) ([address] 4/10/2017    Per MRI:  Posterior L frontal/parietal territory    Cerebral atrophy ([address] 4/10/2017    History of CVA (cerebrovascular accident) 4/10/2017    --L frontal acute infarct and LMCA    Hyperlipidemia LDL goal <70 4/10/2017    Hypertension     TIA (transient ischemic attack)     Unresponsive episode 8/3/2017    transient      Past Surgical History:   Procedure Laterality Date    HX HEENT      Tonsilectomy age 8      Prior to Admission medications    Medication Sig Start Date End Date Taking?  Authorizing Provider   memantine (NAMENDA) 10 mg tablet Take 1 Tab by mouth two (2) times a day. 4/15/20   Keanu Bronson MD   carvediloL (COREG) 3.125 mg tablet Take 1 Tab by mouth two (2) times daily (with meals). 4/15/20   Keanu Bronson MD   ofloxacin (FLOXIN) 0.3 % ophthalmic solution INSTILL 1 DROP 4 TIMES DAILY INTO SURGERY EYE START 2 DAYS PRIOR TO SURGERY 10/23/19   Provider, Historical   varicella-zoster recombinant, PF, (SHINGRIX, PF,) 50 mcg/0.5 mL susr injection 0.5 mL by IntraMUSCular route every two (2) months. 19   Keanu Bronson MD     No current facility-administered medications for this encounter. Current Outpatient Medications   Medication Sig    memantine (NAMENDA) 10 mg tablet Take 1 Tab by mouth two (2) times a day.  carvediloL (COREG) 3.125 mg tablet Take 1 Tab by mouth two (2) times daily (with meals).  ofloxacin (FLOXIN) 0.3 % ophthalmic solution INSTILL 1 DROP 4 TIMES DAILY INTO SURGERY EYE START 2 DAYS PRIOR TO SURGERY    varicella-zoster recombinant, PF, (SHINGRIX, PF,) 50 mcg/0.5 mL susr injection 0.5 mL by IntraMUSCular route every two (2) months. Allergies   Allergen Reactions    Benadryl Allergy/Cold [Diphenhyd-Pe-Acetaminophen] Hives    Penicillins Hives     Was treated with Augmentin in 2015, tolerated medication well. Social History     Tobacco Use    Smoking status: Former Smoker     Last attempt to quit: 1999     Years since quittin.8    Smokeless tobacco: Never Used   Substance Use Topics    Alcohol use: Yes     Alcohol/week: 17.5 standard drinks     Types: 21 Glasses of wine per week     Comment: 3 glasses of port per day      Family History   Problem Relation Age of Onset    Dementia Mother 61    Heart Attack Father 79        Review of Systems  A comprehensive review of systems was negative except for that written in the HPI. Objective:     No data found.   Temp (24hrs), Av.7 °F (37.1 °C), Min:98.7 °F (37.1 °C), Max:98.7 °F (37.1 °C)      EXAM:   Physical Exam:   Patient resting with eyes closed. Awakens easily. Alert and oriented x generally well, mood and affect are appropriate and pleasant. HEENT: Normocephalic, atraumatic. Neck Exam: Supple, No JVD or carotid bruits. Lung Exam: Clear to auscultation, even breath sounds. Cardiac Exam: Regular rate and rhythm with no murmur, rub, or gallop. Abdomen: Soft, non-tender, normal bowel sounds. No bruits or masses. Extremities: Left LE shortened and internally rotated. PP +2. Calves soft. Nontender. Vascular: 2+ dorsalis pedis pulses bilaterally. Imaging Review    EXAM: XR HIP LT W OR WO PELV 2-3 VWS     INDICATION: fall.     COMPARISON: February 2018.     FINDINGS: AP view of the pelvis and a frogleg lateral view of the left hip  demonstrate an acute subcapital left femoral neck fracture with varus  angulation. The bones are osteopenic and there is bilateral hip DJD.     IMPRESSION  IMPRESSION:   Acute left femoral neck fracture. Labs:   Recent Results (from the past 12 hour(s))   CBC WITH AUTOMATED DIFF    Collection Time: 05/20/20  8:58 PM   Result Value Ref Range    WBC 13.0 (H) 3.6 - 11.0 K/uL    RBC 3.56 (L) 3.80 - 5.20 M/uL    HGB 11.3 (L) 11.5 - 16.0 g/dL    HCT 34.6 (L) 35.0 - 47.0 %    MCV 97.2 80.0 - 99.0 FL    MCH 31.7 26.0 - 34.0 PG    MCHC 32.7 30.0 - 36.5 g/dL    RDW 14.6 (H) 11.5 - 14.5 %    PLATELET 283 698 - 380 K/uL    MPV 10.2 8.9 - 12.9 FL    NRBC 0.0 0  WBC    ABSOLUTE NRBC 0.00 0.00 - 0.01 K/uL    NEUTROPHILS 82 (H) 32 - 75 %    LYMPHOCYTES 6 (L) 12 - 49 %    MONOCYTES 10 5 - 13 %    EOSINOPHILS 1 0 - 7 %    BASOPHILS 0 0 - 1 %    IMMATURE GRANULOCYTES 1 (H) 0.0 - 0.5 %    ABS. NEUTROPHILS 10.6 (H) 1.8 - 8.0 K/UL    ABS. LYMPHOCYTES 0.8 0.8 - 3.5 K/UL    ABS. MONOCYTES 1.3 (H) 0.0 - 1.0 K/UL    ABS. EOSINOPHILS 0.1 0.0 - 0.4 K/UL    ABS. BASOPHILS 0.1 0.0 - 0.1 K/UL    ABS. IMM.  GRANS. 0.1 (H) 0.00 - 0.04 K/UL    DF AUTOMATED     METABOLIC PANEL, COMPREHENSIVE    Collection Time: 05/20/20  8:58 PM Result Value Ref Range    Sodium 140 136 - 145 mmol/L    Potassium 3.6 3.5 - 5.1 mmol/L    Chloride 107 97 - 108 mmol/L    CO2 27 21 - 32 mmol/L    Anion gap 6 5 - 15 mmol/L    Glucose 116 (H) 65 - 100 mg/dL    BUN 26 (H) 6 - 20 MG/DL    Creatinine 0.67 0.55 - 1.02 MG/DL    BUN/Creatinine ratio 39 (H) 12 - 20      GFR est AA >60 >60 ml/min/1.73m2    GFR est non-AA >60 >60 ml/min/1.73m2    Calcium 8.6 8.5 - 10.1 MG/DL    Bilirubin, total 1.0 0.2 - 1.0 MG/DL    ALT (SGPT) 28 12 - 78 U/L    AST (SGOT) 80 (H) 15 - 37 U/L    Alk. phosphatase 60 45 - 117 U/L    Protein, total 6.6 6.4 - 8.2 g/dL    Albumin 2.7 (L) 3.5 - 5.0 g/dL    Globulin 3.9 2.0 - 4.0 g/dL    A-G Ratio 0.7 (L) 1.1 - 2.2     CK    Collection Time: 05/20/20  8:58 PM   Result Value Ref Range    CK 1,639 (H) 26 - 192 U/L   SAMPLES BEING HELD    Collection Time: 05/20/20  8:58 PM   Result Value Ref Range    SAMPLES BEING HELD [email or web address]     COMMENT        Add-on orders for these samples will be processed based on acceptable specimen integrity and analyte stability, which may vary by analyte. Assessment:     Patient Active Problem List    Diagnosis Date Noted    Cardiomyopathy [facility]) 08/04/2017    TIA (transient ischemic attack)     Unresponsive episode 08/03/2017    H/O CVA (4/2017) 04/10/2017    Cerebral atrophy ([facility] 04/10/2017    Hyperlipidemia LDL goal <70 04/10/2017    History of CVA (cerebrovascular accident) 04/10/2017    Adjustment disorder 04/10/2014    Anxiety 04/10/2014    Hypertension 11/15/2013         Plan:   Left femoral neck hip fracture  Discussed with patient and family about nature of condition and treatment options. Discussed with [facility] (daughter) at length over the phone. Reviewed risks/ benefits of surgical intervention with hip fracture. Will await medical and cardiac clearance.    Plan for left hip hemiarthroplasty tomorrow by Dr. Jessica Rasheed  Case to be posted in OR to follow Dr. Gwendolyn Ibarra cases  NPO after RUTH Ma to be placed. Await UA results. Appreciate medicine admission  Pain control per ER MD  Hold anticoagulation  Discussed with Dr. Radha Giraldo,  he agrees with above plan. Zeeshan Zhu NP  Orthopaedic Surgery Nurse Practitioner     ATTESTATION:  Pt seen and examined, agree with physical exam findings, A&P. 79yo F with displaced closed LEFT femoral neck fx.   NPO  Bedrest  Pt has been cleared for surgery by Dr. Vallejo Conception with Cardiology  Plan for LEFT hip bipolar hemiarthroplasty today  Type and screen

## 2020-05-21 NOTE — PROGRESS NOTES
[facility]: MED RECONCILIATION    Comments/Recommendations:   Prior to admission medication list updated per interview with patient's daughter, Mariana Parsons (tel: [phone]). Per patient's daughter, patient has already taken all doses of her medications today, including evening/bedtime doses. Next doses not due until tomorrow. Preferred pharmacy is [facility] at [facility]. Allergies: Benadryl allergy/cold [diphenhyd-pe-acetaminophen] and Penicillins    Prior to Admission Medications   Prescriptions Last Dose Informant Patient Reported? Taking? TURMERIC ROOT EXTRACT PO 5/20/2020 at Unknown time Child Yes Yes   Sig: Take 450 mg by mouth daily. atorvastatin (LIPITOR) 40 mg tablet 5/20/2020 at PM Child Yes Yes   Sig: Take 40 mg by mouth nightly. carvediloL (COREG) 3.125 mg tablet 5/20/2020 at PM Child No Yes   Sig: Take 1 Tab by mouth two (2) times daily (with meals). cholecalciferol (VITAMIN D3) (1000 Units /25 mcg) tablet 5/20/2020 at Unknown time Child Yes Yes   Sig: Take 2,000 Units by mouth daily. clopidogreL (PLAVIX) 75 mg tab 5/20/2020 at PM Child Yes Yes   Sig: Take 75 mg by mouth nightly. [id number] (Osteo Bi-Flex Triple Strength) 750 mg-644 mg- 30 mg-1 mg tab 5/20/2020 at PM Child Yes Yes   Sig: Take 1 Tab by mouth two (2) times a day. lisinopriL (PRINIVIL, ZESTRIL) 20 mg tablet 5/20/2020 at PM Child Yes Yes   Sig: Take 20 mg by mouth nightly. memantine (NAMENDA) 10 mg tablet 5/20/2020 at PM Child No Yes   Sig: Take 1 Tab by mouth two (2) times a day.         Jd Espino, Pharmacist

## 2020-05-21 NOTE — WOUND CARE
Wound care consult: 
Initial visit for skin assessment - S/P left hip fracture from GLF - surgery pending this pm. Staff at bedside. Alert- no distress Assessment All skin folds and bony prominences assessed, turned with staff assistance. Skin is thin and fragile - scattered areas of resolving bruises on bilateral shoulders. Dry stable scabs and discoloration on elbows, heels intact Sacral area intact- areas of pink chronic discoloration in the marycruz rectal area. Pure wick in use Treatment Repositioned in bed Heels floated Recommendations/Plan Low air loss surface obtained for post op Turn, reposition every 2 hours as tolerated, float heels, place in off loading boots. Incontinent care. Apply Zinc to all open areas, moisture barrier as needed. Will follow, reconsult as needed.  
[facility]

## 2020-05-21 NOTE — PROGRESS NOTES
5/21/2020  10:38 AM  Reason for Admission: Emergency - Left hip fracture requiring Surgery. Awaiting cardiology clearance. Assessment:   []In person with pt   []Via p/c with pt   []With family member in person. Who/Relation:     [x]With family member via p/c. Who/Relation: Carmen Maguire / [phone]    RUR: 12%  Risk Level: []Low [x]Moderate []High  Value-based purchasing: [x] Yes [] No  Bundle patient: [] Yes [x] No   Specify:     Advance Directive: Full Code. POA not on file, but requested. Assessment:    Age: 78    Sex: [] Male [x]Female     Residency: [x]Private residence []Apartment []Assisted Living []LTC []Other:   Exterior Steps: 5  Interior Steps: 1 flight    Concerns: Pt's bedroom is on second floor with full bathroom. Lives With: []With spouse [x]Other family members (DTR) []Underage children []Alone []Other:   Concerns: No concerns reported. DTR is able to be with pt 24/7. Prior functioning:  [x]Independent []Dependent []Partial dependence   Pt requires assistance with: []Bathing []Dressing []Toileting []Ambulation    Concerns: Pt's knees will get sore, and DTR will help her sometimes with dressing; however, this is not required. Cognition: []Intact [x]Some spheres some of the time []Some spheres all of the time []All spheres all of the time. Concerns: Pt does have dementia. Prior DME required:  []None []RW []Cane []Crutches []Bedside commode []CPAP []Home O2 (Liter/Provider: ) []Nebulizer   [x]Shower Chair []Wheelchair []Hospital Bed []Anu []Stair lift []Rollator []Other:  Concerns: No concerns reported. DME available: []None [x]RW [x]Cane []Crutches [x]Bedside commode []CPAP []Home O2 (Liter/Provider: ) []Nebulizer   [x]Shower Chair []Wheelchair []Hospital Bed []Anu []Stair lift []Rollator []Other:  Concerns: No concerns reported.     Rehab history: []None [x]Outpatient PT []Home Health (Provider/Date: ) []SNF (Provider/Date: ) []IPR (Provider/Date: ) []LTC (Provider/Date: )   Concerns: No concerns reported. Insurer: [employer]   Concerns: If rehab is recommended, they may not cover IPR. Auth is required unless waived. PCP: Soham Stein MD   Name of Practice: [facility]   Current patient: [x]Yes []No   Approximate date of last visit: 12/11/19   Concerns: No concerns reported. [facility]acy: [facility]      Financial/Difficulty affording medications: []No concerns reported []Yes, describe:         Resources/supports identified by patient/family: Family identified as biggest support. Concerns: No concerns reported. Transport: TBD based on medical need. Concerns:  No concerns reported. Barriers/Additional Concerns: Pt's DTR reported pt returning home is preference is appropriate. DTR is open to rehab if recommended. Transition of care plan:    [x]Unable to determine at this time. Awaiting clinical progress, and disposition recommendations. [] Home with outpatient follow-up    [] Home with Outpatient PT and outpatient follow-up   Pt aware of OP appt? []Yes, Provider:    []Not scheduled   Transport provider:     [] Home with family assistance as needed and outpatient follow-up   Family able to assist:    Schedule:  Transport provider:      [] Home with Home Health   - Provider:     []SNF/IPR   -[]Preferences given:   []Listing provided and preferences requested   -Status: []Pending []Accepted:    -Auth required: []Yes []No    -Auth initiated date:   -3 midnight stay required: []Yes []No  Date satisfied:     [] Other:     Kirsten Cristobal MA    Care Management Interventions  PCP Verified by CM: Yes([facility])  Mode of Transport at Discharge:  Other (see comment)(TBD)  Current Support Network: Relative's Home  Confirm Follow Up Transport: Family  Discharge Location  Discharge Placement: Unable to determine at this time

## 2020-05-21 NOTE — PROGRESS NOTES
Nutrition Assessment:    RECOMMENDATIONS/INTERVENTION(S):   1. Advance to diet as medically able, recommend Regular diet. 2. Recommend Ensure Enlive x1/d (350 kcal, 20 gm protein) to promote adequate intakes. 3. Monitor diet advancement, PO intakes, GI function, labs, and weight trends. ASSESSMENT:   5/21: 77 y/o F admitted with femoral neck fracture. Pt seen for low BMI for age. PMH - TIA/CVA, HTN, HLD, dementia. Off floor at time of visit. S/p L hip bipolar hemiarthroplasty 5/21. NPO at this time. Advance to diet as medically able. Unable to obtain nutritional Hx at this time.  lb. BMI 22 c/w underweight for age. Estimated nutritional needs +250 kcal to promote healthy weight. Weight Hx per EMR shows weight stability. No note GI distress. LBM PTA. Skin without PI. Labs - K+ 3.4 L, BUN 24 H, Cr 0.54 L, Ca 8.2 L, Hgb A1c 6.3 (12/11/19). Meds -  ml/hr. Diet Order: NPO  % Eaten:  No data found. Pertinent Medications: [x] Reviewed    Labs: [x] Reviewed    Anthropometrics: Height: 4' 11\" (149.9 cm) Weight: 49.4 kg (109 lb)    IBW (%IBW):   ( ) UBW (%UBW):   (  %)      BMI: Body mass index is 22.02 kg/m². This BMI is indicative of:   [x] Underweight    [] Normal    [] Overweight    []  Obesity    []  Extreme Obesity (BMI>40)  Estimated Nutrition Needs (Based on): 9903 Kcals/day( x 1.3 + 250 kcal) , 60 g(59 - 69 gm (1.2 - 1.4 gm/kg)) Protein  Carbohydrate: At Least 130 g/day  Fluids: 1517 mL/day (1 ml/kcal)    Last BM:  PTA  [x]Active     []Hyperactive  []Hypoactive       [] Absent   BS  Skin:    [x] Intact   [] Incision  [] Breakdown   [] DTI   [] Tears/Excoriation/Abrasion  []Edema [] Other:    Wt Readings from Last 30 Encounters:   05/20/20 49.4 kg (109 lb)   01/13/20 49.4 kg (109 lb)   12/11/19 48.1 kg (106 lb)   10/25/19 48.5 kg (107 lb)   10/13/19 47.6 kg (105 lb)   10/23/18 47.6 kg (105 lb)   09/07/18 48.5 kg (107 lb)   03/30/18 47.6 kg (105 lb)   02/22/18 48.5 kg (107 lb)   11/24/17 47.6 kg (105 lb)   08/21/17 45.8 kg (101 lb)   08/14/17 44.9 kg (99 lb)   08/04/17 47.6 kg (105 lb)   05/12/17 45.8 kg (101 lb)   04/28/17 46.7 kg (103 lb)   04/28/17 47.6 kg (105 lb)   04/24/17 47.6 kg (105 lb)   04/17/17 47.2 kg (104 lb)   04/10/17 45.9 kg (101 lb 4.8 oz)   04/10/17 45.9 kg (101 lb 3 oz)   01/07/16 55.2 kg (121 lb 12.8 oz)   08/10/15 54.9 kg (121 lb)   08/08/15 55.1 kg (121 lb 6.4 oz)   06/30/15 54.4 kg (120 lb)   01/27/15 58.1 kg (128 lb)   10/31/14 56.2 kg (124 lb)   09/04/14 54.7 kg (120 lb 9.6 oz)   08/29/14 55.3 kg (122 lb)   08/26/14 55.2 kg (121 lb 9.6 oz)   06/22/14 54.9 kg (121 lb)      NUTRITION DIAGNOSES:   Problem:  Underweight     Etiology: related to decreased ability to consume sufficient energy     Signs/Symptoms: as evidenced by BMI 22 c/w underweight       NUTRITION INTERVENTIONS:  Meals/Snacks: General/healthful diet   Supplements: Commercial supplement              GOAL:   PO intake >50% + ONS within 3-5 days     Cultural, Spiritism, or Ethnic Dietary Needs: None     EDUCATION & DISCHARGE NEEDS:    [x] None Identified   [] Identified and Education Provided/Documented   [] Identified and Pt declined/was not appropriate      [x] Interdisciplinary Care Plan Reviewed/Documented    [x] Discharge Needs:    Regular diet    [] No Nutrition Related Discharge Needs    NUTRITION RISK:   Pt Is At Nutrition Risk  [x]     No Nutrition Risk Identified  []       PT SEEN FOR:    []  MD Consult: []Calorie Count      []Diabetic Diet Education        []Diet Education     []Electrolyte Management     []General Nutrition Management and Supplements     []Management of Tube Feeding     []TPN Recommendations    []  RN Referral:  []MST score >=2     []Enteral/Parenteral Nutrition PTA     []Pregnant: Gestational DM or Multigestation                 [] Pressure Ulcer    [x]  Low BMI      []  Length of Stay       [] Dysphagia Diet         [] Ventilator  []  Follow-up     Previous Recommendations:   [] Implemented          [] Not Implemented          [x] Not Applicable    Previous Goal:   [] Met              [] Progressing Towards Goal              [] Not Progressing Towards Goal   [x] Not Applicable            Josemanuel Chong, [address] [facility]  Pager [phone]  Phone [phone]

## 2020-05-21 NOTE — BRIEF OP NOTE
Brief Postoperative Note    Patient: Refugio Mcfarland  YOB: 1940  MRN: [id number]    Date of Procedure: 5/21/2020     Pre-Op Diagnosis: LEFT DISPLACED FEMORAL NECK FRACTURE    Post-Op Diagnosis: Same as preoperative diagnosis. Procedure(s):  LEFT HIP BIPOLAR HEMIARTHROPLASTY    Surgeon(s): Feroz Quick MD    Surgical Assistant: Physician Assistant: Danni Weiss PA-C. A skilled assistant was needed for assistance with limb and implant management, retraction and closure    Anesthesia: General     Estimated Blood Loss (mL): 987QF    Complications: none    Specimens: * No specimens in log *     Implants:   Implant Name Type Inv. Item Serial No.  Lot No. LRB No. Used Action   STEM FEM SZ 6 132D [id number] -- ACCOLADE II V40 - SNA  STEM FEM SZ 6 132D [id number] -- ACCOLADE II V40 NA [employer] HOW [id number] Left 1 Implanted   HEAD FEM LFRIC V40 26MM STD NK --  - SNA  HEAD FEM LFRIC V40 26MM STD NK --  NA [employer] HOW [id number] Left 1 Implanted   HEAD FEM BPLR UNIV UHR [id number] --  - SNA  HEAD FEM BPLR UNIV UHR [id number] --  NA [employer] HOW [id number] Left 1 Implanted       Drains: * No LDAs found *    Findings: LEFT DISPLACED FEMORAL NECK FRACTURE    Electronically Signed by Sarah Barkley.  MD Marisela on 5/21/2020 at 3:19 PM

## 2020-05-21 NOTE — ED NOTES
TRANSFER - OUT REPORT:    Verbal report given to Hillary on J[place]  being transferred to [phone] for routine progression of care       Report consisted of patients Situation, Background, Assessment and   Recommendations(SBAR). Information from the following report(s) SBAR, ED Summary, [facility] and Recent Results was reviewed with the receiving nurse. Opportunity for questions and clarification was provided.

## 2020-05-22 LAB
ANION GAP SERPL CALC-SCNC: 2 MMOL/L (ref 5–15)
BASOPHILS # BLD: 0 K/UL (ref 0–0.1)
BASOPHILS NFR BLD: 0 % (ref 0–1)
BUN SERPL-MCNC: 19 MG/DL (ref 6–20)
BUN/CREAT SERPL: 26 (ref 12–20)
CALCIUM SERPL-MCNC: 8 MG/DL (ref 8.5–10.1)
CHLORIDE SERPL-SCNC: 108 MMOL/L (ref 97–108)
CK SERPL-CCNC: 641 U/L (ref 26–192)
CO2 SERPL-SCNC: 28 MMOL/L (ref 21–32)
CREAT SERPL-MCNC: 0.72 MG/DL (ref 0.55–1.02)
DIFFERENTIAL METHOD BLD: ABNORMAL
EOSINOPHIL # BLD: 0 K/UL (ref 0–0.4)
EOSINOPHIL NFR BLD: 0 % (ref 0–7)
ERYTHROCYTE [DISTWIDTH] IN BLOOD BY AUTOMATED COUNT: 14.5 % (ref 11.5–14.5)
GLUCOSE SERPL-MCNC: 101 MG/DL (ref 65–100)
HCT VFR BLD AUTO: 29.2 % (ref 35–47)
HGB BLD-MCNC: 9.3 G/DL (ref 11.5–16)
IMM GRANULOCYTES # BLD AUTO: 0.1 K/UL (ref 0–0.04)
IMM GRANULOCYTES NFR BLD AUTO: 1 % (ref 0–0.5)
LYMPHOCYTES # BLD: 0.7 K/UL (ref 0.8–3.5)
LYMPHOCYTES NFR BLD: 6 % (ref 12–49)
MCH RBC QN AUTO: 31.5 PG (ref 26–34)
MCHC RBC AUTO-ENTMCNC: 31.8 G/DL (ref 30–36.5)
MCV RBC AUTO: 99 FL (ref 80–99)
MONOCYTES # BLD: 1.5 K/UL (ref 0–1)
MONOCYTES NFR BLD: 12 % (ref 5–13)
NEUTS SEG # BLD: 9.8 K/UL (ref 1.8–8)
NEUTS SEG NFR BLD: 81 % (ref 32–75)
NRBC # BLD: 0 K/UL (ref 0–0.01)
NRBC BLD-RTO: 0 PER 100 WBC
PLATELET # BLD AUTO: 174 K/UL (ref 150–400)
PMV BLD AUTO: 9.9 FL (ref 8.9–12.9)
POTASSIUM SERPL-SCNC: 3.5 MMOL/L (ref 3.5–5.1)
RBC # BLD AUTO: 2.95 M/UL (ref 3.8–5.2)
RBC MORPH BLD: ABNORMAL
SODIUM SERPL-SCNC: 138 MMOL/L (ref 136–145)
WBC # BLD AUTO: 12.1 K/UL (ref 3.6–11)

## 2020-05-22 PROCEDURE — 74011000250 HC RX REV CODE- 250: Performed by: ORTHOPAEDIC SURGERY

## 2020-05-22 PROCEDURE — 85025 COMPLETE CBC W/AUTO DIFF WBC: CPT

## 2020-05-22 PROCEDURE — 74011250637 HC RX REV CODE- 250/637: Performed by: STUDENT IN AN ORGANIZED HEALTH CARE EDUCATION/TRAINING PROGRAM

## 2020-05-22 PROCEDURE — 82550 ASSAY OF CK (CPK): CPT

## 2020-05-22 PROCEDURE — 74011250636 HC RX REV CODE- 250/636: Performed by: ORTHOPAEDIC SURGERY

## 2020-05-22 PROCEDURE — 97165 OT EVAL LOW COMPLEX 30 MIN: CPT

## 2020-05-22 PROCEDURE — 77010033678 HC OXYGEN DAILY

## 2020-05-22 PROCEDURE — 65660000000 HC RM CCU STEPDOWN

## 2020-05-22 PROCEDURE — 36415 COLL VENOUS BLD VENIPUNCTURE: CPT

## 2020-05-22 PROCEDURE — 51798 US URINE CAPACITY MEASURE: CPT

## 2020-05-22 PROCEDURE — 74011250637 HC RX REV CODE- 250/637: Performed by: ORTHOPAEDIC SURGERY

## 2020-05-22 PROCEDURE — 97535 SELF CARE MNGMENT TRAINING: CPT

## 2020-05-22 PROCEDURE — 97161 PT EVAL LOW COMPLEX 20 MIN: CPT

## 2020-05-22 PROCEDURE — 74011250636 HC RX REV CODE- 250/636: Performed by: FAMILY MEDICINE

## 2020-05-22 PROCEDURE — 80048 BASIC METABOLIC PNL TOTAL CA: CPT

## 2020-05-22 PROCEDURE — 97530 THERAPEUTIC ACTIVITIES: CPT

## 2020-05-22 RX ADMIN — Medication 10 ML: at 21:55

## 2020-05-22 RX ADMIN — SENNOSIDES AND DOCUSATE SODIUM 1 TABLET: 8.6; 5 TABLET ORAL at 18:42

## 2020-05-22 RX ADMIN — Medication 10 ML: at 18:42

## 2020-05-22 RX ADMIN — LISINOPRIL 20 MG: 20 TABLET ORAL at 21:54

## 2020-05-22 RX ADMIN — ENOXAPARIN SODIUM 40 MG: 40 INJECTION SUBCUTANEOUS at 10:00

## 2020-05-22 RX ADMIN — MORPHINE SULFATE 1 MG: 2 INJECTION, SOLUTION INTRAMUSCULAR; INTRAVENOUS at 10:01

## 2020-05-22 RX ADMIN — MEMANTINE 10 MG: 10 TABLET ORAL at 10:00

## 2020-05-22 RX ADMIN — SENNOSIDES AND DOCUSATE SODIUM 1 TABLET: 8.6; 5 TABLET ORAL at 10:00

## 2020-05-22 RX ADMIN — ATORVASTATIN CALCIUM 40 MG: 20 TABLET, FILM COATED ORAL at 21:54

## 2020-05-22 RX ADMIN — OYSTER SHELL CALCIUM WITH VITAMIN D 1 TABLET: 500; 200 TABLET, FILM COATED ORAL at 10:00

## 2020-05-22 RX ADMIN — MEMANTINE 10 MG: 10 TABLET ORAL at 18:42

## 2020-05-22 RX ADMIN — CEFAZOLIN 2 G: 1 INJECTION, POWDER, FOR SOLUTION INTRAMUSCULAR; INTRAVENOUS at 05:30

## 2020-05-22 RX ADMIN — Medication 10 ML: at 05:30

## 2020-05-22 RX ADMIN — OYSTER SHELL CALCIUM WITH VITAMIN D 1 TABLET: 500; 200 TABLET, FILM COATED ORAL at 18:42

## 2020-05-22 RX ADMIN — OYSTER SHELL CALCIUM WITH VITAMIN D 1 TABLET: 500; 200 TABLET, FILM COATED ORAL at 11:37

## 2020-05-22 RX ADMIN — TRAMADOL HYDROCHLORIDE 50 MG: 50 TABLET, FILM COATED ORAL at 11:37

## 2020-05-22 RX ADMIN — POLYETHYLENE GLYCOL 3350 17 G: 17 POWDER, FOR SOLUTION ORAL at 10:00

## 2020-05-22 RX ADMIN — CEFAZOLIN 2 G: 1 INJECTION, POWDER, FOR SOLUTION INTRAMUSCULAR; INTRAVENOUS at 13:23

## 2020-05-22 RX ADMIN — SODIUM CHLORIDE 75 ML/HR: 900 INJECTION, SOLUTION INTRAVENOUS at 10:19

## 2020-05-22 NOTE — PROGRESS NOTES
[id number] [facility] RESIDENCY PROGRAM  PROGRESS NOTE     5/22/2020  PCP: Josephine Newberry MD     Assessment/Plan:     Aurora Randolph is a 78 y.o. female with a hx of TIA/CVA, HTN, HLD, dementia who is admitted for L femoral neck fracture     24 Hour Events: No acute events.     L Femoral Neck Fracture seen on XR hip. Patient had 2 ground level falls. Covid screening negative. Received cardiac clearance as intermediate risk candidate for intermediate risk surgery. . S/p L hip bipolar hemiarthroplasty-5/21 Noa Asif). - Lovenox 40mg daily Sq for 4 weeks- Resume plavix when completed Lovenox  - Weightbearing as tolerated, PT/OT to see  - Daily CBC/BMP  - CM assistance for discharge planning     Rhabdomyolysis patient had 2 GLF, during the first was on the ground overnight. CK 1639 without any kidney injury. UA mod blood  - Hx of cardiopathy- Stopping IVF  - Encourage PO intake  - Repeat      Bradycardia with a hx of  bigeminy HR POA 55. Patient seen by Dr. Bal Schrader in the past for cardiology clearance d/t Bradycardia for a cataract procedure. EKG was noted to find PVCs in the form of bigemeny. Bradycardia was false and patient was deemed stable for surgery. Trop neg.   - Telemetry monitoring     Hx of CVA/TIA in 2017. CT scan POA noted chronic ischemic changes  -Resume Plavix when stopping Lovenox     Complicated UTI UA pos for 4+ bacteria, positive nitrites, large LE.  Patient has baseline dementia and cannot report symptoms  - IV Levaquin, expected 7 day course  - Ucx- Lactose fermenting gram negative rods- speciation pending.     HTN /57 POA  -Continue home Lisinopril 20mg nightly and Coreg 3.125mg BID     HLD last lipid panel 12/2019 all values wnl  -Continue home Lipitor 40mg nightly     Dementia  -Continue home Namenda 10mg BID        FEN/GI - Regular diet  Activity - Weightbearing as tolerated  DVT prophylaxis - Lovenox  GI prophylaxis - Not indicated at this time  Disposition - Plan to d/c to TBD. Code Status - Full      Debra Foss discussed with Dr. Staci Sr. Subjective:   Pt was seen and examined at bedside. Afebrile and hemodynamically stable. Concerns overnight include: None. Only has pain at hip site with movement, feels pain is well controlled. Denies chest pain, SOB, nausea, vomiting, abdominal pain, dizziness.      Objective:   Physical examination  Patient Vitals for the past 24 hrs:   Temp Pulse Resp BP SpO2   20 0903 99.9 °F (37.7 °C) 76 18 119/53 90 %   20 0700  61      20 0334 98.1 °F (36.7 °C) 67 17 109/63 94 %   20 2334 98.1 °F (36.7 °C) 67 16 109/64 98 %   20 2011 97.6 °F (36.4 °C) 79 16 102/68 98 %   20 1911 97.4 °F (36.3 °C) 77 16 116/71 99 %   20 1811 97.5 °F (36.4 °C) 69 16 133/71 100 %   20 1703 97.4 °F (36.3 °C) (!) 59 16 154/76 100 %   20 1630  61 18 146/82 100 %   20 1615  71 14 122/78 93 %   20 1605  70 16 129/66 98 %   20 1600  66 15 (!) 136/39 98 %   20 1557 97.6 °F (36.4 °C) 60 16 137/66 98 %   20 1555 97.6 °F (36.4 °C) 65 11 147/75 100 %   20 1551    137/66 98 %   20 1316 98.9 °F (37.2 °C) (!) 57 15 157/68 97 %   20 1226 98.3 °F (36.8 °C) (!) 58 18 163/68 96 %      Temp (24hrs), Av °F (36.7 °C), Min:97.4 °F (36.3 °C), Max:99.9 °F (37.7 °C)     O2 Flow Rate (L/min): 2 l/min   O2 Device: Nasal cannula    Date 20 - 20 - 20   Shift 2527-3413 7144-0760 24 Hour Total 4128-0104 1413-6234 24 Hour Total   INTAKE   I.V.(mL/kg/hr) 1700(2.9)  1700(1.4)        Volume (lactated Ringers infusion) 1700  1700      Shift Total(mL/kg) 1700(34.4)  1700(34.4)      OUTPUT   Urine(mL/kg/hr)  300(0.5) 300(0.3)        Urine Occurrence(s) 1 x  1 x        Urine Output (mL) (External Female Catheter 20)  300 300      Blood 250  250        Estimated Blood Loss 250  250      Shift Total(mL/kg) 250(5.1) 300(6.1) 550(11.1)      NET 1450 -300 1150      Weight (kg) 49.4 49.4 49.4 49.4 49.4 49.4     General:   Alert, cooperative, no acute distress   Head:   Atraumatic   Eyes:   Conjunctivae clear   Neck:  Supple, trachea midline, no adenopathy   No JVD   Back:    No CVA tenderness    Chest wall:    No tenderness or deformities    Lungs:   Clear to auscultation bilaterally    Heart:   Normal rate, regular rhythm, no murmur, rubs or gallops   Abdomen:    Soft, non-tender   No masses or organomegaly    Extremities:  No edema or DVT signs   Pulses:  Symmetric all extremities   Skin:  Warm and dry    No rashes or lesions   Neurologic:  Alert and oriented x4   No focal deficits   Urinary catheter:  Purwick     Data Review:     CBC:  Recent Labs     05/22/20 0459 05/21/20 0531 05/20/20 2058   WBC 12.1* 12.3* 13.0*   HGB 9.3* 10.6* 11.3*   HCT 29.2* 32.6* 34.6*    375 431     Metabolic Panel:  Recent Labs     05/22/20 0459 05/21/20 0531 05/20/20 2058    139 140   K 3.5 3.4* 3.6    109* 107   CO2 28 26 27   BUN 19 24* 26*   CREA 0.72 0.54* 0.67   * 98 116*   CA 8.0* 8.2* 8.6   ALB  --   --  2.7*   TBILI  --   --  1.0   SGOT  --   --  80*   ALT  --   --  28     Micro:  Lab Results   Component Value Date/Time    Culture result: LACTOSE FERMENTING GRAM NEGATIVE RODS (A) 05/20/2020 10:27 PM     Imaging:  Xr Elbow Lt Min 3 V    Result Date: 5/20/2020  EXAM: XR ELBOW LT MIN 3 V INDICATION: fall. COMPARISON: None. FINDINGS: Three views of the left elbow demonstrate no fracture, dislocation, effusion or other acute abnormality. The bones are osteopenic. IMPRESSION: No acute abnormality. Xr Hip Lt W Or Wo Pelv 2-3 Vws    Result Date: 5/21/2020  EXAM: XR HIP LT W OR WO PELV 2-3 VWS INDICATION: please take pelvis AP and 2 views LEFT hip immediately postop in pacu. COMPARISON: 5/20/2020.  FINDINGS: AP view of the pelvis and a frogleg lateral view of the left hip obtained portably at 1616 hours after patient transfer in the PACU demonstrate left hip replacement in place without fracture or dislocation. Subcutaneous emphysema as expected. 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 IMPRESSION: Left THR    Xr Hip Lt W Or Wo Pelv 2-3 Vws    Result Date: 5/20/2020  EXAM: XR HIP LT W OR WO PELV 2-3 VWS INDICATION: fall. COMPARISON: February 2018. FINDINGS: AP view of the pelvis and a frogleg lateral view of the left hip demonstrate an acute subcapital left femoral neck fracture with varus angulation. The bones are osteopenic and there is bilateral hip DJD. IMPRESSION: Acute left femoral neck fracture. Ct Head Wo Cont    Result Date: 5/20/2020  Indication:  fall with head trauma Comparison: CT 10/13/2019 Findings: 5 mm axial images were obtained from the skull base through the vertex. CT dose reduction was achieved through the use of a standardized protocol tailored for this examination and automatic exposure control for dose modulation. There is chronic generalized ventriculomegaly. There is no evidence of intracranial hemorrhage, mass, mass effect, or acute infarct. There is periventricular white matter disease. There is a chronic right frontal lobe infarct. There is a chronic right occipital lobe infarct. No extra-axial fluid collections are seen. The visualized paranasal sinuses and mastoid air cells are clear. The orbital structures are unremarkable. No osseous abnormalities are seen. Impression: 1. No evidence of acute infarct or intracranial hemorrhage. 2. Severe periventricular white matter disease is likely secondary to chronic small vessel ischemic changes. 3. Chronic right frontal and occipital lobe infarctions. 4. Chronic generalized ventriculomegaly.       Medications reviewed  Current Facility-Administered Medications   Medication Dose Route Frequency    levoFLOXacin (LEVAQUIN) 750 mg in D5W IVPB  750 mg IntraVENous Q48H    sodium chloride (NS) flush 5-40 mL 5-40 mL IntraVENous Q8H    sodium chloride (NS) flush 5-40 mL  5-40 mL IntraVENous PRN    naloxone (NARCAN) injection 0.4 mg  0.4 mg IntraVENous PRN    calcium-vitamin D (OS-NENA) 500 mg-200 unit tablet  1 Tab Oral TID WITH MEALS    senna-docusate (PERICOLACE) 8.6-50 mg per tablet 1 Tab  1 Tab Oral BID    polyethylene glycol (MIRALAX) packet 17 g  17 g Oral DAILY    [START ON 5/23/2020] bisacodyL (DULCOLAX) suppository 10 mg  10 mg Rectal DAILY PRN    traMADoL (ULTRAM) tablet 50 mg  50 mg Oral Q6H PRN    oxyCODONE IR (ROXICODONE) tablet 2.5 mg  2.5 mg Oral Q4H PRN    morphine injection 1 mg  1 mg IntraVENous Q4H PRN    ceFAZolin (ANCEF) 2 g in sterile water (preservative free) 20 mL IV syringe  2 g IntraVENous Q8H    ondansetron (ZOFRAN ODT) tablet 4 mg  4 mg Oral Q4H PRN    ondansetron (ZOFRAN) injection 4 mg  4 mg IntraVENous Q4H PRN    enoxaparin (LOVENOX) injection 40 mg  40 mg SubCUTAneous DAILY    sodium chloride (NS) flush 5-40 mL  5-40 mL IntraVENous Q8H    sodium chloride (NS) flush 5-40 mL  5-40 mL IntraVENous PRN    atorvastatin (LIPITOR) tablet 40 mg  40 mg Oral QHS    memantine (NAMENDA) tablet 10 mg  10 mg Oral BID    lisinopriL (PRINIVIL, ZESTRIL) tablet 20 mg  20 mg Oral QHS         Signed:   Trixie Lenz MD   Resident, Family Medicine      Attending note: Attending note to follow. ..

## 2020-05-22 NOTE — PROGRESS NOTES
5/22/2020     4:15 PM  CM spoke with pt's DTR via p/c to discuss SNF preferences. DTR requested CM email listing of local facilities for her review. Weekend CM's to follow-up.     3:46 PM  RUR:  12%  Risk Level: []Low [x]Moderate []High  Value-based purchasing: [] Yes [x] No  Bundle patient: [] Yes [x] No   Specify:     Transition of care plan:  1. Awaiting medical clearance and DC order. Post op day 1.   2. PT/OT recommending SNF. CM attempted p/c to pt's DTR to discuss further; however, there was no answer. CM to follow-up. Regina Grippe will be required which will not be started until Tuesday due to weekend and holiday. 3. Outpatient follow-up. 4. [facility] transport likely required.

## 2020-05-22 NOTE — PROGRESS NOTES
Problem: Mobility Impaired (Adult and Pediatric)  Goal: *Acute Goals and Plan of Care (Insert Text)  Description: FUNCTIONAL STATUS PRIOR TO ADMISSION: Patient was independent without use of DME. Pt is a poor historian and unable to provide any history. HOME SUPPORT PRIOR TO ADMISSION: The patient lived with daughter to provide assistance with ADLs. Physical Therapy Goals  Initiated 5/22/2020    1. Patient will move from supine to sit and sit to supine , scoot up and down and roll side to side in bed with moderate assistance  within 4 days. 2. Patient will perform sit to stand with moderate assistance  within 4 days. 3. Patient will ambulate with maximal assistance for 10 feet with the least restrictive device within 4 days. 4. Patient will verbalize and demonstrate understanding of posterior hip precautions per protocol within 4 days. 5. Patient will perform hip home exercise program per protocol with moderate assistance  within 4 days. Outcome: Progressing Towards Goal   PHYSICAL THERAPY EVALUATION  Patient: Amrit Tovar (85 y.o. female)  Date: 5/22/2020  Primary Diagnosis: Femoral neck fracture ([facility] [address] [S72.009A]  Procedure(s) (LRB):  LEFT HIP HEMIARTHROPLASTY (Left) 1 Day Post-Op   Precautions: posterior hip precautions         ASSESSMENT  Based on the objective data described below, the patient presents with confusion(baseline Alzheimer's), impaired balance, hip pain, decreased activity tolerance, inability to ambulate and functional mobility well below baseline following admission for GLF now s/p L hip hemiarthroplasty POD#1. Pt educated on posterior hip precautions and exercises with minimal to no carry over by end of session. Pt requires upwards of Max A x 2 for bed mobility, Mod A x 2 to stand and Max A x 2 to take two small steps forward to transfer to the recliner.  Pt bearing minimal weight on LLE and unable to advance either foot without Max physical assist. Left up in chair with alarm activated and all needs in reach. Pt has not been medicated and hopeful will be able to advance mobility once pain is under control. Will need SNF placement. Current Level of Function Impacting Discharge (mobility/balance): Max A x 2    Functional Outcome Measure: The patient scored 0/28 on the Tinetti outcome measure which is indicative of high fall risk . Other factors to consider for discharge: pain control, confusion, inability to ambulate     Patient will benefit from skilled therapy intervention to address the above noted impairments. PLAN :  Recommendations and Planned Interventions: bed mobility training, transfer training, gait training, therapeutic exercises, neuromuscular re-education, patient and family training/education, and therapeutic activities      Frequency/Duration: Patient will be followed by physical therapy:  twice daily to address goals. Recommendation for discharge: (in order for the patient to meet his/her long term goals)  Therapy up to 5 days/week in SNF setting    This discharge recommendation:  Has been made in collaboration with the attending provider and/or case management    IF patient discharges home will need the following DME: to be determined (TBD)         SUBJECTIVE:   Patient stated You are very pretty but I don't like you.     OBJECTIVE DATA SUMMARY:   HISTORY:    Past Medical History:   Diagnosis Date    Acute CVA (cerebrovascular accident) ([address] 4/10/2017    Per MRI:  Posterior L frontal/parietal territory    Cerebral atrophy ([address] 4/10/2017    History of CVA (cerebrovascular accident) 4/10/2017    --L frontal acute infarct and LMCA    Hyperlipidemia LDL goal <70 4/10/2017    Hypertension     TIA (transient ischemic attack)     Unresponsive episode 8/3/2017    transient     Past Surgical History:   Procedure Laterality Date    HX HEENT      Tonsilectomy age 8       Personal factors and/or comorbidities impacting plan of care: CVA, TIA, dementia    Home Situation  Home Environment: Private residence  # Steps to Enter: 5  Rails to Enter: Yes  Hand Rails : Bilateral  One/Two Story Residence: Two story  Interior Rails: Both  Living Alone: No  Support Systems: Child(viviana), Family member(s)  Patient Expects to be Discharged to[de-identified] Unknown  Current DME Used/Available at Home: Nancy Daniel, rolling, Cane, straight, Commode, bedside, Shower chair  Tub or Shower Type: Shower    EXAMINATION/PRESENTATION/DECISION MAKING:   Critical Behavior:  Neurologic State: Alert  Orientation Level: Oriented to person  Cognition: Memory loss, Follows commands  Safety/Judgement: Decreased awareness of environment, Decreased awareness of need for safety, Decreased insight into deficits  Hearing: Auditory  Auditory Impairment: None  Hearing Aids/Status: Does not own  Skin:    Edema:   Range Of Motion:  AROM: Generally decreased, functional                       Strength:    Strength: Generally decreased, functional                    Tone & Sensation:   Tone: Normal                              Coordination:  Coordination: Generally decreased, functional  Vision:      Functional Mobility:  Bed Mobility:  Rolling: Maximum assistance  Supine to Sit: Maximum assistance;Assist x2     Scooting: Maximum assistance  Transfers:  Sit to Stand: Moderate assistance;Assist x2  Stand to Sit: Moderate assistance;Assist x2        Bed to Chair: Maximum assistance;Assist x2              Balance:   Sitting: Impaired  Sitting - Static: Fair (occasional)  Standing: Impaired; With support  Standing - Static: Constant support  Standing - Dynamic : Constant support  Ambulation/Gait Training:                                                         Stairs: Therapeutic Exercises:    Ankle pumps, heel slides, LAQ, ankle circles    Functional Measure:  Tinetti test:    Sitting Balance: 0  Arises: 0  Attempts to Rise: 0  Immediate Standing Balance: 0  Standing Balance: 0  Nudged: 0  Eyes Closed: 0  Turn 360 Degrees - Continuous/Discontinuous: 0  Turn 360 Degrees - Steady/Unsteady: 0  Sitting Down: 0  Balance Score: 0 Balance total score  Indication of Gait: 0  R Step Length/Height: 0  L Step Length/Height: 0  R Foot Clearance: 0  L Foot Clearance: 0  Step Symmetry: 0  Step Continuity: 0  Path: 0  Trunk: 0  Walking Time: 0  Gait Score: 0 Gait total score  Total Score: 0/28 Overall total score         Tinetti Tool Score Risk of Falls  <19 = High Fall Risk  19-24 = Moderate Fall Risk  25-28 = Low Fall Risk  Tinetti ME. Performance-Oriented Assessment of Mobility Problems in Elderly Patients. [facility]; [id number]. (Scoring Description: PT Bulletin Feb. 10, 1993)    Older adults: Cecily Bedoya et al, 2009; n = 1000 [facility] elderly evaluated with ABC, CHERRIE, ADL, and IADL)  · Mean CHERRIE score for males aged 69-68 years = 26.21(3.40)  · Mean CHERRIE score for females age 69-68 years = 25.16(4.30)  · Mean CHERRIE score for males over 80 years = 23.29(6.02)  · Mean CHERRIE score for females over 80 years = 17.20(8.32)            Physical Therapy Evaluation Charge Determination   History Examination Presentation Decision-Making   MEDIUM  Complexity : 1-2 comorbidities / personal factors will impact the outcome/ POC  MEDIUM Complexity : 3 Standardized tests and measures addressing body structure, function, activity limitation and / or participation in recreation  LOW Complexity : Stable, uncomplicated  Other outcome measures Tinetti  LOW       Based on the above components, the patient evaluation is determined to be of the following complexity level: LOW     Pain Rating:  10/10    Activity Tolerance:   Poor  Please refer to the flowsheet for vital signs taken during this treatment.     After treatment patient left in no apparent distress:   Sitting in chair, Call bell within reach, and Bed / chair alarm activated    COMMUNICATION/EDUCATION:   The patients plan of care was discussed with: Occupational therapist, Registered nurse, and Certified nursing assistant/patient care technician. Fall prevention education was provided and the patient/caregiver indicated understanding., Patient/family have participated as able in goal setting and plan of care. , and Patient/family agree to work toward stated goals and plan of care.     Thank you for this referral.  Casey Melvin, PT   Time Calculation: 32 mins

## 2020-05-22 NOTE — PROGRESS NOTES
[address] [place] [address]   Office [phone]  Fax [phone]          Assessment and Plan     Lesia Sosa is a 78 y.o. female PMH TIA/CVA, HTN, HLD, dementia who is admitted for L femoral neck fracture. 24 Hour Events: No acute events. Tele: occasional PVCs rate 60s-70s. L Femoral Neck Fracture seen on XR hip. Patient had 2 ground level falls. Covid screening negative. Received cardiac clearance as intermediate risk candidate for intermediate risk surgery. S/p L hip bipolar hemiarthroplasty-5/21 Noé Murillo). - Lovenox 40mg daily Sq for 4 weeks- Resume plavix when completed Lovenox  - Weightbearing as tolerated, PT/OT to see  - Daily CBC/BMP  - CM assistance for discharge planning     Rhabdomyolysis patient had 2 GLF, during the first was on the ground overnight. CK 1639 without any kidney injury. UA mod blood  - IVF held given Hx of cardiopathy  - Encourage PO intake  - Repeat      Bradycardia with a hx of  bigeminy HR POA 55. Patient seen by Dr. Jone Lind in the past for cardiology clearance d/t Bradycardia for a cataract procedure. EKG was noted to find PVCs in the form of bigemeny. Bradycardia was false and patient was deemed stable for surgery. Trop neg.   - Telemetry monitoring     Hx of CVA/TIA in 2017. CT scan POA noted chronic ischemic changes  -Resume Plavix when stopping Lovenox     Complicated UTI UA pos for 4+ bacteria, positive nitrites, large LE.  Patient has baseline dementia and cannot report symptoms  - IV Levaquin, expected 7 day course  - Ucx- Lactose fermenting gram negative rods- speciation pending.     HTN /57 POA  -Continue home Lisinopril 20mg nightly and Coreg 3.125mg BID     HLD last lipid panel 12/2019 all values wnl  -Continue home Lipitor 40mg nightly     Dementia  -Continue home Namenda 10mg BID        FEN/GI - Regular diet  Activity - Weightbearing as tolerated  DVT prophylaxis - Lovenox  GI prophylaxis - Not indicated at this time  Disposition - Plan to d/c to TBD. Code Status - Full    I appreciate the opportunity to participate in the care of this patient,  Franck Mitchell MD  Family Medicine Resident         Subjective / Objective     Subjective: No acute complaints. No fever/chills. Pain is well controlled. Passing gas and voiding without difficulties.      Temp (24hrs), Av.6 °F (37 °C), Min:97.9 °F (36.6 °C), Max:99.9 °F (37.7 °C)     Objective   Respiratory: O2 Flow Rate (L/min): 2 l/min O2 Device: Room air   Visit Vitals  /62 (BP 1 Location: Left arm, BP Patient Position: At rest)   Pulse 68   Temp 98.4 °F (36.9 °C)   Resp 16   Ht 4' 11\" (1.499 m)   Wt 117 lb 4.6 oz (53.2 kg)   SpO2 92%   BMI 23.69 kg/m²       Physical Exam  General:   Alert, cooperative, no acute distress   Head:   Atraumatic   Eyes:   Conjunctivae clear   ENT:  Oral mucosa normal   Neck:  Supple, trachea midline, no adenopathy   No JVD   Back:    No CVA tenderness    Lungs:   Clear to auscultation bilaterally    Heart:   Regular rhythm, no murmur   Abdomen:    Soft, non-tender   No masses or organomegaly    Extremities:  No edema or DVT signs   Skin:  Warm and dry    No rashes or lesions   Neurologic:  Oriented   No focal deficits       Urinary catheter:  Purwick       I/O:      Inpatient Medications  Current Facility-Administered Medications   Medication Dose Route Frequency    levoFLOXacin (LEVAQUIN) 750 mg in D5W IVPB  750 mg IntraVENous Q48H    sodium chloride (NS) flush 5-40 mL  5-40 mL IntraVENous Q8H    sodium chloride (NS) flush 5-40 mL  5-40 mL IntraVENous PRN    naloxone (NARCAN) injection 0.4 mg  0.4 mg IntraVENous PRN    calcium-vitamin D (OS-NENA) 500 mg-200 unit tablet  1 Tab Oral TID WITH MEALS    senna-docusate (PERICOLACE) 8.6-50 mg per tablet 1 Tab  1 Tab Oral BID    polyethylene glycol (MIRALAX) packet 17 g  17 g Oral DAILY    bisacodyL (DULCOLAX) suppository 10 mg  10 mg Rectal DAILY PRN    traMADoL (ULTRAM) tablet 50 mg 50 mg Oral Q6H PRN    oxyCODONE IR (ROXICODONE) tablet 2.5 mg  2.5 mg Oral Q4H PRN    ondansetron (ZOFRAN ODT) tablet 4 mg  4 mg Oral Q4H PRN    enoxaparin (LOVENOX) injection 40 mg  40 mg SubCUTAneous DAILY    sodium chloride (NS) flush 5-40 mL  5-40 mL IntraVENous Q8H    sodium chloride (NS) flush 5-40 mL  5-40 mL IntraVENous PRN    atorvastatin (LIPITOR) tablet 40 mg  40 mg Oral QHS    memantine (NAMENDA) tablet 10 mg  10 mg Oral BID    lisinopriL (PRINIVIL, ZESTRIL) tablet 20 mg  20 mg Oral QHS         Allergies  Allergies   Allergen Reactions    Benadryl Allergy/Cold [Diphenhyd-Pe-Acetaminophen] Hives     Per patient's daughter, she thinks patient has had benadryl since then and tolerated it.  Penicillins Hives     Was treated with Augmentin in June 2015, tolerated medication well.           CBC:  Recent Labs     05/23/20  0413 05/22/20  0459 05/21/20  0531   WBC 13.3* 12.1* 12.3*   HGB 8.4* 9.3* 10.6*   HCT 26.5* 29.2* 32.6*    174 502       Metabolic Panel:  Recent Labs     05/23/20 0413 05/22/20  0459 05/21/20  0531 05/20/20  2058    138 139 140   K 3.4* 3.5 3.4* 3.6    108 109* 107   CO2 27 28 26 27   BUN 22* 19 24* 26*   CREA 0.63 0.72 0.54* 0.67   GLU 95 101* 98 116*   CA 8.1* 8.0* 8.2* 8.6   ALB  --   --   --  2.7*   SGOT  --   --   --  80*   ALT  --   --   --  28                    For Billing    Chief Complaint   Patient presents with   [facility] Problems  Date Reviewed: 5/21/2020          Codes Class Noted POA    Femoral neck fracture ([facility] [address] ICD-10-CM: O08.527X  ICD-9-CM: 820.8  5/20/2020 Unknown

## 2020-05-22 NOTE — PROGRESS NOTES
Bedside shift change report given to Monica Valencia (oncoming nurse) by Omar Mohan (offgoing nurse). Report included the following information SBAR, Kardex, OR Summary, Intake/Output, MAR and Recent Results.

## 2020-05-22 NOTE — PROGRESS NOTES
Problem: Mobility Impaired (Adult and Pediatric)  Goal: *Acute Goals and Plan of Care (Insert Text)  Description: FUNCTIONAL STATUS PRIOR TO ADMISSION: Patient was independent without use of DME. Pt is a poor historian and unable to provide any history. HOME SUPPORT PRIOR TO ADMISSION: The patient lived with daughter to provide assistance with ADLs. Physical Therapy Goals  Initiated 5/22/2020    1. Patient will move from supine to sit and sit to supine , scoot up and down and roll side to side in bed with moderate assistance  within 4 days. 2. Patient will perform sit to stand with moderate assistance  within 4 days. 3. Patient will ambulate with maximal assistance for 10 feet with the least restrictive device within 4 days. 4. Patient will verbalize and demonstrate understanding of posterior hip precautions per protocol within 4 days. 5. Patient will perform hip home exercise program per protocol with moderate assistance  within 4 days. 5/22/2020 1541 by Pedro Faye PT  Outcome: Progressing Towards Goal   PHYSICAL THERAPY TREATMENT  Patient: Hillary Henderson (86 y.o. female)  Date: 5/22/2020  Diagnosis: Femoral neck fracture ([address] Tawny Payne   <principal problem not specified>  Procedure(s) (LRB):  LEFT HIP HEMIARTHROPLASTY (Left) 1 Day Post-Op  Precautions:    Chart, physical therapy assessment, plan of care and goals were reviewed. ASSESSMENT  Patient continues with skilled PT services and is slowly progressing towards goals. Pt with poor tolerance and motivation towards therapy. Pt was medicated 3 hours prior to session and remains lethargic and slow to follow commands. Difficult to redirect this afternoon. Continually gripping down on bed sheets and bed rails d/t fear of pain. Requiring Total A x 2 to achieve EOB and pt unwilling to initiate any trunk motion to achieve upright sitting balance.  Pt posteriorly pushing and adamantly declining any attempt at standing even to change wet chucks. Returned to supine and setup with all needs in reach with hip wedge in place. SNF remains appropriate. Current Level of Function Impacting Discharge (mobility/balance): Total A this afternoon    Other factors to consider for discharge: dementia, pain control         PLAN :  Patient continues to benefit from skilled intervention to address the above impairments. Continue treatment per established plan of care. to address goals. Recommendation for discharge: (in order for the patient to meet his/her long term goals)  Therapy up to 5 days/week in SNF setting    This discharge recommendation:  Has been made in collaboration with the attending provider and/or case management    IF patient discharges home will need the following DME: to be determined (TBD)       SUBJECTIVE:   Patient stated Im not interested in eating.     OBJECTIVE DATA SUMMARY:   Critical Behavior:  Neurologic State: Alert  Orientation Level: Oriented to person  Cognition: Memory loss, Follows commands  Safety/Judgement: Decreased awareness of environment, Decreased awareness of need for safety, Decreased insight into deficits  Functional Mobility Training:  Bed Mobility:  Rolling: Maximum assistance  Supine to Sit: Total assistance;Assist x2  Sit to Supine: Total assistance;Assist x2  Scooting: Maximum assistance        Transfers:  Sit to Stand: Moderate assistance;Assist x2  Stand to Sit: Moderate assistance;Assist x2        Bed to Chair: Maximum assistance;Assist x2                    Balance:  Sitting: Impaired  Sitting - Static: None  Sitting - Dynamic: None  Standing: Impaired; With support  Standing - Static: Constant support  Standing - Dynamic : Constant support  Ambulation/Gait Training:                      Activity Tolerance:   Poor  Please refer to the flowsheet for vital signs taken during this treatment.     After treatment patient left in no apparent distress:   Supine in bed, Heels elevated for pressure relief, Call bell within reach, and hip wedge in place     COMMUNICATION/COLLABORATION:   The patients plan of care was discussed with: Registered nurse.      Marizol Choudhary, PT   Time Calculation: 20 mins

## 2020-05-22 NOTE — ADDENDUM NOTE
Addendum  created 05/22/20 0826 by Isaiah Weinstein MD    Att[facility]

## 2020-05-22 NOTE — PROGRESS NOTES
Subjective: Debra Foss is a 78 y.o. female who is POD 1 SP LEFT hip bipolar hemiarthroplasty    Major Events:.     GRISELDA    Objective:    Vital signs in last 24 hours:    Temp:  [97.4 °F (36.3 °C)-98.9 °F (37.2 °C)]   Pulse (Heart Rate):  [54-79]   BP: (102-174)/(39-82)   Resp Rate:  [11-20]   O2 Sat (%):  [90 %-100 %]   Weight:  [49.4 kg (108 lb 14.5 oz)-49.4 kg (109 lb)]     Temp (24hrs), Av.9 °F (36.6 °C), Min:97.4 °F (36.3 °C), Max:98.9 °F (37.2 °C)      Labs:    Lab Results   Component Value Date/Time    WBC 12.1 (H) 2020 04:59 AM    HGB 9.3 (L) 2020 04:59 AM    HCT 29.2 (L) 2020 04:59 AM    PLATELET 653  04:59 AM       Lab Results   Component Value Date/Time    Sodium 138 2020 04:59 AM    Potassium 3.5 2020 04:59 AM    Chloride 108 2020 04:59 AM    CO2 28 2020 04:59 AM    BUN 19 2020 04:59 AM       Physical Exam:    General: alert, cooperative, in NAD  Nonlabored resp    LEFT Lower extremity    Dressing: c/d/i      Motor: + ankle df/pf    Sensory: SILT    Vascular: Brisk capillary refill in toes    Assessment/Plan:    · Pain management: as written    · DVT Prophylaxis: Lovenox 40 mg sq daily x 4 weeks     · PT/OT: WBAT    · Dispo: pending PT evaluation    F[facility]u: [facility] Office 3-4 weeks [phone]  Jose Ramon Bruce PA-C

## 2020-05-22 NOTE — PROGRESS NOTES
Problem: Self Care Deficits Care Plan (Adult)  Goal: *Acute Goals and Plan of Care (Insert Text)  Description:   FUNCTIONAL STATUS PRIOR TO ADMISSION: patient able to perform ADLs without assistance      HOME SUPPORT: The patient lived with daughter but able to perform own ADLs. Occupational Therapy Goals  Initiated 5/22/2020  1. Patient will perform lower body dressing with minimal assistance/contact guard assist within 7 day(s). 2.  Patient will perform upper body dressing with supervision/set-up within 7 day(s). 3.  Patient will perform toilet transfers with moderate assistance  within 7 day(s). 4.  Patient will perform all aspects of toileting with moderate assistance  within 7 day(s). 5.  Patient will participate in upper extremity therapeutic exercise/activities with supervision/set-up for 10 minutes within 7 day(s). Outcome: Progressing Towards Goal   OCCUPATIONAL THERAPY EVALUATION  Patient: Gm Bliss (74 y.o. female)  Date: 5/22/2020  Primary Diagnosis: Femoral neck fracture ([facility] [S72.009A]  Procedure(s) (LRB):  LEFT HIP HEMIARTHROPLASTY (Left) 1 Day Post-Op   Precautions: fall       ASSESSMENT  Based on the objective data described below, the patient presents with decreased functional mobility, decreased activity tolerance, significant pain, fear regarding mobility, decreased balance and strength. Patient reports increased pain to LLE but requires verbal cues for situation regarding. Patient requires mod/max assist x 2 for mobility today, verbal cues for sequencing tasks, though she does follow commands as provided. Patient will benefit from continued OT services to increase safety and independence with ADLs. Current Level of Function Impacting Discharge (ADLs/self-care): mod/max assist x 2 for mobility, up to total assist for ADLs    Functional Outcome Measure: The patient scored 20/100 on the Barthel index  outcome measure.       Other factors to consider for discharge: lives with daughter     Patient will benefit from skilled therapy intervention to address the above noted impairments. PLAN :  Recommendations and Planned Interventions: self care training, functional mobility training, therapeutic exercise, balance training, visual/perceptual training, therapeutic activities, endurance activities, patient education, home safety training, and family training/education    Frequency/Duration: Patient will be followed by occupational therapy 5 times a week to address goals. Recommendation for discharge: (in order for the patient to meet his/her long term goals)  To be determined: SNF vs home with family depending ability of family to assist and progression of goals     This discharge recommendation:  A follow-up discussion with the attending provider and/or case management is planned    IF patient discharges home will need the following DME: TBD       SUBJECTIVE:   Patient stated Ivanna Bridges does it hurt so much? Eulas Fraction    OBJECTIVE DATA SUMMARY:   HISTORY:   Past Medical History:   Diagnosis Date    Acute CVA (cerebrovascular accident) ([facility] 4/10/2017    Per MRI:  Posterior L frontal/parietal territory    Cerebral atrophy ([facility] 4/10/2017    History of CVA (cerebrovascular accident) 4/10/2017    --L frontal acute infarct and LMCA    Hyperlipidemia LDL goal <70 4/10/2017    Hypertension     TIA (transient ischemic attack)     Unresponsive episode 8/3/2017    transient     Past Surgical History:   Procedure Laterality Date    HX HEENT      Tonsilectomy age 8       Expanded or extensive additional review of patient history:     Home Situation  Home Environment: Private residence  # Steps to Enter: 5  Rails to Enter: Yes  Hand Rails : Bilateral  One/Two Story Residence: Two story  Interior Rails: Both  Living Alone: No  Support Systems: Child(viviana), Family member(s)  Patient Expects to be Discharged to[de-identified] Unknown  Current DME Used/Available at Home: Walker, rolling, Cane, straight, Commode, bedside, Shower chair  Tub or Shower Type: Shower    Hand dominance: Right    EXAMINATION OF PERFORMANCE DEFICITS:  Cognitive/Behavioral Status:  Neurologic State: Alert  Orientation Level: Oriented to person  Cognition: Memory loss; Follows commands  Perception: Appears intact  Perseveration: Perseverates during conversation  Safety/Judgement: Decreased awareness of environment;Decreased awareness of need for safety;Decreased insight into deficits    Skin: intact as seen    Edema: none noted     Hearing: Auditory  Auditory Impairment: None  Hearing Aids/Status: Does not own    Vision/Perceptual:                                     Range of Motion:  AROM: Generally decreased, functional                         Strength:  Strength: Generally decreased, functional                Coordination:  Coordination: Generally decreased, functional            Tone & Sensation:  Tone: Normal                         Balance:  Sitting: Impaired  Sitting - Static: Fair (occasional)  Standing: Impaired; With support  Standing - Static: Constant support  Standing - Dynamic : Constant support    Functional Mobility and Transfers for ADLs:  Bed Mobility:       Transfers:  Sit to Stand: Moderate assistance;Assist x2  Stand to Sit: Moderate assistance;Assist x2  Bed to Chair: Maximum assistance;Assist x2  Toilet Transfer : Maximum assistance;Assist x2(BSC - side steps )  Assistive Device : Walker, rolling    ADL Assessment:  Feeding: Supervision    Oral Facial Hygiene/Grooming: Contact guard assistance    Bathing: Moderate assistance    Upper Body Dressing: Moderate assistance    Lower Body Dressing: Total assistance    Toileting:  Total assistance                ADL Intervention and task modifications:                    Cognitive Retraining  Safety/Judgement: Decreased awareness of environment;Decreased awareness of need for safety;Decreased insight into deficits    Therapeutic Exercise:     Functional Measure:  Barthel Index:    Bathin  Bladder: 0  Bowels: 5  Groomin  Dressin  Feedin  Mobility: 0  Stairs: 0  Toilet Use: 0  Transfer (Bed to Chair and Back): 5  Total: 20/100        The Barthel ADL Index: Guidelines  1. The index should be used as a record of what a patient does, not as a record of what a patient could do. 2. The main aim is to establish degree of independence from any help, physical or verbal, however minor and for whatever reason. 3. The need for supervision renders the patient not independent. 4. A patient's performance should be established using the best available evidence. Asking the patient, friends/relatives and nurses are the usual sources, but direct observation and common sense are also important. However direct testing is not needed. 5. Usually the patient's performance over the preceding 24-48 hours is important, but occasionally longer periods will be relevant. 6. Middle categories imply that the patient supplies over 50 per cent of the effort. 7. Use of aids to be independent is allowed. Edinson Schwarz., Barthel, D.W. (7096). Functional evaluation: the Barthel Index. [address] [place] [employer] (14)2. EMILIANO VincentF, Anam Ibanez., Abdifatah Yuan., [place], [address] [place] [address] (). Measuring the change indisability after inpatient rehabilitation; comparison of the responsiveness of the Barthel Index and Functional [place] Measure. Journal of Neurology, Neurosurgery, and Psychiatry, 66(4), 155-422. Carline Arreola, N.J.A, PASCUAL Kohler, & Merry Lyons M.A. (2004.) Assessment of post-stroke quality of life in cost-effectiveness studies: The usefulness of the Barthel Index and the EuroQoL-5D.  Quality of Life Research, 15, 229-43         Occupational Therapy Evaluation Charge Determination   History Examination Decision-Making   LOW Complexity : Brief history review  LOW Complexity : 1-3 performance deficits relating to physical, cognitive , or psychosocial skils that result in activity limitations and / or participation restrictions  LOW Complexity : No comorbidities that affect functional and no verbal or physical assistance needed to complete eval tasks       Based on the above components, the patient evaluation is determined to be of the following complexity level: LOW   Pain Rating:  Patient does not rate pain but repeatedly asks why leg is so painful    Activity Tolerance:   Fair  Please refer to the flowsheet for vital signs taken during this treatment. After treatment patient left in no apparent distress:    Sitting in chair, Call bell within reach, and Bed / chair alarm activated    COMMUNICATION/EDUCATION:   The patients plan of care was discussed with: Physical therapist and Registered nurse. Home safety education was provided and the patient/caregiver indicated understanding., Patient/family have participated as able in goal setting and plan of care. , and Patient/family agree to work toward stated goals and plan of care. This patients plan of care is appropriate for delegation to [facility].     Thank you for this referral.  Vishal Sanders, OTR/L  Time Calculation: 26 mins

## 2020-05-23 LAB
ANION GAP SERPL CALC-SCNC: 4 MMOL/L (ref 5–15)
APTT PPP: 29 SEC (ref 22.1–32)
BACTERIA SPEC CULT: ABNORMAL
BASOPHILS # BLD: 0 K/UL (ref 0–0.1)
BASOPHILS NFR BLD: 0 % (ref 0–1)
BUN SERPL-MCNC: 22 MG/DL (ref 6–20)
BUN/CREAT SERPL: 35 (ref 12–20)
CALCIUM SERPL-MCNC: 8.1 MG/DL (ref 8.5–10.1)
CC UR VC: ABNORMAL
CHLORIDE SERPL-SCNC: 108 MMOL/L (ref 97–108)
CK SERPL-CCNC: 344 U/L (ref 26–192)
CO2 SERPL-SCNC: 27 MMOL/L (ref 21–32)
CREAT SERPL-MCNC: 0.63 MG/DL (ref 0.55–1.02)
DIFFERENTIAL METHOD BLD: ABNORMAL
EOSINOPHIL # BLD: 0.1 K/UL (ref 0–0.4)
EOSINOPHIL NFR BLD: 1 % (ref 0–7)
ERYTHROCYTE [DISTWIDTH] IN BLOOD BY AUTOMATED COUNT: 14.6 % (ref 11.5–14.5)
ERYTHROCYTE [DISTWIDTH] IN BLOOD BY AUTOMATED COUNT: 14.6 % (ref 11.5–14.5)
ERYTHROCYTE [DISTWIDTH] IN BLOOD BY AUTOMATED COUNT: 14.8 % (ref 11.5–14.5)
GLUCOSE SERPL-MCNC: 95 MG/DL (ref 65–100)
HCT VFR BLD AUTO: 25.5 % (ref 35–47)
HCT VFR BLD AUTO: 26.5 % (ref 35–47)
HCT VFR BLD AUTO: 26.6 % (ref 35–47)
HGB BLD-MCNC: 8.1 G/DL (ref 11.5–16)
HGB BLD-MCNC: 8.4 G/DL (ref 11.5–16)
HGB BLD-MCNC: 8.5 G/DL (ref 11.5–16)
IMM GRANULOCYTES # BLD AUTO: 0.1 K/UL (ref 0–0.04)
IMM GRANULOCYTES NFR BLD AUTO: 1 % (ref 0–0.5)
LYMPHOCYTES # BLD: 1 K/UL (ref 0.8–3.5)
LYMPHOCYTES # BLD: 1.2 K/UL (ref 0.8–3.5)
LYMPHOCYTES # BLD: 1.3 K/UL (ref 0.8–3.5)
LYMPHOCYTES NFR BLD: 10 % (ref 12–49)
LYMPHOCYTES NFR BLD: 7 % (ref 12–49)
LYMPHOCYTES NFR BLD: 9 % (ref 12–49)
MAGNESIUM SERPL-MCNC: 2 MG/DL (ref 1.6–2.4)
MCH RBC QN AUTO: 32 PG (ref 26–34)
MCH RBC QN AUTO: 32.1 PG (ref 26–34)
MCH RBC QN AUTO: 32.4 PG (ref 26–34)
MCHC RBC AUTO-ENTMCNC: 31.7 G/DL (ref 30–36.5)
MCHC RBC AUTO-ENTMCNC: 31.8 G/DL (ref 30–36.5)
MCHC RBC AUTO-ENTMCNC: 32 G/DL (ref 30–36.5)
MCV RBC AUTO: 100 FL (ref 80–99)
MCV RBC AUTO: 101.1 FL (ref 80–99)
MCV RBC AUTO: 102 FL (ref 80–99)
MONOCYTES # BLD: 1.4 K/UL (ref 0–1)
MONOCYTES # BLD: 1.6 K/UL (ref 0–1)
MONOCYTES # BLD: 1.7 K/UL (ref 0–1)
MONOCYTES NFR BLD: 11 % (ref 5–13)
MONOCYTES NFR BLD: 11 % (ref 5–13)
MONOCYTES NFR BLD: 13 % (ref 5–13)
NEUTS SEG # BLD: 10.2 K/UL (ref 1.8–8)
NEUTS SEG # BLD: 10.5 K/UL (ref 1.8–8)
NEUTS SEG # BLD: 10.6 K/UL (ref 1.8–8)
NEUTS SEG NFR BLD: 76 % (ref 32–75)
NEUTS SEG NFR BLD: 77 % (ref 32–75)
NEUTS SEG NFR BLD: 80 % (ref 32–75)
NRBC # BLD: 0 K/UL (ref 0–0.01)
NRBC BLD-RTO: 0 PER 100 WBC
PHOSPHATE SERPL-MCNC: 1.8 MG/DL (ref 2.6–4.7)
PLATELET # BLD AUTO: 177 K/UL (ref 150–400)
PLATELET # BLD AUTO: 186 K/UL (ref 150–400)
PLATELET # BLD AUTO: 186 K/UL (ref 150–400)
PMV BLD AUTO: 10.2 FL (ref 8.9–12.9)
PMV BLD AUTO: 9.4 FL (ref 8.9–12.9)
PMV BLD AUTO: 9.8 FL (ref 8.9–12.9)
POTASSIUM SERPL-SCNC: 3.4 MMOL/L (ref 3.5–5.1)
RBC # BLD AUTO: 2.5 M/UL (ref 3.8–5.2)
RBC # BLD AUTO: 2.62 M/UL (ref 3.8–5.2)
RBC # BLD AUTO: 2.66 M/UL (ref 3.8–5.2)
SERVICE CMNT-IMP: ABNORMAL
SODIUM SERPL-SCNC: 139 MMOL/L (ref 136–145)
THERAPEUTIC RANGE,PTTT: NORMAL SECS (ref 58–77)
TROPONIN I SERPL-MCNC: 1.04 NG/ML
TROPONIN I SERPL-MCNC: 3.07 NG/ML
WBC # BLD AUTO: 13 K/UL (ref 3.6–11)
WBC # BLD AUTO: 13.3 K/UL (ref 3.6–11)
WBC # BLD AUTO: 13.7 K/UL (ref 3.6–11)

## 2020-05-23 PROCEDURE — 83735 ASSAY OF MAGNESIUM: CPT

## 2020-05-23 PROCEDURE — 74011250636 HC RX REV CODE- 250/636: Performed by: STUDENT IN AN ORGANIZED HEALTH CARE EDUCATION/TRAINING PROGRAM

## 2020-05-23 PROCEDURE — 93005 ELECTROCARDIOGRAM TRACING: CPT

## 2020-05-23 PROCEDURE — 97530 THERAPEUTIC ACTIVITIES: CPT

## 2020-05-23 PROCEDURE — 74011250637 HC RX REV CODE- 250/637: Performed by: SPECIALIST

## 2020-05-23 PROCEDURE — 74011250637 HC RX REV CODE- 250/637: Performed by: STUDENT IN AN ORGANIZED HEALTH CARE EDUCATION/TRAINING PROGRAM

## 2020-05-23 PROCEDURE — 80048 BASIC METABOLIC PNL TOTAL CA: CPT

## 2020-05-23 PROCEDURE — C1751 CATH, INF, PER/CENT/MIDLINE: HCPCS

## 2020-05-23 PROCEDURE — 82550 ASSAY OF CK (CPK): CPT

## 2020-05-23 PROCEDURE — 97110 THERAPEUTIC EXERCISES: CPT

## 2020-05-23 PROCEDURE — 85025 COMPLETE CBC W/AUTO DIFF WBC: CPT

## 2020-05-23 PROCEDURE — 74011250636 HC RX REV CODE- 250/636: Performed by: SPECIALIST

## 2020-05-23 PROCEDURE — 84484 ASSAY OF TROPONIN QUANT: CPT

## 2020-05-23 PROCEDURE — 84100 ASSAY OF PHOSPHORUS: CPT

## 2020-05-23 PROCEDURE — 65610000006 HC RM INTENSIVE CARE

## 2020-05-23 PROCEDURE — 74011000250 HC RX REV CODE- 250: Performed by: STUDENT IN AN ORGANIZED HEALTH CARE EDUCATION/TRAINING PROGRAM

## 2020-05-23 PROCEDURE — 77030038269 HC DRN EXT URIN PURWCK BARD -A

## 2020-05-23 PROCEDURE — 36415 COLL VENOUS BLD VENIPUNCTURE: CPT

## 2020-05-23 PROCEDURE — 85730 THROMBOPLASTIN TIME PARTIAL: CPT

## 2020-05-23 PROCEDURE — 74011250637 HC RX REV CODE- 250/637: Performed by: ORTHOPAEDIC SURGERY

## 2020-05-23 PROCEDURE — 74011250636 HC RX REV CODE- 250/636: Performed by: PHYSICIAN ASSISTANT

## 2020-05-23 PROCEDURE — 74011000258 HC RX REV CODE- 258: Performed by: SPECIALIST

## 2020-05-23 PROCEDURE — 74011250636 HC RX REV CODE- 250/636: Performed by: ORTHOPAEDIC SURGERY

## 2020-05-23 RX ORDER — HEPARIN SODIUM 5000 [USP'U]/ML
30 INJECTION, SOLUTION INTRAVENOUS; SUBCUTANEOUS ONCE
Status: COMPLETED | OUTPATIENT
Start: 2020-05-23 | End: 2020-05-23

## 2020-05-23 RX ORDER — SODIUM CHLORIDE 9 MG/ML
75 INJECTION, SOLUTION INTRAVENOUS CONTINUOUS
Status: DISCONTINUED | OUTPATIENT
Start: 2020-05-23 | End: 2020-05-25

## 2020-05-23 RX ORDER — HEPARIN SODIUM 10000 [USP'U]/100ML
12-25 INJECTION, SOLUTION INTRAVENOUS
Status: DISCONTINUED | OUTPATIENT
Start: 2020-05-23 | End: 2020-05-25

## 2020-05-23 RX ORDER — GUAIFENESIN 100 MG/5ML
81 LIQUID (ML) ORAL DAILY
Status: DISCONTINUED | OUTPATIENT
Start: 2020-05-24 | End: 2020-05-25

## 2020-05-23 RX ORDER — GUAIFENESIN 100 MG/5ML
162 LIQUID (ML) ORAL
Status: COMPLETED | OUTPATIENT
Start: 2020-05-23 | End: 2020-05-23

## 2020-05-23 RX ORDER — SODIUM CHLORIDE 9 MG/ML
75 INJECTION, SOLUTION INTRAVENOUS CONTINUOUS
Status: DISCONTINUED | OUTPATIENT
Start: 2020-05-23 | End: 2020-05-23

## 2020-05-23 RX ORDER — ATROPINE SULFATE 0.1 MG/ML
1 INJECTION INTRAVENOUS ONCE
Status: COMPLETED | OUTPATIENT
Start: 2020-05-24 | End: 2020-05-23

## 2020-05-23 RX ORDER — DIGOXIN 0.25 MG/ML
250 INJECTION INTRAMUSCULAR; INTRAVENOUS ONCE
Status: COMPLETED | OUTPATIENT
Start: 2020-05-23 | End: 2020-05-23

## 2020-05-23 RX ADMIN — PHENYLEPHRINE HYDROCHLORIDE 35 MCG/MIN: 10 INJECTION INTRAVENOUS at 18:03

## 2020-05-23 RX ADMIN — TRAMADOL HYDROCHLORIDE 50 MG: 50 TABLET, FILM COATED ORAL at 11:54

## 2020-05-23 RX ADMIN — POLYETHYLENE GLYCOL 3350 17 G: 17 POWDER, FOR SOLUTION ORAL at 09:32

## 2020-05-23 RX ADMIN — HEPARIN SODIUM 1600 UNITS: 5000 INJECTION INTRAVENOUS; SUBCUTANEOUS at 17:55

## 2020-05-23 RX ADMIN — OYSTER SHELL CALCIUM WITH VITAMIN D 1 TABLET: 500; 200 TABLET, FILM COATED ORAL at 11:54

## 2020-05-23 RX ADMIN — Medication 10 ML: at 21:32

## 2020-05-23 RX ADMIN — SENNOSIDES AND DOCUSATE SODIUM 1 TABLET: 8.6; 5 TABLET ORAL at 09:33

## 2020-05-23 RX ADMIN — MEMANTINE 10 MG: 10 TABLET ORAL at 09:33

## 2020-05-23 RX ADMIN — SODIUM CHLORIDE 75 ML/HR: 900 INJECTION, SOLUTION INTRAVENOUS at 14:40

## 2020-05-23 RX ADMIN — AMIODARONE HYDROCHLORIDE 1 MG/MIN: 50 INJECTION, SOLUTION INTRAVENOUS at 18:01

## 2020-05-23 RX ADMIN — DIGOXIN 250 MCG: 0.25 INJECTION INTRAMUSCULAR; INTRAVENOUS at 17:52

## 2020-05-23 RX ADMIN — OYSTER SHELL CALCIUM WITH VITAMIN D 1 TABLET: 500; 200 TABLET, FILM COATED ORAL at 18:18

## 2020-05-23 RX ADMIN — HEPARIN SODIUM 12 UNITS/KG/HR: 10000 INJECTION, SOLUTION INTRAVENOUS at 18:07

## 2020-05-23 RX ADMIN — AMIODARONE HYDROCHLORIDE 150 MG: 50 INJECTION, SOLUTION INTRAVENOUS at 17:56

## 2020-05-23 RX ADMIN — LEVOFLOXACIN 750 MG: 5 INJECTION, SOLUTION INTRAVENOUS at 06:10

## 2020-05-23 RX ADMIN — PHENYLEPHRINE HYDROCHLORIDE 40 MCG/MIN: 10 INJECTION INTRAVENOUS at 18:15

## 2020-05-23 RX ADMIN — SODIUM CHLORIDE 500 ML: 900 INJECTION, SOLUTION INTRAVENOUS at 16:46

## 2020-05-23 RX ADMIN — Medication 10 ML: at 06:11

## 2020-05-23 RX ADMIN — SODIUM CHLORIDE 250 ML: 900 INJECTION, SOLUTION INTRAVENOUS at 23:19

## 2020-05-23 RX ADMIN — SODIUM CHLORIDE 75 ML/HR: 900 INJECTION, SOLUTION INTRAVENOUS at 18:19

## 2020-05-23 RX ADMIN — ASPIRIN 81 MG 162 MG: 81 TABLET ORAL at 18:18

## 2020-05-23 RX ADMIN — ATROPINE SULFATE 1 MG: 0.1 INJECTION PARENTERAL at 23:47

## 2020-05-23 RX ADMIN — SODIUM CHLORIDE: 900 INJECTION, SOLUTION INTRAVENOUS at 19:45

## 2020-05-23 RX ADMIN — POTASSIUM BICARBONATE 20 MEQ: 782 TABLET, EFFERVESCENT ORAL at 06:10

## 2020-05-23 RX ADMIN — SODIUM CHLORIDE 500 ML: 900 INJECTION, SOLUTION INTRAVENOUS at 13:25

## 2020-05-23 RX ADMIN — ATORVASTATIN CALCIUM 40 MG: 20 TABLET, FILM COATED ORAL at 21:32

## 2020-05-23 RX ADMIN — ENOXAPARIN SODIUM 40 MG: 40 INJECTION SUBCUTANEOUS at 09:32

## 2020-05-23 RX ADMIN — OYSTER SHELL CALCIUM WITH VITAMIN D 1 TABLET: 500; 200 TABLET, FILM COATED ORAL at 09:33

## 2020-05-23 RX ADMIN — PHENYLEPHRINE HYDROCHLORIDE 30 MCG/MIN: 10 INJECTION INTRAVENOUS at 17:54

## 2020-05-23 RX ADMIN — TRAMADOL HYDROCHLORIDE 50 MG: 50 TABLET, FILM COATED ORAL at 04:01

## 2020-05-23 RX ADMIN — MEMANTINE 10 MG: 10 TABLET ORAL at 18:18

## 2020-05-23 RX ADMIN — SODIUM CHLORIDE 500 ML: 900 INJECTION, SOLUTION INTRAVENOUS at 14:07

## 2020-05-23 NOTE — PROGRESS NOTES
Nursing called for hypotension  72/49, MAP 57. Patient seen and examined at bedside. Awake, alert, oriented to person, baseline mentation. Reported feeling well. Denied headache, nausea, vomiting, CP, SOB, abdominal/back pain, weakness. PE exam unremarkable. No signs of bruising or bleeding from surgical site, bandage c/d/i. Fluids recently stopped and patient recently received tramadol, likely medication/fluid related rather than infectious (No tachycardia, Afebrile, no significant leukocytosis) or cardiac (asymptomatic, no CHF, MI hx, EKG w/ Sinus isac; h/o bigeminy). Discussed with ortho. WBC stable with slight elevation 12-13, was slightly elevated pre op at 13, most recent 13. 3. Hgb downtrending 11.3 pre-op now 8.4. Ordered repeat CBC to monitor trend in WBC and Hgb. Bolus 500 ml and restarted MIVF. Will continue to monitor    Also Ucx showing lactose fermenting GNR awaiting spec/sens, patient currently receiving renal dose levaquin.     Visit Vitals  BP (!) 72/49 (BP Patient Position: At rest)   Pulse 96   Temp 98.2 °F (36.8 °C)   Resp 18   Ht 4' 11\" (1.499 m)   Wt 117 lb 4.6 oz (53.2 kg)   SpO2 93%   BMI 23.69 kg/m²     Signed By: Lance Chaudhari MD     May 23, 2020

## 2020-05-23 NOTE — PROGRESS NOTES
Problem: Mobility Impaired (Adult and Pediatric)  Goal: *Acute Goals and Plan of Care (Insert Text)  Description: FUNCTIONAL STATUS PRIOR TO ADMISSION: Patient was independent without use of DME. Pt is a poor historian and unable to provide any history. HOME SUPPORT PRIOR TO ADMISSION: The patient lived with daughter to provide assistance with ADLs. Physical Therapy Goals  Initiated 5/22/2020    1. Patient will move from supine to sit and sit to supine , scoot up and down and roll side to side in bed with moderate assistance  within 4 days. 2. Patient will perform sit to stand with moderate assistance  within 4 days. 3. Patient will ambulate with maximal assistance for 10 feet with the least restrictive device within 4 days. 4. Patient will verbalize and demonstrate understanding of posterior hip precautions per protocol within 4 days. 5. Patient will perform hip home exercise program per protocol with moderate assistance  within 4 days. Outcome: Progressing Towards Goal   PHYSICAL THERAPY TREATMENT  Patient: Chris Rosenberg (5 y.o. female)  Date: 5/23/2020  Diagnosis: Femoral neck fracture ([facility] [S72.009A]   <principal problem not specified>  Procedure(s) (LRB):  LEFT HIP HEMIARTHROPLASTY (Left) 2 Days Post-Op  Precautions:  posterior hip; fall  Chart, physical therapy assessment, plan of care and goals were reviewed. ASSESSMENT  Patient continues with skilled PT services and is progressing towards goals. Patient cleared to attempt PT after receiving bolus of fluid X2 with slight improvement in BP to 91/49. Patient still very limited in her tolerance to PT however and BP dropped to 80/48 after PT. Nursing aware. Current Level of Function Impacting Discharge (mobility/balance): Patient received in bed alert and knew she was in [facility] for a fractured hip. Explained to patient the hemiarthoplasty and posterior hip precautions.  Handout provided and she expressed understanding though retention is unlikely. She was able to perform bed exercises with moderate assistance. Patient sat on edge of bed with +2 max assist. Unable to achieve full seated position due to patient resisting backward despite cues. Returned to supine with +2 total assist. Vitals placed in doc flow sheets. Patient remained alert and oriented throughout PT session and did not appear in any distress. Slow progress. Will likely need SNF. Other factors to consider for discharge: dementia         PLAN :  Patient continues to benefit from skilled intervention to address the above impairments. Continue treatment per established plan of care. to address goals. Recommendation for discharge: (in order for the patient to meet his/her long term goals)  Therapy up to 5 days/week in SNF setting    This discharge recommendation:  Has not yet been discussed the attending provider and/or case management    IF patient discharges home will need the following DME: none       SUBJECTIVE:   Patient stated I don't remember anything about yesterday.     OBJECTIVE DATA SUMMARY:   Critical Behavior:  Neurologic State: Alert  Orientation Level: Disoriented to time, Disoriented to place, Oriented to person, Oriented to situation  Cognition: Memory loss, Poor safety awareness  Safety/Judgement: Decreased awareness of environment, Decreased awareness of need for safety, Decreased insight into deficits  Functional Mobility Training:  Bed Mobility:     Supine to Sit: Maximum assistance;Assist x2; Additional time  Sit to Supine: Total assistance;Assist x2; Additional time  Scooting: Maximum assistance;Assist x2        Transfers:  Sit to Stand: (not attempted this session)                                Balance:  Sitting: Impaired  Sitting - Static: Poor (constant support)  Ambulation/Gait Training:                                                      Not attempted.                  Therapeutic Exercises:   SUPINE  EXERCISES   Sets   Reps   Active Active Assist   Passive Self ROM   Comments   Ankle Pumps   [x]                                           []                                           []                                           []                                              Quad Sets   []                                           [x]                                           []                                           []                                              Heel Slides   []                                           [x]                                           []                                           []                                              Hip Abduction   []                                           [x]                                           []                                           []                                              Hip External Rotation   []                                           [x]                                           []                                           []                                              Glut Sets   []                                           [x]                                           []                                           []                                                 []                                           []                                           []                                           []                                                 []                                           []                                           []                                           []                                                STANDING  EXERCISES   Sets   Reps   Active Active Assist   Passive Self ROM   Comments   Heel Raises   []                                           []                                           []                                           []                                              Hip Abduction   [] []                                           []                                           []                                              Hip External Rotation   []                                           []                                           []                                           []                                              Hip Flexor Stretch   []                                           []                                           []                                           []                                              Mini squats   []                                           []                                           []                                           []                                              Hamstring Curl   []                                           []                                           []                                           []                                                Pain Rating:  None at rest; reported pain with movement but did not rate    Activity Tolerance:   Poor  Please refer to the flowsheet for vital signs taken during this treatment. After treatment patient left in no apparent distress:   Supine in bed, Call bell within reach, Bed / chair alarm activated, and Side rails x 3    COMMUNICATION/COLLABORATION:   The patients plan of care was discussed with: Registered nurse.      Gib Record, PT   Time Calculation: 44 mins

## 2020-05-23 NOTE — PROGRESS NOTES
[address] [facility]   Office [phone]  Fax [phone]          Assessment and Plan     Hillary Henderson is a 78 y.o. female PMH TIA/CVA, HTN, HLD, dementia who is admitted for L femoral neck fracture. 24 Hour Events: Yesterday afternoon, Hypotensive in 80s/40s and converted to Afib w/ RVR s/p 1.5L bolus. Pt given Dig load + ASA for NSTEMI on septal leads. Pt then transferred to ICU on kael gtt, Hep gtt, amnio gtt per Cardiology after EKG showed w/ STEMI (on septal leads) + Afib w/ RVR. Trop 1.04.  250cc bolus + Atropine given for maxed Kael. Then Amnio later stopped for HR in 30s and MAPs <60. Dobutamine gtt started (central line placed) for 3rd deg heart block by Dr. Terrell Coyne. Daughter changed code status to DNR. CT head obtained for unresponsive pupils-->negative for acute abnormalities. CARDIAC    Cardiogenic Shock: On 5/23, POD2, bp 70s-80s/40s. Bradycardia in 30s.   - IVF @ 75cc/H  - Kael gtt to wean - to keep MAP >65  - Dobutamine gtt to wean  - HOLD home Coreg and lisinopril   - Cards following  - Cardiac monitoring     STEMI:   - Hep gtt  - Trend Trops q6H: 1.04-->3.07-->10      New Onset Afib w/ RVR, now 3rd Degree Heart Block :  5/23. 1st EKG w/ NSTEMI (T wave inversions at septal and lateral leads). 2nd EKG w/ ST elevations on septal leads (V1, V2) + Afib w/ RVR rate 115.  s/p ASA 81mg    - s/p Dig 125 mcg load - heart block likely caused by Digoxin  - Hep gtt - OK'd by Ortho   - DC'd amnio gtt   - Daily Mag/Phos/CBC/CMP  - Cardiac monitoring   - Cards following    HTN /57 POA  - HOLD home Lisinopril 20mg nightly and Coreg 3.125mg BID - may add after hypotension resolves     HLD last lipid panel 12/2019 all values wnl  - Home Lipitor 40mg nightly    Bradycardia with a hx of  bigeminy HR POA 55. Patient seen by Dr. Jayden Aleman in the past for cardiology clearance d/t Bradycardia for a cataract procedure. EKG was noted to find PVCs in the form of bigemeny. Bradycardia was false and patient was deemed stable for surgery. Trop neg.   - Cardiac monitoring    PULM:    AHRF: new O2 requirement. Currently on 4L nc. Not in distress on exam.   - supplemental O2 to wean     NEURO:    AMS w/ decreased pupillary light reflex: now R pupil less reactive on exam c/w L. Pt also was previously talkative but now less responsive. Only knows name but able to follow commands. - Will continue to monitor     Hx of CVA/TIA in 2017. CT scan POA noted chronic ischemic changes  - Previously on Plavix   - Currently on Hep gtt    Dementia  - Home Namenda 29EP RAW    ID:    Complicated UTI: UA pos for 4+ bacteria, positive nitrites, large LE. Patient has baseline dementia and cannot report symptoms  - DC'd IV Levaquin (2 days) - given prolonged QTc   - Will start CTX for 3d   - Ucx: Lactose fermenting gram negative rods. Susceptible to CTX     MSK:     L Femoral Neck Fracture: seen on XR hip. Patient had 2 ground level falls. Covid screening negative. Received cardiac clearance as intermediate risk candidate for intermediate risk surgery. S/p L hip bipolar hemiarthroplasty-5/21 Yun Daniel). - Currently on Hep gtt  - Initially planned Lovenox 40mg daily Sq for 4 weeks w/ plans to resume plavix s/p Lovenox  - Weightbearing as tolerated  - PT/OT/CM following   - Daily CBC/BMP     Rhabdomyolysis: 2 GLF, during the first was on the ground overnight. CK 1639 without any kidney injury. UA mod blood. CK   - CK 1639-->641--> 344  - IVF         FEN/GI - NPO (until passes bedside swallow). Ns @75ml/H  Activity - Weightbearing as tolerated  DVT prophylaxis - Lovenox  GI prophylaxis - Not indicated at this time  Disposition - Plan to d/c to TBD. Code Status - DNR    I appreciate the opportunity to participate in the care of this patient,  Haydee Reed MD  Family Medicine Resident         Subjective / Objective     Subjective: Pt responsive to voice and commands. Only knows name.      Temp (24hrs), Av °F (36.7 °C), Min:97.4 °F (36.3 °C), Max:98.3 °F (36.8 °C)     Objective:  Vital signs: (most recent): Blood pressure (!) 85/46, pulse (!) 105, temperature 98.3 °F (36.8 °C), resp. rate 16, height 4' 11\" (1.499 m), weight 117 lb 4.6 oz (53.2 kg), SpO2 92 %. Respiratory: O2 Flow Rate (L/min): 2 l/min O2 Device: Nasal cannula   Visit Vitals  /67   Pulse 84   Temp 97.4 °F (36.3 °C)   Resp 20   Ht 4' 11\" (1.499 m)   Wt 117 lb 4.6 oz (53.2 kg)   SpO2 97%   BMI 23.69 kg/m²       Physical Exam  General:   no acute distress   Head:   Atraumatic   Eyes:   Conjunctivae clear. R pupil less reactive than L.    ENT:  Oral mucosa normal   Neck:  Supple, trachea midline, no adenopathy   No JVD   Back:    No CVA tenderness    Lungs:   Clear to auscultation bilaterally    Heart:   Regular rhythm, no murmur   Abdomen:    Soft, non-tender   No masses or organomegaly    Extremities:  No edema or DVT signs   Skin:  Warm and dry    No rashes or lesions   Neurologic:  Pt responsive to voice and commands. Only knows name, not time/date. Able to squeeze hand. R pupil less reactive c/w L       Urinary catheter:  Purwick       I/O:  Date 20 - 20 - 20 0659   Shift 7101-7093 6692-7542 24 Hour Total 9561-9783 3162-1625 24 Hour Total   INTAKE   P.O. 240  240        P. O. 240  240      Shift Total(mL/kg) 240(4.5)  240(4.5)      OUTPUT   Stool           Stool Occurrence(s) 1 x 1 x 2 x      Shift Total(mL/kg)           240      Weight (kg) 53.2 53.2 53.2 53.2 53.2 53.2       Inpatient Medications  Current Facility-Administered Medications   Medication Dose Route Frequency    DOBUTamine (DOBUTREX) 500 mg/250 mL (2,000 mcg/mL) infusion  0-10 mcg/kg/min IntraVENous TITRATE    0.9% sodium chloride infusion  75 mL/hr IntraVENous CONTINUOUS    heparin 25,000 units in D5W 250 ml infusion  12-25 Units/kg/hr IntraVENous TITRATE    amiodarone (CORDARONE) 375 mg in dextrose 5% 250 mL infusion  0.5-1 mg/min IntraVENous TITRATE    aspirin chewable tablet 81 mg  81 mg Oral DAILY    PHENYLephrine (MOSES-SYNEPHRINE) 30 mg in 0.9% sodium chloride 250 mL infusion   mcg/min IntraVENous TITRATE    levoFLOXacin (LEVAQUIN) 750 mg in D5W IVPB  750 mg IntraVENous Q48H    sodium chloride (NS) flush 5-40 mL  5-40 mL IntraVENous Q8H    sodium chloride (NS) flush 5-40 mL  5-40 mL IntraVENous PRN    naloxone (NARCAN) injection 0.4 mg  0.4 mg IntraVENous PRN    calcium-vitamin D (OS-NENA) 500 mg-200 unit tablet  1 Tab Oral TID WITH MEALS    senna-docusate (PERICOLACE) 8.6-50 mg per tablet 1 Tab  1 Tab Oral BID    polyethylene glycol (MIRALAX) packet 17 g  17 g Oral DAILY    bisacodyL (DULCOLAX) suppository 10 mg  10 mg Rectal DAILY PRN    traMADoL (ULTRAM) tablet 50 mg  50 mg Oral Q6H PRN    oxyCODONE IR (ROXICODONE) tablet 2.5 mg  2.5 mg Oral Q4H PRN    ondansetron (ZOFRAN ODT) tablet 4 mg  4 mg Oral Q4H PRN    sodium chloride (NS) flush 5-40 mL  5-40 mL IntraVENous Q8H    sodium chloride (NS) flush 5-40 mL  5-40 mL IntraVENous PRN    atorvastatin (LIPITOR) tablet 40 mg  40 mg Oral QHS    memantine (NAMENDA) tablet 10 mg  10 mg Oral BID         Allergies  Allergies   Allergen Reactions    Benadryl Allergy/Cold [Diphenhyd-Pe-Acetaminophen] Hives     Per patient's daughter, she thinks patient has had benadryl since then and tolerated it.  Penicillins Hives     Was treated with Augmentin in June 2015, tolerated medication well.           CBC:  Recent Labs     05/24/20  0339 05/24/20  0116 05/23/20  2315   WBC 16.6* PLEASE DISREGARD RESULTS 16.6*   HGB 8.1* PLEASE DISREGARD RESULTS 8.3*   HCT 25.7* PLEASE DISREGARD RESULTS 26.6*    PLEASE DISREGARD RESULTS 870       Metabolic Panel:  Recent Labs     05/24/20  0339 05/23/20  2153 05/23/20  1540 05/23/20  0413 05/22/20  0459     --   --  139 138   K 4.7  --   --  3.4* 3.5   *  --   --  108 108   CO2 20*  --   --  27 28   BUN 32*  --   --  22* 19   CREA 1.40*  --   --  0.63 0.72   *  --   --  95 101*   CA 7.4*  --   --  8.1* 8.0*   MG  --  1.9 2.0  --   --    PHOS  --   --  1.8*  --   --                     For Billing    Chief Complaint   Patient presents with   [facility] Problems  Date Reviewed: 5/21/2020          Codes Class Noted POA    [facility] ICD-10-CM: X61.946P  ICD-9-CM: 820.8  5/20/2020 Unknown

## 2020-05-23 NOTE — PROGRESS NOTES
0700: Bedside shift change report given to [facility] (oncoming nurse) by [place] (offgoing nurse). Report included the following information SBAR, Kardex, OR Summary, Intake/Output, MAR and Recent Results   Unable to do admission questions as patient is confused and only alert to self and that they hurt their hip    0737: Andres Dc 43 to inform them that Hgb has been dropping about a point a day and currently at 8.4 and that her WBC is up to 13.3 this morning from 12.1 yesterday      1341: PT went to work with patient and BP in 70's. Called FP. CBC and 500 ml bolus of NS ordered    1400: BP 82/49 after bolus. Called FP said to give another 500 ml bolus 0.9 NS; urine culture came back positive for E. Coli, informed FP    1440: called FP for BP of 80/48 after 2nd 500 ml bolus of 0.9 NS. Also informed FP that Hgb was 8.2 and WBC was 13.0 from 1400 CBC. Will consult with team. No new orders at this time    1507: Spoke with FP. Check BP every 2 hours until we get 2 back to back good BP's, SBP >90    1515: EKG performed. Showed St abnormality. A second EKG was done and showed similar results. Results read at bedside by FP. Troponin and labs being drawn by Lawrence Coker. FP gave no new orders after EKG given to them, they said they would consult cardiology. Awaiting any new orders    1629: [facility] Called FP, line busy will call back in a minute    1635: Talked to Dr Maciel and informed him troponin was 1.04. Dr Brie Bonilla, cardiology at bedside, informed him troponin was 1.04    1645: Dr Brie Bonilla ordered another 500 ml bolus of 0.9 NS and ordered to continue the 75 ml/ hr of 0.9 NS after. Said to get pressure up to 90's. If BP continues to remain in 80's call FP    1725: Patient was noted to have converted into A-fib by JUDITH Weiss. Notified FP of new onset of a-fib, that pressure still in 70's- 80's and EKG was being done, they said to also inform cardiology.  Called cardiology on-call and EKG was being done and completed during phone call and informed them of possible acute MI. When called cardiology informed them of potential MI event. Patient transferred down to ICU, room 3002.    1750: TRANSFER - OUT REPORT:    Verbal report given to Paul(name) on Stephanie Mckeon  being transferred to ICU, rm 3002(unit) for change in patient condition(SBP in 70's-80's; new onset a-fib; possible cardiac event)       Report consisted of patients Situation, Background, Assessment and   Recommendations(SBAR). Information from the following report(s) SBAR, Kardex, OR Summary, Intake/Output, MAR and Recent Results was reviewed with the receiving nurse. Lines:   Peripheral IV 05/20/20 Right Antecubital (Active)   Site Assessment Clean, dry, & intact 5/23/2020  9:23 AM   Phlebitis Assessment 0 5/23/2020  9:23 AM   Infiltration Assessment 0 5/23/2020  9:23 AM   Dressing Status Clean, dry, & intact 5/23/2020  9:23 AM   Dressing Type Transparent 5/23/2020  9:23 AM   Hub Color/Line Status Pink; Infusing 5/23/2020  9:23 AM   Action Taken Open ports on tubing capped 5/21/2020  4:19 AM   Alcohol Cap Used Yes 5/23/2020  9:23 AM       Peripheral IV 05/23/20 Left Antecubital (Active)       Peripheral IV 05/23/20 Anterior; Left Forearm (Active)        Opportunity for questions and clarification was provided. Patient transported with:   Monitor  O2 @ 2 liters  Registered Nurse    [phone]: Juan Go, the patient's daughter and contact to inform her patient was transferred to ICU for a cardiac event, gave room number [id number], and ICU number [phone].

## 2020-05-23 NOTE — PROGRESS NOTES
Alexei  22. Medicine Residency Program       Resident Progress Note in Brief    S:  Patient seen and examined at bedside. Denies any chest pain, sob, palpitations. O:  Visit Vitals  BP (!) 81/45   Pulse 74   Temp 98.3 °F (36.8 °C)   Resp 18   Ht 4' 11\" (1.499 m)   Wt 117 lb 4.6 oz (53.2 kg)   SpO2 (!) 76%   BMI 23.69 kg/m²     Physical Examination:   General appearance - alert, well appearing, and in no distress  Chest - clear to auscultation, no wheezes, rales or rhonchi, symmetric air entry  Heart - normal rate, regular rhythm, normal S1, S2, no murmurs, rubs, clicks or gallops,   Abdomen - soft, nontender, nondistended, no masses or organomegaly  Neurological - alert, oriented, normal speech, no focal findings  Extremities - peripheral pulses normal, no pedal edema, no clubbing or cyanosis    A/P:   77 y/o female POD2 from repair of L femoral neck fx who was moved to ICU today for new onset afib, cardiogenic shock with possible NSTEMI. NSTEMI: Trop elevated at 1.04. No chest pain. Cardiology consulted. -Heparin gtt and ASA  -Cardiology consult  -Trend trops, next at 10pm    Cardiogenic shock: MAPs below 65 this afternoon. Patient has remained asymptomatic,   does not have central line.  -Kael gtt to keep MAP> 65    New onset Afib: Cardiology consulted. Loaded with Dig and Amio.  -Continue amio gtt for target HR <110. History of cardiomyopathy   - LVEF was 40% in April 2017 and then 50% on 8/17    11:39 PM  Nurse called stating MAP below 65 and HR in 30s. ICU nurses called Cardiology, Dr. Rolando Aguirre instructed to give 250 cc bolus and 1 mg Atropine. HR and MAP improved. I spoke to Dr. Rolando Aguirre who reviewed new EKG and believes this may be heart block caused by Digoxin, he does not think this is afib. QTC is 490. Patient is maxed out on Kael. Dr. Rolando Aguirre advised to start Dobutamine. Dr. Luis Dillard spoke to patient's daughter Tete Herrera and obtained consent for central line placement. Anesthesiology now placing central line and order for Dobutamine in. Hgb now 8.3. BMP and Mg ordered. Holding Amio drip. 12:47 AM  Mg 1.9. Gave 2gm IV Mg.    1:27  Nursing called saying patient's pupils were unresponsive to light and she has a new bruise on her right shoulder. Patient evaluated and will open her eyes to voice command. Decreased pupillary response. New bruise on right shoulder noted. No bleeding from central line. STAT CT head and CT chest. Patient's family updated. 2:14  Spoke to patient's family Gianna Roque, daughter). After family discussion, they want patient to be DNR. They are okay with continuing pressors. Palliative medicine consulted.    4:02 AM  CT head negative. Patient is more responsive to voice. Daughter updated.        Kwabena Santiago DO  Family Medicine Resident

## 2020-05-23 NOTE — PROGRESS NOTES
Transition of care plan:  RUR 12%  1. Awaiting medical clearance and DC order. Post op day 2.   2. PT/OT recommending SNF. CM attempted to call patient's daughter Claudean Lively but had to leave a voicemail. 3.  CM to follow-up. Malu Cardoso will be required which will not be started until Tuesday due to weekend and holiday. 4. Outpatient follow-up. 5. [facility] transport likely required.    Stephen Jacob BS

## 2020-05-23 NOTE — PROGRESS NOTES
Orthopaedic Progress Note  Post Op day: 2 Days Post-Op    May 23, 2020 1:17 PM     Patient: Foster Szymanski MRN: [id number]  SSN: [id number]    YOB: 1940  Age: 78 y.o. Sex: female      Admit date:  2020  Date of Surgery:  2020   Procedures:  Procedure(s):  LEFT HIP HEMIARTHROPLASTY  Admitting Physician:  Jimmie Rocha MD   Surgeon:  Carly rBink) and Role:     Ruth Triana MD - Primary    Consulting Physician(s): Treatment Team: Attending Provider: Louann Blanco MD; Consulting Provider: Stephan Bullock MD; Consulting Provider: Janneth Mas MD; Utilization Review: Kori Aparicio RN; Care Manager: Julissa Cárdenas    SUBJECTIVE:     Foster Szymanski is a 78 y.o. female is 2 Days Post-Op s/p Procedure(s):  LEFT HIP HEMIARTHROPLASTY with an appropriate level of post-operative pain. No complaints of nausea, vomiting, dizziness, lightheadedness, chest pain, or shortness of breath. PT is working with patient. BP dropped to 76/48 supine. OBJECTIVE:       Physical Exam:  General: Alert, cooperative, no distress. Respiratory: Respirations unlabored  Neurological:  Neurovascular exam within normal limits. Motor: + DF/PF. Musculoskeletal: Calves soft, supple, non-tender upon palpation. Dressing/Wound:  Clean, dry and intact. No significant erythema or swelling.       Vital Signs:        Patient Vitals for the past 8 hrs:   BP Temp Pulse Resp SpO2   20 1307 (!) 72/49       20 1305 (!) 76/48       20 0704 90/57 98.2 °F (36.8 °C) 77 18 95 %                                          Temp (24hrs), Av.2 °F (36.8 °C), Min:97.9 °F (36.6 °C), Max:98.5 °F (36.9 °C)      Labs:        Recent Labs     20  0413   HCT 26.5*   HGB 8.4*     Lab Results   Component Value Date/Time    Sodium 139 2020 04:13 AM    Potassium 3.4 (L) 2020 04:13 AM    Chloride 108 2020 04:13 AM    CO2 27 2020 04:13 AM    Glucose 95 05/23/2020 04:13 AM    BUN 22 (H) 05/23/2020 04:13 AM    Creatinine 0.63 05/23/2020 04:13 AM    Calcium 8.1 (L) 05/23/2020 04:13 AM       PT/OT:                Patient mobility  Bed Mobility Training  Rolling: Maximum assistance  Supine to Sit: Total assistance, Assist x2  Sit to Supine: Total assistance, Assist x2  Scooting: Maximum assistance  Transfer Training  Sit to Stand: Moderate assistance, Assist x2  Stand to Sit: Moderate assistance, Assist x2  Bed to Chair: Maximum assistance, Assist x2                   ASSESSMENT / PLAN:   Active Problems:    Femoral neck fracture ([address] (5/20/2020)                    Hypotension: Afebrile. Hypotension and leukocytosis. Leukocytosis likely reactive. Continue fluids 75ml/hrs. Pain Control: Avoid narcotics to prevent hypotension. DVT Prophylaxis: Lovenox 40 mg SC for 4 weeks. Hemodynamics: HgB down to 8.4 today    Activity: WBAT with PT once not hypotensive.      Disposition: SNF      Signed By:  Cali Anderson, [address] [id number]

## 2020-05-23 NOTE — PROGRESS NOTES
1600-Rounded on this patient due to concern for persistent low blood pressure despite recent fluid boluses. Primary  RN is monitoring this closely and has been in touch with attending physician; blood pressures being monitored hourly. Cardiac enzymes have been sent. Recent hgb 8.5. Awaiting cardiology consult. Primary RN Jere Madsen will notify RRT RN for any acute changes; currently the patient is appropriately responsive to voice, follows commands, and exhibits mild confusion which is reported to be her baseline. Tramadol has been administered for pain today. Will follow closely.  Ciera WONG

## 2020-05-23 NOTE — PROGRESS NOTES
[facility]  22. Medicine Residency Program       Resident Progress Note in Brief    S: Patient seen and examined at bedside for low bps 70s/40s MAP 62. Pt asymptomatic (no CP/palp/SOB/dizziness). O:  Visit Vitals  BP (!) 89/48 (BP 1 Location: Left arm, BP Patient Position: At rest)   Pulse 82   Temp 98.2 °F (36.8 °C)   Resp 18   Ht 4' 11\" (1.499 m)   Wt 117 lb 4.6 oz (53.2 kg)   SpO2 93%   BMI 23.69 kg/m²     Physical Examination:   General appearance - thin frail. In no distress  Chest - clear to auscultation, no wheezes, rales or rhonchi, symmetric air entry  Heart - normal rate, regular rhythm, normal S1, S2, no murmurs, rubs, clicks or gallops,   Abdomen - soft, nontender, nondistended, no masses or organomegaly  Neurological - alert, oriented, normal speech, no focal findings  Extremities - peripheral pulses normal, no pedal edema, no clubbing or cyanosis    A/P:  78 y.o. female PMH TIA/CVA, HTN, HLD, dementia who is POD2 s/p L femoral neck fracture repair. Hypotension: likely 2/2 Cardiogenic Shock & NSTEMI. Currently bp 89/48 s/p 1 L bolus. - EKG: nsr, rate 96, T wave inversions in anterolateral leads.   - ns IVF @ 75cc/H started   - Will obtain EKG, trop   - CBC: Hgb 8.5 (8.4 prior), WBC 13 (from 13.3)  - Consulted Cardiology: Dr. Bernard Person to evaluated. -----------------------------------------------------------------------------  ADDENDUM:     - Trop 1.04 - will trend q6  - Mag 2, Phos 1.8  -  (from 641 yesterday)    -----------------------------------------------------------------------------  ADDENDUM: converted to Afib w/ RVR (rate 120s)      Pt denies CP/palp. States she does not \"feel too good\" but unable to say what specifically is wrong.      Visit Vitals  BP (!) 85/46   Pulse (!) 105   Temp 98.3 °F (36.8 °C)   Resp 16   Ht 4' 11\" (1.499 m)   Wt 117 lb 4.6 oz (53.2 kg)   SpO2 92%   BMI 23.69 kg/m²       - Transferred ICU  - Per Cards (Dr. Joana Delgado): 500cc bolus, Dig load 250mcg, bernardo gtt, Heparin gtt and Amnio gtt--> HR 90s-low 100s currently.   - Dr. Sudha Travis (on-call Cardiologist) made aware. *Attempted to call pt's daughter Mar Sosa, [phone]) for update and revisit code status: No answer. VM left.     *Night team continue to monitor ON     Ambreen hWitmore MD  Family Medicine Resident

## 2020-05-23 NOTE — PROGRESS NOTES
Called daughter regarding SNF options. She stated she has not yet reviewed the options given to her due to personal issues (eg. [facility]). However, she states she will do her research. I stated I would get back to her at the end of the day.      ---------------------------------------------------------------------    ADDENDUM    - Reached out to pt's daughter at the end of the day. Daughter has chosen [address] [facility] (at [place]) since various family members have resided there in the past. States she has already spoken to Matt De Oliveira (Admissions Coordinator #[phone]) who assured her that pt's [facility] insurance would be approved.

## 2020-05-23 NOTE — CONSULTS
Bao Fraga MD. [facility]              Patient: Lisa Scanlon  : 1940      Today's Date: 2020        HISTORY OF PRESENT ILLNESS:     History of Present Illness:  Reason for consult: possible cardiogenic shock (low bps, EKG with new inversions)    Lisa Scanlon is a 78 y.o. female with PMH of transient ischemic attacks, CVA, cardiomyopathy, dyslipidemia, hypertension, PVC is admitted with GLF and left femoral neck fracture. She has dementia. She is on Plavix for prior TIA/ CVA and questionable history of PAF. She had Left hip bipolar hemiarthroplasty on 20     Dr. Dinora Razo notes this afternoon :\"Nursing called for hypotension  72/49, MAP 57. Patient seen and examined at bedside. Awake, alert, oriented to person, baseline mentation. Reported feeling well. Denied headache, nausea, vomiting, CP, SOB, abdominal/back pain, weakness. PE exam unremarkable. No signs of bruising or bleeding from surgical site, bandage c/d/i. Fluids recently stopped and patient recently received tramadol, likely medication/fluid related rather than infectious (No tachycardia, Afebrile, no significant leukocytosis) or cardiac (asymptomatic, no CHF, MI hx, EKG w/ Sinus isac; h/o bigeminy). \"  EKG showed diffuse ST depression prompting a cardiology consult. Ms. Roberto Mohamud says she feels fine laying in bed. No CP or SOB.             PAST MEDICAL HISTORY:     Past Medical History:   Diagnosis Date    Acute CVA (cerebrovascular accident) ([address] 4/10/2017    Per MRI:  Posterior L frontal/parietal territory    Cerebral atrophy ([address] 4/10/2017    History of CVA (cerebrovascular accident) 4/10/2017    --L frontal acute infarct and LMCA    Hyperlipidemia LDL goal <70 4/10/2017    Hypertension     Status post hip surgery      Left hip bipolar hemiarthroplasty on 20     TIA (transient ischemic attack)     Unresponsive episode 8/3/2017    transient       Past Surgical History:   Procedure Laterality Date    HX HEENT      Tonsilectomy age 8         MEDICATIONS:     Current Facility-Administered Medications   Medication Dose Route Frequency Provider Last Rate Last Dose    sodium chloride 0.9 % bolus infusion 500 mL  500 mL IntraVENous ONCE Angiehetu, Silva Juan MD        0.9% sodium chloride infusion  75 mL/hr IntraVENous CONTINUOUS Ayala Candelaria MD        levoFLOXacin (LEVAQUIN) 750 mg in D5W IVPB  750 mg IntraVENous Q48H Josh Hamburger,  mL/hr at 05/23/20 0610 750 mg at 05/23/20 0610    sodium chloride (NS) flush 5-40 mL  5-40 mL IntraVENous Q8H Cha Myers MD   10 mL at 05/23/20 0462    sodium chloride (NS) flush 5-40 mL  5-40 mL IntraVENous PRN Cha Myers MD        naloxone [facility]) injection 0.4 mg  0.4 mg IntraVENous PRN Cha Myers MD        calcium-vitamin D (OS-NENA) 500 mg-200 unit tablet  1 Tab Oral TID WITH MEALS Cha Myers MD   1 Tab at 05/23/20 1154    senna-docusate (PERICOLACE) 8.6-50 mg per tablet 1 Tab  1 Tab Oral BID Cha Myers MD   1 Tab at 05/23/20 0933    polyethylene glycol (MIRALAX) packet 17 g  17 g Oral DAILY Cha Myers MD   17 g at 05/23/20 0932    bisacodyL (DULCOLAX) suppository 10 mg  10 mg Rectal DAILY PRN Cha Myers MD        traMADoL Avnessa Horn) tablet 50 mg  50 mg Oral Q6H PRN Cha Myers MD   50 mg at 05/23/20 1154    oxyCODONE IR (ROXICODONE) tablet 2.5 mg  2.5 mg Oral Q4H PRN Cha Myers MD        ondansetron (ZOFRAN ODT) tablet 4 mg  4 mg Oral Q4H PRN Cha Myers MD        enoxaparin (LOVENOX) injection 40 mg  40 mg SubCUTAneous DAILY Cha Myers MD   40 mg at 05/23/20 0932    sodium chloride (NS) flush 5-40 mL  5-40 mL IntraVENous Q8H Josh Hamburger, DO   Stopped at 05/23/20 1400    sodium chloride (NS) flush 5-40 mL  5-40 mL IntraVENous PRN Josh Hamburger, DO        atorvastatin (LIPITOR) tablet 40 mg  40 mg Oral QHS Josh Hamburger, DO   40 mg at 05/22/20 9594    memantine (NAMENDA) tablet 10 mg  10 mg Oral BID Rolan Thomas DO   10 mg at 20 1350     Allergies   Allergen Reactions    Benadryl Allergy/Cold [Diphenhyd-Pe-Acetaminophen] Hives     Per patient's daughter, she thinks patient has had benadryl since then and tolerated it.  Penicillins Hives     Was treated with Augmentin in 2015, tolerated medication well. SOCIAL HISTORY:     Social History     Tobacco Use    Smoking status: Former Smoker     Last attempt to quit: 1999     Years since quittin.8    Smokeless tobacco: Never Used   Substance Use Topics    Alcohol use: Yes     Alcohol/week: 17.5 standard drinks     Types: 21 Glasses of wine per week     Comment: 3 glasses of port per day    Drug use: No         FAMILY HISTORY:     Family History   Problem Relation Age of Onset    Dementia Mother 61    Heart Attack Father 79           REVIEW OF SYMPTOMS:     Review of Symptoms:  Constitutional: Negative for fever, chills  HEENT: Negative for nosebleeds, tinnitus, and vision changes. Respiratory: Negative for cough, wheezing  Cardiovascular: Negative for orthopnea, claudication, syncope, and PND. Gastrointestinal: Negative for abdominal pain, diarrhea, melena. Genitourinary: Negative for dysuria  Musculoskeletal: Negative for myalgias. Skin: Negative for rash  Heme: No problems bleeding. Neurological: Negative for speech change and focal weakness. PHYSICAL EXAM:     Physical Exam:  Visit Vitals  BP (!) 82/46 (BP 1 Location: Left arm, BP Patient Position: At rest)   Pulse 79   Temp 98.3 °F (36.8 °C)   Resp 16   Ht 4' 11\" (1.499 m)   Wt 117 lb 4.6 oz (53.2 kg)   SpO2 92%   BMI 23.69 kg/m²     Patient appears generally well, mood and affect are appropriate and pleasant. HEENT:  Hearing intact, non-icteric, normocephalic, atraumatic. Neck Exam: Supple, No JVD.  + left neck bruit. Lung Exam: Clear to auscultation, even breath sounds.    Cardiac Exam: Regular rate and rhythm with no murmur. Abdomen: Soft, non-tender, normal bowel sounds. Extremities: Moves all ext well. No lower extremity edema. Psych: Appropriate affect  Neuro - Grossly intact      LABS / OTHER STUDIES:       Recent Results (from the past 24 hour(s))   METABOLIC PANEL, BASIC    Collection Time: 05/23/20  4:13 AM   Result Value Ref Range    Sodium 139 136 - 145 mmol/L    Potassium 3.4 (L) 3.5 - 5.1 mmol/L    Chloride 108 97 - 108 mmol/L    CO2 27 21 - 32 mmol/L    Anion gap 4 (L) 5 - 15 mmol/L    Glucose 95 65 - 100 mg/dL    BUN 22 (H) 6 - 20 MG/DL    Creatinine 0.63 0.55 - 1.02 MG/DL    BUN/Creatinine ratio 35 (H) 12 - 20      GFR est AA >60 >60 ml/min/1.73m2    GFR est non-AA >60 >60 ml/min/1.73m2    Calcium 8.1 (L) 8.5 - 10.1 MG/DL   CBC WITH AUTOMATED DIFF    Collection Time: 05/23/20  4:13 AM   Result Value Ref Range    WBC 13.3 (H) 3.6 - 11.0 K/uL    RBC 2.62 (L) 3.80 - 5.20 M/uL    HGB 8.4 (L) 11.5 - 16.0 g/dL    HCT 26.5 (L) 35.0 - 47.0 %    .1 (H) 80.0 - 99.0 FL    MCH 32.1 26.0 - 34.0 PG    MCHC 31.7 30.0 - 36.5 g/dL    RDW 14.6 (H) 11.5 - 14.5 %    PLATELET 723 784 - 733 K/uL    MPV 9.8 8.9 - 12.9 FL    NRBC 0.0 0  WBC    ABSOLUTE NRBC 0.00 0.00 - 0.01 K/uL    NEUTROPHILS 76 (H) 32 - 75 %    LYMPHOCYTES 9 (L) 12 - 49 %    MONOCYTES 13 5 - 13 %    EOSINOPHILS 1 0 - 7 %    BASOPHILS 0 0 - 1 %    IMMATURE GRANULOCYTES 1 (H) 0.0 - 0.5 %    ABS. NEUTROPHILS 10.2 (H) 1.8 - 8.0 K/UL    ABS. LYMPHOCYTES 1.2 0.8 - 3.5 K/UL    ABS. MONOCYTES 1.7 (H) 0.0 - 1.0 K/UL    ABS. EOSINOPHILS 0.1 0.0 - 0.4 K/UL    ABS. BASOPHILS 0.0 0.0 - 0.1 K/UL    ABS. IMM.  GRANS. 0.1 (H) 0.00 - 0.04 K/UL    DF AUTOMATED     CBC WITH AUTOMATED DIFF    Collection Time: 05/23/20  2:09 PM   Result Value Ref Range    WBC 13.0 (H) 3.6 - 11.0 K/uL    RBC 2.66 (L) 3.80 - 5.20 M/uL    HGB 8.5 (L) 11.5 - 16.0 g/dL    HCT 26.6 (L) 35.0 - 47.0 %    .0 (H) 80.0 - 99.0 FL    MCH 32.0 26.0 - 34.0 PG    MCHC 32.0 30.0 - 36.5 g/dL    RDW 14.6 (H) 11.5 - 14.5 %    PLATELET 996 298 - 530 K/uL    MPV 9.4 8.9 - 12.9 FL    NRBC 0.0 0  WBC    ABSOLUTE NRBC 0.00 0.00 - 0.01 K/uL    NEUTROPHILS 80 (H) 32 - 75 %    LYMPHOCYTES 7 (L) 12 - 49 %    MONOCYTES 11 5 - 13 %    EOSINOPHILS 1 0 - 7 %    BASOPHILS 0 0 - 1 %    IMMATURE GRANULOCYTES 1 (H) 0.0 - 0.5 %    ABS. NEUTROPHILS 10.5 (H) 1.8 - 8.0 K/UL    ABS. LYMPHOCYTES 1.0 0.8 - 3.5 K/UL    ABS. MONOCYTES 1.4 (H) 0.0 - 1.0 K/UL    ABS. EOSINOPHILS 0.1 0.0 - 0.4 K/UL    ABS. BASOPHILS 0.0 0.0 - 0.1 K/UL    ABS. IMM. GRANS. 0.1 (H) 0.00 - 0.04 K/UL    DF AUTOMATED     EKG, 12 LEAD, INITIAL    Collection Time: 05/23/20  2:18 PM   Result Value Ref Range    Ventricular Rate 96 BPM    Atrial Rate 96 BPM    P-R Interval 142 ms    QRS Duration 104 ms    Q-T Interval 390 ms    QTC Calculation (Bezet) 492 ms    Calculated P Axis 66 degrees    Calculated R Axis 57 degrees    Calculated T Axis -122 degrees    Diagnosis       Normal sinus rhythm  Posterior infarct , age undetermined  Marked ST abnormality, possible inferior subendocardial injury  Abnormal ECG  When compared with ECG of 21-MAY-2020 11:10,  MANUAL COMPARISON REQUIRED, DATA IS UNCONFIRMED     TROPONIN I    Collection Time: 05/23/20  3:40 PM   Result Value Ref Range    Troponin-I, Qt. 1.04 (H) <0.05 ng/mL   CK    Collection Time: 05/23/20  3:40 PM   Result Value Ref Range     (H) 26 - 192 U/L   MAGNESIUM    Collection Time: 05/23/20  3:40 PM   Result Value Ref Range    Magnesium 2.0 1.6 - 2.4 mg/dL   PHOSPHORUS    Collection Time: 05/23/20  3:40 PM   Result Value Ref Range    Phosphorus 1.8 (L) 2.6 - 4.7 MG/DL           CARDIAC DIAGNOSTICS:     Cardiac Evaluation Includes:    Echo 4/10/17 - LVEF 40%  Echo 8/4/17 - LVEF 50 %. There was possible hypokinesis of the basal inferoseptal wall(s). EKG 5/21/20 - NSR, LVH with repol abn   EKG 5/23/20 - Normal sinus rhythm;  Marked ST depression.           ASSESSMENT AND PLAN: Assessment and Plan:    1) Possible NSTEMI   - She is Hypotensive (surgery 5/21/20) with ST depression on EKG   - She seemed to be doing OK until around 1 PM today when BP was 76/48  - She denies any CP or SOB   - She has received 1+ L of IVF and BP remains low (though better)   - EKG with ischemic ST depression in multiple leads (more ST depression than EKG a couple of days prior)   - Trop is now elevated at 1.04 today (was < 0.04 on 5/20/20)   - I spoke to the surgeon- Dr. Sammy Reed - about anticoagulation risks - he is worried about giving her anticoagulation so close to surgery but said that cardiac treatment would supercede Ortho issues at this time and to treat potential NSTEMI per cardiology guidelines. --> I'll go ahead and start IV heparin and give her some ASA as well. --> Addendum - resident called and said patient now in afib. Will have her moved to unit and start Kael gtt to maintain BP. I ordered some Dig IV just now ; but can use IV amio if needed if HR is fast and BP is low. - reassess LV function on echo tomorrow    2) History of cardiomyopathy   - LVEF was 40% in APril 2017 and then 50% on 8/17  - Not sure what CAD evaluation she has had over the years   - Her outpatient Coreg and lisinopril are held now due to hypotension     3) Left hip bipolar hemiarthroplasty on 5/21/20       Jessica Quiroz MD, [address], [facility] [address]  [address]  [address] [place] [address], [address] [place] [address]  Ph: [phone]   Ph [phone]

## 2020-05-23 NOTE — PROGRESS NOTES
Attempted to see patient. BP at rest was 76/48, then 72/49. Notified nursing and offered patient water. PT will check later and proceed if she is more stable.

## 2020-05-24 ENCOUNTER — APPOINTMENT (OUTPATIENT)
Dept: CT IMAGING | Age: 80
DRG: 469 | End: 2020-05-24
Attending: STUDENT IN AN ORGANIZED HEALTH CARE EDUCATION/TRAINING PROGRAM
Payer: MEDICARE

## 2020-05-24 ENCOUNTER — APPOINTMENT (OUTPATIENT)
Dept: GENERAL RADIOLOGY | Age: 80
DRG: 469 | End: 2020-05-24
Attending: ANESTHESIOLOGY
Payer: MEDICARE

## 2020-05-24 ENCOUNTER — APPOINTMENT (OUTPATIENT)
Dept: NON INVASIVE DIAGNOSTICS | Age: 80
DRG: 469 | End: 2020-05-24
Attending: SPECIALIST
Payer: MEDICARE

## 2020-05-24 LAB
ANION GAP SERPL CALC-SCNC: 8 MMOL/L (ref 5–15)
APTT PPP: 52.1 SEC (ref 22.1–32)
APTT PPP: 56.6 SEC (ref 22.1–32)
APTT PPP: 66.4 SEC (ref 22.1–32)
ATRIAL RATE: 96 BPM
AV VELOCITY RATIO: 0.64
BASOPHILS # BLD: 0 K/UL (ref 0–0.1)
BASOPHILS # BLD: 0 K/UL (ref 0–0.1)
BASOPHILS # BLD: NORMAL K/UL (ref 0–0.1)
BASOPHILS NFR BLD: 0 % (ref 0–1)
BASOPHILS NFR BLD: 0 % (ref 0–1)
BASOPHILS NFR BLD: NORMAL % (ref 0–1)
BUN SERPL-MCNC: 32 MG/DL (ref 6–20)
BUN/CREAT SERPL: 23 (ref 12–20)
CALCIUM SERPL-MCNC: 7.4 MG/DL (ref 8.5–10.1)
CALCULATED P AXIS, ECG09: 66 DEGREES
CALCULATED R AXIS, ECG10: 57 DEGREES
CALCULATED T AXIS, ECG11: -122 DEGREES
CHLORIDE SERPL-SCNC: 110 MMOL/L (ref 97–108)
CO2 SERPL-SCNC: 20 MMOL/L (ref 21–32)
CREAT SERPL-MCNC: 1.4 MG/DL (ref 0.55–1.02)
DIAGNOSIS, 93000: NORMAL
DIFFERENTIAL METHOD BLD: ABNORMAL
DIFFERENTIAL METHOD BLD: ABNORMAL
DIFFERENTIAL METHOD BLD: NORMAL
ECHO AO ROOT DIAM: 2.91 CM
ECHO AV AREA PEAK VELOCITY: 2.6 CM2
ECHO AV AREA/BSA PEAK VELOCITY: 1.8 CM2/M2
ECHO AV PEAK GRADIENT: 7.4 MMHG
ECHO AV PEAK VELOCITY: 136.24 CM/S
ECHO EST RA PRESSURE: 3 MMHG
ECHO LA AREA 4C: 21.2 CM2
ECHO LA MAJOR AXIS: 4.24 CM
ECHO LA TO AORTIC ROOT RATIO: 1.46
ECHO LA VOL 2C: 56.21 ML (ref 22–52)
ECHO LA VOL 4C: 59.24 ML (ref 22–52)
ECHO LA VOL BP: 58.5 ML (ref 22–52)
ECHO LA VOL/BSA BIPLANE: 39.8 ML/M2 (ref 16–28)
ECHO LA VOLUME INDEX A2C: 38.24 ML/M2 (ref 16–28)
ECHO LA VOLUME INDEX A4C: 40.31 ML/M2 (ref 16–28)
ECHO LV E' LATERAL VELOCITY: 6.97 CENTIMETER/SECOND
ECHO LV EDV TEICHHOLZ: 0.63 ML
ECHO LV ESV TEICHHOLZ: 0.3 ML
ECHO LV INTERNAL DIMENSION DIASTOLIC: 4.53 CM (ref 3.9–5.3)
ECHO LV INTERNAL DIMENSION SYSTOLIC: 3.33 CM
ECHO LV IVSD: 1.13 CM (ref 0.6–0.9)
ECHO LV MASS 2D: 154 G (ref 67–162)
ECHO LV MASS INDEX 2D: 104.8 G/M2 (ref 43–95)
ECHO LV POSTERIOR WALL DIASTOLIC: 0.68 CM (ref 0.6–0.9)
ECHO LVOT DIAM: 2.29 CM
ECHO LVOT PEAK GRADIENT: 3 MMHG
ECHO LVOT PEAK VELOCITY: 87.03 CM/S
ECHO MV A VELOCITY: 89.82 CM/S
ECHO MV AREA PHT: 4.5 CM2
ECHO MV E DECELERATION TIME (DT): 167.5 MS
ECHO MV E VELOCITY: 43.26 CM/S
ECHO MV E/A RATIO: 0.48
ECHO MV PRESSURE HALF TIME (PHT): 48.6 MS
ECHO PULMONARY ARTERY SYSTOLIC PRESSURE (PASP): 34.9 MMHG
ECHO PV MAX VELOCITY: 71.92 CM/S
ECHO PV PEAK GRADIENT: 2.1 MMHG
ECHO RIGHT VENTRICULAR SYSTOLIC PRESSURE (RVSP): 34.9 MMHG
ECHO RV INTERNAL DIMENSION: 3.59 CM
ECHO RV TAPSE: 1.47 CM (ref 1.5–2)
ECHO TV REGURGITANT MAX VELOCITY: 282.38 CM/S
ECHO TV REGURGITANT PEAK GRADIENT: 31.9 MMHG
EOSINOPHIL # BLD: 0 K/UL (ref 0–0.4)
EOSINOPHIL # BLD: 0 K/UL (ref 0–0.4)
EOSINOPHIL # BLD: NORMAL K/UL (ref 0–0.4)
EOSINOPHIL NFR BLD: 0 % (ref 0–7)
EOSINOPHIL NFR BLD: 0 % (ref 0–7)
EOSINOPHIL NFR BLD: NORMAL % (ref 0–7)
ERYTHROCYTE [DISTWIDTH] IN BLOOD BY AUTOMATED COUNT: 14.6 % (ref 11.5–14.5)
ERYTHROCYTE [DISTWIDTH] IN BLOOD BY AUTOMATED COUNT: 14.7 % (ref 11.5–14.5)
ERYTHROCYTE [DISTWIDTH] IN BLOOD BY AUTOMATED COUNT: NORMAL % (ref 11.5–14.5)
GLUCOSE BLD STRIP.AUTO-MCNC: 128 MG/DL (ref 65–100)
GLUCOSE BLD STRIP.AUTO-MCNC: 81 MG/DL (ref 65–100)
GLUCOSE SERPL-MCNC: 122 MG/DL (ref 65–100)
HCT VFR BLD AUTO: 25.7 % (ref 35–47)
HCT VFR BLD AUTO: 26.6 % (ref 35–47)
HCT VFR BLD AUTO: NORMAL % (ref 35–47)
HGB BLD-MCNC: 8.1 G/DL (ref 11.5–16)
HGB BLD-MCNC: 8.3 G/DL (ref 11.5–16)
HGB BLD-MCNC: NORMAL G/DL (ref 11.5–16)
IMM GRANULOCYTES # BLD AUTO: 0.5 K/UL (ref 0–0.04)
IMM GRANULOCYTES # BLD AUTO: 1.5 K/UL (ref 0–0.04)
IMM GRANULOCYTES # BLD AUTO: NORMAL K/UL (ref 0–0.04)
IMM GRANULOCYTES NFR BLD AUTO: 3 % (ref 0–0.5)
IMM GRANULOCYTES NFR BLD AUTO: 9 % (ref 0–0.5)
IMM GRANULOCYTES NFR BLD AUTO: NORMAL % (ref 0–0.5)
LACTATE SERPL-SCNC: 1.7 MMOL/L (ref 0.4–2)
LVFS 2D: 26.52 %
LVSV (TEICH): 32.85 ML
LYMPHOCYTES # BLD: 0.7 K/UL (ref 0.8–3.5)
LYMPHOCYTES # BLD: 1.2 K/UL (ref 0.8–3.5)
LYMPHOCYTES # BLD: NORMAL K/UL (ref 0.8–3.5)
LYMPHOCYTES NFR BLD: 4 % (ref 12–49)
LYMPHOCYTES NFR BLD: 7 % (ref 12–49)
LYMPHOCYTES NFR BLD: NORMAL % (ref 12–49)
MAGNESIUM SERPL-MCNC: 1.9 MG/DL (ref 1.6–2.4)
MAGNESIUM SERPL-MCNC: 2 MG/DL (ref 1.6–2.4)
MCH RBC QN AUTO: 31.6 PG (ref 26–34)
MCH RBC QN AUTO: 31.9 PG (ref 26–34)
MCH RBC QN AUTO: NORMAL PG (ref 26–34)
MCHC RBC AUTO-ENTMCNC: 31.2 G/DL (ref 30–36.5)
MCHC RBC AUTO-ENTMCNC: 31.5 G/DL (ref 30–36.5)
MCHC RBC AUTO-ENTMCNC: NORMAL G/DL (ref 30–36.5)
MCV RBC AUTO: 101.1 FL (ref 80–99)
MCV RBC AUTO: 101.2 FL (ref 80–99)
MCV RBC AUTO: NORMAL FL (ref 80–99)
MONOCYTES # BLD: 0.8 K/UL (ref 0–1)
MONOCYTES # BLD: 1.5 K/UL (ref 0–1)
MONOCYTES # BLD: NORMAL K/UL (ref 0–1)
MONOCYTES NFR BLD: 5 % (ref 5–13)
MONOCYTES NFR BLD: 9 % (ref 5–13)
MONOCYTES NFR BLD: NORMAL % (ref 5–13)
MV DEC SLOPE: 2.58
NEUTS SEG # BLD: 13.4 K/UL (ref 1.8–8)
NEUTS SEG # BLD: 13.6 K/UL (ref 1.8–8)
NEUTS SEG # BLD: NORMAL K/UL (ref 1.8–8)
NEUTS SEG NFR BLD: 81 % (ref 32–75)
NEUTS SEG NFR BLD: 82 % (ref 32–75)
NEUTS SEG NFR BLD: NORMAL % (ref 32–75)
NRBC # BLD: 0 K/UL (ref 0–0.01)
NRBC # BLD: 0 K/UL (ref 0–0.01)
NRBC # BLD: NORMAL K/UL (ref 0–0.01)
NRBC BLD-RTO: 0 PER 100 WBC
NRBC BLD-RTO: 0 PER 100 WBC
NRBC BLD-RTO: NORMAL PER 100 WBC
P-R INTERVAL, ECG05: 142 MS
PHOSPHATE SERPL-MCNC: 5.2 MG/DL (ref 2.6–4.7)
PLATELET # BLD AUTO: 176 K/UL (ref 150–400)
PLATELET # BLD AUTO: 197 K/UL (ref 150–400)
PLATELET # BLD AUTO: NORMAL K/UL (ref 150–400)
PMV BLD AUTO: 10.1 FL (ref 8.9–12.9)
PMV BLD AUTO: 10.3 FL (ref 8.9–12.9)
PMV BLD AUTO: NORMAL FL (ref 8.9–12.9)
POTASSIUM SERPL-SCNC: 4.7 MMOL/L (ref 3.5–5.1)
Q-T INTERVAL, ECG07: 390 MS
QRS DURATION, ECG06: 104 MS
QTC CALCULATION (BEZET), ECG08: 492 MS
RBC # BLD AUTO: 2.54 M/UL (ref 3.8–5.2)
RBC # BLD AUTO: 2.63 M/UL (ref 3.8–5.2)
RBC # BLD AUTO: NORMAL M/UL (ref 3.8–5.2)
RBC MORPH BLD: ABNORMAL
RBC MORPH BLD: ABNORMAL
SERVICE CMNT-IMP: ABNORMAL
SERVICE CMNT-IMP: NORMAL
SODIUM SERPL-SCNC: 138 MMOL/L (ref 136–145)
THERAPEUTIC RANGE,PTTT: ABNORMAL SECS (ref 58–77)
TROPONIN I SERPL-MCNC: 10 NG/ML
TROPONIN I SERPL-MCNC: 17.7 NG/ML
TROPONIN I SERPL-MCNC: 20 NG/ML
VENTRICULAR RATE, ECG03: 96 BPM
WBC # BLD AUTO: 16.6 K/UL (ref 3.6–11)
WBC # BLD AUTO: 16.6 K/UL (ref 3.6–11)
WBC # BLD AUTO: NORMAL K/UL (ref 3.6–11)

## 2020-05-24 PROCEDURE — 74011250636 HC RX REV CODE- 250/636: Performed by: STUDENT IN AN ORGANIZED HEALTH CARE EDUCATION/TRAINING PROGRAM

## 2020-05-24 PROCEDURE — 93005 ELECTROCARDIOGRAM TRACING: CPT

## 2020-05-24 PROCEDURE — 82962 GLUCOSE BLOOD TEST: CPT

## 2020-05-24 PROCEDURE — 70450 CT HEAD/BRAIN W/O DYE: CPT

## 2020-05-24 PROCEDURE — 84100 ASSAY OF PHOSPHORUS: CPT

## 2020-05-24 PROCEDURE — 85730 THROMBOPLASTIN TIME PARTIAL: CPT

## 2020-05-24 PROCEDURE — 65610000006 HC RM INTENSIVE CARE

## 2020-05-24 PROCEDURE — 93306 TTE W/DOPPLER COMPLETE: CPT

## 2020-05-24 PROCEDURE — 74011250636 HC RX REV CODE- 250/636: Performed by: SPECIALIST

## 2020-05-24 PROCEDURE — 77030019905 HC CATH URETH INTMIT MDII -A

## 2020-05-24 PROCEDURE — 02HV33Z INSERTION OF INFUSION DEVICE INTO SUPERIOR VENA CAVA, PERCUTANEOUS APPROACH: ICD-10-PCS | Performed by: RADIOLOGY

## 2020-05-24 PROCEDURE — 83605 ASSAY OF LACTIC ACID: CPT

## 2020-05-24 PROCEDURE — 87635 SARS-COV-2 COVID-19 AMP PRB: CPT

## 2020-05-24 PROCEDURE — 80048 BASIC METABOLIC PNL TOTAL CA: CPT

## 2020-05-24 PROCEDURE — 74011000258 HC RX REV CODE- 258: Performed by: STUDENT IN AN ORGANIZED HEALTH CARE EDUCATION/TRAINING PROGRAM

## 2020-05-24 PROCEDURE — 71250 CT THORAX DX C-: CPT

## 2020-05-24 PROCEDURE — 36415 COLL VENOUS BLD VENIPUNCTURE: CPT

## 2020-05-24 PROCEDURE — 84484 ASSAY OF TROPONIN QUANT: CPT

## 2020-05-24 PROCEDURE — 77010033678 HC OXYGEN DAILY

## 2020-05-24 PROCEDURE — 77030029065 HC DRSG HEMO QCLOT ZMED -B

## 2020-05-24 PROCEDURE — 85025 COMPLETE CBC W/AUTO DIFF WBC: CPT

## 2020-05-24 PROCEDURE — 51798 US URINE CAPACITY MEASURE: CPT

## 2020-05-24 PROCEDURE — 74011250636 HC RX REV CODE- 250/636: Performed by: FAMILY MEDICINE

## 2020-05-24 PROCEDURE — 87040 BLOOD CULTURE FOR BACTERIA: CPT

## 2020-05-24 PROCEDURE — 71045 X-RAY EXAM CHEST 1 VIEW: CPT

## 2020-05-24 PROCEDURE — 83735 ASSAY OF MAGNESIUM: CPT

## 2020-05-24 RX ORDER — DOBUTAMINE HYDROCHLORIDE 200 MG/100ML
0-10 INJECTION INTRAVENOUS
Status: DISCONTINUED | OUTPATIENT
Start: 2020-05-24 | End: 2020-05-25

## 2020-05-24 RX ORDER — MAGNESIUM SULFATE HEPTAHYDRATE 40 MG/ML
2 INJECTION, SOLUTION INTRAVENOUS ONCE
Status: COMPLETED | OUTPATIENT
Start: 2020-05-24 | End: 2020-05-24

## 2020-05-24 RX ADMIN — Medication 10 ML: at 06:13

## 2020-05-24 RX ADMIN — CEFTRIAXONE 1 G: 1 INJECTION, POWDER, FOR SOLUTION INTRAMUSCULAR; INTRAVENOUS at 08:18

## 2020-05-24 RX ADMIN — PHENYLEPHRINE HYDROCHLORIDE 85 MCG/MIN: 10 INJECTION INTRAVENOUS at 06:19

## 2020-05-24 RX ADMIN — MAGNESIUM SULFATE HEPTAHYDRATE 2 G: 40 INJECTION, SOLUTION INTRAVENOUS at 03:42

## 2020-05-24 RX ADMIN — DOBUTAMINE IN DEXTROSE 2.5 MCG/KG/MIN: 200 INJECTION, SOLUTION INTRAVENOUS at 01:19

## 2020-05-24 RX ADMIN — Medication 10 ML: at 13:13

## 2020-05-24 RX ADMIN — SODIUM CHLORIDE 75 ML/HR: 900 INJECTION, SOLUTION INTRAVENOUS at 03:43

## 2020-05-24 RX ADMIN — PHENYLEPHRINE HYDROCHLORIDE 100 MCG/MIN: 10 INJECTION INTRAVENOUS at 01:22

## 2020-05-24 RX ADMIN — SODIUM CHLORIDE 75 ML/HR: 900 INJECTION, SOLUTION INTRAVENOUS at 16:55

## 2020-05-24 NOTE — PROGRESS NOTES
Central Line Procedure Note    Name:  Amrit Tovar  Age:  78 y.o. MRN:  [id number]  CSN:  [id number]  :  1940    Referring physician: Osito Ziegler,*     Indication: Need for vasopressors    Risks, benefits, alternatives explained and patient agrees to proceed. Patient positioned in Trendelenburg. 7-Step Sterility Protocol followed. (cap, mask sterile gown, sterile gloves, large sterile sheet, hand hygiene, 2% chlorhexidine for cutaneous antisepsis)       Left internal jugular cannulated x 1 attempt(s) using ultrasound guidance. Catheter secured & Biopatch applied. Sterile Tegaderm placed. CXR pending.       Mai Lynne MD

## 2020-05-24 NOTE — PROGRESS NOTES
Spiritual Care Assessment/Progress Note  [address]      NAME: Lori Renee      MRN: [id number]  AGE: 78 y.o.  SEX: female  [place] Affiliation: Episcopal   Language: English     5/24/2020     Total Time (in minutes): 6     Spiritual Assessment begun in [facility] INTERVNTNL CARE through conversation with:         [x]Patient        [] Family    [] Friend(s)        Reason for Consult: Palliative Care, Initial/Spiritual Assessment     Spiritual beliefs: (Please include comment if needed)     [] Identifies with a tad tradition:         [] Supported by a tad community:            [] Claims no spiritual orientation:           [] Seeking spiritual identity:                [] Adheres to an individual form of spirituality:           [x] Not able to assess:                           Identified resources for coping:      [] Prayer                               [] Music                  [] Guided Imagery     [] Family/friends                 [] Pet visits     [] Devotional reading                         [x] Unknown     [] Other:                                            Interventions offered during this visit: (See comments for more details)    Patient Interventions: Affirmation of emotions/emotional suffering, Catharsis/review of pertinent events in supportive environment, Coping skills reviewed/reinforced           Plan of Care:     [] Support spiritual and/or cultural needs    [] Support AMD and/or advance care planning process      [] Support grieving process   [] Coordinate Rites and/or Rituals    [] Coordination with community clergy   [] No spiritual needs identified at this time   [] Detailed Plan of Care below (See Comments)  [] Make referral to Music Therapy  [] Make referral to Pet Therapy     [] Make referral to Addiction services  [] Make referral to [facility]  [] Make referral to Spiritual Care Partner  [] No future visits requested        [x] Follow up visits as needed Comments:  visited Mrs. Ranodlph for a palliative care initial spiritual care assessment in the ICU. Mrs. Randolph was awake, appeared pleasantly confused, and was lying in bed. She made good eye contact with the  and discussed how uncomfortable she was feeling. Mrs. Randolph spoke about additional concerns as well but  had difficulty understanding what she was referring to. Mrs. Randolph thanked the  for stopping by.  will follow up as able and/or needed  Stackdriver. Chucho Richards.      Paging Service: [phone])

## 2020-05-24 NOTE — CONSULTS
[facility]  Pulmonary, Critical Care, and Sleep Medicine  Name: Bailee Bueno MRN: [id number]   : 1940 Hospital: [address] [facility]   Date: 2020        Impression Plan   1. NSTEMI  2. Shock, unclear etiology given concomittant NSTEMI, but exam suggests more likely septic vs hypovolemic  3. Atrial fib, now back in NSR  4. E. coli UTI  5. PRASANTH     · TTE and EKG this morning   · Cardiology following  · Continue Kael and dobutamine for now; MAP goal >60; wean dobutamine pending TTE results  · Continue ceftriaxone empirically  · Send blood cultures, lactate  · Trend troponin, BMP  · Continue heparin gtt  · Pending TTE consider additional volume resuscitation    DVT ppx: heparin gtt       Pt is critically ill. Critical care time spent with pt exclusive of procedures was 40 minutes    Radiology  ( personally reviewed) Reviewed chest CT. ABG No results for input(s): PHI, PO2I, PCO2I in the last 72 hours. Subjective     This patient has been seen and evaluated at the request of Dr. Nemo Dubois for ICU management. Patient is a 78 y.o. female h/o TIA/CVA, HTN, dyslipidemia, dementia who presented to ED after an unwitnessed fall at home found to have L femoral neck fracture. Underwent L hip hemiarthroplasty on . Overnight, she was hypotensive, went into afib on the floor so was transferred to ICU after being given amiodarone and digoxin. When she was here she developed bradycardia so was then given atropine and eventually started on Kael and dobutamine drips. EKG this morning shows diffuse downsloping ST depressions with TWI. Troponin is up to 10. She is on a heparin gtt and will be getting a TTE this morning. Review of Systems:  Review of systems not obtained due to patient factors.     Past Medical History:   Diagnosis Date    Acute CVA (cerebrovascular accident) ([facility] 4/10/2017    Per MRI:  Posterior L frontal/parietal territory    Cerebral atrophy ([address] 4/10/2017    History of CVA (cerebrovascular accident) 4/10/2017    --L frontal acute infarct and LMCA    Hyperlipidemia LDL goal <70 4/10/2017    Hypertension     Status post hip surgery      Left hip bipolar hemiarthroplasty on 5/21/20     TIA (transient ischemic attack)     Unresponsive episode 8/3/2017    transient      Past Surgical History:   Procedure Laterality Date    HX HEENT      Tonsilectomy age 8      Prior to Admission medications    Medication Sig Start Date End Date Taking? Authorizing Provider   atorvastatin (LIPITOR) 40 mg tablet Take 40 mg by mouth nightly. Yes Provider, Historical   clopidogreL (PLAVIX) 75 mg tab Take 75 mg by mouth nightly. Yes Provider, Historical   akretpxb-mvxd-frl6-C-seng-bosw (Osteo Bi-Flex Triple Strength) 750 mg-644 mg- 30 mg-1 mg tab Take 1 Tab by mouth two (2) times a day. Yes Provider, Historical   lisinopriL (PRINIVIL, ZESTRIL) 20 mg tablet Take 20 mg by mouth nightly. Yes Provider, Historical   cholecalciferol (VITAMIN D3) (1000 Units /25 mcg) tablet Take 2,000 Units by mouth daily. Yes Provider, Historical   TURMERIC ROOT EXTRACT PO Take 450 mg by mouth daily. Yes Provider, Historical   memantine (NAMENDA) 10 mg tablet Take 1 Tab by mouth two (2) times a day. 4/15/20  Yes Shabana Mora MD   carvediloL (COREG) 3.125 mg tablet Take 1 Tab by mouth two (2) times daily (with meals).  4/15/20  Yes Shabana Mora MD     Current Facility-Administered Medications   Medication Dose Route Frequency    DOBUTamine (DOBUTREX) 500 mg/250 mL (2,000 mcg/mL) infusion  0-10 mcg/kg/min IntraVENous TITRATE    cefTRIAXone (ROCEPHIN) 1 g in 0.9% sodium chloride (MBP/ADV) 50 mL  1 g IntraVENous Q24H    0.9% sodium chloride infusion  75 mL/hr IntraVENous CONTINUOUS    heparin 25,000 units in D5W 250 ml infusion  12-25 Units/kg/hr IntraVENous TITRATE    amiodarone (CORDARONE) 375 mg in dextrose 5% 250 mL infusion  0.5-1 mg/min IntraVENous TITRATE    aspirin chewable tablet 81 mg  81 mg Oral DAILY    PHENYLephrine (MOSES-SYNEPHRINE) 30 mg in 0.9% sodium chloride 250 mL infusion   mcg/min IntraVENous TITRATE    sodium chloride (NS) flush 5-40 mL  5-40 mL IntraVENous Q8H    calcium-vitamin D (OS-NENA) 500 mg-200 unit tablet  1 Tab Oral TID WITH MEALS    senna-docusate (PERICOLACE) 8.6-50 mg per tablet 1 Tab  1 Tab Oral BID    polyethylene glycol (MIRALAX) packet 17 g  17 g Oral DAILY    sodium chloride (NS) flush 5-40 mL  5-40 mL IntraVENous Q8H    atorvastatin (LIPITOR) tablet 40 mg  40 mg Oral QHS    memantine (NAMENDA) tablet 10 mg  10 mg Oral BID     Allergies   Allergen Reactions    Benadryl Allergy/Cold [Diphenhyd-Pe-Acetaminophen] Hives     Per patient's daughter, she thinks patient has had benadryl since then and tolerated it.  Penicillins Hives     Was treated with Augmentin in 2015, tolerated medication well. Social History     Tobacco Use    Smoking status: Former Smoker     Last attempt to quit: 1999     Years since quittin.8    Smokeless tobacco: Never Used   Substance Use Topics    Alcohol use: Yes     Alcohol/week: 17.5 standard drinks     Types: 21 Glasses of wine per week     Comment: 3 glasses of port per day      Family History   Problem Relation Age of Onset    Dementia Mother 61    Heart Attack Father 79          Laboratory: I have personally reviewed the critical care flowsheet and labs.      Recent Labs     20  0339 20  0116 20  2315   WBC 16.6* PLEASE DISREGARD RESULTS 16.6*   HGB 8.1* PLEASE DISREGARD RESULTS 8.3*   HCT 25.7* PLEASE DISREGARD RESULTS 26.6*    PLEASE DISREGARD RESULTS 197     Recent Labs     20  0339 20  2153 20  1540 20  0413 20  0459     --   --  139 138   K 4.7  --   --  3.4* 3.5   *  --   --  108 108   CO2 20*  --   --  27 28   *  --   --  95 101*   BUN 32*  --   --  22* 19   CREA 1.40*  --   --  0.63 0.72   CA 7.4* --   --  8.1* 8.0*   MG 2.0 1.9 2.0  --   --    PHOS 5.2*  --  1.8*  --   --        Objective:     Mode Rate Tidal Volume Pressure FiO2 PEEP                    Vital Signs:     TMAX(24)      Intake/Output:   Last shift:         Last 3 shifts: No intake/output data recorded. [id number]    Intake/Output Summary (Last 24 hours) at 5/24/2020 7218  Last data filed at 5/24/2020 0600  Gross per 24 hour   Intake 2399.88 ml   Output 160 ml   Net 2239.88 ml     EXAM:     General:  Alert, no distress, confused   Head:  Normocephalic, without obvious abnormality, atraumatic. Eyes:  Conjunctivae/corneas clear. PERRL, EOMs intact. Nose: Nares normal.   Throat: Lips, mucosa, and tongue normal.   Neck: Supple, symmetrical, no JVD. Lungs:   Clear to auscultation bilaterally. Chest wall:  No tenderness or deformity. Heart:  Regular rate and rhythm, S1, S2 normal, no murmur, click, rub or gallop. Abdomen:   Soft, non-tender. Bowel sounds normal.   Extremities: L LE in immobilizer, no edema. Skin: Warm, dry.    Neurologic: Grossly nonfocal.       Deb , MD  [facility]

## 2020-05-24 NOTE — PROGRESS NOTES
1900: Bedside and Verbal shift change report given to JUDITH Black (oncoming nurse) by Homa Kern RN (offgoing nurse). Report included the following information SBAR, Kardex, ED Summary, Procedure Summary, Intake/Output, Recent Results, Cardiac Rhythm Sinus Rhythm and Quality Measures. Primary Nurse Harmony Aaron RN and Homa Kern RN performed a dual skin assessment on this patient Impairment noted- see wound doc flow sheet  Diego score is 14.    2000: Shift Assessment completed, see flowsheet. No s/s of distress noted at this time and patient denies pain. Mental Status: Patient alert and oriented to self and situation at this time. Disoriented to time and place. Patient able to follow simple commands at this time. Respiratory: 2L NC. Clear lung sounds noted. Cardiac: Sinus Rhythm on the monitor. 80s-90s. GI/: Active bowel sounds noted. IV Drips: NS infusing at 75 mL/hr, Dobutamine gtt infusing at 5 mcg/kg/min, and Heparin gtt infusing at 14 units/kg/hr. Patient turned and repositioned at this time. Mouth care completed at this time. 2200: Patient turned and repositioned at this time. 0000: Reassessment completed. No changes from previous assessment, see flowsheet. No s/s of distress noted at this time and patient denies pain. Patient turned and repositioned at this time. 0033: Blood glucose-93 at this time. Labs drawn and sent at this time. 0200: Patient turned and repositioned at this time. 0248: Dobutamine gtt decreased to 4 mcg/kg/min.    0400: Reassessment completed. No changes from previous assessment, see flowsheet. No s/s of distress noted at this time and patient denies pain. 0422: Labs drawn and sent at this time. 4550: Dobutamine gtt decreased to 3 mcg/kg/min.    0600: Patient turned and repositioned at this time.      0700: Bedside and Verbal shift change report given to Homa Kern RN (oncoming nurse) by JUDITH Black (offgoing nurse). Report included the following information SBAR, Kardex, ED Summary, Procedure Summary, Intake/Output, Recent Results, Cardiac Rhythm Sinus Rhythm and Quality Measures.

## 2020-05-24 NOTE — PROGRESS NOTES
Called pt's daughter Josh Burger to provide update on pt's current status. Revisited goals of care discussion with her. Daughter confirms current DRI/DNI status, however, she agrees to current non-invasive treatment. She also agrees to a Cath/other invasive cardiology interventions recommended by Cardiology provided that her mother's prognosis would be good or the benefit out-weighs risks.      Alexandra Fiore MD

## 2020-05-24 NOTE — PROGRESS NOTES
0700- Bedside and Verbal shift change report given to [facility] (oncoming nurse) by Abdoul Alfredo (offgoing nurse). Report included the following information SBAR, Kardex, Intake/Output, MAR, Recent Results, Cardiac Rhythm SR and Quality Measures. Primary Nurse Vipul Castillo and JUDITH Marx performed a dual skin assessment on this patient Impairment noted- see wound doc flow sheet  Diego score is see flow sheet. 0745- Assessment completed, see flow sheet. Pt is alert, confused, withdrawn. Unable to answer any orientation questions, continuously states \"I dont know. \" Pupils sluggish to reactive.  equal, weak. Heart rate regular, SR on monitor. Dobutamine drip running at 5. Kael running at 80. Lungs clear, diminished, on 4 L NC. Bowel sounds active. Purewick in place. L hip incision clean/dry/intact. Bruise present to R shoulder. WIll continue to monitor. Dr. Baldo Connell present at bedside, updated on patient and overnight events. Orders for NPO, accucheck q6h, and EKG. 0800- PCT present at bedside to perform EKG. Reviewed results with  Dr. Baldo Connell. 0830- Echo tech present at bedside for Echocardiogram.     1000- Labs drawn and sent. Jackelin SALINAS present at bedside, updated on patient. 200- Dr. Mike Moralez present at bedside. 1045- Increased heparin drip to 14 units per protocol. Dual sign off with JUDITH Stallings. Will repeat labs in 6 hrs. 0- Dr. Baldo Connell updated on ABX administered prior to any blood cx, orders for just one set of blood cultures. 1100- Blood cultures obtained and sent to lab. 36- Dr. Renetta Valerio present at bedside, updated onp atient, orders to keep dobutamine on and wean kael as able. 1145- Reassessment completed, see flow sheet. 1149- Patient not voided since approx 2 am, bladder scan showed 253 mL urine. Dr. Baldo Connell updated on bladder scan and elevated troponin. Orders to continue to monitor bladder and trend troponins. 1405- Kael Stopped.      1515- Bladder scan 416, Dr. Jorge De Guzman called and updated, orders for one time straight cath. 1600- Reassessment completed, see flow sheet. This RN attempted straight cath, unsuccessful. Will allow patient to rest and re attempt. 1640- Dr. Jorge De Guzman present at bedside, updated on patient status. 1805- Call from Dr. Avani Colorado, can begin to titrate dobutamine slowly. 1830- Pt voided, large amount of urine on bed pads. Incontinence and marycruz care provided. Gown changed, bed pad changed. Post void bladder scan 129. Will continue to monitor. 1900- Bedside and Verbal shift change report given to [facility] (oncoming nurse) by [facility] (offgoing nurse). Report included the following information SBAR, Kardex, Intake/Output, MAR, Recent Results, Cardiac Rhythm SR and Quality Measures.

## 2020-05-24 NOTE — PROGRESS NOTES
[facility] [address]       Resident Progress Note in Brief    S:  Patient seen and examined at bedside. Denies any chest pain, sob, palpitations. She thinks she is at a staircase and is worried about \"falling down the stairs. \"      O:  Visit Vitals  BP (!) 163/92   Pulse (!) 112   Temp 98 °F (36.7 °C)   Resp 22   Ht 4' 11\" (1.499 m)   Wt 117 lb 4.6 oz (53.2 kg)   SpO2 (!) 85%   BMI 23.69 kg/m²     Physical Examination:   General appearance - alert, well appearing, and in no distress  Chest - clear to auscultation, no wheezes, rales or rhonchi, symmetric air entry  Heart - normal rate, regular rhythm, normal S1, S2, no murmurs, rubs, clicks or gallops,   Abdomen - soft, nontender, nondistended, no masses or organomegaly  Neurological - alert, oriented, normal speech, no focal findings  Extremities - peripheral pulses normal, no pedal edema, no clubbing or cyanosis    A/P:   77 y/o female POD3 from repair of L femoral neck fx who is now in the ICU for hypotension 2/2 NSTEMI vs sepsis vs. Hypovolemia. Cardiology and Pulmonology following. Patient is currently on Dobutamine gtt. NSTEMI: Trops peaked at 20.   -Heparin gtt   -Cardiology consult    Hypotension: 2/2 NSTEMI vs. Sepsis vs. Hypovolemia.   -Continue Dobutamine gtt  -MIVF  -f/u bcx    Afib: resolved and now in sinus rhythm.   Jo Ann Connelly DO  Family Medicine Resident

## 2020-05-24 NOTE — PROGRESS NOTES
Darius Johnson MD. [facility]              Patient: Gm Bliss  : 1940      Today's Date: 2020        CARDIOLOGY PROGRESS NOTE  S: Events overnight noted. She looks remarkably well for all the problems overnight (hypotension, afib with RVR and then bradycardia). She denies CP or SOB. O:   Visit Vitals  /56   Pulse 81   Temp 98.1 °F (36.7 °C)   Resp 14   Ht 4' 11\" (1.499 m)   Wt 117 lb 4.6 oz (53.2 kg)   SpO2 98%   BMI 23.69 kg/m²     Patient appears generally well, mood and affect are appropriate and pleasant. A little confused. HEENT:  Hearing intact, non-icteric, normocephalic, atraumatic. Neck Exam: Supple, No JVD.  + left neck bruit. Lung Exam: Clear to auscultation, even breath sounds. Cardiac Exam: Regular rate and rhythm with no murmur. Abdomen: Soft, non-tender, normal bowel sounds. Extremities: Moves all ext well. No lower extremity edema. Psych: Appropriate affect  Neuro - Grossly intact      Review of Symptoms:  Constitutional: Negative for fever   HEENT: Negative for vision changes. Respiratory: Negative for productive cough  Cardiovascular: Negative for syncope    Gastrointestinal: Negative for abdominal pain, melena  Genitourinary: Negative for dysuria  Skin: Negative for rash  Heme: No problems bleeding.   Neuro - no speech changes or focal weaknesses        Intake/Output Summary (Last 24 hours) at 2020 1254  Last data filed at 2020 1200  Gross per 24 hour   Intake 2903.37 ml   Output 160 ml   Net 2743.37 ml         MEDICATIONS:     Current Facility-Administered Medications   Medication Dose Route Frequency Provider Last Rate Last Dose    DOBUTamine (DOBUTREX) 500 mg/250 mL (2,000 mcg/mL) infusion  0-10 mcg/kg/min IntraVENous TITRATE Leak, Annie Gomez MD 8 mL/hr at 20 0723 5 mcg/kg/min at 20 0723    cefTRIAXone (ROCEPHIN) 1 g in 0.9% sodium chloride (MBP/ADV) 50 mL  1 g IntraVENous Q24H Nando Ward  mL/hr at 20 0818 1 g at 05/24/20 0818    0.9% sodium chloride infusion  75 mL/hr IntraVENous CONTINUOUS Lazarus Points, MD 75 mL/hr at 05/24/20 0343 75 mL/hr at 05/24/20 0343    heparin 25,000 units in D5W 250 ml infusion  12-25 Units/kg/hr IntraVENous TITRATE Lazarus Points, MD 7.4 mL/hr at 05/24/20 1045 14 Units/kg/hr at 05/24/20 1045    amiodarone (CORDARONE) 375 mg in dextrose 5% 250 mL infusion  0.5-1 mg/min IntraVENous TITRATE Lazarus Points, MD   Stopped at 05/23/20 2345    aspirin chewable tablet 81 mg  81 mg Oral DAILY Lazarus Points, MD   Stopped at 05/24/20 0900    PHENYLephrine (MOSES-SYNEPHRINE) 30 mg in 0.9% sodium chloride 250 mL infusion   mcg/min IntraVENous TITRATE Guerline George MD 7.5 mL/hr at 05/24/20 1242 15 mcg/min at 05/24/20 1242    sodium chloride (NS) flush 5-40 mL  5-40 mL IntraVENous Q8H Ailyn Espinosa MD   10 mL at 05/24/20 4124    sodium chloride (NS) flush 5-40 mL  5-40 mL IntraVENous PRN Ailyn Espinosa MD        naloxone [facility]) injection 0.4 mg  0.4 mg IntraVENous PRN Ailyn Espinosa MD        calcium-vitamin D (OS-NENA) 500 mg-200 unit tablet  1 Tab Oral TID WITH MEALS Ailyn Espinosa MD   Stopped at 05/24/20 0800    senna-docusate (PERICOLACE) 8.6-50 mg per tablet 1 Tab  1 Tab Oral BID Ailyn Espinosa MD   Stopped at 05/23/20 1800    polyethylene glycol (MIRALAX) packet 17 g  17 g Oral DAILY Ailyn Espinosa MD   Stopped at 05/24/20 0900    bisacodyL (DULCOLAX) suppository 10 mg  10 mg Rectal DAILY PRN Ailyn Espinosa MD        traMADoL Birdie Formosa) tablet 50 mg  50 mg Oral Q6H PRN Ailyn Espinosa MD   50 mg at 05/23/20 1154    oxyCODONE IR (ROXICODONE) tablet 2.5 mg  2.5 mg Oral Q4H PRN Ailyn Espinosa MD        ondansetron (ZOFRAN ODT) tablet 4 mg  4 mg Oral Q4H PRN Ailyn Espinosa MD        sodium chloride (NS) flush 5-40 mL  5-40 mL IntraVENous Q8H Shelbi Lagunas, DO   10 mL at 05/24/20 7053    sodium chloride (NS) flush 5-40 mL  5-40 mL IntraVENous PRN Dellar Oats, DO        atorvastatin (LIPITOR) tablet 40 mg  40 mg Oral QHS Dellar Oats, DO   40 mg at 05/23/20 2132    memantine (NAMENDA) tablet 10 mg  10 mg Oral BID Dellar Oats, DO   Stopped at 05/24/20 0900           LABS / OTHER STUDIES:     Recent Results (from the past 24 hour(s))   CBC WITH AUTOMATED DIFF    Collection Time: 05/23/20  2:09 PM   Result Value Ref Range    WBC 13.0 (H) 3.6 - 11.0 K/uL    RBC 2.66 (L) 3.80 - 5.20 M/uL    HGB 8.5 (L) 11.5 - 16.0 g/dL    HCT 26.6 (L) 35.0 - 47.0 %    .0 (H) 80.0 - 99.0 FL    MCH 32.0 26.0 - 34.0 PG    MCHC 32.0 30.0 - 36.5 g/dL    RDW 14.6 (H) 11.5 - 14.5 %    PLATELET 943 996 - 585 K/uL    MPV 9.4 8.9 - 12.9 FL    NRBC 0.0 0  WBC    ABSOLUTE NRBC 0.00 0.00 - 0.01 K/uL    NEUTROPHILS 80 (H) 32 - 75 %    LYMPHOCYTES 7 (L) 12 - 49 %    MONOCYTES 11 5 - 13 %    EOSINOPHILS 1 0 - 7 %    BASOPHILS 0 0 - 1 %    IMMATURE GRANULOCYTES 1 (H) 0.0 - 0.5 %    ABS. NEUTROPHILS 10.5 (H) 1.8 - 8.0 K/UL    ABS. LYMPHOCYTES 1.0 0.8 - 3.5 K/UL    ABS. MONOCYTES 1.4 (H) 0.0 - 1.0 K/UL    ABS. EOSINOPHILS 0.1 0.0 - 0.4 K/UL    ABS. BASOPHILS 0.0 0.0 - 0.1 K/UL    ABS. IMM.  GRANS. 0.1 (H) 0.00 - 0.04 K/UL    DF AUTOMATED     EKG, 12 LEAD, INITIAL    Collection Time: 05/23/20  2:18 PM   Result Value Ref Range    Ventricular Rate 96 BPM    Atrial Rate 96 BPM    P-R Interval 142 ms    QRS Duration 104 ms    Q-T Interval 390 ms    QTC Calculation (Bezet) 492 ms    Calculated P Axis 66 degrees    Calculated R Axis 57 degrees    Calculated T Axis -122 degrees    Diagnosis       Normal sinus rhythm  Marked ST abnormality, possible inferior subendocardial injury  Abnormal ECG  Confirmed by Syeda Bah MD., Raymond ([id number]) on 5/24/2020 1:11:58 AM     TROPONIN I    Collection Time: 05/23/20  3:40 PM   Result Value Ref Range    Troponin-I, Qt. 1.04 (H) <0.05 ng/mL   CK    Collection Time: 05/23/20  3:40 PM   Result Value Ref Range     (H) 26 - 192 U/L   MAGNESIUM    Collection Time: 05/23/20  3:40 PM   Result Value Ref Range    Magnesium 2.0 1.6 - 2.4 mg/dL   PHOSPHORUS    Collection Time: 05/23/20  3:40 PM   Result Value Ref Range    Phosphorus 1.8 (L) 2.6 - 4.7 MG/DL   PTT    Collection Time: 05/23/20  6:30 PM   Result Value Ref Range    aPTT 29.0 22.1 - 32.0 sec    aPTT, therapeutic range     58.0 - 77.0 SECS   CBC WITH AUTOMATED DIFF    Collection Time: 05/23/20  6:31 PM   Result Value Ref Range    WBC 13.7 (H) 3.6 - 11.0 K/uL    RBC 2.50 (L) 3.80 - 5.20 M/uL    HGB 8.1 (L) 11.5 - 16.0 g/dL    HCT 25.5 (L) 35.0 - 47.0 %    .0 (H) 80.0 - 99.0 FL    MCH 32.4 26.0 - 34.0 PG    MCHC 31.8 30.0 - 36.5 g/dL    RDW 14.8 (H) 11.5 - 14.5 %    PLATELET 898 181 - 533 K/uL    MPV 10.2 8.9 - 12.9 FL    NRBC 0.0 0  WBC    ABSOLUTE NRBC 0.00 0.00 - 0.01 K/uL    NEUTROPHILS 77 (H) 32 - 75 %    LYMPHOCYTES 10 (L) 12 - 49 %    MONOCYTES 11 5 - 13 %    EOSINOPHILS 1 0 - 7 %    BASOPHILS 0 0 - 1 %    IMMATURE GRANULOCYTES 1 (H) 0.0 - 0.5 %    ABS. NEUTROPHILS 10.6 (H) 1.8 - 8.0 K/UL    ABS. LYMPHOCYTES 1.3 0.8 - 3.5 K/UL    ABS. MONOCYTES 1.6 (H) 0.0 - 1.0 K/UL    ABS. EOSINOPHILS 0.1 0.0 - 0.4 K/UL    ABS. BASOPHILS 0.0 0.0 - 0.1 K/UL    ABS. IMM.  GRANS. 0.1 (H) 0.00 - 0.04 K/UL    DF AUTOMATED     TROPONIN I    Collection Time: 05/23/20  9:53 PM   Result Value Ref Range    Troponin-I, Qt. 3.07 (H) <0.05 ng/mL   MAGNESIUM    Collection Time: 05/23/20  9:53 PM   Result Value Ref Range    Magnesium 1.9 1.6 - 2.4 mg/dL   EKG, 12 LEAD, INITIAL    Collection Time: 05/23/20 11:01 PM   Result Value Ref Range    Ventricular Rate 60 BPM    Atrial Rate 56 BPM    QRS Duration 102 ms    Q-T Interval 490 ms    QTC Calculation (Bezet) 490 ms    Calculated R Axis 32 degrees    Calculated T Axis -153 degrees    Diagnosis          CBC WITH AUTOMATED DIFF    Collection Time: 05/23/20 11:15 PM   Result Value Ref Range    WBC 16.6 (H) 3.6 - 11.0 K/uL    RBC 2.63 (L) 3.80 - 5.20 M/uL    HGB 8.3 (L) 11.5 - 16.0 g/dL    HCT 26.6 (L) 35.0 - 47.0 %    .1 (H) 80.0 - 99.0 FL    MCH 31.6 26.0 - 34.0 PG    MCHC 31.2 30.0 - 36.5 g/dL    RDW 14.6 (H) 11.5 - 14.5 %    PLATELET 629 693 - 911 K/uL    MPV 10.3 8.9 - 12.9 FL    NRBC 0.0 0  WBC    ABSOLUTE NRBC 0.00 0.00 - 0.01 K/uL    NEUTROPHILS 81 (H) 32 - 75 %    LYMPHOCYTES 7 (L) 12 - 49 %    MONOCYTES 9 5 - 13 %    EOSINOPHILS 0 0 - 7 %    BASOPHILS 0 0 - 1 %    IMMATURE GRANULOCYTES 3 (H) 0.0 - 0.5 %    ABS. NEUTROPHILS 13.4 (H) 1.8 - 8.0 K/UL    ABS. LYMPHOCYTES 1.2 0.8 - 3.5 K/UL    ABS. MONOCYTES 1.5 (H) 0.0 - 1.0 K/UL    ABS. EOSINOPHILS 0.0 0.0 - 0.4 K/UL    ABS. BASOPHILS 0.0 0.0 - 0.1 K/UL    ABS. IMM. GRANS. 0.5 (H) 0.00 - 0.04 K/UL    DF SMEAR SCANNED      RBC COMMENTS NORMOCYTIC, NORMOCHROMIC     CBC WITH AUTOMATED DIFF    Collection Time: 05/24/20  1:16 AM   Result Value Ref Range    WBC PLEASE DISREGARD RESULTS 3.6 - 11.0 K/uL    RBC PLEASE DISREGARD RESULTS 3.80 - 5.20 M/uL    HGB PLEASE DISREGARD RESULTS 11.5 - 16.0 g/dL    HCT PLEASE DISREGARD RESULTS 35.0 - 47.0 %    MCV Cannot be calculated 80.0 - 99.0 FL    MCH Cannot be calculated 26.0 - 34.0 PG    MCHC Cannot be calculated 30.0 - 36.5 g/dL    RDW PLEASE DISREGARD RESULTS 11.5 - 14.5 %    PLATELET PLEASE DISREGARD RESULTS 150 - 400 K/uL    MPV PLEASE DISREGARD RESULTS 8.9 - 12.9 FL    NRBC PLEASE DISREGARD RESULTS 0  WBC    ABSOLUTE NRBC PLEASE DISREGARD RESULTS 0.00 - 0.01 K/uL    NEUTROPHILS PLEASE DISREGARD RESULTS 32 - 75 %    LYMPHOCYTES PLEASE DISREGARD RESULTS 12 - 49 %    MONOCYTES PLEASE DISREGARD RESULTS 5 - 13 %    EOSINOPHILS PLEASE DISREGARD RESULTS 0 - 7 %    BASOPHILS PLEASE DISREGARD RESULTS 0 - 1 %    IMMATURE GRANULOCYTES PLEASE DISREGARD RESULTS 0.0 - 0.5 %    ABS. NEUTROPHILS PLEASE DISREGARD RESULTS 1.8 - 8.0 K/UL    ABS. LYMPHOCYTES PLEASE DISREGARD RESULTS 0.8 - 3.5 K/UL    ABS. MONOCYTES PLEASE DISREGARD RESULTS 0.0 - 1.0 K/UL    ABS. EOSINOPHILS PLEASE DISREGARD RESULTS 0.0 - 0.4 K/UL    ABS. BASOPHILS PLEASE DISREGARD RESULTS 0.0 - 0.1 K/UL    ABS. IMM. GRANS. PLEASE DISREGARD RESULTS 0.00 - 0.04 K/UL    DF PLEASE DISREGARD RESULTS     TROPONIN I    Collection Time: 05/24/20  3:39 AM   Result Value Ref Range    Troponin-I, Qt. 10.00 (H) <0.05 ng/mL   CBC WITH AUTOMATED DIFF    Collection Time: 05/24/20  3:39 AM   Result Value Ref Range    WBC 16.6 (H) 3.6 - 11.0 K/uL    RBC 2.54 (L) 3.80 - 5.20 M/uL    HGB 8.1 (L) 11.5 - 16.0 g/dL    HCT 25.7 (L) 35.0 - 47.0 %    .2 (H) 80.0 - 99.0 FL    MCH 31.9 26.0 - 34.0 PG    MCHC 31.5 30.0 - 36.5 g/dL    RDW 14.7 (H) 11.5 - 14.5 %    PLATELET 046 715 - 139 K/uL    MPV 10.1 8.9 - 12.9 FL    NRBC 0.0 0  WBC    ABSOLUTE NRBC 0.00 0.00 - 0.01 K/uL    NEUTROPHILS 82 (H) 32 - 75 %    LYMPHOCYTES 4 (L) 12 - 49 %    MONOCYTES 5 5 - 13 %    EOSINOPHILS 0 0 - 7 %    BASOPHILS 0 0 - 1 %    IMMATURE GRANULOCYTES 9 (H) 0.0 - 0.5 %    ABS. NEUTROPHILS 13.6 (H) 1.8 - 8.0 K/UL    ABS. LYMPHOCYTES 0.7 (L) 0.8 - 3.5 K/UL    ABS. MONOCYTES 0.8 0.0 - 1.0 K/UL    ABS. EOSINOPHILS 0.0 0.0 - 0.4 K/UL    ABS. BASOPHILS 0.0 0.0 - 0.1 K/UL    ABS. IMM. GRANS.  1.5 (H) 0.00 - 0.04 K/UL    DF SMEAR SCANNED      RBC COMMENTS ANISOCYTOSIS  1+       METABOLIC PANEL, BASIC    Collection Time: 05/24/20  3:39 AM   Result Value Ref Range    Sodium 138 136 - 145 mmol/L    Potassium 4.7 3.5 - 5.1 mmol/L    Chloride 110 (H) 97 - 108 mmol/L    CO2 20 (L) 21 - 32 mmol/L    Anion gap 8 5 - 15 mmol/L    Glucose 122 (H) 65 - 100 mg/dL    BUN 32 (H) 6 - 20 MG/DL    Creatinine 1.40 (H) 0.55 - 1.02 MG/DL    BUN/Creatinine ratio 23 (H) 12 - 20      GFR est AA 44 (L) >60 ml/min/1.73m2    GFR est non-AA 36 (L) >60 ml/min/1.73m2    Calcium 7.4 (L) 8.5 - 10.1 MG/DL   PTT    Collection Time: 05/24/20  3:39 AM   Result Value Ref Range    aPTT 52.1 (H) 22.1 - 32.0 sec    aPTT, therapeutic range     58.0 - 77.0 SECS   MAGNESIUM    Collection Time: 05/24/20  3:39 AM   Result Value Ref Range    Magnesium 2.0 1.6 - 2.4 mg/dL   PHOSPHORUS    Collection Time: 05/24/20  3:39 AM   Result Value Ref Range    Phosphorus 5.2 (H) 2.6 - 4.7 MG/DL   ECHO ADULT COMPLETE    Collection Time: 05/24/20  9:09 AM   Result Value Ref Range    LA Volume 58.5 22 - 52 mL    LV E' Lateral Velocity 6.97 centimeter/second    Tapse 1.47 (A) 1.5 - 2.0 cm    Ao Root D 2.91 cm    Aortic Valve Systolic Peak Velocity 139.00 cm/s    Aortic Valve Area by Continuity of Peak Velocity 2.6 cm2    AoV PG 7.4 mmHg    LVIDd 4.53 3.9 - 5.3 cm    LVPWd 0.68 0.6 - 0.9 cm    LVIDs 3.33 cm    IVSd 1.13 (A) 0.6 - 0.9 cm    LVOT d 2.29 cm    LVOT Peak Velocity 87.03 cm/s    LVOT Peak Gradient 3.0 mmHg    MVA (PHT) 4.5 cm2    MV A [place] 89.82 cm/s    MV E [place] 43.26 cm/s    MV E/A 0.48     Left Atrium to Aortic Root Ratio 1.46     RVIDd 3.59 cm    LA Vol 4C 59.24 (A) 22 - 52 mL    LA Vol 2C 56.21 (A) 22 - 52 mL    LA Area 4C 21.2 cm2    LV Mass .0 67 - 162 g    LV Mass AL Index 104.8 43 - 95 g/m2    RVSP 34.9 mmHg    Est. RA Pressure 3.0 mmHg    Mitral Valve E Wave Deceleration Time 167.5 ms    Mitral Valve Pressure Half-time 48.6 ms    Left Atrium Major Axis 4.24 cm    Triscuspid Valve Regurgitation Peak Gradient 31.9 mmHg    Pulmonic Valve Max Velocity 71.92 cm/s    TR Max Velocity 282.38 cm/s    LA Vol Index 39.80 16 - 28 ml/m2    PASP 34.9 mmHg    LA Vol Index 38.24 16 - 28 ml/m2    LA Vol Index 40.31 16 - 28 ml/m2    GERSON/BSA Pk [place] 1.8 cm2/m2    Left Ventricular Fractional Shortening by 2D 51.232913789 %    Mitral Valve Deceleration [place] 4.2856548945769     AV Velocity Ratio 0.64     Left Ventricular End Diastolic Volume by Teichholz Method 7.39483221658 mL    Left Ventricular End Systolic Volume by Teichholz Method 5.72731457835 mL    Left Ventricular Stroke Volume by Teichholz Method 10.446544078 mL    PV peak gradient 2.1 mmHg   GLUCOSE, POC    Collection Time: 05/24/20  9:16 AM   Result Value Ref Range    Glucose (POC) 128 (H) 65 - 100 mg/dL    Performed by Janie Mckeon    PTT    Collection Time: 05/24/20  9:56 AM   Result Value Ref Range    aPTT 56.6 (H) 22.1 - 32.0 sec    aPTT, therapeutic range     58.0 - 77.0 SECS   TROPONIN I    Collection Time: 05/24/20  9:56 AM   Result Value Ref Range    Troponin-I, Qt. 20.00 (H) <0.05 ng/mL   LACTIC ACID    Collection Time: 05/24/20  9:56 AM   Result Value Ref Range    Lactic acid 1.7 0.4 - 2.0 MMOL/L          CARDIAC DIAGNOSTICS:      Cardiac Evaluation Includes:     Echo 4/10/17 - LVEF 40%  Echo 8/4/17 - LVEF 50 %. There was possible hypokinesis of the basal inferoseptal wall(s). Echo 5/24/20 - Done on dobutamine 5 mcg;  LVEF 50-55%, no obvious WMA, mild-mod MR, mod LAE, RV OK,. PASP 30 mmHg         EKG 5/21/20 - NSR, LVH with repol abn   EKG 5/23/20 - Normal sinus rhythm;  Marked ST depression.       Tele 5/24/20 - sinus         ASSESSMENT AND PLAN:      Assessment and Plan:     1) Shock ; Possible NSTEMI vs sepsis / hypovolemia   - She was Hypotensive on 5/23/20 around 1 PM (surgery 5/21/20) with ST depression on EKG,  She denied any CP or SOB. BP remained low despite > 1 L NS.   EKG then with ischemic ST depression in multiple leads (more ST depression than EKG a couple of days prior). She then went into Afib with RVR. Amio and DIg started. Bernardo started for low BP. Later she had bradycardia and Amio stopped and Dobutamine started. - On 5/24/20 she is looking fairly well and BP is better (on dobutamine and bernardo). Will try to wean down bernardo. - Her Troponin is up to 20. I'm not sure if this was a Type 1 or Type 2 MI. Will continue heparin another 24 hrs and then stop if she is stable   - Echo shows just mildly depressed LVEF (50-55%). Will continue dobutamine for now since she responded nicely to that. - Critical care doc notes that exam does not fit with cardiogenic shock and problems could be due to sepsis and he is treating that as well. 2) History of cardiomyopathy   - LVEF was 40% in APril 2017 and then 50% on 8/17  - Not sure what CAD evaluation she has had over the years    - Her outpatient Coreg and lisinopril are held now due to hypotension   - hypotension as above      3) Left hip bipolar hemiarthroplasty on 5/21/20      4) Transient Afib  - Had Afib a few hours on 5/23/20 in setting of severe hypotension - she went back into sinus on her own   - will follow      Jaymie Quiroz MD, [address] [place] [address], [address]      [address] [place] [address] Drive.  [address] [place] [address], [facility] [place]  Ph: [phone]                               Ph [phone]

## 2020-05-24 NOTE — PROGRESS NOTES
Physical Therapy Note:    PT treatment deferred. Pt transferred for increased level of care due to shock (NSTEMI vs. Sepsis/hypovolemia per cardiology). Pt with continued up-trending troponin and requiring pressor support. Pt not appropriate for PT interventions at this time. Discussed with RN. Will continue to follow and resume PT treatments when pt stable/appropriate.     Salma Moeller, PT, DPT, [facility] Adam

## 2020-05-24 NOTE — PROGRESS NOTES
PT/OT recommend SNF placement. LAURA spoke with MD who talked with patients daughter today. She is interested in placement at [address] [place] [address],[address] in [place]. LAURA also spoke with patients daughter and confirmed that she would like a referral sent to that facility. LAURA sent referral via [employer], awaiting response. Patient has [employer] and will need authorization prior to transfer.

## 2020-05-24 NOTE — PROGRESS NOTES
1900: Bedside and Verbal shift change report given to JUDITH Black (oncoming nurse) by Miroslava Ordoñez RN (offgoing nurse). Report included the following information SBAR, Kardex, ED Summary, Procedure Summary, Intake/Output, Recent Results, Cardiac Rhythm A Fib and Quality Measures. Primary Nurse Joe Engel RN and Miroslava Ordoñez RN performed a dual skin assessment on this patient Impairment noted- see wound doc flow sheet  Diego score is 12.    1921: Kael-synephrine gtt increased to 45 mcg/min. 1945: Medications administered at this time. 2000: Shift Assessment completed, see flowsheet. No s/s of distress noted at this time, patient denies pain. Mental Status: Patient alert and oriented to person and situation only. Disoriented to place and time. Respiratory: 2L NC. Clear lung sounds noted at this time. Cardiac: Sinus Rhythm on the monitor. 60s-70s. GI/: Active bowel sounds noted at this time. IV Drips: Heparin gtt 12 units/kg/hr, Kael-synephrine gtt 50 mcg/min, and NS at 75 mL/hr. Patient turned and repositioned at this time. 2132:  Medications administered at this time. 2142: Kael-synephrine gtt increased to 55 mcg/min. 2153: Labs drawn and sent at this time. 2200: Patient turned and repositioned at this time. Kael-synephrine gtt increased to 60 mcg/min. 2218: Kael-synephrine gtt increased to 65 mcg/min. 2245: Kael-synephrine gtt increased to 80 mcg/min.    2300: Paged Cardiology. Spoke with Dr. Shade Friedman in regards to patient's BP not achieving goal and increasing pressor need. New orders received. RB/V.    2315: Labs drawn and sent at this time. 2318: Kael-synephrine gtt increased to 95 mcg/min. 2340: RN at bedside. Patient bradycardic and dropping to the 20s. Amiodarone gtt stopped at this time. 1mg of atropine pushed at this time. Family practice at bedside and notified at this time.  Patient no longer answering questions but able to follow simple commands. O2 increased at this time. EKG completed at this time. 2345: Kael-synephrine gtt increased to 100 mcg/min at this time. 0000: Reassessment completed. Changes noted, see flow sheet. Eyes opening to voice at this time. Dr. Melisa Dupont at bedside to place central line at this time. 0015: Central line placed at this time without difficulty. CXR ordered. 0119: Dobutamine gtt started at this time at 2.5 mcg/kg/min. 0127: RN at bedside. Patient's pupils fixed and dilated at this time. Right shoulder bruising noted at this time. Family practice called to bedside at this time. 0202: Dobutamine gtt increased to 5 mcg/kg/min. 0228: Patient taken to CT at this time. 0249: Code status changed to DNR at this time. Patient returned to room from CT at this time. Transport uneventful. 6574: Labs drawn and sent at this time. 5653: Medications administered at this time. 0400: Reassessment completed. Changes noted, see flow sheet. Patient not interactive at this time. Pupils are reactive and sluggish. 0455: Heparin gtt increased to 13 units/kg/hr. Kael-synephrine gtt 95 mcg/min.    0500: Patient able to answer questions at this time. 2455: Kael-synephrine gtt decreased to 90 mcg/min at this time. 9538: Kael-synephrine gtt decreased to 85 mcg/min at this time. 0700: Bedside and Verbal shift change report given to JUDITH Parish (oncoming nurse) by Terrell Dempsey RN (offgoing nurse). Report included the following information SBAR, Kardex, ED Summary, Procedure Summary, Intake/Output, Recent Results, Cardiac Rhythm Sinus Rhythm-A Fib and Quality Measures.

## 2020-05-24 NOTE — PROGRESS NOTES
Orthopaedic Progress Note  Post Op day: 3 Days Post-Op    May 24, 2020 11:05 AM     Patient: Lesia Sosa MRN: [id number]  SSN: [id number]    YOB: 1940  Age: 78 y.o. Sex: female      Admit date:  5/20/2020  Date of Surgery:  5/21/2020   Procedures:  Procedure(s):  LEFT HIP HEMIARTHROPLASTY  Admitting Physician:  Nigel Espinoza MD   Surgeon:  Jonny [place]) and Role:     Tony Abernathy MD - Primary    Consulting Physician(s): Treatment Team: Attending Provider: Marlen Carey MD; Consulting Provider: Charles Belcher MD; Consulting Provider: Neel Pastrana MD; Utilization Review: Karyle Clever, RN; Care Manager: Levon Love Consulting Provider: Emmanuelle Bruce MD; Consulting Provider: Ortiz Deal MD    SUBJECTIVE:     Lesia Sosa is a 78 y.o. female is 3 Days Post-Op s/p Procedure(s):  LEFT HIP HEMIARTHROPLASTY. Events overnight noted. CT head and chest ordered. Intensivist is following and we appreciate input and management of patient. Echo results are pending. Patient is still on heparin gtt and bernardo this morning. Far more confused today. OBJECTIVE:       Physical Exam:  General: Alert. In NAD. Does not respond appropriately to time, day, or place. Respiratory: Respirations unlabored  Neurological:  Neurovascular exam within normal limits. Motor: + DF/PF. Musculoskeletal: Calves soft, supple, non-tender upon palpation. Dressing/Wound:  Clean, dry and intact. No significant erythema or swelling.       Vital Signs:        Patient Vitals for the past 8 hrs:   BP Temp Pulse Resp SpO2 Height Weight   05/24/20 0945 (!) 134/101  94 22 100 %     05/24/20 0930 142/64  74 17 100 %     05/24/20 0915 (!) 109/91  75 17 100 %     05/24/20 0900 147/49  72 14 100 %     05/24/20 0845 148/64  73 16 100 %     05/24/20 0838 141/77     4' 11\" (1.499 m) 53.2 kg (117 lb 4.6 oz)   05/24/20 0830 141/77  81 20 100 %   05/24/20 0815 139/72  74 14 100 %     05/24/20 0800 148/71  76 18 100 %     05/24/20 0745 135/62 97.5 °F (36.4 °C) 78 13 100 %     05/24/20 0730 (!) 149/134  81 14 100 %     05/24/20 0715 147/65  86 16 100 %     05/24/20 0700 131/74  83 19 100 %     05/24/20 0645 151/76  83 20 100 %     05/24/20 0630 123/90  82 13 100 %     05/24/20 0619 151/75  82       05/24/20 0615 151/75  86 21 100 %     05/24/20 0613 147/80  94       05/24/20 0601 147/80  89 20 100 %     05/24/20 0600   92 13 100 %     05/24/20 0545 (!) 133/96  100 16 98 %     05/24/20 0530 149/64  84 16 100 %     05/24/20 0515 131/69  83 14 100 %     05/24/20 0501 148/65  84 17 100 %     05/24/20 0500   85 17 100 %     05/24/20 0455 134/67  84       05/24/20 0445 134/67  83 14 100 %     05/24/20 0430 129/57  86 16 100 %     05/24/20 0415 114/53  86 17 94 %     05/24/20 0400 112/67 97.5 °F (36.4 °C) 87 15 100 %     05/24/20 0345 114/87  90 14 97 %     20 0331 104/49  88 19 94 %     05//20 0330   87 18 (!) 66 %     05/24/20 0315 90/49  83 20 (!) 86 %                                            Temp (24hrs), Av.8 °F (36.6 °C), Min:97.4 °F (36.3 °C), Max:98.3 °F (36.8 °C)      Labs:        Recent Labs     20  0339   HCT 25.7*   HGB 8.1*     Lab Results   Component Value Date/Time    Sodium 138 2020 03:39 AM    Potassium 4.7 2020 03:39 AM    Chloride 110 (H) 2020 03:39 AM    CO2 20 (L) 2020 03:39 AM    Glucose 122 (H) 2020 03:39 AM    BUN 32 (H) 2020 03:39 AM    Creatinine 1.40 (H) 2020 03:39 AM    Calcium 7.4 (L) 2020 03:39 AM       PT/OT:                Patient mobility  Bed Mobility Training  Rolling: Maximum assistance  Supine to Sit: Maximum assistance, Assist x2, Additional time  Sit to Supine:  Total assistance, Assist x2, Additional time  Scooting: Maximum assistance, Assist x2  Transfer Training  Sit to Stand: (not attempted this session)  Stand to Sit: Moderate assistance, Assist x2  Bed to Chair: Maximum assistance, Assist x2                   ASSESSMENT / PLAN:   Active Problems:    Femoral neck fracture ([address] (5/20/2020)                    Medical Concerns:  NSTEMI on heparin gtt. Pending results of echo. Hypotension- multifactorial and now on bernardo gtt. Leukocytosis worsened today with left shift. Pending BC and lactate. E.coli UTI on levaquin. Pain Control: Avoid narcotics due to confusion. Tylenol 650 q6 if needed. DVT Prophylaxis: Heparin gtt   Hemodynamics: hgb 8.1 will monitor. No obvious hematoma of hip. Activity: WBAT when able to ambulate. Disposition: SNF.      Signed By:  Deon Patel, [address]

## 2020-05-25 LAB
ANION GAP SERPL CALC-SCNC: 8 MMOL/L (ref 5–15)
APTT PPP: 71.4 SEC (ref 22.1–32)
ATRIAL RATE: 56 BPM
BASOPHILS # BLD: 0 K/UL (ref 0–0.1)
BASOPHILS NFR BLD: 0 % (ref 0–1)
BUN SERPL-MCNC: 42 MG/DL (ref 6–20)
BUN/CREAT SERPL: 31 (ref 12–20)
CALCIUM SERPL-MCNC: 7.7 MG/DL (ref 8.5–10.1)
CALCULATED R AXIS, ECG10: 32 DEGREES
CALCULATED T AXIS, ECG11: -153 DEGREES
CHLORIDE SERPL-SCNC: 113 MMOL/L (ref 97–108)
CO2 SERPL-SCNC: 21 MMOL/L (ref 21–32)
CREAT SERPL-MCNC: 1.37 MG/DL (ref 0.55–1.02)
DIAGNOSIS, 93000: NORMAL
DIFFERENTIAL METHOD BLD: ABNORMAL
EOSINOPHIL # BLD: 0 K/UL (ref 0–0.4)
EOSINOPHIL NFR BLD: 0 % (ref 0–7)
ERYTHROCYTE [DISTWIDTH] IN BLOOD BY AUTOMATED COUNT: 14.6 % (ref 11.5–14.5)
GLUCOSE BLD STRIP.AUTO-MCNC: 86 MG/DL (ref 65–100)
GLUCOSE BLD STRIP.AUTO-MCNC: 93 MG/DL (ref 65–100)
GLUCOSE SERPL-MCNC: 81 MG/DL (ref 65–100)
HAPTOGLOB SERPL-MCNC: 213 MG/DL (ref 30–200)
HCT VFR BLD AUTO: 20.8 % (ref 35–47)
HCT VFR BLD AUTO: 22.4 % (ref 35–47)
HCT VFR BLD AUTO: 25.4 % (ref 35–47)
HEMOCCULT STL QL: NEGATIVE
HGB BLD-MCNC: 6.9 G/DL (ref 11.5–16)
HGB BLD-MCNC: 7.2 G/DL (ref 11.5–16)
HGB BLD-MCNC: 8.6 G/DL (ref 11.5–16)
IMM GRANULOCYTES # BLD AUTO: 0 K/UL
IMM GRANULOCYTES NFR BLD AUTO: 0 %
LYMPHOCYTES # BLD: 0.9 K/UL (ref 0.8–3.5)
LYMPHOCYTES NFR BLD: 6 % (ref 12–49)
MCH RBC QN AUTO: 31.9 PG (ref 26–34)
MCHC RBC AUTO-ENTMCNC: 32.1 G/DL (ref 30–36.5)
MCV RBC AUTO: 99.1 FL (ref 80–99)
MONOCYTES # BLD: 0.8 K/UL (ref 0–1)
MONOCYTES NFR BLD: 5 % (ref 5–13)
NEUTS SEG # BLD: 13.8 K/UL (ref 1.8–8)
NEUTS SEG NFR BLD: 89 % (ref 32–75)
NRBC # BLD: 0.09 K/UL (ref 0–0.01)
NRBC BLD-RTO: 0.6 PER 100 WBC
PLATELET # BLD AUTO: 185 K/UL (ref 150–400)
PMV BLD AUTO: 9.5 FL (ref 8.9–12.9)
POTASSIUM SERPL-SCNC: 4 MMOL/L (ref 3.5–5.1)
Q-T INTERVAL, ECG07: 490 MS
QRS DURATION, ECG06: 102 MS
QTC CALCULATION (BEZET), ECG08: 490 MS
RBC # BLD AUTO: 2.26 M/UL (ref 3.8–5.2)
RBC MORPH BLD: ABNORMAL
RBC MORPH BLD: ABNORMAL
RETICS # AUTO: 0.07 M/UL (ref 0.02–0.08)
RETICS/RBC NFR AUTO: 3.1 % (ref 0.7–2.1)
SARS-COV-2, COV2: NOT DETECTED
SERVICE CMNT-IMP: NORMAL
SERVICE CMNT-IMP: NORMAL
SODIUM SERPL-SCNC: 142 MMOL/L (ref 136–145)
SOURCE, COVRS: NORMAL
SPECIMEN SOURCE, FCOV2M: NORMAL
THERAPEUTIC RANGE,PTTT: ABNORMAL SECS (ref 58–77)
VENTRICULAR RATE, ECG03: 60 BPM
WBC # BLD AUTO: 15.5 K/UL (ref 3.6–11)

## 2020-05-25 PROCEDURE — 85018 HEMOGLOBIN: CPT

## 2020-05-25 PROCEDURE — 85045 AUTOMATED RETICULOCYTE COUNT: CPT

## 2020-05-25 PROCEDURE — 74011000258 HC RX REV CODE- 258: Performed by: STUDENT IN AN ORGANIZED HEALTH CARE EDUCATION/TRAINING PROGRAM

## 2020-05-25 PROCEDURE — 80048 BASIC METABOLIC PNL TOTAL CA: CPT

## 2020-05-25 PROCEDURE — 92610 EVALUATE SWALLOWING FUNCTION: CPT | Performed by: SPEECH-LANGUAGE PATHOLOGIST

## 2020-05-25 PROCEDURE — 74011250636 HC RX REV CODE- 250/636: Performed by: INTERNAL MEDICINE

## 2020-05-25 PROCEDURE — 85025 COMPLETE CBC W/AUTO DIFF WBC: CPT

## 2020-05-25 PROCEDURE — 36430 TRANSFUSION BLD/BLD COMPNT: CPT

## 2020-05-25 PROCEDURE — 74011250636 HC RX REV CODE- 250/636: Performed by: SPECIALIST

## 2020-05-25 PROCEDURE — 65660000000 HC RM CCU STEPDOWN

## 2020-05-25 PROCEDURE — 30233N1 TRANSFUSION OF NONAUTOLOGOUS RED BLOOD CELLS INTO PERIPHERAL VEIN, PERCUTANEOUS APPROACH: ICD-10-PCS | Performed by: FAMILY MEDICINE

## 2020-05-25 PROCEDURE — 74011250637 HC RX REV CODE- 250/637: Performed by: ORTHOPAEDIC SURGERY

## 2020-05-25 PROCEDURE — P9016 RBC LEUKOCYTES REDUCED: HCPCS

## 2020-05-25 PROCEDURE — 74011250637 HC RX REV CODE- 250/637: Performed by: SPECIALIST

## 2020-05-25 PROCEDURE — 82272 OCCULT BLD FECES 1-3 TESTS: CPT

## 2020-05-25 PROCEDURE — 86923 COMPATIBILITY TEST ELECTRIC: CPT

## 2020-05-25 PROCEDURE — 36415 COLL VENOUS BLD VENIPUNCTURE: CPT

## 2020-05-25 PROCEDURE — 83010 ASSAY OF HAPTOGLOBIN QUANT: CPT

## 2020-05-25 PROCEDURE — 74011250636 HC RX REV CODE- 250/636: Performed by: STUDENT IN AN ORGANIZED HEALTH CARE EDUCATION/TRAINING PROGRAM

## 2020-05-25 PROCEDURE — 86900 BLOOD TYPING SEROLOGIC ABO: CPT

## 2020-05-25 PROCEDURE — 77030038269 HC DRN EXT URIN PURWCK BARD -A

## 2020-05-25 PROCEDURE — 85730 THROMBOPLASTIN TIME PARTIAL: CPT

## 2020-05-25 PROCEDURE — 82962 GLUCOSE BLOOD TEST: CPT

## 2020-05-25 PROCEDURE — 74011250637 HC RX REV CODE- 250/637: Performed by: STUDENT IN AN ORGANIZED HEALTH CARE EDUCATION/TRAINING PROGRAM

## 2020-05-25 RX ORDER — HEPARIN SODIUM 5000 [USP'U]/ML
5000 INJECTION, SOLUTION INTRAVENOUS; SUBCUTANEOUS EVERY 8 HOURS
Status: DISCONTINUED | OUTPATIENT
Start: 2020-05-25 | End: 2020-05-26

## 2020-05-25 RX ORDER — SODIUM CHLORIDE 9 MG/ML
250 INJECTION, SOLUTION INTRAVENOUS AS NEEDED
Status: DISCONTINUED | OUTPATIENT
Start: 2020-05-25 | End: 2020-05-27 | Stop reason: HOSPADM

## 2020-05-25 RX ORDER — DEXTROSE, SODIUM CHLORIDE, AND POTASSIUM CHLORIDE 5; .45; .15 G/100ML; G/100ML; G/100ML
75 INJECTION INTRAVENOUS CONTINUOUS
Status: DISCONTINUED | OUTPATIENT
Start: 2020-05-25 | End: 2020-05-25

## 2020-05-25 RX ORDER — CARVEDILOL 6.25 MG/1
6.25 TABLET ORAL 2 TIMES DAILY WITH MEALS
Status: DISCONTINUED | OUTPATIENT
Start: 2020-05-25 | End: 2020-05-26

## 2020-05-25 RX ADMIN — Medication 10 ML: at 05:39

## 2020-05-25 RX ADMIN — Medication 10 ML: at 14:02

## 2020-05-25 RX ADMIN — SODIUM CHLORIDE 75 ML/HR: 900 INJECTION, SOLUTION INTRAVENOUS at 05:39

## 2020-05-25 RX ADMIN — POLYETHYLENE GLYCOL 3350 17 G: 17 POWDER, FOR SOLUTION ORAL at 13:00

## 2020-05-25 RX ADMIN — HEPARIN SODIUM 14 UNITS/KG/HR: 10000 INJECTION, SOLUTION INTRAVENOUS at 00:37

## 2020-05-25 RX ADMIN — MEMANTINE 10 MG: 10 TABLET ORAL at 18:47

## 2020-05-25 RX ADMIN — SENNOSIDES AND DOCUSATE SODIUM 1 TABLET: 8.6; 5 TABLET ORAL at 15:36

## 2020-05-25 RX ADMIN — Medication 10 ML: at 21:41

## 2020-05-25 RX ADMIN — ATORVASTATIN CALCIUM 40 MG: 20 TABLET, FILM COATED ORAL at 21:41

## 2020-05-25 RX ADMIN — SENNOSIDES AND DOCUSATE SODIUM 1 TABLET: 8.6; 5 TABLET ORAL at 21:41

## 2020-05-25 RX ADMIN — Medication 10 ML: at 00:20

## 2020-05-25 RX ADMIN — DOBUTAMINE IN DEXTROSE 4 MCG/KG/MIN: 200 INJECTION, SOLUTION INTRAVENOUS at 02:48

## 2020-05-25 RX ADMIN — CEFTRIAXONE 1 G: 1 INJECTION, POWDER, FOR SOLUTION INTRAMUSCULAR; INTRAVENOUS at 08:35

## 2020-05-25 RX ADMIN — TRAMADOL HYDROCHLORIDE 50 MG: 50 TABLET, FILM COATED ORAL at 12:12

## 2020-05-25 RX ADMIN — HEPARIN SODIUM 5000 UNITS: 5000 INJECTION INTRAVENOUS; SUBCUTANEOUS at 21:41

## 2020-05-25 RX ADMIN — HEPARIN SODIUM 5000 UNITS: 5000 INJECTION INTRAVENOUS; SUBCUTANEOUS at 14:51

## 2020-05-25 RX ADMIN — OYSTER SHELL CALCIUM WITH VITAMIN D 1 TABLET: 500; 200 TABLET, FILM COATED ORAL at 18:47

## 2020-05-25 RX ADMIN — MEMANTINE 10 MG: 10 TABLET ORAL at 12:14

## 2020-05-25 RX ADMIN — CARVEDILOL 6.25 MG: 6.25 TABLET, FILM COATED ORAL at 18:47

## 2020-05-25 RX ADMIN — DEXTROSE MONOHYDRATE, SODIUM CHLORIDE, AND POTASSIUM CHLORIDE 75 ML/HR: 50; 4.5; 1.49 INJECTION, SOLUTION INTRAVENOUS at 08:36

## 2020-05-25 NOTE — PROGRESS NOTES
Orthopaedic Progress Note  Post Op day: 4 Days Post-Op    May 25, 2020 2:58 PM     Patient: Sangita Coker MRN: [id number]  SSN: [id number]    YOB: 1940  Age: 78 y.o. Sex: female      Admit date:  5/20/2020  Date of Surgery:  5/21/2020   Procedures:  Procedure(s):  LEFT HIP HEMIARTHROPLASTY  Admitting Physician:  Hubert Friedman MD   Surgeon:  Carmen Isabel) and Role:     José Luis Hirsch MD - Primary    Consulting Physician(s): Treatment Team: Attending Provider: Jazmyne Jack MD; Consulting Provider: Omar Gay MD; Consulting Provider: Kailee Faustin MD; Utilization Review: Gunjan Laurent RN; Care Manager: Damian Encinas Consulting Provider: Shaka Suggs MD; Consulting Provider: Ishmael Chacko MD    SUBJECTIVE:     Sangita Coker is a 78 y.o. female is 4 Days Post-Op s/p Procedure(s):  LEFT HIP HEMIARTHROPLASTY with an appropriate level of post-operative pain. Hypotensive overnight prompting transfer to ICU for management. +NSTEMI/ afib  Cards following. Pt received unit of PRBC's this am.         OBJECTIVE:       Physical Exam:  General: Alert, cooperative, no distress. Oriented to self  Respiratory: Respirations unlabored  Neurological:  Neurovascular exam within normal limits. Motor: + DF/PF. Musculoskeletal: Calves soft, supple, non-tender upon palpation. Dressing/Wound:  Clean, dry and intact. No significant erythema or swelling.       Vital Signs:        Patient Vitals for the past 8 hrs:   BP Temp Pulse Resp SpO2   05/25/20 1345 168/83 99 °F (37.2 °C) 78 18 100 %   05/25/20 1245 171/85 99.5 °F (37.5 °C) 89 19 100 %   05/25/20 1145 160/78 97.9 °F (36.6 °C) 80  97 %   05/25/20 1115 163/86  73 17    05/25/20 1100 172/85  72 15    05/25/20 1045 168/78 98.2 °F (36.8 °C) 73 27 100 %   05/25/20 1030 152/72  75 18 100 %   05/25/20 1015 149/78 98.5 °F (36.9 °C) 71 20 100 %   05/25/20 1000 151/79 98.6 °F (37 °C) 76 19 99 % 20 0945 148/81  85 23 93 %   20 0930 173/88 98.8 °F (37.1 °C) 89 16 95 %   20 0915 154/72  87 29 92 %   20 0900 161/78   29    20 0845 160/69  85 24 97 %   20 0830 138/84  86 17    20 0730 140/72 98 °F (36.7 °C) 92 28 96 %   20 0707 146/66  85     20 0701   87     20 0700 146/66  84 15 100 %                                          Temp (24hrs), Av.5 °F (36.9 °C), Min:97.9 °F (36.6 °C), Max:99.5 °F (37.5 °C)      Labs:        Recent Labs     20  08   HCT 20.8*   HGB 6.9*     Lab Results   Component Value Date/Time    Sodium 142 2020 04:22 AM    Potassium 4.0 2020 04:22 AM    Chloride 113 (H) 2020 04:22 AM    CO2 21 2020 04:22 AM    Glucose 81 2020 04:22 AM    BUN 42 (H) 2020 04:22 AM    Creatinine 1.37 (H) 2020 04:22 AM    Calcium 7.7 (L) 2020 04:22 AM       PT/OT:                Patient mobility  Bed Mobility Training  Rolling: Maximum assistance  Supine to Sit: Maximum assistance, Assist x2, Additional time  Sit to Supine: Total assistance, Assist x2, Additional time  Scooting: Maximum assistance, Assist x2  Transfer Training  Sit to Stand: (not attempted this session)  Stand to Sit: Moderate assistance, Assist x2  Bed to Chair: Maximum assistance, Assist x2                   ASSESSMENT / PLAN:   Active Problems:    Femoral neck fracture ([facility]) (2020)            POD 4 s/p left hemiarthroplasty  Post op NSTEMI/ shock EF: 50-55%/ afib back to NSR; cards following  E. Coli UTI: ceftriaxone  Anemia: transfused with 1 unit PRBC's  Heparin on hold due to transfusion. Appreciate cards / pulm input. Will continue to follow .   Discussed with Dr. Dandre Cardenas By:  Mary Mendez NP    Orthopedic Surgery   [address] [place] [address]

## 2020-05-25 NOTE — PROGRESS NOTES
Problem: Dysphagia (Adult)  Goal: *Acute Goals and Plan of Care (Insert Text)  Description: Speech Pathology  Initiated 5/25/20  1. Patient will tolerate puree snacks/ sips of liquids without overt s/s of aspiration within 7 days   2. Patient will participate in further assessment of solids once agreeable to solids AND denture placement within 7 days    Outcome: Progressing Towards [address] EVALUATION  Patient: Leydi Miller (76 y.o. female)  Date: 5/25/2020  Primary Diagnosis: Femoral neck fracture ([facility] [address] [S72.009A]  Procedure(s) (LRB):  LEFT HIP HEMIARTHROPLASTY (Left) 4 Days Post-Op   Precautions: swallow       ASSESSMENT :  Patient is POD 4 from L hip hemiarthroplasty d/t femoral neck fx. Patient awake, alert but irritable. On 2L. RN reports patient passed swallow screen and took medication without issue. Hypotensive overnight prompting transfer to ICU for management. +NSTEMI. Patient presents with intact oral and suspected intact pharyngeal swallow for liquids and purees. Declined solids. Patient reportedly thirsty with successive gulps from straw resulting in an increase RR after swallow but no overt s/s of aspiration. Patient is at an elevated risk for aspiration given need for supplemental O2, positioning, and mental status and fatigue. I suspect her intake is likely to be limited. Recommend pureed snacks, sips of liquids as requested for now. Pills one at a time. Will continue to follow closely. Patient will benefit from skilled intervention to address the above impairments. Patients rehabilitation potential is considered to be Fair     PLAN :  Recommendations and Planned Interventions:  Bites/sips of purees/liquids as requested.    SLP to re-assess appropriateness for solids once dentures placed and patient agreeable  Safe swallowing strategies (upright for all PO, small bites/sips, slow rate)  Pills one at a time    Frequency/Duration: Patient will be followed by speech-language pathology 4 times a week to address goals. Discharge Recommendations: To Be Determined     SUBJECTIVE:   Patient irritable. Despite explanation of SLP role and goals of care, patient irritable with bed re-positioning and offering of PO. States she is not hungry. 2L NC. She reports feeling like she is falling in the bed.      OBJECTIVE:     Past Medical History:   Diagnosis Date    Acute CVA (cerebrovascular accident) ([facility] [address] 4/10/2017    Per MRI:  Posterior L frontal/parietal territory    Cerebral atrophy ([facility] [address] 4/10/2017    History of CVA (cerebrovascular accident) 4/10/2017    --L frontal acute infarct and LMCA    Hyperlipidemia LDL goal <70 4/10/2017    Hypertension     Status post hip surgery      Left hip bipolar hemiarthroplasty on 5/21/20     TIA (transient ischemic attack)     Unresponsive episode 8/3/2017    transient     Past Surgical History:   Procedure Laterality Date    HX HEENT      Tonsilectomy age 8     Prior Level of Function/Home Situation:   Home Situation  Home Environment: Private residence  # Steps to Enter: 5  Rails to Enter: Yes  Hand Rails : Bilateral  One/Two Story Residence: Two story  Interior Rails: Both  Living Alone: No  Support Systems: Child(viviana), Family member(s)  Patient Expects to be Discharged to[de-identified] Unknown  Current DME Used/Available at Home: isa Simmons, [address], [place], straight, Commode, bedside, Shower chair  Tub or Shower Type: Shower  Diet prior to admission: Unknown, suspect regular  Current Diet:  NPO   Cognitive and Communication Status:  Neurologic State: Alert, Irritable  Orientation Level: Oriented to person, Disoriented to place, Disoriented to situation, Disoriented to time  Cognition: Follows commands, Impaired decision making, Decreased attention/concentration  Perception: Appears intact  Perseveration: No perseveration noted  Safety/Judgement: Not assessed  Oral Assessment:  Oral Assessment  Labial: No impairment  Dentition: Edentulous; Other (comment)(upper denture plate bedside but patient declined to place)  Oral Hygiene: dry oral mucosa  Velum: Unable to visualize  Mandible: No impairment  P.O. Trials:  Patient Position: Semi-upright in bed. Patient with sensation of falling and leaning sharply to the left. Despite repositioning, unable to sustain an upright posture  Vocal quality prior to P.O.: No impairment  Consistency Presented: Pudding; Thin liquid  How Presented: Straw;Successive swallows;SLP-fed/presented;Spoon     Bolus Acceptance: No impairment  Bolus Formation/Control: No impairment     Propulsion: No impairment  Oral Residue: None  Initiation of Swallow: No impairment  Laryngeal Elevation: Functional  Aspiration Signs/Symptoms: Increase in RR;None  Pharyngeal Phase Characteristics: Poor endurance;Easily fatigued   Effective Modifications: Small sips and bites  Cues for Modifications: Minimal       Oral Phase Severity: No impairment  Pharyngeal Phase Severity : No impairment    NOMS:   The NOMS functional outcome measure was used to quantify this patient's level of swallowing impairment. Based on the NOMS, the patient was determined to be at level 3 for swallow function       NOMS Swallowing Levels:  Level 1 (CN): NPO  Level 2 (CM): NPO but takes consistency in therapy  Level 3 (CL): Takes less than 50% of nutrition p.o. and continues with nonoral feedings; and/or safe with mod cues; and/or max diet restriction  Level 4 (CK): Safe swallow but needs mod cues; and/or mod diet restriction; and/or still requires some nonoral feeding/supplements  Level 5 (CJ): Safe swallow with min diet restriction; and/or needs min cues  Level 6 (CI): Independent with p.o.; rare cues; usually self cues; may need to avoid some foods or needs extra time  Level 7 ([address] [place] [address]): Independent for all p.o.  ALEJANDRO. (2003). National Outcomes Measurement System (NOMS): Adult Speech-Language Pathology User's Guide.        Pain:  Pain Scale 1: PAINAD (Advanced Dementia)  Pain Intensity 1: 5  Pain Location 1: Hip;Leg;Back    After treatment:   Patient left in no apparent distress in bed, Call bell within reach, Nursing notified, and Bed / chair alarm activated    COMMUNICATION/EDUCATION:   The patient's plan of care including recommendations, planned interventions, and recommended diet changes were discussed with: Registered nurse. Patient understands intent and goals of therapy, but is neutral about his/her participation. Thank you for this referral.  Arianne Bhakta.  Miah Avitia MS, CCC-SLP, BCS-S  Time Calculation: 15 mins

## 2020-05-25 NOTE — ROUTINE PROCESS
Physical Therapy Patient currently receiving blood transfusion, we will continue to follow. Thank you.  
[place] PT,DPT,NCS

## 2020-05-25 NOTE — CONSULTS
[facility]  Pulmonary, Critical Care, and Sleep Medicine  Name: Chris Rosenberg MRN: [id number]   : 1940 Hospital: [address] [facility]   Date: 2020        Impression Plan   1. NSTEMI  2. Shock, unclear etiology given concomittant NSTEMI, but exam suggests more likely septic vs hypovolemic  3. Atrial fib, now back in NSR  4. E. coli UTI  5. PRASANTH     · Echo with EF of 55%  · Pressors off  · Cardiology following  · Continue ceftriaxone empirically- 2 ecoli species sensitive to ceftriaxone  · Follow blood cultures  · Troponin has peaked  · Off of heparin gtt  · 1 unit pRBC  · Heparin SQ proph  · OOB into chair   · Transfer to stepdown         Pt is critically ill. Critical care time spent with pt exclusive of procedures was 40 minutes    Radiology  ( personally reviewed) Reviewed chest CT. ABG No results for input(s): PHI, PO2I, PCO2I in the last 72 hours. Subjective     This patient has been seen and evaluated at the request of Dr. Christos Martinez for ICU management. Patient is a 78 y.o. female h/o TIA/CVA, HTN, dyslipidemia, dementia who presented to ED after an unwitnessed fall at home found to have L femoral neck fracture. Underwent L hip hemiarthroplasty on . Overnight, she was hypotensive, went into afib on the floor so was transferred to ICU after being given amiodarone and digoxin. When she was here she developed bradycardia so was then given atropine and eventually started on Kael and dobutamine drips. EKG this morning shows diffuse downsloping ST depressions with TWI. Troponin is up to 10. She is on a heparin gtt and will be getting a TTE this morning. Overnight events:  Off of both pressors  Hgb 6.9  Pt states she is a little sore, but otherwise feeling fine.     Past Medical History:   Diagnosis Date    Acute CVA (cerebrovascular accident) ([facility] [address] 4/10/2017    Per MRI:  Posterior L frontal/parietal territory    Cerebral atrophy ([facility] [address] 4/10/2017    History of CVA (cerebrovascular accident) 4/10/2017    --L frontal acute infarct and LMCA    Hyperlipidemia LDL goal <70 4/10/2017    Hypertension     Status post hip surgery      Left hip bipolar hemiarthroplasty on 5/21/20     TIA (transient ischemic attack)     Unresponsive episode 8/3/2017    transient      Past Surgical History:   Procedure Laterality Date    HX HEENT      Tonsilectomy age 8      Prior to Admission medications    Medication Sig Start Date End Date Taking? Authorizing Provider   atorvastatin (LIPITOR) 40 mg tablet Take 40 mg by mouth nightly. Yes Provider, Historical   clopidogreL (PLAVIX) 75 mg tab Take 75 mg by mouth nightly. Yes Provider, Historical   bxiqbtts-keuh-zcj2-C-seng-bosw (Osteo Bi-Flex Triple Strength) 750 mg-644 mg- 30 mg-1 mg tab Take 1 Tab by mouth two (2) times a day. Yes Provider, Historical   lisinopriL (PRINIVIL, ZESTRIL) 20 mg tablet Take 20 mg by mouth nightly. Yes Provider, Historical   cholecalciferol (VITAMIN D3) (1000 Units /25 mcg) tablet Take 2,000 Units by mouth daily. Yes Provider, Historical   TURMERIC ROOT EXTRACT PO Take 450 mg by mouth daily. Yes Provider, Historical   memantine (NAMENDA) 10 mg tablet Take 1 Tab by mouth two (2) times a day. 4/15/20  Yes Camila Garza MD   carvediloL (COREG) 3.125 mg tablet Take 1 Tab by mouth two (2) times daily (with meals).  4/15/20  Yes Camila Garza MD     Current Facility-Administered Medications   Medication Dose Route Frequency    DOBUTamine (DOBUTREX) 500 mg/250 mL (2,000 mcg/mL) infusion  0-10 mcg/kg/min IntraVENous TITRATE    cefTRIAXone (ROCEPHIN) 1 g in 0.9% sodium chloride (MBP/ADV) 50 mL  1 g IntraVENous Q24H    sodium chloride (NS) flush 5-40 mL  5-40 mL IntraVENous Q8H    calcium-vitamin D (OS-NENA) 500 mg-200 unit tablet  1 Tab Oral TID WITH MEALS    senna-docusate (PERICOLACE) 8.6-50 mg per tablet 1 Tab  1 Tab Oral BID    polyethylene glycol (MIRALAX) packet 17 g  17 g Oral DAILY    sodium chloride (NS) flush 5-40 mL  5-40 mL IntraVENous Q8H    atorvastatin (LIPITOR) tablet 40 mg  40 mg Oral QHS    memantine (NAMENDA) tablet 10 mg  10 mg Oral BID     Allergies   Allergen Reactions    Benadryl Allergy/Cold [Diphenhyd-Pe-Acetaminophen] Hives     Per patient's daughter, she thinks patient has had benadryl since then and tolerated it.  Penicillins Hives     Was treated with Augmentin in 2015, tolerated medication well. Social History     Tobacco Use    Smoking status: Former Smoker     Last attempt to quit: 1999     Years since quittin.8    Smokeless tobacco: Never Used   Substance Use Topics    Alcohol use: Yes     Alcohol/week: 17.5 standard drinks     Types: 21 Glasses of wine per week     Comment: 3 glasses of port per day      Family History   Problem Relation Age of Onset    Dementia Mother 61    Heart Attack Father 79          Laboratory: I have personally reviewed the critical care flowsheet and labs.      Recent Labs     20  0800 20  0422 20  0339 20  0116   WBC  --  15.5* 16.6* PLEASE DISREGARD RESULTS   HGB 6.9* 7.2* 8.1* PLEASE DISREGARD RESULTS   HCT 20.8* 22.4* 25.7* PLEASE DISREGARD RESULTS   PLT  --  185 176 PLEASE DISREGARD RESULTS     Recent Labs     20  0422 20  0339 20  2153 20  1540 20  0413    138  --   --  139   K 4.0 4.7  --   --  3.4*   * 110*  --   --  108   CO2 21 20*  --   --  27   GLU 81 122*  --   --  95   BUN 42* 32*  --   --  22*   CREA 1.37* 1.40*  --   --  0.63   CA 7.7* 7.4*  --   --  8.1*   MG  --  2.0 1.9 2.0  --    PHOS  --  5.2*  --  1.8*  --        Objective:     Mode Rate Tidal Volume Pressure FiO2 PEEP                    Vital Signs:     TMAX(24)      Intake/Output:   Last shift:         Last 3 shifts:  0701 -  1900  In: 210.5 [I.V.:210.5]  Out: 150 [Urine:150]RRIOLAST3    Intake/Output Summary (Last 24 hours) at 2020 1031  Last data filed at 5/25/2020 1000  Gross per 24 hour   Intake 2169.88 ml   Output 750 ml   Net 1419.88 ml     EXAM:     General:  Alert, no distress   Head:  Normocephalic, without obvious abnormality, atraumatic. Eyes:  Conjunctivae/corneas clear. PERRL, EOMs intact. Nose: Nares normal.   Throat: Lips, mucosa, and tongue normal.   Neck: Supple, symmetrical, no JVD. Lungs:   Clear to auscultation bilaterally. Chest wall:  No tenderness or deformity. Heart:  Regular rate and rhythm, S1, S2 normal, no murmur, click, rub or gallop. Abdomen:   Soft, non-tender. Bowel sounds normal.   Extremities: L LE in immobilizer, no edema. Skin: Warm, dry.  Some bruising on the back of thighs   Neurologic: Grossly nonfocal. Unable to move LE due to [address], MD  [facility]

## 2020-05-25 NOTE — PROGRESS NOTES
[address] Medicine Residency  Resident Note in Brief  ----------------------------------------    S:  Patient seen and examined with  (PGY-3). Resting comfortably at this time      O:  Visit Vitals  /58   Pulse 62   Temp 99 °F (37.2 °C)   Resp 15   Ht 4' 11\" (1.499 m)   Wt 123 lb (55.8 kg)   SpO2 96%   BMI 24.84 kg/m²     Physical Exam  Constitutional:       General: She is not in acute distress. HENT:      Head: Normocephalic and atraumatic. Cardiovascular:      Rate and Rhythm: Normal rate and regular rhythm. Heart sounds: No murmur. Pulmonary:      Effort: Pulmonary effort is normal. No respiratory distress. A/P  Rupa Molina is a 78 y.o. female PMH TIA/CVA, HTN, HLD, dementia who is admitted for L femoral neck fracture,who subsequently developed Cardiogenic shock 2/2 NSTEMI. S/p Dobutamine and hep gtt. - Continue Coreg 6.25mg BID  -Daily Mg/phos/BMP  -F/u AM Hgb  - Will continue to monitor vitals overnight      Please see full daily progress note for complete plan.   Tatiana Parker DO  6:12 PM

## 2020-05-25 NOTE — PROGRESS NOTES
Tennille Juarez MD. [facility]              Patient: Foster Szymansik  : 1940      Today's Date: 2020        CARDIOLOGY PROGRESS NOTE  S: Events overnight noted- heparin gtt d/c d/tdrop in H/h  Currently being trasnfused   \" I am having trouble getting along w people this morning\"  Denies pain or SOB  O:   Visit Vitals  /72   Pulse 75   Temp 98.5 °F (36.9 °C)   Resp 18   Ht 4' 11\" (1.499 m)   Wt 117 lb 4.6 oz (53.2 kg)   SpO2 100%   BMI 23.69 kg/m²     Patient appears generally well, mood and affect are appropriate and pleasant. A little confused. Frail/elderly  HEENT:  Hearing intact, non-icteric, normocephalic, atraumatic. Neck Exam: Supple, No JVD.  + left neck bruit. Lung Exam: Clear to auscultation, even breath sounds. Cardiac Exam: Regular rate and rhythm with no murmur. Abdomen: Soft, non-tender, normal bowel sounds. Extremities: Moves all ext well. No lower extremity edema. Psych:   Neuro - Grossly intact      Review of Symptoms:  Constitutional: Negative for fever   HEENT: Negative for vision changes. Respiratory: Negative for productive cough  Cardiovascular: Negative for syncope    Gastrointestinal: Negative for abdominal pain, melena  Genitourinary: Negative for dysuria  Skin: Negative for rash  Heme: No problems bleeding.   Neuro - no speech changes or focal weaknesses        Intake/Output Summary (Last 24 hours) at 2020 1107  Last data filed at 2020 1000  Gross per 24 hour   Intake 2051.1 ml   Output 750 ml   Net 1301.1 ml         MEDICATIONS:     Current Facility-Administered Medications   Medication Dose Route Frequency Provider Last Rate Last Dose    0.9% sodium chloride infusion 250 mL  250 mL IntraVENous PRN Marium Rivera MD        0.9% sodium chloride infusion 250 mL  250 mL IntraVENous PRN Terry Ramirez DO        heparin (porcine) injection 5,000 Units  5,000 Units SubCUTAneous Q8H Sunil Champagne MD        DOBUTamine (DOBUTREX) 500 mg/250 mL (2,000 mcg/mL) infusion  0-10 mcg/kg/min IntraVENous TITRATE Blue Finnegan MD   Stopped at 05/25/20 5014    cefTRIAXone (ROCEPHIN) 1 g in 0.9% sodium chloride (MBP/ADV) 50 mL  1 g IntraVENous Q24H Rebecca Sawyer  mL/hr at 05/25/20 0835 1 g at 05/25/20 0835    sodium chloride (NS) flush 5-40 mL  5-40 mL IntraVENous Q8H Omar Gay MD   10 mL at 05/25/20 0539    sodium chloride (NS) flush 5-40 mL  5-40 mL IntraVENous PRN Omar Gay MD        naloxone [facility]) injection 0.4 mg  0.4 mg IntraVENous PRN Omar Gay MD        calcium-vitamin D (OS-NENA) 500 mg-200 unit tablet  1 Tab Oral TID WITH MEALS Omar Gay MD   Stopped at 05/24/20 0800    senna-docusate (PERICOLACE) 8.6-50 mg per tablet 1 Tab  1 Tab Oral BID Omar Gay MD   Stopped at 05/23/20 1800    polyethylene glycol (MIRALAX) packet 17 g  17 g Oral DAILY Omar Gay MD   Stopped at 05/24/20 0900    bisacodyL (DULCOLAX) suppository 10 mg  10 mg Rectal DAILY PRN Omar Gay MD        traMADoL [facility]) tablet 50 mg  50 mg Oral Q6H PRN Omar Gay MD   50 mg at 05/23/20 1154    oxyCODONE IR (ROXICODONE) tablet 2.5 mg  2.5 mg Oral Q4H PRN Omar Gay MD        ondansetron (ZOFRAN ODT) tablet 4 mg  4 mg Oral Q4H PRN Omar Gay MD        sodium chloride (NS) flush 5-40 mL  5-40 mL IntraVENous Q8H Vicky Rivera DO   10 mL at 05/25/20 0539    sodium chloride (NS) flush 5-40 mL  5-40 mL IntraVENous PRN Vicky Rivera DO        atorvastatin (LIPITOR) tablet 40 mg  40 mg Oral QHS Vicky Rivera DO   Stopped at 05/24/20 2200    memantine (NAMENDA) tablet 10 mg  10 mg Oral BID Vicky Rivera DO   Stopped at 05/24/20 0900           LABS / OTHER STUDIES:     Recent Results (from the past 24 hour(s))   PTT    Collection Time: 05/24/20  4:09 PM   Result Value Ref Range    aPTT 66.4 (H) 22.1 - 32.0 sec    aPTT, therapeutic range     58.0 - 77.0 SECS   TROPONIN I    Collection Time: 05/24/20  4:09 PM   Result Value Ref Range    Troponin-I, Qt. 17.70 (H) <0.05 ng/mL   GLUCOSE, POC    Collection Time: 05/24/20  6:03 PM   Result Value Ref Range    Glucose (POC) 81 65 - 100 mg/dL    Performed by Nolan Handler    SARS-COV-2    Collection Time: 05/24/20  7:36 PM   Result Value Ref Range    Specimen source Nasopharyngeal      SARS-CoV-2 PENDING     Specimen source Nasopharyngeal     GLUCOSE, POC    Collection Time: 05/25/20 12:33 AM   Result Value Ref Range    Glucose (POC) 93 65 - 100 mg/dL    Performed by Andrew Cough    PTT    Collection Time: 05/25/20 12:35 AM   Result Value Ref Range    aPTT 71.4 (H) 22.1 - 32.0 sec    aPTT, therapeutic range     58.0 - 39.6 SECS   METABOLIC PANEL, BASIC    Collection Time: 05/25/20  4:22 AM   Result Value Ref Range    Sodium 142 136 - 145 mmol/L    Potassium 4.0 3.5 - 5.1 mmol/L    Chloride 113 (H) 97 - 108 mmol/L    CO2 21 21 - 32 mmol/L    Anion gap 8 5 - 15 mmol/L    Glucose 81 65 - 100 mg/dL    BUN 42 (H) 6 - 20 MG/DL    Creatinine 1.37 (H) 0.55 - 1.02 MG/DL    BUN/Creatinine ratio 31 (H) 12 - 20      GFR est AA 45 (L) >60 ml/min/1.73m2    GFR est non-AA 37 (L) >60 ml/min/1.73m2    Calcium 7.7 (L) 8.5 - 10.1 MG/DL   CBC WITH AUTOMATED DIFF    Collection Time: 05/25/20  4:22 AM   Result Value Ref Range    WBC 15.5 (H) 3.6 - 11.0 K/uL    RBC 2.26 (L) 3.80 - 5.20 M/uL    HGB 7.2 (L) 11.5 - 16.0 g/dL    HCT 22.4 (L) 35.0 - 47.0 %    MCV 99.1 (H) 80.0 - 99.0 FL    MCH 31.9 26.0 - 34.0 PG    MCHC 32.1 30.0 - 36.5 g/dL    RDW 14.6 (H) 11.5 - 14.5 %    PLATELET 544 427 - 785 K/uL    MPV 9.5 8.9 - 12.9 FL    NRBC 0.6 (H) 0  WBC    ABSOLUTE NRBC 0.09 (H) 0.00 - 0.01 K/uL    NEUTROPHILS 89 (H) 32 - 75 %    LYMPHOCYTES 6 (L) 12 - 49 %    MONOCYTES 5 5 - 13 %    EOSINOPHILS 0 0 - 7 %    BASOPHILS 0 0 - 1 %    IMMATURE GRANULOCYTES 0 %    ABS. NEUTROPHILS 13.8 (H) 1.8 - 8.0 K/UL    ABS. LYMPHOCYTES 0.9 0.8 - 3.5 K/UL    ABS.  MONOCYTES 0.8 0.0 - 1.0 K/UL    ABS. EOSINOPHILS 0.0 0.0 - 0.4 K/UL    ABS. BASOPHILS 0.0 0.0 - 0.1 K/UL    ABS. IMM. GRANS. 0.0 K/UL    DF MANUAL      RBC COMMENTS ANISOCYTOSIS  1+        RBC COMMENTS ANDIE CELLS  PRESENT       HAPTOGLOBIN    Collection Time: 05/25/20  4:22 AM   Result Value Ref Range    Haptoglobin 213 (H) 30 - 200 mg/dL   HGB & HCT    Collection Time: 05/25/20  8:00 AM   Result Value Ref Range    HGB 6.9 (L) 11.5 - 16.0 g/dL    HCT 20.8 (L) 35.0 - 47.0 %   RETICULOCYTE COUNT    Collection Time: 05/25/20  8:00 AM   Result Value Ref Range    Reticulocyte count 3.1 (H) 0.7 - 2.1 %    Absolute Retic Cnt. 0.0672 0.0164 - 0.0776 M/ul   TYPE + CROSSMATCH    Collection Time: 05/25/20  8:00 AM   Result Value Ref Range    Crossmatch Expiration 05/28/2020     ABO/Rh(D) O POSITIVE     Antibody screen NEG     Unit number [id number]     Blood component type RC LR     Unit division 00     Status of unit ISSUED     Crossmatch result Compatible    GLUCOSE, POC    Collection Time: 05/25/20  1:15 PM   Result Value Ref Range    Glucose (POC) 86 65 - 100 mg/dL    Performed by Keeley Morgan           CARDIAC DIAGNOSTICS:      Cardiac Evaluation Includes:     Echo 4/10/17 - LVEF 40%  Echo 8/4/17 - LVEF 50 %. There was possible hypokinesis of the basal inferoseptal wall(s). Echo 5/24/20 - Done on dobutamine 5 mcg;  LVEF 50-55%, no obvious WMA, mild-mod MR, mod LAE, RV OK,. PASP 30 mmHg         EKG 5/21/20 - NSR, LVH with repol abn   EKG 5/23/20 - Normal sinus rhythm;  Marked ST depression.       Tele 5/24/20 - sinus      Telemetry: NSR, brief PAF   ASSESSMENT AND PLAN:      Assessment and Plan:     1) Shock ; Possible NSTEMI vs sepsis / hypovolemia   - She was Hypotensive on 5/23/20 around 1 PM (surgery 5/21/20) with ST depression on EKG,  She denied any CP or SOB. BP remained low despite > 1 L NS.   EKG then with ischemic ST depression in multiple leads (more ST depression than EKG a couple of days prior).   She then went into Afib with RVR. Amio and DIg started. Kael started for low BP. Later she had bradycardia and Amio stopped and Dobutamine started. - 5/24/20 she is looking fairly well and BP is better (on dobutamine and kael). - Her Troponin is up to 20. I'm not sure if this was a Type 1 or Type 2 MI. Heparin gtt held d/t drop in hgb. Trop trending down   - Echo shows just mildly depressed LVEF (50-55%). Dobutamine off this AM 5/25  - Critical care doc notes that exam does not fit with cardiogenic shock and problems could be due to sepsis and he is treating that as well. 2) History of cardiomyopathy   - LVEF was 40% in APril 2017 and then 50% on 8/17  - Not sure what CAD evaluation she has had over the years    - Her outpatient Coreg and lisinopril are held now due to hypotension --> will resume gradually.    3) Left hip bipolar hemiarthroplasty on 5/21/20      4) Transient Afib  - Had Afib a few hours on 5/23/20 in setting of severe hypotension - she went back into sinus on her own   -brief runs on telemetry - not sustained    5) post operative anemia: drop in Hgb <7, heparin gtt d/c. S/p 1 unit of PRBCs    6) dementia: twyla Varela NP  [facility] [address] [place] [address]  Patient personally seen and examined. All the elements of history and examination were personally performed. Assessment and plan was discussed and agree as written above    Doing considering what she has been through. BP is much better (a little high now). No CP. Hrt RRR. Lungs clear ant. Will start coreg 3.125 mg BID  Stop IV heparin (being transfused). Stop IV fluids. Watch volume status closely.          Masoud Marie MD, [facility]

## 2020-05-25 NOTE — PROGRESS NOTES
Shift Summary  1115: Bedside and Verbal shift change report given to Sunshine Navarrete RN (oncoming nurse) by Anais Aguirre RN (offgoing nurse). Report included the following information SBAR, Kardex, Procedure Summary, Intake/Output, MAR, Accordion, Recent Results and Cardiac Rhythm NSR. Blood infusing. 1200: Patient NPO. Patent alert, responsive, complaining of pain. Discussed with MD, RN to do swallow eval and can give sips/meds if passes. 1210: Passed bedside swallow- administered tramadol and AM Namenda. 1245: Blood transfusion completed. Will collect H&H at 1445 (2 hr post transfusion)  1310: HR, BP stable, patient completed unit PRBCs Discussed with Dr. [place] Duty downgrading patient to telemetry- MD wishes to keep patient on SDU at this time    1510: post transfusion hgb sent to lab. Patient bathed, linen changed. Patient much more calm this afternoon. Denies current pain. Patient has been resting, refuses food. 1600: Patient sleeping. Took a few sips of ensure. 1845: Patient has been sleeping much of afternoon. Missed two calls from her sister. Woke to take medicine and try to eat some dinner. Found top denture plate only. Cleaned dentures, however patient refused to wear them. Fed some bites of mashed potatos, tomato soup. Finished about half of Ensure from earlier. Had to crush large Calcium tablet, reorient patient for her to keep her attention to swallow meds. 1930: Bedside and Verbal shift change report given to Nhan Parr RN (oncoming nurse) by Sunshine Navarrete RN (offgoing nurse). Report included the following information SBAR, Kardex, Procedure Summary, Intake/Output, MAR, Accordion, Recent Results and Cardiac Rhythm NSR.

## 2020-05-25 NOTE — PROGRESS NOTES
[address] [place] [address]   Office [phone]  Fax [phone]          Assessment and Plan     Dahlia Wiggins is a 78 y.o. female PMH TIA/CVA, HTN, HLD, dementia who is admitted for L femoral neck fracture. 24 Hour Events: No acute events. CARDIAC   Shock: RESOLVED. Responded to Dobutamine, Echo EF 50-55%. - s/p [place] gtt - off 5/25 am    - Cards following  - Cardiac monitoring      NTEMI: Ischemic ST depression in multiple leads. Treated with heparin gtt  - s/p Hep gtt - off 5/25  - Lovenox 40mg daily x4 wks per Cards  - Trop peaked at 20  - Coreg 3.125mg BID      Transient New Onset Afib:  Afib w/ RVR rate-115 on 5/23, which soon converted to NSR. s/p Digoxin load  - Daily Mag/Phos/CBC/CMP  - Cardiac monitoring   - Cards following     Chronic HTN: RESOLVED. /57 POA  - s/p Kael gtt - off 5/24  - Home Coreg 3.125mg BID   - START Lisinopril 5mg daily (home 20mg qhs)     HLD last lipid panel 12/2019 all values wnl  - Home Lipitor 40mg nightly     Bradycardia with a hx of  bigeminy HR POA 55. Patient seen by Dr. Taylor Calderon in the past for cardiology clearance d/t Bradycardia for a cataract procedure. EKG was noted to find PVCs in the form of bigemeny. Bradycardia was false and patient was deemed stable for surgery. Trop neg.   - Cardiac monitoring     PULM:  AHRF: new O2 requirement. Currently on 4L nc. Not in distress on exam. Now satting 97% on RA.   - s/p supplemental O2 to wean         NEURO:  AMS w/ decreased pupillary light reflex: IMPROVED back to BL today. No acute findings on Head CT.   - Will continue to monitor      Hx of CVA/TIA in 2017. CT scan POA noted chronic ischemic changes  - Previously on Plavix. Will discuss with cards regarding antiplatelet medications     Dementia  - Home Namenda [id number]     ID:  Complicated UTI: UA pos for 4+ bacteria, positive nitrites, large LE.  Patient has baseline dementia and cannot report symptoms  - DC'd IV Levaquin (2 days) - given prolonged QTc       start CTX (day 3/3 today)  - Ucx: Lactose fermenting gram negative rods. Susceptible to CTX   - Bcx: neg x2d     MSK:   L Femoral Neck Fracture: Due to GLF. S/p L hip bipolar hemiarthroplasty- Nia Mccurdy). - Initially planned Lovenox 40mg daily Sq for 4 weeks w/ plans to resume plavix s/p Lovenox       -Holding due to acute anemia  - Weightbearing as tolerated  - PT/OT/CM following   - Daily CBC/BMP     Rhabdomyolysis: 2 GLF, during the first was on the ground overnight. CK 1639 without any kidney injury. UA mod blood. CK   - CK 1639-->641--> 344     HEME  Post-operative Anemia- Hgb postop ~8.5. On  dropped to 7.2 with repeat 6.9. T&S, verbal consent from POA obtained  - s/p 1UpRBC: post transfusion Hgb 8.6   - Transfuse prn, TT <7   - Retic 3.1 (appropriately elevated), haptoglobin 213^, FOBT neg            FEN/GI - Dysphagia 1 pureed diet. Activity - as tolerated  DVT prophylaxis - Lovenox 40mg daily x4 wks  GI prophylaxis - Not indicated at this time  Disposition - Plan to d/c to TBD. Code Status - DNR    I appreciate the opportunity to participate in the care of this patient,  Maragret Roque MD  Family Medicine Resident         Subjective / Objective     Subjective Pt was seen and examined at bedside. Concerns overnight include: None. Pt denies chest pain, nausea, vomiting, abdominal pain.      Temp (24hrs), Av.6 °F (37 °C), Min:97.9 °F (36.6 °C), Max:99.5 °F (37.5 °C)     Objective  Respiratory: O2 Flow Rate (L/min): 2 l/min O2 Device: Room air   Visit Vitals  /70   Pulse (!) 59   Temp 98.7 °F (37.1 °C)   Resp 16   Ht 4' 11\" (1.499 m)   Wt 123 lb (55.8 kg)   SpO2 97%   BMI 24.84 kg/m²       Physical Exam  General:   Alert, cooperative, no acute distress   Head:   Atraumatic   Eyes:   Conjunctivae clear   ENT:  Oral mucosa normal   Neck:  Supple, trachea midline, no adenopathy   No JVD   Back:    No CVA tenderness    Lungs:   Clear to auscultation bilaterally Heart:   Regular rhythm, no murmur   Abdomen:    Soft, non-tender   No masses or organomegaly    Extremities:  No edema or DVT signs   Skin:  Warm and dry    No rashes or lesions   Neurologic:  Oriented   No focal deficits       Urinary catheter:  yovany      I/O:  Date 05/25/20 0700 - 05/26/20 0659 05/26/20 0700 - 05/27/20 0659   Shift 0700-1859 1900-0659 24 Hour Total 0700-1859 1900-0659 24 Hour Total   INTAKE   P.O. 260 200 460        P.O. 260 200 460      I. V.(mL/kg/hr) 772.7(1.2)  772.7        Heparin Volume 18.5  18.5        DOBUTamine Volume 11.7  11.7        Volume (0.9% sodium chloride infusion) 563.8  563.8        Volume (dextrose 5% - 0.45% NaCl with KCl 20 mEq/L infusion) 78.8  78.8        Volume (cefTRIAXone (ROCEPHIN) 1 g in 0.9% sodium chloride (MBP/ADV) 50 mL) 100  100      Blood 332.5  332.5        Volume (TRANSFUSE PACKED RBC'S) 332.5  332.5      Shift Total(mL/kg) 1365. 2(24.5) 200(3.6) 1565. 2(28.1)      OUTPUT   Urine(mL/kg/hr) 250(0.4) 350 600        Urine Voided 100  100        Urine Occurrence(s) 1 x  1 x        Urine Output (mL) (External Female Catheter 05/21/20) 150 350 500      Shift Total(mL/kg) 250(4.5) 350(6.3) 600(10.8)      NET 1115.2 -150 965.2      Weight (kg) 55.8 55.8 55.8 55.8 55.8 55.8       Inpatient Medications  Current Facility-Administered Medications   Medication Dose Route Frequency    0.9% sodium chloride infusion 250 mL  250 mL IntraVENous PRN    0.9% sodium chloride infusion 250 mL  250 mL IntraVENous PRN    heparin (porcine) injection 5,000 Units  5,000 Units SubCUTAneous Q8H    carvediloL (COREG) tablet 6.25 mg  6.25 mg Oral BID WITH MEALS    cefTRIAXone (ROCEPHIN) 1 g in 0.9% sodium chloride (MBP/ADV) 50 mL  1 g IntraVENous Q24H    sodium chloride (NS) flush 5-40 mL  5-40 mL IntraVENous Q8H    sodium chloride (NS) flush 5-40 mL  5-40 mL IntraVENous PRN    naloxone (NARCAN) injection 0.4 mg  0.4 mg IntraVENous PRN    calcium-vitamin D (OS-NENA) 500 mg-200 unit tablet  1 Tab Oral TID WITH MEALS    senna-docusate (PERICOLACE) 8.6-50 mg per tablet 1 Tab  1 Tab Oral BID    polyethylene glycol (MIRALAX) packet 17 g  17 g Oral DAILY    bisacodyL (DULCOLAX) suppository 10 mg  10 mg Rectal DAILY PRN    traMADoL (ULTRAM) tablet 50 mg  50 mg Oral Q6H PRN    oxyCODONE IR (ROXICODONE) tablet 2.5 mg  2.5 mg Oral Q4H PRN    ondansetron (ZOFRAN ODT) tablet 4 mg  4 mg Oral Q4H PRN    sodium chloride (NS) flush 5-40 mL  5-40 mL IntraVENous Q8H    sodium chloride (NS) flush 5-40 mL  5-40 mL IntraVENous PRN    atorvastatin (LIPITOR) tablet 40 mg  40 mg Oral QHS    memantine (NAMENDA) tablet 10 mg  10 mg Oral BID         Allergies  Allergies   Allergen Reactions    Benadryl Allergy/Cold [Diphenhyd-Pe-Acetaminophen] Hives     Per patient's daughter, she thinks patient has had benadryl since then and tolerated it.  Penicillins Hives     Was treated with Augmentin in June 2015, tolerated medication well.           CBC:  Recent Labs     05/26/20  0336 05/25/20  1452 05/25/20  0800 05/25/20  0422 05/24/20  0339   WBC 11.9*  --   --  15.5* 16.6*   HGB 8.8* 8.6* 6.9* 7.2* 8.1*   HCT 26.3* 25.4* 20.8* 22.4* 25.7*     --   --  185 055       Metabolic Panel:  Recent Labs     05/26/20  0336 05/25/20  0422 05/24/20  0339 05/23/20  2153 05/23/20  1540    142 138  --   --    K 4.0 4.0 4.7  --   --    * 113* 110*  --   --    CO2 25 21 20*  --   --    BUN 37* 42* 32*  --   --    CREA 1.03* 1.37* 1.40*  --   --    GLU 82 81 122*  --   --    CA 7.8* 7.7* 7.4*  --   --    MG 2.4  --  2.0 1.9 2.0   PHOS 2.0*  --  5.2*  --  1.8*              For Billing    Chief Complaint   Patient presents with   [facility] Problems  Date Reviewed: 5/21/2020          Codes Class Noted POA    [facility] ICD-10-CM: E19.130C  ICD-9-CM: 820.8  5/20/2020 Unknown

## 2020-05-25 NOTE — PROGRESS NOTES
Hgb trended to 6.9. Pt has type and screen, retic count, FOBT. Discussed d/c heparin drip with cardiology who agreed. Jean Paul Marie 89 [address]amp, daughter [phone]. Updated and consented for blood products. Made aware of risks (infections, allergic reaction which can complicate hospital course or lead to death), daughter understanding and accepts risk in light of benefits. Nurse made aware and will stop heparin drip and will transfuse pt.      Brandi Graham, DO

## 2020-05-25 NOTE — PROGRESS NOTES
0700- Bedside and Verbal shift change report given to [facility] (oncoming nurse) by Carlee Nesbitt (offgoing nurse). Report included the following information SBAR, Kardex, Intake/Output, MAR, Recent Results, Cardiac Rhythm SR and Quality Measures. Primary Nurse Rajinder Morin and [facility], RN performed a dual skin assessment on this patient Impairment noted- see wound doc flow sheet  Diego score is see flow sheet. 3932- Drips verified. 0730- Assessment completed, see flow sheet. Pt is alert, confused, restless, oriented to person only. Pupils equal and reactive to light.  equal, weak. Heart rate regular, SR on monitor. L IJ TLC present. Dobutamine running at 2. Lungs lower diminished, LLL wheezing. Bowel sounds active, NPO at this time. Purewick in place, marycruz care provided. L hip dressing clean, dry and intact. Bruising present to R shoulder and inner thighs bilaterally. Blister present to sacrum, pink and blanchable around area. Legs remain in abductor foaming. Will continue to monitor patient closely. 0830- Call from [facility] orders to stop heparin drip now as Hbg has dropped to 6.9 and transfuse blood. 0831- Heparin drip stopped. 3996- Telephone consent for blood transfusion obtained from patient's daughter, Isidro Echeverria. Marco A Veloz, RN provided second RN witness. 0945-First PRBC unit started. Dual sign of with Vazquez Scanlon RN.     1150- Dr. Claudell Height present at bedside assessing patient, updated on patient overnight and current status. 1100- Bedside and Verbal shift change report given to Odilon Fonseca (oncoming nurse) by [employer] (offgoing nurse). Report included the following information SBAR, Kardex, Intake/Output, MAR, Recent Results, Cardiac Rhythm SR and Quality Measures.

## 2020-05-25 NOTE — PROGRESS NOTES
[id number] [facility] RESIDENCY PROGRAM  PROGRESS NOTE     5/25/2020  PCP: Jaspal Medellin MD     Assessment/Plan:     Margaret Pereira is a 78 y.o. female PMH TIA/CVA, HTN, HLD, dementia who is admitted for L femoral neck fracture.      24 Hour Events: Acute drop in Hgb, 8.1 to 6. 9.         CARDIAC   Shock: Responded to Dobutamine, Echo EF 50-55%. - Wean off Dobutamie  - HOLD home Coreg and lisinopril   - Cards following  - Cardiac monitoring      NTEMI: Ischemic ST depression in multiple leads. Treated with heparin gtt  - STOP Hep gtt given acute drop in Hgb. - Trop max 20       New Onset Afib w/ RVR:  Afib w/ RVR rate-115. Pt treated with Digoxin and converted to NSR.    - Daily Mag/Phos/CBC/CMP  - Cardiac monitoring   - Cards following     HTN /57 POA  - HOLD home Lisinopril 20mg nightly and Coreg 3.125mg BID - may add after hypotension resolves      HLD last lipid panel 12/2019 all values wnl  - Home Lipitor 40mg nightly     Bradycardia with a hx of  bigeminy HR POA 55. Patient seen by Dr. Hieu Carpenter in the past for cardiology clearance d/t Bradycardia for a cataract procedure. EKG was noted to find PVCs in the form of bigemeny. Bradycardia was false and patient was deemed stable for surgery. Trop neg.   - Cardiac monitoring     PULM:  AHRF: new O2 requirement. Currently on 4L nc. Not in distress on exam.   - supplemental O2 to wean        NEURO:  AMS w/ decreased pupillary light reflex: IMPROVED. No acute findings on Head CT. Pupillary response has improved. - Will continue to monitor      Hx of CVA/TIA in 2017. CT scan POA noted chronic ischemic changes  - Previously on Plavix. Will discuss with cards regarding antiplatelet medications     Dementia  - Home Namenda 01GR MAHESH     ID:  Complicated UTI: UA pos for 4+ bacteria, positive nitrites, large LE.  Patient has baseline dementia and cannot report symptoms  - DC'd IV Levaquin (2 days) - given prolonged QTc       start CTX for 3d   - Ucx: Lactose fermenting gram negative rods. Susceptible to CTX      MSK:   L Femoral Neck Fracture: Due to GLF. S/p L hip bipolar hemiarthroplasty-5/21 Yun Daniel). - Initially planned Lovenox 40mg daily Sq for 4 weeks w/ plans to resume plavix s/p Lovenox       -Holding due to acute anemia  - Weightbearing as tolerated  - PT/OT/CM following   - Daily CBC/BMP     Rhabdomyolysis: 2 GLF, during the first was on the ground overnight. CK 1639 without any kidney injury. UA mod blood. CK   - CK 1639-->641--> 344    HEME  Anemia- Hgb postop ~8.5. On 5/25 dropped to 7.2 with repeat 6.9.  - T&S, verbal consent from POA obtained  - Transfuse 1U PRBC now  - Post transfusion HH.   - Retic, haptoglobin, FOBT         FEN/GI - NPO (until passes bedside swallow). Ns @75ml/H  Activity - Weightbearing as tolerated  DVT prophylaxis - Lovenox  GI prophylaxis - Not indicated at this time  Disposition - Plan to d/c to TBD. Code Status - DNR         Levi Huntley discussed with Dr. Itzel Dee. Subjective:   Pt was seen and examined at bedside. Afebrile and hemodynamically stable. Concerns overnight include: None. Pt states \"Can I go down the stairs, I have to pee. \" Pt endorses L hip pain, no SoB. Denies chest pain, nausea, vomiting, abdominal pain.      Objective:   Physical examination  Patient Vitals for the past 24 hrs:   Temp Pulse Resp BP SpO2   05/25/20 0530  99 (!) 31 (!) 120/94    05/25/20 0515  88 22 139/63    05/25/20 0500  93 21 136/79    05/25/20 0446  95 20 138/71 100 %   05/25/20 0445  99 25     05/25/20 0431  (!) 103 16 150/60 94 %   05/25/20 0430  (!) 103 22 (!) 143/118 90 %   05/25/20 0428  (!) 105  (!) 125/105    05/25/20 0417  (!) 106 25 (!) 125/105 99 %   05/25/20 0415   28 (!) 145/112    05/25/20 0400 98.6 °F (37 °C) (!) 107 23 145/88 93 %   05/25/20 0352  (!) 108 30 143/79 93 %   05/25/20 0318  97 24 140/68 100 %   05/25/20 0315  (!) 105 (!) 31 161/70    05/25/20 0300  92 22 132/78 95 %   05/25/20 0248  81  109/79    05/25/20 0245  90 23 109/79 90 %   05/25/20 0230  95 26 (!) 130/106 94 %   05/25/20 0215  78 21 123/63    05/25/20 0200  86 27     05/25/20 0145  82 19 90/71    05/25/20 0130  79 22 126/62    05/25/20 0115  83 16 115/70    05/25/20 0100  82 21 136/70    05/25/20 0045  85 17 139/59    05/25/20 0030  81 17 134/73    05/25/20 0015  84 24 138/61 99 %   05/25/20 0000 98.2 °F (36.8 °C) 83 14 127/65 99 %   05/24/20 2345  83 20 136/61    05/24/20 2330  79 19 133/59 94 %   05/24/20 2315  83 23 97/57    05/24/20 2300  85 17  100 %   05/24/20 2245  80 21 104/69    05/24/20 2230  84 18 133/65 93 %   05/24/20 2215  80 21 125/60    05/24/20 2200  82 20 125/60 98 %   05/24/20 2145  84 21 (!) 126/100 94 %   05/24/20 2130  81 13 122/59 97 %   05/24/20 2115  81 23 120/64 97 %   05/24/20 2100  82 13 99/45    05/24/20 2045  86 22 115/50 96 %   05/24/20 2030  95 (!) 38 103/73 100 %   05/24/20 2015  97 28 130/54 100 %   05/24/20 2000 98.2 °F (36.8 °C) 86 (!) 32 110/82 100 %   05/24/20 1945    114/59    05/24/20 1939  97  (!) 141/120    05/24/20 1915   26 136/66    05/24/20 1900  (!) 112 22 (!) 163/92    05/24/20 1845  (!) 112 26 148/75 (!) 85 %   05/24/20 1830  (!) 106 20 133/86    05/24/20 1815  87 22 123/67 100 %   05/24/20 1800  92 18 109/87 (!) 78 %   05/24/20 1745  80 19 135/62 100 %   05/24/20 1730  85 22  (!) 75 %   05/24/20 1715  80 19 130/62 100 %   05/24/20 1700  87 21 148/56 100 %   05/24/20 1645  85 24 130/56 95 %   05/24/20 1630  80 19 116/73    05/24/20 1615  78 15 112/56 100 %   05/24/20 1600 98 °F (36.7 °C) 91 25 114/62 94 %   05/24/20 1545  95 26 131/54    05/24/20 1530  85 21 101/61    05/24/20 1515  83 20 (!) 126/110 92 %   05/24/20 1500  89 18  93 %   05/24/20 1445  81 21 (!) 150/100 99 %   05/24/20 1430  81 16  96 %   05/24/20 1423  78      05/24/20 1415  81 17 120/61 100 %   05/24/20 1400  83 19 108/48    05/24/20 1345  79  (!) 130/95 91 %   05/24/20 1330  87  123/54 (!) 88 %   05/24/20 1315  81 18 108/68 100 %   05/24/20 1300  76 12 106/45 100 %   05/24/20 1245  81 21 128/69 99 %   05/24/20 1230  82 14 125/61    05/24/20 1215  81 14 116/56    05/24/20 1200  76 15 119/56 98 %   05/24/20 1145 98.1 °F (36.7 °C) 80 19 125/61 97 %   05/24/20 1130  77 18 133/80 96 %   05/24/20 1115  74 17 128/55 100 %   05/24/20 1100  87 22 (!) 131/110 94 %   05/24/20 1045  76 20 131/73 100 %   05/24/20 1030  74 16 117/66 100 %   05/24/20 1015  72 18 142/47 100 %   05/24/20 1000  77 20 139/69    05/24/20 0945  94 22 (!) 134/101 100 %   05/24/20 0930  74 17 142/64 100 %   05/24/20 0915  75 17 (!) 109/91 100 %   05/24/20 0900  72 14 147/49 100 %   05/24/20 0845  73 16 148/64 100 %   05/24/20 0838    141/77    05/24/20 0830  81 20 141/77 100 %   05/24/20 0815  74 14 139/72 100 %   05/24/20 0800  76 18 148/71 100 %   05/24/20 0745 97.5 °F (36.4 °C) 78 13 135/62 100 %   05/24/20 0730  81 14 (!) 149/134 100 %   05/24/20 0715  86 16 147/65 100 %   05/24/20 0700  83 19 131/74 100 %   05/24/20 0645  83 20 151/76 100 %   05/24/20 0630  82 13 123/90 100 %   05/24/20 0619  82  151/75    05/24/20 0615  86 21 151/75 100 %   05/24/20 0613  94  147/80       Temp (24hrs), Av.1 °F (36.7 °C), Min:97.5 °F (36.4 °C), Max:98.6 °F (37 °C)     O2 Flow Rate (L/min): 2 l/min   O2 Device: Nasal cannula    Date 20 - 20 - 20   Shift 3527-26471859 24 Hour Total 1196-9417 2671-0496 24 Hour Total   INTAKE   P.O.  0 0        P. O.  0 0      I. V.(mL/kg/hr) 1306.3(2) 990 2296.3        Heparin Volume 86.4 81.4 167.8        DOBUTamine Volume 96 83.6 179.6        Phenylephrine Volume 223.9 0 223.9        Amiodarone Volume 0 0 0        Volume (0.9% sodium chloride infusion)       Shift Total(mL/kg) 1306.3(24.6) 990(18.6) 2296. 3(43.2)      OUTPUT Urine(mL/kg/hr) 0(0) 500 500        Urine Occurrence(s) 1 x 1 x 2 x        Urine Output (mL) (External Female Catheter 05/21/20) 0 500 500      Shift Total(mL/kg) 0(0) 500(9.4) 500(9.4)      NET 1306.3 490 1796.3      Weight (kg) 53.2 53.2 53.2 53.2 53.2 53.2     General:   Alert, no acute distress   Head:   Atraumatic   Eyes:   Conjunctivae clear   ENT:  Oral mucosa normal   Neck:  Supple, trachea midline, Left bruit   Back:    No CVA tenderness    Chest wall:    No tenderness or deformities    Lungs:   Clear to auscultation bilaterally    Heart:   Normal rate, regular rhythm, no murmur, rubs or gallops   Abdomen:    Soft, non-tender   No masses or organomegaly    Extremities:  No edema or DVT signs   Pulses:  Symmetric all extremities   Skin:  Warm and dry    No rashes or lesions   Neurologic:  Alert and oriented x2   No focal deficits   Urinary catheter:  Purwick     Data Review:     CBC:  Recent Labs     05/25/20  0422 05/24/20  0339 05/24/20  0116   WBC 15.5* 16.6* PLEASE DISREGARD RESULTS   HGB 7.2* 8.1* PLEASE DISREGARD RESULTS   HCT 22.4* 25.7* PLEASE DISREGARD RESULTS    176 PLEASE DISREGARD RESULTS     Metabolic Panel:  Recent Labs     05/25/20  0422 05/24/20  0339 05/23/20  2153 05/23/20  1540 05/23/20  0413    138  --   --  139   K 4.0 4.7  --   --  3.4*   * 110*  --   --  108   CO2 21 20*  --   --  27   BUN 42* 32*  --   --  22*   CREA 1.37* 1.40*  --   --  0.63   GLU 81 122*  --   --  95   CA 7.7* 7.4*  --   --  8.1*   MG  --  2.0 1.9 2.0  --    PHOS  --  5.2*  --  1.8*  --      Micro:  Lab Results   Component Value Date/Time    Culture result: NO GROWTH AFTER 17 HOURS 05/24/2020 11:07 AM    Culture result: ESCHERICHIA COLI (A) 05/20/2020 10:27 PM     Imaging:  Xr Elbow Lt Min 3 V    Result Date: 5/20/2020  EXAM: XR ELBOW LT MIN 3 V INDICATION: fall. COMPARISON: None. FINDINGS: Three views of the left elbow demonstrate no fracture, dislocation, effusion or other acute abnormality. The bones are osteopenic. IMPRESSION: No acute abnormality. Xr Hip Lt W Or Wo Pelv 2-3 Vws    Result Date: 5/21/2020  EXAM: XR HIP LT W OR WO PELV 2-3 VWS INDICATION: please take pelvis AP and 2 views LEFT hip immediately postop in pacu. COMPARISON: 5/20/2020. FINDINGS: AP view of the pelvis and a frogleg lateral view of the left hip obtained portably at 1616 hours after patient transfer in the PACU demonstrate left hip replacement in place without fracture or dislocation. Subcutaneous emphysema as expected. 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 IMPRESSION: Left THR    Xr Hip Lt W Or Wo Pelv 2-3 Vws    Result Date: 5/20/2020  EXAM: XR HIP LT W OR WO PELV 2-3 VWS INDICATION: fall. COMPARISON: February 2018. FINDINGS: AP view of the pelvis and a frogleg lateral view of the left hip demonstrate an acute subcapital left femoral neck fracture with varus angulation. The bones are osteopenic and there is bilateral hip DJD. IMPRESSION: Acute left femoral neck fracture. Ct Head Wo Cont    Result Date: 5/24/2020  EXAM: CT HEAD WO CONT INDICATION: decreased pupillary response COMPARISON: None. CONTRAST: None. TECHNIQUE: Unenhanced CT of the head was performed using 5 mm images. Brain and bone windows were generated. Coronal and sagittal reformats. CT dose reduction was achieved through use of a standardized protocol tailored for this examination and automatic exposure control for dose modulation. FINDINGS: There is prominence of ventricles sulci diffusely. There are extensive changes small vessel disease in the periventricular white matter. There is an old right occipital infarct. There is an old right posterior frontal infarction. No hemorrhage mass or definite acute infarct is identified. Bony structures are intact. IMPRESSION: No significant change. No acute abnormality identified.      Ct Head Wo Cont    Result Date: 5/20/2020  Indication:  fall with head trauma Comparison: CT 10/13/2019 Findings: 5 mm axial images were obtained from the skull base through the vertex. CT dose reduction was achieved through the use of a standardized protocol tailored for this examination and automatic exposure control for dose modulation. There is chronic generalized ventriculomegaly. There is no evidence of intracranial hemorrhage, mass, mass effect, or acute infarct. There is periventricular white matter disease. There is a chronic right frontal lobe infarct. There is a chronic right occipital lobe infarct. No extra-axial fluid collections are seen. The visualized paranasal sinuses and mastoid air cells are clear. The orbital structures are unremarkable. No osseous abnormalities are seen. Impression: 1. No evidence of acute infarct or intracranial hemorrhage. 2. Severe periventricular white matter disease is likely secondary to chronic small vessel ischemic changes. 3. Chronic right frontal and occipital lobe infarctions. 4. Chronic generalized ventriculomegaly. Ct Chest Wo Cont    Result Date: 5/24/2020  INDICATION: Cardiogenic shock COMPARISON: CONTRAST: None. TECHNIQUE:  5 mm axial images were obtained through the chest. Coronal and sagittal reformats were generated. CT dose reduction was achieved through use of a standardized protocol tailored for this examination and automatic exposure control for dose modulation. The absence of intravenous contrast reduces the sensitivity for evaluation of the mediastinum, richelle, vasculature, and upper abdominal organs. FINDINGS: There are extensive changes of atherosclerosis in the carotid arteries. Left IJ catheter tip is at the left brachiocephalic SVC junction. CHEST WALL: No mass or axillary lymphadenopathy. THYROID: No nodule. MEDIASTINUM: No mass or lymphadenopathy. RICHELLE: No mass or lymphadenopathy. THORACIC AORTA: No aneurysm. Atherosclerotic changes are noted. MAIN PULMONARY ARTERY: Normal in caliber. TRACHEA/BRONCHI: Patent. ESOPHAGUS: No wall thickening or dilatation. HEART: Mildly enlarged. There is coronary artery calcification. PLEURA: There are tiny effusions LUNGS: There are changes of atherosclerosis. There is minor basilar atelectasis. INCIDENTALLY IMAGED UPPER ABDOMEN: No significant abnormality in the incidentally imaged upper abdomen. BONES: There are degenerative changes in the shoulders with rotator cuff atrophy. There are compression deformities of T12 and L1     IMPRESSION: 1. There is slight bibasilar atelectasis/tiny effusions. There are changes of emphysema 2. There are extensive atherosclerotic changes in the carotid arteries, thoracic aorta. There is coronary artery calcification. 3. There are degenerative changes in the shoulder with superior elevation of the humeral heads consistent with rotator cuff atrophy. There are are compression deformities of T12 and L1. Xr Chest Port    Result Date: 5/24/2020  EXAM:  XR CHEST PORT INDICATION:  central line placement COMPARISON:  2017 FINDINGS: A portable AP radiograph of the chest was obtained at 0049 hours. Left IJ catheter tip overlies SVC left brachiocephalic junction. There is no pneumothorax. .  Lungs are clear of an acute process. .  The cardiac and mediastinal contours and pulmonary vascularity are normal. .     IMPRESSION: Left IJ catheter tip overlies left brachiocephalic SVC junction. There is no pneumothorax.      Medications reviewed  Current Facility-Administered Medications   Medication Dose Route Frequency    DOBUTamine (DOBUTREX) 500 mg/250 mL (2,000 mcg/mL) infusion  0-10 mcg/kg/min IntraVENous TITRATE    cefTRIAXone (ROCEPHIN) 1 g in 0.9% sodium chloride (MBP/ADV) 50 mL  1 g IntraVENous Q24H    0.9% sodium chloride infusion  75 mL/hr IntraVENous CONTINUOUS    heparin 25,000 units in D5W 250 ml infusion  12-25 Units/kg/hr IntraVENous TITRATE    amiodarone (CORDARONE) 375 mg in dextrose 5% 250 mL infusion  0.5-1 mg/min IntraVENous TITRATE    aspirin chewable tablet 81 mg  81 mg Oral DAILY    PHENYLephrine (MOSES-SYNEPHRINE) 30 mg in 0.9% sodium chloride 250 mL infusion   mcg/min IntraVENous TITRATE    sodium chloride (NS) flush 5-40 mL  5-40 mL IntraVENous Q8H    sodium chloride (NS) flush 5-40 mL  5-40 mL IntraVENous PRN    naloxone (NARCAN) injection 0.4 mg  0.4 mg IntraVENous PRN    calcium-vitamin D (OS-NENA) 500 mg-200 unit tablet  1 Tab Oral TID WITH MEALS    senna-docusate (PERICOLACE) 8.6-50 mg per tablet 1 Tab  1 Tab Oral BID    polyethylene glycol (MIRALAX) packet 17 g  17 g Oral DAILY    bisacodyL (DULCOLAX) suppository 10 mg  10 mg Rectal DAILY PRN    traMADoL (ULTRAM) tablet 50 mg  50 mg Oral Q6H PRN    oxyCODONE IR (ROXICODONE) tablet 2.5 mg  2.5 mg Oral Q4H PRN    ondansetron (ZOFRAN ODT) tablet 4 mg  4 mg Oral Q4H PRN    sodium chloride (NS) flush 5-40 mL  5-40 mL IntraVENous Q8H    sodium chloride (NS) flush 5-40 mL  5-40 mL IntraVENous PRN    atorvastatin (LIPITOR) tablet 40 mg  40 mg Oral QHS    memantine (NAMENDA) tablet 10 mg  10 mg Oral BID         Signed:   Jaya Leo MD   Resident, Family Medicine      Attending note: Attending note to follow. ..

## 2020-05-26 PROBLEM — M62.82 RHABDOMYOLYSIS: Status: ACTIVE | Noted: 2020-05-26

## 2020-05-26 PROBLEM — R00.1 BRADYCARDIA: Status: ACTIVE | Noted: 2020-05-26

## 2020-05-26 PROBLEM — I21.4 NSTEMI (NON-ST ELEVATED MYOCARDIAL INFARCTION) (HCC): Status: ACTIVE | Noted: 2020-05-26

## 2020-05-26 PROBLEM — D64.9 ANEMIA: Status: ACTIVE | Noted: 2020-05-26

## 2020-05-26 PROBLEM — N39.0 UTI (URINARY TRACT INFECTION): Status: ACTIVE | Noted: 2020-05-26

## 2020-05-26 LAB
ABO + RH BLD: NORMAL
ANION GAP SERPL CALC-SCNC: 3 MMOL/L (ref 5–15)
ATRIAL RATE: 76 BPM
ATRIAL RATE: 97 BPM
BASOPHILS # BLD: 0 K/UL (ref 0–0.1)
BASOPHILS NFR BLD: 0 % (ref 0–1)
BLD PROD TYP BPU: NORMAL
BLOOD GROUP ANTIBODIES SERPL: NORMAL
BPU ID: NORMAL
BUN SERPL-MCNC: 37 MG/DL (ref 6–20)
BUN/CREAT SERPL: 36 (ref 12–20)
CALCIUM SERPL-MCNC: 7.8 MG/DL (ref 8.5–10.1)
CALCULATED P AXIS, ECG09: 43 DEGREES
CALCULATED R AXIS, ECG10: 14 DEGREES
CALCULATED R AXIS, ECG10: 38 DEGREES
CALCULATED T AXIS, ECG11: 172 DEGREES
CALCULATED T AXIS, ECG11: 180 DEGREES
CHLORIDE SERPL-SCNC: 116 MMOL/L (ref 97–108)
CO2 SERPL-SCNC: 25 MMOL/L (ref 21–32)
CREAT SERPL-MCNC: 1.03 MG/DL (ref 0.55–1.02)
CROSSMATCH RESULT,%XM: NORMAL
DIAGNOSIS, 93000: NORMAL
DIAGNOSIS, 93000: NORMAL
DIFFERENTIAL METHOD BLD: ABNORMAL
EOSINOPHIL # BLD: 0.2 K/UL (ref 0–0.4)
EOSINOPHIL NFR BLD: 2 % (ref 0–7)
ERYTHROCYTE [DISTWIDTH] IN BLOOD BY AUTOMATED COUNT: 15.5 % (ref 11.5–14.5)
GLUCOSE SERPL-MCNC: 82 MG/DL (ref 65–100)
HCT VFR BLD AUTO: 26.3 % (ref 35–47)
HCT VFR BLD AUTO: 26.8 % (ref 35–47)
HCT VFR BLD AUTO: 26.8 % (ref 35–47)
HCT VFR BLD AUTO: 27.2 % (ref 35–47)
HGB BLD-MCNC: 8.8 G/DL (ref 11.5–16)
HGB BLD-MCNC: 8.8 G/DL (ref 11.5–16)
HGB BLD-MCNC: 8.9 G/DL (ref 11.5–16)
HGB BLD-MCNC: 8.9 G/DL (ref 11.5–16)
IMM GRANULOCYTES # BLD AUTO: 0 K/UL (ref 0–0.04)
IMM GRANULOCYTES NFR BLD AUTO: 0 % (ref 0–0.5)
LYMPHOCYTES # BLD: 1 K/UL (ref 0.8–3.5)
LYMPHOCYTES NFR BLD: 8 % (ref 12–49)
MAGNESIUM SERPL-MCNC: 2.4 MG/DL (ref 1.6–2.4)
MCH RBC QN AUTO: 32.2 PG (ref 26–34)
MCHC RBC AUTO-ENTMCNC: 33.5 G/DL (ref 30–36.5)
MCV RBC AUTO: 96.3 FL (ref 80–99)
METAMYELOCYTES NFR BLD MANUAL: 2 %
MONOCYTES # BLD: 0.8 K/UL (ref 0–1)
MONOCYTES NFR BLD: 7 % (ref 5–13)
NEUTS BAND NFR BLD MANUAL: 2 %
NEUTS SEG # BLD: 9.6 K/UL (ref 1.8–8)
NEUTS SEG NFR BLD: 79 % (ref 32–75)
NRBC # BLD: 0.26 K/UL (ref 0–0.01)
NRBC BLD-RTO: 2.2 PER 100 WBC
P-R INTERVAL, ECG05: 146 MS
PHOSPHATE SERPL-MCNC: 2 MG/DL (ref 2.6–4.7)
PLATELET # BLD AUTO: 203 K/UL (ref 150–400)
PMV BLD AUTO: 9.9 FL (ref 8.9–12.9)
POTASSIUM SERPL-SCNC: 4 MMOL/L (ref 3.5–5.1)
Q-T INTERVAL, ECG07: 266 MS
Q-T INTERVAL, ECG07: 376 MS
QRS DURATION, ECG06: 84 MS
QRS DURATION, ECG06: 94 MS
QTC CALCULATION (BEZET), ECG08: 367 MS
QTC CALCULATION (BEZET), ECG08: 423 MS
RBC # BLD AUTO: 2.73 M/UL (ref 3.8–5.2)
RBC MORPH BLD: ABNORMAL
RBC MORPH BLD: ABNORMAL
SODIUM SERPL-SCNC: 144 MMOL/L (ref 136–145)
SPECIMEN EXP DATE BLD: NORMAL
STATUS OF UNIT,%ST: NORMAL
UNIT DIVISION, %UDIV: 0
VENTRICULAR RATE, ECG03: 115 BPM
VENTRICULAR RATE, ECG03: 76 BPM
WBC # BLD AUTO: 11.9 K/UL (ref 3.6–11)
WBC MORPH BLD: ABNORMAL

## 2020-05-26 PROCEDURE — 84100 ASSAY OF PHOSPHORUS: CPT

## 2020-05-26 PROCEDURE — 74011250636 HC RX REV CODE- 250/636: Performed by: STUDENT IN AN ORGANIZED HEALTH CARE EDUCATION/TRAINING PROGRAM

## 2020-05-26 PROCEDURE — 74011250637 HC RX REV CODE- 250/637: Performed by: STUDENT IN AN ORGANIZED HEALTH CARE EDUCATION/TRAINING PROGRAM

## 2020-05-26 PROCEDURE — 85018 HEMOGLOBIN: CPT

## 2020-05-26 PROCEDURE — 36415 COLL VENOUS BLD VENIPUNCTURE: CPT

## 2020-05-26 PROCEDURE — 93005 ELECTROCARDIOGRAM TRACING: CPT

## 2020-05-26 PROCEDURE — 80048 BASIC METABOLIC PNL TOTAL CA: CPT

## 2020-05-26 PROCEDURE — 97110 THERAPEUTIC EXERCISES: CPT

## 2020-05-26 PROCEDURE — 83735 ASSAY OF MAGNESIUM: CPT

## 2020-05-26 PROCEDURE — 65660000000 HC RM CCU STEPDOWN

## 2020-05-26 PROCEDURE — 74011250637 HC RX REV CODE- 250/637: Performed by: NURSE PRACTITIONER

## 2020-05-26 PROCEDURE — 74011000250 HC RX REV CODE- 250: Performed by: STUDENT IN AN ORGANIZED HEALTH CARE EDUCATION/TRAINING PROGRAM

## 2020-05-26 PROCEDURE — 74011250637 HC RX REV CODE- 250/637: Performed by: SPECIALIST

## 2020-05-26 PROCEDURE — 97530 THERAPEUTIC ACTIVITIES: CPT

## 2020-05-26 PROCEDURE — 74011000258 HC RX REV CODE- 258: Performed by: STUDENT IN AN ORGANIZED HEALTH CARE EDUCATION/TRAINING PROGRAM

## 2020-05-26 PROCEDURE — 74011250637 HC RX REV CODE- 250/637: Performed by: ORTHOPAEDIC SURGERY

## 2020-05-26 PROCEDURE — 85025 COMPLETE CBC W/AUTO DIFF WBC: CPT

## 2020-05-26 PROCEDURE — 74011250636 HC RX REV CODE- 250/636: Performed by: INTERNAL MEDICINE

## 2020-05-26 PROCEDURE — 77030038269 HC DRN EXT URIN PURWCK BARD -A

## 2020-05-26 PROCEDURE — 51798 US URINE CAPACITY MEASURE: CPT

## 2020-05-26 PROCEDURE — 92526 ORAL FUNCTION THERAPY: CPT

## 2020-05-26 RX ORDER — CARVEDILOL 3.12 MG/1
3.12 TABLET ORAL 2 TIMES DAILY WITH MEALS
Status: DISCONTINUED | OUTPATIENT
Start: 2020-05-26 | End: 2020-05-27 | Stop reason: HOSPADM

## 2020-05-26 RX ORDER — ENOXAPARIN SODIUM 100 MG/ML
40 INJECTION SUBCUTANEOUS EVERY 24 HOURS
Qty: 10.8 ML | Refills: 0 | Status: SHIPPED | OUTPATIENT
Start: 2020-05-26 | End: 2020-06-22

## 2020-05-26 RX ORDER — LISINOPRIL 5 MG/1
5 TABLET ORAL DAILY
Qty: 30 TAB | Refills: 0 | Status: SHIPPED | OUTPATIENT
Start: 2020-05-27 | End: 2020-05-27

## 2020-05-26 RX ORDER — FERROUS SULFATE, DRIED 160(50) MG
1 TABLET, EXTENDED RELEASE ORAL
Qty: 90 TAB | Refills: 0 | Status: SHIPPED | OUTPATIENT
Start: 2020-05-27 | End: 2020-06-26

## 2020-05-26 RX ORDER — CARVEDILOL 3.12 MG/1
3.12 TABLET ORAL 2 TIMES DAILY WITH MEALS
Status: DISCONTINUED | OUTPATIENT
Start: 2020-05-26 | End: 2020-05-26

## 2020-05-26 RX ORDER — ENOXAPARIN SODIUM 100 MG/ML
40 INJECTION SUBCUTANEOUS EVERY 24 HOURS
Status: DISCONTINUED | OUTPATIENT
Start: 2020-05-26 | End: 2020-05-27 | Stop reason: HOSPADM

## 2020-05-26 RX ORDER — LISINOPRIL 5 MG/1
5 TABLET ORAL DAILY
Status: DISCONTINUED | OUTPATIENT
Start: 2020-05-26 | End: 2020-05-27

## 2020-05-26 RX ADMIN — Medication 10 ML: at 14:23

## 2020-05-26 RX ADMIN — TRAMADOL HYDROCHLORIDE 50 MG: 50 TABLET, FILM COATED ORAL at 10:29

## 2020-05-26 RX ADMIN — HEPARIN SODIUM 5000 UNITS: 5000 INJECTION INTRAVENOUS; SUBCUTANEOUS at 05:44

## 2020-05-26 RX ADMIN — OYSTER SHELL CALCIUM WITH VITAMIN D 1 TABLET: 500; 200 TABLET, FILM COATED ORAL at 09:18

## 2020-05-26 RX ADMIN — ATORVASTATIN CALCIUM 40 MG: 20 TABLET, FILM COATED ORAL at 21:12

## 2020-05-26 RX ADMIN — Medication 10 ML: at 21:12

## 2020-05-26 RX ADMIN — Medication 10 ML: at 05:43

## 2020-05-26 RX ADMIN — SENNOSIDES AND DOCUSATE SODIUM 1 TABLET: 8.6; 5 TABLET ORAL at 09:18

## 2020-05-26 RX ADMIN — Medication 10 ML: at 21:13

## 2020-05-26 RX ADMIN — CEFTRIAXONE 1 G: 1 INJECTION, POWDER, FOR SOLUTION INTRAMUSCULAR; INTRAVENOUS at 09:19

## 2020-05-26 RX ADMIN — LISINOPRIL 5 MG: 5 TABLET ORAL at 09:56

## 2020-05-26 RX ADMIN — SODIUM PHOSPHATE, MONOBASIC, MONOHYDRATE 15 MMOL: 276; 142 INJECTION, SOLUTION INTRAVENOUS at 06:54

## 2020-05-26 RX ADMIN — OYSTER SHELL CALCIUM WITH VITAMIN D 1 TABLET: 500; 200 TABLET, FILM COATED ORAL at 12:13

## 2020-05-26 RX ADMIN — SENNOSIDES AND DOCUSATE SODIUM 1 TABLET: 8.6; 5 TABLET ORAL at 17:13

## 2020-05-26 RX ADMIN — CARVEDILOL 3.12 MG: 3.12 TABLET, FILM COATED ORAL at 17:14

## 2020-05-26 RX ADMIN — MEMANTINE 10 MG: 10 TABLET ORAL at 09:18

## 2020-05-26 RX ADMIN — MEMANTINE 10 MG: 10 TABLET ORAL at 17:14

## 2020-05-26 RX ADMIN — Medication 10 ML: at 05:44

## 2020-05-26 RX ADMIN — ENOXAPARIN SODIUM 40 MG: 40 INJECTION SUBCUTANEOUS at 21:12

## 2020-05-26 RX ADMIN — OYSTER SHELL CALCIUM WITH VITAMIN D 1 TABLET: 500; 200 TABLET, FILM COATED ORAL at 17:14

## 2020-05-26 RX ADMIN — POLYETHYLENE GLYCOL 3350 17 G: 17 POWDER, FOR SOLUTION ORAL at 09:19

## 2020-05-26 NOTE — PROGRESS NOTES
5/26/2020   2:44 PM  Pt is accepted for rehab at [facility], s/w Se[facility] in admissions faxed [phone] 2 negative COVID results, pt has [facility] requires auth. CM  faxed all clinicals to  [facility] [phone] for auth. CM placed pt on will  [facility] call list for 5/27/20 via stretcher. CM updated pt's Dtr Jackie Jones [phone]. Sundar iPke      10:11 AM  Pt is COVID negative  EMR reviewed, pt is continuing to require medical management for Lt femoral neck fracture, NSTEMI, AFib, cardiology is following, Acute Hypoxemic Respiratory Failure,pt now on RA. Transitions of Care Plan:  RUR  19%  1. CM to follow through for treatment/response  2. POD #5  Lt hip hemiarthroplasty, plan for SNF at d/c, referral has been place to [facility] in [employer], pt is COVID negative  5/20/20 and 5/24/20, will require [facility] [employer] insurance auth  3. D/C when stable to SNF  4.  Pt will require transport at d/c.    Sundar Pike

## 2020-05-26 NOTE — PROGRESS NOTES
Music Therapy Assessment  [address] [place] [id number]     1940  78 y.o.  female    Patient Telephone Number: [phone] (home)   Quaker Affiliation: Holiness   Language: English   Patient Active Problem List    Diagnosis Date Noted    Anemia 05/26/2020    NSTEMI (non-ST elevated myocardial infarction) ([facility] 05/26/2020    Rhabdomyolysis 05/26/2020    Bradycardia 05/26/2020    UTI (urinary tract infection) 05/26/2020    Femoral neck fracture ([facility] 05/20/2020    Cardiomyopathy ([facility] 08/04/2017    TIA (transient ischemic attack)     Unresponsive episode 08/03/2017    H/O CVA (4/2017) 04/10/2017    Cerebral atrophy ([facility] 04/10/2017    Hyperlipidemia LDL goal <70 04/10/2017    History of CVA (cerebrovascular accident) 04/10/2017    Adjustment disorder 04/10/2014    Anxiety 04/10/2014    Hypertension 11/15/2013        Date: 5/26/2020            Total Time (in minutes): 25          SFM 3 INTERVNTNL CARE    Mental Status:   [x] Alert [  ] Daysi Moulding [  ]  Confused  [  ] Minimally responsive  [  ] Sleeping    Communication Status: [  ] Impaired Speech [  ] Nonverbal -N/A    Physical Status:   [  ] Oxygen in use  [  ] Hard of Hearing [  ] Vision Impaired  [  ] Ambulatory  [  ] Ambulatory with assistance [  ] Non-ambulatory -N/A    Music Preferences, Background: Classical, Showtunes. Clinical Problem addressed: Support relaxation, positive social interaction. Goal(s) met in session:  Physical/Pain management (Scale of 1-10):    Pre-session rating: Pt reported having pain, and said it was being managed by medication. She didn't rate her pain. Post-session rating: Pt didn't report on pain.   [x] Increased relaxation   [  ] Affected breathing patterns  [  ] Decreased muscle tension   [  ] Decreased agitation  [  ] Affected heart rate    [  ] Increased alertness     Emotional/Psychological:  [x] Increased self-expression   [  ] Decreased aggressive behavior   [  ] Decreased feelings of stress  [  ] Discussed healthy coping skills     [x] Improved mood    [  ] Decreased withdrawn behavior     Social:  [  ] Decreased feelings of isolation/loneliness [x] Positive social interaction   [  ] Provided support and/or comfort for family/friends    Spiritual:  [  ] Spiritual support    [  ] Expressed peace  [  ] Expressed tad    [  ] Discussed beliefs    Techniques Utilized (Check all that apply):   [  ] Procedural support MT [x] Music for relaxation [x] Patient preferred music  [  ] Nicole analysis  [x] Song choice  [  ] Music for validation  [  ] Entrainment  [  ] Movement to music [  ] Guided visualization  [  ] Bertha Gladys  [  ] Patient instrument playing [  ] Kimmie Font writing  [  ] Elodia Ordoñez along   [  ] Dotty Killer  [  ] Sensory stimulation  [  ] Active Listening  [  ] Music for spiritual support [  ] Making of CDs as gifts    Session Observations:  Referral from [facility], Palliative . Patient (pt) was alert but drowsy lying in bed. Her nurse Lewis Beltrán was briefly in the room at the start of the session and shared the pt had just woken up. After asking pt how she was feeling, this music therapist (MT) asked pt about her music preferences. Pt took time to answer questions, and would respond more readily to yes/no questions. MT sat across from pt and sang and played You'll Never Walk Alone from the musical Carousel. Pt closed her eyes and her facial expression was relaxed as she listened. She expressed enjoyment after the song and chose to hear Good Night, My Someone from the musical The Music Man. Pt increased relaxation in response to MT singing and playing this with guitar as evidenced by (AEB) closing her eyes and taking a moment following the song to open them. She chose to hear the Beverly Kojo song Beautiful Dreamer, which MT sang and played. Pt expressed gratitude for the session. Will follow as able.     Lorena Mccormack MT-BC (Music Therapist-Board Certified)  [facility] Department  Referral-based service

## 2020-05-26 NOTE — PROGRESS NOTES
[address] [place] [address]   Office [phone]  Fax [phone]          Assessment and Plan     Foster Szymanski is a 78 y.o. female PMH TIA/CVA, HTN, HLD, dementia who is admitted for L femoral neck fracture. 24 Hour Events: No acute events. BG 67 on CMP and 75, 96 s/p juice. Accepted this AM to [facility] ([facility])        Hypovolemic vs Septic Shock 2/2 complicated UTI: RESOLVED. s/p [place] gtt + kael gtt. +UA (4+ bact, nitrites, large LE). Ucx: E coli. S/p Tx w/ Levaquin + CTX. Bcx NTD.  - Bcx neg x3d   - Off [place] gtt 5/25 am    - Off Kael gtt 5/24  - Cards following  - Cardiac monitoring      NSTEMI: Asymptomatic. Ischemic ST depression in multiple leads. Treated with heparin gtt. Echo EF 50-55%. - s/p Hep gtt - off 5/25  - Trop peaked at 20  - Coreg 3.125mg BID      New Onset Afib:  Afib w/ RVR rate-115 on 5/23, which soon converted to NSR s/p Digoxin load. - Daily Mag/Phos/CBC/CMP  - Cardiac monitoring   - Cards following    L Femoral Neck Fracture: Due to GLF. S/p L hip bipolar hemiarthroplasty-5/21 (Dr. Jennifer Moya). - Lovenox 40mg daily x4 wks per Ortho  - STOP home plavix 75mg daily for now (resume after completion of Lovenox)  - Tramadol 50mg q6H prn   - Weightbearing as tolerated per PT/OT in SNF  - START Calcium-Vit Supplement TID  - F/u OP Ortho   - F/u PCP to discuss starting Fosfomax for osteoporosis given hip fracture  - Daily CBC/BMP    AHRF: RESOLVED. new O2 requirement. Currently on 4L nc. Not in distress on exam. Now satting 99% on RA.      AMS w/ decreased pupillary light reflex: RESOLVED back to BL. No acute findings on Head CT.   - Will continue to monitor    Rhabdomyolysis: 2 GLF, during the first was on the ground overnight. CK 1639 without any kidney injury. UA mod blood. CK   - CK 1639-->641--> 344    Hypoglycemia: BG 67 on CMP and 75, 96 s/p juice. - Will recheck BG after breakfast.     Post-operative Anemia- Hgb postop ~8.5.  On 5/25 dropped to 7.2 with repeat 6.9. T&S, verbal consent from POA obtained  - s/p 1UpRBC: post transfusion Hgb 8.6   - Transfuse prn, TT <8  - Retic 3.1 (appropriately elevated), haptoglobin 213^, FOBT neg   - Daily CBC     Chronic HTN: /57 POA. bp 131/66 now. - Home Coreg 3.125mg BID   - START Lisinopril 10 mg daily (STOP home 32QQ qhs)     Complicated UTI: +UA (4+ bact, nitrites, large LE). Ucx: E coli. S/p Tx w/ Levaquin + CTX. Bcx NTD. Bradycardia with a hx of  bigeminy HR POA 55. Patient seen by Dr. Davalos in the past for cardiology clearance d/t Bradycardia for a cataract procedure. EKG was noted to find PVCs in the form of bigemeny. Bradycardia was false and patient was deemed stable for surgery. Trop neg.   - Cardiac monitoring     Hx of CVA/TIA in 2017. CT scan POA noted chronic ischemic changes   - STOP home plavix 75mg daily for now (resume after completion of Lovenox 40mg daily x4wks)    HLD last lipid panel 2019 all values wnl  - Home Lipitor 40mg nightly      Dementia  - Home Namenda 10mg BID        FEN/GI - Dysphagia 1 pureed diet. Activity - as tolerated  DVT prophylaxis - Lovenox 40mg daily x4 wks  GI prophylaxis - Not indicated at this time  Disposition - Plan to d/c to TBD. Code Status - DNR    I appreciate the opportunity to participate in the care of this patient,  Coni Plasencia MD  Family Medicine Resident         Subjective / Objective     Subjective Pt was seen and examined at bedside. No concerns overnight. Pt denies chest pain, nausea, vomiting, abdominal pain. Temp (24hrs), Av °F (36.7 °C), Min:97.4 °F (36.3 °C), Max:98.7 °F (37.1 °C)     Objective:  Vital signs: (most recent): Blood pressure 163/86, pulse 63, temperature 97.4 °F (36.3 °C), resp. rate 17, height 4' 11\" (1.499 m), weight 123 lb (55.8 kg), SpO2 96 %.       Respiratory: O2 Flow Rate (L/min): 2 l/min O2 Device: Room air   Visit Vitals  /66   Pulse (!) 58   Temp 97.5 °F (36.4 °C)   Resp 21   Ht 4' 11\" (1.499 m)   Wt 129 lb (58.5 kg)   SpO2 97%   BMI 26.05 kg/m²       Physical Exam  General:   Alert, cooperative, no acute distress   Head:   Atraumatic   Eyes:   Conjunctivae clear   ENT:  Oral mucosa normal   Neck:  Supple, trachea midline, no adenopathy   No JVD   Back:    No CVA tenderness    Lungs:   Clear to auscultation bilaterally    Heart:   Regular rhythm, no murmur   Abdomen:    Soft, non-tender   No masses or organomegaly    Extremities:  No edema or DVT signs   Skin:  Warm and dry    No rashes or lesions   Neurologic:  Oriented   No focal deficits       Urinary catheter:  [employer]      I/O:  Date 05/26/20 0700 - 05/27/20 0659 05/27/20 0700 - 05/28/20 0659   Shift 9598-3671 5934-1943 24 Hour Total 9259-8926 6783-4412 24 Hour Total   INTAKE   P.O. 240 720 960        P.O. 240 720 960      I. V.(mL/kg/hr)  20(0) 20(0)        I.V.  20 20      Shift Total(mL/kg) 240(4.3) 740(12.6) 980(16.7)      OUTPUT   Urine(mL/kg/hr) 300(0.4) 280(0.4) 580(0.4)        Urine Output (mL) (External Female Catheter 05/21/20) 300 280 580      Shift Total(mL/kg) 300(5.4) 280(4.8) 580(9.9)      NET -60 460 400      Weight (kg) 55.8 58.5 58.5 58.5 58.5 58.5       Inpatient Medications  Current Facility-Administered Medications   Medication Dose Route Frequency    dextrose 10% infusion 250 mL  250 mL IntraVENous ONCE    lisinopriL (PRINIVIL, ZESTRIL) tablet 10 mg  10 mg Oral DAILY    carvediloL (COREG) tablet 3.125 mg  3.125 mg Oral BID WITH MEALS    enoxaparin (LOVENOX) injection 40 mg  40 mg SubCUTAneous Q24H    0.9% sodium chloride infusion 250 mL  250 mL IntraVENous PRN    0.9% sodium chloride infusion 250 mL  250 mL IntraVENous PRN    sodium chloride (NS) flush 5-40 mL  5-40 mL IntraVENous Q8H    sodium chloride (NS) flush 5-40 mL  5-40 mL IntraVENous PRN    naloxone (NARCAN) injection 0.4 mg  0.4 mg IntraVENous PRN    calcium-vitamin D (OS-NENA) 500 mg-200 unit tablet  1 Tab Oral TID WITH MEALS    senna-docusate (PERICOLACE) 8.6-50 mg per tablet 1 Tab  1 Tab Oral BID    polyethylene glycol (MIRALAX) packet 17 g  17 g Oral DAILY    bisacodyL (DULCOLAX) suppository 10 mg  10 mg Rectal DAILY PRN    traMADoL (ULTRAM) tablet 50 mg  50 mg Oral Q6H PRN    oxyCODONE IR (ROXICODONE) tablet 2.5 mg  2.5 mg Oral Q4H PRN    ondansetron (ZOFRAN ODT) tablet 4 mg  4 mg Oral Q4H PRN    sodium chloride (NS) flush 5-40 mL  5-40 mL IntraVENous Q8H    sodium chloride (NS) flush 5-40 mL  5-40 mL IntraVENous PRN    atorvastatin (LIPITOR) tablet 40 mg  40 mg Oral QHS    memantine (NAMENDA) tablet 10 mg  10 mg Oral BID         Allergies  Allergies   Allergen Reactions    Benadryl Allergy/Cold [Diphenhyd-Pe-Acetaminophen] Hives     Per patient's daughter, she thinks patient has had benadryl since then and tolerated it.  Penicillins Hives     Was treated with Augmentin in June 2015, tolerated medication well. CBC:  Recent Labs     05/27/20  1000 05/27/20  0340 05/26/20  2154  05/26/20  0336  05/25/20  0422   WBC  --  13.3*  --   --  11.9*  --  15.5*   HGB 9.3* 9.0* 8.9*   < > 8.8*   < > 7.2*   HCT 28.5* 27.6* 26.8*   < > 26.3*   < > 22.4*   PLT  --  230  --   --  203  --  185    < > = values in this interval not displayed.        Metabolic Panel:  Recent Labs     05/27/20  0340 05/26/20  0336 05/25/20  0422    144 142   K 3.7 4.0 4.0   * 116* 113*   CO2 25 25 21   BUN 31* 37* 42*   CREA 0.74 1.03* 1.37*   GLU 67 82 81   CA 7.9* 7.8* 7.7*   MG 2.2 2.4  --    PHOS 2.6 2.0*  --               For Billing    Chief Complaint   Patient presents with   [facility] Problems  Date Reviewed: 5/21/2020          Codes Class Noted POA    Anemia ICD-10-CM: D64.9  ICD-9-CM: 285.9  5/26/2020 Unknown        NSTEMI (non-ST elevated myocardial infarction) ([facility] ICD-10-CM: I21.4  ICD-9-CM: 410.70  5/26/2020 Unknown        Rhabdomyolysis ICD-10-CM: M84.00  ICD-9-CM: 728.88  5/26/2020 Unknown        Bradycardia ICD-10-CM: R00.1  ICD-9-CM: 427.89  5/26/2020 Unknown        UTI (urinary tract infection) ICD-10-CM: N39.0  ICD-9-CM: 599.0  5/26/2020 Unknown        * (Principal) Femoral neck fracture ([facility] ICD-10-CM: S72.009A  ICD-9-CM: 820.8  5/20/2020 Unknown        Hypertension ICD-10-CM: I10  ICD-9-CM: 401.9  11/15/2013 Yes    Overview Addendum 4/28/2017  1:45 PM by Ana Lilia Engel MD     A. Goal BP <140/90 s/p CVA in 2017. B. Echo (4/10/17):  EF 40% with mild GHK. Mild MR. Mild to mod TR. PASP 53. Neg bubble.

## 2020-05-26 NOTE — PROGRESS NOTES
Problem: Dysphagia (Adult)  Goal: *Acute Goals and Plan of Care (Insert Text)  Description: Speech Pathology  Initiated 5/25/20  1. Patient will tolerate puree snacks/ sips of liquids without overt s/s of aspiration within 7 days   2. Patient will participate in further assessment of solids once agreeable to solids AND denture placement within 7 days    Outcome: [address] [place] TREATMENT  Patient: Levi Huntley (35 y.o. female)  Date: 5/26/2020  Diagnosis: Femoral neck fracture ([address] [S72.009A]   Femoral neck fracture (HCC)  Procedure(s) (LRB):  LEFT HIP HEMIARTHROPLASTY (Left) 5 Days Post-Op  Precautions: aspiration Fall, WBAT, Total hip(posterior)    ASSESSMENT:  Taking min PO and not chewing. Diet downgraded to purees, thins. RD has provided supplements at each meal appropriately. PLAN:  Recommendations and Planned Interventions:  Continue dysphagia 1,thins. Hope for improved PO intake. Patient continues to benefit from skilled intervention to address the above impairments. Continue treatment per established plan of care. Discharge Recommendations: To Be Determined     SUBJECTIVE:   Patient frequently shrugging her shoulders. OBJECTIVE:   Cognitive and Communication Status:  Neurologic State: Alert  Orientation Level: Oriented to person, Disoriented to time, Disoriented to situation, Disoriented to place(knows she is at the hospital but unable to recall which one)  Cognition: (apathetic.not interested in much PO)  Perception: Cues to maintain midline in standing, Cues to attend right visual field, Cues to attend to right side of body  Perseveration: No perseveration noted  Safety/Judgement: Decreased awareness of environment  Dysphagia Treatment:  Oral Assessment:     P.O. Trials:  Patient Position: upright in bed  Vocal quality prior to P.O.: No impairment  Consistency Presented:  Thin liquid;Puree  How Presented: SLP-fed/presented;Self-fed/presented;Straw;Spoon   ORAL PHASE:   Bolus Acceptance: (reduced lip closure on spoon, but resistent to spoon rub on upper lip and closed her mouth)  Bolus Formation/Control: No impairment(did not eval chewing)     Propulsion: Delayed (# of seconds)(mild)  Oral Residue: None  PHARYNGEAL PHASE:   Initiation of Swallow: No impairment  Laryngeal Elevation: Functional  Aspiration Signs/Symptoms: None                      Exercises:  Laryngeal Exercises:                                                                                                                                   Pain:  Pain Scale 1: Numeric (0 - 10)  Pain Intensity 1: 0       After treatment:   Call bell within reach and Nursing notified    COMMUNICATION/EDUCATION:   Patient was educated regarding her deficit(s) of DYSPHAGIA as this relates to her diagnosis of HIP FX. She demonstrated Fair understanding as evidenced by discussion. .    The patient's plan of care including recommendations, planned interventions, and recommended diet changes were discussed with: Registered nurse and Certified nursing assistant/patient care technician.      LUIS Johnson  Time Calculation: 15 mins

## 2020-05-26 NOTE — PROGRESS NOTES
Bedside and Verbal shift change report given to Opal Dickens RN (oncoming nurse) by Pattie Jaeger RN (offgoing nurse). Report included the following information SBAR, Kardex, MAR and Cardiac Rhythm Sinus Magnus/NSR with ST depression. 0800: Pt resting quietly in bed. No c/o pain at the time. Patient stated pain is \"only when I move. \"   1030: Turned pt to the left. Pt tolerated well. 1130: Pt without complaints. Encouraged pt to call for assistance before getting out of bed. Pt acknowledged understanding. Call light in reach. 1330: SBP in the 150-170s. Family Practice informed. 1430: Pt resting quietly in bed. No complaints of pain. 1530: Rachelle form [employer] at bedside. 1630: Pt sleeping. No signs of distress. Bedside and Verbal shift change report given to Fariba Davila RN (oncoming nurse) by Opal Dickens RN (offgoing nurse). Report included the following information SBAR, Kardex, [facility] and Cardiac Rhythm NSR/Sinus Jorge Dickerson.

## 2020-05-26 NOTE — PROGRESS NOTES
Bedside and Verbal shift change report given to Panfilo Conner (oncoming nurse) by [place], RN (offgoing nurse). Report included the following information SBAR, Kardex, ED Summary and Recent Results. 2000 - Full assessment complete. No complaints of pain. Island dressing to L hip CDI.    0000 - Reassessment complete. No changes noted. 0400 - Labs drawn and sent. Reassessment complete. No changes noted. Bedside and Verbal shift change report given to Jaci Jimenes (oncoming nurse) by Brady Alfaro RN (offgoing nurse). Report included the following information SBAR, Kardex, ED Summary and Recent Results.

## 2020-05-26 NOTE — PROGRESS NOTES
Stephanie Sanabria MD. [facility]              Patient: Margaret Pereira  : 1940      Today's Date: 2020        CARDIOLOGY PROGRESS NOTE  S: I do not know how I am doing. Denies pain. O:   Visit Vitals  /81   Pulse 61   Temp 98.1 °F (36.7 °C)   Resp 21   Ht 4' 11\" (1.499 m)   Wt 123 lb (55.8 kg)   SpO2 99%   BMI 24.84 kg/m²     Patient appears generally well, mood and affect are appropriate and pleasant. A little confused. Frail/elderly  HEENT:  Hearing intact, non-icteric, normocephalic, atraumatic. Neck Exam: Supple, No JVD.  + left neck bruit. Lung Exam: Clear to auscultation, even breath sounds. Cardiac Exam: Regular rate and rhythm with no murmur. Abdomen: Soft, non-tender, normal bowel sounds. Extremities: Moves all ext well. No lower extremity edema. Psych:   Neuro - Grossly intact      Review of Symptoms:  Constitutional: Negative for fever   HEENT: Negative for vision changes. Respiratory: Negative for productive cough  Cardiovascular: Negative for syncope    Gastrointestinal: Negative for abdominal pain, melena  Genitourinary: Negative for dysuria  Skin: Negative for rash  Heme: No problems bleeding.   Neuro - no speech changes or focal weaknesses        Intake/Output Summary (Last 24 hours) at 2020 0949  Last data filed at 2020 2197  Gross per 24 hour   Intake 1142.29 ml   Output 600 ml   Net 542.29 ml         MEDICATIONS:     Current Facility-Administered Medications   Medication Dose Route Frequency Provider Last Rate Last Dose    sodium phosphate 15 mmol in 0.9% sodium chloride 250 mL infusion  15 mmol IntraVENous ONCE Nathan Sultana MD   15 mmol at 20 0654    lisinopriL (PRINIVIL, ZESTRIL) tablet 5 mg  5 mg Oral DAILY Nick Del Real NP        carvediloL (COREG) tablet 3.125 mg  3.125 mg Oral BID WITH MEALS Blanca Castillo NP        0.9% sodium chloride infusion 250 mL  250 mL IntraVENous PRN Anabela Barr MD        0.9% sodium chloride infusion 250 mL  250 mL IntraVENous PRN Jorge Caceres,         heparin (porcine) injection 5,000 Units  5,000 Units SubCUTAneous Q8H Dinesh Farrell MD   5,000 Units at 05/26/20 0544    sodium chloride (NS) flush 5-40 mL  5-40 mL IntraVENous Q8H Melecio Pretty MD   10 mL at 05/26/20 0543    sodium chloride (NS) flush 5-40 mL  5-40 mL IntraVENous PRN Melecio Pretty MD        naloxone [facility]) injection 0.4 mg  0.4 mg IntraVENous PRN Melecio Pretty MD        calcium-vitamin D (OS-NENA) 500 mg-200 unit tablet  1 Tab Oral TID WITH MEALS Melecio Pretty MD   1 Tab at 05/26/20 2055    senna-docusate (PERICOLACE) 8.6-50 mg per tablet 1 Tab  1 Tab Oral BID Melecio Pretty MD   1 Tab at 05/26/20 5676    polyethylene glycol (MIRALAX) packet 17 g  17 g Oral DAILY Melecio Pretty MD   17 g at 05/26/20 0919    bisacodyL (DULCOLAX) suppository 10 mg  10 mg Rectal DAILY PRN Melecio Pretty MD        traMADoL Sean De Jesus) tablet 50 mg  50 mg Oral Q6H PRN Melecio Pretty MD   50 mg at 05/25/20 1212    oxyCODONE IR (ROXICODONE) tablet 2.5 mg  2.5 mg Oral Q4H PRN Melecio Pretty MD        ondansetron (ZOFRAN ODT) tablet 4 mg  4 mg Oral Q4H PRN Melecio Pretty MD        sodium chloride (NS) flush 5-40 mL  5-40 mL IntraVENous Q8H Prudy Opitz, DO   10 mL at 05/26/20 0544    sodium chloride (NS) flush 5-40 mL  5-40 mL IntraVENous PRN Prudy Opitz, DO        atorvastatin (LIPITOR) tablet 40 mg  40 mg Oral QHS Prudy Opitz, DO   40 mg at 05/25/20 2141    memantine (NAMENDA) tablet 10 mg  10 mg Oral BID Prudy Opitz, DO   10 mg at 05/26/20 9991           LABS / OTHER STUDIES:     Recent Results (from the past 24 hour(s))   GLUCOSE, POC    Collection Time: 05/25/20  1:15 PM   Result Value Ref Range    Glucose (POC) 86 65 - 100 mg/dL    Performed by Emma Machado    HGB & HCT    Collection Time: 05/25/20  2:52 PM   Result Value Ref Range    HGB 8.6 (L) 11.5 - 16.0 g/dL HCT 25.4 (L) 35.0 - 47.0 %   OCCULT BLOOD, STOOL    Collection Time: 05/25/20  4:39 PM   Result Value Ref Range    Occult blood, stool Negative NEG     METABOLIC PANEL, BASIC    Collection Time: 05/26/20  3:36 AM   Result Value Ref Range    Sodium 144 136 - 145 mmol/L    Potassium 4.0 3.5 - 5.1 mmol/L    Chloride 116 (H) 97 - 108 mmol/L    CO2 25 21 - 32 mmol/L    Anion gap 3 (L) 5 - 15 mmol/L    Glucose 82 65 - 100 mg/dL    BUN 37 (H) 6 - 20 MG/DL    Creatinine 1.03 (H) 0.55 - 1.02 MG/DL    BUN/Creatinine ratio 36 (H) 12 - 20      GFR est AA >60 >60 ml/min/1.73m2    GFR est non-AA 51 (L) >60 ml/min/1.73m2    Calcium 7.8 (L) 8.5 - 10.1 MG/DL   CBC WITH AUTOMATED DIFF    Collection Time: 05/26/20  3:36 AM   Result Value Ref Range    WBC 11.9 (H) 3.6 - 11.0 K/uL    RBC 2.73 (L) 3.80 - 5.20 M/uL    HGB 8.8 (L) 11.5 - 16.0 g/dL    HCT 26.3 (L) 35.0 - 47.0 %    MCV 96.3 80.0 - 99.0 FL    MCH 32.2 26.0 - 34.0 PG    MCHC 33.5 30.0 - 36.5 g/dL    RDW 15.5 (H) 11.5 - 14.5 %    PLATELET 231 537 - 163 K/uL    MPV 9.9 8.9 - 12.9 FL    NRBC 2.2 (H) 0  WBC    ABSOLUTE NRBC 0.26 (H) 0.00 - 0.01 K/uL    NEUTROPHILS 79 (H) 32 - 75 %    BAND NEUTROPHILS 2 %    LYMPHOCYTES 8 (L) 12 - 49 %    MONOCYTES 7 5 - 13 %    EOSINOPHILS 2 0 - 7 %    BASOPHILS 0 0 - 1 %    METAMYELOCYTES 2 %    IMMATURE GRANULOCYTES 0 0.0 - 0.5 %    ABS. NEUTROPHILS 9.6 (H) 1.8 - 8.0 K/UL    ABS. LYMPHOCYTES 1.0 0.8 - 3.5 K/UL    ABS. MONOCYTES 0.8 0.0 - 1.0 K/UL    ABS. EOSINOPHILS 0.2 0.0 - 0.4 K/UL    ABS. BASOPHILS 0.0 0.0 - 0.1 K/UL    ABS. IMM.  GRANS. 0.0 0.00 - 0.04 K/UL    DF MANUAL      RBC COMMENTS ANISOCYTOSIS  1+        RBC COMMENTS ACANTHOCYTES      WBC COMMENTS TOXIC GRANULATION     MAGNESIUM    Collection Time: 05/26/20  3:36 AM   Result Value Ref Range    Magnesium 2.4 1.6 - 2.4 mg/dL   PHOSPHORUS    Collection Time: 05/26/20  3:36 AM   Result Value Ref Range    Phosphorus 2.0 (L) 2.6 - 4.7 MG/DL   HGB & HCT    Collection Time: 05/26/20 9:30 AM   Result Value Ref Range    HGB 8.9 (L) 11.5 - 16.0 g/dL    HCT 27.2 (L) 35.0 - 47.0 %          CARDIAC DIAGNOSTICS:      Cardiac Evaluation Includes:     Echo 4/10/17 - LVEF 40%  Echo 8/4/17 - LVEF 50 %. There was possible hypokinesis of the basal inferoseptal wall(s). Echo 5/24/20 - Done on dobutamine 5 mcg;  LVEF 50-55%, no obvious WMA, mild-mod MR, mod LAE, RV OK,. PASP 30 mmHg         EKG 5/21/20 - NSR, LVH with repol abn   EKG 5/23/20 - Normal sinus rhythm;  Marked ST depression.       Tele 5/24/20 - sinus      Telemetry: SB 40-50's  ASSESSMENT AND PLAN:      Assessment and Plan:     1) Shock ; suspect type II NSTEMI in the setting of  sepsis / hypovolemia   - She was Hypotensive on 5/23/20 around 1 PM (surgery 5/21/20) with ST depression on EKG,  She denied any CP or SOB. BP remained low despite > 1 L NS.   EKG then with ischemic ST depression in multiple leads (more ST depression than EKG a couple of days prior). She then went into Afib with RVR. Amio and DIg started. Kael started for low BP. Later she had bradycardia and Amio stopped and Dobutamine started. - peak trop 20   - Heparin gtt held 5/25 d/t drop in hgb. - Dobutamine off  5/25    2) History of cardiomyopathy - coreg started - dose decreased d/t bradycardia. Lisinopril started this AM  - LVEF was 40% in APril 2017 and then 50% on 8/17- EF 50-55% per this echo  - Not sure what CAD evaluation she has had over the years       3) Left hip bipolar hemiarthroplasty on 5/21/20      4) Transient Afib  - Had Afib a few hours on 5/23/20 in setting of severe hypotension - she went back into sinus on her own   - brief transient episode in the setting of sepsis - would not recommend an [address] at this time, will re address if she continues to have more AF. Regardless she is high risk for bleeding given recent surgery/anemia/fall and dementia      5) post operative anemia: Hgb better at 8.9/stable s/p prbc trx.      6) Dementia: Lenell Fluke Brenna Morin NP  [facility] [address] [place] [address]  Patient personally seen and examined. All the elements of history and examination were personally performed. Assessment and plan was discussed and agree as written above    Overall doing better than a few days ago. BP stable. Still in sinus. Resting comfortably. hrt RRR. Lungs clear ant  Plans as above. Treat BP. She is high risk of bleeding right now from [address] [place] [address] Road - can reassess if she has more afib going forward.      Samir Byrd MD, [facility]

## 2020-05-26 NOTE — PROGRESS NOTES
Problem: Mobility Impaired (Adult and Pediatric)  Goal: *Acute Goals and Plan of Care (Insert Text)  Description: FUNCTIONAL STATUS PRIOR TO ADMISSION: Patient was independent without use of DME. Pt is a poor historian and unable to provide any history. HOME SUPPORT PRIOR TO ADMISSION: The patient lived with daughter to provide assistance with ADLs. Physical Therapy Goals  Initiated 5/22/2020    1. Patient will move from supine to sit and sit to supine , scoot up and down and roll side to side in bed with moderate assistance  within 4 days. 2. Patient will perform sit to stand with moderate assistance  within 4 days. 3. Patient will ambulate with maximal assistance for 10 feet with the least restrictive device within 4 days. 4. Patient will verbalize and demonstrate understanding of posterior hip precautions per protocol within 4 days. 5. Patient will perform hip home exercise program per protocol with moderate assistance  within 4 days. Outcome: Progressing Towards Goal  Note:   PHYSICAL THERAPY TREATMENT  Patient: Gil Rossi (82 y.o. female)  Date: 5/26/2020  Diagnosis: Femoral neck fracture ([facility] [address] [S72.009A]   Femoral neck fracture (HCC)  Procedure(s) (LRB):  LEFT HIP HEMIARTHROPLASTY (Left) 5 Days Post-Op  Precautions: Fall, WBAT, Total hip(posterior)  Chart, physical therapy assessment, plan of care and goals were reviewed. ASSESSMENT  Patient continues with skilled PT services and is progressing towards goals. Patient with slow movements and requires increased time for completion. Patient now with elevated blood pressures, compared to the hypotension in previous PT visit. She transferred to ICU due to NSTEMI. Patient with inattention to the right side in UE and LE and requires increased cuing for sequencing. Large bruise noted on the Right shoulder. Patient with 2 attempts at standing and poor balance, requiring increased assistance, not safe to transfer to chair.   On 2nd stand requires dependent tactile cuing for weight shift to side step towards head of bed and for RW management. .     Current Level of Function Impacting Discharge (mobility/balance): MAXA x2 for bed mobility. Unable to transfer to chair    Other factors to consider for discharge:          PLAN :  Patient continues to benefit from skilled intervention to address the above impairments. Continue treatment per established plan of care. to address goals. Recommendation for discharge: (in order for the patient to meet his/her long term goals)  Therapy up to 5 days/week in SNF setting    This discharge recommendation:  A follow-up discussion with the attending provider and/or case management is planned    IF patient discharges home will need the following DME: to be determined (TBD)       SUBJECTIVE:   Patient stated it only hurts if I move.     OBJECTIVE DATA SUMMARY:   Critical Behavior:  Neurologic State: Alert, Confused  Orientation Level: Oriented to person, Disoriented to place, Disoriented to situation, Disoriented to time  Cognition: Follows commands  Safety/Judgement: Not assessed  Patient Vitals for the past 8 hrs:   Position Pulse BP SpO2   05/26/20 1132 Post activity 68 176/89 96 %   05/26/20 1115 sitting 67 185/79 96 %   05/26/20 1105 supine 60 158/79 97 %          Functional Mobility Training:  Bed Mobility:  Rolling: Moderate assistance;Assist x2  Supine to Sit: Maximum assistance;Assist x2  Sit to Supine: Total assistance;Assist x2           Transfers:                                   Balance:  Sitting - Static: Fair (occasional)  Sitting - Dynamic: Fair (occasional)  Ambulation/Gait Training:                 Pain Rating: In left and right hip with movement, patient was premedicated prior to session    Activity Tolerance:   Fair  Please refer to the flowsheet for vital signs taken during this treatment.     After treatment patient left in no apparent distress:   Supine in bed, Call bell within reach, Bed / chair alarm activated, and Side rails x 3    COMMUNICATION/COLLABORATION:   The patients plan of care was discussed with: Occupational therapist and Registered nurse.      Levi Ratliff, PT, DPT   Time Calculation: 32 mins

## 2020-05-26 NOTE — PROGRESS NOTES
Problem: Mobility Impaired (Adult and Pediatric)  Goal: *Acute Goals and Plan of Care (Insert Text)  Description: FUNCTIONAL STATUS PRIOR TO ADMISSION: Patient was independent without use of DME. Pt is a poor historian and unable to provide any history. HOME SUPPORT PRIOR TO ADMISSION: The patient lived with daughter to provide assistance with ADLs. Physical Therapy Goals  Initiated 5/22/2020    1. Patient will move from supine to sit and sit to supine , scoot up and down and roll side to side in bed with moderate assistance  within 4 days. 2. Patient will perform sit to stand with moderate assistance  within 4 days. 3. Patient will ambulate with maximal assistance for 10 feet with the least restrictive device within 4 days. 4. Patient will verbalize and demonstrate understanding of posterior hip precautions per protocol within 4 days. 5. Patient will perform hip home exercise program per protocol with moderate assistance  within 4 days. 5/26/2020 1513 by Gary Bose, PT, DPT  Outcome: Progressing Towards Goal  Note:   PHYSICAL THERAPY TREATMENT  Patient: Debra Foss (44 y.o. female)  Date: 5/26/2020  Diagnosis: Femoral neck fracture ([facility] [address] [S72.009A]   Femoral neck fracture (HCC)  Procedure(s) (LRB):  LEFT HIP HEMIARTHROPLASTY (Left) 5 Days Post-Op  Precautions: Fall, WBAT, Total hip(posterior)  Chart, physical therapy assessment, plan of care and goals were reviewed. ASSESSMENT  Patient continues with skilled PT services and is progressing towards goals. Patient performed LE exercises for 2nd PT session. Still requires AAROM for completion of exercises due to pain, premedicated prior to session. Current Level of Function Impacting Discharge (mobility/balance): MAX A x2    Other factors to consider for discharge:          PLAN :  Patient continues to benefit from skilled intervention to address the above impairments. Continue treatment per established plan of care.   to address goals. Recommendation for discharge: (in order for the patient to meet his/her long term goals)  Therapy up to 5 days/week in SNF setting    This discharge recommendation:  A follow-up discussion with the attending provider and/or case management is planned    IF patient discharges home will need the following DME: to be determined (TBD)       SUBJECTIVE:   Patient stated i dont know.     OBJECTIVE DATA SUMMARY:   Critical Behavior:  Neurologic State: Alert  Orientation Level: Oriented to person, Disoriented to time, Disoriented to situation, Disoriented to place(knows she is at the hospital but unable to recall which one)  Cognition: (apathetic.not interested in much PO)  Safety/Judgement: Decreased awareness of environment  Functional Mobility Training:  Bed Mobility:  Rolling: Moderate assistance;Assist x2  Supine to Sit: Maximum assistance;Assist x2  Sit to Supine: Total assistance;Assist x2           Transfers:                                   Balance:  Sitting: Impaired  Sitting - Static: Fair (occasional)  Sitting - Dynamic: Fair (occasional)  Standing: Impaired  Standing - Static: Poor  Standing - Dynamic : Poor  Ambulation/Gait Training:                       Therapeutic Exercises: Ankle pumps, heel slides, quad sets, glut sets  2 sets of 10  Pain Rating:  premedicated prior    Activity Tolerance:   Fair  Please refer to the flowsheet for vital signs taken during this treatment. After treatment patient left in no apparent distress:   Supine in bed, Call bell within reach, Bed / chair alarm activated, and Side rails x 3    COMMUNICATION/COLLABORATION:   The patients plan of care was discussed with: Registered nurse.      Kati Nesbitt PT, DPT   Time Calculation: 11 mins           5/26/2020 1134 by Ellie Landa PT, DPT  Outcome: Progressing Towards Goal  Note:   PHYSICAL THERAPY TREATMENT  Patient: Rupa Molina (40 y.o. female)  Date: 5/26/2020  Diagnosis: Femoral neck fracture ([facility] [address] [S72.009A]   Femoral neck fracture (HCC)  Procedure(s) (LRB):  LEFT HIP HEMIARTHROPLASTY (Left) 5 Days Post-Op  Precautions: Fall, WBAT, Total hip(posterior)  Chart, physical therapy assessment, plan of care and goals were reviewed. ASSESSMENT  Patient continues with skilled PT services and is progressing towards goals. Patient with slow movements and requires increased time for completion. Patient now with elevated blood pressures, compared to the hypotension in previous PT visit. She transferred to ICU due to NSTEMI. Patient with inattention to the right side in UE and LE and requires increased cuing for sequencing. Large bruise noted on the Right shoulder. Patient with 2 attempts at standing and poor balance, requiring increased assistance, not safe to transfer to chair. On 2nd stand requires dependent tactile cuing for weight shift to side step towards head of bed and for RW management. .     Current Level of Function Impacting Discharge (mobility/balance): MAXA x2 for bed mobility. Unable to transfer to chair    Other factors to consider for discharge:          PLAN :  Patient continues to benefit from skilled intervention to address the above impairments. Continue treatment per established plan of care. to address goals. Recommendation for discharge: (in order for the patient to meet his/her long term goals)  Therapy up to 5 days/week in SNF setting    This discharge recommendation:  A follow-up discussion with the attending provider and/or case management is planned    IF patient discharges home will need the following DME: to be determined (TBD)       SUBJECTIVE:   Patient stated it only hurts if I move.     OBJECTIVE DATA SUMMARY:   Critical Behavior:  Neurologic State: Alert, Confused  Orientation Level: Oriented to person, Disoriented to place, Disoriented to situation, Disoriented to time  Cognition: Follows commands  Safety/Judgement: Not assessed  Patient Vitals for the past 8 hrs:   Position Pulse BP SpO2   05/26/20 1132 Post activity 68 176/89 96 %   05/26/20 1115 sitting 67 185/79 96 %   05/26/20 1105 supine 60 158/79 97 %          Functional Mobility Training:  Bed Mobility:  Rolling: Moderate assistance;Assist x2  Supine to Sit: Maximum assistance;Assist x2  Sit to Supine: Total assistance;Assist x2           Transfers:                                   Balance:  Sitting - Static: Fair (occasional)  Sitting - Dynamic: Fair (occasional)  Ambulation/Gait Training:                 Pain Rating: In left and right hip with movement, patient was premedicated prior to session    Activity Tolerance:   Fair  Please refer to the flowsheet for vital signs taken during this treatment. After treatment patient left in no apparent distress:   Supine in bed, Call bell within reach, Bed / chair alarm activated, and Side rails x 3    COMMUNICATION/COLLABORATION:   The patients plan of care was discussed with: Occupational therapist and Registered nurse.      Tilman Hammans, PT, DPT   Time Calculation: 32 mins

## 2020-05-26 NOTE — DISCHARGE SUMMARY
[facility] [facility] [address] [place] [address]   Office [phone], Fax [phone]      DISCHARGE NOTE         Date: May 27, 2020    Sangita Coker  MRN: [id number]  YOB: 1940  Age: 78 y.o. Date of admission: 5/20/2020     Date of discharge/transfer: 5/27/2020    Primary Care Physician: Kailee Faustin MD     Attending physician at admission: Dr. Anise Babinski     Attending physician at discharge/transfer: Dr. Gloria Mosley     Resident physician at discharge/transfer: Mary Amezcua MD     Consultants during hospitalization:  Cardiology, Ortho, Pulm, PT/OT, CM     Admission diagnoses:   Femoral neck fracture ([facility] [S72.009A]     MEDICATION CHANGES:   - START Lisinopril 10mg daily (STOP home Lisinopril 20mg qhs)  - START Lovenox SQ 40mg x4 wks (day 2/28 today)  - START Calcium-Vit Supplement TID  - START TRAMADOL 50mg q6 prn   - STOP Home Plavix 75mg daily (resume after completion of Lovenox)  *Continue all other home meds        Discharge diagnoses:  Hospital Problems  Date Reviewed: 5/21/2020          Codes Class Noted POA    Anemia ICD-10-CM: D64.9  ICD-9-CM: 285.9  5/26/2020 Unknown        NSTEMI (non-ST elevated myocardial infarction) ([facility] ICD-10-CM: I21.4  ICD-9-CM: 410.70  5/26/2020 Unknown        Rhabdomyolysis ICD-10-CM: M62.82  ICD-9-CM: 728.88  5/26/2020 Unknown        Bradycardia ICD-10-CM: R00.1  ICD-9-CM: 427.89  5/26/2020 Unknown        UTI (urinary tract infection) ICD-10-CM: N39.0  ICD-9-CM: 599.0  5/26/2020 Unknown        * (Principal) Femoral neck fracture ([facility] ICD-10-CM: A95.476Z  ICD-9-CM: 820.8  5/20/2020 Unknown        Hypertension ICD-10-CM: I10  ICD-9-CM: 401.9  11/15/2013 Yes    Overview Addendum 4/28/2017  1:45 PM by Mara Huntley MD     A. Goal BP <140/90 s/p CVA in 2017. B. Echo (4/10/17):  EF 40% with mild GHK. Mild MR. Mild to mod TR. PASP 53. Neg bubble.                     History of Present Illness per admitting resident Dr. Janina Cowden:  Joe Blackwood Yudith Murillo is a 78 y.o. female with known hx of TIA/CVA in 2017, HTN, HLD and dementia who presents to the ER complaining of L hip pain. History is obtained from her daughter Arnold Peterson . On Sunday the patient had an unwitnessed fall off the side of the bed while watching TV. Her daughter was unable to get her up from the ground, so she let her sleep on the floor overnight with blankets and pillow . The next day she was able to get her up. She was walking slower but without any complaint. The patient typically ambulates without assistance. That evening the patient was found on the floor again. The patient was unsure on how she ended up there. When her daughter tried to lift her up this time she was complained of immense pain all over her body. The patient was unable to move herself since that episode so the daughter sent her to the ED. She denies any SOB, cough, fever, chills, abdominal pain, nausea vomiting, CP, or dysuria. Hospital course with associated diagnosis:     NSTEMI: Ischemic ST depression in multiple leads. Treated with heparin gtt. Echo EF 50-55%. - Home Coreg 3.125mg BID   - F/u OP Cardiology    Hypovolemic vs Septic Shock 2/2 complicated UTI: RESOLVED. s/p [place] gtt + bernardo gtt. +UA (4+ bact, nitrites, large LE). Ucx: E coli. S/p Tx w/ Levaquin + CTX. Bcx NTD.     New Onset Afib:  Afib w/ RVR rate-115 on 5/23, which soon converted to NSR. s/p Digoxin load. Per cardiology, no need for [address] [place] [address] Road given risks of bleeding (recent surgery/anemia/fall and dementia). - F/u OP Cardiology    AHRF: RESOLVED. Weaned off supplemental O2. Not in distress on exam. Now satting 97% on RA. L Femoral Neck Fracture: Due to GLF. S/p L hip bipolar hemiarthroplasty-5/21 (Dr. Jannie Cole).   - START Calcium-Vit Supplement TID  - START Tramadol 50mg q6 prn   - START Lovenox 40mg daily x4 wks (started 5/26/20) for DVT ppx   - STOP home plavix 75mg daily (resume after completion of Lovenox)  - WBAT LLE with posterior hip precautions    - F/u OP Ortho in 3-4 wks  - F/u PCP: to discuss starting Fosfomax for osteoporosis given hip fracture    Rhabdomyolysis: 2 GLF, during the first was on the ground overnight. CK 1639 without any kidney injury. UA mod blood. CK 1639-->641--> 344. Hypoglycemia: BG 67 on CMP and 75, 96 s/p juice.   - F/u PCP: Monitor BG     Post-operative Anemia: IMPROVING. Hgb postop 8.5 (BL 13). s/p 1UpRBC: post transfusion Hgb 8.6. Retic 3.1 (appropriately elevated), haptoglobin 213^, FOBT neg. Hgb 9 at DC.   - F/u PCP: repeat CBC    AMS w/ decreased pupillary light reflex: RESOLVED. Back to Baseline. No acute findings on Head CT. Chronic HTN: POA /57. Bp 131/66 at DC.   - Cont Home Coreg 3.125mg BID   - START Lisinopril 10mg daily (STOP home 20mg qhs)  - F/u PCP    Complicated UTI: +UA (4+ bact, nitrites, large LE). Ucx: E coli. S/p Tx w/ Levaquin + CTX. Bcx NTD.      Bradycardia with a hx of  bigeminy: HR POA 55. Cleared by Dr. Sofía Robles in the past for a cataract procedure. EKG was noted to find PVCs in the form of bigemeny. Bradycardia was false and patient was deemed stable for surgery. Also cleared by Dr. Renetta Valerio (Cards) during this admission pre-surgically. - F/u OP Cardiology    HLD: last lipid panel 12/2019 all values wnl  - Cont Home Lipitor 40mg qhs     Hx of CVA/TIA in 2017. CT scan POA noted chronic ischemic changes  - STOP home plavix 75mg daily (resume after completion of Lovenox)     Dementia  - Cont Home Namenda 10mg BID            Physical exam at discharge:   Visit Vitals  /76 (BP 1 Location: Right arm, BP Patient Position: At rest)   Pulse 64   Temp 97.8 °F (36.6 °C)   Resp 20   Ht 4' 11\" (1.499 m)   Wt 129 lb (58.5 kg)   SpO2 94%   BMI 26.05 kg/m²       Gen: Awake, alert, NAD  Eye: PERRL, EOMI  Cards: RRR. no m/r/g, DP intact BL  Resp: CTAB, no w/r/r  Abd: Normal BS, NT, ND  Ext: No swelling. No tenderness. Neuro: mentation back to BL (oriented to self and place.  Not time/date)    Condition at discharge: stable    Disposition:     Recommended follow-up tests after discharge: repeat CBC     Diet: Dysphagia 1 pureed diet. Activity: Weightbearing as tolerated per PT/OT in SNF    Labs:  Recent Labs     05/27/20  1000 05/27/20  0340 05/26/20  2154  05/26/20  0336  05/25/20  0422   WBC  --  13.3*  --   --  11.9*  --  15.5*   HGB 9.3* 9.0* 8.9*   < > 8.8*   < > 7.2*   HCT 28.5* 27.6* 26.8*   < > 26.3*   < > 22.4*   PLT  --  230  --   --  203  --  185    < > = values in this interval not displayed. Recent Labs     05/27/20  0340 05/26/20  0336 05/25/20  0422    144 142   K 3.7 4.0 4.0   * 116* 113*   CO2 25 25 21   BUN 31* 37* 42*   CREA 0.74 1.03* 1.37*   GLU 67 82 81   CA 7.9* 7.8* 7.7*   MG 2.2 2.4  --    PHOS 2.6 2.0*  --      No results for input(s): ALT, AP, TBIL, TBILI, TP, ALB, GLOB, GGT, AML, LPSE in the last 72 hours. No lab exists for component: SGOT, GPT, AMYP, HLPSE  Recent Labs     05/25/20  0035 05/24/20  1609   APTT 71.4* 66.4*      No results for input(s): FE, TIBC, PSAT, FERR in the last 72 hours. No results for input(s): PH, PCO2, PO2 in the last 72 hours. No results for input(s): CPK, CKMB in the last 72 hours.     No lab exists for component: TROPONINI  Lab Results   Component Value Date/Time    Glucose (POC) 96 05/27/2020 02:38 PM    Glucose (POC) 86 05/27/2020 09:50 AM    Glucose (POC) 96 05/27/2020 06:31 AM    Glucose (POC) 75 05/27/2020 05:34 AM    Glucose (POC) 67 05/27/2020 05:14 AM        All Micro Results     Procedure Component Value Units Date/Time    CULTURE, BLOOD [[id number]] Collected:  05/24/20 1107    Order Status:  Completed Specimen:  Blood Updated:  05/27/20 0627     Special Requests: NO SPECIAL REQUESTS        Culture result: NO GROWTH 3 DAYS       CULTURE, BLOOD [[id number]] Collected:  05/24/20 0845    Order Status:  Canceled Specimen:  Blood     CULTURE, URINE [[id number]]  (Abnormal)  (Susceptibility) Collected: 05/20/20 2227    Order Status:  Completed Specimen:  Voided Urine Updated:  05/23/20 140     Special Requests: NO SPECIAL REQUESTS        [place] Count --        >100,000  COLONIES/mL       Culture result: ESCHERICHIA COLI       URINE CULTURE HOLD SAMPLE [[id number]] Collected:  05/20/20 2227    Order Status:  Completed Specimen:  Urine from Serum Updated:  05/20/20 2235     Urine culture hold       Urine on hold in Microbiology dept for 2 days. If unpreserved urine is submitted, it cannot be used for addtional testing after 24 hours, recollection will be required. Diagnostic Studies and Procedures:    EXAM: XR ELBOW LT MIN 3 V     INDICATION: fall.     COMPARISON: None.     FINDINGS: Three views of the left elbow demonstrate no fracture, dislocation,  effusion or other acute abnormality. The bones are osteopenic.     IMPRESSION  IMPRESSION: No acute abnormality. EXAM: XR HIP LT W OR WO PELV 2-3 VWS     INDICATION: fall.     COMPARISON: February 2018.     FINDINGS: AP view of the pelvis and a frogleg lateral view of the left hip  demonstrate an acute subcapital left femoral neck fracture with varus  angulation. The bones are osteopenic and there is bilateral hip DJD.     IMPRESSION  IMPRESSION:   Acute left femoral neck fracture. CT Head:     Findings: 5 mm axial images were obtained from the skull base through the  vertex.      CT dose reduction was achieved through the use of a standardized protocol  tailored for this examination and automatic exposure control for dose  modulation.     There is chronic generalized ventriculomegaly. There is no evidence of  intracranial hemorrhage, mass, mass effect, or acute infarct. There is  periventricular white matter disease. There is a chronic right frontal lobe  infarct. There is a chronic right occipital lobe infarct. No extra-axial fluid  collections are seen. The visualized paranasal sinuses and mastoid air cells are  clear.  The orbital structures are unremarkable. No osseous abnormalities are  seen.      IMPRESSION  Impression:   1. No evidence of acute infarct or intracranial hemorrhage. 2. Severe periventricular white matter disease is likely secondary to chronic  small vessel ischemic changes. 3. Chronic right frontal and occipital lobe infarctions. 4. Chronic generalized ventriculomegaly. EXAM: XR HIP LT W OR WO PELV 2-3 VWS     INDICATION: please take pelvis AP and 2 views LEFT hip immediately postop in  pacu.     COMPARISON: 5/20/2020.     FINDINGS: AP view of the pelvis and a frogleg lateral view of the left hip  obtained portably at 1616 hours after patient transfer in the PACU demonstrate  left hip replacement in place without fracture or dislocation. Subcutaneous  emphysema as expected. There is osteoarthritis of the right hip with  heterotopic ossification.      IMPRESSION:   Left THR        EXAM:  XR CHEST PORT     INDICATION:  central line placement     COMPARISON:  2017     FINDINGS: A portable AP radiograph of the chest was obtained at 0049 hours. Left  IJ catheter tip overlies SVC left brachiocephalic junction. There is no  pneumothorax. .  Lungs are clear of an acute process. .  The cardiac and  mediastinal contours and pulmonary vascularity are normal. .      IMPRESSION  IMPRESSION: Left IJ catheter tip overlies left brachiocephalic SVC junction. There is no pneumothorax. CT CHEST WO CONT    INDICATION: Cardiogenic shock     COMPARISON:     CONTRAST: None.     TECHNIQUE:  5 mm axial images were obtained through the chest. Coronal and  sagittal reformats were generated.   CT dose reduction was achieved through use  of a standardized protocol tailored for this examination and automatic exposure  control for dose modulation.     The absence of intravenous contrast reduces the sensitivity for evaluation of  the mediastinum, padma, vasculature, and upper abdominal organs.     FINDINGS:  There are extensive changes of atherosclerosis in the carotid arteries. Left IJ  catheter tip is at the left brachiocephalic SVC junction. CHEST WALL: No mass or axillary lymphadenopathy. THYROID: No nodule. MEDIASTINUM: No mass or lymphadenopathy. RICHELLE: No mass or lymphadenopathy. THORACIC AORTA: No aneurysm. Atherosclerotic changes are noted. MAIN PULMONARY ARTERY: Normal in caliber. TRACHEA/BRONCHI: Patent. ESOPHAGUS: No wall thickening or dilatation. HEART: Mildly enlarged. There is coronary artery calcification. PLEURA: There are tiny effusions  LUNGS: There are changes of atherosclerosis. There is minor basilar atelectasis. INCIDENTALLY IMAGED UPPER ABDOMEN: No significant abnormality in the  incidentally imaged upper abdomen. BONES: There are degenerative changes in the shoulders with rotator cuff  atrophy. There are compression deformities of T12 and L1     IMPRESSION:     1. There is slight bibasilar atelectasis/tiny effusions. There are changes of  emphysema  2. There are extensive atherosclerotic changes in the carotid arteries, thoracic  aorta. There is coronary artery calcification. 3. There are degenerative changes in the shoulder with superior elevation of the  humeral heads consistent with rotator cuff atrophy. There are are compression  deformities of T12 and L1. Discharge/Transfer Medications:  Current Discharge Medication List      START taking these medications    Details   traMADoL (ULTRAM) 50 mg tablet Take 1 Tab by mouth every six (6) hours as needed for Pain for up to 20 doses. Max Daily Amount: 200 mg. Qty: 20 Tab, Refills: 0    Associated Diagnoses: Closed fracture of neck of left femur, sequela      enoxaparin (LOVENOX) 40 mg/0.4 mL 0.4 mL by SubCUTAneous route every twenty-four (24) hours for 27 days. Qty: 10.8 mL, Refills: 0      calcium-vitamin D (OYSTER SHELL) 500 mg(1,250mg) -200 unit per tablet Take 1 Tab by mouth three (3) times daily (with meals) for 30 days.   Qty: 90 Tab, Refills: 0         CONTINUE these medications which have CHANGED    Details   lisinopriL (PRINIVIL, ZESTRIL) 10 mg tablet Take 1 Tab by mouth daily for 30 days. Qty: 30 Tab, Refills: 0         CONTINUE these medications which have NOT CHANGED    Details   atorvastatin (LIPITOR) 40 mg tablet Take 40 mg by mouth nightly. wsatwzed-ofkh-uxd2-C-seng-bosw (Osteo Bi-Flex Triple Strength) 750 mg-644 mg- 30 mg-1 mg tab Take 1 Tab by mouth two (2) times a day. cholecalciferol (VITAMIN D3) (1000 Units /25 mcg) tablet Take 2,000 Units by mouth daily. TURMERIC ROOT EXTRACT PO Take 450 mg by mouth daily. memantine (NAMENDA) 10 mg tablet Take 1 Tab by mouth two (2) times a day. Qty: 180 Tab, Refills: 0    Associated Diagnoses: Cognitive impairment      carvediloL (COREG) 3.125 mg tablet Take 1 Tab by mouth two (2) times daily (with meals). Qty: 180 Tab, Refills: 0    Associated Diagnoses: Acute CVA (cerebrovascular accident) ([address] Essential hypertension         STOP taking these medications       clopidogreL (PLAVIX) 75 mg tab Comments:   Reason for Stopping: Follow up plans/appointments:   Follow-up Information     Follow up With Specialties Details Why Contact Info    Jem Hernández PA-C Physician Assistant Schedule an appointment as soon as possible for a visit in 3 weeks Follow up with Ortho in 3-4 wks  [facility]      Cheyenne Corey MD Family Practice Schedule an appointment as soon as possible for a visit Call to follow up with your PCP as soon as possible  [address]  [phone]      Alden Saba MD Cardiology, Internal Medicine Schedule an appointment as soon as possible for a visit on 7/24/2020 3 pm [facility] [address]  [phone] Jaspal Medellin MD [place] [address]  [address]  [phone]             Discharge instructions to patient/family  Please seek medical attention for any new or worsening symptoms particularly fever, chest pain, shortness of breath, abdominal pain, nausea, vomiting      Mario Larkin MD  Family Medicine Resident      Cc: Jaspal Medellin MD

## 2020-05-26 NOTE — PROGRESS NOTES
Problem: Self Care Deficits Care Plan (Adult)  Goal: *Acute Goals and Plan of Care (Insert Text)  Description:   FUNCTIONAL STATUS PRIOR TO ADMISSION: patient able to perform ADLs without assistance      HOME SUPPORT: The patient lived with daughter but able to perform own ADLs. Occupational Therapy Goals  Initiated 5/22/2020  1. Patient will perform lower body dressing with minimal assistance/contact guard assist within 7 day(s). 2.  Patient will perform upper body dressing with supervision/set-up within 7 day(s). 3.  Patient will perform toilet transfers with moderate assistance  within 7 day(s). 4.  Patient will perform all aspects of toileting with moderate assistance  within 7 day(s). 5.  Patient will participate in upper extremity therapeutic exercise/activities with supervision/set-up for 10 minutes within 7 day(s). Outcome: Progressing Towards Goal   OCCUPATIONAL THERAPY TREATMENT  Patient: Lori Renee (59 y.o. female)  Date: 5/26/2020  Diagnosis: Femoral neck fracture ([facility] [S72.009A]   Femoral neck fracture (HCC)  Procedure(s) (LRB):  LEFT HIP HEMIARTHROPLASTY (Left) 5 Days Post-Op  Precautions: Fall, WBAT L LE, Total hip(posterior)  Chart, occupational therapy assessment, plan of care, and goals were reviewed. ASSESSMENT  Patient continues with skilled OT services and is progressing towards goals. Ms. Joe Holloway was received in bed agreeable to activity with max encouragement. She continues to be limited by poor cognition, generalized weakness, and significant pain at rest and with movement; She c/o pain \"all over\". Today, patient noted to have R side inattention as well as impaired sequencing and coordination (R UE> L UE). Patient was able to come to sitting EOB with significant x2 person assist.  Sitting balance was fair to good overall. Patient performed x2 trials of sit to stand transfers in preparation for ADL transfers but unsafe to remain OOB.   Patient completed simple grooming tasks (washing face) and LB dressing from bed level. She is unable to tolerated/attempt seated or standing ADLs this date. Patient would benefit from continued skilled OT to progress towards goals and improve overall independence. Current Level of Function Impacting Discharge (ADLs): Patient required mod Ax2 for rolling and max A to total A x2 for all other bed mobility and sit to stand transfers. She was able to take several steps to [facility] for repositioning. Patient required setup for grooming and total A for LB dressing from bed level. PLAN :  Patient continues to benefit from skilled intervention to address the above impairments. Continue treatment per established plan of care. to address goals. Recommend with staff:   Recommend patient be positioned with [facility] with goal to reach full chair position frequently throughout the day. For toileting needs, recommend bed level at this time. Encourage patient involvement in personal care as able.       Recommend next OT session: ADLs, ADL transfers, energy conservation    Recommendation for discharge: (in order for the patient to meet his/her long term goals)  Therapy up to 5 days/week in SNF setting    This discharge recommendation:  Has been made in collaboration with the attending provider and/or case management    IF patient discharges home will need the following DME: none       SUBJECTIVE:   Patient stated I'm at the hospital.    OBJECTIVE DATA SUMMARY:   Cognitive/Behavioral Status:  Neurologic State: Alert  Orientation Level: Oriented to person;Disoriented to time;Disoriented to situation;Disoriented to place(knows she is at the hospital but unable to recall which one)  Cognition: Decreased command following  Perception: Cues to maintain midline in standing;Cues to attend right visual field;Cues to attend to right side of body  Perseveration: No perseveration noted  Safety/Judgement: Decreased awareness of environment    Functional Mobility and Transfers for ADLs:  Bed Mobility:  Rolling: Moderate assistance;Assist x2  Supine to Sit: Maximum assistance;Assist x2  Sit to Supine: Total assistance;Assist x2    Transfers:   Sit to stand: max A x2; Able to perform x2 reps and take several steps to [facility] with max verbal cues for safe technique, sequencing, and RW management    Balance:  Sitting: Impaired  Sitting - Static: Fair (occasional)  Sitting - Dynamic: Fair (occasional)  Standing: Impaired  Standing - Static: Poor  Standing - Dynamic : Poor    ADL Intervention:  Grooming  Grooming Assistance: Set-up  Washing Face: Set-up  Cues: Verbal cues provided    Lower Body Dressing Assistance  Dressing Assistance: Total assistance(dependent)  Socks: Total assistance (dependent)  Position Performed: Long sitting on bed     Cognitive Retraining  Safety/Judgement: Decreased awareness of environment    Activity Tolerance:   Fair  Please refer to the flowsheet for vital signs taken during this treatment. After treatment patient left in no apparent distress:   Supine in bed, Call bell within reach, and Bed / chair alarm activated    COMMUNICATION/COLLABORATION:   The patients plan of care was discussed with: Physical therapist, Registered nurse, and patient .      Gamaliel Aviles OTR/L  Time Calculation: 24 mins

## 2020-05-26 NOTE — PROGRESS NOTES
Subjective: Elsie Gr is a 78 y.o. female who is POD 5 s/p LEFT hip bipolar now with NSTEMI and AFib    Major Events:. Pain controlled    Objective:    Vital signs in last 24 hours:    Temp:  [97.4 °F (36.3 °C)-99.5 °F (37.5 °C)]   Pulse (Heart Rate):  []   BP: ()/()   Resp Rate:  [12-38]   O2 Sat (%):  [66 %-100 %]   Weight:  [53.2 kg (117 lb 4.6 oz)-55.8 kg (123 lb)]     Temp (24hrs), Av.6 °F (37 °C), Min:97.9 °F (36.6 °C), Max:99.5 °F (37.5 °C)      Labs:    Lab Results   Component Value Date/Time    WBC 11.9 (H) 2020 03:36 AM    HGB 8.8 (L) 2020 03:36 AM    HCT 26.3 (L) 2020 03:36 AM    PLATELET 087  03:36 AM       Lab Results   Component Value Date/Time    Sodium 144 2020 03:36 AM    Potassium 4.0 2020 03:36 AM    Chloride 116 (H) 2020 03:36 AM    CO2 25 2020 03:36 AM    BUN 37 (H) 2020 03:36 AM       Physical Exam:    General: alert, cooperative, in NAD  Nonlabored resp    LEFT Lower extremity    Dressing: mild shadowing  Gluteal/thigh compartments soft    Motor: 5/5 ankle df/pf    Sensory: SILT    Vascular: Brisk capillary refill in toes    Assessment/Plan:    · Pain management: as written    · DVT Prophylaxis: Hep gtt held per anemia s/p transfusion  When ok per medicine, would rec Lovenox 40mg sc Daily x 4 weeks (once hep gtt stopped)    · PT/OT: WBAT LLE with posterior hip precautions     · Dispo: pending PT evaluation    [facility] with JERI Cintron [facility].  Austin Valerio MD

## 2020-05-26 NOTE — PROGRESS NOTES
RN and CNA report that patient taking a few small bites, refusing to put in her denture plate and not really chewing. SLP eval yesterday recommended pureed diet, but she is still on regular. Diet downgraded to dysphagia 1, thins until she is more interested in PO and ready to re-eval for solids. She does want to drink and has been drinking a lot. Occasional cough noted. SLP will continue to follow.

## 2020-05-26 NOTE — PROGRESS NOTES
Nutrition Assessment:    RECOMMENDATIONS/INTERVENTION(S):   1. Diet per SLP. 2. Trial ONS to promote adequate intakes. Recommend Ensure Enlive x1/d (350 kcal, 20 gm protein), Ensure pudding x1/d (170 kcal, 4 gm protein), Magic cup x1/d (290 kcal, 9 gm protein). Will monitor for tolerance. 3. Monitor PO intakes, GI function, labs, and weight trends. ASSESSMENT:   5/26: F/u. Charting remotely. SLP following, advanced to Regular diet yesterday. Per SLP assessment, bites/ sips of purees/ liquids as requested. Noted meal intake yesterday, 10% and 50% supplement. Will trial other other ONS to promote adequate intakes. LBM 5/24, on bowel regimen. No note of GI distress. Labs - BUN 37 H, Cr 1.03 H, Ca 7.8 L, Phos 2.0 L. Meds - calcium-vitamin D, heparin, polyethylene glycol, senna-docusate, sodium phosphate. 5/21: 79 y/o F admitted with femoral neck fracture. Pt seen for low BMI for age. PMH - TIA/CVA, HTN, HLD, dementia. Off floor at time of visit. S/p L hip bipolar hemiarthroplasty 5/21. NPO at this time. Advance to diet as medically able. Unable to obtain nutritional Hx at this time.  lb. BMI 22 c/w underweight for age. Estimated nutritional needs +250 kcal to promote healthy weight. Weight Hx per EMR shows weight stability. No note GI distress. LBM PTA. Skin without PI. Labs - K+ 3.4 L, BUN 24 H, Cr 0.54 L, Ca 8.2 L, Hgb A1c 6.3 (12/11/19). Meds -  ml/hr. Diet Order: Regular  % Eaten:    Patient Vitals for the past 72 hrs:   % Diet Eaten   05/25/20 1900 10 %   05/23/20 1000 0 %       Pertinent Medications: [x] Reviewed    Labs: [x] Reviewed    Anthropometrics: Height: 4' 11\" (149.9 cm) Weight: 55.8 kg (123 lb)    IBW (%IBW):   ( ) UBW (%UBW):   (  %)      BMI: Body mass index is 24.84 kg/m².     This BMI is indicative of:   [x] Underweight    [] Normal    [] Overweight    []  Obesity    []  Extreme Obesity (BMI>40)  Estimated Nutrition Needs (Based on): 8770 Kcals/day( x 1.3 + 250 kcal) , 60 g(59 - 69 gm (1.2 - 1.4 gm/kg)) Protein  Carbohydrate:  At Least 130 g/day  Fluids: 1517 mL/day (1 ml/kcal)    Last BM: 5/24  [x]Active     []Hyperactive  []Hypoactive       [] Absent   BS  Skin:    [] Intact   [x] Incision - hip L  [] Breakdown   [] DTI   [] Tears/Excoriation/Abrasion  []Edema [x] Other: blister buttock R   Wt Readings from Last 30 Encounters:   05/25/20 55.8 kg (123 lb)   01/13/20 49.4 kg (109 lb)   12/11/19 48.1 kg (106 lb)   10/25/19 48.5 kg (107 lb)   10/13/19 47.6 kg (105 lb)   10/23/18 47.6 kg (105 lb)   09/07/18 48.5 kg (107 lb)   03/30/18 47.6 kg (105 lb)   02/22/18 48.5 kg (107 lb)   11/24/17 47.6 kg (105 lb)   08/21/17 45.8 kg (101 lb)   08/14/17 44.9 kg (99 lb)   08/04/17 47.6 kg (105 lb)   05/12/17 45.8 kg (101 lb)   04/28/17 46.7 kg (103 lb)   04/28/17 47.6 kg (105 lb)   04/24/17 47.6 kg (105 lb)   04/17/17 47.2 kg (104 lb)   04/10/17 45.9 kg (101 lb 4.8 oz)   04/10/17 45.9 kg (101 lb 3 oz)   01/07/16 55.2 kg (121 lb 12.8 oz)   08/10/15 54.9 kg (121 lb)   08/08/15 55.1 kg (121 lb 6.4 oz)   06/30/15 54.4 kg (120 lb)   01/27/15 58.1 kg (128 lb)   10/31/14 56.2 kg (124 lb)   09/04/14 54.7 kg (120 lb 9.6 oz)   08/29/14 55.3 kg (122 lb)   08/26/14 55.2 kg (121 lb 9.6 oz)   06/22/14 54.9 kg (121 lb)      NUTRITION DIAGNOSES:   Problem:  Underweight     Etiology: related to decreased ability to consume sufficient energy     Signs/Symptoms: as evidenced by BMI 22 c/w underweight        5/26: Nutrition Dx improved - BMI 24.8    NUTRITION INTERVENTIONS:  Meals/Snacks: General/healthful diet   Supplements: Commercial supplement              GOAL:   PO intakes >50% + ONS with weight stability within 3-5 days     Cultural, Judaism, or Ethnic Dietary Needs: None     EDUCATION & DISCHARGE NEEDS:    [x] None Identified   [] Identified and Education Provided/Documented   [] Identified and Pt declined/was not appropriate      [x] Interdisciplinary Care Plan Reviewed/Documented    [x] Discharge Needs:    Regular diet    [] No Nutrition Related Discharge Needs    NUTRITION RISK:   Pt Is At Nutrition Risk  [x]     No Nutrition Risk Identified  []       PT SEEN FOR:    []  MD Consult: []Calorie Count      []Diabetic Diet Education        []Diet Education     []Electrolyte Management     []General Nutrition Management and Supplements     []Management of Tube Feeding     []TPN Recommendations    []  RN Referral:  []MST score >=2     []Enteral/Parenteral Nutrition PTA     []Pregnant: Gestational DM or Multigestation                 [] Pressure Ulcer    []  Low BMI      []  Length of Stay       [] Dysphagia Diet         [] Ventilator  [x]  Follow-up     Previous Recommendations:   [x] Implemented          [] Not Implemented          [] Not Applicable    Previous Goal:   [] Met              [x] Progressing Towards Goal              [] Not Progressing Towards Goal   [] Not Applicable            Tricia Navarrete, [address] [facility]  Pager [phone]  Phone [phone]

## 2020-05-26 NOTE — CONSULTS
Palliative Medicine Consult  Neel: [phone] ([phone])    Patient Name: Kan Mccracken  YOB: 1940    Date of Initial Consult:  5/26/2020  Reason for Consult: [address] discussion  Requesting Provider: Dr. Damien Sim   Primary Care Physician: Sigrid Goetz MD     SUMMARY:   Kna Mccracken is a 78 y.o. with a past history of TIA, CVA, HTN, dementia, falls, who was admitted on 5/20/2020 from home with a diagnosis of left displaced femur fracture status post GLF. current medical issues leading to Palliative Medicine involvement include:GOC discussion in setting of elderly woman with multiple co morbidities, including new onset afib, .who does not have an AMD in EMR. Patient presented to the ER with CC of 2 unwitnessed ground-level fall, the second on resulted in patient complaining of left hip pain. In ER patient was alert but unable to provide ROS, she was bradycardic, imaging of hip revealed closed fracture of left femur. Patient admitted. Course of hospitalization: 5/21 underwent left hip Golar hemiarthroplasty on 5/23 patient hypotensive and found to be in A. fib with RVR, after which became bradycardic, possible STEMI, patient transferred to the ICU. On 5/25 hemoglobin dropped 6.9, was transfused 2 units     PALLIATIVE DIAGNOSES:   1. Advanced care planning discussion  2. Goals of care discussion  3. DNR discussion  4. Altered mental status, unspecified  5. Debility       PLAN:   1. Prior to visit completed chart review. I also spoke with patient's nurse, Yamileth Farley. 2. Patient was seen and evaluated, no family at bedside. She was initially sleeping, but did wake to voice, was pleasant, but somewhat quiet, able to tell me she was in hospital amd that she had surgery, she is not oriented to time, year, president. Was not able to provide ROS.   3. Advanced care Planning Discussion- Patient unable to tell me if she had ever completed AMD, she is able to tell me that her daughter Rachael Hoff Katelin Gibsonshanthi is who she would want to help her with decision making. I will call daughter to find out if patient ever completed AMD.  4. GOC discussion Patient unable to participate in this, will need to discuss with family. 5. DNR discussion- DNR code status, will need Durable DNR completed before discharge. 6. Debility- etiology includes recent fall resulting in left hip fx. Patient very weak and is now virtually confined to bed without assist.  7. Initial consult note routed to primary continuity provider and/or primary health care team members  8. Communicated plan of care with: Palliative IDTStephanie Team     GOALS OF CARE / TREATMENT PREFERENCES:     GOALS OF CARE:       TREATMENT PREFERENCES:   Code Status: DNR    Advance Care Planning:  [x] [facility] Interdisciplinary Team has updated the ACP Navigator with Health Care Decision Maker and Patient Capacity      Primary Decision Maker (Active): [place] - Child - [phone]  Advance Care Planning 5/26/2020   Patient's Healthcare Decision Maker is: Legal Next of Kin   Primary Decision Maker Name -   Primary Decision Maker Phone Number -   Primary Decision Maker Relationship to Patient -   Confirm Advance Directive Yes, not on file   Patient Would Like to Complete Advance Directive -             Other Instructions: Other:    As far as possible, the palliative care team has discussed with patient / health care proxy about goals of care / treatment preferences for patient. HISTORY:     History obtained from: medical records/nurse    CHIEF COMPLAINT: N/A    HPI/SUBJECTIVE:    The patient is:   [] Verbal and participatory  [x] Non-participatory due to:   Patient w/ confusion, not able to provide ROS, though she knows that she is in hospital. She indicated that she has no pain if she stays still, but unable to rate pain associated with movement.     Clinical Pain Assessment (nonverbal scale for severity on nonverbal patients): Clinical Pain Assessment  Severity: 0     Activity (Movement): Lying quietly, normal position    Duration: for how long has pt been experiencing pain (e.g., 2 days, 1 month, years)  Frequency: how often pain is an issue (e.g., several times per day, once every few days, constant)     FUNCTIONAL ASSESSMENT:     Palliative Performance Scale (PPS):  PPS: 30       PSYCHOSOCIAL/SPIRITUAL SCREENING:     Palliative IDT has assessed this patient for cultural preferences / practices and a referral made as appropriate to needs (Cultural Services, Patient Advocacy, Ethics, etc.)    Any spiritual / Adventism concerns:  [] Yes /  [x] No    Caregiver Burnout:  [] Yes /  [] No /  [x] No Caregiver Present      Anticipatory grief assessment:   [x] Normal  / [] Maladaptive       ESAS Anxiety:      ESAS Depression:          REVIEW OF SYSTEMS:     Positive and pertinent negative findings in ROS are noted above in HPI. The following systems were [] reviewed / [x] unable to be reviewed as noted in HPI  Other findings are noted below. Systems: constitutional, ears/nose/mouth/throat, respiratory, gastrointestinal, genitourinary, musculoskeletal, integumentary, neurologic, psychiatric, endocrine. Positive findings noted below. Modified ESAS Completed by: provider   Fatigue: 6 Drowsiness: 2     Pain: 0     Nausea: 0   Anorexia: 7 Dyspnea: 0           Stool Occurrence(s): 1        PHYSICAL EXAM:     From RN flowsheet:  Wt Readings from Last 3 Encounters:   05/25/20 123 lb (55.8 kg)   01/13/20 109 lb (49.4 kg)   12/11/19 106 lb (48.1 kg)     Blood pressure 126/64, pulse (!) 58, temperature 98.2 °F (36.8 °C), resp. rate 17, height 4' 11\" (1.499 m), weight 123 lb (55.8 kg), SpO2 96 %.     Pain Scale 1: Numeric (0 - 10)  Pain Intensity 1: 0  Pain Onset 1: movement  Pain Location 1: Hip, Leg, Back  Pain Orientation 1: Left  Pain Description 1: Aching  Pain Intervention(s) 1: Medication (see MAR)  Last bowel movement, if known: Constitutional: elderly, weak, frail appearing, NAD  Eyes: pupils equal, anicteric  ENMT: no nasal discharge, moist mucous membranes  Cardiovascular: regular rhythm, distal pulses intact  Respiratory: breathing not labored, symmetric  Gastrointestinal: soft non-tender, +bowel sounds  Musculoskeletal: no deformity, no tenderness to palpation  Skin: warm, dry  Neurologic: following simple  commands, little movement observed in LE  Psychiatric: full affect, no hallucinations  Other:       HISTORY:     Principal Problem:    Femoral neck fracture ([address] (2020)    Active Problems:    Hypertension (11/15/2013)      Overview: A.  Goal BP <140/90 s/p CVA in 2017. B. Echo (4/10/17):  EF 40% with mild GHK. Mild MR. Mild to mod TR. PASP 53. Neg bubble. Anemia (2020)      NSTEMI (non-ST elevated myocardial infarction) ([address] (2020)      Rhabdomyolysis (2020)      Bradycardia (2020)      UTI (urinary tract infection) (2020)      Past Medical History:   Diagnosis Date    Acute CVA (cerebrovascular accident) ([address] 4/10/2017    Per MRI:  Posterior L frontal/parietal territory    Cerebral atrophy ([address] 4/10/2017    History of CVA (cerebrovascular accident) 4/10/2017    --L frontal acute infarct and LMCA    Hyperlipidemia LDL goal <70 4/10/2017    Hypertension     Status post hip surgery      Left hip bipolar hemiarthroplasty on 20     TIA (transient ischemic attack)     Unresponsive episode 8/3/2017    transient      Past Surgical History:   Procedure Laterality Date    HX HEENT      Tonsilectomy age 8      Family History   Problem Relation Age of Onset    Dementia Mother 61    Heart Attack Father 79      History reviewed, no pertinent family history. Social History     Tobacco Use    Smoking status: Former Smoker     Last attempt to quit: 1999     Years since quittin.9    Smokeless tobacco: Never Used   Substance Use Topics    Alcohol use:  Yes Alcohol/week: 17.5 standard drinks     Types: 21 Glasses of wine per week     Comment: 3 glasses of port per day     Allergies   Allergen Reactions    Benadryl Allergy/Cold [Diphenhyd-Pe-Acetaminophen] Hives     Per patient's daughter, she thinks patient has had benadryl since then and tolerated it.  Penicillins Hives     Was treated with Augmentin in June 2015, tolerated medication well.        Current Facility-Administered Medications   Medication Dose Route Frequency    lisinopriL (PRINIVIL, ZESTRIL) tablet 5 mg  5 mg Oral DAILY    carvediloL (COREG) tablet 3.125 mg  3.125 mg Oral BID WITH MEALS    enoxaparin (LOVENOX) injection 40 mg  40 mg SubCUTAneous Q24H    0.9% sodium chloride infusion 250 mL  250 mL IntraVENous PRN    0.9% sodium chloride infusion 250 mL  250 mL IntraVENous PRN    sodium chloride (NS) flush 5-40 mL  5-40 mL IntraVENous Q8H    sodium chloride (NS) flush 5-40 mL  5-40 mL IntraVENous PRN    naloxone (NARCAN) injection 0.4 mg  0.4 mg IntraVENous PRN    calcium-vitamin D (OS-NENA) 500 mg-200 unit tablet  1 Tab Oral TID WITH MEALS    senna-docusate (PERICOLACE) 8.6-50 mg per tablet 1 Tab  1 Tab Oral BID    polyethylene glycol (MIRALAX) packet 17 g  17 g Oral DAILY    bisacodyL (DULCOLAX) suppository 10 mg  10 mg Rectal DAILY PRN    traMADoL (ULTRAM) tablet 50 mg  50 mg Oral Q6H PRN    oxyCODONE IR (ROXICODONE) tablet 2.5 mg  2.5 mg Oral Q4H PRN    ondansetron (ZOFRAN ODT) tablet 4 mg  4 mg Oral Q4H PRN    sodium chloride (NS) flush 5-40 mL  5-40 mL IntraVENous Q8H    sodium chloride (NS) flush 5-40 mL  5-40 mL IntraVENous PRN    atorvastatin (LIPITOR) tablet 40 mg  40 mg Oral QHS    memantine (NAMENDA) tablet 10 mg  10 mg Oral BID          LAB AND IMAGING FINDINGS:     Lab Results   Component Value Date/Time    WBC 11.9 (H) 05/26/2020 03:36 AM    HGB 8.8 (L) 05/26/2020 03:22 PM    PLATELET 102 01/95/7392 03:36 AM     Lab Results   Component Value Date/Time    Sodium 144 05/26/2020 03:36 AM    Potassium 4.0 05/26/2020 03:36 AM    Chloride 116 (H) 05/26/2020 03:36 AM    CO2 25 05/26/2020 03:36 AM    BUN 37 (H) 05/26/2020 03:36 AM    Creatinine 1.03 (H) 05/26/2020 03:36 AM    Calcium 7.8 (L) 05/26/2020 03:36 AM    Magnesium 2.4 05/26/2020 03:36 AM    Phosphorus 2.0 (L) 05/26/2020 03:36 AM      Lab Results   Component Value Date/Time    Alk. phosphatase 60 05/20/2020 08:58 PM    Protein, total 6.6 05/20/2020 08:58 PM    Albumin 2.7 (L) 05/20/2020 08:58 PM    Globulin 3.9 05/20/2020 08:58 PM     Lab Results   Component Value Date/Time    INR 1.0 08/04/2017 12:19 AM    Prothrombin time 10.0 08/04/2017 12:19 AM    aPTT 71.4 (H) 05/25/2020 12:35 AM      No results found for: IRON, FE, TIBC, IBCT, PSAT, FERR   No results found for: PH, PCO2, PO2  No components found for: [place]   Lab Results   Component Value Date/Time     (H) 05/23/2020 03:40 PM    CK - MB 4.8 (H) 08/04/2017 12:19 AM                Total time: 70 min  Counseling / coordination time, spent as noted above: 50 min  > 50% counseling / coordination?: yes    Prolonged service was provided for  []30 min   []75 min in face to face time in the presence of the patient, spent as noted above. Time Start:   Time End:   Note: this can only be billed with 50842 (initial) or 60324 (follow up). If multiple start / stop times, list each separately.

## 2020-05-26 NOTE — PROGRESS NOTES
5/26/2020  6:07 PM  Case management note    Received call from [facility] with authorization number  [id number]. Solo quijano   Will continue to follow  Celeste Rock

## 2020-05-27 VITALS
SYSTOLIC BLOOD PRESSURE: 153 MMHG | RESPIRATION RATE: 20 BRPM | HEART RATE: 64 BPM | OXYGEN SATURATION: 94 % | DIASTOLIC BLOOD PRESSURE: 76 MMHG | TEMPERATURE: 97.8 F | BODY MASS INDEX: 26 KG/M2 | HEIGHT: 59 IN | WEIGHT: 129 LBS

## 2020-05-27 LAB
ANION GAP SERPL CALC-SCNC: 4 MMOL/L (ref 5–15)
ATRIAL RATE: 60 BPM
BASOPHILS # BLD: 0 K/UL (ref 0–0.1)
BASOPHILS NFR BLD: 0 % (ref 0–1)
BUN SERPL-MCNC: 31 MG/DL (ref 6–20)
BUN/CREAT SERPL: 42 (ref 12–20)
CALCIUM SERPL-MCNC: 7.9 MG/DL (ref 8.5–10.1)
CALCULATED P AXIS, ECG09: 58 DEGREES
CALCULATED R AXIS, ECG10: 18 DEGREES
CALCULATED T AXIS, ECG11: 158 DEGREES
CHLORIDE SERPL-SCNC: 112 MMOL/L (ref 97–108)
CO2 SERPL-SCNC: 25 MMOL/L (ref 21–32)
CREAT SERPL-MCNC: 0.74 MG/DL (ref 0.55–1.02)
DIAGNOSIS, 93000: NORMAL
DIFFERENTIAL METHOD BLD: ABNORMAL
EOSINOPHIL # BLD: 0.3 K/UL (ref 0–0.4)
EOSINOPHIL NFR BLD: 2 % (ref 0–7)
ERYTHROCYTE [DISTWIDTH] IN BLOOD BY AUTOMATED COUNT: 15.1 % (ref 11.5–14.5)
GLUCOSE BLD STRIP.AUTO-MCNC: 67 MG/DL (ref 65–100)
GLUCOSE BLD STRIP.AUTO-MCNC: 75 MG/DL (ref 65–100)
GLUCOSE BLD STRIP.AUTO-MCNC: 86 MG/DL (ref 65–100)
GLUCOSE BLD STRIP.AUTO-MCNC: 96 MG/DL (ref 65–100)
GLUCOSE BLD STRIP.AUTO-MCNC: 96 MG/DL (ref 65–100)
GLUCOSE SERPL-MCNC: 67 MG/DL (ref 65–100)
HCT VFR BLD AUTO: 27.6 % (ref 35–47)
HCT VFR BLD AUTO: 28.5 % (ref 35–47)
HGB BLD-MCNC: 9 G/DL (ref 11.5–16)
HGB BLD-MCNC: 9.3 G/DL (ref 11.5–16)
IMM GRANULOCYTES # BLD AUTO: 0 K/UL
IMM GRANULOCYTES NFR BLD AUTO: 0 %
LYMPHOCYTES # BLD: 1.3 K/UL (ref 0.8–3.5)
LYMPHOCYTES NFR BLD: 10 % (ref 12–49)
MAGNESIUM SERPL-MCNC: 2.2 MG/DL (ref 1.6–2.4)
MCH RBC QN AUTO: 31.7 PG (ref 26–34)
MCHC RBC AUTO-ENTMCNC: 32.6 G/DL (ref 30–36.5)
MCV RBC AUTO: 97.2 FL (ref 80–99)
METAMYELOCYTES NFR BLD MANUAL: 1 %
MONOCYTES # BLD: 1.1 K/UL (ref 0–1)
MONOCYTES NFR BLD: 8 % (ref 5–13)
NEUTS SEG # BLD: 10.5 K/UL (ref 1.8–8)
NEUTS SEG NFR BLD: 79 % (ref 32–75)
NRBC # BLD: 0.27 K/UL (ref 0–0.01)
NRBC BLD-RTO: 2 PER 100 WBC
P-R INTERVAL, ECG05: 178 MS
PHOSPHATE SERPL-MCNC: 2.6 MG/DL (ref 2.6–4.7)
PLATELET # BLD AUTO: 230 K/UL (ref 150–400)
PMV BLD AUTO: 9.6 FL (ref 8.9–12.9)
POTASSIUM SERPL-SCNC: 3.7 MMOL/L (ref 3.5–5.1)
Q-T INTERVAL, ECG07: 468 MS
QRS DURATION, ECG06: 90 MS
QTC CALCULATION (BEZET), ECG08: 468 MS
RBC # BLD AUTO: 2.84 M/UL (ref 3.8–5.2)
RBC MORPH BLD: ABNORMAL
SERVICE CMNT-IMP: NORMAL
SODIUM SERPL-SCNC: 141 MMOL/L (ref 136–145)
VENTRICULAR RATE, ECG03: 60 BPM
WBC # BLD AUTO: 13.3 K/UL (ref 3.6–11)

## 2020-05-27 PROCEDURE — 74011250637 HC RX REV CODE- 250/637: Performed by: ORTHOPAEDIC SURGERY

## 2020-05-27 PROCEDURE — 85018 HEMOGLOBIN: CPT

## 2020-05-27 PROCEDURE — 92526 ORAL FUNCTION THERAPY: CPT

## 2020-05-27 PROCEDURE — 84100 ASSAY OF PHOSPHORUS: CPT

## 2020-05-27 PROCEDURE — 36415 COLL VENOUS BLD VENIPUNCTURE: CPT

## 2020-05-27 PROCEDURE — 74011250637 HC RX REV CODE- 250/637: Performed by: NURSE PRACTITIONER

## 2020-05-27 PROCEDURE — 83735 ASSAY OF MAGNESIUM: CPT

## 2020-05-27 PROCEDURE — 74011250637 HC RX REV CODE- 250/637: Performed by: STUDENT IN AN ORGANIZED HEALTH CARE EDUCATION/TRAINING PROGRAM

## 2020-05-27 PROCEDURE — 80048 BASIC METABOLIC PNL TOTAL CA: CPT

## 2020-05-27 PROCEDURE — 82962 GLUCOSE BLOOD TEST: CPT

## 2020-05-27 PROCEDURE — 85025 COMPLETE CBC W/AUTO DIFF WBC: CPT

## 2020-05-27 PROCEDURE — 77030038269 HC DRN EXT URIN PURWCK BARD -A

## 2020-05-27 RX ORDER — SODIUM,POTASSIUM PHOSPHATES 280-250MG
2 POWDER IN PACKET (EA) ORAL ONCE
Status: COMPLETED | OUTPATIENT
Start: 2020-05-27 | End: 2020-05-27

## 2020-05-27 RX ORDER — DEXTROSE MONOHYDRATE 100 MG/ML
250 INJECTION, SOLUTION INTRAVENOUS ONCE
Status: DISCONTINUED | OUTPATIENT
Start: 2020-05-27 | End: 2020-05-27 | Stop reason: HOSPADM

## 2020-05-27 RX ORDER — LISINOPRIL 10 MG/1
10 TABLET ORAL DAILY
Qty: 30 TAB | Refills: 0 | Status: SHIPPED | OUTPATIENT
Start: 2020-05-27 | End: 2020-06-26

## 2020-05-27 RX ORDER — TRAMADOL HYDROCHLORIDE 50 MG/1
50 TABLET ORAL
Qty: 20 TAB | Refills: 0 | Status: SHIPPED | OUTPATIENT
Start: 2020-05-27 | End: 2020-05-27

## 2020-05-27 RX ORDER — LISINOPRIL 5 MG/1
10 TABLET ORAL DAILY
Status: DISCONTINUED | OUTPATIENT
Start: 2020-05-27 | End: 2020-05-27 | Stop reason: HOSPADM

## 2020-05-27 RX ORDER — TRAMADOL HYDROCHLORIDE 50 MG/1
50 TABLET ORAL
Qty: 20 TAB | Refills: 0 | Status: SHIPPED | OUTPATIENT
Start: 2020-05-27 | End: 2020-06-10

## 2020-05-27 RX ADMIN — LISINOPRIL 10 MG: 5 TABLET ORAL at 09:21

## 2020-05-27 RX ADMIN — SENNOSIDES AND DOCUSATE SODIUM 1 TABLET: 8.6; 5 TABLET ORAL at 09:21

## 2020-05-27 RX ADMIN — POTASSIUM & SODIUM PHOSPHATES POWDER PACK 280-160-250 MG 2 PACKET: 280-160-250 PACK at 06:50

## 2020-05-27 RX ADMIN — Medication 10 ML: at 14:33

## 2020-05-27 RX ADMIN — TRAMADOL HYDROCHLORIDE 50 MG: 50 TABLET, FILM COATED ORAL at 14:59

## 2020-05-27 RX ADMIN — CARVEDILOL 3.12 MG: 3.12 TABLET, FILM COATED ORAL at 09:32

## 2020-05-27 RX ADMIN — Medication 10 ML: at 06:06

## 2020-05-27 RX ADMIN — MEMANTINE 10 MG: 10 TABLET ORAL at 09:21

## 2020-05-27 RX ADMIN — POLYETHYLENE GLYCOL 3350 17 G: 17 POWDER, FOR SOLUTION ORAL at 09:22

## 2020-05-27 RX ADMIN — OYSTER SHELL CALCIUM WITH VITAMIN D 1 TABLET: 500; 200 TABLET, FILM COATED ORAL at 09:22

## 2020-05-27 NOTE — DISCHARGE INSTRUCTIONS
SNF DISCHARGE INSTRUCTIONS    Jacob Torrez / [id number] : 1940    Admission date: 2020 Discharge date: 2020 11:42 AM     Please bring this form with you to show your care provider at your follow-up appointment. Primary care provider:  Milad Pratt MD    Discharging provider:  Jany Moeller MD  - Family Medicine Resident  Dr. Juan F Cisneros Attending    . . . . . . . . . . . . . . . . . . . . . . . . . . . . . . . . . . . . . . . . . . . . . . . . . . . . . . . . . . . . . . . . . . . . . . . Nessa Jamil FOLLOW-UP CARE RECOMMENDATIONS:    APPOINTMENTS:  · Follow-up with: Follow-up Information     Follow up With Specialties Details Why Contact Info    Robert Hutton PA-C Physician Assistant Schedule an appointment as soon as possible for a visit in 3 weeks Follow up with Ortho in 3-4 wks  [address]      Milad Pratt MD Family Practice Schedule an appointment as soon as possible for a visit Call to follow up with your PCP as soon as possible  [address] [address]  [phone]      Hai Leiva MD Cardiology, Internal Medicine Schedule an appointment as soon as possible for a visit on 2020 3 pm [address] [facility] [address] [phone]      [address]  [phone]      Milad Pratt MD [place] [address]  [address]  [phone]            *Follow Up with PCP to discuss starting Fosfomax for osteoporosis given you hip fx. *Keep in mind that Tramadol lowers your seizure threshold- only use as needed. No not drive after taking it.       MEDICATION CHANGES:   - START Lisinopril 10mg daily (STOP home Lisinopril 20mg qhs)  - START Lovenox SQ 40mg x4 wks (Started 20)  - START Calcium-Vit Supplement TID  - START TRAMADOL 50mg every 6 hours as needed for pain   - STOP home Plavix 75mg daily (resume after completion of Lovenox)  *Continue all other home meds        It is very important that you keep follow-up appointment(s). Bring discharge papers, medication list (and/or medication bottles) to follow-up appointments for review by outpatient provider(s). FOLLOW-UP TESTS RECOMMENDED:   · Per OP Cardiology and Ortho         ONGOING TREATMENT PLAN:     NSTEMI: EKG with ST depression in multiple leads. Cardiology was following. Treated with heparin gtt. Echo EF 50-55%. - START Lovenox 40mg daily x4 wks (day 2/28 today)  - Home Coreg 3.125mg BID   - F/u OP Cardiology     Hypotension. Hypovolemic vs Septic Shock 2/2 complicated UTI: RESOLVED. s/p [place] gtt + bernardo gtt. +UA (4+ bact, nitrites, large LE). Ucx: E coli. S/p Tx w/ Levaquin + CTX. Bcx NGTD.     New Onset Afib:  Afib w/ RVR rate-115 on 5/23, which soon converted to NSR. s/p Digoxin load. - F/u OP Cardiology     AHRF: RESOLVED. Weaned off supplemental O2. Not in distress on exam. Now satting 97% on RA.      L Femoral Neck Fracture: Due to GLF. S/p L hip bipolar hemiarthroplasty-5/21 Donnice July). - STOP home plavix 75mg daily (resume after completion of Lovenox)  - START Calcium-Vit Supplement TID  - START Tramadol 50mg every 6 hours as needed for pain   - STOP home plavix 75mg daily for now (resume after completion of Lovenox)  - Weightbearing as tolerated per PT/OT in SNF   - F/u OP Ortho in 3-4 wks  - F/u PCP to discuss starting Fosfomax for osteoporosis given hip fracture     Rhabdomyolysis: 2 GLF, during the first was on the ground overnight. CK 1639 without any kidney injury. UA mod blood. CK 1639-->641--> 344.      Hypoglycemia: BG 67 on CMP and 75, 96 s/p juice. ***     Post-operative Anemia: IMPROVING. Hgb postop 8.5 (BL 13). s/p 1UpRBC: post transfusion Hgb 8.6. Retic 3.1 (appropriately elevated), haptoglobin 213^, FOBT neg. Hgb 9 at DC. - F/u PCP: repeat CBC     AMS w/ decreased pupillary light reflex: RESOLVED. Back to Baseline. No acute findings on Head CT.      Chronic HTN: POA /57. Bp 131/66 at DC.   - Cont Home Coreg 3.125mg BID   - START Lisinopril 10mg daily (STOP home 20mg qhs)  - F/u PCP     Complicated UTI: +UA (4+ bact, nitrites, large LE). Ucx: E coli. S/p Tx w/ Levaquin + CTX. Bcx NTD.      Bradycardia with a hx of  bigeminy: HR POA 55. Cleared by Dr. Diamante Juarez in the past for a cataract procedure. EKG was noted to find PVCs in the form of bigemeny. Bradycardia was false and patient was deemed stable for surgery. Also cleared by Dr. Chito Garcia (Cards) during this admission pre-surgically. - F/u OP Cardiology     HLD: last lipid panel 12/2019 all values wnl  - Cont Home Lipitor 40mg qhs     Hx of CVA/TIA in 2017. CT scan POA noted chronic ischemic changes  - STOP home plavix 75mg daily (resume after completion of Lovenox)     Dementia  - Cont Home Namenda 10mg BID        PENDING TEST RESULTS:  At the time of discharge the following test results are still pending: none. Please review these results as they become available. Specific symptoms to watch for: chest pain, shortness of breath, fever, chills, nausea, vomiting, diarrhea, change in mentation, falling, weakness, bleeding. DIET: Dysphagia 1 pureed diet. ACTIVITY:  WBAT LLE with posterior hip precautions     WOUND CARE:  None needed    EQUIPMENT needed:  none    INCIDENTAL FINDINGS:  none    GOALS OF CARE:  X  Eventual return to home/independent/assisted living     Long term SNF      Hospice     No rehospitalization     Patient condition at discharge:   Functional status    Poor    X  Deconditioned      Independent   Cognition    Lucid     Forgetful (some sensescence)   X  Dementia   Catheters/lines (plus indication)  X  Ma (purewick)     PICC      PEG      None   Code status    Full code    X  DNR    . . . . . . . . . . . . . . . . . . . . . . . . . . . . . . . . . . Alison Temple . . . . . . . . . . . . . . . . . . . . . . . . . . . . . . . . . . . . Vahid Shipley      CHRONIC MEDICAL CONDITIONS:  Problem List as of 5/27/2020 Date Reviewed: 5/21/2020          Codes Class Noted - Resolved    Anemia ICD-10-CM: D64.9  ICD-9-CM: 285.9  5/26/2020 - Present        NSTEMI (non-ST elevated myocardial infarction) ([facility] ICD-10-CM: I21.4  ICD-9-CM: 410.70  5/26/2020 - Present        Rhabdomyolysis ICD-10-CM: M62.82  ICD-9-CM: 728.88  5/26/2020 - Present        Bradycardia ICD-10-CM: R00.1  ICD-9-CM: 427.89  5/26/2020 - Present        UTI (urinary tract infection) ICD-10-CM: N39.0  ICD-9-CM: 599.0  5/26/2020 - Present        * (Principal) Femoral neck fracture ([facility] ICD-10-CM: S72.009A  ICD-9-CM: 820.8  5/20/2020 - Present        TIA (transient ischemic attack) ICD-10-CM: G45.9  ICD-9-CM: 435.9  Unknown - Present        Cardiomyopathy ([facility] ICD-10-CM: I42.9  ICD-9-CM: 425.4  8/4/2017 - Present        Unresponsive episode ICD-10-CM: R41.89  ICD-9-CM: 780.09  8/3/2017 - Present    Overview Signed 8/4/2017  8:50 AM by Jaswant Mckeon     transient             H/O CVA (4/2017) ICD-10-CM: I63.9  ICD-9-CM: 434.91  4/10/2017 - Present    Overview Signed 4/10/2017 12:14 PM by Jaswant Mckeon     MRI review:  Posterior L frontal/parietal infarct             Cerebral atrophy ([facility] ICD-10-CM: G31.9  ICD-9-CM: 331.9  4/10/2017 - Present    Overview Signed 4/10/2017  1:01 PM by Aidee Celis than expected for age             Hyperlipidemia LDL goal <70 ICD-10-CM: E78.5  ICD-9-CM: 272.4  4/10/2017 - Present        History of CVA (cerebrovascular accident) (Chronic) ICD-10-CM: Z86.73  ICD-9-CM: V12.54  4/10/2017 - Present    Overview Signed 8/4/2017  8:58 AM by Bill CALVILLO     --L frontal acute infarct and LMCA             Adjustment disorder ICD-10-CM: F43.20  ICD-9-CM: 309.9  4/10/2014 - Present        Anxiety ICD-10-CM: F41.9  ICD-9-CM: 300.00  4/10/2014 - Present        Hypertension ICD-10-CM: I10  ICD-9-CM: 401.9  11/15/2013 - Present    Overview Addendum 4/28/2017  1:45 PM by MD LEYLA Zafar. Goal BP <140/90 s/p CVA in 2017. B. Echo (4/10/17):  EF 40% with mild GHK. Mild MR. Mild to mod TR. PASP 53. Neg bubble. RESOLVED: CVA (cerebral vascular accident) [facility]) ICD-10-CM: I63.9  ICD-9-CM: 434.91  4/10/2017 - 4/28/2017        RESOLVED: Ileus ([facility] ICD-10-CM: K56.7  ICD-9-CM: 560.1  7/7/2013 - 4/10/2017              Information obtained by :   I understand that if any problems occur once I am at home I am to contact my physician. I understand and acknowledge receipt of the instructions indicated above.                                                                                                                                              Physician's or R.N.'s Signature                                                                  Date/Time                                                                                                                                              Patient or Representative Signature                                                          Date/Time

## 2020-05-27 NOTE — PALLIATIVE CARE DISCHARGE
Goals of Care/Treatment Preferences The Palliative Medicine team was consulted as part of your/your loved one's care in the hospital. Our team is a supportive service; we strive to relieve suffering and improve quality of life. We reviewed advance care planning information, which includes the following: 
Patient's [place] is[de-identified] Legal Next of Kin Confirm Advance Directive: Not on file We reviewed / discussed your code status as: DNR 
   Full Code means perform CPR in the event of cardiac arrest. 
    DNR means do NOT perform CPR in the event of cardiac arrest. 
    Partial Code means you have specific preferences, please discuss with your healthcare team. 
    Dinesh Pilling means this issue was not addressed / resolved during your stay Other Instructions: The Palliative Medicine Nurse Practitioner is assisting your daughter with completion of a Durable Do Not Resuscitate form. Once complete, this form should travel with you. When you are in a facility, this form should be placed on your chart. Once you are home, it is recommended that the [facility] form be placed in a visible location such as on the refrigerator or bedroom door. Because of the importance of this information, we are providing you with a printed copy to share with other healthcare providers after this hospitalization is complete.

## 2020-05-27 NOTE — PROGRESS NOTES
5/27/2020   11:52 AM  CM spoke w/ Family Medicine resident, plan remains for d/c today, awaiting d/c order. CM scheduled pt for stretcher transport at 3:00PM to [address] [place] [address],[address], transport packet to chart. CM notified [facility] admission. Chad Sales      10:34 AM  LAURA spoke w/ [facility] admissions, they can accept pt today if stable, awaiting d/c order. Chad Sales      8:29 AM  LAURA updated Family Medicine, insurance auth obtained, good for 48H, awaiting d/c order, pt is on will call for stretcher transport vis [facility].   Chad Sales

## 2020-05-27 NOTE — ACP (ADVANCE CARE PLANNING)
Advance Care Planning 2020   Patient's Healthcare Decision Maker is: Legal Next of Kin   Primary Decision Maker Name Ashely Carlos   Primary Decision Maker [facility] Number [facility]  [phone]; [phone]   Primary Decision Maker Relationship to Patient Dtr and son   Confirm Advance Directive Yes, not on file   Patient Would Like to Complete Advance Directive Currently unable      Pt does not currently have AMD on file. In absence of verified Medical POA, dtr Ashely Steve and son Alexander Villagran are legal NOK and would be surrogate decision makers if pt is unable to speak for herself. Pt currently has inpt DNR order in place; Palliative Medicine NP is assisting with completion of DDNR.

## 2020-05-27 NOTE — PROGRESS NOTES
5/27/2020   1:07 PM  Transition of Care Plan to SNF/Rehab    SNF/Rehab Transition:  Patient has been accepted to [facility] and meets criteria for admission. Patient will transported by [facility] and expected to leave at 3:00 PM    Communication to Patient/Family:  Met with patient and daughter Meryle Neptune  (identified care giver) and they are agreeable to the transition plan. Communication to SNF/Rehab:  Bedside RN, Enough, has been notified to update the transition plan to the facility and call report phone number  [place] [facility]  Rm 102  Discharge information has been updated on the AVS.     Discharge instructions to be fax'd to facility at [facility] # [phone]). Patient has been identified as part of the  [employer].  For Care Coordination associated with that Bundle Program, please contact   Bundle information has been communication to     Nursing Please include all hard scripts for controlled substances, med rec and dc summary, and AVS in packet.      Reviewed and confirmed with facility, John Campos can manage the patient care needs for the following:     Job Foots with (X) only those applicable:    Medication:  [x]  Medications will be available at the facility  []  IV Antibiotics  [x]  Controlled Substance - hard copy to be sent with patient   []  Weekly Labs   Documents:  [x] Hard RX  [x] MAR  [] Kardex  [x] AVS  []Transfer Summary  [x]Discharge   Equipment:  []  CPAP/BiPAP  []  Wound Vacuum  []  Ma or Urinary Device  []  PICC/Central Line  []  Nebulizer  []  Ventilator   Treatment:  []Isolation (for MRSA, VRE, etc.)  []Surgical Drain Management  []Tracheostomy Care  []Dressing Changes  []Dialysis with transportation and chair time   []PEG Care  []Oxygen   []Daily Weights for Heart Failure   Dietary:  []Any diet limitations  []Tube Feedings   []Total Parenteral Management (TPN)   Eligible for Medicaid Long Term Services and Supports  Yes:  [] Eligible for medical assistance or will become eligible within 180 days and UAI completed. [] Provider/Patient and/or support system has requested screening. [] UAI copy provided to patient or responsible party,   [] UAI unavailable at discharge will send once processed to SNF provider. [] UAI unavailable at discharged mailed to patient  No:   [x] Private pay and is not financially eligible for Medicaid within the next 180 days. [] Reside out-of-state.   [] A residents of a state owned/operated facility that is licensed  by [address] or [facility]  [] Enrollment in [facility] hospice services  [] [address] [place] [address]  [] Patient /Family declines to have screening completed or provide financial information for screening     Financial Resources:  Medicaid    [] Initiated and application pending   [] Full coverage     Advanced Care Plan:  []Surrogate Decision Maker of Care  []POA  []Communicated Code Status DNR (DDNR\", \"Full\")    Other   Care Management Interventions  PCP Verified by CM: Yes([facility])  Mode of Transport at Discharge: BLS([facility]/stretcher)  Transition of Care Consult (CM Consult): SNF([facility])  Partner SNF: Yes  Current Support Network: Relative's Home  Confirm Follow Up Transport: Family  Discharge Location  Discharge Placement: Skilled nursing facility([facility])    Conchis Dominguez

## 2020-05-27 NOTE — PROGRESS NOTES
CMS Note  5/27/2020    Due to patient's condition, CMS called to discuss the 2nd IMM letter with patient's daughter.    Tameka Barrett CMS

## 2020-05-27 NOTE — PROGRESS NOTES
Subjective: Alessia Oglesby is a 78 y.o. female who is POD 6 s/p LEFT hip bipolar now with NSTEMI and AFib    Major Events:. Pain controlled    Objective:    Vital signs in last 24 hours:    Temp:  [97.4 °F (36.3 °C)-99.5 °F (37.5 °C)]   Pulse (Heart Rate):  []   BP: ()/()   Resp Rate:  [12-38]   O2 Sat (%):  [75 %-100 %]   Weight:  [53.2 kg (117 lb 4.6 oz)-58.5 kg (129 lb)]     Temp (24hrs), Av.1 °F (36.7 °C), Min:97.4 °F (36.3 °C), Max:98.7 °F (37.1 °C)      Labs:    Lab Results   Component Value Date/Time    WBC 13.3 (H) 2020 03:40 AM    HGB 9.0 (L) 2020 03:40 AM    HCT 27.6 (L) 2020 03:40 AM    PLATELET 854  03:40 AM       Lab Results   Component Value Date/Time    Sodium 141 2020 03:40 AM    Potassium 3.7 2020 03:40 AM    Chloride 112 (H) 2020 03:40 AM    CO2 25 2020 03:40 AM    BUN 31 (H) 2020 03:40 AM       Physical Exam:    General: alert, cooperative, in NAD  Nonlabored resp    LEFT Lower extremity    Dressing: mild shadowing  Gluteal/thigh compartments soft     Motor: 5/5 ankle df/pf     Sensory: SILT     Vascular: Brisk capillary refill in toes     Assessment/Plan:     · Pain management: as written     · DVT Prophylaxis:  Lovenox 40mg sc Daily x 4 weeks     · PT/OT: WBAT LLE with posterior hip precautions      · Dispo: pending PT evaluation     F[facility]u: [facility] 3-4 weeks with JERI Bowman 7.  Radha Giraldo MD

## 2020-05-27 NOTE — PROGRESS NOTES
Spoke to pt's daughter and explained the pt's DC planning in depth. All questions were answered. Pt to be Dc to [facility] ([facility]) this afternoon.      Kelli Urbina MD

## 2020-05-27 NOTE — PROGRESS NOTES
Palliative Medicine Consult  Neel: [phone] ([phone])    Patient Name: Dahlia Wiggins  YOB: 1940    Date of Initial Consult:  5/26/2020  Reason for Consult: [address] discussion  Requesting Provider: Dr. Edvin Henderson   Primary Care Physician: Sandoval Yuan MD     SUMMARY:   Dahlia Wiggins is a 78 y.o. with a past history of TIA, CVA, HTN, dementia, falls, who was admitted on 5/20/2020 from home with a diagnosis of left displaced femur fracture status post GLF. current medical issues leading to Palliative Medicine involvement include:GOC discussion in setting of elderly woman with multiple co morbidities, including new onset afib, .who does not have an AMD in EMR. Patient presented to the ER with CC of 2 unwitnessed ground-level fall, the second on resulted in patient complaining of left hip pain. In ER patient was alert but unable to provide ROS, she was bradycardic, imaging of hip revealed closed fracture of left femur. Patient admitted. Course of hospitalization: 5/21 underwent left hip Golar hemiarthroplasty on 5/23 patient hypotensive and found to be in A. fib with RVR, after which became bradycardic, possible STEMI, patient transferred to the ICU. On 5/25 hemoglobin dropped 6.9, was transfused 2 units     PALLIATIVE DIAGNOSES:   1. Advanced care planning discussion  2. Goals of care discussion  3. DNR discussion  4. Altered mental status, unspecified  5. Debility       PLAN:   1. Prior to visit completed chart review for updated information. 2. Palliative clinical  Melanie De La Rosa [address] [place] [address] and I met with the patient her daughter Radha Plascencia was at bedside. 3. We reintroduced ourselves, had spoken to her on the phone the day before. 4. Advanced care Planning Discussion- Patient had not completed an AMD, however daughter and son are her primary healthcare decision makers. 5. GOC discussion-patient being discharged to a skilled nursing facility today.   We also discussed scenario in which patient continues to decline, comfort versus full restorative. 6. DNR discussion-patient has an inpatient DNR CODE STATUS, does not have a durable DNR. Patient's daughter agreeable to completing a durable DNR, a copy placed in paper chart to be scanned to EMR, gave daughter the original plus a copy. 7. Debility- etiology includes recent fall resulting in left hip fx. Patient very weak and is now virtually confined to bed without assist.  Patient being discharged to a SNF today, with goal to return home  8. Initial consult note routed to primary continuity provider and/or primary health care team members  9. Communicated plan of care with: Palliative IDT, [address] Team     GOALS OF CARE / TREATMENT PREFERENCES:     GOALS OF CARE:  Patient/Health Care Proxy Stated Goals: Rehabilitation    TREATMENT PREFERENCES:   Code Status: Prior    Advance Care Planning:  [x] [facility] Interdisciplinary Team has updated the ACP Navigator with [place] and Patient Capacity      Primary Decision Maker (Active): Ephraim Landers - [phone]    Primary Decision Maker: Yun Sawyer - [phone]  Advance Care Planning 5/26/2020   Patient's Negra is: Legal Next of [address] 67   Primary Decision Maker Name -   Primary Decision Maker Phone Number -   Primary Decision Maker Relationship to Patient -   Confirm Advance Directive Yes, not on file   Patient Would Like to Complete Advance Directive -       Medical Interventions: Limited additional interventions     Other Instructions:   Artificially Administered Nutrition: No feeding tube     Other:    As far as possible, the palliative care team has discussed with patient / health care proxy about goals of care / treatment preferences for patient.      HISTORY:     History obtained from: medical records/nurse    CHIEF COMPLAINT: N/A    HPI/SUBJECTIVE:    The patient is:   [] Verbal and participatory  [x] Non-participatory due to:   Patient w/ confusion, not able to provide ROS, though she knows that she is in hospital, oriented to daughter    Clinical Pain Assessment (nonverbal scale for severity on nonverbal patients):   Clinical Pain Assessment  Severity: 0  Location: denied  Character: denied  Duration: denied  Effect: denied  Factors: denied  Frequency: denied     Activity (Movement): Lying quietly, normal position    Duration: for how long has pt been experiencing pain (e.g., 2 days, 1 month, years)  Frequency: how often pain is an issue (e.g., several times per day, once every few days, constant)     FUNCTIONAL ASSESSMENT:     Palliative Performance Scale (PPS):  PPS: 30       PSYCHOSOCIAL/SPIRITUAL SCREENING:     Palliative IDT has assessed this patient for cultural preferences / practices and a referral made as appropriate to needs (Cultural Services, Patient Advocacy, Ethics, etc.)    Any spiritual / Latter day concerns:  [] Yes /  [x] No    Caregiver Burnout:  [] Yes /  [x] No /  [] No Caregiver Present      Anticipatory grief assessment:   [x] Normal  / [] Maladaptive       ESAS Anxiety:      ESAS Depression:          REVIEW OF SYSTEMS:     Positive and pertinent negative findings in ROS are noted above in HPI. The following systems were [] reviewed / [x] unable to be reviewed as noted in HPI  Other findings are noted below. Systems: constitutional, ears/nose/mouth/throat, respiratory, gastrointestinal, genitourinary, musculoskeletal, integumentary, neurologic, psychiatric, endocrine. Positive findings noted below. Modified ESAS Completed by: provider   Fatigue: 6 Drowsiness: 2     Pain: 0     Nausea: 0   Anorexia: 7 Dyspnea: 0           Stool Occurrence(s): 1        PHYSICAL EXAM:     From RN flowsheet:  Wt Readings from Last 3 Encounters:   05/26/20 129 lb (58.5 kg)   01/13/20 109 lb (49.4 kg)   12/11/19 106 lb (48.1 kg)     Blood pressure 153/76, pulse 64, temperature 97.8 °F (36.6 °C), resp.  rate 20, height 4' 11\" (1.499 m), weight 129 lb (58.5 kg), SpO2 94 %. Pain Scale 1: Numeric (0 - 10)  Pain Intensity 1: 6  Pain Onset 1: post-op  Pain Location 1: Hip  Pain Orientation 1: Left  Pain Description 1: Aching  Pain Intervention(s) 1: Medication (see MAR)  Last bowel movement, if known:     Constitutional: elderly, weak, frail appearing, NAD  Eyes: pupils equal, anicteric  ENMT: no nasal discharge, moist mucous membranes  Cardiovascular: regular rhythm, distal pulses intact  Respiratory: breathing not labored, symmetric  Gastrointestinal: soft non-tender, +bowel sounds  Musculoskeletal: no deformity, no tenderness to palpation  Skin: warm, dry  Neurologic: following simple  commands, little movement observed in LE  Psychiatric: full affect, no hallucinations  Other:       HISTORY:     Principal Problem:    Femoral neck fracture ([address] (5/20/2020)    Active Problems:    Hypertension (11/15/2013)      Overview: A.  Goal BP <140/90 s/p CVA in 2017. B. Echo (4/10/17):  EF 40% with mild GHK. Mild MR. Mild to mod TR. PASP 53. Neg bubble.       Anemia (5/26/2020)      NSTEMI (non-ST elevated myocardial infarction) ([address] (5/26/2020)      Rhabdomyolysis (5/26/2020)      Bradycardia (5/26/2020)      UTI (urinary tract infection) (5/26/2020)      Past Medical History:   Diagnosis Date    Acute CVA (cerebrovascular accident) ([address] 4/10/2017    Per MRI:  Posterior L frontal/parietal territory    Cerebral atrophy ([address] 4/10/2017    History of CVA (cerebrovascular accident) 4/10/2017    --L frontal acute infarct and LMCA    Hyperlipidemia LDL goal <70 4/10/2017    Hypertension     Status post hip surgery      Left hip bipolar hemiarthroplasty on 5/21/20     TIA (transient ischemic attack)     Unresponsive episode 8/3/2017    transient      Past Surgical History:   Procedure Laterality Date    HX HEENT      Tonsilectomy age 8      Family History   Problem Relation Age of Onset    Dementia Mother 61  Heart Attack Father 79      History reviewed, no pertinent family history. Social History     Tobacco Use    Smoking status: Former Smoker     Last attempt to quit: 1999     Years since quittin.9    Smokeless tobacco: Never Used   Substance Use Topics    Alcohol use: Yes     Alcohol/week: 17.5 standard drinks     Types: 21 Glasses of wine per week     Comment: 3 glasses of port per day     Allergies   Allergen Reactions    Benadryl Allergy/Cold [Diphenhyd-Pe-Acetaminophen] Hives     Per patient's daughter, she thinks patient has had benadryl since then and tolerated it.  Penicillins Hives     Was treated with Augmentin in 2015, tolerated medication well. No current facility-administered medications for this encounter. Current Outpatient Medications   Medication Sig    lisinopriL (PRINIVIL, ZESTRIL) 10 mg tablet Take 1 Tab by mouth daily for 30 days.  traMADoL (ULTRAM) 50 mg tablet Take 1 Tab by mouth every six (6) hours as needed for Pain for up to 20 doses. Max Daily Amount: 200 mg.  enoxaparin (LOVENOX) 40 mg/0.4 mL 0.4 mL by SubCUTAneous route every twenty-four (24) hours for 27 days.  calcium-vitamin D (OYSTER SHELL) 500 mg(1,250mg) -200 unit per tablet Take 1 Tab by mouth three (3) times daily (with meals) for 30 days.  atorvastatin (LIPITOR) 40 mg tablet Take 40 mg by mouth nightly.  iefoncom-drkp-fyc0-C-seng-bosw (Osteo Bi-Flex Triple Strength) 750 mg-644 mg- 30 mg-1 mg tab Take 1 Tab by mouth two (2) times a day.  cholecalciferol (VITAMIN D3) (1000 Units /25 mcg) tablet Take 2,000 Units by mouth daily.  TURMERIC ROOT EXTRACT PO Take 450 mg by mouth daily.  memantine (NAMENDA) 10 mg tablet Take 1 Tab by mouth two (2) times a day.  carvediloL (COREG) 3.125 mg tablet Take 1 Tab by mouth two (2) times daily (with meals).           LAB AND IMAGING FINDINGS:     Lab Results   Component Value Date/Time    WBC 13.3 (H) 2020 03:40 AM    HGB 9.3 (L) 05/27/2020 10:00 AM    PLATELET 537 79/45/7832 03:40 AM     Lab Results   Component Value Date/Time    Sodium 141 05/27/2020 03:40 AM    Potassium 3.7 05/27/2020 03:40 AM    Chloride 112 (H) 05/27/2020 03:40 AM    CO2 25 05/27/2020 03:40 AM    BUN 31 (H) 05/27/2020 03:40 AM    Creatinine 0.74 05/27/2020 03:40 AM    Calcium 7.9 (L) 05/27/2020 03:40 AM    Magnesium 2.2 05/27/2020 03:40 AM    Phosphorus 2.6 05/27/2020 03:40 AM      Lab Results   Component Value Date/Time    Alk. phosphatase 60 05/20/2020 08:58 PM    Protein, total 6.6 05/20/2020 08:58 PM    Albumin 2.7 (L) 05/20/2020 08:58 PM    Globulin 3.9 05/20/2020 08:58 PM     Lab Results   Component Value Date/Time    INR 1.0 08/04/2017 12:19 AM    Prothrombin time 10.0 08/04/2017 12:19 AM    aPTT 71.4 (H) 05/25/2020 12:35 AM      No results found for: IRON, FE, TIBC, IBCT, PSAT, FERR   No results found for: PH, PCO2, PO2  No components found for: [place]   Lab Results   Component Value Date/Time     (H) 05/23/2020 03:40 PM    CK - MB 4.8 (H) 08/04/2017 12:19 AM                Total time: 25 min  Counseling / coordination time, spent as noted above: 20min  > 50% counseling / coordination?: yes    Prolonged service was provided for  []30 min   []75 min in face to face time in the presence of the patient, spent as noted above. Time Start:   Time End:   Note: this can only be billed with 93482 (initial) or 52712 (follow up). If multiple start / stop times, list each separately.

## 2020-05-27 NOTE — PROGRESS NOTES
Problem: Pressure Injury - Risk of  Goal: *Prevention of pressure injury  Description: Document Diego Scale and appropriate interventions in the flowsheet. Outcome: Resolved/Met  Note: Pressure Injury Interventions:  Sensory Interventions: Assess changes in LOC    Moisture Interventions: Absorbent underpads    Activity Interventions: Increase time out of bed    Mobility Interventions: HOB 30 degrees or less    Nutrition Interventions: Offer support with meals,snacks and hydration    Friction and Shear Interventions: Apply protective barrier, creams and emollients                Problem: Patient Education: Go to Patient Education Activity  Goal: Patient/Family Education  Outcome: Resolved/Met     Problem: Pain  Goal: *Control of Pain  Outcome: Resolved/Met     Problem: Falls - Risk of  Goal: *Absence of Falls  Description: Document Wali Fall Risk and appropriate interventions in the flowsheet.   Outcome: Resolved/Met  Note: Fall Risk Interventions:  Mobility Interventions: Bed/chair exit alarm    Mentation Interventions: Bed/chair exit alarm    Medication Interventions: Bed/chair exit alarm    Elimination Interventions: Call light in reach    History of Falls Interventions: Bed/chair exit alarm         Problem: Patient Education: Go to Patient Education Activity  Goal: Patient/Family Education  Outcome: Resolved/Met     Problem: Patient Education: Go to Patient Education Activity  Goal: Patient/Family Education  Outcome: Resolved/Met     Problem: Patient Education: Go to Patient Education Activity  Goal: Patient/Family Education  Outcome: Resolved/Met

## 2020-05-27 NOTE — PROGRESS NOTES
Bedside and Verbal shift change report given to Harmony Rowe RN (oncoming nurse) by Isadora Nava RN (offgoing nurse). Report included the following information SBAR, Kardex, MAR and Cardiac Rhythm NSR.     0700: Pt resting quietly in bed, No c/o pain. Encouraged pt to call for assistance before getting out of bed. Call light in reach. 0945: Blood sugar 86 after breakfast. Pt ate about 10% of her breakPt without complaints. 1230; TRANSFER - OUT REPORT:    Verbal report given to JUDITH Valdez (name) on [facility]  being transferred to 4th floor (unit) for routine progression of care       Report consisted of patients Situation, Background, Assessment and   Recommendations(SBAR). Information from the following report(s) SBAR, Kardex, [facility] and Cardiac Rhythm NSR with ST depression was reviewed with the receiving nurse.     Lines:   Triple Lumen L TLC 05/24/20 Left Internal jugular (Active)   Central Line Being Utilized Yes 5/27/2020  3:33 AM   Criteria for Appropriate Use Hemodynamically unstable, requiring monitoring lines, vasopressors, or volume resuscitation 5/27/2020  3:33 AM   Site Assessment Clean, dry, & intact 5/27/2020  3:33 AM   Infiltration Assessment 0 5/27/2020  3:33 AM   Affected Extremity/Extremities Color distal to insertion site pink (or appropriate for race) 5/27/2020  3:33 AM   Date of Last Dressing Change 05/24/20 5/26/2020  7:14 AM   Dressing Status Clean, dry, & intact 5/27/2020  3:33 AM   Dressing Type Disk with Chlorhexadine gluconate (CHG) 5/27/2020  3:33 AM   Action Taken Open ports on tubing capped 5/27/2020  3:33 AM   Proximal Hub Color/Line Status White;Flushed;Patent 5/27/2020  3:33 AM   Positive Blood Return (Medial Site) Yes 5/27/2020  3:33 AM   Medial Hub Color/Line Status Blue;Flushed;Patent 5/27/2020  3:33 AM   Positive Blood Return (Lateral Site) Yes 5/27/2020  3:33 AM   Distal Hub Color/Line Status Brown;Flushed;Patent 5/27/2020  3:33 AM   Positive Blood Return (Site #3) Yes 5/27/2020  3:33 AM   Alcohol Cap Used Yes 5/27/2020  3:33 AM       Peripheral IV 05/23/20 Anterior; Left Forearm (Active)   Site Assessment Clean, dry, & intact 5/27/2020  3:33 AM   Phlebitis Assessment 0 5/27/2020  3:33 AM   Infiltration Assessment 0 5/27/2020  3:33 AM   Dressing Status Clean, dry, & intact 5/27/2020  3:33 AM   Dressing Type Tape;Transparent 5/27/2020  3:33 AM   Hub Color/Line Status Pink;Flushed;Patent 5/27/2020  3:33 AM   Action Taken Open ports on tubing capped 5/27/2020  3:33 AM   Alcohol Cap Used Yes 5/27/2020  3:33 AM        Opportunity for questions and clarification was provided. Informed Terri Guzman of pt's central line. Terri Guzman ok with removing central line once pt is on the 4th floor.       Patient transported with:   Monitor  Registered Nurse

## 2020-05-27 NOTE — PROGRESS NOTES
Problem: Dysphagia (Adult)  Goal: *Acute Goals and Plan of Care (Insert Text)  Description: Speech Pathology  Initiated 5/25/20  1. Patient will tolerate puree snacks/ sips of liquids without overt s/s of aspiration within 7 days   2. Patient will participate in further assessment of solids once agreeable to solids AND denture placement within 7 days    Outcome: [address] TREATMENT  Patient: Elsie Gr (73 y.o. female)  Date: 5/27/2020  Diagnosis: Femoral neck fracture ([facility] [S72.009A]   Femoral neck fracture (HCC)  Procedure(s) (LRB):  LEFT HIP HEMIARTHROPLASTY (Left) 6 Days Post-Op  Precautions: aspiration Fall, WBAT, Total hip(posterior)    ASSESSMENT:  Patient taking min PO-just bites. She does enjoy drinking water. No s/s aspiration today. PLAN:  Recommendations and Planned Interventions:  Continue purees, thins. When she is ready, she can be assessed at [facility] for solid foods. Patient continues to benefit from skilled intervention to address the above impairments. Continue treatment per established plan of care. Discharge Recommendations:  Skilled Nursing Facility     SUBJECTIVE:   Patient stated I'd like some water. OBJECTIVE:   Cognitive and Communication Status:  Neurologic State: Alert  Orientation Level: Oriented to person, Disoriented to situation, Disoriented to place, Disoriented to time  Cognition: Follows commands  Perception: Cues to maintain midline in standing, Cues to attend right visual field, Cues to attend to right side of body  Perseveration: No perseveration noted  Safety/Judgement: Decreased awareness of environment  Dysphagia Treatment:  Oral Assessment:     P.O. Trials:  Patient Position: upright in bed  Vocal quality prior to P.O.: No impairment  Consistency Presented:  Thin liquid  How Presented: SLP-fed/presented;Straw;Successive swallows   ORAL PHASE:   Bolus Acceptance: No impairment  Bolus Formation/Control: No impairment     Propulsion: No impairment  Oral Residue: None  PHARYNGEAL PHASE:   Initiation of Swallow: No impairment  Laryngeal Elevation: Functional  Aspiration Signs/Symptoms: None  Pharyngeal Phase Characteristics: No impairment, issues, or problems                    Exercises:  Laryngeal Exercises:                                                                                                                                   Pain:  Pain Scale 1: Numeric (0 - 10)  Pain Intensity 1: 0       After treatment:   Patient left in no apparent distress in bed and Nursing notified    COMMUNICATION/EDUCATION:   Patient was educated regarding her deficit(s) of DYSPHAGIA  as this relates to her diagnosis of SURGERY. She demonstrated Fair understanding as evidenced by discussino. .    The patient's plan of care including recommendations, planned interventions, and recommended diet changes were discussed with: Registered nurse.      LUIS Becerra  Time Calculation: 10 mins

## 2020-05-27 NOTE — PROGRESS NOTES
Marai T Cruz MD. [facility]              Patient: Jarett Gutierrez  : 1940      Today's Date: 2020        CARDIOLOGY PROGRESS NOTE  S: I do not know how I am doing. Denies pain. Denies SOB    O:   Visit Vitals  /66   Pulse (!) 58   Temp 97.5 °F (36.4 °C)   Resp 21   Ht 4' 11\" (1.499 m)   Wt 129 lb (58.5 kg)   SpO2 97%   BMI 26.05 kg/m²     Patient appears generally well, mood and affect are appropriate and pleasant. A little confused. Frail/elderly  HEENT:  Hearing intact, non-icteric, normocephalic, atraumatic. Neck Exam: Supple, No JVD.  + left neck bruit. Lung Exam: Clear to auscultation, even breath sounds. Cardiac Exam: Regular rate and rhythm with no murmur. Abdomen: Soft, non-tender, normal bowel sounds. Extremities: Moves all ext well. No lower extremity edema. Psych:   Neuro - Grossly intact      Review of Symptoms:  Constitutional: Negative for fever   HEENT: Negative for vision changes. Respiratory: Negative for productive cough  Cardiovascular: Negative for syncope    Gastrointestinal: Negative for abdominal pain, melena  Genitourinary: Negative for dysuria  Skin: Negative for rash  Heme: No problems bleeding.   Neuro - no speech changes or focal weaknesses        Intake/Output Summary (Last 24 hours) at 2020 1154  Last data filed at 2020 1687  Gross per 24 hour   Intake 740 ml   Output 380 ml   Net 360 ml         MEDICATIONS:     Current Facility-Administered Medications   Medication Dose Route Frequency Provider Last Rate Last Dose    dextrose 10% infusion 250 mL  250 mL IntraVENous Ashley Jimenez DO   Stopped at 20 0600    lisinopriL (PRINIVIL, ZESTRIL) tablet 10 mg  10 mg Oral DAILY Genealicia Plsathya ROBLES, NP   10 mg at 20 7362    carvediloL (COREG) tablet 3.125 mg  3.125 mg Oral BID WITH MEALS Daly ROBLES NP   3.125 mg at 20 0932    enoxaparin (LOVENOX) injection 40 mg  40 mg SubCUTAneous Q24H Isaías Coe MD 40 mg at 05/26/20 2112    0.9% sodium chloride infusion 250 mL  250 mL IntraVENous PRN Anabela Barr MD        0.9% sodium chloride infusion 250 mL  250 mL IntraVENous PRN Terry Ramirez DO        sodium chloride (NS) flush 5-40 mL  5-40 mL IntraVENous Q8H Vanesa Oskindra, MD   10 mL at 05/27/20 0606    sodium chloride (NS) flush 5-40 mL  5-40 mL IntraVENous PRN Vanesa Frank MD        naloxone [facility]) injection 0.4 mg  0.4 mg IntraVENous PRN Vanesa Osier., MD        calcium-vitamin D (OS-NENA) 500 mg-200 unit tablet  1 Tab Oral TID WITH MEALS Vanesa Frank MD   1 Tab at 05/27/20 7744    senna-docusate (PERICOLACE) 8.6-50 mg per tablet 1 Tab  1 Tab Oral BID Vanesa Frank MD   1 Tab at 05/27/20 5975    polyethylene glycol (MIRALAX) packet 17 g  17 g Oral DAILY Vanesa OsMD kindra   17 g at 05/27/20 4888    bisacodyL (DULCOLAX) suppository 10 mg  10 mg Rectal DAILY PRN Vanesa Osier., MD        traMADoL [place] [facility]) tablet 50 mg  50 mg Oral Q6H PRN Vanesa Osier., MD   50 mg at 05/26/20 1029    oxyCODONE IR (ROXICODONE) tablet 2.5 mg  2.5 mg Oral Q4H PRN Vanesa Osier., MD        ondansetron (ZOFRAN ODT) tablet 4 mg  4 mg Oral Q4H PRN Vanesa Osier., MD        sodium chloride (NS) flush 5-40 mL  5-40 mL IntraVENous Q8H Gleen Brass, DO   10 mL at 05/27/20 0606    sodium chloride (NS) flush 5-40 mL  5-40 mL IntraVENous PRN Gleen Brass, DO        atorvastatin (LIPITOR) tablet 40 mg  40 mg Oral QHS Gleen Brass, DO   40 mg at 05/26/20 2112    memantine (NAMENDA) tablet 10 mg  10 mg Oral BID Gleen Brass, DO   10 mg at 05/27/20 6157           LABS / OTHER STUDIES:     Recent Results (from the past 24 hour(s))   HGB & HCT    Collection Time: 05/26/20  3:22 PM   Result Value Ref Range    HGB 8.8 (L) 11.5 - 16.0 g/dL    HCT 26.8 (L) 35.0 - 47.0 %   HGB & HCT    Collection Time: 05/26/20  9:54 PM   Result Value Ref Range    HGB 8.9 (L) 11.5 - 16.0 g/dL HCT 26.8 (L) 35.0 - 20.7 %   METABOLIC PANEL, BASIC    Collection Time: 05/27/20  3:40 AM   Result Value Ref Range    Sodium 141 136 - 145 mmol/L    Potassium 3.7 3.5 - 5.1 mmol/L    Chloride 112 (H) 97 - 108 mmol/L    CO2 25 21 - 32 mmol/L    Anion gap 4 (L) 5 - 15 mmol/L    Glucose 67 65 - 100 mg/dL    BUN 31 (H) 6 - 20 MG/DL    Creatinine 0.74 0.55 - 1.02 MG/DL    BUN/Creatinine ratio 42 (H) 12 - 20      GFR est AA >60 >60 ml/min/1.73m2    GFR est non-AA >60 >60 ml/min/1.73m2    Calcium 7.9 (L) 8.5 - 10.1 MG/DL   CBC WITH AUTOMATED DIFF    Collection Time: 05/27/20  3:40 AM   Result Value Ref Range    WBC 13.3 (H) 3.6 - 11.0 K/uL    RBC 2.84 (L) 3.80 - 5.20 M/uL    HGB 9.0 (L) 11.5 - 16.0 g/dL    HCT 27.6 (L) 35.0 - 47.0 %    MCV 97.2 80.0 - 99.0 FL    MCH 31.7 26.0 - 34.0 PG    MCHC 32.6 30.0 - 36.5 g/dL    RDW 15.1 (H) 11.5 - 14.5 %    PLATELET 296 838 - 980 K/uL    MPV 9.6 8.9 - 12.9 FL    NRBC 2.0 (H) 0  WBC    ABSOLUTE NRBC 0.27 (H) 0.00 - 0.01 K/uL    NEUTROPHILS 79 (H) 32 - 75 %    LYMPHOCYTES 10 (L) 12 - 49 %    MONOCYTES 8 5 - 13 %    EOSINOPHILS 2 0 - 7 %    BASOPHILS 0 0 - 1 %    METAMYELOCYTES 1 (H) 0 %    IMMATURE GRANULOCYTES 0 %    ABS. NEUTROPHILS 10.5 (H) 1.8 - 8.0 K/UL    ABS. LYMPHOCYTES 1.3 0.8 - 3.5 K/UL    ABS. MONOCYTES 1.1 (H) 0.0 - 1.0 K/UL    ABS. EOSINOPHILS 0.3 0.0 - 0.4 K/UL    ABS. BASOPHILS 0.0 0.0 - 0.1 K/UL    ABS. IMM.  GRANS. 0.0 K/UL    DF MANUAL      RBC COMMENTS ANISOCYTOSIS  1+        RBC COMMENTS POLYCHROMASIA  PRESENT        RBC COMMENTS ANDIE CELLS  PRESENT       MAGNESIUM    Collection Time: 05/27/20  3:40 AM   Result Value Ref Range    Magnesium 2.2 1.6 - 2.4 mg/dL   PHOSPHORUS    Collection Time: 05/27/20  3:40 AM   Result Value Ref Range    Phosphorus 2.6 2.6 - 4.7 MG/DL   GLUCOSE, POC    Collection Time: 05/27/20  5:14 AM   Result Value Ref Range    Glucose (POC) 67 65 - 100 mg/dL    Performed by [address] [place] [address] [place],[address], POC    Collection Time: 05/27/20 5:34 AM   Result Value Ref Range    Glucose (POC) 75 65 - 100 mg/dL    Performed by [address] [facility] [address], POC    Collection Time: 05/27/20  6:31 AM   Result Value Ref Range    Glucose (POC) 96 65 - 100 mg/dL    Performed by [address] [facility]. [facility] [address], POC    Collection Time: 05/27/20  9:50 AM   Result Value Ref Range    Glucose (POC) 86 65 - 100 mg/dL    Performed by Ernestine Becerra    HGB & HCT    Collection Time: 05/27/20 10:00 AM   Result Value Ref Range    HGB 9.3 (L) 11.5 - 16.0 g/dL    HCT 28.5 (L) 35.0 - 47.0 %          CARDIAC DIAGNOSTICS:      Cardiac Evaluation Includes:     Echo 4/10/17 - LVEF 40%  Echo 8/4/17 - LVEF 50 %. There was possible hypokinesis of the basal inferoseptal wall(s). Echo 5/24/20 - Done on dobutamine 5 mcg;  LVEF 50-55%, no obvious WMA, mild-mod MR, mod LAE, RV OK,. PASP 30 mmHg         EKG 5/21/20 - NSR, LVH with repol abn   EKG 5/23/20 - Normal sinus rhythm;  Marked ST depression.       Tele 5/24/20 - sinus      Telemetry: SB 50's occ PVCs  ASSESSMENT AND PLAN:      Assessment and Plan:     1) Shock ; suspect type II NSTEMI in the setting of  sepsis / hypovolemia   - She was Hypotensive on 5/23/20 around 1 PM (surgery 5/21/20) with ST depression on EKG,  She denied any CP or SOB. BP remained low despite > 1 L NS.   EKG then with ischemic ST depression in multiple leads (more ST depression than EKG a couple of days prior). She then went into Afib with RVR. Amio and DIg started. Kael started for low BP. Later she had bradycardia and Amio stopped and Dobutamine started. - peak trop 20   - Heparin gtt held 5/25 d/t drop in hgb. - Dobutamine off  5/25    2) History of cardiomyopathy - coreg started - dose decreased d/t bradycardia.    Lisinopril started 5/27, dose increased this morning- goal SBP <150  - LVEF was 40% in APril 2017 and then 50% on 8/17- EF 50-55% per this echo  - Not sure what CAD evaluation she has had over the years       3) Left hip bipolar hemiarthroplasty on 5/21/20      4) Transient Afib  - Had Afib a few hours on 5/23/20 in setting of severe hypotension - she went back into sinus on her own   - brief transient episode in the setting of sepsis - would not recommend an [address] at this time, will re address if she continues to have more AF. Regardless she is high risk for bleeding given recent surgery/anemia/fall and dementia      5) post operative anemia: Hgb stable s/p prbc trx. 6) Dementia: twyla Elizabeth NP  [facility]        We will sign off. Please call if there are any new developments that require cardiology input. Follow up with Dr Shahid Soria  Patient personally seen and examined. All the elements of history and examination were personally performed. Assessment and plan was discussed and agree as written above    She has been doing well. BP has come up and been stable (have had to increase anti-hypertensives). O2 sats OK and she has looked better over days. Hold off of [address] for Afib given that Afib was transient (<24 hours) and occurred in a setting of shock and she has a high risk of bleeding (with anemia requiring PRBC after heparin started). If she has more Afib, then would reconsider [address] use. Will sign off, but please call with any questions.      Bala Borden MD, [facility] [place]

## 2020-05-27 NOTE — PROGRESS NOTES
1900  Bedside and Verbal shift change report given to [address] [place] [address] (oncoming nurse) by Damon Verdugo (offgoing nurse). Report included the following information SBAR, Kardex, Procedure Summary, Intake/Output, MAR, Accordion, Recent Results and Cardiac Rhythm NSR- Sinus Gaudencio Jenae. Initial assessment done. No pain. Patient resting.     0951  H&H Q6 drawn and sent. 2230  Last HGB is 8.9. Next draw at 3:30 am.     Visit Vitals  /66   Pulse 63   Temp 98.1 °F (36.7 °C)   Resp 23   Ht 4' 11\" (1.499 m)   Wt 58.5 kg (129 lb)   SpO2 99%   BMI 26.05 kg/m²     0515  HYPOGLYCEMIC EPISODE DOCUMENTATION    Patient with hypoglycemic episode(s) at 0515 (time) on 05/27(date). BG value(s) pre-treatment 79    Was patient symptomatic? [] yes, [x] no  Patient was treated with the following rescue medications/treatments: [] D50                [] Glucose tablets                [] Glucagon                [x] 4oz juice                [] 6oz reg soda                [] 8oz low fat milk  BG value post-treatment: 75  Name of MD notified:Dr. Aracelis March  The following orders were received: Give 2 more orange juice. Goal above 100. If still low, give d10 bolus. 0635  BG of 96 after 3 orange juice. 0700  Bedside and Verbal shift change report given to Damon Verdugo (oncoming nurse) by Deedee Flores RN (offgoing nurse). Report included the following information SBAR, Kardex, Procedure Summary, Intake/Output, MAR, Accordion and Recent Results.

## 2020-05-27 NOTE — PROGRESS NOTES
Problem: Pressure Injury - Risk of  Goal: *Prevention of pressure injury  Description: Document Diego Scale and appropriate interventions in the flowsheet. Outcome: Progressing Towards Goal  Note: Pressure Injury Interventions:  Sensory Interventions: Assess need for specialty bed, Keep linens dry and wrinkle-free, Pad between skin to skin, Turn and reposition approx. every two hours (pillows and wedges if needed)    Moisture Interventions: Assess need for specialty bed, Internal/External urinary devices, Maintain skin hydration (lotion/cream)    Activity Interventions: Increase time out of bed, Pressure redistribution bed/mattress(bed type), PT/OT evaluation    Mobility Interventions: Pressure redistribution bed/mattress (bed type), PT/OT evaluation, Turn and reposition approx. every two hours(pillow and wedges)    Nutrition Interventions: Document food/fluid/supplement intake, Discuss nutritional consult with provider    Friction and Shear Interventions: Apply protective barrier, creams and emollients, Lift team/patient mobility team, Transferring/repositioning devices                Problem: Patient Education: Go to Patient Education Activity  Goal: Patient/Family Education  Outcome: Progressing Towards Goal     Problem: Pain  Goal: *Control of Pain  Outcome: Progressing Towards Goal     Problem: Falls - Risk of  Goal: *Absence of Falls  Description: Document Wali Fall Risk and appropriate interventions in the flowsheet.   Outcome: Progressing Towards Goal  Note: Fall Risk Interventions:  Mobility Interventions: Bed/chair exit alarm, OT consult for ADLs, PT Consult for mobility concerns, PT Consult for assist device competence, Strengthening exercises (ROM-active/passive), Utilize gait belt for transfers/ambulation    Mentation Interventions: Bed/chair exit alarm, Family/sitter at bedside, More frequent rounding, Room close to nurse's station, Toileting rounds    Medication Interventions: Bed/chair exit alarm, Patient to call before getting OOB, Teach patient to arise slowly    Elimination Interventions: Bed/chair exit alarm, Call light in reach, Patient to call for help with toileting needs, Toileting schedule/hourly rounds    History of Falls Interventions: Bed/chair exit alarm, Room close to nurse's station, Investigate reason for fall         Problem: Patient Education: Go to Patient Education Activity  Goal: Patient/Family Education  Outcome: Progressing Towards Goal     Problem: Patient Education: Go to Patient Education Activity  Goal: Patient/Family Education  Outcome: Progressing Towards Goal     Problem: Patient Education: Go to Patient Education Activity  Goal: Patient/Family Education  Outcome: Progressing Towards Goal

## 2020-05-27 NOTE — PROGRESS NOTES
Bedside and Verbal shift change report given to Belkis العلي RN (oncoming nurse) by Vinh Blackwood RN (offgoing nurse). Report included the following information SBAR, Kardex, MAR and Cardiac Rhythm NSR.     0700: Pt resting quietly in bed, No c/o pain. Encouraged pt to call for assistance before getting out of bed. Call light in reach. 0945: Blood sugar 86 after breakfast. Pt without complaints.

## 2020-05-30 LAB
BACTERIA SPEC CULT: NORMAL
SERVICE CMNT-IMP: NORMAL

## 2020-06-15 ENCOUNTER — TELEPHONE (OUTPATIENT)
Dept: FAMILY MEDICINE CLINIC | Age: 80
End: 2020-06-15

## 2020-06-15 NOTE — TELEPHONE ENCOUNTER
----- Message from [address] [facility] [address] [facility] sent at 6/15/2020  3:55 PM EDT -----  Regarding: Dr. Johnson Feeling telephone  Caller's first and last name: Gia Mcdonald   Reason for call: verbal confirmation  Callback required yes/no and why: yes  Best contact number(s): [phone]  Details to clarify the request:         Stated Pt was referred over from [facility] and that she would need to be contacted as soon as possible by the nurse to confirm that they would follow in home health. She stated that Pt would need to be seen in office if Dr. Ivette Cartwright is willing to follow up in home health.

## 2020-06-17 NOTE — TELEPHONE ENCOUNTER
Shreya Bryan with [facility] calling, she stats that patient will not be able to do a visit in the office as she has dementia in can hardly stand. It's to much of a challenge for her daughter. She asked for a virtual visit which I scheduled for  Friday at 4:00 pm with Dr. Brennen Camarillo. She wants to know if you will follow patient?   She is asking to be contacted    Call [phone]

## 2020-06-17 NOTE — TELEPHONE ENCOUNTER
I am not sure what they mean by following for Home Health - do they mean just reordering home health PT/OT as needed and following up on PT/OT notes? In any case I am leaving at the end of June so unfortunately I will not be able to follow the patient. Would recommend she be followed by a PGY-1 who saw her during her hospitalization (Missy Esqueda), or Allen if that is who her telephone visit is with.

## 2020-06-17 NOTE — TELEPHONE ENCOUNTER
Patient scheduled for:  Upcoming Friday, June 19, 2020 04:00 PM f [facility]-MAIN OFFICE, [facility], Telemedicine 30 My Chart, 30min  cancel or reschedule  Telemedicine 30 My Chart: For visits related to COVID      Please advise thanks

## 2020-06-17 NOTE — TELEPHONE ENCOUNTER
----- Message from Karlos Zhao sent at 2020  3:17 PM EDT -----  Regarding: Dr. Lucie Putnam / Steph Abdi Message    To: [facility]  Subject: Dr. Lucie Putnam / Telephone  Patient's first and last name: Ligia Medel  : 1940  ID numbers:  #[id number]    Caller's first and last name: Nilesh Ruano, Daughter  Reason for call: Schedule a virtual phone call appt with Dr. Lucie Putnam for this . Please call to let pt know if she should come in to practice or will the call only be okay. Callback required yes/no and why: Yes  Best contact number(s): [phone]  Details to clarify the request: Osman was told by  the doctor is going to want to physically see pt but osman is unable to do video virtual due to technology missing. Please confirm if necessary to see pt.

## 2020-06-19 NOTE — TELEPHONE ENCOUNTER
Appointment on 06/19/2020 with Dr. Ovidio Steven was cancelled and rescheduled for 06/30/2020 with Dr. Eliana Pa.      ----- Message from Fidencio [facility] sent at 6/18/2020  3:39 PM EDT -----  Regarding: Dr. Archana Nesbitt telephone  Caller's first and last name: Oral Backer [facility]  Reason for call: [address] [facility] required yes/no and why: yes  Best contact number(s): [phone]  Details to clarify the request:         Stated that she would like to speak with Dr. Eliana Pa as soon as possible to see if she would be willing to follow Pt in home health.

## 2020-06-23 NOTE — CDMP QUERY
Good Afternoon, Pt admitted with a Left Femur fracture s/p a fall on 5/20/20. Pt noted to have hypotension and a NSTEMI on 5/23. If possible, please document and clarify which type of shock the patient was treated for during admission within a progress note and/or addendum to the discharging summary: 
 
? Cardiogenic Shock ? Septic Shock ? Other Shock, Please specify ? Other, please specify ? Clinically unable to determine The medical record reflects the following: 
    
Risk Factors: 78 Yr F admitted for a Left Femur Fracture after a GLF Clinical Indicators: On 5/23 while working with PT patient developed hypotension with BP's of 72/49. It was believed at the time it was 2/2 narcotics and a 500 ML NS IVF bolus was administered. Later on 5/23 patient with persistent hypotension with new ST depression and elevated troponin's that peaked at 20.00 in which Cardiology was consulted. CBC at this time revealed a WBC of 13. 3. Patient was transferred to ICU. Patient was started on phenylephrine IVF titratable gtt at 85 mcg/min & Dobutamine titratable IVF gtt at 5 mcg/min. 5/24 Pulmonary s progress notes state, Shock, unclear etiology given concomitant NSTEMI, but exam suggests more likely septic vs hypovolemic.  On 5/24 Cardiology also stating, \"Shock ; Possible NSTEMI vs sepsis / hypovolemia. \" WBC peaked at 16.6 with a lactic acid of 1.7. Urine Culture showed E. Coli and blood cultures No Growth. Patient was on IV antibiotic coverage for UTI since 5/20 with Ancef 2 G IV. On 5/26 Cardiology's progress note confirmed their dx with, \"5/26 Cardiology stating shock 2/2 NSTEMI & Sepsis. \" Discharge summary states on 5/27, \"Septic shock 2/2 UTI. \" Treatment: ICU level of care, Cardiology consult, frequent monitoring of vital signs, ECHO, Phenylephrine Gtt, Dobutamine gtt, UC, BC, Daily CBC/BMP, IV antibiotics. Thank you, Katelynn Han [address] [place] [address], Jayla Ye

## 2020-06-23 NOTE — PROGRESS NOTES
CD Query Response:     Refugio Mcfarlnad is an 78 y.o. female PMH TIA/CVA, HTN, HLD, dementia who is admitted for L femoral neck fracture and developed an NSTEMI in the post-op period. Shock likely Cardiogenic 2/2 NSTEMI: RESOLVED. s/p Dob[place] gtt + bernardo gtt.  Ischemic ST depression in multiple leads. Treated with heparin gtt. Echo EF 50-55%. Septic Shock from E. Coli UTI is unlikely as pt is adequately treated with Levaquin + CTX (E coli sensitive to both).  Final Bcx neg x6 days.   - Home Coreg 3.125mg BID   - F/u OP Cardiology      Sunil Lomax MD

## 2020-06-29 ENCOUNTER — DOCUMENTATION ONLY (OUTPATIENT)
Dept: FAMILY MEDICINE CLINIC | Age: 80
End: 2020-06-29

## 2020-06-30 ENCOUNTER — VIRTUAL VISIT (OUTPATIENT)
Dept: FAMILY MEDICINE CLINIC | Age: 80
End: 2020-06-30

## 2020-06-30 DIAGNOSIS — I21.4 NSTEMI (NON-ST ELEVATED MYOCARDIAL INFARCTION) (HCC): ICD-10-CM

## 2020-06-30 DIAGNOSIS — S72.002D CLOSED FRACTURE OF NECK OF LEFT FEMUR WITH ROUTINE HEALING, SUBSEQUENT ENCOUNTER: Primary | ICD-10-CM

## 2020-06-30 DIAGNOSIS — Z86.73 HISTORY OF CVA (CEREBROVASCULAR ACCIDENT): ICD-10-CM

## 2020-06-30 DIAGNOSIS — I48.91 ATRIAL FIBRILLATION, UNSPECIFIED TYPE (HCC): ICD-10-CM

## 2020-06-30 DIAGNOSIS — R41.89 COGNITIVE IMPAIRMENT: ICD-10-CM

## 2020-06-30 DIAGNOSIS — I10 ESSENTIAL HYPERTENSION: ICD-10-CM

## 2020-06-30 DIAGNOSIS — D64.9 POSTOPERATIVE ANEMIA: ICD-10-CM

## 2020-06-30 RX ORDER — CLOPIDOGREL BISULFATE 75 MG/1
75 TABLET ORAL
COMMUNITY
End: 2020-12-03 | Stop reason: SDUPTHER

## 2020-06-30 RX ORDER — LISINOPRIL 10 MG/1
10 TABLET ORAL DAILY
COMMUNITY
End: 2020-12-03

## 2020-06-30 NOTE — PROGRESS NOTES
Gil Rossi  78 y.o. female  1940  [address] [place] [address]  [id number]    [phone] (home) [phone] (work)     [facility] [address]:    Telephone Encounter  Ailyn Farmer DO       Encounter Date: 6/30/2020 at 4:00 PM    Consent: Gil Rossi, who was seen by synchronous (real-time) audio only technology, and/or her healthcare decision maker, is aware that this patient-initiated, Telehealth encounter on 6/30/2020 is a billable service, with coverage as determined by her insurance carrier. She is aware that she may receive a bill and has provided verbal consent to proceed: Yes. Chief Complaint   Patient presents with    Physical Therapy       History of Present Illness   Gil Rossi is a 78 y.o. female was evaluated by telephone. I communicated with the patient and/or health care decision maker about  Recent Femoral neck fracture. History was provided by daughter due to patient's dementia. The following were discussed:    Femoral Neck Fracture: s/p L hip bipolar hemiarthorplasy (5/21/20) during hospital admission from 5/20 - 5/27/20. Patient was at [address] [place] [address],[address] rehab for 2 weeks after hospitalization and now has transitioned to daughter's home. Daughter states that a HH/PT/OT order is needed. Patient is doing well and surgical site incision healing well. Has not followed up with ortho yet. NSTEMI/New Onset Afib: during recent hospitalization. Patient currently not on [address] d/t risk of falls and recent fracture . Denies any complaints of CP or palpitations. Plans to set up an appointment with cardiology. HTN:Reports compliance with medications. BP's measured at home around 120s/80s. Review of Systems   Review of Systems   Constitutional: Negative for chills and fever. Respiratory: Negative for cough and shortness of breath. Cardiovascular: Negative for chest pain and palpitations.    Gastrointestinal: Negative for abdominal pain, blood in stool, diarrhea and vomiting. Genitourinary: Negative for dysuria and hematuria. Musculoskeletal:        +Hip pain (improving)   Skin:        Healing left hip surgical incision. Neurological:        Mentation at baseline per daughter       Vitals/Objective:   General: Soft-spoken. Patient able to greet and answer simple questions appropriately. Unable to assess further due to patient's dementia. Due to this being a Virtual Check-in/Telephone evaluation, many elements of the physical examination are unable to be assessed. Assessment and Plan:   Time-based coding, delete if not needed: I spent at least 25 minutes with this established patient, and >50% of the time was spent counseling and/or coordinating care regarding femoral fracture  Total Time: minutes: 21-30 minutes      1. Closed fracture of neck of left femur with routine healing, subsequent encounter: s/p hip bipolar hemiarthroplasty (5/21)   -  skilled nursing/PT/OT ordered ([facility] Fax # [phone])   - Patient's daughter to make follow up appointment with ortho    2. NSTEMI (non-ST elevated myocardial infarction) w/ hx of cardiomyopathy:  Treated w/ heparin gtt during hospitalization. ECHO 50-55%. No CP.   - Continue Coreg 3.125mg BID  - Patient's daughter to make follow up appointment with cardiology     3. Atrial fibrillation, unspecified type [facility]): New onset in the setting of shock during hospitalization, self-converted to NSR. Not started on [facility] [place] d/t recent fall/anemia/dementia. -  Follow up with cardiology    4. Postoperative anemia: received 1 unit prbc during hospitalization. Hgb 9.3 (5/27/20) prior to discharge  - CBC ordered, to be drawn once patient able to come into office    5. Essential hypertension: Stable  - Continue lisinopril 10 mg daily    6. History of CVA (cerebrovascular accident)  - Continue plavix 75 mg daily (restarted at rehab)  - Continue atorvastatin 40 mg daily    7.  Cognitive impairment: stable  - Continue memantine 10 mg BID      We discussed the expected course, resolution and complications of the diagnosis(es) in detail. Medication risks, benefits, costs, interactions, and alternatives were discussed as indicated. I advised her to contact the office if her condition worsens, changes or fails to improve as anticipated. She expressed understanding with the diagnosis(es) and plan. Patient understands that this encounter was a temporary measure, and the importance of further follow up and examination was emphasized. Patient verbalized understanding. Patient informed to follow up: 3 months    I affirm this is a Patient Initiated Episode with an Established Patient who has not had a related appointment within my department in the past 7 days or scheduled within the next 24 hours. Note: not billable if this call serves to triage the patient into an appointment for the relevant concern      Electronically Signed: Delta Whitten DO  Providers location when delivering service: home        ICD-10-CM ICD-9-CM    1. Closed fracture of neck of left femur with routine healing, subsequent encounter S72.002D V54.13 REFERRAL TO PHYSICAL THERAPY      REFERRAL TO OCCUPATIONAL THERAPY      [facility] TO [facility]   2. NSTEMI (non-ST elevated myocardial infarction) ([facility] I21.4 410.70    3. Atrial fibrillation, unspecified type ([facility] I48.91 427.31    4. Postoperative anemia D64.9 285.9 CBC W/O DIFF   5. Essential hypertension I10 401.9    6. History of CVA (cerebrovascular accident) Z86.73 V12.54    7.  Cognitive impairment R41.89 294.9 REFERRAL TO OCCUPATIONAL THERAPY      REFERRAL TO HOME HEALTH       Pursuant to the emergency declaration under the [facility], [facility] waiver authority and the [facility] [employer] Act, this Virtual  Visit was conducted, with patient's consent, to reduce the patient's risk of exposure to COVID-19 and provide continuity of care for an established patient. History   Patients past medical, surgical and family histories were personally reviewed and updated. Past Medical History:   Diagnosis Date    Acute CVA (cerebrovascular accident) ([facility] 4/10/2017    Per MRI:  Posterior L frontal/parietal territory    Cerebral atrophy ([facility] 4/10/2017    History of CVA (cerebrovascular accident) 4/10/2017    --L frontal acute infarct and LMCA    Hyperlipidemia LDL goal <70 4/10/2017    Hypertension     Status post hip surgery      Left hip bipolar hemiarthroplasty on 20     TIA (transient ischemic attack)     Unresponsive episode 8/3/2017    transient     Past Surgical History:   Procedure Laterality Date    HX HEENT      Tonsilectomy age 8     Family History   Problem Relation Age of Onset    Dementia Mother 61    Heart Attack Father 79     Social History     Socioeconomic History    Marital status:      Spouse name: Not on file    Number of children: Not on file    Years of education: Not on file    Highest education level: Not on file   Occupational History    Not on file   Social Needs    Financial resource strain: Not on file    Food insecurity     Worry: Not on file     Inability: Not on file    Transportation needs     Medical: Not on file     Non-medical: Not on file   Tobacco Use    Smoking status: Former Smoker     Last attempt to quit: 1999     Years since quittin.9    Smokeless tobacco: Never Used   Substance and Sexual Activity    Alcohol use:  Yes     Alcohol/week: 17.5 standard drinks     Types: 21 Glasses of wine per week     Comment: 3 glasses of port per day    Drug use: No    Sexual activity: Never   Lifestyle    Physical activity     Days per week: Not on file     Minutes per session: Not on file    Stress: Not on file   Relationships    Social connections     Talks on phone: Not on file     Gets together: Not on file     Attends Rastafarian service: Not on file     Active member of club or organization: Not on file     Attends meetings of clubs or organizations: Not on file     Relationship status: Not on file    Intimate partner violence     Fear of current or ex partner: Not on file     Emotionally abused: Not on file     Physically abused: Not on file     Forced sexual activity: Not on file   Other Topics Concern    Not on file   Social History Narrative    Not on file            Current Medications/Allergies   Medications and Allergies reviewed:    Current Outpatient Medications   Medication Sig Dispense Refill    lisinopriL (PRINIVIL, ZESTRIL) 10 mg tablet Take 10 mg by mouth daily.  clopidogreL (Plavix) 75 mg tab Take 75 mg by mouth.  atorvastatin (LIPITOR) 40 mg tablet Take 40 mg by mouth nightly.  cholecalciferol (VITAMIN D3) (1000 Units /25 mcg) tablet Take 2,000 Units by mouth daily.  TURMERIC ROOT EXTRACT PO Take 450 mg by mouth daily.  memantine (NAMENDA) 10 mg tablet Take 1 Tab by mouth two (2) times a day. 180 Tab 0    carvediloL (COREG) 3.125 mg tablet Take 1 Tab by mouth two (2) times daily (with meals). 180 Tab 0    rfemjrkm-mxjg-cwb0-C-seng-bosw (Osteo Bi-Flex Triple Strength) 750 mg-644 mg- 30 mg-1 mg tab Take 1 Tab by mouth two (2) times a day. Allergies   Allergen Reactions    Benadryl Allergy/Cold [Diphenhyd-Pe-Acetaminophen] Hives     Per patient's daughter, she thinks patient has had benadryl since then and tolerated it.  Penicillins Hives     Was treated with Augmentin in June 2015, tolerated medication well.

## 2020-07-01 ENCOUNTER — TELEPHONE (OUTPATIENT)
Dept: FAMILY MEDICINE CLINIC | Age: 80
End: 2020-07-01

## 2020-07-01 NOTE — PROGRESS NOTES
[address] Medicine Residency Attending Addendum:  Dr. Valentina Nagel, DO,  the patient and I were not physically present during this encounter. The resident and I are concurrently monitoring the patient care through appropriate telecommunication technology. I discussed the findings, assessment and plan with the resident and agree with the resident's findings and plan as documented in the resident's note.       Fariba Reyez MD

## 2020-07-01 NOTE — TELEPHONE ENCOUNTER
----- Message from [employer] sent at 7/1/2020  1:29 PM EDT -----  Regarding: Scooby Pérez DO  General Message/Vendor Calls    Caller's first and last name: [facility]       Reason for call: Pt's [address] Update      Callback required yes/no and why: Yes      Best contact number(s): [phone]       Details to clarify the request: Caller advised pt is open to [facility] and not able to open services from [facility]; if any question caller requesting callback      [employer]

## 2020-08-07 ENCOUNTER — TELEPHONE (OUTPATIENT)
Dept: FAMILY MEDICINE CLINIC | Age: 80
End: 2020-08-07

## 2020-08-07 NOTE — LETTER
[facility] 
[address] [place] [address] Dept: [phone] Dept Fax: [phone] Ms. Dayan Martini [facility] 
[address] [facility] [id number] To Whom It May Concern: 
 
Arian Oreilly is currently under the care of [address] [facility]. This letter is to confirm that the patient has severe cognitive impairment/dementia and is currently unable to care for herself. Ms. Jm Calvo is listed as the primary healthcare decision maker for Ms. Arian Oreilly. If there are questions or concerns please have the patient contact our office. Sincerely, Narciso Us, DO  
8/10/2020 12:48 PM

## 2020-08-07 NOTE — Clinical Note
Moss Adjutant,    I have written the requested letter, but we are unable to send any patient information through email. Would you be able to let the patient know that she can either access the letter through [address] or we can fax it over to her?     Thank you,    Dandre Galicia

## 2020-08-07 NOTE — TELEPHONE ENCOUNTER
----- Message from Alireza Carson sent at 8/3/2020 12:01 PM EDT -----  Regarding: Dr. Edson Cervantes Message/Vendor Calls    Caller's first and last name: Ms.Tracy Bell daughter      Reason for call: Ms. Pradeep Peres, daughter, is requesting to have a statement regarding the patient's recent diagnosis of Alzheimer's, and  her inability to care for herself and that her daughter Ms. Elaine Miller is her POA and is handling all her affairs to be sent over to Mr. Aggie Urbano at [email or web address]. Callback required yes/no and why:Yes       Best contact number(s): [phone]      Details to clarify the request: Please include the patient's address.        Alireza Carson

## 2020-08-10 ENCOUNTER — TELEPHONE (OUTPATIENT)
Dept: FAMILY MEDICINE CLINIC | Age: 80
End: 2020-08-10

## 2020-08-10 NOTE — TELEPHONE ENCOUNTER
----- Message from Cathy Olmstead sent at 8/10/2020  8:53 AM EDT -----  Regarding: Dr. Blanca Burt: [phone]  Caller's first and last name: Anjel Bundy, caregiver)  Reason for call: Daughter needs a signed letter from the Doctor stating that the pt has dementia and the daughter handles all affairs for the pt and is also the power of . Callback required yes/no and why: yes  Best contact number(s): [phone]  Details to clarify the request: Daughter expressed that this is an urgent matter. Pt's social security account was hacked and the pt does not have access to her funds, the pt will be in a nursing home within the next week or two and both the nursing home and Medicaid insurance need information and access as well. The social security administration does not recognize power of  in these matters. However, they will take a letter from the doctor as a valid document to clear this up. Please send the letter to Saleem Benjamin with the social security administration at email address - [email or web address] . Pt's diagnosis was given by Dr. Anabela King last year.

## 2020-08-11 ENCOUNTER — TELEPHONE (OUTPATIENT)
Dept: FAMILY MEDICINE CLINIC | Age: 80
End: 2020-08-11

## 2020-08-11 NOTE — TELEPHONE ENCOUNTER
Patient's mother walked in to clinic to  letter. She dropped off forms for Dr. Nichelle Freitas to complete. Forms placed in Dr. Shellie March folder. She is asking if her mothers recent xray results have been received from [facility]. Will send to Dr. Nichelle Freitas to inquire about xray report.

## 2020-08-14 ENCOUNTER — TELEPHONE (OUTPATIENT)
Dept: FAMILY MEDICINE CLINIC | Age: 80
End: 2020-08-14

## 2020-08-14 NOTE — TELEPHONE ENCOUNTER
----- Message from Luis Alberto Serra sent at 8/13/2020  5:11 PM EDT -----  Regarding: Dr. Antonietta Carnes: [phone]  Mrs. Dorian Valerio, pts daughter, checking on the status of forms dropped off at practice for pts admission to assisted living facility, [facility].     Documents included 4 pages  Attn: Geovanni Mcginnis   Phone: [phone]  Fax: [phone]

## 2020-08-19 ENCOUNTER — TELEPHONE (OUTPATIENT)
Dept: FAMILY MEDICINE CLINIC | Age: 80
End: 2020-08-19

## 2020-08-19 NOTE — TELEPHONE ENCOUNTER
Vinita Goldberg with Social Security calling, notes that he need the doctor to e-mail him to verify patient is incapable of taking care of herself. Also to note that patient's daughter Jimena Fung is capable to take care of her. He states he doesn't need any other information or details. Ask that you e-mail him at [email or web address]    Questions call main office at [phone]

## 2020-08-21 ENCOUNTER — TELEPHONE (OUTPATIENT)
Dept: FAMILY MEDICINE CLINIC | Age: 80
End: 2020-08-21

## 2020-08-21 NOTE — TELEPHONE ENCOUNTER
Dr. Parish Mccarty Telephone   Received: Yesterday   Message Contents   Gwendolyn Villa [facility] Front Office               Caller's first and last name: Juan Sosa ( Daughter)   Reason for call: Questions   Callback required yes/no and why: Yes   Best contact number(s): [phone]   Details to clarify the request: Caller stated that she would like a call back in regards to getting paper work sent out to LIN[facility]. Caller stated that she got a phone call advising caller that the paper work will be sent out 8/19/2020. Caller stated that the paper work still was not sent out and needs it sent out right away.

## 2020-08-21 NOTE — TELEPHONE ENCOUNTER
----- Message from [employer] sent at 8/19/2020  5:00 PM EDT -----  Regarding: Dr. Megan Banks Message/Vendor Calls    Caller's first and last name: Alberto Elena at home      Reason for call: Pt had a fall today and wanted to let Dr. Diaz Part aware and daughter also needs a copy radiologist xrays from dispatch health add to [address] required yes/no and why: Yes      Best contact number(s): [phone]      Details to clarify the request:      [employer]

## 2020-08-25 NOTE — TELEPHONE ENCOUNTER
Dr. Manuel Carney: Odell Grey [facility] Front Office               General Message/Vendor Calls     Caller's first and last name: Ulysses Patience       Reason for call: Caller stated that Dr. Collins Uriarte nurse would be attempting to fax the pt's information over to [employer] a second time. [employer] seems to have still not received the fax. Pt spoke with Pat Webster at [employer] who advised that she could email the documents to her instead if possible. The email for Gustavo Chung is [email or web address]       Callback required yes/no and why: Yes       Best contact number(s): [phone]       Details to clarify the request: N/A

## 2020-08-28 NOTE — TELEPHONE ENCOUNTER
Forms re-faxed to Ms Shira Steward and another copy dropped off at her office at [address] [place] [address].

## 2020-10-02 ENCOUNTER — TELEPHONE (OUTPATIENT)
Dept: FAMILY MEDICINE CLINIC | Age: 80
End: 2020-10-02

## 2020-10-06 NOTE — TELEPHONE ENCOUNTER
Called home health nurse and she said resident can sign. I have these orders up front in your folder so we can refax them!

## 2020-10-06 NOTE — TELEPHONE ENCOUNTER
There was two new orders faxed over so I put them in your folder. I can call and ask if it needs to be an attending.

## 2020-10-26 ENCOUNTER — TELEPHONE (OUTPATIENT)
Dept: FAMILY MEDICINE CLINIC | Age: 80
End: 2020-10-26

## 2020-10-27 NOTE — TELEPHONE ENCOUNTER
Called [facility] back, they are changing Doctor to Dr. Michael Larios to sign orders instead of Dr. Boris Herron and will fax over new orders.

## 2020-11-09 DIAGNOSIS — I11.0 HYPERTENSIVE HEART DISEASE WITH CONGESTIVE HEART FAILURE, UNSPECIFIED HEART FAILURE TYPE (HCC): ICD-10-CM

## 2020-11-09 DIAGNOSIS — Z74.2 NEED FOR HOME HEALTH CARE: Primary | ICD-10-CM

## 2020-11-09 DIAGNOSIS — E11.9 TYPE 2 DIABETES MELLITUS WITHOUT COMPLICATION, WITHOUT LONG-TERM CURRENT USE OF INSULIN (HCC): ICD-10-CM

## 2020-11-09 DIAGNOSIS — I50.9 HEART FAILURE, UNSPECIFIED HF CHRONICITY, UNSPECIFIED HEART FAILURE TYPE (HCC): ICD-10-CM

## 2020-11-09 DIAGNOSIS — L89.152 STAGE II PRESSURE ULCER OF SACRAL REGION (HCC): ICD-10-CM

## 2020-11-09 PROCEDURE — G0180 MD CERTIFICATION HHA PATIENT: HCPCS | Performed by: FAMILY MEDICINE

## 2020-11-09 NOTE — TELEPHONE ENCOUNTER
Dr. Yrn Moody   Received:  Today   Message Contents   Courtney Days [facility] Front Office               General Message/Vendor Calls     Caller's first and last name: Parker Kang with [facility]       Reason for call: America Sickle required yes/no and why: yes     Best contact number(s): [phone]     Details to clarify the request: [facility] has sent order requests 3 times, 06/26/2020, 07/17/2020, and again11/03/2020 with no response, for Home health orders, please call Parker Kang as soon as possible, at [address] [facility]

## 2020-11-09 NOTE — PROGRESS NOTES
Reviewed and Sam [facility] Certification/Recertification and Care Plan (see attached)    Arvil Settle  1940    [facility] Agency: Shameka at [address] [facility] [place] Record #: [id number]  Provider #: [id number]    [facility] Date: 6/52/4956  Certification Period: 6/29/2020-8/27/2020  Last F2F: 6/30/2020    Services: SN/PT/OT/SW    Dee Junior MD  11/9/2020 8:45 AM

## 2020-11-09 NOTE — TELEPHONE ENCOUNTER
I called and spoke with [facility] and she is re-faxing form to our clinic.  I will get Dr Favian Barker to sign and send back asap

## 2020-12-03 ENCOUNTER — HOME VISIT (OUTPATIENT)
Dept: FAMILY MEDICINE CLINIC | Age: 80
End: 2020-12-03

## 2020-12-03 VITALS
DIASTOLIC BLOOD PRESSURE: 69 MMHG | HEART RATE: 70 BPM | OXYGEN SATURATION: 96 % | TEMPERATURE: 97.4 F | RESPIRATION RATE: 16 BRPM | SYSTOLIC BLOOD PRESSURE: 128 MMHG | WEIGHT: 105 LBS | BODY MASS INDEX: 21.21 KG/M2

## 2020-12-03 DIAGNOSIS — Z86.73 HISTORY OF CVA (CEREBROVASCULAR ACCIDENT): Chronic | ICD-10-CM

## 2020-12-03 DIAGNOSIS — I48.91 ATRIAL FIBRILLATION, UNSPECIFIED TYPE (HCC): ICD-10-CM

## 2020-12-03 DIAGNOSIS — E78.5 HYPERLIPIDEMIA LDL GOAL <70: ICD-10-CM

## 2020-12-03 DIAGNOSIS — I42.9 CARDIOMYOPATHY, UNSPECIFIED TYPE (HCC): ICD-10-CM

## 2020-12-03 DIAGNOSIS — Z66 DO NOT RESUSCITATE: ICD-10-CM

## 2020-12-03 DIAGNOSIS — I63.9 ACUTE CVA (CEREBROVASCULAR ACCIDENT) (HCC): ICD-10-CM

## 2020-12-03 DIAGNOSIS — R41.89 COGNITIVE IMPAIRMENT: ICD-10-CM

## 2020-12-03 DIAGNOSIS — D64.9 ANEMIA, UNSPECIFIED TYPE: ICD-10-CM

## 2020-12-03 DIAGNOSIS — Z87.81 HISTORY OF HIP FRACTURE: ICD-10-CM

## 2020-12-03 DIAGNOSIS — I10 ESSENTIAL HYPERTENSION: Primary | ICD-10-CM

## 2020-12-03 PROCEDURE — 99309 SBSQ NF CARE MODERATE MDM 30: CPT | Performed by: STUDENT IN AN ORGANIZED HEALTH CARE EDUCATION/TRAINING PROGRAM

## 2020-12-03 PROCEDURE — G8432 DEP SCR NOT DOC, RNG: HCPCS | Performed by: STUDENT IN AN ORGANIZED HEALTH CARE EDUCATION/TRAINING PROGRAM

## 2020-12-03 PROCEDURE — 1101F PT FALLS ASSESS-DOCD LE1/YR: CPT | Performed by: STUDENT IN AN ORGANIZED HEALTH CARE EDUCATION/TRAINING PROGRAM

## 2020-12-03 PROCEDURE — G8536 NO DOC ELDER MAL SCRN: HCPCS | Performed by: STUDENT IN AN ORGANIZED HEALTH CARE EDUCATION/TRAINING PROGRAM

## 2020-12-03 RX ORDER — MEMANTINE HYDROCHLORIDE 10 MG/1
10 TABLET ORAL 2 TIMES DAILY
Qty: 180 TAB | Refills: 0
Start: 2020-12-03

## 2020-12-03 RX ORDER — EAR PLUGS
1 EACH OTIC (EAR) 3 TIMES DAILY
Qty: 90 TAB | Refills: 0
Start: 2020-12-03

## 2020-12-03 RX ORDER — CARVEDILOL 3.12 MG/1
3.12 TABLET ORAL 2 TIMES DAILY WITH MEALS
Qty: 180 TAB | Refills: 0
Start: 2020-12-03 | End: 2021-06-09

## 2020-12-03 RX ORDER — ATORVASTATIN CALCIUM 40 MG/1
40 TABLET, FILM COATED ORAL
Qty: 30 TAB | Refills: 0
Start: 2020-12-03

## 2020-12-03 RX ORDER — CLOPIDOGREL BISULFATE 75 MG/1
75 TABLET ORAL DAILY
Qty: 30 TAB | Refills: 0
Start: 2020-12-03

## 2020-12-03 NOTE — PROGRESS NOTES
Jamshid Randolph  78 y.o. female  1940  [address] [facility] [address]  [id number]   [address]  Medicine @ [facility]  Recertification Progress Note  Jeremiracleyolanda Maira, [place]       Encounter Date: 12/3/2020    Chief Complaint   Patient presents with    Follow Up Chronic Condition     History of Present Illness   Duane Garcia is a 78 y.o. female who was seen at [facility] today for their 28-/44-LYO recertification. She was last seen 11/6/2020      No new concerns per nursing or per patient. Per nursing, no further episodes of hypotension that they are aware of since stopping her lisinopril. Diet: Cardiac  continue prescribed diet    Ambulate with assistance, uses wheelchair   Review of Systems   Review of Systems   Respiratory: Negative for cough and shortness of breath. Cardiovascular: Negative for chest pain. Gastrointestinal: Negative for abdominal pain, nausea and vomiting. Musculoskeletal: Negative for back pain and falls. Neurological: Negative for headaches. Vitals/Objective:     Vitals:    12/03/20 0909   BP: 128/69   Pulse: 70   Resp: 16   Temp: 97.4 °F (36.3 °C)   SpO2: 96%   Weight: 105 lb (47.6 kg)     Body mass index is 21.21 kg/m². Weight is stable. Weight in our chart on 11/5 105lb and also on 10/5 wsa 105lb. Physical Exam  Constitutional:       General: She is not in acute distress. Appearance: Normal appearance. She is not toxic-appearing. HENT:      Head: Normocephalic and atraumatic. Nose: No congestion or rhinorrhea. Mouth/Throat:      Mouth: Mucous membranes are moist.   Eyes:      Conjunctiva/sclera: Conjunctivae normal.   Cardiovascular:      Rate and Rhythm: Normal rate and regular rhythm. Heart sounds: No murmur. No friction rub. No gallop. Pulmonary:      Effort: Pulmonary effort is normal. No respiratory distress. Breath sounds: Normal breath sounds. No wheezing or rales.    Abdominal:      General: Abdomen is flat. Bowel sounds are normal. There is no distension. Palpations: Abdomen is soft. Tenderness: There is no abdominal tenderness. Skin:     General: Skin is warm and dry. Neurological:      Mental Status: Mental status is at baseline. Pertinent Lab/Test Results: Most recent labs 9/9/2020 WBC 6.2, plts 198 Hg 11.6    Assessment and Plan:   1. Essential hypertension  - carvediloL (COREG) 3.125 mg tablet; Take 1 Tab by mouth two (2) times daily (with meals). Dispense: 180 Tab; Refill: 0    2. Hyperlipidemia LDL goal <70  - atorvastatin (LIPITOR) 40 mg tablet; Take 1 Tab by mouth nightly. Dispense: 30 Tab; Refill: 0    3. History of CVA (cerebrovascular accident)  - clopidogreL (Plavix) 75 mg tab; Take 1 Tab by mouth daily. Dispense: 30 Tab; Refill: 0  - atorvastatin (LIPITOR) 40 mg tablet; Take 1 Tab by mouth nightly. Dispense: 30 Tab; Refill: 0    4. Cardiomyopathy, unspecified type ([facility] [address]    5. History of hip fracture  - Calcium-Vitamin D3-Vitamin K 500-200-40 mg-unit-mcg chew; Take 1 Tab by mouth three (3) times daily. Dispense: 90 Tab; Refill: 0    6. Cognitive impairment  - memantine (NAMENDA) 10 mg tablet; Take 1 Tab by mouth two (2) times a day. Dispense: 180 Tab; Refill: 0    7. Acute CVA (cerebrovascular accident) ([facility] [address]  - carvediloL (COREG) 3.125 mg tablet; Take 1 Tab by mouth two (2) times daily (with meals). Dispense: 180 Tab; Refill: 0    8. Atrial fibrillation, unspecified type (HCC)  - carvediloL (COREG) 3.125 mg tablet; Take 1 Tab by mouth two (2) times daily (with meals). Dispense: 180 Tab; Refill: 0      CODE STATUS: DNR    I have discussed the assessment and plan with the patient and their responsible party as seen in the above orders. They have expressed understanding. Electronically Signed: Jamaal Rivas DO     History   Patients past medical, surgical and family histories were reviewed and updated.   Yes    Past Medical History:   Diagnosis Date  Acute CVA (cerebrovascular accident) ([facility] [address] 4/10/2017    Per MRI:  Posterior L frontal/parietal territory    Cerebral atrophy ([facility] [address] 4/10/2017    History of CVA (cerebrovascular accident) 4/10/2017    --L frontal acute infarct and LMCA    Hyperlipidemia LDL goal <70 4/10/2017    Hypertension     Stage II pressure ulcer of sacral region ([facility] [address] 2020    Status post hip surgery      Left hip bipolar hemiarthroplasty on 20     TIA (transient ischemic attack)     Unresponsive episode 8/3/2017    transient     Past Surgical History:   Procedure Laterality Date    HX HEENT      Tonsilectomy age 8     Family History   Problem Relation Age of Onset    Dementia Mother 61    Heart Attack Father 79     Social History     Socioeconomic History    Marital status:      Spouse name: Not on file    Number of children: Not on file    Years of education: Not on file    Highest education level: Not on file   Occupational History    Not on file   Social Needs    Financial resource strain: Not on file    Food insecurity     Worry: Not on file     Inability: Not on file    Transportation needs     Medical: Not on file     Non-medical: Not on file   Tobacco Use    Smoking status: Former Smoker     Last attempt to quit: 1999     Years since quittin.4    Smokeless tobacco: Never Used   Substance and Sexual Activity    Alcohol use:  Yes     Alcohol/week: 17.5 standard drinks     Types: 21 Glasses of wine per week     Comment: 3 glasses of port per day    Drug use: No    Sexual activity: Never   Lifestyle    Physical activity     Days per week: Not on file     Minutes per session: Not on file    Stress: Not on file   Relationships    Social connections     Talks on phone: Not on file     Gets together: Not on file     Attends Mormonism service: Not on file     Active member of club or organization: Not on file     Attends meetings of clubs or organizations: Not on file     Relationship status: Not on file    Intimate partner violence     Fear of current or ex partner: Not on file     Emotionally abused: Not on file     Physically abused: Not on file     Forced sexual activity: Not on file   Other Topics Concern    Not on file   Social History Narrative    Not on file            Current Medications/Allergies     Current Outpatient Medications   Medication Sig Dispense Refill    clopidogreL (Plavix) 75 mg tab Take 1 Tab by mouth daily. 30 Tab 0    atorvastatin (LIPITOR) 40 mg tablet Take 1 Tab by mouth nightly. 30 Tab 0    Calcium-Vitamin D3-Vitamin K 425-132-64 mg-unit-mcg chew Take 1 Tab by mouth three (3) times daily. 90 Tab 0    memantine (NAMENDA) 10 mg tablet Take 1 Tab by mouth two (2) times a day. 180 Tab 0    carvediloL (COREG) 3.125 mg tablet Take 1 Tab by mouth two (2) times daily (with meals). 180 Tab 0    idqerugh-sewb-ppo2-C-seng-bosw (Osteo Bi-Flex Triple Strength) 750 mg-644 mg- 30 mg-1 mg tab Take 1 Tab by mouth two (2) times a day.  TURMERIC ROOT EXTRACT PO Take 450 mg by mouth daily. Allergies   Allergen Reactions    Benadryl Allergy/Cold [Diphenhyd-Pe-Acetaminophen] Hives     Per patient's daughter, she thinks patient has had benadryl since then and tolerated it.  Penicillins Hives     Was treated with Augmentin in June 2015, tolerated medication well.

## 2020-12-29 PROBLEM — R41.89 COGNITIVE IMPAIRMENT: Status: ACTIVE | Noted: 2020-12-29

## 2021-02-25 ENCOUNTER — HOME VISIT (OUTPATIENT)
Dept: FAMILY MEDICINE CLINIC | Age: 81
End: 2021-02-25
Payer: MEDICAID

## 2021-02-25 VITALS
WEIGHT: 111 LBS | HEART RATE: 80 BPM | RESPIRATION RATE: 18 BRPM | BODY MASS INDEX: 22.42 KG/M2 | TEMPERATURE: 98 F | OXYGEN SATURATION: 95 % | SYSTOLIC BLOOD PRESSURE: 128 MMHG | DIASTOLIC BLOOD PRESSURE: 63 MMHG

## 2021-02-25 DIAGNOSIS — I63.9 ACUTE CVA (CEREBROVASCULAR ACCIDENT) (HCC): ICD-10-CM

## 2021-02-25 DIAGNOSIS — I42.9 CARDIOMYOPATHY, UNSPECIFIED TYPE (HCC): ICD-10-CM

## 2021-02-25 DIAGNOSIS — Z87.81 HISTORY OF HIP FRACTURE: ICD-10-CM

## 2021-02-25 DIAGNOSIS — I48.0 PAROXYSMAL ATRIAL FIBRILLATION (HCC): ICD-10-CM

## 2021-02-25 DIAGNOSIS — E78.5 HYPERLIPIDEMIA LDL GOAL <70: ICD-10-CM

## 2021-02-25 DIAGNOSIS — R41.89 COGNITIVE IMPAIRMENT: ICD-10-CM

## 2021-02-25 DIAGNOSIS — I10 ESSENTIAL HYPERTENSION: Primary | ICD-10-CM

## 2021-02-25 DIAGNOSIS — Z86.73 HISTORY OF CVA (CEREBROVASCULAR ACCIDENT): ICD-10-CM

## 2021-02-25 PROCEDURE — 1101F PT FALLS ASSESS-DOCD LE1/YR: CPT | Performed by: STUDENT IN AN ORGANIZED HEALTH CARE EDUCATION/TRAINING PROGRAM

## 2021-02-25 PROCEDURE — G8536 NO DOC ELDER MAL SCRN: HCPCS | Performed by: STUDENT IN AN ORGANIZED HEALTH CARE EDUCATION/TRAINING PROGRAM

## 2021-02-25 PROCEDURE — G8432 DEP SCR NOT DOC, RNG: HCPCS | Performed by: STUDENT IN AN ORGANIZED HEALTH CARE EDUCATION/TRAINING PROGRAM

## 2021-02-25 PROCEDURE — 99309 SBSQ NF CARE MODERATE MDM 30: CPT | Performed by: STUDENT IN AN ORGANIZED HEALTH CARE EDUCATION/TRAINING PROGRAM

## 2021-02-25 RX ORDER — TRAMADOL HYDROCHLORIDE 50 MG/1
50 TABLET ORAL
COMMUNITY

## 2021-02-25 NOTE — PROGRESS NOTES
Colten Randolph  78 y.o. female  1940  [address] [facility] [address]  [id number]   [facility] Medicine @ [facility]  Recertification Progress Note  Glo [place], [facility]       Encounter Date: 2/25/2021    Chief Complaint   Patient presents with    Follow Up Chronic Condition     History of Present Illness   Lesia Sosa is a 78 y.o. female who was seen at [facility] today for their 07-/18-DRW recertification. She was last seen 12/29/2020    No concerns per nursing, patient, or POA. Patient reports feeling good today. States she is eating and drinking okay and does not have any problems going to the bathroom. She is s/p COVID-19 vaccine x2     Diet:  Cardiac  continue prescribed diet    Ambulate with assistance, uses wheelchair   Review of Systems   Review of Systems   Unable to perform ROS: Dementia       Vitals/Objective:     Vitals:    02/25/21 1400   BP: 128/63   Pulse: 80   Resp: 18   Temp: 98 °F (36.7 °C)   SpO2: 95%   Weight: 111 lb (50.3 kg)     Body mass index is 22.42 kg/m². Weight is improving. Weight was 103lb in 12/2020. Constitutional:       General: She is not in acute distress. Appearance: Normal appearance. She is not toxic-appearing. HENT:      Head: Normocephalic and atraumatic. Hyperpigmented lesion to L cheek with small/scaley area present     Nose: No congestion or rhinorrhea. Mouth/Throat: clear, no exudate or erythema      Mouth: Mucous membranes are moist.   Eyes:      Conjunctiva/sclera: Conjunctivae normal. PERRL   Cardiovascular:      Rate and Rhythm: Normal rate and regular rhythm. Heart sounds: No murmur. No friction rub. No gallop. Pulmonary:      Effort: Pulmonary effort is normal. No respiratory distress. Breath sounds: Normal breath sounds. No wheezing or rales. Abdominal:      General: Abdomen is flat. Bowel sounds are normal. There is no distension. Palpations: Abdomen is soft.       Tenderness: There is no abdominal tenderness. Skin:     General: Skin is warm and dry. Neurological:      Mental Status: Mental status is at baseline. A&O x1 (oriented to self only)        Pertinent Lab/Test Results: Most recent labs 9/9/2020 WBC 6.2, plts 198 Hg 11.6  BMP 11/25- Na 141, K 3.78, Cl 107,     Assessment and Plan:   1. Essential hypertension  - carvediloL (COREG) 3.125 mg tablet; Take 1 Tab by mouth two (2) times daily (with meals). - check BMP 6 months (~9/2021)      2. Hyperlipidemia LDL goal <70  - atorvastatin (LIPITOR) 40 mg tablet; Take 1 Tab by mouth nightly. - consider checking cholesterol in 6 months (~9/2021)     3. History of CVA (cerebrovascular accident)  - clopidogreL (Plavix) 75 mg tab; Take 1 Tab by mouth daily. - atorvastatin (LIPITOR) 40 mg tablet; Take 1 Tab by mouth nightly.       4. Cardiomyopathy, unspecified type ([address]     5. History of hip fracture  - Calcium-Vitamin D3-Vitamin K 500-200-40 mg-unit-mcg chew; Take 1 Tab by mouth three (3) times daily  - Tramadol 50mg one tablet q6hr PRN for pain      6. Cognitive impairment  - memantine (NAMENDA) 10 mg tablet; Take 1 Tab by mouth two (2) times a day.        7. Atrial fibrillation, unspecified type (HCC)  - carvediloL (COREG) 3.125 mg tablet; Take 1 Tab by mouth two (2) times daily (with meals). 8. History of anemia:  Noted on hospital admission 5/2020 for femoral neck fracture. Likely post op reaction. Not currently on iron. HgB 9/2020 (11.6)   -consider getting CBC in 6 mo (~9/2021) to ensure HgB remains stable       CODE STATUS: DNR    I have discussed the assessment and plan with the patient and their responsible party as seen in the above orders. They have expressed understanding. Electronically Signed: Vandana Costello DO     History   Patients past medical, surgical and family histories were reviewed and updated.       Past Medical History:   Diagnosis Date    Acute CVA (cerebrovascular accident) ([address] 4/10/2017    Per MRI:  Posterior L frontal/parietal territory    Cerebral atrophy ([facility] [address] 4/10/2017    History of CVA (cerebrovascular accident) 4/10/2017    --L frontal acute infarct and LMCA    Hyperlipidemia LDL goal <70 4/10/2017    Hypertension     Stage II pressure ulcer of sacral region ([facility] [address] 2020    Status post hip surgery      Left hip bipolar hemiarthroplasty on 20     TIA (transient ischemic attack)     Unresponsive episode 8/3/2017    transient     Past Surgical History:   Procedure Laterality Date    HX HEENT      Tonsilectomy age 8     Family History   Problem Relation Age of Onset    Dementia Mother 61    Heart Attack Father 79     Social History     Socioeconomic History    Marital status:      Spouse name: Not on file    Number of children: Not on file    Years of education: Not on file    Highest education level: Not on file   Occupational History    Not on file   Social Needs    Financial resource strain: Not on file    Food insecurity     Worry: Not on file     Inability: Not on file    Transportation needs     Medical: Not on file     Non-medical: Not on file   Tobacco Use    Smoking status: Former Smoker     Quit date: 1999     Years since quittin.6    Smokeless tobacco: Never Used   Substance and Sexual Activity    Alcohol use:  Yes     Alcohol/week: 17.5 standard drinks     Types: 21 Glasses of wine per week     Comment: 3 glasses of port per day    Drug use: No    Sexual activity: Never   Lifestyle    Physical activity     Days per week: Not on file     Minutes per session: Not on file    Stress: Not on file   Relationships    Social connections     Talks on phone: Not on file     Gets together: Not on file     Attends Shinto service: Not on file     Active member of club or organization: Not on file     Attends meetings of clubs or organizations: Not on file     Relationship status: Not on file    Intimate partner violence     Fear of current or ex partner: Not on file     Emotionally abused: Not on file     Physically abused: Not on file     Forced sexual activity: Not on file   Other Topics Concern   • Not on file   Social History Narrative   • Not on file            Current Medications/Allergies     Current Outpatient Medications   Medication Sig Dispense Refill   • clopidogreL (Plavix) 75 mg tab Take 1 Tab by mouth daily. 30 Tab 0   • atorvastatin (LIPITOR) 40 mg tablet Take 1 Tab by mouth nightly. 30 Tab 0   • Calcium-Vitamin D3-Vitamin K 500-200-40 mg-unit-mcg chew Take 1 Tab by mouth three (3) times daily. 90 Tab 0   • memantine (NAMENDA) 10 mg tablet Take 1 Tab by mouth two (2) times a day. 180 Tab 0   • carvediloL (COREG) 3.125 mg tablet Take 1 Tab by mouth two (2) times daily (with meals). 180 Tab 0   • nzpvuzsa-bckv-fpf9-C-seng-bosw (Osteo Bi-Flex Triple Strength) 750 mg-644 mg- 30 mg-1 mg tab Take 1 Tab by mouth two (2) times a day.     • TURMERIC ROOT EXTRACT PO Take 450 mg by mouth daily.       Allergies   Allergen Reactions   • Benadryl Allergy/Cold [Diphenhyd-Pe-Acetaminophen] Hives     Per patient's daughter, she thinks patient has had benadryl since then and tolerated it.   • Penicillins Hives     Was treated with Augmentin in June 2015, tolerated medication well.

## 2021-03-04 ENCOUNTER — TELEPHONE (OUTPATIENT)
Dept: FAMILY MEDICINE CLINIC | Age: 81
End: 2021-03-04

## 2021-03-04 NOTE — TELEPHONE ENCOUNTER
----- Message from Carmen Gómez sent at 2/26/2021  3:14 PM EST -----  Regarding: Dr. Julio C Cárdenas telephone  Patient return call    Caller's first and last name and relationship (if not the patient): Diana Joiner (daughter)       Best contact number(s): [phone]      Whose call is being returned: n/a       Details to clarify the request: Call in regard to pt's state       Carmen Gómez

## 2021-03-05 NOTE — TELEPHONE ENCOUNTER
Attempted to call patient's daughter Elder Richardson back with no answer. Left VM to call [facility] back if has any questions regarding her mother's care.     Coleen Christian, [facility] Medicine Resident

## 2021-04-19 ENCOUNTER — HOME VISIT (OUTPATIENT)
Dept: FAMILY MEDICINE CLINIC | Age: 81
End: 2021-04-19
Payer: MEDICAID

## 2021-04-19 VITALS
OXYGEN SATURATION: 96 % | TEMPERATURE: 98 F | SYSTOLIC BLOOD PRESSURE: 100 MMHG | WEIGHT: 115.6 LBS | RESPIRATION RATE: 16 BRPM | DIASTOLIC BLOOD PRESSURE: 60 MMHG | HEART RATE: 64 BPM | BODY MASS INDEX: 23.35 KG/M2

## 2021-04-19 DIAGNOSIS — R60.0 LOCALIZED EDEMA: ICD-10-CM

## 2021-04-19 DIAGNOSIS — Z87.81 HISTORY OF HIP FRACTURE: ICD-10-CM

## 2021-04-19 DIAGNOSIS — Z66 DNR (DO NOT RESUSCITATE): ICD-10-CM

## 2021-04-19 DIAGNOSIS — D64.9 ANEMIA, UNSPECIFIED TYPE: ICD-10-CM

## 2021-04-19 DIAGNOSIS — F03.90 DEMENTIA WITHOUT BEHAVIORAL DISTURBANCE, UNSPECIFIED DEMENTIA TYPE: Primary | ICD-10-CM

## 2021-04-19 DIAGNOSIS — Z86.73 HISTORY OF CVA (CEREBROVASCULAR ACCIDENT): ICD-10-CM

## 2021-04-19 DIAGNOSIS — I42.9 CARDIOMYOPATHY, UNSPECIFIED TYPE (HCC): ICD-10-CM

## 2021-04-19 DIAGNOSIS — E78.5 HYPERLIPIDEMIA LDL GOAL <70: ICD-10-CM

## 2021-04-19 DIAGNOSIS — I10 ESSENTIAL HYPERTENSION: ICD-10-CM

## 2021-04-19 DIAGNOSIS — R23.4 SCAB: ICD-10-CM

## 2021-04-19 DIAGNOSIS — I48.0 PAROXYSMAL ATRIAL FIBRILLATION (HCC): ICD-10-CM

## 2021-04-19 PROCEDURE — G8432 DEP SCR NOT DOC, RNG: HCPCS | Performed by: FAMILY MEDICINE

## 2021-04-19 PROCEDURE — 1101F PT FALLS ASSESS-DOCD LE1/YR: CPT | Performed by: FAMILY MEDICINE

## 2021-04-19 PROCEDURE — 99309 SBSQ NF CARE MODERATE MDM 30: CPT | Performed by: FAMILY MEDICINE

## 2021-04-19 PROCEDURE — G8536 NO DOC ELDER MAL SCRN: HCPCS | Performed by: FAMILY MEDICINE

## 2021-04-19 NOTE — PROGRESS NOTES
Dee Randolph  78 y.o. female  1940  [address] [facility] [address]  [id number]   [address] [facility]  Medicine @ [facility]  Recertification Progress Note  Millie Ignacio MD       Encounter Date: 4/19/2021    Chief Complaint   Patient presents with    Follow Up Chronic Condition     History of Present Illness   Sandra Carreon is a 78 y.o. female who was seen at [facility] today for their 04-/38-GUO recertification. She was last seen 2/25/21. Nursing has no acute concerns (only wanted me to take a look at scabs on leg). Nurse states probably from hitting her leg on wheelchair. Pt states she is feeling well and is eating and drinking okay. Pt states she does not recall how she obtained the leg scabs. NOTE: NOTE: Attempted to reach w/ pt's daughter and POA however, no response. VM left w/ instructions to return call. Diet: cardiac  continue prescribed diet    Ambulate with assistance, uses wheelchair at times   Review of Systems   Review of Systems   Unable to perform ROS: Dementia         Vitals/Objective:     Vitals:    04/19/21 1600   BP: 100/60   Pulse: 64   Resp: 16   Temp: 98 °F (36.7 °C)   SpO2: 96%   Weight: 115 lb 9.6 oz (52.4 kg)     Body mass index is 23.35 kg/m². Weight is increasing steadily (115 lbs today from 111 on 2/25)    Physical Exam  Constitutional:       General: She is not in acute distress. Appearance: Normal appearance. She is not ill-appearing. HENT:      Head: Normocephalic and atraumatic. Right Ear: External ear normal.      Left Ear: External ear normal.      Nose: Nose normal.      Mouth/Throat:      Mouth: Mucous membranes are moist.   Eyes:      Conjunctiva/sclera: Conjunctivae normal.   Neck:      Musculoskeletal: Neck supple. Cardiovascular:      Rate and Rhythm: Normal rate and regular rhythm. Pulses: Normal pulses. Heart sounds: Normal heart sounds.    Pulmonary:      Effort: Pulmonary effort is normal. Breath sounds: Normal breath sounds. Abdominal:      General: Abdomen is flat. Musculoskeletal:      Right shoulder: She exhibits swelling. Feet:    Skin:     General: Skin is warm. Capillary Refill: Capillary refill takes less than 2 seconds. Neurological:      Mental Status: She is alert. Pertinent Lab/Test Results:    Most recent labs   9/9/2020 - WBC 6.2, plts 198 Hg 11.6  BMP 11/25- Na 141, K 3.78, Cl 107     Assessment and Plan:   1. Dementia without behavioral disturbance, unspecified dementia type (HCC)  - memantine (NAMENDA) 10 mg tablet; Take 1 Tab by mouth two (2) times a day. 2. Essential hypertension: bp stable currently 100/60.   - carvediloL (COREG) 3.125 mg BID  - check BMP 6 months (~9/2021)    3. Paroxysmal atrial fibrillation (HCC)  - carvediloL (COREG) 3.125 mg tablet BID    4. Hyperlipidemia LDL goal <70  - atorvastatin (LIPITOR) 40 mg daily   - consider checking cholesterol in 6 months (~9/2021)      5. History of CVA (cerebrovascular accident)  - clopidogreL (Plavix) 75 mg daily. - atorvastatin (LIPITOR) 40 mg qhs        6. History of hip fracture  - Calcium-Vitamin D3-Vitamin K 500-200-40 mg-unit-mcg chew TID  - Tramadol 50mg q6hr PRN for pain     7. Cardiomyopathy, unspecified type ([address]    8. Hx Anemia, unspecified type: Noted on hospital admission 5/2020 for femoral neck fracture. Likely post op reaction. Not currently on iron. HgB 9/2020 (11.6)   -consider getting CBC in 6 mo (~9/2021) to ensure HgB remains stable     9. Scabs: 2 small well-healed 0.5 inch scabs w/ mild surrounding swelling. TTP. Likely 2/2 trauma however pt does not remember; suspect from wheelchair. No signs of infection/warmth. - Will Keep area clean and dry, apply bacitracin    10. Chronic BL LE Edema: 2+ on exam today. - Will Rx compression stockings  - Advised to keep legs elevated     11. DNR (do not resuscitate)    12. COVID DISCUSSION: pt s/p COVID vaccine x2.    - Attempted to reach w/ pt's daughter and POA however, no response. VM left w/ instructions to return call. - will revisit at later time       CODE STATUS: DNR    I have discussed the assessment and plan with the patient and their responsible party as seen in the above orders. They have expressed understanding. Electronically Signed: Cristo Boykin MD     History   Patients past medical, surgical and family histories were reviewed and updated. Past Medical History:   Diagnosis Date    Acute CVA (cerebrovascular accident) ([address] 4/10/2017    Per MRI:  Posterior L frontal/parietal territory    Cerebral atrophy ([address] 4/10/2017    History of CVA (cerebrovascular accident) 4/10/2017    --L frontal acute infarct and LMCA    Hyperlipidemia LDL goal <70 4/10/2017    Hypertension     Stage II pressure ulcer of sacral region ([facility] [address] 2020    Status post hip surgery      Left hip bipolar hemiarthroplasty on 20     TIA (transient ischemic attack)     Unresponsive episode 8/3/2017    transient     Past Surgical History:   Procedure Laterality Date    HX HEENT      Tonsilectomy age 8     Family History   Problem Relation Age of Onset    Dementia Mother 61    Heart Attack Father 79     Social History     Socioeconomic History    Marital status:      Spouse name: Not on file    Number of children: Not on file    Years of education: Not on file    Highest education level: Not on file   Occupational History    Not on file   Social Needs    Financial resource strain: Not on file    Food insecurity     Worry: Not on file     Inability: Not on file    Transportation needs     Medical: Not on file     Non-medical: Not on file   Tobacco Use    Smoking status: Former Smoker     Quit date: 1999     Years since quittin.8    Smokeless tobacco: Never Used   Substance and Sexual Activity    Alcohol use:  Yes     Alcohol/week: 17.5 standard drinks     Types: 21 Glasses of wine per week     Comment: 3 glasses of port per day    Drug use: No    Sexual activity: Never   Lifestyle    Physical activity     Days per week: Not on file     Minutes per session: Not on file    Stress: Not on file   Relationships    Social connections     Talks on phone: Not on file     Gets together: Not on file     Attends Oriental orthodox service: Not on file     Active member of club or organization: Not on file     Attends meetings of clubs or organizations: Not on file     Relationship status: Not on file    Intimate partner violence     Fear of current or ex partner: Not on file     Emotionally abused: Not on file     Physically abused: Not on file     Forced sexual activity: Not on file   Other Topics Concern    Not on file   Social History Narrative    Not on file            Current Medications/Allergies     Current Outpatient Medications   Medication Sig Dispense Refill    traMADoL (ULTRAM) 50 mg tablet Take 50 mg by mouth every six (6) hours as needed for Pain.  clopidogreL (Plavix) 75 mg tab Take 1 Tab by mouth daily. 30 Tab 0    atorvastatin (LIPITOR) 40 mg tablet Take 1 Tab by mouth nightly. 30 Tab 0    Calcium-Vitamin D3-Vitamin K 277-509-46 mg-unit-mcg chew Take 1 Tab by mouth three (3) times daily. 90 Tab 0    memantine (NAMENDA) 10 mg tablet Take 1 Tab by mouth two (2) times a day. 180 Tab 0    carvediloL (COREG) 3.125 mg tablet Take 1 Tab by mouth two (2) times daily (with meals). 180 Tab 0     Allergies   Allergen Reactions    Benadryl Allergy/Cold [Diphenhyd-Pe-Acetaminophen] Hives     Per patient's daughter, she thinks patient has had benadryl since then and tolerated it.  Penicillins Hives     Was treated with Augmentin in June 2015, tolerated medication well.

## 2021-05-16 ENCOUNTER — HOSPITAL ENCOUNTER (EMERGENCY)
Age: 81
Discharge: NURSING, REHAB OR LONG-TERM CARE | End: 2021-05-16
Attending: EMERGENCY MEDICINE
Payer: MEDICARE

## 2021-05-16 ENCOUNTER — APPOINTMENT (OUTPATIENT)
Dept: CT IMAGING | Age: 81
End: 2021-05-16
Attending: EMERGENCY MEDICINE
Payer: MEDICARE

## 2021-05-16 VITALS
RESPIRATION RATE: 16 BRPM | DIASTOLIC BLOOD PRESSURE: 85 MMHG | SYSTOLIC BLOOD PRESSURE: 161 MMHG | OXYGEN SATURATION: 96 %
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
## 2021-05-16 DIAGNOSIS — S01.81XA LACERATION OF FOREHEAD, INITIAL ENCOUNTER: ICD-10-CM

## 2021-05-16 DIAGNOSIS — S00.83XA TRAUMATIC HEMATOMA OF FOREHEAD, INITIAL ENCOUNTER: ICD-10-CM

## 2021-05-16 DIAGNOSIS — S09.90XA INJURY OF HEAD, INITIAL ENCOUNTER: ICD-10-CM

## 2021-05-16 PROCEDURE — 70450 CT HEAD/BRAIN W/O DYE: CPT

## 2021-05-16 PROCEDURE — 99284 EMERGENCY DEPT VISIT MOD MDM: CPT

## 2021-05-16 NOTE — ED TRIAGE NOTES
Pt arrives via EMS from [facility] after she slipped on bathroom floor while wearing fluffy socks. 1.5 Lac on forehead above right eye. Unknown if she lost consciousness, though when nursing staff came in she was awake (but she doesn't remember that). Not on blood thinners.

## 2021-05-16 NOTE — ED PROVIDER NOTES
HPI the patient had a ground-level fall this afternoon. She may have had LOC. She presents with a hematoma to her mid forehead with a 1 cm laceration. She denies neck pain. She denies chest, abdominal, back or extremity pain/injury. Past Medical History:   Diagnosis Date    Acute CVA (cerebrovascular accident) ([facility] [address] 4/10/2017    Per MRI:  Posterior L frontal/parietal territory    Cerebral atrophy ([facility] [address] 4/10/2017    History of CVA (cerebrovascular accident) 4/10/2017    --L frontal acute infarct and LMCA    Hyperlipidemia LDL goal <70 4/10/2017    Hypertension     Stage II pressure ulcer of sacral region ([facility] [address] 2020    Status post hip surgery      Left hip bipolar hemiarthroplasty on 20     TIA (transient ischemic attack)     Unresponsive episode 8/3/2017    transient       Past Surgical History:   Procedure Laterality Date    HX HEENT      Tonsilectomy age 8         Family History:   Problem Relation Age of Onset    Dementia Mother 61    Heart Attack Father 79       Social History     Socioeconomic History    Marital status:      Spouse name: Not on file    Number of children: Not on file    Years of education: Not on file    Highest education level: Not on file   Occupational History    Not on file   Social Needs    Financial resource strain: Not on file    Food insecurity     Worry: Not on file     Inability: Not on file    Transportation needs     Medical: Not on file     Non-medical: Not on file   Tobacco Use    Smoking status: Former Smoker     Quit date: 1999     Years since quittin.8    Smokeless tobacco: Never Used   Substance and Sexual Activity    Alcohol use:  Yes     Alcohol/week: 17.5 standard drinks     Types: 21 Glasses of wine per week     Comment: 3 glasses of port per day    Drug use: No    Sexual activity: Never   Lifestyle    Physical activity     Days per week: Not on file     Minutes per session: Not on file    Stress: Not on file Relationships    Social connections     Talks on phone: Not on file     Gets together: Not on file     Attends Congregational service: Not on file     Active member of club or organization: Not on file     Attends meetings of clubs or organizations: Not on file     Relationship status: Not on file    Intimate partner violence     Fear of current or ex partner: Not on file     Emotionally abused: Not on file     Physically abused: Not on file     Forced sexual activity: Not on file   Other Topics Concern    Not on file   Social History Narrative    Not on file         ALLERGIES: Benadryl allergy/cold [diphenhyd-pe-acetaminophen] and Penicillins    Review of Systems   HENT: Negative for voice change. Cardiovascular: Negative for chest pain. Gastrointestinal: Negative for abdominal pain. Musculoskeletal: Negative for back pain and neck pain. Neurological: Positive for headaches. Psychiatric/Behavioral: Negative for confusion. There were no vitals filed for this visit. Physical Exam  Constitutional:       General: She is not in acute distress. Appearance: She is well-developed. HENT:      Head: Normocephalic. Comments: There is a 2 inch hematoma to the mid forehead with a 1 cm laceration with a slow venous ooze. A pressure dressing was applied to this wound. The scalp was otherwise atraumatic. Eyes:      Pupils: Pupils are equal, round, and reactive to light. Neck:      Musculoskeletal: Normal range of motion. No muscular tenderness. Cardiovascular:      Rate and Rhythm: Normal rate. Heart sounds: No murmur. Pulmonary:      Effort: Pulmonary effort is normal.      Breath sounds: Normal breath sounds. Abdominal:      Palpations: Abdomen is soft. Tenderness: There is no abdominal tenderness. Musculoskeletal: Normal range of motion. Skin:     General: Skin is warm and dry. Capillary Refill: Capillary refill takes less than 2 seconds.    Neurological: General: No focal deficit present. Mental Status: She is alert. Motor: No weakness.    Psychiatric:         Behavior: Behavior normal.          MDM       Procedures

## 2021-06-15 ENCOUNTER — HOME VISIT (OUTPATIENT)
Dept: FAMILY MEDICINE CLINIC | Age: 81
End: 2021-06-15
Payer: MEDICARE

## 2021-06-15 VITALS
SYSTOLIC BLOOD PRESSURE: 110 MMHG | DIASTOLIC BLOOD PRESSURE: 57 MMHG | WEIGHT: 115 LBS | RESPIRATION RATE: 20 BRPM | HEART RATE: 87 BPM | BODY MASS INDEX: 23.23 KG/M2

## 2021-06-15 DIAGNOSIS — D64.9 ANEMIA, UNSPECIFIED TYPE: ICD-10-CM

## 2021-06-15 DIAGNOSIS — F03.90 DEMENTIA WITHOUT BEHAVIORAL DISTURBANCE, UNSPECIFIED DEMENTIA TYPE: Primary | ICD-10-CM

## 2021-06-15 DIAGNOSIS — Z87.81 HISTORY OF HIP FRACTURE: ICD-10-CM

## 2021-06-15 DIAGNOSIS — Z66 DNR (DO NOT RESUSCITATE): ICD-10-CM

## 2021-06-15 DIAGNOSIS — E78.5 HYPERLIPIDEMIA LDL GOAL <70: ICD-10-CM

## 2021-06-15 DIAGNOSIS — I63.9 ACUTE CVA (CEREBROVASCULAR ACCIDENT) (HCC): ICD-10-CM

## 2021-06-15 DIAGNOSIS — I48.0 PAROXYSMAL ATRIAL FIBRILLATION (HCC): ICD-10-CM

## 2021-06-15 DIAGNOSIS — I10 ESSENTIAL HYPERTENSION: ICD-10-CM

## 2021-06-15 DIAGNOSIS — F43.20 ADJUSTMENT DISORDER, UNSPECIFIED TYPE: ICD-10-CM

## 2021-06-15 DIAGNOSIS — Z86.73 HISTORY OF CVA (CEREBROVASCULAR ACCIDENT): ICD-10-CM

## 2021-06-15 DIAGNOSIS — I42.9 CARDIOMYOPATHY, UNSPECIFIED TYPE (HCC): ICD-10-CM

## 2021-06-15 DIAGNOSIS — R60.0 LOCALIZED EDEMA: ICD-10-CM

## 2021-06-15 PROCEDURE — G8536 NO DOC ELDER MAL SCRN: HCPCS | Performed by: FAMILY MEDICINE

## 2021-06-15 PROCEDURE — 1101F PT FALLS ASSESS-DOCD LE1/YR: CPT | Performed by: FAMILY MEDICINE

## 2021-06-15 PROCEDURE — 99309 SBSQ NF CARE MODERATE MDM 30: CPT | Performed by: FAMILY MEDICINE

## 2021-06-15 PROCEDURE — G8432 DEP SCR NOT DOC, RNG: HCPCS | Performed by: FAMILY MEDICINE

## 2021-06-15 NOTE — PROGRESS NOTES
Jhonny Randolph  78 y.o. female  1940  [address] [place] [address]  [id number]   [address] Medicine at [facility]  Recertification Progress Note  Steve Jimenez MD       Encounter Date: 6/15/2021    Chief Complaint   Patient presents with    Follow-up     [facility] recertification     History of Present Illness   Juan Ratliff is a 78 y.o. female who was seen at [facility] today for their 76-/42-QKK recertification. She was last seen on 4/19/2021 by Dr Lori Valentin and Dr Kailey Grace. Since last evaluation patient did not have significant changes. She sustained GLF in mid-May and was evaluated in the ED with CT head without fractures. She returned to [facility]. Order for chair alarm and assistance with ambulation was placed. Patient did not have any other incidents and is doing well per nursing. Patient is only oriented to herself. No complaints per family, daughter believes patient is doing very well. Diet: cardiac  continue prescribed diet    Ambulate with assistance    Review of Systems   ROS  Not able to perform d/t dementia     Vitals/Objective: There were no vitals filed for this visit. There is no height or weight on file to calculate BMI. Weight is stable    Physical Exam  Constitutional:       General: She is not in acute distress. Appearance: Normal appearance. She is not ill-appearing. HENT:      Head: Normocephalic and atraumatic. Right Ear: External ear normal.      Left Ear: External ear normal.      Nose: Nose normal.      Mouth/Throat:      Mouth: Mucous membranes are moist.   Eyes:      Conjunctiva/sclera: Conjunctivae normal.   Neck:      Musculoskeletal: Neck supple. Cardiovascular:      Rate and Rhythm: Normal rate and regular rhythm. Pulses: Normal pulses. Heart sounds: Normal heart sounds. Pulmonary:      Effort: Pulmonary effort is normal.      Breath sounds: Normal breath sounds.    Abdominal:      General: Abdomen is flat.   Skin:     General: Skin is warm. Trace edema on ankles b/l     Capillary Refill: Capillary refill takes less than 2 seconds. Neurological:      Mental Status: She is alert.       Pertinent Lab/Test Results:  CT head without acute findings on 5/16. Assessment and Plan:   1. Dementia without behavioral disturbance, unspecified dementia type (HCC)  - memantine (NAMENDA) 10 mg tablet; Take 1 Tab by mouth two (2) times a day. - no recent changes per psych evals     2. Essential hypertension: bp stable currently 100/60.   - carvediloL (COREG) 3.125 mg BID  - check BMP 6 months (~9/2021)     3. Paroxysmal atrial fibrillation (HCC)  - carvediloL (COREG) 3.125 mg tablet BID     4. Hyperlipidemia LDL goal <70  - atorvastatin (LIPITOR) 40 mg daily   - consider checking cholesterol in 6 months (~9/2021)      5. History of CVA (cerebrovascular accident)  - clopidogreL (Plavix) 75 mg daily. - atorvastatin (LIPITOR) 40 mg qhs        6. History of hip fracture  - Calcium-Vitamin D3-Vitamin K 500-200-40 mg-unit-mcg chew TID  - Tramadol 50mg q6hr PRN for pain      7. Cardiomyopathy, unspecified type ([address]     8. Hx Anemia, unspecified type: Noted on hospital admission 5/2020 for femoral neck fracture. Jayne Carrillo post op reaction.  Not currently on iron. HgB 9/2020 (11.6)   -consider getting CBC in 6 mo (~9/2021) to ensure HgB remains stable      9. Chronic BL LE Edema: trace on exam today. - Will Rx compression stockings  - Advised to keep legs elevated      10. DNR (do not resuscitate)    I have discussed the assessment and plan with the patient and their responsible party as seen in the above orders. They have expressed understanding. Electronically Signed: Mansi Almodovar MD     History   Patients past medical, surgical and family histories were reviewed and updated.       Past Medical History:   Diagnosis Date    Acute CVA (cerebrovascular accident) ([facility] [address] 4/10/2017    Per MRI:  Posterior L frontal/parietal territory    Cerebral atrophy ([facility] [address] 4/10/2017    History of CVA (cerebrovascular accident) 4/10/2017    --L frontal acute infarct and LMCA    Hyperlipidemia LDL goal <70 4/10/2017    Hypertension     Stage II pressure ulcer of sacral region ([facility] [address] 2020    Status post hip surgery      Left hip bipolar hemiarthroplasty on 20     TIA (transient ischemic attack)     Unresponsive episode 8/3/2017    transient     Past Surgical History:   Procedure Laterality Date    HX HEENT      Tonsilectomy age 8     Family History   Problem Relation Age of Onset    Dementia Mother 61    Heart Attack Father 79     Social History     Socioeconomic History    Marital status:      Spouse name: Not on file    Number of children: Not on file    Years of education: Not on file    Highest education level: Not on file   Occupational History    Not on file   Tobacco Use    Smoking status: Former Smoker     Quit date: 1999     Years since quittin.9    Smokeless tobacco: Never Used   Substance and Sexual Activity    Alcohol use: Yes     Alcohol/week: 17.5 standard drinks     Types: 21 Glasses of wine per week     Comment: 3 glasses of port per day    Drug use: No    Sexual activity: Never   Other Topics Concern    Not on file   Social History Narrative    Not on file     Social Determinants of Health     Financial Resource Strain:     Difficulty of Paying Living Expenses:    Food Insecurity:     Worried About Running Out of Food in the Last Year:     [address] in the Last Year:    Transportation Needs:     Lack of Transportation (Medical):      Lack of Transportation (Non-Medical):    Physical Activity:     Days of Exercise per Week:     Minutes of Exercise per Session:    Stress:     Feeling of Stress :    Social Connections:     Frequency of Communication with Friends and Family:     Frequency of Social Gatherings with Friends and Family:     Attends Am[employer] Services:     Active Member of Clubs or Organizations:     Attends Club or Organization Meetings:     Marital Status:    Intimate Partner Violence:     Fear of Current or Ex-Partner:     Emotionally Abused:     Physically Abused:     Sexually Abused:             Current Medications/Allergies     Current Outpatient Medications   Medication Sig Dispense Refill    METOPROLOL SUCCINATE PO Take 12.5 mg by mouth two (2) times a day.  traMADoL (ULTRAM) 50 mg tablet Take 50 mg by mouth every six (6) hours as needed for Pain.  clopidogreL (Plavix) 75 mg tab Take 1 Tab by mouth daily. 30 Tab 0    atorvastatin (LIPITOR) 40 mg tablet Take 1 Tab by mouth nightly. 30 Tab 0    Calcium-Vitamin D3-Vitamin K 191-808-54 mg-unit-mcg chew Take 1 Tab by mouth three (3) times daily. 90 Tab 0    memantine (NAMENDA) 10 mg tablet Take 1 Tab by mouth two (2) times a day. 180 Tab 0     Allergies   Allergen Reactions    Benadryl Allergy/Cold [Diphenhyd-Pe-Acetaminophen] Hives     Per patient's daughter, she thinks patient has had benadryl since then and tolerated it.  Penicillins Hives     Was treated with Augmentin in June 2015, tolerated medication well.

## 2021-08-16 ENCOUNTER — HOME VISIT (OUTPATIENT)
Dept: FAMILY MEDICINE CLINIC | Age: 81
End: 2021-08-16
Payer: MEDICARE

## 2021-08-16 VITALS
SYSTOLIC BLOOD PRESSURE: 118 MMHG | TEMPERATURE: 97 F | HEART RATE: 62 BPM | RESPIRATION RATE: 18 BRPM | DIASTOLIC BLOOD PRESSURE: 64 MMHG | WEIGHT: 110.1 LBS | OXYGEN SATURATION: 98 % | BODY MASS INDEX: 22.24 KG/M2

## 2021-08-16 DIAGNOSIS — I48.0 PAROXYSMAL ATRIAL FIBRILLATION (HCC): ICD-10-CM

## 2021-08-16 DIAGNOSIS — Z86.73 HISTORY OF CVA (CEREBROVASCULAR ACCIDENT): ICD-10-CM

## 2021-08-16 DIAGNOSIS — I10 ESSENTIAL HYPERTENSION: ICD-10-CM

## 2021-08-16 DIAGNOSIS — F03.90 DEMENTIA WITHOUT BEHAVIORAL DISTURBANCE, UNSPECIFIED DEMENTIA TYPE: Primary | ICD-10-CM

## 2021-08-16 DIAGNOSIS — E78.5 HYPERLIPIDEMIA LDL GOAL <70: ICD-10-CM

## 2021-08-16 PROCEDURE — 99309 SBSQ NF CARE MODERATE MDM 30: CPT | Performed by: STUDENT IN AN ORGANIZED HEALTH CARE EDUCATION/TRAINING PROGRAM

## 2021-08-16 PROCEDURE — G8536 NO DOC ELDER MAL SCRN: HCPCS | Performed by: STUDENT IN AN ORGANIZED HEALTH CARE EDUCATION/TRAINING PROGRAM

## 2021-08-16 PROCEDURE — G8432 DEP SCR NOT DOC, RNG: HCPCS | Performed by: STUDENT IN AN ORGANIZED HEALTH CARE EDUCATION/TRAINING PROGRAM

## 2021-08-16 PROCEDURE — 1101F PT FALLS ASSESS-DOCD LE1/YR: CPT | Performed by: STUDENT IN AN ORGANIZED HEALTH CARE EDUCATION/TRAINING PROGRAM

## 2021-08-17 NOTE — PROGRESS NOTES
Lisa Kinsey  78 y.o. female  1940  [address] [address]  [id number]   [facility] Medicine at [facility]  Recertification Progress Note  Kurt Herrera MD       Encounter Date: 8/16/2021    Chief Complaint   Patient presents with    Follow-up     History of Present Illness   Lisa Kinsey is a 78 y.o. female who was seen at [facility] today for their 44-/96-SPL recertification. She was last seen 6/15/21      Diet: No added salt, regular texture, thin consistency. continue prescribed diet    As tolerated with assist    Review of Systems   ROS      Vitals/Objective:     Vitals:    08/16/21 2239   BP: 118/64   Pulse: 62   Resp: 18   Temp: 97 °F (36.1 °C)   SpO2: 98%   Weight: 110 lb 1.6 oz (49.9 kg)     Body mass index is 22.24 kg/m². Weight is decreasing steadily    Physical Exam  Constitutional:       General: She is not in acute distress.     Appearance: Normal appearance. She is not ill-appearing. HENT:      Head: Normocephalic and atraumatic.      Right Ear: External ear normal.      Left Ear: External ear normal.      Nose: Nose normal.      Mouth/Throat:      Mouth: Mucous membranes are moist.   Eyes:      Conjunctiva/sclera: Conjunctivae normal.   Neck:      Musculoskeletal: Neck supple. Cardiovascular:      Rate and Rhythm: Normal rate and regular rhythm.      Pulses: Normal pulses.      Heart sounds: Normal heart sounds. Pulmonary:      Effort: Pulmonary effort is normal.      Breath sounds: Normal breath sounds. Abdominal:      General: Abdomen is flat. Skin:     General: Skin is warm. Trace edema on ankles b/l     Capillary Refill: Capillary refill takes less than 2 seconds.      Neurological:      Mental Status: She is alert and oriented to self only    Pertinent Lab/Test Results:  None    Assessment and Plan:   1. Dementia without behavioral disturbance, unspecified dementia type (HCC)  - memantine (NAMENDA) 10 mg tablet;  Take 1 Tab by mouth two (2) times a day.     2. Essential hypertension: bp stable currently 118/65  - metoprolol 12.5 BID  - Recheck BMP 1 month (~9/2021)     3. Weight loss: Loss of 5 pounds over 2 months  - Increase ensure clear from BID to TID  - Continue following weights    4. Paroxysmal atrial fibrillation (HCC)  - metoprolol 12.5 BID     5. Hyperlipidemia LDL goal <70  - atorvastatin (LIPITOR) 40 mg daily   - Recheck cholesterol in 1 month (~9/2021)      6. History of CVA (cerebrovascular accident)  - clopidogreL (Plavix) 75 mg daily. - atorvastatin (LIPITOR) 40 mg qhs        7. History of hip fracture  - Calcium-Vitamin D3-Vitamin K 500-200-40 mg-unit-mcg chew TID  - Tramadol 50mg q6hr PRN for pain      8. Cardiomyopathy, unspecified type ([address]     9. Hx Anemia, unspecified type: Noted on hospital admission 5/2020 for femoral neck fracture. Omero Andrade post op reaction.  Not currently on iron. HgB 9/2020 (11.6)   - Recheck CBC in 1 mo (~9/2021) to ensure HgB remains stable      10. Chronic BL LE Edema: Improved  - Continue compression stockings  - Advised to keep legs elevated       11. DNR (do not resuscitate)    I have discussed the assessment and plan with the patient and their responsible party as seen in the above orders. They have expressed understanding. Electronically Signed: Alessia Rubin MD     History   Patients past medical, surgical and family histories were reviewed and updated.       Past Medical History:   Diagnosis Date    Acute CVA (cerebrovascular accident) ([address] 4/10/2017    Per MRI:  Posterior L frontal/parietal territory    Cerebral atrophy ([address] 4/10/2017    History of CVA (cerebrovascular accident) 4/10/2017    --L frontal acute infarct and LMCA    Hyperlipidemia LDL goal <70 4/10/2017    Hypertension     Stage II pressure ulcer of sacral region ([address] 11/9/2020    Status post hip surgery      Left hip bipolar hemiarthroplasty on 5/21/20     TIA (transient ischemic attack)     Unresponsive episode 8/3/2017    transient     Past Surgical History:   Procedure Laterality Date    HX HEENT      Tonsilectomy age 8     Family History   Problem Relation Age of Onset    Dementia Mother 61    Heart Attack Father 79     Social History     Socioeconomic History    Marital status:      Spouse name: Not on file    Number of children: Not on file    Years of education: Not on file    Highest education level: Not on file   Occupational History    Not on file   Tobacco Use    Smoking status: Former Smoker     Quit date: 1999     Years since quittin.1    Smokeless tobacco: Never Used   Substance and Sexual Activity    Alcohol use: Yes     Alcohol/week: 17.5 standard drinks     Types: 21 Glasses of wine per week     Comment: 3 glasses of port per day    Drug use: No    Sexual activity: Never   Other Topics Concern    Not on file   Social History Narrative    Not on file     Social Determinants of Health     Financial Resource Strain:     Difficulty of Paying Living Expenses:    Food Insecurity:     Worried About Running Out of Food in the Last Year:     [address] in the Last Year:    Transportation Needs:     Lack of Transportation (Medical):      Lack of Transportation (Non-Medical):    Physical Activity:     Days of Exercise per Week:     Minutes of Exercise per Session:    Stress:     Feeling of Stress :    Social Connections:     Frequency of Communication with Friends and Family:     Frequency of Social Gatherings with Friends and Family:     Attends Oriental orthodox Services:     Active Member of Clubs or Organizations:     Attends Club or Organization Meetings:     Marital Status:    Intimate Partner Violence:     Fear of Current or Ex-Partner:     Emotionally Abused:     Physically Abused:     Sexually Abused:             Current Medications/Allergies     Current Outpatient Medications   Medication Sig Dispense Refill    METOPROLOL SUCCINATE PO Take 12.5 mg by mouth two (2) times a day.  traMADoL (ULTRAM) 50 mg tablet Take 50 mg by mouth every six (6) hours as needed for Pain.  clopidogreL (Plavix) 75 mg tab Take 1 Tab by mouth daily. 30 Tab 0    atorvastatin (LIPITOR) 40 mg tablet Take 1 Tab by mouth nightly. 30 Tab 0    Calcium-Vitamin D3-Vitamin K 938-845-58 mg-unit-mcg chew Take 1 Tab by mouth three (3) times daily. 90 Tab 0    memantine (NAMENDA) 10 mg tablet Take 1 Tab by mouth two (2) times a day. 180 Tab 0     Allergies   Allergen Reactions    Benadryl Allergy/Cold [Diphenhyd-Pe-Acetaminophen] Hives     Per patient's daughter, she thinks patient has had benadryl since then and tolerated it.  Penicillins Hives     Was treated with Augmentin in June 2015, tolerated medication well.

## 2021-10-07 ENCOUNTER — HOME VISIT (OUTPATIENT)
Dept: FAMILY MEDICINE CLINIC | Age: 81
End: 2021-10-07
Payer: MEDICARE

## 2021-10-07 VITALS
BODY MASS INDEX: 22.24 KG/M2 | SYSTOLIC BLOOD PRESSURE: 127 MMHG | RESPIRATION RATE: 18 BRPM | OXYGEN SATURATION: 97 % | HEART RATE: 63 BPM | TEMPERATURE: 97.4 F | WEIGHT: 110.1 LBS | DIASTOLIC BLOOD PRESSURE: 92 MMHG

## 2021-10-07 DIAGNOSIS — I48.0 PAROXYSMAL ATRIAL FIBRILLATION (HCC): ICD-10-CM

## 2021-10-07 DIAGNOSIS — I10 ESSENTIAL HYPERTENSION: ICD-10-CM

## 2021-10-07 DIAGNOSIS — D64.9 ANEMIA, UNSPECIFIED TYPE: ICD-10-CM

## 2021-10-07 DIAGNOSIS — I42.9 CARDIOMYOPATHY, UNSPECIFIED TYPE (HCC): ICD-10-CM

## 2021-10-07 DIAGNOSIS — F03.90 DEMENTIA WITHOUT BEHAVIORAL DISTURBANCE, UNSPECIFIED DEMENTIA TYPE: Primary | ICD-10-CM

## 2021-10-07 DIAGNOSIS — I63.9 ACUTE CVA (CEREBROVASCULAR ACCIDENT) (HCC): ICD-10-CM

## 2021-10-07 DIAGNOSIS — Z87.81 HISTORY OF HIP FRACTURE: ICD-10-CM

## 2021-10-07 DIAGNOSIS — R60.0 BILATERAL LOWER EXTREMITY EDEMA: ICD-10-CM

## 2021-10-07 PROCEDURE — G8432 DEP SCR NOT DOC, RNG: HCPCS | Performed by: STUDENT IN AN ORGANIZED HEALTH CARE EDUCATION/TRAINING PROGRAM

## 2021-10-07 PROCEDURE — 1101F PT FALLS ASSESS-DOCD LE1/YR: CPT | Performed by: STUDENT IN AN ORGANIZED HEALTH CARE EDUCATION/TRAINING PROGRAM

## 2021-10-07 PROCEDURE — 99309 SBSQ NF CARE MODERATE MDM 30: CPT | Performed by: STUDENT IN AN ORGANIZED HEALTH CARE EDUCATION/TRAINING PROGRAM

## 2021-10-07 PROCEDURE — G8536 NO DOC ELDER MAL SCRN: HCPCS | Performed by: STUDENT IN AN ORGANIZED HEALTH CARE EDUCATION/TRAINING PROGRAM

## 2021-10-07 RX ORDER — CARVEDILOL 3.12 MG/1
3.12 TABLET ORAL 2 TIMES DAILY WITH MEALS
COMMUNITY
End: 2021-12-02 | Stop reason: ALTCHOICE

## 2021-10-07 RX ORDER — ACETAMINOPHEN 500 MG
650 TABLET ORAL
COMMUNITY
End: 2022-08-02

## 2021-10-07 RX ORDER — LISINOPRIL 10 MG/1
10 TABLET ORAL DAILY
COMMUNITY
End: 2021-12-02 | Stop reason: ALTCHOICE

## 2021-10-07 RX ORDER — MELATONIN 5 MG
5 CAPSULE ORAL
COMMUNITY

## 2021-10-07 NOTE — PROGRESS NOTES
Ada Randolph  78 y.o. female  1940  [id number] [facility] [address]  [id number]   [address] [facility]  Medicine at [facility]  Recertification Progress Note  Selma Hicks MD       Encounter Date: 10/7/2021    Chief Complaint   Patient presents with    Follow Up Chronic Condition     History of Present Illness   Levi Huntley is a 78 y.o. female who was seen at [facility] today for their 63-/74-LLA recertification. She was last seen 8/20      Diet: Low salt, Ensure clear TID  continue prescribed diet    Activity as tolerated w/ assist    Review of Systems   ROS      Vitals/Objective:     Vitals:    10/07/21 1822   BP: (!) 127/92   Pulse: 63   Resp: 18   Temp: 97.4 °F (36.3 °C)   SpO2: 97%   Weight: 110 lb 1.6 oz (49.9 kg)     Body mass index is 22.24 kg/m². Wt Readings from Last 3 Encounters:   10/07/21 110 lb 1.6 oz (49.9 kg)   08/16/21 110 lb 1.6 oz (49.9 kg)   06/15/21 115 lb (52.2 kg)       Weight is stable    Physical Exam    Pertinent Lab/Test Results:  None    Assessment and Plan:     1. Dementia without behavioral disturbance, unspecified dementia type (HCC)  - memantine (NAMENDA) 10 mg tablet; Take 1 Tab by mouth two (2) times a day.     2. Essential hypertension:  - STOP metoprolol 12.5 BID as pt is also taking coreg  - Continue lisinopril 10mg daily - can increase if needed  - Inc vitals to daily x2 week to monitor for rebound tachycardia, hypertension      3. Paroxysmal atrial fibrillation (HCC)  - Coreg 3.125mg BID    4. History of hip fracture  - Calcium-Vitamin D3-Vitamin K 500-200-40 mg-unit-mcg chew TID  - Tramadol 50mg q6hr PRN for pain     5. Cardiomyopathy, unspecified type (HCC)  - Continue carvedilol 3.125mg BID  - STOP metoprolol 12.5mg BID  - Inc vitals to daily x2 week to monitor for rebound tachycardia, hypertension    6. Anemia, unspecified type: Noted on hospital admission 5/2020 for femoral neck fracture. Likely post op reaction.  Not currently on iron.  - Will recheck CBC    7. Bilateral lower extremity edema  - Continue compression stockings  - Advised to keep legs elevated      8. History of CVA (cerebrovascular accident)  - clopidogreL (Plavix) 75 mg daily. - atorvastatin (LIPITOR) 40 mg qhs      CODE STATUS: DNR    I have discussed the assessment and plan with the patient and their responsible party as seen in the above orders. They have expressed understanding. Electronically Signed: Christopher Conway MD     History   Patients past medical, surgical and family histories were reviewed and updated. Past Medical History:   Diagnosis Date    Acute CVA (cerebrovascular accident) ([address] 4/10/2017    Per MRI:  Posterior L frontal/parietal territory    Cerebral atrophy ([address] 4/10/2017    History of CVA (cerebrovascular accident) 4/10/2017    --L frontal acute infarct and LMCA    Hyperlipidemia LDL goal <70 4/10/2017    Hypertension     Stage II pressure ulcer of sacral region ([facility] 2020    Status post hip surgery      Left hip bipolar hemiarthroplasty on 20     TIA (transient ischemic attack)     Unresponsive episode 8/3/2017    transient     Past Surgical History:   Procedure Laterality Date    HX HEENT      Tonsilectomy age 8     Family History   Problem Relation Age of Onset    Dementia Mother 61    Heart Attack Father 79     Social History     Socioeconomic History    Marital status:      Spouse name: Not on file    Number of children: Not on file    Years of education: Not on file    Highest education level: Not on file   Occupational History    Not on file   Tobacco Use    Smoking status: Former Smoker     Quit date: 1999     Years since quittin.2    Smokeless tobacco: Never Used   Substance and Sexual Activity    Alcohol use:  Yes     Alcohol/week: 17.5 standard drinks     Types: 21 Glasses of wine per week     Comment: 3 glasses of port per day    Drug use: No    Sexual activity: Never   Other Topics Concern    Not on file   Social History Narrative    Not on file     Social Determinants of Health     Financial Resource Strain:     Difficulty of Paying Living Expenses:    Food Insecurity:     Worried About Running Out of Food in the Last Year:     [address] in the Last Year:    Transportation Needs:     Lack of Transportation (Medical):  Lack of Transportation (Non-Medical):    Physical Activity:     Days of Exercise per Week:     Minutes of Exercise per Session:    Stress:     Feeling of Stress :    Social Connections:     Frequency of Communication with Friends and Family:     Frequency of Social Gatherings with Friends and Family:     Attends [employer] Services:     Active Member of Clubs or Organizations:     Attends Club or Organization Meetings:     Marital Status:    Intimate Partner Violence:     Fear of Current or Ex-Partner:     Emotionally Abused:     Physically Abused:     Sexually Abused:             Current Medications/Allergies     Current Outpatient Medications   Medication Sig Dispense Refill    lisinopriL (PRINIVIL, ZESTRIL) 10 mg tablet Take 10 mg by mouth daily.  acetaminophen (TYLENOL) 500 mg tablet Take 500 mg by mouth every six (6) hours as needed.  carvediloL (COREG) 3.125 mg tablet Take 3.125 mg by mouth two (2) times daily (with meals).  melatonin 5 mg cap capsule Take 5 mg by mouth nightly.  METOPROLOL SUCCINATE PO Take 12.5 mg by mouth two (2) times a day.  traMADoL (ULTRAM) 50 mg tablet Take 50 mg by mouth every six (6) hours as needed for Pain.  clopidogreL (Plavix) 75 mg tab Take 1 Tab by mouth daily. 30 Tab 0    atorvastatin (LIPITOR) 40 mg tablet Take 1 Tab by mouth nightly. 30 Tab 0    Calcium-Vitamin D3-Vitamin K 208-093-89 mg-unit-mcg chew Take 1 Tab by mouth three (3) times daily. 90 Tab 0    memantine (NAMENDA) 10 mg tablet Take 1 Tab by mouth two (2) times a day.  180 Tab 0     Allergies   Allergen Reactions    Benadryl Allergy/Cold [Diphenhyd-Pe-Acetaminophen] Hives     Per patient's daughter, she thinks patient has had benadryl since then and tolerated it.  Penicillins Hives     Was treated with Augmentin in June 2015, tolerated medication well.

## 2021-11-04 RX ORDER — ACETAMINOPHEN 325 MG/1
650 TABLET ORAL DAILY
Qty: 30 TABLET | Refills: 0
Start: 2021-11-04 | End: 2022-01-31

## 2021-11-04 RX ORDER — FACIAL-BODY WIPES
10 EACH TOPICAL
Qty: 1 EACH | Refills: 0
Start: 2021-11-04 | End: 2022-01-31

## 2021-12-01 ENCOUNTER — HOME VISIT (OUTPATIENT)
Dept: FAMILY MEDICINE CLINIC | Age: 81
End: 2021-12-01
Payer: MEDICARE

## 2021-12-01 DIAGNOSIS — F03.90 DEMENTIA WITHOUT BEHAVIORAL DISTURBANCE, UNSPECIFIED DEMENTIA TYPE: Primary | ICD-10-CM

## 2021-12-01 DIAGNOSIS — Z86.73 HISTORY OF CVA (CEREBROVASCULAR ACCIDENT): ICD-10-CM

## 2021-12-01 DIAGNOSIS — I10 ESSENTIAL HYPERTENSION: ICD-10-CM

## 2021-12-01 DIAGNOSIS — Z87.81 HISTORY OF HIP FRACTURE: ICD-10-CM

## 2021-12-01 DIAGNOSIS — I42.9 CARDIOMYOPATHY, UNSPECIFIED TYPE (HCC): ICD-10-CM

## 2021-12-01 DIAGNOSIS — R60.0 BILATERAL LOWER EXTREMITY EDEMA: ICD-10-CM

## 2021-12-01 DIAGNOSIS — R63.4 WEIGHT LOSS: ICD-10-CM

## 2021-12-01 DIAGNOSIS — I48.0 PAROXYSMAL ATRIAL FIBRILLATION (HCC): ICD-10-CM

## 2021-12-01 PROCEDURE — 99309 SBSQ NF CARE MODERATE MDM 30: CPT | Performed by: STUDENT IN AN ORGANIZED HEALTH CARE EDUCATION/TRAINING PROGRAM

## 2021-12-01 PROCEDURE — G8536 NO DOC ELDER MAL SCRN: HCPCS | Performed by: STUDENT IN AN ORGANIZED HEALTH CARE EDUCATION/TRAINING PROGRAM

## 2021-12-01 PROCEDURE — 1101F PT FALLS ASSESS-DOCD LE1/YR: CPT | Performed by: STUDENT IN AN ORGANIZED HEALTH CARE EDUCATION/TRAINING PROGRAM

## 2021-12-01 PROCEDURE — G8432 DEP SCR NOT DOC, RNG: HCPCS | Performed by: STUDENT IN AN ORGANIZED HEALTH CARE EDUCATION/TRAINING PROGRAM

## 2021-12-01 NOTE — PROGRESS NOTES
Roosevelt Castleman Teuscher  78 y.o. female  1940  [address] [facility] [address]  [id number]   [address] [place] [address] Family Medicine at [facility]  Recertification Progress Note  Davy Dixon DO       Encounter Date: 12/1/2021    Chief Complaint   Patient presents with    Follow Up Chronic Condition     History of Present Illness   Maru Chow is a 78 y.o. female who was seen at [facility] today for their 23-FJM recertification. She was last seen 10/7/21. Diet: No added salt diet - regular texture, thin consistency    activity as tolerated    Patient and nursing staff without concerns today. Patient is happily demented and unable to answer a ROS. Spoke to daughter who has no concerns. Interval History:  Per daughter, she received a call a few weeks ago about patient being taken off of Coreg due to pressures on lower side and by cardiology she thinks (daughter believes cardiologist to be Dr. Eleazar Gregory). Daughter requested Lisinopril be discontinued some time last year due to family member passing away with cancer supposedly linked to medication. Daughter believes patient saw cardiologist 1 month ago. No note in EMR so possible another cardiologist?    Review of Systems   Review of Systems   Reason unable to perform ROS: dementia - patient responded with \"i dont know\" for all ROS. Vitals/Objective:     Vitals:    12/02/21 1645   BP: 132/76   Pulse: 72   Resp: 18   Temp: 98.2 °F (36.8 °C)   SpO2: 96%   Weight: 104 lb 9.6 oz (47.4 kg)   Height: 4' 11\" (1.499 m)     Body mass index is 21.13 kg/m². Weight is decreasing steadily    Physical Exam  Constitutional:       Appearance: Normal appearance. She is normal weight. HENT:      Nose: No congestion or rhinorrhea. Eyes:      Extraocular Movements: Extraocular movements intact. Conjunctiva/sclera: Conjunctivae normal.   Cardiovascular:      Rate and Rhythm: Normal rate and regular rhythm. Pulses: Normal pulses.       Heart sounds: Normal heart sounds. Pulmonary:      Effort: Pulmonary effort is normal.      Breath sounds: Normal breath sounds. Abdominal:      General: Bowel sounds are normal.      Palpations: Abdomen is soft. Tenderness: There is no abdominal tenderness. Musculoskeletal:      Right lower leg: No edema. Left lower leg: No edema. Comments: compression stockings in place   Skin:     General: Skin is warm and dry. Comments: Large seborrheic kertosis on left cheek (nonbothersome to patient)   Neurological:      Mental Status: She is alert. Mental status is at baseline. She is disoriented. Comments: Oriented to Month only      Psychiatric:      Comments: Happily demented             Assessment and Plan:   1. Dementia without behavioral disturbance, unspecified dementia type [facility]): Most recent psych eval 11/9/21 without medication changes. - Continue memantine 10 mg tablet BID     2. Essential hypertension: Previously on Coreg and Lisinopril, now off all hypertensive and BP at goal of less than 150/90.  - Continue to monitor     3. Paroxysmal atrial fibrillation ([facility]): RRR at time of exam.  Review of vitals has appropriate pulse. Patient stable off of Coreg at this time. - Continue Clopidogrel  - Will contact cardiologist to confirm plan     4. History of hip fracture  - Continue Calcium-Vitamin D3-Vitamin K 500-200-40 mg-unit-mcg chew TID  - Continue Tramadol 50mg q6hr PRN for pain      5. Cardiomyopathy, unspecified type [facility]): Last echo 5/2020 with LVEF 50-55%. Pulse stable on beta blocker, vitals reviewed. Last saw cardiology (Dr. Radha Lance) in 1/2020 who recommended follow up as needed. 6. Bilateral lower extremity edema: controlled with stockings  - Continue compression stockings  - Advised to keep legs elevated       7. History of CVA (cerebrovascular accident)  - Continue clopidogreL (Plavix) 75 mg daily. - Continue atorvastatin (LIPITOR) 40 mg qhs    8.  Weight loss: BMI stable however patient has lost 6lbs in 2 months. - Add Ensure plus daily       CODE STATUS: DNR    I have discussed the assessment and plan with the patient and their responsible party as seen in the above orders. They have expressed understanding. Electronically Signed: Thomas Landry DO     History   Patients past medical, surgical and family histories were reviewed and updated. Past Medical History:   Diagnosis Date    Acute CVA (cerebrovascular accident) ([facility] 4/10/2017    Per MRI:  Posterior L frontal/parietal territory    Cerebral atrophy ([address] 4/10/2017    History of CVA (cerebrovascular accident) 4/10/2017    --L frontal acute infarct and LMCA    Hyperlipidemia LDL goal <70 4/10/2017    Hypertension     Stage II pressure ulcer of sacral region ([facility] 2020    Status post hip surgery      Left hip bipolar hemiarthroplasty on 20     TIA (transient ischemic attack)     Unresponsive episode 8/3/2017    transient     Past Surgical History:   Procedure Laterality Date    HX HEENT      Tonsilectomy age 8     Family History   Problem Relation Age of Onset    Dementia Mother 61    Heart Attack Father 79     Social History     Socioeconomic History    Marital status:      Spouse name: Not on file    Number of children: Not on file    Years of education: Not on file    Highest education level: Not on file   Occupational History    Not on file   Tobacco Use    Smoking status: Former Smoker     Quit date: 1999     Years since quittin.4    Smokeless tobacco: Never Used   Substance and Sexual Activity    Alcohol use:  Yes     Alcohol/week: 17.5 standard drinks     Types: 21 Glasses of wine per week     Comment: 3 glasses of port per day    Drug use: No    Sexual activity: Never   Other Topics Concern    Not on file   Social History Narrative    Not on file     Social Determinants of Health     Financial Resource Strain:     Difficulty of Paying Living Expenses: Not on file   Food Insecurity:     Worried About [facility] [facility] in the Last Year: Not on file    Татьяна of Food in the Last Year: Not on file   Transportation Needs:     Lack of Transportation (Medical): Not on file    Lack of Transportation (Non-Medical): Not on file   Physical Activity:     Days of Exercise per Week: Not on file    Minutes of Exercise per Session: Not on file   Stress:     Feeling of Stress : Not on file   Social Connections:     Frequency of Communication with Friends and Family: Not on file    Frequency of Social Gatherings with Friends and Family: Not on file    Attends Religion Services: Not on file    Active Member of [address] [place] [address] or Organizations: Not on file    Attends Club or Organization Meetings: Not on file    Marital Status: Not on file   Intimate Partner Violence:     Fear of Current or Ex-Partner: Not on file    Emotionally Abused: Not on file    Physically Abused: Not on file    Sexually Abused: Not on file   Housing Stability:     Unable to Pay for Housing in the Last Year: Not on file    Number of [place] in the Last Year: Not on file    Unstable Housing in the Last Year: Not on file            Current Medications/Allergies     Current Outpatient Medications   Medication Sig Dispense Refill    bisacodyL (DULCOLAX) 10 mg supp Insert 10 mg into rectum daily as needed for Constipation. 1 Each 0    acetaminophen (TYLENOL) 325 mg tablet Take 2 Tablets by mouth daily. 30 Tablet 0    lisinopriL (PRINIVIL, ZESTRIL) 10 mg tablet Take 10 mg by mouth daily.  acetaminophen (TYLENOL) 500 mg tablet Take 650 mg by mouth every eight (8) hours as needed for Pain. Do not exceed 3GM in 24 hours      carvediloL (COREG) 3.125 mg tablet Take 3.125 mg by mouth two (2) times daily (with meals).  melatonin 5 mg cap capsule Take 5 mg by mouth nightly.  traMADoL (ULTRAM) 50 mg tablet Take 50 mg by mouth every six (6) hours as needed for Pain.       clopidogreL (Plavix) 75 mg tab Take 1 Tab by mouth daily. 30 Tab 0    atorvastatin (LIPITOR) 40 mg tablet Take 1 Tab by mouth nightly. 30 Tab 0    Calcium-Vitamin D3-Vitamin K 985-783-92 mg-unit-mcg chew Take 1 Tab by mouth three (3) times daily. 90 Tab 0    memantine (NAMENDA) 10 mg tablet Take 1 Tab by mouth two (2) times a day. 180 Tab 0     Allergies   Allergen Reactions    Benadryl Allergy/Cold [Diphenhyd-Pe-Acetaminophen] Hives     Per patient's daughter, she thinks patient has had benadryl since then and tolerated it.  Penicillins Hives     Was treated with Augmentin in June 2015, tolerated medication well.

## 2021-12-02 VITALS
HEART RATE: 72 BPM | TEMPERATURE: 98.2 F | OXYGEN SATURATION: 96 % | RESPIRATION RATE: 18 BRPM | BODY MASS INDEX: 21.09 KG/M2 | DIASTOLIC BLOOD PRESSURE: 76 MMHG | SYSTOLIC BLOOD PRESSURE: 132 MMHG | WEIGHT: 104.6 LBS | HEIGHT: 59 IN

## 2022-01-31 ENCOUNTER — HOME VISIT (OUTPATIENT)
Dept: FAMILY MEDICINE CLINIC | Age: 82
End: 2022-01-31
Payer: MEDICARE

## 2022-01-31 VITALS
HEART RATE: 66 BPM | DIASTOLIC BLOOD PRESSURE: 81 MMHG | BODY MASS INDEX: 23.67 KG/M2 | TEMPERATURE: 97.4 F | OXYGEN SATURATION: 97 % | RESPIRATION RATE: 19 BRPM | SYSTOLIC BLOOD PRESSURE: 138 MMHG | WEIGHT: 117.2 LBS

## 2022-01-31 DIAGNOSIS — F03.90 DEMENTIA WITHOUT BEHAVIORAL DISTURBANCE, UNSPECIFIED DEMENTIA TYPE: Primary | ICD-10-CM

## 2022-01-31 DIAGNOSIS — Z86.73 HISTORY OF CVA (CEREBROVASCULAR ACCIDENT): ICD-10-CM

## 2022-01-31 DIAGNOSIS — I10 ESSENTIAL HYPERTENSION: ICD-10-CM

## 2022-01-31 DIAGNOSIS — R68.89 FLUCTUATION OF WEIGHT: ICD-10-CM

## 2022-01-31 DIAGNOSIS — I42.9 CARDIOMYOPATHY, UNSPECIFIED TYPE (HCC): ICD-10-CM

## 2022-01-31 DIAGNOSIS — Z87.81 HISTORY OF HIP FRACTURE: ICD-10-CM

## 2022-01-31 DIAGNOSIS — R60.0 BILATERAL LOWER EXTREMITY EDEMA: ICD-10-CM

## 2022-01-31 DIAGNOSIS — I48.0 PAROXYSMAL ATRIAL FIBRILLATION (HCC): ICD-10-CM

## 2022-01-31 PROCEDURE — 1101F PT FALLS ASSESS-DOCD LE1/YR: CPT | Performed by: FAMILY MEDICINE

## 2022-01-31 PROCEDURE — G8432 DEP SCR NOT DOC, RNG: HCPCS | Performed by: FAMILY MEDICINE

## 2022-01-31 PROCEDURE — G8536 NO DOC ELDER MAL SCRN: HCPCS | Performed by: FAMILY MEDICINE

## 2022-01-31 PROCEDURE — 99309 SBSQ NF CARE MODERATE MDM 30: CPT | Performed by: FAMILY MEDICINE

## 2022-01-31 RX ORDER — DOCUSATE SODIUM 100 MG/1
100 CAPSULE, LIQUID FILLED ORAL DAILY
COMMUNITY

## 2022-01-31 RX ORDER — DICLOFENAC SODIUM 10 MG/G
GEL TOPICAL 4 TIMES DAILY
COMMUNITY

## 2022-01-31 RX ORDER — FACIAL-BODY WIPES
10 EACH TOPICAL
Qty: 1 EACH | Refills: 0
Start: 2022-01-31

## 2022-01-31 NOTE — PROGRESS NOTES
Carmen Randolph  78 y.o. female  1940  [address] [facility] [address]  [id number]   [address] [facility]  Medicine at [facility]  Recertification Progress Note  Patricia Flores MD       Encounter Date: 1/31/2022    Chief Complaint   Patient presents with    Follow Up Chronic Condition     History of Present Illness   Erin Charles is a 78 y.o. female who was seen at [facility] today for their 45-/46-GFX recertification. She was last seen 12/1/21. Interval hx: Beta blocker d/c'd and HR, BP have been stable. Pt and nursing staff w/ no complaints or concerns. Diet: No added salt diet - regular texture, thin consistency. Ensure Clear QID. Activity as tolerated    Review of Systems   Review of Systems   Unable to perform ROS: Dementia     Vitals/Objective:     Vitals:    01/31/22 1602   BP: 138/81   Pulse: 66   Resp: 19   Temp: 97.4 °F (36.3 °C)   SpO2: 97%   Weight: 117 lb 3.2 oz (53.2 kg)     Body mass index is 23.67 kg/m². Wt Readings from Last 3 Encounters:   01/31/22 117 lb 3.2 oz (53.2 kg)   12/02/21 104 lb 9.6 oz (47.4 kg)   10/07/21 110 lb 1.6 oz (49.9 kg)     Weight is fluctuating a lot    Physical Exam  Constitutional:       Appearance: Normal appearance. She is normal weight. HENT:      Nose: No congestion or rhinorrhea. Eyes:      Extraocular Movements: Extraocular movements intact. Conjunctiva/sclera: Conjunctivae normal.   Cardiovascular:      Rate and Rhythm: Normal rate and regular rhythm. Pulses: Normal pulses. Heart sounds: Normal heart sounds. Pulmonary:      Effort: Pulmonary effort is normal.      Breath sounds: Normal breath sounds. Abdominal:      General: Bowel sounds are normal.      Palpations: Abdomen is soft. Tenderness: There is no abdominal tenderness. Musculoskeletal:      Right lower leg: No edema. Left lower leg: No edema.       Comments: compression stockings in place   Skin:     General: Skin is warm and dry.      Comments: Large seborrheic kertosis on left cheek (nonbothersome to patient)   Neurological:      Mental Status: She is alert. Mental status is at baseline. She is disoriented. Pertinent Lab/Test Results:  NA    Assessment and Plan:     1. Dementia without behavioral disturbance, unspecified dementia type [facility]): Most recent psych eval 11/9/21 without medication changes. - Continue memantine 10 mg tablet BID     2. Essential hypertension: Previously on beta-blocker and Lisinopril, now off all hypertensive and BP at goal of less than 150/90. Recent readings all at goal.  - Continue to monitor      3. Paroxysmal atrial fibrillation ([address]): RRR at time of exam. Patient stable off of coreg at this time. - Continue plavix 75mg daily  - Will contact cardiologist to confirm plan     4. History of hip fracture  - Continue Calcium-Vitamin D3-Vitamin K 500-200-40 mg-unit-mcg chew TID  - Continue Tramadol 50mg q6hr PRN for pain      5. Cardiomyopathy, unspecified type [facility]): Last echo 5/2020 with LVEF 50-55%. Pulse stable off of beta blocker, vitals reviewed. Last saw cardiology (Dr. Shaun Moss) in 1/2020 who recommended follow up as needed.     6. Bilateral lower extremity edema: controlled with stockings  - Continue compression stockings  - Advised to keep legs elevated       7. History of CVA (cerebrovascular accident)  - Continue clopidogreL (Plavix) 75 mg daily. - Continue atorvastatin (LIPITOR) 40 mg qhs     8. Fluctuation of Weight: Fluctuating weight over the past several months, initially down 6lb but now up 7lb since last visit. Pt is currently on ensure clear QID supplements. - Continue to monitor weight    CODE STATUS: DNR    I have discussed the assessment and plan with the patient and their responsible party as seen in the above orders. They have expressed understanding.       Electronically Signed: Casa Montano MD   History   Patients past medical, surgical and family histories were reviewed and updated. Past Medical History:   Diagnosis Date    Acute CVA (cerebrovascular accident) ([facility] 4/10/2017    Per MRI:  Posterior L frontal/parietal territory    Cerebral atrophy ([facility] 4/10/2017    History of CVA (cerebrovascular accident) 4/10/2017    --L frontal acute infarct and LMCA    Hyperlipidemia LDL goal <70 4/10/2017    Hypertension     Stage II pressure ulcer of sacral region ([facility] 2020    Status post hip surgery      Left hip bipolar hemiarthroplasty on 20     TIA (transient ischemic attack)     Unresponsive episode 8/3/2017    transient     Past Surgical History:   Procedure Laterality Date    HX HEENT      Tonsilectomy age 8     Family History   Problem Relation Age of Onset    Dementia Mother 61    Heart Attack Father 79     Social History     Socioeconomic History    Marital status:      Spouse name: Not on file    Number of children: Not on file    Years of education: Not on file    Highest education level: Not on file   Occupational History    Not on file   Tobacco Use    Smoking status: Former Smoker     Quit date: 1999     Years since quittin.5    Smokeless tobacco: Never Used   Substance and Sexual Activity    Alcohol use: Yes     Alcohol/week: 17.5 standard drinks     Types: 21 Glasses of wine per week     Comment: 3 glasses of port per day    Drug use: No    Sexual activity: Never   Other Topics Concern    Not on file   Social History Narrative    Not on file     Social Determinants of Health     Financial Resource Strain:     Difficulty of Paying Living Expenses: Not on file   Food Insecurity:     Worried About Running Out of Food in the Last Year: Not on file    Татьяна of Food in the Last Year: Not on file   Transportation Needs:     Lack of Transportation (Medical): Not on file    Lack of Transportation (Non-Medical):  Not on file   Physical Activity:     Days of Exercise per Week: Not on file    Minutes of Exercise per Session: Not on file   Stress:     Feeling of Stress : Not on file   Social Connections:     Frequency of Communication with Friends and Family: Not on file    Frequency of Social Gatherings with Friends and Family: Not on file    Attends Yazdanism Services: Not on file    Active Member of [address] [place] [address] or Organizations: Not on file    Attends Club or Organization Meetings: Not on file    Marital Status: Not on file   Intimate Partner Violence:     Fear of Current or Ex-Partner: Not on file    Emotionally Abused: Not on file    Physically Abused: Not on file    Sexually Abused: Not on file   Housing Stability:     Unable to Pay for Housing in the Last Year: Not on file    Number of [place] in the Last Year: Not on file    Unstable Housing in the Last Year: Not on file            Current Medications/Allergies     Current Outpatient Medications   Medication Sig Dispense Refill    diclofenac (VOLTAREN) 1 % gel Apply  to affected area four (4) times daily.  bisacodyL (DULCOLAX) 10 mg supp Insert 10 mg into rectum every seventy-two (72) hours as needed for Constipation. 1 Each 0    docusate sodium (COLACE) 100 mg capsule Take 100 mg by mouth daily.  acetaminophen (TYLENOL) 500 mg tablet Take 650 mg by mouth every eight (8) hours as needed for Pain. Do not exceed 3GM in 24 hours      melatonin 5 mg cap capsule Take 5 mg by mouth nightly.  traMADoL (ULTRAM) 50 mg tablet Take 50 mg by mouth every six (6) hours as needed for Pain.  clopidogreL (Plavix) 75 mg tab Take 1 Tab by mouth daily. 30 Tab 0    atorvastatin (LIPITOR) 40 mg tablet Take 1 Tab by mouth nightly. 30 Tab 0    Calcium-Vitamin D3-Vitamin K 396-532-92 mg-unit-mcg chew Take 1 Tab by mouth three (3) times daily. 90 Tab 0    memantine (NAMENDA) 10 mg tablet Take 1 Tab by mouth two (2) times a day.  180 Tab 0     Allergies   Allergen Reactions    Benadryl Allergy/Cold [Diphenhyd-Pe-Acetaminophen] Hives     Per patient's daughter, she thinks patient has had benadryl since then and tolerated it.  Penicillins Hives     Was treated with Augmentin in June 2015, tolerated medication well. English

## 2022-03-18 PROBLEM — I63.9 ACUTE CVA (CEREBROVASCULAR ACCIDENT) (HCC): Status: ACTIVE | Noted: 2017-04-10

## 2022-03-18 PROBLEM — F03.90 DEMENTIA WITHOUT BEHAVIORAL DISTURBANCE (HCC): Status: ACTIVE | Noted: 2021-10-07

## 2022-03-18 PROBLEM — R41.89 UNRESPONSIVE EPISODE: Status: ACTIVE | Noted: 2017-08-03

## 2022-03-18 PROBLEM — R40.4 UNRESPONSIVE EPISODE: Status: ACTIVE | Noted: 2017-08-03

## 2022-03-18 PROBLEM — I48.91 ATRIAL FIBRILLATION (HCC): Status: ACTIVE | Noted: 2020-12-03

## 2022-03-18 PROBLEM — I42.9 CARDIOMYOPATHY (HCC): Status: ACTIVE | Noted: 2017-08-04

## 2022-03-19 PROBLEM — I21.4 NSTEMI (NON-ST ELEVATED MYOCARDIAL INFARCTION) (HCC): Status: ACTIVE | Noted: 2020-05-26

## 2022-03-19 PROBLEM — D64.9 ANEMIA: Status: ACTIVE | Noted: 2020-05-26

## 2022-03-19 PROBLEM — G31.9 CEREBRAL ATROPHY (HCC): Status: ACTIVE | Noted: 2017-04-10

## 2022-03-19 PROBLEM — R00.1 BRADYCARDIA: Status: ACTIVE | Noted: 2020-05-26

## 2022-03-19 PROBLEM — M62.82 RHABDOMYOLYSIS: Status: ACTIVE | Noted: 2020-05-26

## 2022-03-19 PROBLEM — N39.0 UTI (URINARY TRACT INFECTION): Status: ACTIVE | Noted: 2020-05-26

## 2022-03-19 PROBLEM — R41.89 COGNITIVE IMPAIRMENT: Status: ACTIVE | Noted: 2020-12-29

## 2022-03-20 PROBLEM — L89.152 STAGE II PRESSURE ULCER OF SACRAL REGION (HCC): Status: ACTIVE | Noted: 2020-11-09

## 2022-03-20 PROBLEM — S72.009A FEMORAL NECK FRACTURE (HCC): Status: ACTIVE | Noted: 2020-05-20

## 2022-03-20 PROBLEM — E78.5 HYPERLIPIDEMIA LDL GOAL <70: Status: ACTIVE | Noted: 2017-04-10

## 2022-03-20 PROBLEM — Z86.73 HISTORY OF CVA (CEREBROVASCULAR ACCIDENT): Status: ACTIVE | Noted: 2017-04-10

## 2022-03-30 ENCOUNTER — HOME VISIT (OUTPATIENT)
Dept: FAMILY MEDICINE CLINIC | Age: 82
End: 2022-03-30
Payer: MEDICARE

## 2022-03-30 DIAGNOSIS — R68.89 FLUCTUATION OF WEIGHT: ICD-10-CM

## 2022-03-30 DIAGNOSIS — I48.0 PAROXYSMAL ATRIAL FIBRILLATION (HCC): ICD-10-CM

## 2022-03-30 DIAGNOSIS — Z87.81 HISTORY OF HIP FRACTURE: ICD-10-CM

## 2022-03-30 DIAGNOSIS — R60.0 BILATERAL LOWER EXTREMITY EDEMA: ICD-10-CM

## 2022-03-30 DIAGNOSIS — F03.90 DEMENTIA WITHOUT BEHAVIORAL DISTURBANCE, UNSPECIFIED DEMENTIA TYPE: Primary | ICD-10-CM

## 2022-03-30 DIAGNOSIS — Z86.73 HISTORY OF CVA (CEREBROVASCULAR ACCIDENT): ICD-10-CM

## 2022-03-30 DIAGNOSIS — I42.9 CARDIOMYOPATHY, UNSPECIFIED TYPE (HCC): ICD-10-CM

## 2022-03-30 DIAGNOSIS — F43.20 ADJUSTMENT DISORDER, UNSPECIFIED TYPE: ICD-10-CM

## 2022-03-30 DIAGNOSIS — K59.00 CONSTIPATION, UNSPECIFIED CONSTIPATION TYPE: ICD-10-CM

## 2022-03-30 DIAGNOSIS — I63.9 ACUTE CVA (CEREBROVASCULAR ACCIDENT) (HCC): ICD-10-CM

## 2022-03-30 DIAGNOSIS — I10 ESSENTIAL HYPERTENSION: ICD-10-CM

## 2022-03-30 PROCEDURE — G8432 DEP SCR NOT DOC, RNG: HCPCS | Performed by: STUDENT IN AN ORGANIZED HEALTH CARE EDUCATION/TRAINING PROGRAM

## 2022-03-30 PROCEDURE — G8536 NO DOC ELDER MAL SCRN: HCPCS | Performed by: STUDENT IN AN ORGANIZED HEALTH CARE EDUCATION/TRAINING PROGRAM

## 2022-03-30 PROCEDURE — 99309 SBSQ NF CARE MODERATE MDM 30: CPT | Performed by: STUDENT IN AN ORGANIZED HEALTH CARE EDUCATION/TRAINING PROGRAM

## 2022-03-30 PROCEDURE — 1101F PT FALLS ASSESS-DOCD LE1/YR: CPT | Performed by: STUDENT IN AN ORGANIZED HEALTH CARE EDUCATION/TRAINING PROGRAM

## 2022-03-31 NOTE — PROGRESS NOTES
Jet Randolph  78 y.o. female  1940  [address] [facility] [id number]  [id number]   [address] [place] [address] Medicine at [address] [place] [address]  Recertification Progress Note  Angelo Burks MD       Encounter Date: 3/30/2022    Chief complaint on file. Follow up on chronic condition    History of Present Illness   John Smith is a 78 y.o. [facility] 27-/12-ARG recertification. She was last seen 1/31/22. Interval hx:   Pt and nursing staff w/ no complaints or concerns. Diet: No added salt diet - regular texture, thin consistency. Ensure Clear QID. Activity as tolerated    Review of Systems   Review of Systems   Constitutional: Negative for chills and fever. HENT: Negative for sore throat. Report some clear nasal discharge   Respiratory: Negative for cough and shortness of breath. Cardiovascular: Negative for chest pain. Neurological: Negative for headaches. Vitals/Objective:     Vitals:    04/01/22 0559   BP: 137/76   Pulse: 76   Resp: 18   Temp: 98.2 °F (36.8 °C)   SpO2: 96%   Weight: 120 lb 1 oz (54.5 kg)     Body mass index is 24.25 kg/m². Wt Readings from Last 3 Encounters:   04/01/22 120 lb 1 oz (54.5 kg)   01/31/22 117 lb 3.2 oz (53.2 kg)   12/02/21 104 lb 9.6 oz (47.4 kg)       Physical Exam  Constitutional:       Appearance: Normal appearance. She is normal weight. HENT:      Nose: No congestion or rhinorrhea. Eyes:      Extraocular Movements: Extraocular movements intact. Conjunctiva/sclera: Conjunctivae normal.   Cardiovascular:      Rate and Rhythm: Normal rate and regular rhythm. Pulses: Normal pulses. Heart sounds: Normal heart sounds. Pulmonary:      Effort: Pulmonary effort is normal.      Breath sounds: Normal breath sounds. Abdominal:      General: Bowel sounds are normal.      Palpations: Abdomen is soft. Tenderness: There is no abdominal tenderness.    Musculoskeletal: Right lower leg: No edema. Left lower leg: No edema. Skin:     General: Skin is warm and dry. Comments: Large seborrheic kertosis on left cheek (nonbothersome to patient)   Neurological:      Mental Status: She is alert. Mental status is at baseline. She oriented only in person. Assessment and Plan:     1. Dementia without behavioral disturbance, unspecified dementia type [facility]): Most recent psych eval 3/15/22 without medication changes. - Continue Memantine 10 mg tablet BID     2. Essential hypertension: Previously on beta-blocker and Lisinopril, now off all hypertensive and BP at goal of less than 150/90. Recent readings all at goal.  - Continue to monitor      3. Paroxysmal atrial fibrillation ([address]): RRR at time of exam. Patient stable off of coreg at this time. - Continue plavix 75mg daily     4. History of hip fracture  - Continue Calcium-Vitamin D3-Vitamin K 500-200-40 mg-unit-mcg chew TID  - Continue Tramadol 50mg q6hr PRN for pain      5. Cardiomyopathy, unspecified type [facility]): Last echo 5/2020 with LVEF 50-55%. Last saw cardiology (Dr. Radha Samuels) in 1/2020 who recommended follow up as needed.     6. Bilateral lower extremity edema: controlled with stockings  - Continue compression stockings  - Advised to keep legs elevated       7. History of CVA (cerebrovascular accident)  - Continue ClopidogreL (Plavix) 75 mg daily. - Continue Atorvastatin (LIPITOR) 40 mg qhs     8. Fluctuation of Weight: Fluctuating weight over the past several months, initially down 6lb but now up 7lb since last visit. - Continue Ensure clear 240 mL w/ meals  - Continue Ensure plus 240 mL every evening. 9. Constipation:   - Colace 100 mg PO daily  - Bisacodyl Supp 10 mg rectally every 72 hours as needed for constipation     CODE STATUS: DNR    I have discussed the assessment and plan with the patient and their responsible party as seen in the above orders. They have expressed understanding. Electronically Signed: Ashley Patient, MD   History   Patients past medical, surgical and family histories were reviewed and updated. Past Medical History:   Diagnosis Date    Acute CVA (cerebrovascular accident) ([facility] 4/10/2017    Per MRI:  Posterior L frontal/parietal territory    Cerebral atrophy ([facility] 4/10/2017    History of CVA (cerebrovascular accident) 4/10/2017    --L frontal acute infarct and LMCA    Hyperlipidemia LDL goal <70 4/10/2017    Hypertension     Stage II pressure ulcer of sacral region ([facility] [facility] 2020    Status post hip surgery      Left hip bipolar hemiarthroplasty on 20     TIA (transient ischemic attack)     Unresponsive episode 8/3/2017    transient     Past Surgical History:   Procedure Laterality Date    HX HEENT      Tonsilectomy age 8     Family History   Problem Relation Age of Onset    Dementia Mother 61    Heart Attack Father 79     Social History     Socioeconomic History    Marital status:      Spouse name: Not on file    Number of children: Not on file    Years of education: Not on file    Highest education level: Not on file   Occupational History    Not on file   Tobacco Use    Smoking status: Former Smoker     Quit date: 1999     Years since quittin.7    Smokeless tobacco: Never Used   Substance and Sexual Activity    Alcohol use: Yes     Alcohol/week: 17.5 standard drinks     Types: 21 Glasses of wine per week     Comment: 3 glasses of port per day    Drug use: No    Sexual activity: Never   Other Topics Concern    Not on file   Social History Narrative    Not on file     Social Determinants of Health     Financial Resource Strain:     Difficulty of Paying Living Expenses: Not on file   Food Insecurity:     Worried About Running Out of Food in the Last Year: Not on file    Татьяна of Food in the Last Year: Not on file   Transportation Needs:     Lack of Transportation (Medical):  Not on file    Lack of Transportation (Non-Medical): Not on file   Physical Activity:     Days of Exercise per Week: Not on file    Minutes of Exercise per Session: Not on file   Stress:     Feeling of Stress : Not on file   Social Connections:     Frequency of Communication with Friends and Family: Not on file    Frequency of Social Gatherings with Friends and Family: Not on file    Attends Judaism Services: Not on file    Active Member of [address] [place] [address] or Organizations: Not on file    Attends Club or Organization Meetings: Not on file    Marital Status: Not on file   Intimate Partner Violence:     Fear of Current or Ex-Partner: Not on file    Emotionally Abused: Not on file    Physically Abused: Not on file    Sexually Abused: Not on file   Housing Stability:     Unable to Pay for Housing in the Last Year: Not on file    Number of [place] in the Last Year: Not on file    Unstable Housing in the Last Year: Not on file            Current Medications/Allergies     Current Outpatient Medications   Medication Sig Dispense Refill    diclofenac (VOLTAREN) 1 % gel Apply  to affected area four (4) times daily.  bisacodyL (DULCOLAX) 10 mg supp Insert 10 mg into rectum every seventy-two (72) hours as needed for Constipation. 1 Each 0    docusate sodium (COLACE) 100 mg capsule Take 100 mg by mouth daily.  acetaminophen (TYLENOL) 500 mg tablet Take 650 mg by mouth every eight (8) hours as needed for Pain. Do not exceed 3GM in 24 hours      melatonin 5 mg cap capsule Take 5 mg by mouth nightly.  traMADoL (ULTRAM) 50 mg tablet Take 50 mg by mouth every six (6) hours as needed for Pain.  clopidogreL (Plavix) 75 mg tab Take 1 Tab by mouth daily. 30 Tab 0    atorvastatin (LIPITOR) 40 mg tablet Take 1 Tab by mouth nightly. 30 Tab 0    Calcium-Vitamin D3-Vitamin K 570-565-71 mg-unit-mcg chew Take 1 Tab by mouth three (3) times daily.  90 Tab 0    memantine (NAMENDA) 10 mg tablet Take 1 Tab by mouth two (2) times a day. 180 Tab 0     Allergies   Allergen Reactions    Benadryl Allergy/Cold [Diphenhyd-Pe-Acetaminophen] Hives     Per patient's daughter, she thinks patient has had benadryl since then and tolerated it.  Penicillins Hives     Was treated with Augmentin in June 2015, tolerated medication well.

## 2022-04-01 VITALS
SYSTOLIC BLOOD PRESSURE: 137 MMHG | BODY MASS INDEX: 24.25 KG/M2 | OXYGEN SATURATION: 96 % | DIASTOLIC BLOOD PRESSURE: 76 MMHG | TEMPERATURE: 98.2 F | HEART RATE: 76 BPM | RESPIRATION RATE: 18 BRPM | WEIGHT: 120.06 LBS

## 2022-06-08 ENCOUNTER — HOME VISIT (OUTPATIENT)
Dept: FAMILY MEDICINE CLINIC | Age: 82
End: 2022-06-08
Payer: MEDICARE

## 2022-06-08 VITALS
RESPIRATION RATE: 18 BRPM | SYSTOLIC BLOOD PRESSURE: 129 MMHG | DIASTOLIC BLOOD PRESSURE: 70 MMHG | BODY MASS INDEX: 23.95 KG/M2 | TEMPERATURE: 97.4 F | OXYGEN SATURATION: 95 % | HEART RATE: 75 BPM | WEIGHT: 118.6 LBS

## 2022-06-08 DIAGNOSIS — Z87.81 HISTORY OF HIP FRACTURE: ICD-10-CM

## 2022-06-08 DIAGNOSIS — R60.0 BILATERAL LOWER EXTREMITY EDEMA: ICD-10-CM

## 2022-06-08 DIAGNOSIS — I42.9 CARDIOMYOPATHY, UNSPECIFIED TYPE (HCC): ICD-10-CM

## 2022-06-08 DIAGNOSIS — R68.89 FLUCTUATION OF WEIGHT: ICD-10-CM

## 2022-06-08 DIAGNOSIS — K59.00 CONSTIPATION, UNSPECIFIED CONSTIPATION TYPE: ICD-10-CM

## 2022-06-08 DIAGNOSIS — I63.9 ACUTE CVA (CEREBROVASCULAR ACCIDENT) (HCC): ICD-10-CM

## 2022-06-08 DIAGNOSIS — I10 ESSENTIAL HYPERTENSION: ICD-10-CM

## 2022-06-08 DIAGNOSIS — F03.90 DEMENTIA WITHOUT BEHAVIORAL DISTURBANCE, UNSPECIFIED DEMENTIA TYPE: Primary | ICD-10-CM

## 2022-06-08 DIAGNOSIS — I48.0 PAROXYSMAL ATRIAL FIBRILLATION (HCC): ICD-10-CM

## 2022-06-08 PROCEDURE — G8536 NO DOC ELDER MAL SCRN: HCPCS | Performed by: FAMILY MEDICINE

## 2022-06-08 PROCEDURE — 1123F ACP DISCUSS/DSCN MKR DOCD: CPT | Performed by: FAMILY MEDICINE

## 2022-06-08 PROCEDURE — G8432 DEP SCR NOT DOC, RNG: HCPCS | Performed by: FAMILY MEDICINE

## 2022-06-08 PROCEDURE — 99309 SBSQ NF CARE MODERATE MDM 30: CPT | Performed by: FAMILY MEDICINE

## 2022-06-08 PROCEDURE — 1101F PT FALLS ASSESS-DOCD LE1/YR: CPT | Performed by: FAMILY MEDICINE

## 2022-06-08 NOTE — PROGRESS NOTES
Tawnya Randolph  78 y.o. female  1940  [address] [facility]  [id number]   [facility] at [facility]  Recertification Progress Note  Melo Koch MD       Encounter Date: 6/8/2022      Advance Care Planning   Healthcare Decision Maker:       Primary Decision Maker (Active): Randall - Child - [phone]    Primary Decision Maker: Yoni Johnson - Ever - [phone]      Chief Complaint   Patient presents with    Follow Up Chronic Condition     History of Present Illness   Ezio Moody is a 78 y.o. female who was seen at [facility] today for their 12-/18-MNQ recertification. She was last seen 04/01/2022. Diet: No added salt diet - regular texture, thin consistency. Ensure Clear QID. Activity:  As tolerated    Interval Hx:  Nursing staff report Left hand erythema on 3rd and 4th digit of new onset. Patient denies pain or tenderness. X-ray of Lt hand was ordered. No other concerns reported per nursing staff. Normal CBC and CMP from 05/18/22. Review of Systems   Review of Systems   Unable to perform ROS: Dementia     Vitals/Objective:     Vitals:    06/08/22 0949   BP: 129/70   Pulse: 75   Resp: 18   Temp: 97.4 °F (36.3 °C)   SpO2: 95%   Weight: 118 lb 9.6 oz (53.8 kg)     Body mass index is 23.95 kg/m². Last 3 Recorded Weights in this Encounter    06/08/22 0949   Weight: 118 lb 9.6 oz (53.8 kg)     04/01/22 120 lb 1 oz (54.5 kg)   01/31/22 117 lb 3.2 oz (53.2 kg)       Physical Exam  Constitutional:       General: She is not in acute distress. Appearance: She is not toxic-appearing. HENT:      Head: Normocephalic and atraumatic. Cardiovascular:      Rate and Rhythm: Normal rate and regular rhythm. Pulses: Normal pulses. Heart sounds: Normal heart sounds. Pulmonary:      Effort: Pulmonary effort is normal.      Breath sounds: Normal breath sounds.    Abdominal:      General: Bowel sounds are normal. There is no distension. Tenderness: There is no abdominal tenderness. Musculoskeletal:      Right lower leg: No edema. Left lower leg: No edema. Skin:     Comments: Left hand discoloration on 3rd and 4th digit. No swelling, erythema or tenderness to palpation. Neurological:      Mental Status: She is alert. Mental status is at baseline. Comments: Oriented to person only   Psychiatric:      Comments: Happily demented         Assessment and Plan:       1. Dementia without behavioral disturbance, unspecified dementia type [facility]): Most recent psych eval 3/15/22 without medication changes.  - Continue Memantine 10 mg tablet BID     2. Essential hypertension: No home meds. MARGOT at goal for age. Previously on beta-blocker and Lisinopril.  - Continue to monitor      3. Paroxysmal atrial fibrillation ([address]): RRR at time of exam. Patient stable off of coreg at this time. - Continue plavix 75mg daily     4. Hip fracture  - Continue Calcium-Vitamin D3-Vitamin K 500-200-40 mg-unit-mcg chew TID  - Voltaren gel prn  - Continue Tramadol 50mg q6hr PRN for pain      5. Cardiomyopathy, unspecified type [facility]): Last echo 5/2020 with LVEF 50-55%.  Last saw cardiology (Dr. Paty Kinsey) in 1/2020 who recommended follow up as needed.     6. Bilateral lower extremity edema: controlled with stockings  - Continue compression stockings     7. CVA (cerebrovascular accident)  - Continue ClopidogreL (Plavix) 75 mg daily.  - Continue Atorvastatin (LIPITOR) 40 mg qhs     8. Fluctuation of Weight:  Stable 119 lbs down from 120, previously 110 lbs. - Continue Ensure clear 240 mL w/ meals  - Continue Ensure plus 240 mL every evening.      9. Constipation:   - Colace 100 mg PO daily  - Bisacodyl Supp 10 mg rectally every 72 hours as needed for constipation     CODE STATUS: DNR    I have discussed the assessment and plan with the patient and their responsible party as seen in the above orders. They have expressed understanding. Electronically Signed: Hesham Sweeney MD     History   Patients past medical, surgical and family histories were reviewed and updated. Past Medical History:   Diagnosis Date    Acute CVA (cerebrovascular accident) ([facility] 4/10/2017    Per MRI:  Posterior L frontal/parietal territory    Cerebral atrophy ([facility] [address] 4/10/2017    History of CVA (cerebrovascular accident) 4/10/2017    --L frontal acute infarct and LMCA    Hyperlipidemia LDL goal <70 4/10/2017    Hypertension     Stage II pressure ulcer of sacral region ([facility] [facility] 2020    Status post hip surgery      Left hip bipolar hemiarthroplasty on 20     TIA (transient ischemic attack)     Unresponsive episode 8/3/2017    transient     Past Surgical History:   Procedure Laterality Date    HX HEENT      Tonsilectomy age 8     Family History   Problem Relation Age of Onset    Dementia Mother 61    Heart Attack Father 79     Social History     Socioeconomic History    Marital status:      Spouse name: Not on file    Number of children: Not on file    Years of education: Not on file    Highest education level: Not on file   Occupational History    Not on file   Tobacco Use    Smoking status: Former Smoker     Quit date: 1999     Years since quittin.9    Smokeless tobacco: Never Used   Substance and Sexual Activity    Alcohol use: Yes     Alcohol/week: 17.5 standard drinks     Types: 21 Glasses of wine per week     Comment: 3 glasses of port per day    Drug use: No    Sexual activity: Never   Other Topics Concern    Not on file   Social History Narrative    Not on file     Social Determinants of Health     Financial Resource Strain:     Difficulty of Paying Living Expenses: Not on file   Food Insecurity:     Worried About Running Out of Food in the Last Year: Not on file    Татьяна of Food in the Last Year: Not on file   Transportation Needs:     Lack of Transportation (Medical):  Not on file    Lack of Transportation (Non-Medical): Not on file   Physical Activity:     Days of Exercise per Week: Not on file    Minutes of Exercise per Session: Not on file   Stress:     Feeling of Stress : Not on file   Social Connections:     Frequency of Communication with Friends and Family: Not on file    Frequency of Social Gatherings with Friends and Family: Not on file    Attends Buddhist Services: Not on file    Active Member of [address] [place] [address] or Organizations: Not on file    Attends Club or Organization Meetings: Not on file    Marital Status: Not on file   Intimate Partner Violence:     Fear of Current or Ex-Partner: Not on file    Emotionally Abused: Not on file    Physically Abused: Not on file    Sexually Abused: Not on file   Housing Stability:     Unable to Pay for Housing in the Last Year: Not on file    Number of [place] in the Last Year: Not on file    Unstable Housing in the Last Year: Not on file            Current Medications/Allergies     Current Outpatient Medications   Medication Sig Dispense Refill    diclofenac (VOLTAREN) 1 % gel Apply  to affected area four (4) times daily.  bisacodyL (DULCOLAX) 10 mg supp Insert 10 mg into rectum every seventy-two (72) hours as needed for Constipation. 1 Each 0    docusate sodium (COLACE) 100 mg capsule Take 100 mg by mouth daily.  acetaminophen (TYLENOL) 500 mg tablet Take 650 mg by mouth every eight (8) hours as needed for Pain. Do not exceed 3GM in 24 hours      melatonin 5 mg cap capsule Take 5 mg by mouth nightly.  traMADoL (ULTRAM) 50 mg tablet Take 50 mg by mouth every six (6) hours as needed for Pain.  clopidogreL (Plavix) 75 mg tab Take 1 Tab by mouth daily. 30 Tab 0    atorvastatin (LIPITOR) 40 mg tablet Take 1 Tab by mouth nightly. 30 Tab 0    Calcium-Vitamin D3-Vitamin K 877-371-35 mg-unit-mcg chew Take 1 Tab by mouth three (3) times daily.  90 Tab 0    memantine (NAMENDA) 10 mg tablet Take 1 Tab by mouth two (2) times a day. 180 Tab 0     Allergies   Allergen Reactions    Benadryl Allergy/Cold [Diphenhyd-Pe-Acetaminophen] Hives     Per patient's daughter, she thinks patient has had benadryl since then and tolerated it.  Penicillins Hives     Was treated with Augmentin in June 2015, tolerated medication well.

## 2022-06-20 ENCOUNTER — HOSPITAL ENCOUNTER (EMERGENCY)
Age: 82
Discharge: HOME | End: 2022-06-20
Attending: EMERGENCY MEDICINE
Payer: MEDICARE

## 2022-06-20 ENCOUNTER — APPOINTMENT (OUTPATIENT)
Dept: CT IMAGING | Age: 82
End: 2022-06-20
Attending: EMERGENCY MEDICINE
Payer: MEDICARE

## 2022-06-20 ENCOUNTER — APPOINTMENT (OUTPATIENT)
Dept: GENERAL RADIOLOGY | Age: 82
End: 2022-06-20
Attending: EMERGENCY MEDICINE
Payer: MEDICARE

## 2022-06-20 VITALS
WEIGHT: 118 LBS | RESPIRATION RATE: 12 BRPM | DIASTOLIC BLOOD PRESSURE: 62 MMHG | HEIGHT: 59 IN | HEART RATE: 54 BPM | OXYGEN SATURATION: 95 % | SYSTOLIC BLOOD PRESSURE: 114 MMHG | TEMPERATURE: 97.4 F | BODY MASS INDEX: 23.79 KG/M2

## 2022-06-20 DIAGNOSIS — R11.10 VOMITING, UNSPECIFIED VOMITING TYPE, UNSPECIFIED WHETHER NAUSEA PRESENT: Primary | ICD-10-CM

## 2022-06-20 DIAGNOSIS — N39.0 URINARY TRACT INFECTION WITHOUT HEMATURIA, SITE UNSPECIFIED: ICD-10-CM

## 2022-06-20 DIAGNOSIS — R55 SYNCOPE, UNSPECIFIED SYNCOPE TYPE: ICD-10-CM

## 2022-06-20 LAB
ALBUMIN SERPL-MCNC: 3.3 G/DL (ref 3.5–5)
ALBUMIN/GLOB SERPL: 0.8 {RATIO} (ref 1.1–2.2)
ALP SERPL-CCNC: 83 U/L (ref 45–117)
ALT SERPL-CCNC: 22 U/L (ref 12–78)
ANION GAP SERPL CALC-SCNC: 6 MMOL/L (ref 5–15)
APPEARANCE UR: ABNORMAL
AST SERPL-CCNC: 28 U/L (ref 15–37)
BACTERIA URNS QL MICRO: ABNORMAL /HPF
BASOPHILS # BLD: 0.1 K/UL (ref 0–0.1)
BASOPHILS NFR BLD: 1 % (ref 0–1)
BILIRUB SERPL-MCNC: 0.4 MG/DL (ref 0.2–1)
BILIRUB UR QL CFM: NEGATIVE
BUN SERPL-MCNC: 18 MG/DL (ref 6–20)
BUN/CREAT SERPL: 16 (ref 12–20)
CALCIUM SERPL-MCNC: 9.7 MG/DL (ref 8.5–10.1)
CHLORIDE SERPL-SCNC: 106 MMOL/L (ref 97–108)
CO2 SERPL-SCNC: 28 MMOL/L (ref 21–32)
COLOR UR: ABNORMAL
COMMENT, HOLDF: NORMAL
CREAT SERPL-MCNC: 1.15 MG/DL (ref 0.55–1.02)
DIFFERENTIAL METHOD BLD: ABNORMAL
EOSINOPHIL # BLD: 0.1 K/UL (ref 0–0.4)
EOSINOPHIL NFR BLD: 1 % (ref 0–7)
EPITH CASTS URNS QL MICRO: ABNORMAL /LPF
ERYTHROCYTE [DISTWIDTH] IN BLOOD BY AUTOMATED COUNT: 14 % (ref 11.5–14.5)
GLOBULIN SER CALC-MCNC: 4.2 G/DL (ref 2–4)
GLUCOSE SERPL-MCNC: 155 MG/DL (ref 65–100)
GLUCOSE UR STRIP.AUTO-MCNC: NEGATIVE MG/DL
HCT VFR BLD AUTO: 44.9 % (ref 35–47)
HGB BLD-MCNC: 14.3 G/DL (ref 11.5–16)
HGB UR QL STRIP: NEGATIVE
IMM GRANULOCYTES # BLD AUTO: 0 K/UL (ref 0–0.04)
IMM GRANULOCYTES NFR BLD AUTO: 0 % (ref 0–0.5)
KETONES UR QL STRIP.AUTO: 15 MG/DL
LEUKOCYTE ESTERASE UR QL STRIP.AUTO: ABNORMAL
LIPASE SERPL-CCNC: 92 U/L (ref 73–393)
LYMPHOCYTES # BLD: 0.8 K/UL (ref 0.8–3.5)
LYMPHOCYTES NFR BLD: 8 % (ref 12–49)
MAGNESIUM SERPL-MCNC: 2.2 MG/DL (ref 1.6–2.4)
MCH RBC QN AUTO: 31.2 PG (ref 26–34)
MCHC RBC AUTO-ENTMCNC: 31.8 G/DL (ref 30–36.5)
MCV RBC AUTO: 98 FL (ref 80–99)
MONOCYTES # BLD: 0.7 K/UL (ref 0–1)
MONOCYTES NFR BLD: 7 % (ref 5–13)
NEUTS SEG # BLD: 7.8 K/UL (ref 1.8–8)
NEUTS SEG NFR BLD: 83 % (ref 32–75)
NITRITE UR QL STRIP.AUTO: POSITIVE
NRBC # BLD: 0 K/UL (ref 0–0.01)
NRBC BLD-RTO: 0 PER 100 WBC
PH UR STRIP: 5.5 [PH] (ref 5–8)
PLATELET # BLD AUTO: 205 K/UL (ref 150–400)
PMV BLD AUTO: 10.7 FL (ref 8.9–12.9)
POTASSIUM SERPL-SCNC: 4.4 MMOL/L (ref 3.5–5.1)
PROT SERPL-MCNC: 7.5 G/DL (ref 6.4–8.2)
PROT UR STRIP-MCNC: 30 MG/DL
RBC # BLD AUTO: 4.58 M/UL (ref 3.8–5.2)
RBC #/AREA URNS HPF: ABNORMAL /HPF (ref 0–5)
RBC MORPH BLD: ABNORMAL
SAMPLES BEING HELD,HOLD: NORMAL
SODIUM SERPL-SCNC: 140 MMOL/L (ref 136–145)
SP GR UR REFRACTOMETRY: 1.02 (ref 1–1.03)
TROPONIN-HIGH SENSITIVITY: 12 NG/L (ref 0–51)
UR CULT HOLD, URHOLD: NORMAL
UROBILINOGEN UR QL STRIP.AUTO: 1 EU/DL (ref 0.2–1)
WBC # BLD AUTO: 9.5 K/UL (ref 3.6–11)
WBC URNS QL MICRO: ABNORMAL /HPF (ref 0–4)

## 2022-06-20 PROCEDURE — 72125 CT NECK SPINE W/O DYE: CPT

## 2022-06-20 PROCEDURE — 74011250637 HC RX REV CODE- 250/637: Performed by: EMERGENCY MEDICINE

## 2022-06-20 PROCEDURE — 83735 ASSAY OF MAGNESIUM: CPT

## 2022-06-20 PROCEDURE — 99285 EMERGENCY DEPT VISIT HI MDM: CPT

## 2022-06-20 PROCEDURE — 87186 SC STD MICRODIL/AGAR DIL: CPT

## 2022-06-20 PROCEDURE — 70450 CT HEAD/BRAIN W/O DYE: CPT

## 2022-06-20 PROCEDURE — 80053 COMPREHEN METABOLIC PANEL: CPT

## 2022-06-20 PROCEDURE — 87077 CULTURE AEROBIC IDENTIFY: CPT

## 2022-06-20 PROCEDURE — 81001 URINALYSIS AUTO W/SCOPE: CPT

## 2022-06-20 PROCEDURE — 85025 COMPLETE CBC W/AUTO DIFF WBC: CPT

## 2022-06-20 PROCEDURE — 84484 ASSAY OF TROPONIN QUANT: CPT

## 2022-06-20 PROCEDURE — 71045 X-RAY EXAM CHEST 1 VIEW: CPT

## 2022-06-20 PROCEDURE — 93005 ELECTROCARDIOGRAM TRACING: CPT

## 2022-06-20 PROCEDURE — 87086 URINE CULTURE/COLONY COUNT: CPT

## 2022-06-20 PROCEDURE — 83690 ASSAY OF LIPASE: CPT

## 2022-06-20 RX ORDER — CEPHALEXIN 250 MG/1
500 CAPSULE ORAL
Status: COMPLETED | OUTPATIENT
Start: 2022-06-20 | End: 2022-06-20

## 2022-06-20 RX ORDER — CEPHALEXIN 500 MG/1
500 CAPSULE ORAL 3 TIMES DAILY
Qty: 21 CAPSULE | Refills: 0 | Status: SHIPPED | OUTPATIENT
Start: 2022-06-20 | End: 2022-06-27

## 2022-06-20 RX ADMIN — CEPHALEXIN 500 MG: 250 CAPSULE ORAL at 19:15

## 2022-06-20 NOTE — ED NOTES
6 attempts to give report to Aj Quigley with no answer. Spoke with  and given number [phone] to reach nurses station. No Answer. [facility] here to take pt back to facility. Aware of report attempts.

## 2022-06-20 NOTE — ED PROVIDER NOTES
Joe Willson is an 79 yo F with h/o dementia and prior CVA/TIAs who presents to the ED by EMS from [facility]. EMS reports patient had syncopal episode lasting 10 minutes following multiple episodes of vomiting this morning. Patient has no complaints. Is oriented to person only and EMS reports she is at her baseline. Past Medical History:   Diagnosis Date    Acute CVA (cerebrovascular accident) ([facility] [address] 4/10/2017    Per MRI:  Posterior L frontal/parietal territory    Cerebral atrophy ([address] 4/10/2017    History of CVA (cerebrovascular accident) 4/10/2017    --L frontal acute infarct and LMCA    Hyperlipidemia LDL goal <70 4/10/2017    Hypertension     Stage II pressure ulcer of sacral region ([facility] [address] [facility] 2020    Status post hip surgery      Left hip bipolar hemiarthroplasty on 20     TIA (transient ischemic attack)     Unresponsive episode 8/3/2017    transient       Past Surgical History:   Procedure Laterality Date    HX HEENT      Tonsilectomy age 8         Family History:   Problem Relation Age of Onset    Dementia Mother 61    Heart Attack Father 79       Social History     Socioeconomic History    Marital status:      Spouse name: Not on file    Number of children: Not on file    Years of education: Not on file    Highest education level: Not on file   Occupational History    Not on file   Tobacco Use    Smoking status: Former Smoker     Quit date: 1999     Years since quittin.9    Smokeless tobacco: Never Used   Substance and Sexual Activity    Alcohol use:  Yes     Alcohol/week: 17.5 standard drinks     Types: 21 Glasses of wine per week     Comment: 3 glasses of port per day    Drug use: No    Sexual activity: Never   Other Topics Concern    Not on file   Social History Narrative    Not on file     Social Determinants of Health     Financial Resource Strain:     Difficulty of Paying Living Expenses: Not on file   Food Insecurity:     Worried About Running Out of Food in the Last Year: Not on file    Ran Out of Food in the Last Year: Not on file   Transportation Needs:     Lack of Transportation (Medical): Not on file    Lack of Transportation (Non-Medical): Not on file   Physical Activity:     Days of Exercise per Week: Not on file    Minutes of Exercise per Session: Not on file   Stress:     Feeling of Stress : Not on file   Social Connections:     Frequency of Communication with Friends and Family: Not on file    Frequency of Social Gatherings with Friends and Family: Not on file    Attends Orthodoxy Services: Not on file    Active Member of [address] [place] [address] [employer] or Organizations: Not on file    Attends Club or Organization Meetings: Not on file    Marital Status: Not on file   Intimate Partner Violence:     Fear of Current or Ex-Partner: Not on file    Emotionally Abused: Not on file    Physically Abused: Not on file    Sexually Abused: Not on file   Housing Stability:     Unable to Pay for Housing in the Last Year: Not on file    Number of [place] in the Last Year: Not on file    Unstable Housing in the Last Year: Not on file         ALLERGIES: Benadryl allergy/cold [diphenhyd-pe-acetaminophen] and Penicillins    Review of Systems   Unable to perform ROS: Dementia   Cardiovascular: Positive for syncope. Gastrointestinal: Positive for vomiting. Neurological: Positive for syncope. Vitals:    06/20/22 1543   BP: 114/62   Pulse: (!) 54   Resp: 12   Temp: 97.4 °F (36.3 °C)   SpO2: 95%   Weight: 53.5 kg (118 lb)   Height: 4' 11\" (1.499 m)            Physical Exam  Vitals and nursing note reviewed. Constitutional:       General: She is not in acute distress. Appearance: She is well-developed. HENT:      Head: Normocephalic and atraumatic. Eyes:      Conjunctiva/sclera: Conjunctivae normal.   Neck:      Trachea: Phonation normal.   Cardiovascular:      Rate and Rhythm: Normal rate.    Pulmonary:      Effort: Pulmonary effort is normal. No respiratory distress. Abdominal:      General: There is no distension. Tenderness: There is no abdominal tenderness. There is no guarding. Musculoskeletal:         General: No tenderness. Normal range of motion. Cervical back: Normal range of motion. Skin:     General: Skin is warm and dry. Neurological:      Mental Status: She is alert. She is not disoriented. Motor: No abnormal muscle tone. MDM     7:27 PM  CBC, CMP, trop, MG and lipase normal.  UA c/w uti. Given keflex in the ED and tolerated. PAtient discharged by  [employer]      Procedures

## 2022-06-20 NOTE — ED TRIAGE NOTES
Pt arrives via EMS from [facility] for syncopal episode lasting 10 min. Pt has multiple episodes of vomiting this a.m. Pt at baseline (dementia) on EMS arrival.      EMS reports pt did not hit head.

## 2022-06-22 LAB
ATRIAL RATE: 52 BPM
CALCULATED P AXIS, ECG09: 30 DEGREES
CALCULATED R AXIS, ECG10: 38 DEGREES
CALCULATED T AXIS, ECG11: 40 DEGREES
DIAGNOSIS, 93000: NORMAL
P-R INTERVAL, ECG05: 184 MS
Q-T INTERVAL, ECG07: 530 MS
QRS DURATION, ECG06: 82 MS
QTC CALCULATION (BEZET), ECG08: 492 MS
VENTRICULAR RATE, ECG03: 52 BPM

## 2022-06-24 LAB
BACTERIA SPEC CULT: ABNORMAL
CC UR VC: ABNORMAL
SERVICE CMNT-IMP: ABNORMAL

## 2022-06-30 ENCOUNTER — HOSPITAL ENCOUNTER (EMERGENCY)
Age: 82
Discharge: HOME | End: 2022-07-01
Attending: EMERGENCY MEDICINE
Payer: MEDICARE

## 2022-06-30 ENCOUNTER — APPOINTMENT (OUTPATIENT)
Dept: GENERAL RADIOLOGY | Age: 82
End: 2022-06-30
Attending: EMERGENCY MEDICINE
Payer: MEDICARE

## 2022-06-30 ENCOUNTER — APPOINTMENT (OUTPATIENT)
Dept: CT IMAGING | Age: 82
End: 2022-06-30
Attending: EMERGENCY MEDICINE
Payer: MEDICARE

## 2022-06-30 DIAGNOSIS — N39.0 URINARY TRACT INFECTION WITHOUT HEMATURIA, SITE UNSPECIFIED: ICD-10-CM

## 2022-06-30 DIAGNOSIS — F01.50 VASCULAR DEMENTIA WITHOUT BEHAVIORAL DISTURBANCE (HCC): ICD-10-CM

## 2022-06-30 DIAGNOSIS — R50.9 ACUTE FEBRILE ILLNESS: Primary | ICD-10-CM

## 2022-06-30 DIAGNOSIS — E86.0 DEHYDRATION: ICD-10-CM

## 2022-06-30 LAB
COMMENT, HOLDF: NORMAL
LACTATE BLD-SCNC: 1.49 MMOL/L (ref 0.4–2)
SAMPLES BEING HELD,HOLD: NORMAL

## 2022-06-30 PROCEDURE — 71045 X-RAY EXAM CHEST 1 VIEW: CPT

## 2022-06-30 PROCEDURE — 99284 EMERGENCY DEPT VISIT MOD MDM: CPT

## 2022-06-30 PROCEDURE — 87040 BLOOD CULTURE FOR BACTERIA: CPT

## 2022-06-30 PROCEDURE — 96374 THER/PROPH/DIAG INJ IV PUSH: CPT

## 2022-06-30 PROCEDURE — 84100 ASSAY OF PHOSPHORUS: CPT

## 2022-06-30 PROCEDURE — 86140 C-REACTIVE PROTEIN: CPT

## 2022-06-30 PROCEDURE — 74011250636 HC RX REV CODE- 250/636: Performed by: EMERGENCY MEDICINE

## 2022-06-30 PROCEDURE — 84484 ASSAY OF TROPONIN QUANT: CPT

## 2022-06-30 PROCEDURE — 74176 CT ABD & PELVIS W/O CONTRAST: CPT

## 2022-06-30 PROCEDURE — 36415 COLL VENOUS BLD VENIPUNCTURE: CPT

## 2022-06-30 PROCEDURE — 96361 HYDRATE IV INFUSION ADD-ON: CPT

## 2022-06-30 PROCEDURE — 80053 COMPREHEN METABOLIC PANEL: CPT

## 2022-06-30 PROCEDURE — 83880 ASSAY OF NATRIURETIC PEPTIDE: CPT

## 2022-06-30 PROCEDURE — 84145 PROCALCITONIN (PCT): CPT

## 2022-06-30 PROCEDURE — 70450 CT HEAD/BRAIN W/O DYE: CPT

## 2022-06-30 PROCEDURE — 83605 ASSAY OF LACTIC ACID: CPT

## 2022-06-30 PROCEDURE — 83735 ASSAY OF MAGNESIUM: CPT

## 2022-06-30 PROCEDURE — 85025 COMPLETE CBC W/AUTO DIFF WBC: CPT

## 2022-06-30 RX ADMIN — SODIUM CHLORIDE 1000 ML: 9 INJECTION, SOLUTION INTRAVENOUS at 23:04

## 2022-07-01 ENCOUNTER — HOME VISIT (OUTPATIENT)
Dept: FAMILY MEDICINE CLINIC | Age: 82
End: 2022-07-01
Payer: MEDICARE

## 2022-07-01 VITALS
TEMPERATURE: 100.9 F | HEIGHT: 59 IN | BODY MASS INDEX: 23.78 KG/M2 | DIASTOLIC BLOOD PRESSURE: 63 MMHG | WEIGHT: 117.95 LBS | SYSTOLIC BLOOD PRESSURE: 133 MMHG | OXYGEN SATURATION: 92 % | HEART RATE: 67 BPM | RESPIRATION RATE: 13 BRPM

## 2022-07-01 DIAGNOSIS — N30.00 ACUTE CYSTITIS WITHOUT HEMATURIA: ICD-10-CM

## 2022-07-01 DIAGNOSIS — U07.1 COVID-19: Primary | ICD-10-CM

## 2022-07-01 LAB
ALBUMIN SERPL-MCNC: 3.3 G/DL (ref 3.5–5)
ALBUMIN/GLOB SERPL: 0.8 {RATIO} (ref 1.1–2.2)
ALP SERPL-CCNC: 76 U/L (ref 45–117)
ALT SERPL-CCNC: 21 U/L (ref 12–78)
ANION GAP SERPL CALC-SCNC: 7 MMOL/L (ref 5–15)
APPEARANCE UR: ABNORMAL
AST SERPL-CCNC: 25 U/L (ref 15–37)
BACTERIA URNS QL MICRO: ABNORMAL /HPF
BASOPHILS # BLD: 0.1 K/UL (ref 0–0.1)
BASOPHILS NFR BLD: 1 % (ref 0–1)
BILIRUB SERPL-MCNC: 0.6 MG/DL (ref 0.2–1)
BILIRUB UR QL: NEGATIVE
BNP SERPL-MCNC: 602 PG/ML
BUN SERPL-MCNC: 19 MG/DL (ref 6–20)
BUN/CREAT SERPL: 16 (ref 12–20)
CALCIUM SERPL-MCNC: 9.8 MG/DL (ref 8.5–10.1)
CAOX CRY URNS QL MICRO: ABNORMAL
CHLORIDE SERPL-SCNC: 103 MMOL/L (ref 97–108)
CO2 SERPL-SCNC: 29 MMOL/L (ref 21–32)
COLOR UR: YELLOW
CREAT SERPL-MCNC: 1.19 MG/DL (ref 0.55–1.02)
CRP SERPL-MCNC: 1.9 MG/DL (ref 0–0.6)
DIFFERENTIAL METHOD BLD: ABNORMAL
EOSINOPHIL # BLD: 0 K/UL (ref 0–0.4)
EOSINOPHIL NFR BLD: 0 % (ref 0–7)
EPITH CASTS URNS QL MICRO: ABNORMAL /LPF
ERYTHROCYTE [DISTWIDTH] IN BLOOD BY AUTOMATED COUNT: 14.3 % (ref 11.5–14.5)
GLOBULIN SER CALC-MCNC: 4 G/DL (ref 2–4)
GLUCOSE SERPL-MCNC: 117 MG/DL (ref 65–100)
GLUCOSE UR STRIP.AUTO-MCNC: NEGATIVE MG/DL
HCT VFR BLD AUTO: 40.4 % (ref 35–47)
HGB BLD-MCNC: 13.3 G/DL (ref 11.5–16)
HGB UR QL STRIP: NEGATIVE
HYALINE CASTS URNS QL MICRO: ABNORMAL /LPF (ref 0–5)
IMM GRANULOCYTES # BLD AUTO: 0 K/UL (ref 0–0.04)
IMM GRANULOCYTES NFR BLD AUTO: 0 % (ref 0–0.5)
KETONES UR QL STRIP.AUTO: ABNORMAL MG/DL
LEUKOCYTE ESTERASE UR QL STRIP.AUTO: ABNORMAL
LYMPHOCYTES # BLD: 0.9 K/UL (ref 0.8–3.5)
LYMPHOCYTES NFR BLD: 9 % (ref 12–49)
MAGNESIUM SERPL-MCNC: 2.2 MG/DL (ref 1.6–2.4)
MCH RBC QN AUTO: 32 PG (ref 26–34)
MCHC RBC AUTO-ENTMCNC: 32.9 G/DL (ref 30–36.5)
MCV RBC AUTO: 97.3 FL (ref 80–99)
MONOCYTES # BLD: 1.2 K/UL (ref 0–1)
MONOCYTES NFR BLD: 12 % (ref 5–13)
MUCOUS THREADS URNS QL MICRO: ABNORMAL /LPF
NEUTS SEG # BLD: 7.9 K/UL (ref 1.8–8)
NEUTS SEG NFR BLD: 78 % (ref 32–75)
NITRITE UR QL STRIP.AUTO: NEGATIVE
NRBC # BLD: 0 K/UL (ref 0–0.01)
NRBC BLD-RTO: 0 PER 100 WBC
OTHER,OTHU: ABNORMAL
PH UR STRIP: 5.5 [PH] (ref 5–8)
PHOSPHATE SERPL-MCNC: 3.6 MG/DL (ref 2.6–4.7)
PLATELET # BLD AUTO: 190 K/UL (ref 150–400)
PMV BLD AUTO: 10.4 FL (ref 8.9–12.9)
POTASSIUM SERPL-SCNC: 4.5 MMOL/L (ref 3.5–5.1)
PROCALCITONIN SERPL-MCNC: <0.05 NG/ML
PROT SERPL-MCNC: 7.3 G/DL (ref 6.4–8.2)
PROT UR STRIP-MCNC: ABNORMAL MG/DL
RBC # BLD AUTO: 4.15 M/UL (ref 3.8–5.2)
RBC #/AREA URNS HPF: ABNORMAL /HPF (ref 0–5)
SODIUM SERPL-SCNC: 139 MMOL/L (ref 136–145)
SP GR UR REFRACTOMETRY: 1.02 (ref 1–1.03)
TROPONIN-HIGH SENSITIVITY: 15 NG/L (ref 0–51)
UA: UC IF INDICATED,UAUC: ABNORMAL
UROBILINOGEN UR QL STRIP.AUTO: 1 EU/DL (ref 0.2–1)
WBC # BLD AUTO: 10 K/UL (ref 3.6–11)
WBC URNS QL MICRO: ABNORMAL /HPF (ref 0–4)

## 2022-07-01 PROCEDURE — 87086 URINE CULTURE/COLONY COUNT: CPT

## 2022-07-01 PROCEDURE — 81001 URINALYSIS AUTO W/SCOPE: CPT

## 2022-07-01 PROCEDURE — 1123F ACP DISCUSS/DSCN MKR DOCD: CPT | Performed by: FAMILY MEDICINE

## 2022-07-01 PROCEDURE — 99308 SBSQ NF CARE LOW MDM 20: CPT | Performed by: FAMILY MEDICINE

## 2022-07-01 PROCEDURE — 74011000250 HC RX REV CODE- 250: Performed by: EMERGENCY MEDICINE

## 2022-07-01 PROCEDURE — 74011250637 HC RX REV CODE- 250/637

## 2022-07-01 PROCEDURE — 74011250636 HC RX REV CODE- 250/636: Performed by: EMERGENCY MEDICINE

## 2022-07-01 PROCEDURE — 87186 SC STD MICRODIL/AGAR DIL: CPT

## 2022-07-01 PROCEDURE — G8432 DEP SCR NOT DOC, RNG: HCPCS | Performed by: FAMILY MEDICINE

## 2022-07-01 PROCEDURE — 74011250636 HC RX REV CODE- 250/636

## 2022-07-01 PROCEDURE — 87077 CULTURE AEROBIC IDENTIFY: CPT

## 2022-07-01 PROCEDURE — 1101F PT FALLS ASSESS-DOCD LE1/YR: CPT | Performed by: FAMILY MEDICINE

## 2022-07-01 PROCEDURE — G8536 NO DOC ELDER MAL SCRN: HCPCS | Performed by: FAMILY MEDICINE

## 2022-07-01 PROCEDURE — 93005 ELECTROCARDIOGRAM TRACING: CPT

## 2022-07-01 RX ORDER — CEPHALEXIN 500 MG/1
500 CAPSULE ORAL 3 TIMES DAILY
Qty: 30 CAPSULE | Refills: 0 | Status: SHIPPED | OUTPATIENT
Start: 2022-07-01 | End: 2022-07-11

## 2022-07-01 RX ORDER — HALOPERIDOL 5 MG/ML
10 INJECTION INTRAMUSCULAR ONCE
Status: COMPLETED | OUTPATIENT
Start: 2022-07-01 | End: 2022-07-01

## 2022-07-01 RX ORDER — HALOPERIDOL 5 MG/ML
INJECTION INTRAMUSCULAR
Status: COMPLETED
Start: 2022-07-01 | End: 2022-07-01

## 2022-07-01 RX ORDER — ACETAMINOPHEN 500 MG
1000 TABLET ORAL
Status: COMPLETED | OUTPATIENT
Start: 2022-07-01 | End: 2022-07-01

## 2022-07-01 RX ORDER — ACETAMINOPHEN 500 MG
TABLET ORAL
Status: COMPLETED
Start: 2022-07-01 | End: 2022-07-01

## 2022-07-01 RX ADMIN — ACETAMINOPHEN 1000 MG: 500 TABLET, FILM COATED ORAL at 00:35

## 2022-07-01 RX ADMIN — HALOPERIDOL 10 MG: 5 INJECTION INTRAMUSCULAR at 01:53

## 2022-07-01 RX ADMIN — Medication 1000 MG: at 00:35

## 2022-07-01 RX ADMIN — CEFTRIAXONE 1 G: 1 INJECTION, POWDER, FOR SOLUTION INTRAMUSCULAR; INTRAVENOUS at 04:19

## 2022-07-01 NOTE — ED TRIAGE NOTES
EMS reported ALOC at 1400 today and nursing home doctor wanted her to go out. Pt has been on keflex for UTI since approx. 10 days ago. EMS was later called to transport her, unknown why delay. EMS reports pt has been awake and nurse reported to EMS this was pt's normal mentation.

## 2022-07-01 NOTE — ED PROVIDER NOTES
75-year-old female with a past medical history significant for CVA, cerebral atrophy, hyperlipidemia, hypertension, pressure ulcer in the sacral region, TIA, dementia who presents to the ER from nursing home for evaluation for lethargy with decreased level of consciousness. Nursing staff state that the patient has been increasingly lethargic and difficult with ambulation and currently being treated for a presumed UTI. The patient initially a week, alert and responsive to self only. Most of the history was obtained from the nursing staff and EMS crew who transported the patient to the ER. Past Medical History:   Diagnosis Date    Acute CVA (cerebrovascular accident) ([address] 4/10/2017    Per MRI:  Posterior L frontal/parietal territory    Cerebral atrophy ([address] 4/10/2017    History of CVA (cerebrovascular accident) 4/10/2017    --L frontal acute infarct and LMCA    Hyperlipidemia LDL goal <70 4/10/2017    Hypertension     Stage II pressure ulcer of sacral region ([facility] 2020    Status post hip surgery      Left hip bipolar hemiarthroplasty on 20     TIA (transient ischemic attack)     Unresponsive episode 8/3/2017    transient       Past Surgical History:   Procedure Laterality Date    HX HEENT      Tonsilectomy age 8         Family History:   Problem Relation Age of Onset    Dementia Mother 61    Heart Attack Father 79       Social History     Socioeconomic History    Marital status:      Spouse name: Not on file    Number of children: Not on file    Years of education: Not on file    Highest education level: Not on file   Occupational History    Not on file   Tobacco Use    Smoking status: Former Smoker     Quit date: 1999     Years since quittin.9    Smokeless tobacco: Never Used   Substance and Sexual Activity    Alcohol use:  Yes     Alcohol/week: 17.5 standard drinks     Types: 21 Glasses of wine per week     Comment: 3 glasses of port per day    Drug use: No    Sexual activity: Never   Other Topics Concern    Not on file   Social History Narrative    Not on file     Social Determinants of Health     Financial Resource Strain:     Difficulty of Paying Living Expenses: Not on file   Food Insecurity:     Worried About Running Out of Food in the Last Year: Not on file    Татьяна of Food in the Last Year: Not on file   Transportation Needs:     Lack of Transportation (Medical): Not on file    Lack of Transportation (Non-Medical): Not on file   Physical Activity:     Days of Exercise per Week: Not on file    Minutes of Exercise per Session: Not on file   Stress:     Feeling of Stress : Not on file   Social Connections:     Frequency of Communication with Friends and Family: Not on file    Frequency of Social Gatherings with Friends and Family: Not on file    Attends [employer] Services: Not on file    Active Member of [address] [place] [address] [place] or Organizations: Not on file    Attends Club or Organization Meetings: Not on file    Marital Status: Not on file   Intimate Partner Violence:     Fear of Current or Ex-Partner: Not on file    Emotionally Abused: Not on file    Physically Abused: Not on file    Sexually Abused: Not on file   Housing Stability:     Unable to Pay for Housing in the Last Year: Not on file    Number of [place] in the Last Year: Not on file    Unstable Housing in the Last Year: Not on file         ALLERGIES: Benadryl allergy/cold [diphenhyd-pe-acetaminophen] and Penicillins    Review of Systems   All other systems reviewed and are negative. Vitals:    06/30/22 2255   Temp: 99.4 °F (37.4 °C)   Weight: 53.5 kg (117 lb 15.1 oz)   Height: 4' 11\" (1.499 m)            Physical Exam  Vitals and nursing note reviewed. CONSTITUTIONAL: Frail elderly female who is chronically ill-appearing in no apparent distress  HEAD: Normocephalic; atraumatic  EYES: PERRL; EOM intact; conjunctiva and sclera are clear bilaterally.   ENT: No rhinorrhea; normal pharynx with no tonsillar hypertrophy; mucous membranes pink/moist, no erythema, no exudate. NECK: Supple; non-tender; no cervical lymphadenopathy  CARD: Normal S1, S2; no murmurs, rubs, or gallops. Regular rate and rhythm. RESP: Normal respiratory effort; breath sounds clear and equal bilaterally; no wheezes, rhonchi, or rales. ABD: Normal bowel sounds; non-distended; non-tender; no palpable organomegaly, no masses, no bruits. Back Exam: Sacral decubitus ulcer; no vertebral point tenderness, no CVA tenderness. Normal range of motion. EXT: Normal ROM in all four extremities; non-tender to palpation; no swelling or deformity; distal pulses are normal, no edema. SKIN: Warm; dry; no rash. NEURO:Alert and oriented x self, coherent, EFRAÍN-XII grossly intact, sensory and motor are non-focal.        MDM  Number of Diagnoses or Management Options  Acute febrile illness  Dehydration  Urinary tract infection without hematuria, site unspecified  Vascular dementia without behavioral disturbance ([facility]  Diagnosis management comments: Assessment: Elderly female who presents to the ER for evaluation for altered mental status dry with decreased responsiveness rule out metabolic encephalopathy including sepsis with occult bacteremia, UTI, electrolytes abnormality and dehydration. Plan: Lab/IV fluid/EKG/chest x-ray/antipyretic/CT scan of the head CT scan of the abdomen and pelvis/broad-spectrum antibiotic/serial exam/ Monitor and Reevaluate.          Amount and/or Complexity of Data Reviewed  Clinical lab tests: ordered and reviewed  Tests in the radiology section of CPT®: ordered and reviewed  Tests in the medicine section of CPT®: reviewed and ordered  Discussion of test results with the performing providers: yes  Decide to obtain previous medical records or to obtain history from someone other than the patient: yes  Obtain history from someone other than the patient: yes  Review and summarize past medical records: yes  Discuss the patient with other providers: yes  Independent visualization of images, tracings, or specimens: yes    Risk of Complications, Morbidity, and/or Mortality  Presenting problems: moderate  Diagnostic procedures: moderate  Management options: moderate  General comments: Total critical care time spent exclusive of procedures:  65 minutes    Patient Progress  Patient progress: stable         Procedures    ED EKG interpretation:  Rhythm: normal sinus rhythm; and regular With premature SVT complexes. Rate (approx.): 65; Axis: normal; P wave: normal; QRS interval: normal ; ST/T wave: normal; in  Lead: Diffusely; Other findings: borderline ekg. This EKG was interpreted by Cody Bruce MD,ED Provider. XRAY INTERPRETATION (ED MD)  Chest Xray  No acute process seen. Normal heart size. No bony abnormalities. No infiltrate. Rusty Villarreal MD 11:01 PM    Progress Note:   Pt has been reexamined by Cody Bruce MD. Pt is feeling much better. Symptoms have improved. All available results have been reviewed with pt and any available family. Pt understands sx, dx, and tx in ED. Care plan has been outlined and questions have been answered. Pt is ready to go home. Will send home on acute febrile illness/UTI/dehydration instruction. Antipyretic education. Prescription of Keflex. . Outpatient referral with PCP as needed. Written by Cody Bruce MD,4:39 AM    .   .

## 2022-07-01 NOTE — ED NOTES
Late entry at 1041:    Two more attempts made for report unable to give report. Discharge papers with paper prescriptions left in ED attempted to call to see if we could send medications somewhere without answer. Did advise [facility] to let nurse taking report from them to call back to this ER for this RN with no call back.

## 2022-07-01 NOTE — ED NOTES
Verbal shift change report given to [address] (oncoming nurse) by Tasha Carlton (offgoing nurse). Report included the following information SBAR, ED Summary, Procedure Summary and MAR.

## 2022-07-01 NOTE — ED NOTES
The documentation for this period is being entered following the guidelines as defined in the [facility] policy by Musa Dumas RN.

## 2022-07-01 NOTE — ED NOTES
Report attempt 3 for nurse report at facility. Report given to [facility] and advise nursing staff to call back to ER for report.        Attempts made to   [id number]  [phone]

## 2022-07-01 NOTE — PROGRESS NOTES
S: Patient seen at [facility] today following ED admission overnight and new diagnosis of COVID. She was recently seen in ED on 6/20 for an episode of unresponsiveness where she was diagnosed with a UTI and discharged on Keflex. Patient sent to ED yesterday (6/30) due to concerns that she was not interacting verbally and had significant weakness where she could not transfer from bed to chair. She was diagnosed with a febrile illness (T100.9F), received rocephin for a presumed UTI and discharged wit Keflex 500mg TID. CXR did not show evidence of PNA. Procal was negative. Had and elevated CRP. On review today 1 of 2 bottles of her blood cultures flagged positive and were sent for speciation/sensitivities. On return from the ED patient was tested for COVID and tested positive. O: Today patient is supine in bed, alert and oriented only to self (baseline). She appears fatigued, but in no acute distress. She follows commands. RRR, no m/r/g. Lungs CTA, though breaths are shallow.   Abd: soft, NT, ND    Reviewed:   Lab Results   Component Value Date/Time    WBC 10.0 06/30/2022 11:08 PM    Hemoglobin (POC) 13.9 07/07/2013 12:47 AM    HGB 13.3 06/30/2022 11:08 PM    Hematocrit (POC) 41 07/07/2013 12:47 AM    HCT 40.4 06/30/2022 11:08 PM    PLATELET 398 10/64/5024 11:08 PM    MCV 97.3 06/30/2022 11:08 PM     Lab Results   Component Value Date/Time    Sodium 139 06/30/2022 11:08 PM    Potassium 4.5 06/30/2022 11:08 PM    Chloride 103 06/30/2022 11:08 PM    CO2 29 06/30/2022 11:08 PM    Anion gap 7 06/30/2022 11:08 PM    Glucose 117 (H) 06/30/2022 11:08 PM    BUN 19 06/30/2022 11:08 PM    Creatinine 1.19 (H) 06/30/2022 11:08 PM    BUN/Creatinine ratio 16 06/30/2022 11:08 PM    GFR est AA 53 (L) 06/30/2022 11:08 PM    GFR est non-AA 43 (L) 06/30/2022 11:08 PM    Calcium 9.8 06/30/2022 11:08 PM     Lab Results   Component Value Date/Time    C-Reactive protein 1.90 (H) 06/30/2022 11:08 PM     Results     Procedure Component Value Units Date/Time    CULTURE, URINE [[id number]] Collected: 07/01/22 0220    Order Status: Completed Specimen: Cath Urine Updated: 07/01/22 1353    CULTURE, BLOOD [[id number]] Collected: 06/30/22 2347    Order Status: Completed Updated: 07/01/22 0704    CULTURE, BLOOD [[id number]] Collected: 06/30/22 2312    Order Status: Completed Specimen: Blood Updated: 07/01/22 1344     Special Requests: NO SPECIAL REQUESTS        Culture result:       ONE OF TWO BOTTLES HAS BEEN FLAGGED POSITIVE BY INSTRUMENT. BOTTLE HAS BEEN SENT TO [facility] TO ASSESS FOR POSSIBLE GROWTH. No organisms seen on gram stain. Multiple subcultures are in progress. NO GROWTH THUS FAR                  REMAINING BOTTLE(S) HAS/HAVE NO GROWTH SO FAR          MICRO TRACKING [[id number]] Collected: 06/30/22 2312    Order Status: Completed Updated: 07/01/22 0905    BLOOD CULTURE ID PANEL [[id number]] Collected: 06/30/22 2312    Order Status: Canceled     CULTURE, BLOOD, PAIRED [[id number]]     Order Status: Sent Specimen: Blood     URINE CULTURE HOLD SAMPLE [[id number]] Collected: 06/20/22 1708    Order Status: Completed Specimen: Urine from Serum Updated: 06/20/22 1712     Urine culture hold       Urine on hold in Microbiology dept for 2 days. If unpreserved urine is submitted, it cannot be used for addtional testing after 24 hours, recollection will be required.           CULTURE, URINE [[id number]]  (Abnormal)  (Susceptibility) Collected: 06/20/22 1708    Order Status: Completed Specimen: Cath Urine Updated: 06/24/22 1639     Special Requests: NO SPECIAL REQUESTS        [place] Count --        >100,000  COLONIES/mL       Culture result: ESCHERICHIA COLI       Susceptibility      Escherichia coli     BRONSON     Amikacin ($) Susceptible     Ampicillin ($) Resistant     Ampicillin/sulbactam ($) Resistant     Cefazolin ($) Susceptible     Cefepime ($$) Susceptible     Cefoxitin Susceptible     Ceftazidime ($) Susceptible     Ceftriaxone ($) Susceptible     Ciprofloxacin ($) Susceptible     Gentamicin ($) Susceptible     Levofloxacin ($) Susceptible     Meropenem ($$) Susceptible     Nitrofurantoin Susceptible     Piperacillin/Tazobac ($) Susceptible     Tobramycin ($) Susceptible     Trimeth/Sulfa Susceptible                  Linear View                         A/P  COVID: Currently not in respiratory distress. Attempted to reach daughter to discuss Paxlovid. Did not get answer. Checking vitals q4 hours. Will send back to ED if she develops hypoxia or respiratory distress. UTI: Will continue keflex and follow urine and blood cultures. Will adjust antibiotics based on these results. Low threshold to send back to ED for further assessment and possibly inpatient treatment. ADDENDUM: Spoke with patient's daughter Agnieszka Pereyra and gave her updates on COVID and blood culture results. Discussed possible use of Paxlovid and they are agreeable as long as there are no contraindications. Unfortunately, due to her Plavix, she is unable to take this medication. We will continue supportive care.      Electronic Signature:  Reyna Mullins MD  7/1/2022 7:23 PM

## 2022-07-03 LAB
BACTERIA SPEC CULT: ABNORMAL
BACTERIA SPEC CULT: ABNORMAL
CC UR VC: ABNORMAL
SERVICE CMNT-IMP: ABNORMAL

## 2022-07-05 LAB
ATRIAL RATE: 65 BPM
CALCULATED P AXIS, ECG09: -14 DEGREES
CALCULATED R AXIS, ECG10: -2 DEGREES
CALCULATED T AXIS, ECG11: 147 DEGREES
DIAGNOSIS, 93000: NORMAL
P-R INTERVAL, ECG05: 150 MS
Q-T INTERVAL, ECG07: 432 MS
QRS DURATION, ECG06: 70 MS
QTC CALCULATION (BEZET), ECG08: 449 MS
VENTRICULAR RATE, ECG03: 65 BPM

## 2022-07-07 LAB
BACTERIA SPEC CULT: NORMAL
SERVICE CMNT-IMP: NORMAL
SERVICE CMNT-IMP: NORMAL

## 2022-08-02 NOTE — PROGRESS NOTES
Medication reconciliation completed.      Jessica Blizzard,   PGY-2 Resident  [address] Medicine

## 2022-08-11 ENCOUNTER — HOME VISIT (OUTPATIENT)
Dept: FAMILY MEDICINE CLINIC | Age: 82
End: 2022-08-11
Payer: MEDICARE

## 2022-08-11 VITALS
DIASTOLIC BLOOD PRESSURE: 68 MMHG | TEMPERATURE: 98.6 F | BODY MASS INDEX: 22.42 KG/M2 | HEART RATE: 68 BPM | WEIGHT: 111 LBS | SYSTOLIC BLOOD PRESSURE: 108 MMHG | RESPIRATION RATE: 20 BRPM

## 2022-08-11 DIAGNOSIS — R68.89 FLUCTUATION OF WEIGHT: ICD-10-CM

## 2022-08-11 DIAGNOSIS — K59.00 CONSTIPATION, UNSPECIFIED CONSTIPATION TYPE: ICD-10-CM

## 2022-08-11 DIAGNOSIS — Z86.73 HISTORY OF CVA (CEREBROVASCULAR ACCIDENT): ICD-10-CM

## 2022-08-11 DIAGNOSIS — Z86.16 HISTORY OF COVID-19: ICD-10-CM

## 2022-08-11 DIAGNOSIS — I48.0 PAROXYSMAL ATRIAL FIBRILLATION (HCC): ICD-10-CM

## 2022-08-11 DIAGNOSIS — Z87.81 HISTORY OF HIP FRACTURE: ICD-10-CM

## 2022-08-11 DIAGNOSIS — R60.0 BILATERAL LOWER EXTREMITY EDEMA: ICD-10-CM

## 2022-08-11 DIAGNOSIS — R79.89 ELEVATED SERUM CREATININE: ICD-10-CM

## 2022-08-11 DIAGNOSIS — F03.90 DEMENTIA WITHOUT BEHAVIORAL DISTURBANCE, UNSPECIFIED DEMENTIA TYPE: Primary | ICD-10-CM

## 2022-08-11 DIAGNOSIS — I42.9 CARDIOMYOPATHY, UNSPECIFIED TYPE (HCC): ICD-10-CM

## 2022-08-11 DIAGNOSIS — I10 ESSENTIAL HYPERTENSION: ICD-10-CM

## 2022-08-11 PROCEDURE — G8536 NO DOC ELDER MAL SCRN: HCPCS | Performed by: STUDENT IN AN ORGANIZED HEALTH CARE EDUCATION/TRAINING PROGRAM

## 2022-08-11 PROCEDURE — 1101F PT FALLS ASSESS-DOCD LE1/YR: CPT | Performed by: STUDENT IN AN ORGANIZED HEALTH CARE EDUCATION/TRAINING PROGRAM

## 2022-08-11 PROCEDURE — 1123F ACP DISCUSS/DSCN MKR DOCD: CPT | Performed by: STUDENT IN AN ORGANIZED HEALTH CARE EDUCATION/TRAINING PROGRAM

## 2022-08-11 PROCEDURE — 99309 SBSQ NF CARE MODERATE MDM 30: CPT

## 2022-08-11 PROCEDURE — G8432 DEP SCR NOT DOC, RNG: HCPCS | Performed by: STUDENT IN AN ORGANIZED HEALTH CARE EDUCATION/TRAINING PROGRAM

## 2022-08-11 NOTE — PROGRESS NOTES
Audrey Randolph  78 y.o. female  1940  [address] [facility] [id number] [address] [facility]  Duncan Chavez MD       Encounter Date: 8/11/2022    Chief Complaint   Patient presents with    Follow Up Chronic Condition     History of Present Illness   Lisa Scanlon is a 78 y.o. female who was seen at [facility] today for their 12-/88-EFR recertification. She was last seen 6/8/22. Sent to ER in June for episode of unresponsiveness, diagnosed with UTI and treated. She then went back to ER at end of June for weakness and fever and was diagnosed with a UTI again. Upon return to [facility] she was found to be positive for COVID. Was not able to take Paxlovid due to her being on Plavix. Patient has recovered well and has no complaints today. Diet: CASEY, regular diet, thin consistency  continue prescribed diet    Out of bed with assistance    Review of Systems   Review of Systems   Respiratory:  Negative for shortness of breath. Cardiovascular:  Negative for chest pain. Gastrointestinal:  Negative for abdominal pain. Denies issues with bowels. Genitourinary: Negative. Psychiatric/Behavioral:  Positive for memory loss. Vitals/Objective:     Vitals:    08/11/22 0949   BP: 108/68   Pulse: 68   Resp: 20   Temp: 98.6 °F (37 °C)   Weight: 111 lb (50.3 kg)     Body mass index is 22.42 kg/m². Weight is fluctuating a bit    Physical Exam  Constitutional:       General: She is not in acute distress. Appearance: Normal appearance. She is normal weight. HENT:      Head: Normocephalic. Mouth/Throat:      Mouth: Mucous membranes are moist.      Pharynx: Oropharynx is clear. Eyes:      Extraocular Movements: Extraocular movements intact. Conjunctiva/sclera: Conjunctivae normal.   Cardiovascular:      Rate and Rhythm: Normal rate and regular rhythm.    Pulmonary:      Effort: Pulmonary effort is normal. No respiratory distress. Breath sounds: Normal breath sounds. No wheezing or rhonchi. Abdominal:      General: Abdomen is flat. Bowel sounds are normal.   Musculoskeletal:      Cervical back: Normal range of motion. Comments: Trace BLE   Skin:     General: Skin is warm. Neurological:      Mental Status: She is alert. Comments: Oriented to time of day and person   Psychiatric:         Mood and Affect: Mood normal.       Pertinent Lab/Test Results:  6/30: Ucx E. Coli (d/c'd on keflex from ER)  6/30: Bcx NG x 6 days  6/30: WBC 10, Hgb 13.3, Plt 190, Na 139, K 4.5, Cr 1.19 (BL unclear), GFR 53    Assessment and Plan:   1. Dementia without behavioral disturbance, unspecified dementia type [facility]): Most recent psych eval 3/15/22 without medication changes. - Continue Memantine 10 mg tablet BID     2. Essential hypertension: No home meds. BP at goal for age. Previously on beta-blocker and Lisinopril.  - Continue to monitor      3. Paroxysmal atrial fibrillation ([address]): RRR at time of exam. Patient stable off of coreg at this time. - Continue plavix 75mg daily     4. Hip fracture  - Continue Calcium-Vitamin D3 500-200 mg-unit-mcg chew TID  - Voltaren gel prn  - Continue Tramadol 50mg q6hr PRN for pain      5. Cardiomyopathy, unspecified type [facility]): Last echo 5/2020 with LVEF 50-55%. Last saw cardiology (Dr. Lisa Orosco) in 1/2020 who recommended follow up as needed. 6. Bilateral lower extremity edema: controlled with stockings previously, trace edema on exam wasn't wearing compression stockings  - re-ordered compression stockings     7. CVA (cerebrovascular accident)  - Continue ClopidogreL (Plavix) 75 mg daily. - Continue Atorvastatin (LIPITOR) 40 mg qhs     8. Fluctuation of Weight:  Stable 119 lbs down from 120, previously 110 lbs. - Continue Ensure clear 240 mL w/ meals  - Continue Ensure plus 240 mL every bedtime     9.  Constipation:  - Colace 100 mg PO daily  - Bisacodyl Supp 10 mg rectally every 72 hours as needed for constipation     10. Insomnia:   - Melatonin 5mg qhs    11. Hx of COVID:  2022. Patient recovered well. Was not a candidate for paxlovid due to plavix use. 12. Elevated Cr: noted in  when hospitalized, unclear baseline. Likely secondary to dehydration from recent illness. - repeat BMP       CODE STATUS: Prior    Unable to reach daughter Perlita Paredes. Electronically Signed: Shira Figueroa MD     History   Patients past medical, surgical and family histories were reviewed and updated. Past Medical History:   Diagnosis Date    Acute CVA (cerebrovascular accident) ([address] 4/10/2017    Per MRI:  Posterior L frontal/parietal territory    Cerebral atrophy ([facility] 4/10/2017    History of CVA (cerebrovascular accident) 4/10/2017    --L frontal acute infarct and LMCA    Hyperlipidemia LDL goal <70 4/10/2017    Hypertension     Stage II pressure ulcer of sacral region ([facility] 2020    Status post hip surgery      Left hip bipolar hemiarthroplasty on 20     TIA (transient ischemic attack)     Unresponsive episode 8/3/2017    transient     Past Surgical History:   Procedure Laterality Date    HX HEENT      Tonsilectomy age 8     Family History   Problem Relation Age of Onset    Dementia Mother 61    Heart Attack Father 79     Social History     Socioeconomic History    Marital status:      Spouse name: Not on file    Number of children: Not on file    Years of education: Not on file    Highest education level: Not on file   Occupational History    Not on file   Tobacco Use    Smoking status: Former     Types: Cigarettes     Quit date: 1999     Years since quittin.1    Smokeless tobacco: Never   Substance and Sexual Activity    Alcohol use:  Yes     Alcohol/week: 17.5 standard drinks     Types: 21 Glasses of wine per week     Comment: 3 glasses of port per day    Drug use: No    Sexual activity: Never   Other Topics Concern    Not on file   Social History Narrative    Not on file     Social Determinants of Health     Financial Resource Strain: Not on file   Food Insecurity: Not on file   Transportation Needs: Not on file   Physical Activity: Not on file   Stress: Not on file   Social Connections: Not on file   Intimate Partner Violence: Not on file   Housing Stability: Not on file            Current Medications/Allergies     Current Outpatient Medications   Medication Sig Dispense Refill    diclofenac (VOLTAREN) 1 % gel Apply  to affected area four (4) times daily. bisacodyL (DULCOLAX) 10 mg supp Insert 10 mg into rectum every seventy-two (72) hours as needed for Constipation. 1 Each 0    docusate sodium (COLACE) 100 mg capsule Take 100 mg by mouth daily. melatonin 5 mg cap capsule Take 5 mg by mouth nightly. traMADoL (ULTRAM) 50 mg tablet Take 50 mg by mouth every six (6) hours as needed for Pain. clopidogreL (Plavix) 75 mg tab Take 1 Tab by mouth daily. 30 Tab 0    atorvastatin (LIPITOR) 40 mg tablet Take 1 Tab by mouth nightly. 30 Tab 0    Calcium-Vitamin D3-Vitamin K [id number] mg-unit-mcg chew Take 1 Tab by mouth three (3) times daily. 90 Tab 0    memantine (NAMENDA) 10 mg tablet Take 1 Tab by mouth two (2) times a day. 180 Tab 0     Allergies   Allergen Reactions    Benadryl Allergy/Cold [Diphenhyd-Pe-Acetaminophen] Hives     Per patient's daughter, she thinks patient has had benadryl since then and tolerated it. Penicillins Hives     Was treated with Augmentin in June 2015, tolerated medication well.

## 2022-09-17 NOTE — PROGRESS NOTES
I saw and evaluated the patient, performing the key elements of the service. I discussed the findings, assessment and plan with the resident and agree with the resident's findings and plan as documented in the resident's note. You were seen in the emergency department for chest pain.     Please follow-up with your primary care doctor in the next 24-48 hours.     Please take Ibuprofen and Tylenol as prescribed on the bottles for pain control.     If you have any worsening symptoms, severe headache, chest pain, shortness of breath, nausea or vomiting, please return to the emergency department.

## 2022-10-10 ENCOUNTER — HOME VISIT (OUTPATIENT)
Dept: FAMILY MEDICINE CLINIC | Age: 82
End: 2022-10-10
Payer: MEDICARE

## 2022-10-10 DIAGNOSIS — I42.9 CARDIOMYOPATHY, UNSPECIFIED TYPE (HCC): ICD-10-CM

## 2022-10-10 DIAGNOSIS — F03.90 DEMENTIA WITHOUT BEHAVIORAL DISTURBANCE, UNSPECIFIED DEMENTIA TYPE: Primary | ICD-10-CM

## 2022-10-10 DIAGNOSIS — R60.0 BILATERAL LOWER EXTREMITY EDEMA: ICD-10-CM

## 2022-10-10 DIAGNOSIS — I10 ESSENTIAL HYPERTENSION: ICD-10-CM

## 2022-10-10 DIAGNOSIS — K59.00 CONSTIPATION, UNSPECIFIED CONSTIPATION TYPE: ICD-10-CM

## 2022-10-10 DIAGNOSIS — R68.89 FLUCTUATION OF WEIGHT: ICD-10-CM

## 2022-10-10 DIAGNOSIS — F51.04 PSYCHOPHYSIOLOGICAL INSOMNIA: ICD-10-CM

## 2022-10-10 DIAGNOSIS — I48.0 PAROXYSMAL ATRIAL FIBRILLATION (HCC): ICD-10-CM

## 2022-10-10 DIAGNOSIS — L89.152 STAGE II PRESSURE ULCER OF SACRAL REGION (HCC): ICD-10-CM

## 2022-10-10 DIAGNOSIS — Z86.73 HISTORY OF CVA (CEREBROVASCULAR ACCIDENT): ICD-10-CM

## 2022-10-10 DIAGNOSIS — Z87.81 HISTORY OF HIP FRACTURE: ICD-10-CM

## 2022-10-10 PROCEDURE — 1123F ACP DISCUSS/DSCN MKR DOCD: CPT | Performed by: FAMILY MEDICINE

## 2022-10-10 PROCEDURE — 1101F PT FALLS ASSESS-DOCD LE1/YR: CPT | Performed by: FAMILY MEDICINE

## 2022-10-10 PROCEDURE — 99309 SBSQ NF CARE MODERATE MDM 30: CPT | Performed by: FAMILY MEDICINE

## 2022-10-10 PROCEDURE — G8536 NO DOC ELDER MAL SCRN: HCPCS | Performed by: FAMILY MEDICINE

## 2022-10-10 PROCEDURE — G8432 DEP SCR NOT DOC, RNG: HCPCS | Performed by: FAMILY MEDICINE

## 2022-10-10 NOTE — PROGRESS NOTES
Princess Randolph  78 y.o. female  1940  [address] [place] [address]  [id number]   [address] [place] [address] Medicine at [facility]  Recertification Progress Note  Flavio Rod MD       Encounter Date: 10/10/2022    Chief Complaint   Patient presents with    Follow Up Chronic Condition     History of Present Illness   Khalida Poon is a 78 y.o. female who was seen at [facility] today for their 43-/58-KYF recertification. She was last seen 8/11/2022. Diet: CASEY, regular diet, thin consistency  continue prescribed diet     Out of bed with assistance    Interval Hx: No complaints. Review of Systems   Review of Systems   Constitutional:  Negative for chills and fever. HENT:  Negative for congestion and sore throat. Eyes:  Negative for discharge and redness. Respiratory:  Negative for cough and shortness of breath. Cardiovascular:  Negative for chest pain and palpitations. Gastrointestinal:  Negative for diarrhea and vomiting. Genitourinary:  Negative for dysuria and frequency. Musculoskeletal:  Negative for falls and myalgias. Skin:  Negative for itching and rash. Neurological:  Negative for focal weakness and seizures. Vitals/Objective:     Vitals:    10/13/22 1709   BP: 110/70   Pulse: 64   Resp: 18   Temp: 97.2 °F (36.2 °C)   SpO2: 98%   Weight: 113 lb 12.8 oz (51.6 kg)   Height: 4' 11\" (1.499 m)     Body mass index is 22.98 kg/m². Weight is stable    Physical Exam  Vitals reviewed. Constitutional:       General: She is not in acute distress. Appearance: Normal appearance. HENT:      Head: Normocephalic and atraumatic. Right Ear: External ear normal.      Left Ear: External ear normal.   Eyes:      General: No scleral icterus. Extraocular Movements: Extraocular movements intact. Conjunctiva/sclera: Conjunctivae normal.   Cardiovascular:      Rate and Rhythm: Normal rate and regular rhythm. Pulses: Normal pulses.       Heart sounds: Normal heart sounds. Pulmonary:      Effort: Pulmonary effort is normal. No respiratory distress. Breath sounds: Normal breath sounds. Abdominal:      General: Abdomen is flat. Palpations: Abdomen is soft. Tenderness: There is no abdominal tenderness. Musculoskeletal:      Right lower leg: Edema (Trace) present. Left lower leg: Edema (Trace) present. Skin:     General: Skin is warm and dry. Neurological:      Mental Status: She is alert. Mental status is at baseline. Comments: A&O x1       Pertinent Lab/Test Results:  None    Assessment and Plan:   1. Dementia without behavioral disturbance, unspecified dementia type [facility]): Most recent psych eval 3/15/22 without medication changes. - Continue Memantine 10 mg tablet BID     2. Essential hypertension: No home meds. BP at goal for age. Previously on beta-blocker and Lisinopril.  - Continue to monitor      3. Paroxysmal atrial fibrillation ([address]): RRR at time of exam and rate controlled. Patient stable off of coreg at this time. - Continue plavix 75mg daily     4. Hip fracture  - Continue Calcium-Vitamin D3 500-200 mg-unit-mcg chew TID  - Voltaren gel prn  - Continue Tramadol 50mg q6hr PRN for pain      5. Cardiomyopathy, unspecified type [facility]): Last echo 5/2020 with LVEF 50-55%. Last saw cardiology (Dr. Nile Rodriguez) in 1/2020 who recommended follow up as needed. 6. Bilateral lower extremity edema: controlled with stockings previously, trace edema on exam wasn't wearing compression stockings  - continue compression stockings     7. CVA (cerebrovascular accident)  - Continue ClopidogreL (Plavix) 75 mg daily. - Continue Atorvastatin (LIPITOR) 40 mg qhs     8. Fluctuation of Weight:  Stable 119 lbs down from 120, previously 110 lbs. - Continue Ensure clear 240 mL w/ meals  - Continue Ensure plus 240 mL every bedtime     9.  Constipation:  - Colace 100 mg PO daily  - Bisacodyl Supp 10 mg rectally every 72 hours as needed for constipation 10. Insomnia:   - Melatonin 5mg qhs    1. Stage II Sacral Ulcer  - continue with wound care and repositioning    CODE STATUS: Prior    I have discussed the assessment and plan with the patient and their responsible party as seen in the above orders. They have expressed understanding. Electronically Signed: Kaushal Cantrell MD     History   Patients past medical, surgical and family histories were reviewed and updated. Past Medical History:   Diagnosis Date    Acute CVA (cerebrovascular accident) ([address] 4/10/2017    Per MRI:  Posterior L frontal/parietal territory    Cerebral atrophy ([address] 4/10/2017    History of CVA (cerebrovascular accident) 4/10/2017    --L frontal acute infarct and LMCA    Hyperlipidemia LDL goal <70 4/10/2017    Hypertension     Stage II pressure ulcer of sacral region ([facility] 2020    Status post hip surgery      Left hip bipolar hemiarthroplasty on 20     TIA (transient ischemic attack)     Unresponsive episode 8/3/2017    transient     Past Surgical History:   Procedure Laterality Date    HX HEENT      Tonsilectomy age 8     Family History   Problem Relation Age of Onset    Dementia Mother 61    Heart Attack Father 79     Social History     Socioeconomic History    Marital status:      Spouse name: Not on file    Number of children: Not on file    Years of education: Not on file    Highest education level: Not on file   Occupational History    Not on file   Tobacco Use    Smoking status: Former     Types: Cigarettes     Quit date: 1999     Years since quittin.2    Smokeless tobacco: Never   Substance and Sexual Activity    Alcohol use:  Yes     Alcohol/week: 17.5 standard drinks     Types: 21 Glasses of wine per week     Comment: 3 glasses of port per day    Drug use: No    Sexual activity: Never   Other Topics Concern    Not on file   Social History Narrative    Not on file     Social Determinants of Health     Financial Resource Strain: Not on file   Food Insecurity: Not on file   Transportation Needs: Not on file   Physical Activity: Not on file   Stress: Not on file   Social Connections: Not on file   Intimate Partner Violence: Not on file   Housing Stability: Not on file            Current Medications/Allergies     Current Outpatient Medications   Medication Sig Dispense Refill    diclofenac (VOLTAREN) 1 % gel Apply  to affected area four (4) times daily. bisacodyL (DULCOLAX) 10 mg supp Insert 10 mg into rectum every seventy-two (72) hours as needed for Constipation. 1 Each 0    docusate sodium (COLACE) 100 mg capsule Take 100 mg by mouth daily. melatonin 5 mg cap capsule Take 5 mg by mouth nightly. traMADoL (ULTRAM) 50 mg tablet Take 50 mg by mouth every six (6) hours as needed for Pain. clopidogreL (Plavix) 75 mg tab Take 1 Tab by mouth daily. 30 Tab 0    atorvastatin (LIPITOR) 40 mg tablet Take 1 Tab by mouth nightly. 30 Tab 0    Calcium-Vitamin D3-Vitamin K 583-558-91 mg-unit-mcg chew Take 1 Tab by mouth three (3) times daily. 90 Tab 0    memantine (NAMENDA) 10 mg tablet Take 1 Tab by mouth two (2) times a day. 180 Tab 0     Allergies   Allergen Reactions    Benadryl Allergy/Cold [Diphenhyd-Pe-Acetaminophen] Hives     Per patient's daughter, she thinks patient has had benadryl since then and tolerated it. Penicillins Hives     Was treated with Augmentin in June 2015, tolerated medication well.

## 2022-10-13 VITALS
TEMPERATURE: 97.2 F | SYSTOLIC BLOOD PRESSURE: 110 MMHG | HEART RATE: 64 BPM | HEIGHT: 59 IN | DIASTOLIC BLOOD PRESSURE: 70 MMHG | OXYGEN SATURATION: 98 % | WEIGHT: 113.8 LBS | RESPIRATION RATE: 18 BRPM | BODY MASS INDEX: 22.94 KG/M2

## 2022-10-19 NOTE — TELEPHONE ENCOUNTER
----- Message from Jayme Hardy sent at 9/28/2020 10:05 AM EDT -----  Regarding: Dr. Annie Lu first and last name: Tom Elise at [address] at [facility]  Reason for call: [facility] required yes/no and why: Yes  Best contact number(s): [phone]  Details to clarify the request: Stated that she has faxed  several  Home Health orders from patients home health services that have not been signed, orders are now over 27days old, please call Tom Elise at [phone] to discuss. [Negative] : Heme/Lymph

## 2022-11-29 ENCOUNTER — TELEPHONE (OUTPATIENT)
Dept: FAMILY MEDICINE CLINIC | Age: 82
End: 2022-11-29

## 2022-11-29 NOTE — TELEPHONE ENCOUNTER
[facility] called stating that they needed an order put in for the patient to get an x-ray on her left ring finger because it is swollen. If need be they can be contacted at [phone]. Thanks!

## 2022-12-07 ENCOUNTER — HOME VISIT (OUTPATIENT)
Dept: FAMILY MEDICINE CLINIC | Age: 82
End: 2022-12-07
Payer: MEDICARE

## 2022-12-07 DIAGNOSIS — Z87.81 HISTORY OF HIP FRACTURE: ICD-10-CM

## 2022-12-07 DIAGNOSIS — I42.9 CARDIOMYOPATHY, UNSPECIFIED TYPE (HCC): ICD-10-CM

## 2022-12-07 DIAGNOSIS — L89.152 STAGE II PRESSURE ULCER OF SACRAL REGION (HCC): ICD-10-CM

## 2022-12-07 DIAGNOSIS — K59.00 CONSTIPATION, UNSPECIFIED CONSTIPATION TYPE: ICD-10-CM

## 2022-12-07 DIAGNOSIS — M25.442 SWELLING OF FINGER JOINT OF LEFT HAND: ICD-10-CM

## 2022-12-07 DIAGNOSIS — R60.0 BILATERAL LOWER EXTREMITY EDEMA: ICD-10-CM

## 2022-12-07 DIAGNOSIS — R68.89 FLUCTUATION OF WEIGHT: ICD-10-CM

## 2022-12-07 DIAGNOSIS — I10 ESSENTIAL HYPERTENSION: Primary | ICD-10-CM

## 2022-12-07 DIAGNOSIS — F51.04 PSYCHOPHYSIOLOGICAL INSOMNIA: ICD-10-CM

## 2022-12-07 DIAGNOSIS — I48.0 PAROXYSMAL ATRIAL FIBRILLATION (HCC): ICD-10-CM

## 2022-12-07 DIAGNOSIS — Z86.73 HISTORY OF CVA (CEREBROVASCULAR ACCIDENT): ICD-10-CM

## 2022-12-07 PROCEDURE — G8432 DEP SCR NOT DOC, RNG: HCPCS | Performed by: FAMILY MEDICINE

## 2022-12-07 PROCEDURE — 1123F ACP DISCUSS/DSCN MKR DOCD: CPT | Performed by: FAMILY MEDICINE

## 2022-12-07 PROCEDURE — G8536 NO DOC ELDER MAL SCRN: HCPCS | Performed by: FAMILY MEDICINE

## 2022-12-07 PROCEDURE — 1101F PT FALLS ASSESS-DOCD LE1/YR: CPT | Performed by: FAMILY MEDICINE

## 2022-12-07 PROCEDURE — 99309 SBSQ NF CARE MODERATE MDM 30: CPT | Performed by: FAMILY MEDICINE

## 2022-12-07 NOTE — PROGRESS NOTES
Cj Randolph  78 y.o. female  1940  [address] [id number]  [id number]   [facility] Family Medicine at [facility]  Recertification Progress Note  Tony Torres MD       Encounter Date: 12/7/2022    Chief Complaint   Patient presents with    Follow Up Chronic Condition     History of Present Illness   Uche Goss is a 78 y.o. female who was seen at [facility] today for their 59-/41-YEG recertification. She was last seen on 10/20/22    Diet: CASEY, regular diet, thin consistency  continue prescribed diet     Out of bed with assistance     Interval Hx: No complaints. Review of Systems   Review of Systems   Constitutional:  Negative for chills and fever. Eyes:  Negative for blurred vision and double vision. Respiratory:  Negative for cough, sputum production, shortness of breath and wheezing. Cardiovascular:  Negative for chest pain and palpitations. Gastrointestinal:  Negative for abdominal pain, diarrhea, nausea and vomiting. Musculoskeletal:  Positive for joint pain. Negative for falls, myalgias and neck pain. Left ring finger DIP   Skin:  Negative for rash. Neurological:  Negative for dizziness, tingling, loss of consciousness, weakness and headaches. Psychiatric/Behavioral:  Negative for depression. The patient is not nervous/anxious and does not have insomnia. Vitals/Objective:     Vitals:    12/08/22 1623   BP: (!) 98/58   Pulse: 68   Resp: 18   Temp: 97.2 °F (36.2 °C)   SpO2: 98%   Weight: 112 lb (50.8 kg)   Height: 4' 11\" (1.499 m)     Body mass index is 22.62 kg/m². Weight is fluctuating a bit    Physical Exam  Constitutional:       General: She is not in acute distress. Appearance: Normal appearance. HENT:      Head: Normocephalic and atraumatic. Cardiovascular:      Rate and Rhythm: Normal rate and regular rhythm. Pulses: Normal pulses. Heart sounds: Normal heart sounds. No murmur heard.   Pulmonary: Effort: Pulmonary effort is normal.      Breath sounds: Normal breath sounds. Abdominal:      General: Abdomen is flat. Palpations: Abdomen is soft. Musculoskeletal:      Left hand: Swelling present. Cervical back: Neck supple. No tenderness. Comments: Mild redness, swelling DIP joint left ring finger. Full ROM. Skin:     General: Skin is warm and dry. Capillary Refill: Capillary refill takes less than 2 seconds. Neurological:      Mental Status: She is alert. Mental status is at baseline. Psychiatric:         Mood and Affect: Mood normal.         Behavior: Behavior normal.     Pertinent Lab/Test Results:  XR of hand from 12/1 showed no fracture in finger. Assessment and Plan:   1. Dementia without behavioral disturbance, unspecified dementia type [facility]): Most recent psych eval 3/15/22 without medication changes. - Continue Memantine 10 mg tablet BID     2. Essential hypertension: No home meds. BP at goal for age. Previously on beta-blocker and Lisinopril.  - Continue to monitor      3. Paroxysmal atrial fibrillation ([address]): RRR at time of exam and rate controlled. Patient stable off of coreg at this time. - Continue plavix 75mg daily     4. H/o hip fracture  - Continue Calcium-Vitamin D3 500-200 mg-unit-mcg chew TID  - Voltaren gel prn  - Continue Tramadol 50mg q6hr PRN for pain      5. Cardiomyopathy, unspecified type [facility]): Last echo 5/2020 with LVEF 50-55%. Last saw cardiology (Dr. Cong Lenz) in 1/2020 who recommended follow up as needed. 6. Bilateral lower extremity edema: controlled with stockings previously, trace edema on exam wasn't wearing compression stockings  - continue compression stockings     7. H/o CVA (cerebrovascular accident)  - Continue ClopidogreL (Plavix) 75 mg daily. - Continue Atorvastatin (LIPITOR) 40 mg qhs     8. Fluctuation of Weight:  Stable 119 lbs down from 120, previously 110 lbs.   - Continue Ensure clear 240 mL w/ meals  - Continue Ensure plus 240 mL every bedtime     9. Constipation:  - Colace 100 mg PO daily  - Bisacodyl Supp 10 mg rectally every 72 hours as needed for constipation      10. Insomnia:   - Melatonin 5mg qhs     11. Stage II Sacral Ulcer  - continue with wound care and repositioning    12. Finger joint swelling of left hand: of left ring finger PIP joint. Improving significantly. XR showed no acute fracture, only mild soft tissue swelling.  - Continue to monitor    CODE STATUS: Prior    I have discussed the assessment and plan with the patient and their responsible party as seen in the above orders. They have expressed understanding. Electronically Signed: Sloane Evans MD     History   Patients past medical, surgical and family histories were reviewed and updated.       Past Medical History:   Diagnosis Date    Acute CVA (cerebrovascular accident) ([address] 4/10/2017    Per MRI:  Posterior L frontal/parietal territory    Cerebral atrophy ([address] 4/10/2017    History of CVA (cerebrovascular accident) 4/10/2017    --L frontal acute infarct and LMCA    Hyperlipidemia LDL goal <70 4/10/2017    Hypertension     Stage II pressure ulcer of sacral region ([facility] 2020    Status post hip surgery      Left hip bipolar hemiarthroplasty on 20     TIA (transient ischemic attack)     Unresponsive episode 8/3/2017    transient     Past Surgical History:   Procedure Laterality Date    HX HEENT      Tonsilectomy age 8     Family History   Problem Relation Age of Onset    Dementia Mother 61    Heart Attack Father 79     Social History     Socioeconomic History    Marital status:      Spouse name: Not on file    Number of children: Not on file    Years of education: Not on file    Highest education level: Not on file   Occupational History    Not on file   Tobacco Use    Smoking status: Former     Types: Cigarettes     Quit date: 1999     Years since quittin.4    Smokeless tobacco: Never   Substance and Sexual Activity    Alcohol use: Yes     Alcohol/week: 17.5 standard drinks     Types: 21 Glasses of wine per week     Comment: 3 glasses of port per day    Drug use: No    Sexual activity: Never   Other Topics Concern    Not on file   Social History Narrative    Not on file     Social Determinants of Health     Financial Resource Strain: Not on file   Food Insecurity: Not on file   Transportation Needs: Not on file   Physical Activity: Not on file   Stress: Not on file   Social Connections: Not on file   Intimate Partner Violence: Not on file   Housing Stability: Not on file            Current Medications/Allergies     Current Outpatient Medications   Medication Sig Dispense Refill    diclofenac (VOLTAREN) 1 % gel Apply  to affected area four (4) times daily. bisacodyL (DULCOLAX) 10 mg supp Insert 10 mg into rectum every seventy-two (72) hours as needed for Constipation. 1 Each 0    docusate sodium (COLACE) 100 mg capsule Take 100 mg by mouth daily. melatonin 5 mg cap capsule Take 5 mg by mouth nightly. traMADoL (ULTRAM) 50 mg tablet Take 50 mg by mouth every six (6) hours as needed for Pain. clopidogreL (Plavix) 75 mg tab Take 1 Tab by mouth daily. 30 Tab 0    atorvastatin (LIPITOR) 40 mg tablet Take 1 Tab by mouth nightly. 30 Tab 0    Calcium-Vitamin D3-Vitamin K 044-938-29 mg-unit-mcg chew Take 1 Tab by mouth three (3) times daily. 90 Tab 0    memantine (NAMENDA) 10 mg tablet Take 1 Tab by mouth two (2) times a day. 180 Tab 0     Allergies   Allergen Reactions    Benadryl Allergy/Cold [Diphenhyd-Pe-Acetaminophen] Hives     Per patient's daughter, she thinks patient has had benadryl since then and tolerated it. Penicillins Hives     Was treated with Augmentin in June 2015, tolerated medication well.

## 2022-12-08 VITALS
TEMPERATURE: 97.2 F | BODY MASS INDEX: 22.58 KG/M2 | DIASTOLIC BLOOD PRESSURE: 58 MMHG | RESPIRATION RATE: 18 BRPM | HEIGHT: 59 IN | HEART RATE: 68 BPM | SYSTOLIC BLOOD PRESSURE: 98 MMHG | WEIGHT: 112 LBS | OXYGEN SATURATION: 98 %

## 2022-12-29 NOTE — TELEPHONE ENCOUNTER
Spoke with Rahat Montero from [facility]. Verified that  Ms. Tete Chang is our pt and [facility]

## 2023-02-06 ENCOUNTER — HOME VISIT (OUTPATIENT)
Dept: FAMILY MEDICINE CLINIC | Age: 83
End: 2023-02-06
Payer: MEDICARE

## 2023-02-06 DIAGNOSIS — F03.90 DEMENTIA WITHOUT BEHAVIORAL DISTURBANCE (HCC): ICD-10-CM

## 2023-02-06 DIAGNOSIS — I10 ESSENTIAL HYPERTENSION: Primary | ICD-10-CM

## 2023-02-06 DIAGNOSIS — Z87.81 HISTORY OF HIP FRACTURE: ICD-10-CM

## 2023-02-06 DIAGNOSIS — S62.301D CLOSED NONDISPLACED FRACTURE OF SECOND METACARPAL BONE OF LEFT HAND WITH ROUTINE HEALING, UNSPECIFIED PORTION OF METACARPAL, SUBSEQUENT ENCOUNTER: ICD-10-CM

## 2023-02-06 DIAGNOSIS — R60.0 LOCALIZED EDEMA: ICD-10-CM

## 2023-02-06 DIAGNOSIS — I48.0 PAROXYSMAL ATRIAL FIBRILLATION (HCC): ICD-10-CM

## 2023-02-06 DIAGNOSIS — R68.89 FLUCTUATION OF WEIGHT: ICD-10-CM

## 2023-02-06 DIAGNOSIS — R60.0 BILATERAL LOWER EXTREMITY EDEMA: ICD-10-CM

## 2023-02-06 DIAGNOSIS — I42.9 CARDIOMYOPATHY, UNSPECIFIED TYPE (HCC): ICD-10-CM

## 2023-02-06 DIAGNOSIS — E78.5 HYPERLIPIDEMIA LDL GOAL <70: ICD-10-CM

## 2023-02-06 DIAGNOSIS — K59.00 CONSTIPATION, UNSPECIFIED CONSTIPATION TYPE: ICD-10-CM

## 2023-02-06 DIAGNOSIS — L89.152 STAGE II PRESSURE ULCER OF SACRAL REGION (HCC): ICD-10-CM

## 2023-02-06 DIAGNOSIS — Z86.73 HISTORY OF CVA (CEREBROVASCULAR ACCIDENT): ICD-10-CM

## 2023-02-06 NOTE — PROGRESS NOTES
Raquel Randolph  78 y.o. female  1940  [address] [place] [address]  [id number]   [facility] Family Medicine at [facility]  Recertification Progress Note  Kevin Workman MD       Encounter Date: 2/6/2023    Chief Complaint   Patient presents with    Follow Up Chronic Condition     History of Present Illness   Juancho Lewis is a 78 y.o. female who was seen at [facility] today for their 49-/77-MJZ recertification. She was last seen 12/7/23. Diet: low sodium and regular texture, thin consistency    Out of bed with assistance    Review of Systems   General: No fevers. No chills. HENT: No congestion. No rhinorrhea. No sore throat. Eyes: No eye pain. No eye redness. Respiratory: No cough. No shortness of breath. Cardio: No chest pain. No leg swelling. No palpitations. GI: No abd pain. No n/v. No diarrhea. No constipation. : No frequency. No dysuria. No urgency. MSK: No back pain. No neck pain. Neuro: No headaches. No dizziness. Vitals/Objective:     Vitals:    02/07/23 2307   BP: 115/69   Pulse: 64   Resp: 17   Temp: 97.5 °F (36.4 °C)   SpO2: 98%   Weight: 114 lb 1.6 oz (51.8 kg)   Height: 4' 11\" (1.499 m)     Body mass index is 23.05 kg/m². Weight is minimally increased. Up to 114 from 112 lbs. Physical Exam  Vitals and nursing note reviewed. Constitutional:       General: She is not in acute distress. HENT:      Head: Normocephalic and atraumatic. Nose: No congestion or rhinorrhea. Mouth/Throat:      Mouth: Mucous membranes are dry. Pharynx: Oropharynx is clear. Eyes:      Conjunctiva/sclera: Conjunctivae normal.      Pupils: Pupils are equal, round, and reactive to light. Cardiovascular:      Rate and Rhythm: Normal rate and regular rhythm. Heart sounds: No murmur heard. No friction rub. No gallop. Pulmonary:      Effort: Pulmonary effort is normal. No respiratory distress. Breath sounds: Normal breath sounds.  No wheezing, rhonchi or rales. Musculoskeletal:      Right lower leg: No edema. Left lower leg: No edema. Comments: Tenderness over the left left second metacarpal on palpation. Skin:     General: Skin is warm and dry. Capillary Refill: Capillary refill takes less than 2 seconds. Neurological:      Mental Status: She is alert. She is disoriented. Pertinent Lab/Test Results:    Left hand XR 12/15/22: fracture of 2nd metacarpal. Degenerative arthritis. Soft tissue swelling. Assessment and Plan:     1. Dementia without behavioral disturbance, unspecified dementia type [facility]): Most recent psych eval 3/15/22 without medication changes. - Continue Memantine 10 mg tablet BID     2. Essential hypertension: No home meds. BP at goal for age. Previously on beta-blocker and Lisinopril.  - Continue to monitor      3. Paroxysmal atrial fibrillation [facility]): Patient stable off of coreg at this time. - Continue plavix 75mg daily     4. H/o hip fracture  - Continue Calcium-Vitamin D3 500-200 mg-unit-mcg chew TID  - Voltaren gel prn  - Continue Tramadol 50mg q6hr PRN for pain      5. Cardiomyopathy, unspecified type [facility]): Last echo 5/2020 with LVEF 50-55%. Last saw cardiology (Dr. Gabriela Crockett) in 1/2020 who recommended follow up as needed. 6. Bilateral lower extremity edema: Seems well controlled. - continue compression stockings     7. H/o CVA (cerebrovascular accident)  - Continue ClopidogreL (Plavix) 75 mg daily. - Continue Atorvastatin (LIPITOR) 40 mg qhs     8. Fluctuation of Weight:  Weight up to 114, from 112 previously. - Continue Ensure clear 240 mL w/ meals  - Continue Ensure plus 240 mL every bedtime     9. Constipation:  - Colace 100 mg PO daily  - Bisacodyl Supp 10 mg rectally every 72 hours as needed for constipation      10. Insomnia:   - Melatonin 5mg qhs     11. Stage II Sacral Ulcer  - continue with wound care and repositioning     12. Left 2nd metacarpal fracture: Confirmed by XR. Evaluated by ortho and treated with temporary cobra sprint at that time. Did not make it to follow up with Dr. Shea Tian. - repeat left hand XR, pending results may need continued follow up with ortho     CODE STATUS: DNR    I have discussed the assessment and plan with the patient and their responsible party as seen in the above orders. They have expressed understanding. Electronically Signed: Génesis Hennessy MD     History   Patients past medical, surgical and family histories were reviewed and updated. Past Medical History:   Diagnosis Date    Acute CVA (cerebrovascular accident) ([address] 4/10/2017    Per MRI:  Posterior L frontal/parietal territory    Cerebral atrophy ([address] 4/10/2017    History of CVA (cerebrovascular accident) 4/10/2017    --L frontal acute infarct and LMCA    Hyperlipidemia LDL goal <70 4/10/2017    Hypertension     Stage II pressure ulcer of sacral region ([facility] 2020    Status post hip surgery      Left hip bipolar hemiarthroplasty on 20     TIA (transient ischemic attack)     Unresponsive episode 8/3/2017    transient     Past Surgical History:   Procedure Laterality Date    HX HEENT      Tonsilectomy age 8     Family History   Problem Relation Age of Onset    Dementia Mother 61    Heart Attack Father 79     Social History     Socioeconomic History    Marital status:      Spouse name: Not on file    Number of children: Not on file    Years of education: Not on file    Highest education level: Not on file   Occupational History    Not on file   Tobacco Use    Smoking status: Former     Types: Cigarettes     Quit date: 1999     Years since quittin.6    Smokeless tobacco: Never   Substance and Sexual Activity    Alcohol use:  Yes     Alcohol/week: 17.5 standard drinks     Types: 21 Glasses of wine per week     Comment: 3 glasses of port per day    Drug use: No    Sexual activity: Never   Other Topics Concern    Not on file   Social History Narrative    Not on file     Social Determinants of Health     Financial Resource Strain: Not on file   Food Insecurity: Not on file   Transportation Needs: Not on file   Physical Activity: Not on file   Stress: Not on file   Social Connections: Not on file   Intimate Partner Violence: Not on file   Housing Stability: Not on file            Current Medications/Allergies     Current Outpatient Medications   Medication Sig Dispense Refill    diclofenac (VOLTAREN) 1 % gel Apply  to affected area four (4) times daily. bisacodyL (DULCOLAX) 10 mg supp Insert 10 mg into rectum every seventy-two (72) hours as needed for Constipation. 1 Each 0    docusate sodium (COLACE) 100 mg capsule Take 100 mg by mouth daily. melatonin 5 mg cap capsule Take 5 mg by mouth nightly. traMADoL (ULTRAM) 50 mg tablet Take 50 mg by mouth every six (6) hours as needed for Pain. clopidogreL (Plavix) 75 mg tab Take 1 Tab by mouth daily. 30 Tab 0    atorvastatin (LIPITOR) 40 mg tablet Take 1 Tab by mouth nightly. 30 Tab 0    Calcium-Vitamin D3-Vitamin K 544-497-92 mg-unit-mcg chew Take 1 Tab by mouth three (3) times daily. 90 Tab 0    memantine (NAMENDA) 10 mg tablet Take 1 Tab by mouth two (2) times a day. 180 Tab 0     Allergies   Allergen Reactions    Benadryl Allergy/Cold [Diphenhyd-Pe-Acetaminophen] Hives     Per patient's daughter, she thinks patient has had benadryl since then and tolerated it. Penicillins Hives     Was treated with Augmentin in June 2015, tolerated medication well.          {

## 2023-02-07 VITALS
DIASTOLIC BLOOD PRESSURE: 69 MMHG | HEART RATE: 64 BPM | OXYGEN SATURATION: 98 % | SYSTOLIC BLOOD PRESSURE: 115 MMHG | HEIGHT: 59 IN | BODY MASS INDEX: 23 KG/M2 | RESPIRATION RATE: 17 BRPM | WEIGHT: 114.1 LBS | TEMPERATURE: 97.5 F

## 2023-04-05 ENCOUNTER — HOSPITAL ENCOUNTER (OUTPATIENT)
Age: 83
End: 2023-04-05
Attending: EMERGENCY MEDICINE
Payer: MEDICARE

## 2023-04-05 ENCOUNTER — APPOINTMENT (OUTPATIENT)
Dept: CT IMAGING | Age: 83
End: 2023-04-05
Attending: EMERGENCY MEDICINE
Payer: MEDICARE

## 2023-04-05 PROCEDURE — 72125 CT NECK SPINE W/O DYE: CPT

## 2023-04-05 PROCEDURE — 99284 EMERGENCY DEPT VISIT MOD MDM: CPT

## 2023-04-05 PROCEDURE — 70450 CT HEAD/BRAIN W/O DYE: CPT

## 2023-04-05 RX ORDER — BACITRACIN 500 [USP'U]/G
OINTMENT TOPICAL 2 TIMES DAILY
Qty: 28 G | Refills: 0 | Status: SHIPPED
Start: 2023-04-05 | End: 2023-04-12

## 2023-04-05 NOTE — ED NOTES
Patient soiled herself with urine- Patient cleansed and clean gown applied. Patient placed on a clean sheet and given a warm blanket. Patient is awaiting transport to return home.

## 2023-04-05 NOTE — ED TRIAGE NOTES
Glf on plavix, unwitnessed. Per nuring staff at [facility] patient was found down covered in feces. Nursing staff got patient up into wheelchair and gave her a bath before calling ems for an 1/2 in lac to middle of scalp. Bleeding controlled prior to arrival. Baseline dementia.

## 2023-04-05 NOTE — ED NOTES
Spoke to [place], staff at [facility] and made her aware the patient is returning. Brief report given. Staff from transport also here; report given and patient loaded on the stretcher. Patient left the ED in stable condition with no complaints.

## 2023-04-05 NOTE — ED PROVIDER NOTES
The history is provided by the EMS personnel and the nursing home. Fall  The accident occurred 1 to 2 hours ago. The fall occurred in unknown circumstances. She fell from a height of ground level. She landed on Hard floor. The volume of blood lost was minimal. The point of impact was the head. The pain is mild. The risk factors include dementia and being elderly (on plavix). She has tried nothing for the symptoms. Past Medical History:   Diagnosis Date    Acute CVA (cerebrovascular accident) ([address] 4/10/2017    Per MRI:  Posterior L frontal/parietal territory    Cerebral atrophy ([address] 4/10/2017    History of CVA (cerebrovascular accident) 4/10/2017    --L frontal acute infarct and LMCA    Hyperlipidemia LDL goal <70 4/10/2017    Hypertension     Stage II pressure ulcer of sacral region ([address] 2020    Status post hip surgery      Left hip bipolar hemiarthroplasty on 20     TIA (transient ischemic attack)     Unresponsive episode 8/3/2017    transient       Past Surgical History:   Procedure Laterality Date    HX HEENT      Tonsilectomy age 8         Family History:   Problem Relation Age of Onset    Dementia Mother 61    Heart Attack Father 79       Social History     Socioeconomic History    Marital status:      Spouse name: Not on file    Number of children: Not on file    Years of education: Not on file    Highest education level: Not on file   Occupational History    Not on file   Tobacco Use    Smoking status: Former     Types: Cigarettes     Quit date: 1999     Years since quittin.7    Smokeless tobacco: Never   Substance and Sexual Activity    Alcohol use:  Yes     Alcohol/week: 17.5 standard drinks     Types: 21 Glasses of wine per week     Comment: 3 glasses of port per day    Drug use: No    Sexual activity: Never   Other Topics Concern    Not on file   Social History Narrative    Not on file     Social Determinants of Health     Financial Resource Strain: Not on file   Food Insecurity: Not on file   Transportation Needs: Not on file   Physical Activity: Not on file   Stress: Not on file   Social Connections: Not on file   Intimate Partner Violence: Not on file   Housing Stability: Not on file         ALLERGIES: Benadryl allergy/cold [diphenhyd-pe-acetaminophen] and Penicillins    Review of Systems    Vitals:    04/05/23 0135 04/05/23 0245   BP: (!) 165/90    Pulse: 81 77   Resp: 14    Temp: 98.1 °F (36.7 °C)    SpO2: 95%    Weight: 57.5 kg (126 lb 12.2 oz)    Height: 4' 11\" (1.499 m)             Physical Exam  Vitals and nursing note reviewed. Constitutional:       General: She is not in acute distress. Appearance: She is well-developed. HENT:      Head: Normocephalic. Comments: Punctate laceration of occipital scalp with underlying contusion  Eyes:      Conjunctiva/sclera: Conjunctivae normal.   Cardiovascular:      Rate and Rhythm: Normal rate and regular rhythm. Pulmonary:      Effort: Pulmonary effort is normal. No respiratory distress. Abdominal:      General: There is no distension. Musculoskeletal:         General: No deformity. Normal range of motion. Cervical back: Neck supple. Skin:     General: Skin is warm and dry. Neurological:      Mental Status: She is alert. Mental status is at baseline. She is disoriented. Cranial Nerves: No cranial nerve deficit. Psychiatric:         Mood and Affect: Mood normal.         Behavior: Behavior normal.        Medical Decision Making  78 y.o. female presents with unwitnessed fall from [facility] care facility. She has a small scalp laceration that is well approximated and will not require repair. Topical ABx applied, wound is hemostatic. Provided instructions for supportive care measures. CT head and neck shows expected cortical atrophy without bleeding, no c-spine fracture or injury. She is able to stand under her own power and is pleasant and talkative throughout ED course but is an ongoing fall risk.  Stable for discharge to resume previous care. Problems Addressed:  Fall, initial encounter: acute illness or injury  Scalp laceration, initial encounter: acute illness or injury    Amount and/or Complexity of Data Reviewed  Independent Historian: EMS  Radiology: ordered and independent interpretation performed. Decision-making details documented in ED Course. Risk  OTC drugs.            Procedures

## 2023-04-14 PROBLEM — N39.0 UTI (URINARY TRACT INFECTION): Status: RESOLVED | Noted: 2020-05-26 | Resolved: 2023-04-14

## 2023-04-14 PROBLEM — R41.89 UNRESPONSIVE EPISODE: Status: RESOLVED | Noted: 2017-08-03 | Resolved: 2023-04-14

## 2023-04-14 PROBLEM — M62.82 RHABDOMYOLYSIS: Status: RESOLVED | Noted: 2020-05-26 | Resolved: 2023-04-14

## 2023-04-14 PROBLEM — R41.89 COGNITIVE IMPAIRMENT: Status: RESOLVED | Noted: 2020-12-29 | Resolved: 2023-04-14

## 2023-04-14 PROBLEM — S72.009A FEMORAL NECK FRACTURE (HCC): Status: RESOLVED | Noted: 2020-05-20 | Resolved: 2023-04-14

## 2023-04-14 PROBLEM — I63.9 ACUTE CVA (CEREBROVASCULAR ACCIDENT) (HCC): Status: RESOLVED | Noted: 2017-04-10 | Resolved: 2023-04-14

## 2023-04-14 PROBLEM — R40.4 UNRESPONSIVE EPISODE: Status: RESOLVED | Noted: 2017-08-03 | Resolved: 2023-04-14

## 2023-04-21 ENCOUNTER — DOCUMENTATION ONLY (OUTPATIENT)
Dept: SURGERY | Age: 83
End: 2023-04-21

## 2023-05-30 RX ORDER — ATORVASTATIN CALCIUM 40 MG/1
40 TABLET, FILM COATED ORAL
COMMUNITY
Start: 2020-12-03

## 2023-05-30 RX ORDER — CLOPIDOGREL BISULFATE 75 MG/1
75 TABLET ORAL DAILY
COMMUNITY
Start: 2020-12-03

## 2023-05-30 RX ORDER — PSEUDOEPHEDRINE HCL 30 MG
100 TABLET ORAL DAILY
COMMUNITY

## 2023-05-30 RX ORDER — ACETAMINOPHEN 500 MG
1000 TABLET ORAL 3 TIMES DAILY PRN
COMMUNITY
Start: 2023-04-10

## 2023-05-30 RX ORDER — TRAMADOL HYDROCHLORIDE 50 MG/1
50 TABLET ORAL EVERY 6 HOURS PRN
COMMUNITY
End: 2023-06-06 | Stop reason: ALTCHOICE

## 2023-05-30 RX ORDER — BISACODYL 10 MG
10 SUPPOSITORY, RECTAL RECTAL
COMMUNITY
Start: 2022-01-31 | End: 2023-06-06 | Stop reason: CLARIF

## 2023-05-30 RX ORDER — MEMANTINE HYDROCHLORIDE 10 MG/1
10 TABLET ORAL 2 TIMES DAILY
COMMUNITY
Start: 2020-12-03

## 2023-06-06 ENCOUNTER — OUTSIDE SERVICES (OUTPATIENT)
Age: 83
End: 2023-06-06

## 2023-06-06 VITALS
OXYGEN SATURATION: 97 % | WEIGHT: 111.6 LBS | TEMPERATURE: 98.4 F | HEART RATE: 64 BPM | RESPIRATION RATE: 14 BRPM | BODY MASS INDEX: 22.54 KG/M2 | SYSTOLIC BLOOD PRESSURE: 106 MMHG | DIASTOLIC BLOOD PRESSURE: 63 MMHG

## 2023-06-06 DIAGNOSIS — G47.00 INSOMNIA, UNSPECIFIED TYPE: ICD-10-CM

## 2023-06-06 DIAGNOSIS — R68.89 FLUCTUATION OF WEIGHT: ICD-10-CM

## 2023-06-06 DIAGNOSIS — I42.9 CARDIOMYOPATHY, UNSPECIFIED TYPE (HCC): ICD-10-CM

## 2023-06-06 DIAGNOSIS — I48.0 PAROXYSMAL ATRIAL FIBRILLATION (HCC): ICD-10-CM

## 2023-06-06 DIAGNOSIS — Z86.73 HISTORY OF CVA (CEREBROVASCULAR ACCIDENT): ICD-10-CM

## 2023-06-06 DIAGNOSIS — R60.0 BILATERAL LOWER EXTREMITY EDEMA: ICD-10-CM

## 2023-06-06 DIAGNOSIS — F03.90 DEMENTIA WITHOUT BEHAVIORAL DISTURBANCE (HCC): Primary | ICD-10-CM

## 2023-06-06 DIAGNOSIS — Z87.81 HISTORY OF HIP FRACTURE: ICD-10-CM

## 2023-06-06 DIAGNOSIS — I10 ESSENTIAL HYPERTENSION: ICD-10-CM

## 2023-06-06 DIAGNOSIS — L89.152 STAGE II PRESSURE ULCER OF SACRAL REGION (HCC): ICD-10-CM

## 2023-06-06 DIAGNOSIS — K59.00 CONSTIPATION, UNSPECIFIED CONSTIPATION TYPE: ICD-10-CM

## 2023-08-03 ENCOUNTER — OUTSIDE SERVICES (OUTPATIENT)
Age: 83
End: 2023-08-03

## 2023-08-03 DIAGNOSIS — I48.0 PAROXYSMAL ATRIAL FIBRILLATION (HCC): Primary | ICD-10-CM

## 2023-08-03 DIAGNOSIS — G47.00 INSOMNIA, UNSPECIFIED TYPE: ICD-10-CM

## 2023-08-03 DIAGNOSIS — K59.00 CONSTIPATION, UNSPECIFIED CONSTIPATION TYPE: ICD-10-CM

## 2023-08-03 DIAGNOSIS — I42.9 CARDIOMYOPATHY, UNSPECIFIED TYPE (HCC): ICD-10-CM

## 2023-08-03 DIAGNOSIS — R60.0 BILATERAL LOWER EXTREMITY EDEMA: ICD-10-CM

## 2023-08-03 DIAGNOSIS — Z86.73 HISTORY OF CVA (CEREBROVASCULAR ACCIDENT): ICD-10-CM

## 2023-08-03 DIAGNOSIS — R68.89 FLUCTUATION OF WEIGHT: ICD-10-CM

## 2023-08-03 DIAGNOSIS — I10 ESSENTIAL HYPERTENSION: ICD-10-CM

## 2023-08-03 DIAGNOSIS — S62.301D: ICD-10-CM

## 2023-08-03 DIAGNOSIS — Z87.81 HISTORY OF HIP FRACTURE: ICD-10-CM

## 2023-08-03 DIAGNOSIS — L89.152 SACRAL DECUBITUS ULCER, STAGE II (HCC): ICD-10-CM

## 2023-08-03 DIAGNOSIS — F03.90 DEMENTIA WITHOUT BEHAVIORAL DISTURBANCE (HCC): ICD-10-CM

## 2023-08-03 ASSESSMENT — ENCOUNTER SYMPTOMS
SHORTNESS OF BREATH: 0
ABDOMINAL PAIN: 0
DIARRHEA: 0

## 2023-08-05 VITALS
WEIGHT: 111 LBS | TEMPERATURE: 96.7 F | RESPIRATION RATE: 18 BRPM | BODY MASS INDEX: 22.42 KG/M2 | HEART RATE: 78 BPM | SYSTOLIC BLOOD PRESSURE: 116 MMHG | OXYGEN SATURATION: 96 % | DIASTOLIC BLOOD PRESSURE: 67 MMHG

## 2023-08-31 ENCOUNTER — APPOINTMENT (OUTPATIENT)
Facility: HOSPITAL | Age: 83
DRG: 280 | End: 2023-08-31
Payer: MEDICARE

## 2023-08-31 ENCOUNTER — APPOINTMENT (OUTPATIENT)
Dept: VASCULAR SURGERY | Facility: HOSPITAL | Age: 83
DRG: 280 | End: 2023-08-31
Payer: MEDICARE

## 2023-08-31 ENCOUNTER — HOSPITAL ENCOUNTER (INPATIENT)
Facility: HOSPITAL | Age: 83
LOS: 6 days | Discharge: NURSING, REHAB OR LONG-TERM CARE | DRG: 280 | End: 2023-09-06
Attending: EMERGENCY MEDICINE | Admitting: FAMILY MEDICINE
Payer: MEDICARE

## 2023-08-31 DIAGNOSIS — N39.0 URINARY TRACT INFECTION WITHOUT HEMATURIA, SITE UNSPECIFIED: Primary | ICD-10-CM

## 2023-08-31 DIAGNOSIS — R55 SYNCOPE AND COLLAPSE: ICD-10-CM

## 2023-08-31 DIAGNOSIS — R11.2 NAUSEA AND VOMITING, UNSPECIFIED VOMITING TYPE: ICD-10-CM

## 2023-08-31 DIAGNOSIS — R57.9 SHOCK (HCC): ICD-10-CM

## 2023-08-31 DIAGNOSIS — R55 SYNCOPE, UNSPECIFIED SYNCOPE TYPE: ICD-10-CM

## 2023-08-31 PROBLEM — A41.9 SEPSIS (HCC): Status: ACTIVE | Noted: 2023-08-31

## 2023-08-31 LAB
ALBUMIN SERPL-MCNC: 2.9 G/DL (ref 3.5–5)
ALBUMIN/GLOB SERPL: 0.9 (ref 1.1–2.2)
ALP SERPL-CCNC: 62 U/L (ref 45–117)
ALT SERPL-CCNC: 17 U/L (ref 12–78)
ANION GAP BLD CALC-SCNC: 9 (ref 10–20)
ANION GAP SERPL CALC-SCNC: 4 MMOL/L (ref 5–15)
APPEARANCE UR: ABNORMAL
AST SERPL-CCNC: 19 U/L (ref 15–37)
BACTERIA URNS QL MICRO: ABNORMAL /HPF
BASE EXCESS BLD CALC-SCNC: 1.4 MMOL/L
BASOPHILS # BLD: 0.1 K/UL (ref 0–0.1)
BASOPHILS NFR BLD: 1 % (ref 0–1)
BILIRUB SERPL-MCNC: 0.5 MG/DL (ref 0.2–1)
BILIRUB UR QL: NEGATIVE
BUN SERPL-MCNC: 22 MG/DL (ref 6–20)
BUN/CREAT SERPL: 22 (ref 12–20)
CA-I BLD-MCNC: 1.19 MMOL/L (ref 1.12–1.32)
CALCIUM SERPL-MCNC: 8.9 MG/DL (ref 8.5–10.1)
CHLORIDE BLD-SCNC: 108 MMOL/L (ref 100–108)
CHLORIDE SERPL-SCNC: 112 MMOL/L (ref 97–108)
CO2 BLD-SCNC: 27 MMOL/L (ref 19–24)
CO2 SERPL-SCNC: 28 MMOL/L (ref 21–32)
COLOR UR: ABNORMAL
COMMENT:: NORMAL
CREAT SERPL-MCNC: 1 MG/DL (ref 0.55–1.02)
CREAT UR-MCNC: 1 MG/DL (ref 0.6–1.3)
DIFFERENTIAL METHOD BLD: ABNORMAL
EOSINOPHIL # BLD: 0.4 K/UL (ref 0–0.4)
EOSINOPHIL NFR BLD: 5 % (ref 0–7)
EPITH CASTS URNS QL MICRO: ABNORMAL /LPF
ERYTHROCYTE [DISTWIDTH] IN BLOOD BY AUTOMATED COUNT: 13.7 % (ref 11.5–14.5)
GLOBULIN SER CALC-MCNC: 3.3 G/DL (ref 2–4)
GLUCOSE BLD STRIP.AUTO-MCNC: 126 MG/DL (ref 74–106)
GLUCOSE SERPL-MCNC: 132 MG/DL (ref 65–100)
GLUCOSE UR STRIP.AUTO-MCNC: NEGATIVE MG/DL
HCO3 BLDA-SCNC: 27 MMOL/L
HCT VFR BLD AUTO: 38.1 % (ref 35–47)
HGB BLD-MCNC: 12.2 G/DL (ref 11.5–16)
HGB UR QL STRIP: ABNORMAL
IMM GRANULOCYTES # BLD AUTO: 0 K/UL (ref 0–0.04)
IMM GRANULOCYTES NFR BLD AUTO: 0 % (ref 0–0.5)
KETONES UR QL STRIP.AUTO: NEGATIVE MG/DL
LACTATE BLD-SCNC: 1.14 MMOL/L (ref 0.4–2)
LEUKOCYTE ESTERASE UR QL STRIP.AUTO: ABNORMAL
LYMPHOCYTES # BLD: 1.2 K/UL (ref 0.8–3.5)
LYMPHOCYTES NFR BLD: 14 % (ref 12–49)
MAGNESIUM SERPL-MCNC: 2.1 MG/DL (ref 1.6–2.4)
MCH RBC QN AUTO: 32.4 PG (ref 26–34)
MCHC RBC AUTO-ENTMCNC: 32 G/DL (ref 30–36.5)
MCV RBC AUTO: 101.3 FL (ref 80–99)
MONOCYTES # BLD: 0.7 K/UL (ref 0–1)
MONOCYTES NFR BLD: 9 % (ref 5–13)
NEUTS SEG # BLD: 6.3 K/UL (ref 1.8–8)
NEUTS SEG NFR BLD: 71 % (ref 32–75)
NITRITE UR QL STRIP.AUTO: POSITIVE
NRBC # BLD: 0 K/UL (ref 0–0.01)
NRBC BLD-RTO: 0 PER 100 WBC
PCO2 BLDV: 46.3 MMHG (ref 41–51)
PH BLDV: 7.38 (ref 7.32–7.42)
PH UR STRIP: 6.5 (ref 5–8)
PLATELET # BLD AUTO: 206 K/UL (ref 150–400)
PMV BLD AUTO: 9.9 FL (ref 8.9–12.9)
PO2 BLDV: 20 MMHG (ref 25–40)
POTASSIUM BLD-SCNC: 3.9 MMOL/L (ref 3.5–5.5)
POTASSIUM SERPL-SCNC: 3.8 MMOL/L (ref 3.5–5.1)
PROCALCITONIN SERPL-MCNC: <0.05 NG/ML
PROT SERPL-MCNC: 6.2 G/DL (ref 6.4–8.2)
PROT UR STRIP-MCNC: 30 MG/DL
RBC # BLD AUTO: 3.76 M/UL (ref 3.8–5.2)
RBC #/AREA URNS HPF: ABNORMAL /HPF (ref 0–5)
SAO2 % BLD: 31 %
SARS-COV-2 RDRP RESP QL NAA+PROBE: NOT DETECTED
SODIUM BLD-SCNC: 144 MMOL/L (ref 136–145)
SODIUM SERPL-SCNC: 144 MMOL/L (ref 136–145)
SOURCE: NORMAL
SP GR UR REFRACTOMETRY: 1.01 (ref 1–1.03)
SPECIMEN HOLD: NORMAL
SPECIMEN SITE: ABNORMAL
TROPONIN I SERPL HS-MCNC: 20 NG/L (ref 0–51)
URINE CULTURE IF INDICATED: ABNORMAL
UROBILINOGEN UR QL STRIP.AUTO: 1 EU/DL (ref 0.2–1)
WBC # BLD AUTO: 8.7 K/UL (ref 3.6–11)
WBC URNS QL MICRO: >100 /HPF (ref 0–4)

## 2023-08-31 PROCEDURE — 99285 EMERGENCY DEPT VISIT HI MDM: CPT

## 2023-08-31 PROCEDURE — 71045 X-RAY EXAM CHEST 1 VIEW: CPT

## 2023-08-31 PROCEDURE — 82330 ASSAY OF CALCIUM: CPT

## 2023-08-31 PROCEDURE — 96375 TX/PRO/DX INJ NEW DRUG ADDON: CPT

## 2023-08-31 PROCEDURE — 6360000002 HC RX W HCPCS

## 2023-08-31 PROCEDURE — 2580000003 HC RX 258: Performed by: EMERGENCY MEDICINE

## 2023-08-31 PROCEDURE — 83735 ASSAY OF MAGNESIUM: CPT

## 2023-08-31 PROCEDURE — 2000000000 HC ICU R&B

## 2023-08-31 PROCEDURE — 82947 ASSAY GLUCOSE BLOOD QUANT: CPT

## 2023-08-31 PROCEDURE — 6360000002 HC RX W HCPCS: Performed by: EMERGENCY MEDICINE

## 2023-08-31 PROCEDURE — 87086 URINE CULTURE/COLONY COUNT: CPT

## 2023-08-31 PROCEDURE — 96361 HYDRATE IV INFUSION ADD-ON: CPT

## 2023-08-31 PROCEDURE — 74176 CT ABD & PELVIS W/O CONTRAST: CPT

## 2023-08-31 PROCEDURE — 96374 THER/PROPH/DIAG INJ IV PUSH: CPT

## 2023-08-31 PROCEDURE — 87635 SARS-COV-2 COVID-19 AMP PRB: CPT

## 2023-08-31 PROCEDURE — 93005 ELECTROCARDIOGRAM TRACING: CPT | Performed by: EMERGENCY MEDICINE

## 2023-08-31 PROCEDURE — 36415 COLL VENOUS BLD VENIPUNCTURE: CPT

## 2023-08-31 PROCEDURE — 87150 DNA/RNA AMPLIFIED PROBE: CPT

## 2023-08-31 PROCEDURE — 87186 SC STD MICRODIL/AGAR DIL: CPT

## 2023-08-31 PROCEDURE — 84484 ASSAY OF TROPONIN QUANT: CPT

## 2023-08-31 PROCEDURE — 81001 URINALYSIS AUTO W/SCOPE: CPT

## 2023-08-31 PROCEDURE — 84145 PROCALCITONIN (PCT): CPT

## 2023-08-31 PROCEDURE — 87040 BLOOD CULTURE FOR BACTERIA: CPT

## 2023-08-31 PROCEDURE — 2580000003 HC RX 258

## 2023-08-31 PROCEDURE — 84132 ASSAY OF SERUM POTASSIUM: CPT

## 2023-08-31 PROCEDURE — 80053 COMPREHEN METABOLIC PANEL: CPT

## 2023-08-31 PROCEDURE — 87077 CULTURE AEROBIC IDENTIFY: CPT

## 2023-08-31 PROCEDURE — 70450 CT HEAD/BRAIN W/O DYE: CPT

## 2023-08-31 PROCEDURE — 84295 ASSAY OF SERUM SODIUM: CPT

## 2023-08-31 PROCEDURE — 85025 COMPLETE CBC W/AUTO DIFF WBC: CPT

## 2023-08-31 PROCEDURE — 82803 BLOOD GASES ANY COMBINATION: CPT

## 2023-08-31 RX ORDER — ONDANSETRON 2 MG/ML
4 INJECTION INTRAMUSCULAR; INTRAVENOUS ONCE
Status: COMPLETED | OUTPATIENT
Start: 2023-08-31 | End: 2023-08-31

## 2023-08-31 RX ORDER — 0.9 % SODIUM CHLORIDE 0.9 %
1000 INTRAVENOUS SOLUTION INTRAVENOUS ONCE
Status: COMPLETED | OUTPATIENT
Start: 2023-08-31 | End: 2023-08-31

## 2023-08-31 RX ORDER — SODIUM CHLORIDE 9 MG/ML
INJECTION, SOLUTION INTRAVENOUS CONTINUOUS
Status: DISCONTINUED | OUTPATIENT
Start: 2023-08-31 | End: 2023-09-01

## 2023-08-31 RX ORDER — POLYETHYLENE GLYCOL 3350 17 G/17G
17 POWDER, FOR SOLUTION ORAL DAILY PRN
Status: DISCONTINUED | OUTPATIENT
Start: 2023-08-31 | End: 2023-09-06 | Stop reason: HOSPADM

## 2023-08-31 RX ORDER — SODIUM CHLORIDE 9 MG/ML
INJECTION, SOLUTION INTRAVENOUS PRN
Status: DISCONTINUED | OUTPATIENT
Start: 2023-08-31 | End: 2023-09-06 | Stop reason: HOSPADM

## 2023-08-31 RX ORDER — ENOXAPARIN SODIUM 100 MG/ML
40 INJECTION SUBCUTANEOUS DAILY
Status: DISCONTINUED | OUTPATIENT
Start: 2023-08-31 | End: 2023-09-01

## 2023-08-31 RX ORDER — ONDANSETRON 2 MG/ML
4 INJECTION INTRAMUSCULAR; INTRAVENOUS EVERY 6 HOURS PRN
Status: DISCONTINUED | OUTPATIENT
Start: 2023-08-31 | End: 2023-09-06 | Stop reason: HOSPADM

## 2023-08-31 RX ORDER — MEMANTINE HYDROCHLORIDE 10 MG/1
10 TABLET ORAL 2 TIMES DAILY
Status: DISCONTINUED | OUTPATIENT
Start: 2023-08-31 | End: 2023-09-06 | Stop reason: HOSPADM

## 2023-08-31 RX ORDER — LANOLIN ALCOHOL/MO/W.PET/CERES
5 CREAM (GRAM) TOPICAL NIGHTLY
Status: DISCONTINUED | OUTPATIENT
Start: 2023-08-31 | End: 2023-09-06 | Stop reason: HOSPADM

## 2023-08-31 RX ORDER — CLOPIDOGREL BISULFATE 75 MG/1
75 TABLET ORAL DAILY
Status: DISCONTINUED | OUTPATIENT
Start: 2023-09-01 | End: 2023-09-06 | Stop reason: HOSPADM

## 2023-08-31 RX ORDER — ACETAMINOPHEN 650 MG/1
650 SUPPOSITORY RECTAL EVERY 6 HOURS PRN
Status: DISCONTINUED | OUTPATIENT
Start: 2023-08-31 | End: 2023-09-06 | Stop reason: HOSPADM

## 2023-08-31 RX ORDER — 0.9 % SODIUM CHLORIDE 0.9 %
500 INTRAVENOUS SOLUTION INTRAVENOUS ONCE
Status: COMPLETED | OUTPATIENT
Start: 2023-08-31 | End: 2023-08-31

## 2023-08-31 RX ORDER — SODIUM CHLORIDE 0.9 % (FLUSH) 0.9 %
5-40 SYRINGE (ML) INJECTION PRN
Status: DISCONTINUED | OUTPATIENT
Start: 2023-08-31 | End: 2023-09-06 | Stop reason: HOSPADM

## 2023-08-31 RX ORDER — 0.9 % SODIUM CHLORIDE 0.9 %
836 INTRAVENOUS SOLUTION INTRAVENOUS ONCE
Status: COMPLETED | OUTPATIENT
Start: 2023-08-31 | End: 2023-08-31

## 2023-08-31 RX ORDER — ONDANSETRON 4 MG/1
4 TABLET, ORALLY DISINTEGRATING ORAL EVERY 8 HOURS PRN
Status: DISCONTINUED | OUTPATIENT
Start: 2023-08-31 | End: 2023-09-06 | Stop reason: HOSPADM

## 2023-08-31 RX ORDER — SODIUM CHLORIDE 0.9 % (FLUSH) 0.9 %
5-40 SYRINGE (ML) INJECTION EVERY 12 HOURS SCHEDULED
Status: DISCONTINUED | OUTPATIENT
Start: 2023-08-31 | End: 2023-09-06 | Stop reason: HOSPADM

## 2023-08-31 RX ORDER — ATORVASTATIN CALCIUM 20 MG/1
40 TABLET, FILM COATED ORAL NIGHTLY
Status: DISCONTINUED | OUTPATIENT
Start: 2023-08-31 | End: 2023-09-06 | Stop reason: HOSPADM

## 2023-08-31 RX ORDER — ACETAMINOPHEN 325 MG/1
650 TABLET ORAL EVERY 6 HOURS PRN
Status: DISCONTINUED | OUTPATIENT
Start: 2023-08-31 | End: 2023-09-06 | Stop reason: HOSPADM

## 2023-08-31 RX ADMIN — SODIUM CHLORIDE 500 ML: 9 INJECTION, SOLUTION INTRAVENOUS at 21:14

## 2023-08-31 RX ADMIN — ENOXAPARIN SODIUM 40 MG: 100 INJECTION SUBCUTANEOUS at 21:16

## 2023-08-31 RX ADMIN — WATER 1000 MG: 1 INJECTION INTRAMUSCULAR; INTRAVENOUS; SUBCUTANEOUS at 17:26

## 2023-08-31 RX ADMIN — PHENYLEPHRINE HYDROCHLORIDE 30 MCG/MIN: 10 INJECTION INTRAVENOUS at 20:15

## 2023-08-31 RX ADMIN — SODIUM CHLORIDE, PRESERVATIVE FREE 10 ML: 5 INJECTION INTRAVENOUS at 21:17

## 2023-08-31 RX ADMIN — SODIUM CHLORIDE: 9 INJECTION, SOLUTION INTRAVENOUS at 20:05

## 2023-08-31 RX ADMIN — SODIUM CHLORIDE 1000 ML: 9 INJECTION, SOLUTION INTRAVENOUS at 16:30

## 2023-08-31 RX ADMIN — ONDANSETRON 4 MG: 2 INJECTION INTRAMUSCULAR; INTRAVENOUS at 17:24

## 2023-08-31 RX ADMIN — SODIUM CHLORIDE 836 ML: 9 INJECTION, SOLUTION INTRAVENOUS at 17:26

## 2023-08-31 ASSESSMENT — ENCOUNTER SYMPTOMS
ABDOMINAL PAIN: 0
VOMITING: 1
NAUSEA: 1

## 2023-08-31 ASSESSMENT — PAIN SCALES - GENERAL
PAINLEVEL_OUTOF10: 4
PAINLEVEL_OUTOF10: 0

## 2023-08-31 ASSESSMENT — PAIN - FUNCTIONAL ASSESSMENT: PAIN_FUNCTIONAL_ASSESSMENT: CRITICAL CARE PAIN OBSERVATION TOOL (CPOT)

## 2023-08-31 NOTE — ED PROVIDER NOTES
[facility] EMERGENCY DEPT  EMERGENCY DEPARTMENT ENCOUNTER      Pt Name: Annika Saavedra  MRN: [id number]  [address] [facility] 1940  Date of evaluation: 8/31/2023  Provider: Jalen Moss MD    CHIEF COMPLAINT       Chief Complaint   Patient presents with    Altered Mental Status         HISTORY OF PRESENT ILLNESS    Lolita Baer is an 79 yo F with h/o dementia, CVA, HTN and paroxysmal a-fib on plavix who is a resident at [facility]. EMS reports that staff noted patient vomited twice and  had syncopal episode. Patient hypotensive during transport and received 500ml. Additional history from independent historians:     Review of External Medical Records:     Nursing Notes were reviewed. REVIEW OF SYSTEMS       Review of Systems   Unable to perform ROS: Dementia   Gastrointestinal:  Positive for nausea and vomiting. Neurological:  Positive for syncope. Except as noted above the remainder of the review of systems was reviewed and negative.        PAST MEDICAL HISTORY     Past Medical History:   Diagnosis Date    Acute CVA (cerebrovascular accident) ([address]) 4/10/2017    Per MRI:  Posterior L frontal/parietal territory    Cerebral atrophy ([address]) 4/10/2017    Femoral neck fracture ([address]) 5/20/2020    History of CVA (cerebrovascular accident) 4/10/2017    --L frontal acute infarct and LMCA    Hyperlipidemia LDL goal <70 4/10/2017    Hypertension     Rhabdomyolysis 5/26/2020    Stage II pressure ulcer of sacral region ([address]) 11/9/2020    Status post hip surgery      Left hip bipolar hemiarthroplasty on 5/21/20     TIA (transient ischemic attack)     Unresponsive episode 8/3/2017    transient         SURGICAL HISTORY       Past Surgical History:   Procedure Laterality Date    HEENT      Tonsilectomy age 8         CURRENT MEDICATIONS       Previous Medications    ACETAMINOPHEN (TYLENOL) 500 MG TABLET    Take 2 tablets by mouth 3 times daily as needed    ATORVASTATIN (LIPITOR) 40 MG TABLET    Take 1 tablet by mouth

## 2023-08-31 NOTE — ED TRIAGE NOTES
Per EMS pt from [address] facility. Facility reports one initial vomiting episode this AM. Pt was fine until lunch after large meal pt vomited with syncopal episode. EMS report pt was near unresponsive on arrival. Pt screamed in pain during IV insertion and then became less responsive. Pt bp 78'A systolic with EMS. Given approx 500ml saline bolus with EMS.      Pt responsive to verbal stimuli

## 2023-09-01 ENCOUNTER — APPOINTMENT (OUTPATIENT)
Dept: VASCULAR SURGERY | Facility: HOSPITAL | Age: 83
DRG: 280 | End: 2023-09-01
Payer: MEDICARE

## 2023-09-01 ENCOUNTER — APPOINTMENT (OUTPATIENT)
Facility: HOSPITAL | Age: 83
DRG: 280 | End: 2023-09-01
Payer: MEDICARE

## 2023-09-01 DIAGNOSIS — R55 SYNCOPE AND COLLAPSE: ICD-10-CM

## 2023-09-01 DIAGNOSIS — F03.911 DEMENTIA WITH AGITATION, UNSPECIFIED DEMENTIA SEVERITY, UNSPECIFIED DEMENTIA TYPE (HCC): Primary | ICD-10-CM

## 2023-09-01 DIAGNOSIS — I21.4 NSTEMI (NON-ST ELEVATED MYOCARDIAL INFARCTION) (HCC): ICD-10-CM

## 2023-09-01 DIAGNOSIS — N30.00 ACUTE CYSTITIS WITHOUT HEMATURIA: ICD-10-CM

## 2023-09-01 DIAGNOSIS — R57.9 SHOCK (HCC): ICD-10-CM

## 2023-09-01 PROBLEM — E78.5 HYPERLIPIDEMIA LDL GOAL <70: Status: ACTIVE | Noted: 2017-04-10

## 2023-09-01 PROBLEM — Z86.73 HISTORY OF CVA (CEREBROVASCULAR ACCIDENT): Status: ACTIVE | Noted: 2017-04-10

## 2023-09-01 PROBLEM — E11.9 TYPE 2 DIABETES MELLITUS WITHOUT COMPLICATION (HCC): Status: ACTIVE | Noted: 2023-09-01

## 2023-09-01 PROBLEM — A41.9 SEPSIS (HCC): Status: RESOLVED | Noted: 2023-08-31 | Resolved: 2023-09-01

## 2023-09-01 PROBLEM — R77.8 ELEVATED TROPONIN: Status: ACTIVE | Noted: 2023-09-01

## 2023-09-01 PROBLEM — F03.90 DEMENTIA WITHOUT BEHAVIORAL DISTURBANCE (HCC): Status: ACTIVE | Noted: 2021-10-07

## 2023-09-01 PROBLEM — N39.0 URINARY TRACT INFECTION WITHOUT HEMATURIA: Status: ACTIVE | Noted: 2020-05-26

## 2023-09-01 PROBLEM — R79.89 ELEVATED TROPONIN: Status: ACTIVE | Noted: 2023-09-01

## 2023-09-01 LAB
AMMONIA PLAS-SCNC: 45 UMOL/L
ANION GAP SERPL CALC-SCNC: 4 MMOL/L (ref 5–15)
APAP SERPL-MCNC: 3 UG/ML (ref 10–30)
APTT PPP: 105.4 SEC (ref 22.1–31)
APTT PPP: 99 SEC (ref 22.1–31)
APTT PPP: >130 SEC (ref 22.1–31)
BASOPHILS # BLD: 0.1 K/UL (ref 0–0.1)
BASOPHILS NFR BLD: 1 % (ref 0–1)
BUN SERPL-MCNC: 15 MG/DL (ref 6–20)
BUN/CREAT SERPL: 19 (ref 12–20)
CALCIUM SERPL-MCNC: 8.1 MG/DL (ref 8.5–10.1)
CHLORIDE SERPL-SCNC: 118 MMOL/L (ref 97–108)
CO2 SERPL-SCNC: 25 MMOL/L (ref 21–32)
CREAT SERPL-MCNC: 0.77 MG/DL (ref 0.55–1.02)
DIFFERENTIAL METHOD BLD: ABNORMAL
ECHO AR MAX VEL PISA: 2.6 M/S
ECHO AV AREA PEAK VELOCITY: 2.3 CM2
ECHO AV AREA/BSA PEAK VELOCITY: 1.4 CM2/M2
ECHO AV PEAK GRADIENT: 3 MMHG
ECHO AV PEAK VELOCITY: 0.9 M/S
ECHO AV REGURGITANT PHT: 1098.3 MILLISECOND
ECHO AV VELOCITY RATIO: 0.56
ECHO BSA: 1.65 M2
ECHO EST RA PRESSURE: 8 MMHG
ECHO LA DIAMETER INDEX: 2.38 CM/M2
ECHO LA DIAMETER: 3.9 CM
ECHO LA VOL 2C: 25 ML (ref 22–52)
ECHO LA VOL 2C: 28 ML (ref 22–52)
ECHO LA VOL 4C: 23 ML (ref 22–52)
ECHO LA VOL 4C: 25 ML (ref 22–52)
ECHO LA VOL BP: 26 ML (ref 22–52)
ECHO LA VOL/BSA BIPLANE: 16 ML/M2 (ref 16–34)
ECHO LA VOLUME AREA LENGTH: 29 ML
ECHO LA VOLUME INDEX AREA LENGTH: 18 ML/M2 (ref 16–34)
ECHO LV EDV A2C: 75 ML
ECHO LV EDV A4C: 80 ML
ECHO LV EDV BP: 77 ML (ref 56–104)
ECHO LV EDV INDEX A4C: 49 ML/M2
ECHO LV EDV INDEX BP: 47 ML/M2
ECHO LV EDV NDEX A2C: 46 ML/M2
ECHO LV EJECTION FRACTION A2C: 50 %
ECHO LV EJECTION FRACTION A4C: 32 %
ECHO LV EJECTION FRACTION BIPLANE: 40 % (ref 55–100)
ECHO LV ESV A2C: 37 ML
ECHO LV ESV A4C: 54 ML
ECHO LV ESV BP: 46 ML (ref 19–49)
ECHO LV ESV INDEX A2C: 23 ML/M2
ECHO LV ESV INDEX A4C: 33 ML/M2
ECHO LV ESV INDEX BP: 28 ML/M2
ECHO LV FRACTIONAL SHORTENING: 15 % (ref 28–44)
ECHO LV INTERNAL DIMENSION DIASTOLE INDEX: 2.44 CM/M2
ECHO LV INTERNAL DIMENSION DIASTOLIC: 4 CM (ref 3.9–5.3)
ECHO LV INTERNAL DIMENSION SYSTOLIC INDEX: 2.07 CM/M2
ECHO LV INTERNAL DIMENSION SYSTOLIC: 3.4 CM
ECHO LV IVSD: 0.9 CM (ref 0.6–0.9)
ECHO LV MASS 2D: 109.7 G (ref 67–162)
ECHO LV MASS INDEX 2D: 66.9 G/M2 (ref 43–95)
ECHO LV POSTERIOR WALL DIASTOLIC: 0.9 CM (ref 0.6–0.9)
ECHO LV RELATIVE WALL THICKNESS RATIO: 0.45
ECHO LVOT AREA: 4.2 CM2
ECHO LVOT DIAM: 2.3 CM
ECHO LVOT MEAN GRADIENT: 1 MMHG
ECHO LVOT PEAK GRADIENT: 1 MMHG
ECHO LVOT PEAK VELOCITY: 0.5 M/S
ECHO LVOT STROKE VOLUME INDEX: 35.4 ML/M2
ECHO LVOT SV: 58.1 ML
ECHO LVOT VTI: 14 CM
ECHO MV REGURGITANT PEAK GRADIENT: 104 MMHG
ECHO MV REGURGITANT PEAK VELOCITY: 5.1 M/S
ECHO RIGHT VENTRICULAR SYSTOLIC PRESSURE (RVSP): 33 MMHG
ECHO RV INTERNAL DIMENSION: 3.2 CM
ECHO TV REGURGITANT MAX VELOCITY: 2.5 M/S
ECHO TV REGURGITANT PEAK GRADIENT: 25 MMHG
EKG ATRIAL RATE: 49 BPM
EKG ATRIAL RATE: 59 BPM
EKG DIAGNOSIS: NORMAL
EKG DIAGNOSIS: NORMAL
EKG P AXIS: 22 DEGREES
EKG P AXIS: 45 DEGREES
EKG P-R INTERVAL: 202 MS
EKG P-R INTERVAL: 222 MS
EKG Q-T INTERVAL: 462 MS
EKG Q-T INTERVAL: 492 MS
EKG QRS DURATION: 66 MS
EKG QRS DURATION: 80 MS
EKG QTC CALCULATION (BAZETT): 444 MS
EKG QTC CALCULATION (BAZETT): 457 MS
EKG R AXIS: 22 DEGREES
EKG R AXIS: 4 DEGREES
EKG T AXIS: 20 DEGREES
EKG T AXIS: 34 DEGREES
EKG VENTRICULAR RATE: 49 BPM
EKG VENTRICULAR RATE: 59 BPM
EOSINOPHIL # BLD: 0.3 K/UL (ref 0–0.4)
EOSINOPHIL NFR BLD: 3 % (ref 0–7)
ERYTHROCYTE [DISTWIDTH] IN BLOOD BY AUTOMATED COUNT: 13.9 % (ref 11.5–14.5)
ERYTHROCYTE [DISTWIDTH] IN BLOOD BY AUTOMATED COUNT: 14 % (ref 11.5–14.5)
EST. AVERAGE GLUCOSE BLD GHB EST-MCNC: 117 MG/DL
FOLATE SERPL-MCNC: 37.5 NG/ML (ref 5–21)
GLUCOSE SERPL-MCNC: 106 MG/DL (ref 65–100)
HBA1C MFR BLD: 5.7 % (ref 4–5.6)
HCT VFR BLD AUTO: 33.4 % (ref 35–47)
HCT VFR BLD AUTO: 33.4 % (ref 35–47)
HGB BLD-MCNC: 10.4 G/DL (ref 11.5–16)
HGB BLD-MCNC: 10.8 G/DL (ref 11.5–16)
IMM GRANULOCYTES # BLD AUTO: 0 K/UL (ref 0–0.04)
IMM GRANULOCYTES NFR BLD AUTO: 0 % (ref 0–0.5)
INR PPP: 1.1 (ref 0.9–1.1)
LYMPHOCYTES # BLD: 1.6 K/UL (ref 0.8–3.5)
LYMPHOCYTES NFR BLD: 18 % (ref 12–49)
MCH RBC QN AUTO: 31.9 PG (ref 26–34)
MCH RBC QN AUTO: 33 PG (ref 26–34)
MCHC RBC AUTO-ENTMCNC: 31.1 G/DL (ref 30–36.5)
MCHC RBC AUTO-ENTMCNC: 32.3 G/DL (ref 30–36.5)
MCV RBC AUTO: 102.1 FL (ref 80–99)
MCV RBC AUTO: 102.5 FL (ref 80–99)
MONOCYTES # BLD: 0.8 K/UL (ref 0–1)
MONOCYTES NFR BLD: 9 % (ref 5–13)
NEUTS SEG # BLD: 6.2 K/UL (ref 1.8–8)
NEUTS SEG NFR BLD: 69 % (ref 32–75)
NRBC # BLD: 0 K/UL (ref 0–0.01)
NRBC # BLD: 0 K/UL (ref 0–0.01)
NRBC BLD-RTO: 0 PER 100 WBC
NRBC BLD-RTO: 0 PER 100 WBC
NT PRO BNP: 3398 PG/ML
PLATELET # BLD AUTO: 169 K/UL (ref 150–400)
PLATELET # BLD AUTO: 177 K/UL (ref 150–400)
PMV BLD AUTO: 10 FL (ref 8.9–12.9)
PMV BLD AUTO: 9.8 FL (ref 8.9–12.9)
POTASSIUM SERPL-SCNC: 3.8 MMOL/L (ref 3.5–5.1)
PROTHROMBIN TIME: 11.1 SEC (ref 9–11.1)
RBC # BLD AUTO: 3.26 M/UL (ref 3.8–5.2)
RBC # BLD AUTO: 3.27 M/UL (ref 3.8–5.2)
SALICYLATES SERPL-MCNC: <1.7 MG/DL (ref 2.8–20)
SODIUM SERPL-SCNC: 147 MMOL/L (ref 136–145)
THERAPEUTIC RANGE: ABNORMAL SECS (ref 58–77)
TROPONIN I SERPL HS-MCNC: 6426 NG/L (ref 0–51)
TROPONIN I SERPL HS-MCNC: 8454 NG/L (ref 0–51)
TSH SERPL DL<=0.05 MIU/L-ACNC: 2.13 UIU/ML (ref 0.36–3.74)
VIT B12 SERPL-MCNC: 668 PG/ML (ref 193–986)
WBC # BLD AUTO: 9.1 K/UL (ref 3.6–11)
WBC # BLD AUTO: 9.1 K/UL (ref 3.6–11)

## 2023-09-01 PROCEDURE — 6360000002 HC RX W HCPCS

## 2023-09-01 PROCEDURE — APPSS45 APP SPLIT SHARED TIME 31-45 MINUTES: Performed by: NURSE PRACTITIONER

## 2023-09-01 PROCEDURE — 93010 ELECTROCARDIOGRAM REPORT: CPT | Performed by: STUDENT IN AN ORGANIZED HEALTH CARE EDUCATION/TRAINING PROGRAM

## 2023-09-01 PROCEDURE — 85610 PROTHROMBIN TIME: CPT

## 2023-09-01 PROCEDURE — 85730 THROMBOPLASTIN TIME PARTIAL: CPT

## 2023-09-01 PROCEDURE — 84484 ASSAY OF TROPONIN QUANT: CPT

## 2023-09-01 PROCEDURE — 85027 COMPLETE CBC AUTOMATED: CPT

## 2023-09-01 PROCEDURE — 85025 COMPLETE CBC W/AUTO DIFF WBC: CPT

## 2023-09-01 PROCEDURE — 93005 ELECTROCARDIOGRAM TRACING: CPT

## 2023-09-01 PROCEDURE — 2580000003 HC RX 258: Performed by: HOSPITALIST

## 2023-09-01 PROCEDURE — 99223 1ST HOSP IP/OBS HIGH 75: CPT | Performed by: INTERNAL MEDICINE

## 2023-09-01 PROCEDURE — 83880 ASSAY OF NATRIURETIC PEPTIDE: CPT

## 2023-09-01 PROCEDURE — 2580000003 HC RX 258: Performed by: NURSE PRACTITIONER

## 2023-09-01 PROCEDURE — 93306 TTE W/DOPPLER COMPLETE: CPT

## 2023-09-01 PROCEDURE — 80143 DRUG ASSAY ACETAMINOPHEN: CPT

## 2023-09-01 PROCEDURE — 6370000000 HC RX 637 (ALT 250 FOR IP): Performed by: HOSPITALIST

## 2023-09-01 PROCEDURE — 82746 ASSAY OF FOLIC ACID SERUM: CPT

## 2023-09-01 PROCEDURE — 2580000003 HC RX 258

## 2023-09-01 PROCEDURE — 80179 DRUG ASSAY SALICYLATE: CPT

## 2023-09-01 PROCEDURE — 84443 ASSAY THYROID STIM HORMONE: CPT

## 2023-09-01 PROCEDURE — 2000000000 HC ICU R&B

## 2023-09-01 PROCEDURE — 2500000003 HC RX 250 WO HCPCS: Performed by: HOSPITALIST

## 2023-09-01 PROCEDURE — 82607 VITAMIN B-12: CPT

## 2023-09-01 PROCEDURE — 2500000003 HC RX 250 WO HCPCS

## 2023-09-01 PROCEDURE — 86780 TREPONEMA PALLIDUM: CPT

## 2023-09-01 PROCEDURE — 83036 HEMOGLOBIN GLYCOSYLATED A1C: CPT

## 2023-09-01 PROCEDURE — 80048 BASIC METABOLIC PNL TOTAL CA: CPT

## 2023-09-01 PROCEDURE — 36415 COLL VENOUS BLD VENIPUNCTURE: CPT

## 2023-09-01 PROCEDURE — 82140 ASSAY OF AMMONIA: CPT

## 2023-09-01 PROCEDURE — 2500000003 HC RX 250 WO HCPCS: Performed by: NURSE PRACTITIONER

## 2023-09-01 RX ORDER — QUETIAPINE FUMARATE 25 MG/1
25 TABLET, FILM COATED ORAL NIGHTLY
Status: DISCONTINUED | OUTPATIENT
Start: 2023-09-01 | End: 2023-09-06 | Stop reason: HOSPADM

## 2023-09-01 RX ORDER — NOREPINEPHRINE BITARTRATE 0.06 MG/ML
1-100 INJECTION, SOLUTION INTRAVENOUS CONTINUOUS
Status: DISCONTINUED | OUTPATIENT
Start: 2023-09-01 | End: 2023-09-04

## 2023-09-01 RX ORDER — HEPARIN SODIUM 1000 [USP'U]/ML
60 INJECTION, SOLUTION INTRAVENOUS; SUBCUTANEOUS PRN
Status: DISCONTINUED | OUTPATIENT
Start: 2023-09-01 | End: 2023-09-03

## 2023-09-01 RX ORDER — DEXMEDETOMIDINE HYDROCHLORIDE 4 UG/ML
.1-1.5 INJECTION, SOLUTION INTRAVENOUS CONTINUOUS
Status: DISCONTINUED | OUTPATIENT
Start: 2023-09-01 | End: 2023-09-04

## 2023-09-01 RX ORDER — ASPIRIN 300 MG/1
300 SUPPOSITORY RECTAL
Status: COMPLETED | OUTPATIENT
Start: 2023-09-01 | End: 2023-09-01

## 2023-09-01 RX ORDER — HEPARIN SODIUM 1000 [USP'U]/ML
60 INJECTION, SOLUTION INTRAVENOUS; SUBCUTANEOUS ONCE
Status: COMPLETED | OUTPATIENT
Start: 2023-09-01 | End: 2023-09-01

## 2023-09-01 RX ORDER — OLANZAPINE 10 MG/1
INJECTION, POWDER, LYOPHILIZED, FOR SOLUTION INTRAMUSCULAR
Status: DISPENSED
Start: 2023-09-01 | End: 2023-09-02

## 2023-09-01 RX ORDER — HALOPERIDOL 5 MG/ML
2 INJECTION INTRAMUSCULAR ONCE
Status: COMPLETED | OUTPATIENT
Start: 2023-09-01 | End: 2023-09-01

## 2023-09-01 RX ORDER — HEPARIN SODIUM 1000 [USP'U]/ML
30 INJECTION, SOLUTION INTRAVENOUS; SUBCUTANEOUS PRN
Status: DISCONTINUED | OUTPATIENT
Start: 2023-09-01 | End: 2023-09-03

## 2023-09-01 RX ORDER — HEPARIN SODIUM 10000 [USP'U]/100ML
5-30 INJECTION, SOLUTION INTRAVENOUS CONTINUOUS
Status: DISCONTINUED | OUTPATIENT
Start: 2023-09-01 | End: 2023-09-03

## 2023-09-01 RX ORDER — HALOPERIDOL 5 MG/ML
2 INJECTION INTRAMUSCULAR EVERY 6 HOURS PRN
Status: DISCONTINUED | OUTPATIENT
Start: 2023-09-01 | End: 2023-09-01

## 2023-09-01 RX ADMIN — WATER 2.5 MG: 1 INJECTION INTRAMUSCULAR; INTRAVENOUS; SUBCUTANEOUS at 22:11

## 2023-09-01 RX ADMIN — SODIUM CHLORIDE, PRESERVATIVE FREE 10 ML: 5 INJECTION INTRAVENOUS at 07:54

## 2023-09-01 RX ADMIN — HALOPERIDOL LACTATE 2 MG: 5 INJECTION, SOLUTION INTRAMUSCULAR at 18:49

## 2023-09-01 RX ADMIN — HEPARIN SODIUM 3670 UNITS: 1000 INJECTION INTRAVENOUS; SUBCUTANEOUS at 06:43

## 2023-09-01 RX ADMIN — SODIUM CHLORIDE, PRESERVATIVE FREE 10 ML: 5 INJECTION INTRAVENOUS at 20:31

## 2023-09-01 RX ADMIN — WATER 1000 MG: 1 INJECTION INTRAMUSCULAR; INTRAVENOUS; SUBCUTANEOUS at 17:40

## 2023-09-01 RX ADMIN — SODIUM CHLORIDE: 9 INJECTION, SOLUTION INTRAVENOUS at 03:47

## 2023-09-01 RX ADMIN — ASPIRIN 300 MG: 300 SUPPOSITORY RECTAL at 09:23

## 2023-09-01 RX ADMIN — NOREPINEPHRINE BITARTRATE 5 MCG/MIN: 1 SOLUTION INTRAVENOUS at 09:09

## 2023-09-01 RX ADMIN — WATER 5 MG: 1 INJECTION INTRAMUSCULAR; INTRAVENOUS; SUBCUTANEOUS at 00:49

## 2023-09-01 RX ADMIN — DEXMEDETOMIDINE HYDROCHLORIDE 0.2 MCG/KG/HR: 400 INJECTION INTRAVENOUS at 02:44

## 2023-09-01 RX ADMIN — WATER 2.5 MG: 1 INJECTION INTRAMUSCULAR; INTRAVENOUS; SUBCUTANEOUS at 15:05

## 2023-09-01 RX ADMIN — HEPARIN SODIUM AND DEXTROSE 12 UNITS/KG/HR: 10000; 5 INJECTION INTRAVENOUS at 06:58

## 2023-09-01 ASSESSMENT — PAIN SCALES - GENERAL
PAINLEVEL_OUTOF10: 0
PAINLEVEL_OUTOF10: 0

## 2023-09-01 NOTE — CARE COORDINATION
09/01/23 1252   Service Assessment   Patient Orientation Person   History Provided By Child/Family   Primary Caregiver Other (Comment)  ([facility])   [facility] Children;Family Members   [address] is: Legal Next of [address] [place] [address]   PCP Verified by CM Yes   Last Visit to PCP Within last 3 months   Prior Functional Level Assistance with the following:;Bathing;Dressing; Toileting;Cooking;Housework; Shopping;Mobility   Can patient return to prior living arrangement Yes   Financial Resources Medicaid; Medicare   Social/Functional History   Lives With Other (comment)  ([facility])   ADL Assistance Needs assistance   Ambulation Assistance Needs assistance   Transfer Assistance Needs assistance   Discharge Planning   Type of [address] [facility]   Patient expects to be discharged to: Skilled nursing facility     Initial assessment was completed with patient's daughter, Janine Rebollar. Patient has been residing at [facility] in their memory center since September 2020. Daughter reports patient is w/c bound at baseline and requiring assistance with most ADLs. Patient is able to feed herself. Daughter Erin Courtney is reportedly patient's MPOA. Plan [facility] when medically stable.

## 2023-09-01 NOTE — CONSULTS
ICU consult Note      HPI:  78 y.o. female with w/ a PMHX of dementia, HTN, CVA in 2017, HLD admitted from SNF for altered mental status. Found to be quite hypotensive and nauseous. Received zofran and fluids, however, later became increasingly agitated. Subjective:   9/1: Agitated overnight. No improvement with zyprexa 5 mg IV, then started on low dose precedex, as well as phenylephrine. This morning her troponin noted to be very elevated. Echo shows depressed EF and WMA. Started on asa and heparin. Somnolent, lethargic, minimally responsive. Objective; Altered, somnolent  CTAB  Rrr, -mrt  No LE edema  Soft, NT, ND    Labs, imaging, echo were reviewed. Point of Care Echo was performed. Impression:  - NSTEMI - I  - Shock (cardiogenic/septic/hypovolemic)  - Encephalopathy and delirium  - UTI    Recommendations:  Neuro: AMS/Encephalopathy due to medical illness. Delirium precautions. Will add seroquel QHS for delirium. Cardio: Rectal ASA, heparin gtt. Statin and plavix when able to take PO. Cardiology consult - ? Cath. Sinus bradycardia due to precedex and phenylephrine. Switch phenylephrine to norepi. Pulm: stable    Renal: stable. Replete lytes    ID: CTX for UTI. Blood Cultures    Ppx: heparin Gtt    NPO pending improvement in mental status.     Goals of care discussions per primary team.

## 2023-09-01 NOTE — CONSULTS
ON SECOURS: [address] [facility]  Nini Barreto, [address] [facility] Neurology  [facility] [address]  [phone]      Name:   Russel Moran   Medical record #: [id number]  Admission Date: 8/31/2023     Who Consulted: Dr. Jomar Story    Reason for Consult: Altered mental status and syncope    HISTORY OF PRESENT ILLNESS:     This is a 78 y.o. female who is admitted for syncope, vomiting, hypotension.  presented to the ED from her skilled nursing facility on 8/31/2023 with vomiting and syncope. EMS reported that she was hypotensive during transport requiring a 500 mL bolus. Upon arrival to the ED the provider found her to be disoriented with a blood pressure of 90/47, afebrile, heart rate was 54. Review of labs significant for presenting sodium of 144 that is now up to 147, ammonia 45, BNP of 3398, troponin now 8454, CBC noncontributory, urine with 2+ bacteria and trace leukocytes. The Neurology Service is asked to evaluate for altered status. The family practice team was able to get in touch with her family and [address] [facility] core staff and documented the following:  Patient was behaving normally per [address] [place] [address] staff. After her lunch time activity, she had 2 episodes of vomiting and then passed out. BP at the time was low with a MAP of 60. She is AAOx1 (self) at baseline in the setting of her dementia, but responds appropriately to questions and is able to follow commands. Hx limited 2/2 pt's baseline dementia, called daughter and POA Amritmargarita Samy) to provide additional history. She was last seen by our team in 2017 by Dr. Alec Patten for 2 episodes of being unresponsive. At that time her exam was nonfocal and Dr. Alec Patten did not suspect ictus. Neuro-imaging:     CT Head: No acute process, enlarged ventricles      EKG: sinus bradycardia, 1st degree AV block.     Care Plan discussed with:  Patient x   Family    RN    Care Manager    Primary Team Provider    Consultant/Specialist

## 2023-09-01 NOTE — ED NOTES
1957: Family medicine attending notified in chart review: pt SBP 80-90. MAP <60. This is charge RN, primary RN made aware. Orders received for NS 500cc bolus. 2002: Discussed c/w primary RN, pt is still receiving 2nd NS bolus, notified MD.     2007: Orders received for Tariq gtts and pt admit order changed to Stepdown. 2022: Discussed with Nursing supervisor about placing pt in [address] bed. Primary RN notified.      Martha Gallagher RN  08/31/23 2027

## 2023-09-02 DIAGNOSIS — I21.4 NSTEMI (NON-ST ELEVATED MYOCARDIAL INFARCTION) (HCC): ICD-10-CM

## 2023-09-02 DIAGNOSIS — I10 ESSENTIAL HYPERTENSION: ICD-10-CM

## 2023-09-02 DIAGNOSIS — F03.911 DEMENTIA WITH AGITATION, UNSPECIFIED DEMENTIA SEVERITY, UNSPECIFIED DEMENTIA TYPE (HCC): ICD-10-CM

## 2023-09-02 DIAGNOSIS — R77.8 ELEVATED TROPONIN: ICD-10-CM

## 2023-09-02 DIAGNOSIS — I48.91 ATRIAL FIBRILLATION, UNSPECIFIED TYPE (HCC): ICD-10-CM

## 2023-09-02 DIAGNOSIS — D64.9 ANEMIA, UNSPECIFIED TYPE: Primary | ICD-10-CM

## 2023-09-02 DIAGNOSIS — E11.9 TYPE 2 DIABETES MELLITUS WITHOUT COMPLICATION, UNSPECIFIED WHETHER LONG TERM INSULIN USE (HCC): ICD-10-CM

## 2023-09-02 LAB
ACCESSION NUMBER, LLC1M: ABNORMAL
ACINETOBACTER CALCOAC BAUMANNII COMPLEX BY PCR: NOT DETECTED
ANION GAP SERPL CALC-SCNC: 5 MMOL/L (ref 5–15)
APTT PPP: 41.2 SEC (ref 22.1–31)
APTT PPP: 55.4 SEC (ref 22.1–31)
APTT PPP: 65.6 SEC (ref 22.1–31)
B FRAGILIS DNA BLD POS QL NAA+NON-PROBE: NOT DETECTED
BASOPHILS # BLD: 0.1 K/UL (ref 0–0.1)
BASOPHILS NFR BLD: 1 % (ref 0–1)
BIOFIRE TEST COMMENT: ABNORMAL
BUN SERPL-MCNC: 13 MG/DL (ref 6–20)
BUN/CREAT SERPL: 17 (ref 12–20)
C ALBICANS DNA BLD POS QL NAA+NON-PROBE: NOT DETECTED
C AURIS DNA BLD POS QL NAA+NON-PROBE: NOT DETECTED
C GATTII+NEOFOR DNA BLD POS QL NAA+N-PRB: NOT DETECTED
C GLABRATA DNA BLD POS QL NAA+NON-PROBE: NOT DETECTED
C KRUSEI DNA BLD POS QL NAA+NON-PROBE: NOT DETECTED
C PARAP DNA BLD POS QL NAA+NON-PROBE: NOT DETECTED
C TROPICLS DNA BLD POS QL NAA+NON-PROBE: NOT DETECTED
CALCIUM SERPL-MCNC: 8.4 MG/DL (ref 8.5–10.1)
CHLORIDE SERPL-SCNC: 114 MMOL/L (ref 97–108)
CO2 SERPL-SCNC: 26 MMOL/L (ref 21–32)
CREAT SERPL-MCNC: 0.78 MG/DL (ref 0.55–1.02)
DIFFERENTIAL METHOD BLD: ABNORMAL
E CLOAC COMP DNA BLD POS NAA+NON-PROBE: NOT DETECTED
E COLI DNA BLD POS QL NAA+NON-PROBE: NOT DETECTED
E FAECALIS DNA BLD POS QL NAA+NON-PROBE: NOT DETECTED
E FAECIUM DNA BLD POS QL NAA+NON-PROBE: NOT DETECTED
ENTEROBACTERALES DNA BLD POS NAA+N-PRB: NOT DETECTED
EOSINOPHIL # BLD: 0.5 K/UL (ref 0–0.4)
EOSINOPHIL NFR BLD: 6 % (ref 0–7)
ERYTHROCYTE [DISTWIDTH] IN BLOOD BY AUTOMATED COUNT: 14.4 % (ref 11.5–14.5)
GLUCOSE SERPL-MCNC: 80 MG/DL (ref 65–100)
GP B STREP DNA BLD POS QL NAA+NON-PROBE: NOT DETECTED
HAEM INFLU DNA BLD POS QL NAA+NON-PROBE: NOT DETECTED
HCT VFR BLD AUTO: 30.9 % (ref 35–47)
HGB BLD-MCNC: 9.8 G/DL (ref 11.5–16)
IMM GRANULOCYTES # BLD AUTO: 0 K/UL (ref 0–0.04)
IMM GRANULOCYTES NFR BLD AUTO: 0 % (ref 0–0.5)
K OXYTOCA DNA BLD POS QL NAA+NON-PROBE: NOT DETECTED
KLEBSIELLA SP DNA BLD POS QL NAA+NON-PRB: NOT DETECTED
KLEBSIELLA SP DNA BLD POS QL NAA+NON-PRB: NOT DETECTED
L MONOCYTOG DNA BLD POS QL NAA+NON-PROBE: NOT DETECTED
LYMPHOCYTES # BLD: 1.9 K/UL (ref 0.8–3.5)
LYMPHOCYTES NFR BLD: 26 % (ref 12–49)
MCH RBC QN AUTO: 32 PG (ref 26–34)
MCHC RBC AUTO-ENTMCNC: 31.7 G/DL (ref 30–36.5)
MCV RBC AUTO: 101 FL (ref 80–99)
MONOCYTES # BLD: 0.9 K/UL (ref 0–1)
MONOCYTES NFR BLD: 11 % (ref 5–13)
N MEN DNA BLD POS QL NAA+NON-PROBE: NOT DETECTED
NEUTS SEG # BLD: 4.2 K/UL (ref 1.8–8)
NEUTS SEG NFR BLD: 56 % (ref 32–75)
NRBC # BLD: 0 K/UL (ref 0–0.01)
NRBC BLD-RTO: 0 PER 100 WBC
P AERUGINOSA DNA BLD POS NAA+NON-PROBE: NOT DETECTED
PLATELET # BLD AUTO: 164 K/UL (ref 150–400)
PMV BLD AUTO: 10.1 FL (ref 8.9–12.9)
POTASSIUM SERPL-SCNC: 3.6 MMOL/L (ref 3.5–5.1)
PROTEUS SP DNA BLD POS QL NAA+NON-PROBE: NOT DETECTED
RBC # BLD AUTO: 3.06 M/UL (ref 3.8–5.2)
RESISTANT GENE TARGETS: ABNORMAL
S AUREUS DNA BLD POS QL NAA+NON-PROBE: NOT DETECTED
S AUREUS+CONS DNA BLD POS NAA+NON-PROBE: DETECTED
S EPIDERMIDIS DNA BLD POS QL NAA+NON-PRB: NOT DETECTED
S LUGDUNENSIS DNA BLD POS QL NAA+NON-PRB: NOT DETECTED
S MALTOPHILIA DNA BLD POS QL NAA+NON-PRB: NOT DETECTED
S MARCESCENS DNA BLD POS NAA+NON-PROBE: NOT DETECTED
S PNEUM DNA BLD POS QL NAA+NON-PROBE: NOT DETECTED
S PYO DNA BLD POS QL NAA+NON-PROBE: NOT DETECTED
SALMONELLA DNA BLD POS QL NAA+NON-PROBE: NOT DETECTED
SODIUM SERPL-SCNC: 145 MMOL/L (ref 136–145)
STREPTOCOCCUS DNA BLD POS NAA+NON-PROBE: NOT DETECTED
THERAPEUTIC RANGE: ABNORMAL SECS (ref 58–77)
WBC # BLD AUTO: 7.5 K/UL (ref 3.6–11)

## 2023-09-02 PROCEDURE — 85730 THROMBOPLASTIN TIME PARTIAL: CPT

## 2023-09-02 PROCEDURE — 97162 PT EVAL MOD COMPLEX 30 MIN: CPT

## 2023-09-02 PROCEDURE — 2580000003 HC RX 258

## 2023-09-02 PROCEDURE — 6360000002 HC RX W HCPCS

## 2023-09-02 PROCEDURE — 80048 BASIC METABOLIC PNL TOTAL CA: CPT

## 2023-09-02 PROCEDURE — 2700000000 HC OXYGEN THERAPY PER DAY

## 2023-09-02 PROCEDURE — 1100000003 HC PRIVATE W/ TELEMETRY

## 2023-09-02 PROCEDURE — APPSS30 APP SPLIT SHARED TIME 16-30 MINUTES: Performed by: NURSE PRACTITIONER

## 2023-09-02 PROCEDURE — 85025 COMPLETE CBC W/AUTO DIFF WBC: CPT

## 2023-09-02 PROCEDURE — 97535 SELF CARE MNGMENT TRAINING: CPT | Performed by: OCCUPATIONAL THERAPIST

## 2023-09-02 PROCEDURE — 97530 THERAPEUTIC ACTIVITIES: CPT

## 2023-09-02 PROCEDURE — 6370000000 HC RX 637 (ALT 250 FOR IP)

## 2023-09-02 PROCEDURE — 97165 OT EVAL LOW COMPLEX 30 MIN: CPT | Performed by: OCCUPATIONAL THERAPIST

## 2023-09-02 PROCEDURE — 36415 COLL VENOUS BLD VENIPUNCTURE: CPT

## 2023-09-02 PROCEDURE — 99232 SBSQ HOSP IP/OBS MODERATE 35: CPT | Performed by: NURSE PRACTITIONER

## 2023-09-02 PROCEDURE — 99233 SBSQ HOSP IP/OBS HIGH 50: CPT | Performed by: INTERNAL MEDICINE

## 2023-09-02 RX ORDER — POTASSIUM CHLORIDE 7.45 MG/ML
10 INJECTION INTRAVENOUS
Status: COMPLETED | OUTPATIENT
Start: 2023-09-02 | End: 2023-09-02

## 2023-09-02 RX ADMIN — QUETIAPINE FUMARATE 25 MG: 25 TABLET ORAL at 21:52

## 2023-09-02 RX ADMIN — ATORVASTATIN CALCIUM 40 MG: 20 TABLET, FILM COATED ORAL at 21:54

## 2023-09-02 RX ADMIN — POTASSIUM CHLORIDE 10 MEQ: 7.45 INJECTION INTRAVENOUS at 11:19

## 2023-09-02 RX ADMIN — SODIUM CHLORIDE, PRESERVATIVE FREE 10 ML: 5 INJECTION INTRAVENOUS at 21:54

## 2023-09-02 RX ADMIN — SODIUM CHLORIDE, PRESERVATIVE FREE 10 ML: 5 INJECTION INTRAVENOUS at 08:17

## 2023-09-02 RX ADMIN — HEPARIN SODIUM 1840 UNITS: 1000 INJECTION INTRAVENOUS; SUBCUTANEOUS at 11:24

## 2023-09-02 RX ADMIN — POTASSIUM CHLORIDE 10 MEQ: 7.45 INJECTION INTRAVENOUS at 08:17

## 2023-09-02 RX ADMIN — Medication 4.5 MG: at 21:53

## 2023-09-02 RX ADMIN — HEPARIN SODIUM AND DEXTROSE 11 UNITS/KG/HR: 10000; 5 INJECTION INTRAVENOUS at 19:55

## 2023-09-02 RX ADMIN — POTASSIUM CHLORIDE 10 MEQ: 7.45 INJECTION INTRAVENOUS at 09:13

## 2023-09-02 RX ADMIN — WATER 2000 MG: 1 INJECTION INTRAMUSCULAR; INTRAVENOUS; SUBCUTANEOUS at 13:29

## 2023-09-02 RX ADMIN — HEPARIN SODIUM 1840 UNITS: 1000 INJECTION INTRAVENOUS; SUBCUTANEOUS at 19:55

## 2023-09-02 RX ADMIN — POTASSIUM CHLORIDE 10 MEQ: 7.45 INJECTION INTRAVENOUS at 10:15

## 2023-09-02 ASSESSMENT — PAIN SCALES - WONG BAKER: WONGBAKER_NUMERICALRESPONSE: 0

## 2023-09-02 NOTE — CONSULTS
ICU consult follow up    Subjective: Improved mental status. Calm and comfortable. No chest pain. Denies nausea. VS reviewed - stable. Pleasant, comfortable appearing  CTAB  Soft, NT, ND  Non focal neuro exam, other than disorientation    Labs, imaging, echo were reviewed. Impression:  - NSTEMI - I  - Shock (cardiogenic/septic/hypovolemic) - resolved  - Encephalopathy and delirium - resolving  - UTI     Recommendations:  Neuro: AMS/Encephalopathy due to medical illness. Delirium precautions. Add low dose seroquel 12.5 mg qHS if able to take PO safely. Otherwise, use zyprexa 2,5 mg QHS. Cardio: aspirin and heparin gtt. Statin and plavix when able to take PO. Appreciate Cardiology consult - No cath recommended. ID: CTX for UTI. Blood Cultures pending     Ppx: heparin Gtt     Bedside swallow eval today. Stable for transfer out of ICU.

## 2023-09-03 ENCOUNTER — APPOINTMENT (OUTPATIENT)
Facility: HOSPITAL | Age: 83
DRG: 280 | End: 2023-09-03
Payer: MEDICARE

## 2023-09-03 DIAGNOSIS — D64.9 ANEMIA, UNSPECIFIED TYPE: ICD-10-CM

## 2023-09-03 DIAGNOSIS — I21.4 NSTEMI (NON-ST ELEVATED MYOCARDIAL INFARCTION) (HCC): ICD-10-CM

## 2023-09-03 DIAGNOSIS — N30.00 ACUTE CYSTITIS WITHOUT HEMATURIA: ICD-10-CM

## 2023-09-03 DIAGNOSIS — F03.911 DEMENTIA WITH AGITATION, UNSPECIFIED DEMENTIA SEVERITY, UNSPECIFIED DEMENTIA TYPE (HCC): ICD-10-CM

## 2023-09-03 DIAGNOSIS — I10 ESSENTIAL HYPERTENSION: ICD-10-CM

## 2023-09-03 DIAGNOSIS — F41.9 ANXIETY: Primary | ICD-10-CM

## 2023-09-03 LAB
ANION GAP SERPL CALC-SCNC: 5 MMOL/L (ref 5–15)
APTT PPP: 49.3 SEC (ref 22.1–31)
APTT PPP: 64.2 SEC (ref 22.1–31)
BACTERIA SPEC CULT: ABNORMAL
BACTERIA SPEC CULT: ABNORMAL
BASOPHILS # BLD: 0.1 K/UL (ref 0–0.1)
BASOPHILS NFR BLD: 1 % (ref 0–1)
BUN SERPL-MCNC: 11 MG/DL (ref 6–20)
BUN/CREAT SERPL: 15 (ref 12–20)
CALCIUM SERPL-MCNC: 8.7 MG/DL (ref 8.5–10.1)
CC UR VC: ABNORMAL
CHLORIDE SERPL-SCNC: 109 MMOL/L (ref 97–108)
CHOLEST SERPL-MCNC: 134 MG/DL
CO2 SERPL-SCNC: 27 MMOL/L (ref 21–32)
CREAT SERPL-MCNC: 0.75 MG/DL (ref 0.55–1.02)
DIFFERENTIAL METHOD BLD: ABNORMAL
ECHO BSA: 1.65 M2
ECHO LV EDV A2C: 79 ML
ECHO LV EDV A4C: 71 ML
ECHO LV EDV BP: 76 ML (ref 56–104)
ECHO LV EDV INDEX A4C: 43 ML/M2
ECHO LV EDV INDEX BP: 46 ML/M2
ECHO LV EDV NDEX A2C: 48 ML/M2
ECHO LV EJECTION FRACTION A2C: 53 %
ECHO LV EJECTION FRACTION A4C: 48 %
ECHO LV EJECTION FRACTION BIPLANE: 47 % (ref 55–100)
ECHO LV ESV A2C: 37 ML
ECHO LV ESV A4C: 37 ML
ECHO LV ESV BP: 40 ML (ref 19–49)
ECHO LV ESV INDEX A2C: 23 ML/M2
ECHO LV ESV INDEX A4C: 23 ML/M2
ECHO LV ESV INDEX BP: 24 ML/M2
ECHO LV FRACTIONAL SHORTENING: 23 % (ref 28–44)
ECHO LV INTERNAL DIMENSION DIASTOLE INDEX: 2.87 CM/M2
ECHO LV INTERNAL DIMENSION DIASTOLIC: 4.7 CM (ref 3.9–5.3)
ECHO LV INTERNAL DIMENSION SYSTOLIC INDEX: 2.2 CM/M2
ECHO LV INTERNAL DIMENSION SYSTOLIC: 3.6 CM
ECHO LV IVSD: 0.9 CM (ref 0.6–0.9)
ECHO LV MASS 2D: 142.7 G (ref 67–162)
ECHO LV MASS INDEX 2D: 87 G/M2 (ref 43–95)
ECHO LV POSTERIOR WALL DIASTOLIC: 0.9 CM (ref 0.6–0.9)
ECHO LV RELATIVE WALL THICKNESS RATIO: 0.38
EOSINOPHIL # BLD: 0.3 K/UL (ref 0–0.4)
EOSINOPHIL NFR BLD: 4 % (ref 0–7)
ERYTHROCYTE [DISTWIDTH] IN BLOOD BY AUTOMATED COUNT: 13.9 % (ref 11.5–14.5)
GLUCOSE BLD STRIP.AUTO-MCNC: 105 MG/DL (ref 65–117)
GLUCOSE SERPL-MCNC: 93 MG/DL (ref 65–100)
HCT VFR BLD AUTO: 34.1 % (ref 35–47)
HDLC SERPL-MCNC: 62 MG/DL
HDLC SERPL: 2.2 (ref 0–5)
HGB BLD-MCNC: 11 G/DL (ref 11.5–16)
IMM GRANULOCYTES # BLD AUTO: 0 K/UL (ref 0–0.04)
IMM GRANULOCYTES NFR BLD AUTO: 0 % (ref 0–0.5)
LDLC SERPL CALC-MCNC: 58.8 MG/DL (ref 0–100)
LYMPHOCYTES # BLD: 1.3 K/UL (ref 0.8–3.5)
LYMPHOCYTES NFR BLD: 15 % (ref 12–49)
MAGNESIUM SERPL-MCNC: 2 MG/DL (ref 1.6–2.4)
MCH RBC QN AUTO: 32.3 PG (ref 26–34)
MCHC RBC AUTO-ENTMCNC: 32.3 G/DL (ref 30–36.5)
MCV RBC AUTO: 100 FL (ref 80–99)
MONOCYTES # BLD: 1 K/UL (ref 0–1)
MONOCYTES NFR BLD: 12 % (ref 5–13)
NEUTS SEG # BLD: 5.5 K/UL (ref 1.8–8)
NEUTS SEG NFR BLD: 68 % (ref 32–75)
NRBC # BLD: 0 K/UL (ref 0–0.01)
NRBC BLD-RTO: 0 PER 100 WBC
PLATELET # BLD AUTO: 164 K/UL (ref 150–400)
PMV BLD AUTO: 10.1 FL (ref 8.9–12.9)
POTASSIUM SERPL-SCNC: 3.5 MMOL/L (ref 3.5–5.1)
RBC # BLD AUTO: 3.41 M/UL (ref 3.8–5.2)
SERVICE CMNT-IMP: ABNORMAL
SERVICE CMNT-IMP: ABNORMAL
SERVICE CMNT-IMP: NORMAL
SODIUM SERPL-SCNC: 141 MMOL/L (ref 136–145)
THERAPEUTIC RANGE: ABNORMAL SECS (ref 58–77)
THERAPEUTIC RANGE: ABNORMAL SECS (ref 58–77)
TRIGL SERPL-MCNC: 66 MG/DL
VLDLC SERPL CALC-MCNC: 13.2 MG/DL
WBC # BLD AUTO: 8.2 K/UL (ref 3.6–11)

## 2023-09-03 PROCEDURE — 82962 GLUCOSE BLOOD TEST: CPT

## 2023-09-03 PROCEDURE — 2580000003 HC RX 258

## 2023-09-03 PROCEDURE — 6360000002 HC RX W HCPCS

## 2023-09-03 PROCEDURE — 80048 BASIC METABOLIC PNL TOTAL CA: CPT

## 2023-09-03 PROCEDURE — 36415 COLL VENOUS BLD VENIPUNCTURE: CPT

## 2023-09-03 PROCEDURE — 2580000003 HC RX 258: Performed by: FAMILY MEDICINE

## 2023-09-03 PROCEDURE — 85730 THROMBOPLASTIN TIME PARTIAL: CPT

## 2023-09-03 PROCEDURE — 99232 SBSQ HOSP IP/OBS MODERATE 35: CPT | Performed by: INTERNAL MEDICINE

## 2023-09-03 PROCEDURE — 87040 BLOOD CULTURE FOR BACTERIA: CPT

## 2023-09-03 PROCEDURE — 94761 N-INVAS EAR/PLS OXIMETRY MLT: CPT

## 2023-09-03 PROCEDURE — 1100000000 HC RM PRIVATE

## 2023-09-03 PROCEDURE — 93005 ELECTROCARDIOGRAM TRACING: CPT | Performed by: FAMILY MEDICINE

## 2023-09-03 PROCEDURE — 85025 COMPLETE CBC W/AUTO DIFF WBC: CPT

## 2023-09-03 PROCEDURE — 80061 LIPID PANEL: CPT

## 2023-09-03 PROCEDURE — 93308 TTE F-UP OR LMTD: CPT

## 2023-09-03 PROCEDURE — A4216 STERILE WATER/SALINE, 10 ML: HCPCS

## 2023-09-03 PROCEDURE — 83735 ASSAY OF MAGNESIUM: CPT

## 2023-09-03 PROCEDURE — C9113 INJ PANTOPRAZOLE SODIUM, VIA: HCPCS

## 2023-09-03 RX ORDER — ASPIRIN 81 MG/1
81 TABLET, CHEWABLE ORAL DAILY
Status: DISCONTINUED | OUTPATIENT
Start: 2023-09-03 | End: 2023-09-06 | Stop reason: HOSPADM

## 2023-09-03 RX ORDER — 0.9 % SODIUM CHLORIDE 0.9 %
250 INTRAVENOUS SOLUTION INTRAVENOUS ONCE
Status: COMPLETED | OUTPATIENT
Start: 2023-09-03 | End: 2023-09-03

## 2023-09-03 RX ORDER — ENOXAPARIN SODIUM 100 MG/ML
40 INJECTION SUBCUTANEOUS DAILY
Status: DISCONTINUED | OUTPATIENT
Start: 2023-09-03 | End: 2023-09-06 | Stop reason: HOSPADM

## 2023-09-03 RX ADMIN — SODIUM CHLORIDE, PRESERVATIVE FREE 10 ML: 5 INJECTION INTRAVENOUS at 20:38

## 2023-09-03 RX ADMIN — SODIUM CHLORIDE, PRESERVATIVE FREE 10 ML: 5 INJECTION INTRAVENOUS at 09:00

## 2023-09-03 RX ADMIN — SODIUM CHLORIDE 40 MG: 9 INJECTION INTRAMUSCULAR; INTRAVENOUS; SUBCUTANEOUS at 16:14

## 2023-09-03 RX ADMIN — SODIUM CHLORIDE 250 ML: 9 INJECTION, SOLUTION INTRAVENOUS at 01:00

## 2023-09-03 RX ADMIN — WATER 2000 MG: 1 INJECTION INTRAMUSCULAR; INTRAVENOUS; SUBCUTANEOUS at 13:02

## 2023-09-03 RX ADMIN — VANCOMYCIN HYDROCHLORIDE 1500 MG: 1.5 INJECTION, POWDER, LYOPHILIZED, FOR SOLUTION INTRAVENOUS at 10:37

## 2023-09-03 RX ADMIN — ENOXAPARIN SODIUM 40 MG: 100 INJECTION SUBCUTANEOUS at 13:02

## 2023-09-03 ASSESSMENT — PAIN SCALES - WONG BAKER: WONGBAKER_NUMERICALRESPONSE: 0

## 2023-09-03 NOTE — CARE COORDINATION
Call received from Dr. Piyush Lock, anticipating discharge tomorrow back to [facility] downloaded into [employer] for them to know that is the intent, nobody answering at the facility today.   CM to follow tomorrow    Transition of Care  RUR 13%  1- possible transfer back to [facility] tomorrow  2- CM following to coordinate with family and facility  3- transportation TBD

## 2023-09-04 DIAGNOSIS — I21.4 NSTEMI (NON-ST ELEVATED MYOCARDIAL INFARCTION) (HCC): ICD-10-CM

## 2023-09-04 DIAGNOSIS — I10 ESSENTIAL HYPERTENSION: ICD-10-CM

## 2023-09-04 DIAGNOSIS — E11.9 TYPE 2 DIABETES MELLITUS WITHOUT COMPLICATION, UNSPECIFIED WHETHER LONG TERM INSULIN USE (HCC): ICD-10-CM

## 2023-09-04 DIAGNOSIS — F41.9 ANXIETY: ICD-10-CM

## 2023-09-04 DIAGNOSIS — D64.9 ANEMIA, UNSPECIFIED TYPE: Primary | ICD-10-CM

## 2023-09-04 DIAGNOSIS — F03.911 DEMENTIA WITH AGITATION, UNSPECIFIED DEMENTIA SEVERITY, UNSPECIFIED DEMENTIA TYPE (HCC): ICD-10-CM

## 2023-09-04 DIAGNOSIS — I48.91 ATRIAL FIBRILLATION, UNSPECIFIED TYPE (HCC): ICD-10-CM

## 2023-09-04 LAB
ANION GAP SERPL CALC-SCNC: 7 MMOL/L (ref 5–15)
APTT PPP: 24.8 SEC (ref 22.1–31)
BASOPHILS # BLD: 0.1 K/UL (ref 0–0.1)
BASOPHILS NFR BLD: 1 % (ref 0–1)
BUN SERPL-MCNC: 23 MG/DL (ref 6–20)
BUN/CREAT SERPL: 30 (ref 12–20)
CALCIUM SERPL-MCNC: 8.7 MG/DL (ref 8.5–10.1)
CHLORIDE SERPL-SCNC: 111 MMOL/L (ref 97–108)
CO2 SERPL-SCNC: 26 MMOL/L (ref 21–32)
CREAT SERPL-MCNC: 0.77 MG/DL (ref 0.55–1.02)
DIFFERENTIAL METHOD BLD: ABNORMAL
EOSINOPHIL # BLD: 0.5 K/UL (ref 0–0.4)
EOSINOPHIL NFR BLD: 5 % (ref 0–7)
ERYTHROCYTE [DISTWIDTH] IN BLOOD BY AUTOMATED COUNT: 14.2 % (ref 11.5–14.5)
GLUCOSE BLD STRIP.AUTO-MCNC: 105 MG/DL (ref 65–117)
GLUCOSE BLD STRIP.AUTO-MCNC: 126 MG/DL (ref 65–117)
GLUCOSE BLD STRIP.AUTO-MCNC: 203 MG/DL (ref 65–117)
GLUCOSE BLD STRIP.AUTO-MCNC: 55 MG/DL (ref 65–117)
GLUCOSE BLD STRIP.AUTO-MCNC: 80 MG/DL (ref 65–117)
GLUCOSE BLD STRIP.AUTO-MCNC: 84 MG/DL (ref 65–117)
GLUCOSE BLD STRIP.AUTO-MCNC: 86 MG/DL (ref 65–117)
GLUCOSE SERPL-MCNC: 62 MG/DL (ref 65–100)
HCT VFR BLD AUTO: 32.9 % (ref 35–47)
HGB BLD-MCNC: 10.4 G/DL (ref 11.5–16)
IMM GRANULOCYTES # BLD AUTO: 0 K/UL (ref 0–0.04)
IMM GRANULOCYTES NFR BLD AUTO: 0 % (ref 0–0.5)
LYMPHOCYTES # BLD: 1.9 K/UL (ref 0.8–3.5)
LYMPHOCYTES NFR BLD: 22 % (ref 12–49)
MAGNESIUM SERPL-MCNC: 2 MG/DL (ref 1.6–2.4)
MCH RBC QN AUTO: 31.8 PG (ref 26–34)
MCHC RBC AUTO-ENTMCNC: 31.6 G/DL (ref 30–36.5)
MCV RBC AUTO: 100.6 FL (ref 80–99)
MONOCYTES # BLD: 1.1 K/UL (ref 0–1)
MONOCYTES NFR BLD: 12 % (ref 5–13)
NEUTS SEG # BLD: 5.2 K/UL (ref 1.8–8)
NEUTS SEG NFR BLD: 60 % (ref 32–75)
NRBC # BLD: 0 K/UL (ref 0–0.01)
NRBC BLD-RTO: 0 PER 100 WBC
PLATELET # BLD AUTO: 172 K/UL (ref 150–400)
PMV BLD AUTO: 10.6 FL (ref 8.9–12.9)
POTASSIUM SERPL-SCNC: 3.5 MMOL/L (ref 3.5–5.1)
RBC # BLD AUTO: 3.27 M/UL (ref 3.8–5.2)
SERVICE CMNT-IMP: ABNORMAL
SERVICE CMNT-IMP: NORMAL
SODIUM SERPL-SCNC: 144 MMOL/L (ref 136–145)
THERAPEUTIC RANGE: NORMAL SECS (ref 58–77)
WBC # BLD AUTO: 8.7 K/UL (ref 3.6–11)

## 2023-09-04 PROCEDURE — 2580000003 HC RX 258

## 2023-09-04 PROCEDURE — 83735 ASSAY OF MAGNESIUM: CPT

## 2023-09-04 PROCEDURE — 85025 COMPLETE CBC W/AUTO DIFF WBC: CPT

## 2023-09-04 PROCEDURE — 36415 COLL VENOUS BLD VENIPUNCTURE: CPT

## 2023-09-04 PROCEDURE — 2500000003 HC RX 250 WO HCPCS

## 2023-09-04 PROCEDURE — 82962 GLUCOSE BLOOD TEST: CPT

## 2023-09-04 PROCEDURE — C9113 INJ PANTOPRAZOLE SODIUM, VIA: HCPCS

## 2023-09-04 PROCEDURE — A4216 STERILE WATER/SALINE, 10 ML: HCPCS

## 2023-09-04 PROCEDURE — 6360000002 HC RX W HCPCS

## 2023-09-04 PROCEDURE — 2500000003 HC RX 250 WO HCPCS: Performed by: HOSPITALIST

## 2023-09-04 PROCEDURE — 85730 THROMBOPLASTIN TIME PARTIAL: CPT

## 2023-09-04 PROCEDURE — 80048 BASIC METABOLIC PNL TOTAL CA: CPT

## 2023-09-04 PROCEDURE — 6370000000 HC RX 637 (ALT 250 FOR IP)

## 2023-09-04 PROCEDURE — 92610 EVALUATE SWALLOWING FUNCTION: CPT | Performed by: SPEECH-LANGUAGE PATHOLOGIST

## 2023-09-04 PROCEDURE — 1100000000 HC RM PRIVATE

## 2023-09-04 PROCEDURE — 94761 N-INVAS EAR/PLS OXIMETRY MLT: CPT

## 2023-09-04 PROCEDURE — 2580000003 HC RX 258: Performed by: HOSPITALIST

## 2023-09-04 RX ORDER — DEXTROSE MONOHYDRATE 100 MG/ML
INJECTION, SOLUTION INTRAVENOUS CONTINUOUS PRN
Status: DISCONTINUED | OUTPATIENT
Start: 2023-09-04 | End: 2023-09-06 | Stop reason: HOSPADM

## 2023-09-04 RX ORDER — POTASSIUM CHLORIDE 7.45 MG/ML
10 INJECTION INTRAVENOUS
Status: COMPLETED | OUTPATIENT
Start: 2023-09-04 | End: 2023-09-04

## 2023-09-04 RX ORDER — DEXTROSE MONOHYDRATE 50 MG/ML
INJECTION, SOLUTION INTRAVENOUS CONTINUOUS
Status: DISCONTINUED | OUTPATIENT
Start: 2023-09-04 | End: 2023-09-05

## 2023-09-04 RX ADMIN — POTASSIUM CHLORIDE 10 MEQ: 7.46 INJECTION, SOLUTION INTRAVENOUS at 12:02

## 2023-09-04 RX ADMIN — SODIUM CHLORIDE, PRESERVATIVE FREE 10 ML: 5 INJECTION INTRAVENOUS at 08:12

## 2023-09-04 RX ADMIN — SODIUM CHLORIDE, PRESERVATIVE FREE 10 ML: 5 INJECTION INTRAVENOUS at 20:59

## 2023-09-04 RX ADMIN — ENOXAPARIN SODIUM 40 MG: 100 INJECTION SUBCUTANEOUS at 08:12

## 2023-09-04 RX ADMIN — WATER 2.5 MG: 1 INJECTION INTRAMUSCULAR; INTRAVENOUS; SUBCUTANEOUS at 09:25

## 2023-09-04 RX ADMIN — WATER 2.5 MG: 1 INJECTION INTRAMUSCULAR; INTRAVENOUS; SUBCUTANEOUS at 20:50

## 2023-09-04 RX ADMIN — ASPIRIN 81 MG: 81 TABLET, CHEWABLE ORAL at 08:12

## 2023-09-04 RX ADMIN — POTASSIUM CHLORIDE 10 MEQ: 7.46 INJECTION, SOLUTION INTRAVENOUS at 10:34

## 2023-09-04 RX ADMIN — POTASSIUM CHLORIDE 10 MEQ: 7.46 INJECTION, SOLUTION INTRAVENOUS at 09:31

## 2023-09-04 RX ADMIN — CLOPIDOGREL BISULFATE 75 MG: 75 TABLET ORAL at 08:12

## 2023-09-04 RX ADMIN — DEXTROSE MONOHYDRATE 125 ML: 100 INJECTION, SOLUTION INTRAVENOUS at 05:53

## 2023-09-04 RX ADMIN — POTASSIUM CHLORIDE 10 MEQ: 7.46 INJECTION, SOLUTION INTRAVENOUS at 08:25

## 2023-09-04 RX ADMIN — DEXTROSE MONOHYDRATE: 50 INJECTION, SOLUTION INTRAVENOUS at 10:30

## 2023-09-04 RX ADMIN — SODIUM CHLORIDE 40 MG: 9 INJECTION INTRAMUSCULAR; INTRAVENOUS; SUBCUTANEOUS at 08:12

## 2023-09-04 ASSESSMENT — PAIN SCALES - GENERAL: PAINLEVEL_OUTOF10: 0

## 2023-09-04 ASSESSMENT — PAIN SCALES - WONG BAKER: WONGBAKER_NUMERICALRESPONSE: 0

## 2023-09-04 NOTE — DISCHARGE INSTRUCTIONS
Remainder per SNF    Agitation in setting of AMS: improved combative and confused on arrival. CT head: NAP. AMS workup unremarkable. S/p precedex gtt. UTI could be contributing   - Consider Seroquel 12.5mg qhs if remains agitated   - diet puree, thin liquids, crush meds  - Discontinue home Memantine 10 mg   - Remainder per SNF     Staph bacteremia Bcx 8/31 growing staph in 1/2 bottles, most likely contaminant. ID panel positive for staphylococcus but negative for staph epi and MRSA. Echo: no obvious vegetations. - final blood cultures still pending  - Remainder per SNF     UTI Resolved. UA infectious. Ucx: E coli. - Remainder per SNF     Vomiting resolved. Likely atypical NSTEMI presentation. ECHO: EF 35%  - Remainder per SNF     H/o paroxysmal afib not anticoagulated given risk for bleeding  - Remainder per SNF      H/o CVA in 2017 No deficits  - Remainder per SNF     Pre diabetes Last A1c 5.7 9/23. No home meds  - continue monitoring  - Remainder per SNF     Constipation chronic, stable. - Remainder per SNF     H/o hip fracture  - Remainder per SNF    No other changes were made to your medications, please take all your other home medicines as previously prescribed. Appointments  No future appointments. [place] [id number] [phone]    Schedule an appointment as soon as possible for a visit           Follow-up tests needed: repeat blood cultures    Pending test results: At the time of your discharge the following test results are still pending: none. Please make sure you review these results with your outpatient follow-up provider(s). Specific symptoms to watch for: chest pain, shortness of breath, fever, chills, nausea, vomiting, diarrhea, change in mentation, falling, weakness, bleeding.      DIET/what to eat:   pureed diet    ACTIVITY:  ambulate with assistance     Wound care: none    Equipment needed:  none    What to do if new or unexpected

## 2023-09-05 LAB
ANION GAP SERPL CALC-SCNC: 5 MMOL/L (ref 5–15)
BASOPHILS # BLD: 0.1 K/UL (ref 0–0.1)
BASOPHILS NFR BLD: 1 % (ref 0–1)
BUN SERPL-MCNC: 11 MG/DL (ref 6–20)
BUN/CREAT SERPL: 15 (ref 12–20)
CALCIUM SERPL-MCNC: 9.3 MG/DL (ref 8.5–10.1)
CHLORIDE SERPL-SCNC: 108 MMOL/L (ref 97–108)
CO2 SERPL-SCNC: 28 MMOL/L (ref 21–32)
CREAT SERPL-MCNC: 0.72 MG/DL (ref 0.55–1.02)
DIFFERENTIAL METHOD BLD: ABNORMAL
EKG ATRIAL RATE: 46 BPM
EKG DIAGNOSIS: NORMAL
EKG P AXIS: 26 DEGREES
EKG P-R INTERVAL: 188 MS
EKG Q-T INTERVAL: 532 MS
EKG QRS DURATION: 68 MS
EKG QTC CALCULATION (BAZETT): 465 MS
EKG R AXIS: 2 DEGREES
EKG T AXIS: 14 DEGREES
EKG VENTRICULAR RATE: 46 BPM
EOSINOPHIL # BLD: 0.7 K/UL (ref 0–0.4)
EOSINOPHIL NFR BLD: 8 % (ref 0–7)
ERYTHROCYTE [DISTWIDTH] IN BLOOD BY AUTOMATED COUNT: 13.7 % (ref 11.5–14.5)
GLUCOSE BLD STRIP.AUTO-MCNC: 118 MG/DL (ref 65–117)
GLUCOSE BLD STRIP.AUTO-MCNC: 264 MG/DL (ref 65–117)
GLUCOSE BLD STRIP.AUTO-MCNC: 93 MG/DL (ref 65–117)
GLUCOSE SERPL-MCNC: 88 MG/DL (ref 65–100)
HCT VFR BLD AUTO: 37.8 % (ref 35–47)
HGB BLD-MCNC: 12.4 G/DL (ref 11.5–16)
IMM GRANULOCYTES # BLD AUTO: 0 K/UL (ref 0–0.04)
IMM GRANULOCYTES NFR BLD AUTO: 0 % (ref 0–0.5)
LYMPHOCYTES # BLD: 2 K/UL (ref 0.8–3.5)
LYMPHOCYTES NFR BLD: 24 % (ref 12–49)
MAGNESIUM SERPL-MCNC: 1.9 MG/DL (ref 1.6–2.4)
MCH RBC QN AUTO: 33 PG (ref 26–34)
MCHC RBC AUTO-ENTMCNC: 32.8 G/DL (ref 30–36.5)
MCV RBC AUTO: 100.5 FL (ref 80–99)
MONOCYTES # BLD: 1.2 K/UL (ref 0–1)
MONOCYTES NFR BLD: 15 % (ref 5–13)
NEUTS SEG # BLD: 4.4 K/UL (ref 1.8–8)
NEUTS SEG NFR BLD: 52 % (ref 32–75)
NRBC # BLD: 0 K/UL (ref 0–0.01)
NRBC BLD-RTO: 0 PER 100 WBC
PLATELET # BLD AUTO: 182 K/UL (ref 150–400)
PMV BLD AUTO: 10.2 FL (ref 8.9–12.9)
POTASSIUM SERPL-SCNC: 3.8 MMOL/L (ref 3.5–5.1)
RBC # BLD AUTO: 3.76 M/UL (ref 3.8–5.2)
SERVICE CMNT-IMP: ABNORMAL
SERVICE CMNT-IMP: ABNORMAL
SERVICE CMNT-IMP: NORMAL
SODIUM SERPL-SCNC: 141 MMOL/L (ref 136–145)
T PALLIDUM AB SER QL IA: NON REACTIVE
WBC # BLD AUTO: 8.4 K/UL (ref 3.6–11)

## 2023-09-05 PROCEDURE — C9113 INJ PANTOPRAZOLE SODIUM, VIA: HCPCS

## 2023-09-05 PROCEDURE — 2580000003 HC RX 258

## 2023-09-05 PROCEDURE — 80048 BASIC METABOLIC PNL TOTAL CA: CPT

## 2023-09-05 PROCEDURE — A4216 STERILE WATER/SALINE, 10 ML: HCPCS

## 2023-09-05 PROCEDURE — 85025 COMPLETE CBC W/AUTO DIFF WBC: CPT

## 2023-09-05 PROCEDURE — 6370000000 HC RX 637 (ALT 250 FOR IP)

## 2023-09-05 PROCEDURE — 36415 COLL VENOUS BLD VENIPUNCTURE: CPT

## 2023-09-05 PROCEDURE — 2500000003 HC RX 250 WO HCPCS

## 2023-09-05 PROCEDURE — 1100000000 HC RM PRIVATE

## 2023-09-05 PROCEDURE — 95819 EEG AWAKE AND ASLEEP: CPT

## 2023-09-05 PROCEDURE — 2500000003 HC RX 250 WO HCPCS: Performed by: HOSPITALIST

## 2023-09-05 PROCEDURE — 95816 EEG AWAKE AND DROWSY: CPT | Performed by: PSYCHIATRY & NEUROLOGY

## 2023-09-05 PROCEDURE — 93010 ELECTROCARDIOGRAM REPORT: CPT | Performed by: SPECIALIST

## 2023-09-05 PROCEDURE — 6360000002 HC RX W HCPCS

## 2023-09-05 PROCEDURE — 82962 GLUCOSE BLOOD TEST: CPT

## 2023-09-05 PROCEDURE — 92526 ORAL FUNCTION THERAPY: CPT

## 2023-09-05 PROCEDURE — 2580000003 HC RX 258: Performed by: HOSPITALIST

## 2023-09-05 PROCEDURE — 83735 ASSAY OF MAGNESIUM: CPT

## 2023-09-05 RX ORDER — PSEUDOEPHEDRINE HCL 30 MG
100 TABLET ORAL DAILY
Qty: 60 CAPSULE | Refills: 0 | Status: SHIPPED
Start: 2023-09-05

## 2023-09-05 RX ORDER — ASPIRIN 81 MG/1
81 TABLET, CHEWABLE ORAL DAILY
Qty: 30 TABLET | Refills: 3 | Status: SHIPPED
Start: 2023-09-05

## 2023-09-05 RX ORDER — CLOPIDOGREL BISULFATE 75 MG/1
75 TABLET ORAL DAILY
Qty: 30 TABLET | Refills: 0 | Status: SHIPPED
Start: 2023-09-05

## 2023-09-05 RX ORDER — ACETAMINOPHEN 500 MG
1000 TABLET ORAL 3 TIMES DAILY PRN
Qty: 120 TABLET | Refills: 0 | Status: SHIPPED
Start: 2023-09-05

## 2023-09-05 RX ORDER — ASPIRIN 81 MG/1
81 TABLET, CHEWABLE ORAL DAILY
Qty: 30 TABLET | Refills: 3 | Status: CANCELLED | OUTPATIENT
Start: 2023-09-06

## 2023-09-05 RX ORDER — ASPIRIN 81 MG/1
81 TABLET, CHEWABLE ORAL DAILY
Qty: 30 TABLET | Refills: 3 | Status: SHIPPED | OUTPATIENT
Start: 2023-09-05 | End: 2023-09-05 | Stop reason: SDUPTHER

## 2023-09-05 RX ORDER — ATORVASTATIN CALCIUM 40 MG/1
40 TABLET, FILM COATED ORAL DAILY
Qty: 30 TABLET | Refills: 0 | Status: SHIPPED
Start: 2023-09-05

## 2023-09-05 RX ADMIN — POTASSIUM BICARBONATE 20 MEQ: 782 TABLET, EFFERVESCENT ORAL at 16:33

## 2023-09-05 RX ADMIN — WATER 2.5 MG: 1 INJECTION INTRAMUSCULAR; INTRAVENOUS; SUBCUTANEOUS at 20:28

## 2023-09-05 RX ADMIN — ENOXAPARIN SODIUM 40 MG: 100 INJECTION SUBCUTANEOUS at 09:39

## 2023-09-05 RX ADMIN — SODIUM CHLORIDE 40 MG: 9 INJECTION INTRAMUSCULAR; INTRAVENOUS; SUBCUTANEOUS at 09:39

## 2023-09-05 RX ADMIN — DEXTROSE MONOHYDRATE: 50 INJECTION, SOLUTION INTRAVENOUS at 09:34

## 2023-09-05 RX ADMIN — Medication 4.5 MG: at 20:38

## 2023-09-05 RX ADMIN — ATORVASTATIN CALCIUM 40 MG: 20 TABLET, FILM COATED ORAL at 20:38

## 2023-09-05 RX ADMIN — WATER 2.5 MG: 1 INJECTION INTRAMUSCULAR; INTRAVENOUS; SUBCUTANEOUS at 21:51

## 2023-09-05 RX ADMIN — SODIUM CHLORIDE, PRESERVATIVE FREE 10 ML: 5 INJECTION INTRAVENOUS at 09:39

## 2023-09-05 RX ADMIN — WATER 2.5 MG: 1 INJECTION INTRAMUSCULAR; INTRAVENOUS; SUBCUTANEOUS at 00:50

## 2023-09-05 ASSESSMENT — PAIN SCALES - GENERAL: PAINLEVEL_OUTOF10: 0

## 2023-09-05 NOTE — CARE COORDINATION
Care Management follow up    1630 Update:  2nd voice message left for Sepideh Foster requesting call back re:  bed assignment. Unable to reach admissions at this time. Family practice resident notified. 1382 update:  Call received from [facility] resident, patient can be discharged today. Call placed to [facility] admission, message left for Sepideh Foster requesting patient return today, await return call. Patient to have Hospice services set up by [facility]. Diet has been advanced to pureed. RUR 12 (Score %) low   Is This a Readmission NO    Current status  Patient discussed during interdisciplinary rounds. Patient continues to require medical management including ongoing assessment and monitoring. Spoke with [address] [place] [address] Manuel Rao, [phone]. Patient updates provided. Await Palliative consult and goals of care. Possible Hospice consult. Per [address] [place] [address], patient can return to [facility] even though she has required a sitter for several days. Patient NPO this am, ice only. Transition of Care Plan  Monitor patient status and response to treatment. Patient continues to require medical management. CM needs: facilitate return to [address] [place] [address] facility. Await Palliative consult/GOC. CM to monitor progress and recommendations.     Ivan Gonzalez, RN, MSN/Care manager

## 2023-09-05 NOTE — PROCEDURES
[facility]   Electroencephalogram  Report    Procedure ID: [id number] Procedure Date: 9/5/2023   Patient Name: Jacqueline Fitch YOB: 1940   Procedure Type: Routine Medical Record No: [id number]       An EEG is requested in this 78-year-old lady to evaluate for epileptic abnormality. Medication said to include Namenda, Plavix, Lipitor and Voltaren    This tracing is obtained during the awake state. During wakefulness the background consists of diffuse mixed frequency theta range activity. Hyperventilation is not performed. Intermittent photic stimulation little alters the tracing. Sleep architecture is not seen. Interpretation  This EEG recorded during the awake state is abnormal secondary to diffuse slowing and disorganization of the background indicative of a mild degree of bihemispheric dysfunction as is commonly seen with an encephalopathy nonspecific as to cause. This pattern is also commonly seen in chronic neurodegenerative disorders such as dementia and clinical correlation is recommended.   No epileptiform abnormalities are seen        Alexia Delgado MD

## 2023-09-05 NOTE — NURSE NAVIGATOR
Chart reviewed by Heart Failure Nurse Navigator. Heart Failure database completed. Patient sent to ED by EMS from [facility] for syncopal episode after vomiting early in the day. She is admitted with NSTEMI and has a reduced EF by Echo. EF was improved to 40 to 45% on subsequent Echo. She has PMH of  previous CVA, cerbral atrophy, previous MI, paroxysmal atrial fib, HTN, hyperlipidemia, sacral pressure ulcer, and previous hip surgery. Cardiology, Neurology, and Palliative care  have been consulted. Patient has been in memory care at [facility] for the past 3 years. Patient hypotensive on admission and has required pressors. GDMT limited by hypotension and then functional swallowing issues. EF:  Echo on admission, 9/1 with EF 35 to 40%. Repeat Echo on 9/3 with EF of 40 to 45% with mild global hypokinesis, mild mitral regurgitation     ACEi/ARB/ARNi: **    BB: **    Aldosterone Antagonist: **    Obstructive Sleep Apnea Screening:   Referred to Sleep Medicine:     CRT  Not indicated    NYHA Functional Class **. Heart Failure Teach Back in Patient Education. Heart Failure Avoiding Triggers on Discharge Instructions. Cardiologist: Dr Aiden Umana discharge follow up phone call to be made within 48-72 hours of discharge. Telemedicine Visit: The patient's condition can be safely assessed and treated via synchronous audio and visual telemedicine encounter.      Reason for Telemedicine Visit: Services only offered telehealth    Originating Site (Patient Location): Patient's home    Distant Site (Provider Location): Provider Remote Setting    Consent:  The patient/guardian has verbally consented to: the potential risks and benefits of telemedicine (video visit) versus in person care; bill my insurance or make self-payment for services provided; and responsibility for payment of non-covered services.     Mode of Communication:  Video Conference via [employer]    As the provider I attest to compliance with applicable laws and regulations related to telemedicine.  Process Group Note    PATIENT'S NAME: Jammie Mariee  MRN:   [id number]  :   1966  ACCT. NUMBER: [id number]  DATE OF SERVICE: 21  START TIME:  9:00 AM  END TIME:  9:50 AM  FACILITATOR: Ermelinda Flynn LMFT  TOPIC:  Process Group    Diagnoses:  296.53 Bipolar I Disorder Current or Most Recent Episode Depressed, Severe    4. Other Diagnoses that is relevant to services:   300.00 (F41.9) Unspecified Anxiety Disorder       [facility] 55+ Program  TRACK: A1    NUMBER OF PARTICIPANTS: 5          Data:    Session content: At the start of this group, patients were invited to check in by identifying themselves, describing their current emotional status, and identifying issues to address in this group.   Area(s) of treatment focus addressed in this session included Symptom Management.  Processed feeling some disappointment from plans being changed over the weekend with her  reports she felt down due to change and spent a large portion of the morning in bed. Reports she was able to challenge herself to get out of bed, clean around her home and eat. Explored ways to celebrate her ability to move forward after disappointment and focusing on her sense of agency  "around coping. No safety concerns.     Therapeutic Interventions/Treatment Strategies:  Psychotherapist offered support, feedback and validation and reinforced use of skills. Treatment modalities used include Cognitive Behavioral Therapy. Interventions include Cognitive Restructuring:  Explored impact of ineffective thoughts / distortions on mood and activity and Coping Skills: Discussed how the use of intentional \"in the moment\" actions can help reduce distress.    Assessment:    Patient response:   Patient responded to session by accepting feedback, listening, focusing on goals and verbalizing understanding    Possible barriers to participation / learning include: and no barriers identified    Health Issues:   None reported       Substance Use Review:   Substance Use: No active concerns identified.    Mental Status/Behavioral Observations  Appearance:   Appropriate   Eye Contact:   Good   Psychomotor Behavior: Normal   Attitude:   Cooperative   Orientation:   All  Speech   Rate / Production: Normal    Volume:  Normal   Mood:    Anxious   Affect:    Worrisome   Thought Content:   Clear  Thought Form:  Coherent  Logical     Insight:    Fair     Plan:     Safety Plan: No current safety concerns identified.  Recommended that patient call 911 or go to the local ED should there be a change in any of these risk factors.     Barriers to treatment: None identified    Patient Contracts (see media tab):  None    Substance Use: Not addressed in session     Continue or Discharge: Patient will continue in 55+ Program (55+) as planned. Patient is likely to benefit from learning and using skills as they work toward the goals identified in their treatment plan.      ARLENE Savage  January 25, 2021  "

## 2023-09-06 VITALS
TEMPERATURE: 98.4 F | WEIGHT: 135 LBS | HEART RATE: 84 BPM | SYSTOLIC BLOOD PRESSURE: 117 MMHG | RESPIRATION RATE: 16 BRPM | BODY MASS INDEX: 23.92 KG/M2 | HEIGHT: 63 IN | DIASTOLIC BLOOD PRESSURE: 59 MMHG | OXYGEN SATURATION: 95 %

## 2023-09-06 LAB
BACTERIA SPEC CULT: NORMAL
GLUCOSE BLD STRIP.AUTO-MCNC: 108 MG/DL (ref 65–117)
GLUCOSE BLD STRIP.AUTO-MCNC: 131 MG/DL (ref 65–117)
SARS-COV-2 RDRP RESP QL NAA+PROBE: NOT DETECTED
SERVICE CMNT-IMP: ABNORMAL
SERVICE CMNT-IMP: NORMAL
SERVICE CMNT-IMP: NORMAL
SOURCE: NORMAL

## 2023-09-06 PROCEDURE — 82962 GLUCOSE BLOOD TEST: CPT

## 2023-09-06 PROCEDURE — 87635 SARS-COV-2 COVID-19 AMP PRB: CPT

## 2023-09-06 RX ORDER — QUETIAPINE FUMARATE 25 MG/1
25 TABLET, FILM COATED ORAL NIGHTLY PRN
Qty: 60 TABLET | Refills: 3
Start: 2023-09-06

## 2023-09-06 NOTE — CARE COORDINATION
9/6/2023  2:40 PM  Care Management Progress Note      ICD-10-CM    1. Urinary tract infection without hematuria, site unspecified  N39.0       2. Nausea and vomiting, unspecified vomiting type  R11.2       3. Syncope, unspecified syncope type  R55       4. Syncope and collapse  R55 Transthoracic echocardiogram (TTE) complete with contrast, bubble, strain, and 3D PRN     Transthoracic echocardiogram (TTE) complete with contrast, bubble, strain, and 3D PRN     CANCELED: Vascular duplex carotid bilateral     CANCELED: Vascular duplex carotid bilateral      5. Shock ([address])  R57.9 Transthoracic echocardiogram (TTE) limited with contrast, bubble, strain, and 3D PRN     Transthoracic echocardiogram (TTE) limited with contrast, bubble, strain, and 3D PRN          RUR:  12%  Risk Level: [x]Low []Moderate []High  Value-based purchasing: [x] Yes [] No  Bundle patient: [] Yes [x] No   Specify:     Transition of care plan:  Discharge order submitted. Pt has medically cleared. Return to LTC at [address], please call report to [phone], . CM called and spoke to pt's Myesha Meneses, who is agreeable with DC. Outpatient follow-up. Medicaid stretcher transport arranged through [address] ([phone], Ref # [id number]), and they will arrive between 3pm and 7pm. Packet in chart. 09/01/23 1252   Service Assessment   Patient Orientation Person   History Provided By Child/Family   Primary Caregiver Other (Comment)  ([address] [place] [address])   [facility] Children;Family Members   [address] is: Legal Next of [address] [place] [address]   PCP Verified by CM Yes   Last Visit to PCP Within last 3 months   Prior Functional Level Assistance with the following:;Bathing;Dressing; Toileting;Cooking;Housework; Shopping;Mobility   Can patient return to prior living arrangement Yes   Financial Resources Medicaid; Medicare   Social/Functional History   Lives With Other (comment)  ([address])   ADL Assistance

## 2023-09-07 ENCOUNTER — OUTSIDE SERVICES (OUTPATIENT)
Age: 83
End: 2023-09-07

## 2023-09-07 VITALS
TEMPERATURE: 98.6 F | SYSTOLIC BLOOD PRESSURE: 116 MMHG | DIASTOLIC BLOOD PRESSURE: 64 MMHG | WEIGHT: 140 LBS | HEART RATE: 60 BPM | HEIGHT: 59 IN | OXYGEN SATURATION: 98 % | BODY MASS INDEX: 28.22 KG/M2 | RESPIRATION RATE: 16 BRPM

## 2023-09-07 DIAGNOSIS — Z87.81 HISTORY OF HIP FRACTURE: ICD-10-CM

## 2023-09-07 DIAGNOSIS — E11.9 TYPE 2 DIABETES MELLITUS WITHOUT COMPLICATION, UNSPECIFIED WHETHER LONG TERM INSULIN USE (HCC): ICD-10-CM

## 2023-09-07 DIAGNOSIS — Z66 DO NOT RESUSCITATE: ICD-10-CM

## 2023-09-07 DIAGNOSIS — I10 ESSENTIAL HYPERTENSION: ICD-10-CM

## 2023-09-07 DIAGNOSIS — R77.8 ELEVATED TROPONIN: ICD-10-CM

## 2023-09-07 DIAGNOSIS — F03.911 DEMENTIA WITH AGITATION, UNSPECIFIED DEMENTIA SEVERITY, UNSPECIFIED DEMENTIA TYPE (HCC): ICD-10-CM

## 2023-09-07 DIAGNOSIS — I48.91 ATRIAL FIBRILLATION, UNSPECIFIED TYPE (HCC): ICD-10-CM

## 2023-09-07 DIAGNOSIS — I48.0 PAROXYSMAL ATRIAL FIBRILLATION (HCC): Primary | ICD-10-CM

## 2023-09-07 DIAGNOSIS — L89.152 SACRAL DECUBITUS ULCER, STAGE II (HCC): ICD-10-CM

## 2023-09-07 DIAGNOSIS — R73.03 PREDIABETES: ICD-10-CM

## 2023-09-07 DIAGNOSIS — K59.00 CONSTIPATION, UNSPECIFIED CONSTIPATION TYPE: ICD-10-CM

## 2023-09-07 DIAGNOSIS — D64.9 ANEMIA, UNSPECIFIED TYPE: Primary | ICD-10-CM

## 2023-09-07 DIAGNOSIS — G47.00 INSOMNIA, UNSPECIFIED TYPE: ICD-10-CM

## 2023-09-07 DIAGNOSIS — Z86.73 HISTORY OF CVA (CEREBROVASCULAR ACCIDENT): ICD-10-CM

## 2023-09-07 DIAGNOSIS — E78.2 MIXED HYPERLIPIDEMIA: ICD-10-CM

## 2023-09-07 DIAGNOSIS — I21.4 NSTEMI (NON-ST ELEVATED MYOCARDIAL INFARCTION) (HCC): ICD-10-CM

## 2023-09-07 ASSESSMENT — MINI MENTAL STATE EXAM
PLACE DESIGN, ERASER AND PENCIL IN FRONT OF THE PERSON.  SAY:  COPY THIS DESIGN PLEASE.  SHOW: DESIGN. ALLOW: MULTIPLE TRIES. WAIT UNTIL PERSON IS FINISHED AND HANDS IT BACK. SCORE: ONLY FOR DIAGRAM WITH 4-SIDED FIGURE BETWEEN TWO 5-SIDED FIGURES: 0
SAY: READ THE WORDS ON THE PAGE AND THEN DO WHAT IT SAYS. THEN HAND THE PERSON
THE SHEET WITH CLOSE YOUR EYES ON IT. IF THE SUBJECT READS AND DOES NOT CLOSE THEIR EYES, REPEAT UP TO THREE TIMES. SCORE ONLY IF SUBJECT CLOSES EYES.: 1
SAY: I WOULD LIKE YOU TO COUNT BACKWARD FROM 100 BY SEVENS: 0
HAND THE PERSON A PENCIL AND PAPER. SAY: WRITE ANY COMPLETE SENTENCE ON THAT
PIECE OF PAPER. (NOTE: THE SENTENCE MUST MAKE SENSE. IGNORE SPELLING ERRORS): 0
SUM ALL MMSE QUESTIONS FOR TOTAL SCORE [OUT OF 30].: 10
SHOW: PENCIL [OBJECT] ASK: WHAT IS THIS CALLED?: 1
WHICH SEASON IS THIS?: 0
NOW WHAT WERE THE THREE OBJECTS I ASKED YOU TO REMEMBER?: 0
WHAT YEAR IS THIS?: 0
SHOW: WRISTWATCH [OBJECT] ASK: WHAT IS THIS CALLED?: 1
WHAT STATE [OR PROVINCE] ARE WE IN?: 0
WHAT DAY OF THE WEEK IS THIS?: 0
WHAT MONTH IS THIS?: 0
SAY: FOLD THE PAPER IN HALF ONCE WITH BOTH HANDS, SCORE IF PAPER IS CORRECTLY FOLDED IN HALF.: 1
WHAT IS TODAY'S DATE?: 0
WHAT IS THE NAME OF THIS BUILDING [IN FACILITY]?/WHAT IS THE STREET ADDRESS OF THIS HOUSE [IN HOME]?: 0
ASK THE PERSON IF HE IS RIGHT OR LEFT-HANDED. TAKE A PIECE OF PAPER AND HOLD IT UP IN
FRONT OF THE PERSON. SAY: TAKE THIS PAPER IN YOUR RIGHT/LEFT HAND (WHICHEVER IS NON-
DOMINANT), SCORE IF PAPER IS PICKED UP IN CORRECT HAND.: 1
SAY: PUT THE PAPER DOWN ON THE FLOOR, SCORE IF PAPER IS PLACED BACK ON FLOOR: 1
SAY: I AM GOING TO NAME THREE OBJECTS. WHEN I AM FINISHED, I WANT YOU TO REPEAT
THEM. REMEMBER WHAT THEY ARE BECAUSE I AM GOING TO ASK YOU TO NAME THEM AGAIN IN
A FEW MINUTES.  SAY THE FOLLOWING WORDS SLOWLY AT 1-SECOND INTERVALS - BALL/ CAR/ MAN [ITERATIONS FOR REPEAT ADMINISTRATION]: 3
WHAT CITY/TOWN ARE WE IN?: 0
WHAT FLOOR ARE WE ON [IN FACILITY]?/ WHAT ROOM ARE WE IN [IN HOME]?: 0
WHAT COUNTRY ARE WE IN?: 1
SAY: I WOULD LIKE YOU TO REPEAT THIS PHRASE AFTER ME: NO IFS, ANDS, OR BUTS.: 0

## 2023-09-08 NOTE — PROGRESS NOTES
I saw and evaluated the patient, performing the key elements of the service. I discussed the findings, assessment and plan with the resident and agree with the resident's findings and plan as documented in the resident's note. Family has decided on comfort care.   Will discuss with family medication changes and goals of care
Cigarettes     Quit date: 1999     Years since quittin.1    Smokeless tobacco: Never   Substance and Sexual Activity    Alcohol use: Yes     Alcohol/week: 17.5 standard drinks    Drug use: No    Sexual activity: Not on file   Other Topics Concern    Not on file   Social History Narrative    Not on file     Social Determinants of Health     Financial Resource Strain: Not on file   Food Insecurity: Not on file   Transportation Needs: Not on file   Physical Activity: Not on file   Stress: Not on file   Social Connections: Not on file   Intimate Partner Violence: Not on file   Housing Stability: Not on file            Current Medications/Allergies     Current Outpatient Medications   Medication Sig Dispense Refill    aspirin 81 MG chewable tablet Take 1 tablet by mouth daily 30 tablet 3    clopidogrel (PLAVIX) 75 MG tablet Take 1 tablet by mouth daily 30 tablet 0    Calcium-Vitamin D-Vitamin K 500-200-40 MG-UNT-MCG CHEW Take 1 tablet by mouth 3 times daily 90 tablet 0    Melatonin 5 MG CAPS Take 5 mg by mouth nightly 30 capsule 0    docusate (COLACE, DULCOLAX) 100 MG CAPS Take 100 mg by mouth daily 60 capsule 0    diclofenac sodium (VOLTAREN) 1 % GEL Apply 4 g topically 4 times daily as needed for Pain 350 g 0    atorvastatin (LIPITOR) 40 MG tablet Take 1 tablet by mouth daily 30 tablet 0    acetaminophen (TYLENOL) 500 MG tablet Take 2 tablets by mouth 3 times daily as needed for Pain or Fever 120 tablet 0     No current facility-administered medications for this visit. Allergies   Allergen Reactions    Penicillins Hives     Was treated with Augmentin in 2015, tolerated medication well.

## 2023-09-09 LAB
BACTERIA SPEC CULT: NORMAL
BACTERIA SPEC CULT: NORMAL
SERVICE CMNT-IMP: NORMAL
SERVICE CMNT-IMP: NORMAL

## 2023-10-01 PROBLEM — R79.89 ELEVATED TROPONIN: Status: RESOLVED | Noted: 2023-09-01 | Resolved: 2023-10-01

## 2023-10-05 ENCOUNTER — OUTSIDE SERVICES (OUTPATIENT)
Age: 83
End: 2023-10-05

## 2023-10-05 DIAGNOSIS — Z86.73 HISTORY OF CVA (CEREBROVASCULAR ACCIDENT): ICD-10-CM

## 2023-10-05 DIAGNOSIS — E78.5 HYPERLIPIDEMIA, UNSPECIFIED HYPERLIPIDEMIA TYPE: ICD-10-CM

## 2023-10-05 DIAGNOSIS — L89.152 STAGE II PRESSURE ULCER OF SACRAL REGION (HCC): ICD-10-CM

## 2023-10-05 DIAGNOSIS — I25.10 CORONARY ARTERY DISEASE INVOLVING NATIVE HEART WITHOUT ANGINA PECTORIS, UNSPECIFIED VESSEL OR LESION TYPE: Primary | ICD-10-CM

## 2023-10-05 DIAGNOSIS — K59.00 CONSTIPATION, UNSPECIFIED CONSTIPATION TYPE: ICD-10-CM

## 2023-10-05 DIAGNOSIS — I48.0 PAROXYSMAL ATRIAL FIBRILLATION (HCC): ICD-10-CM

## 2023-10-05 DIAGNOSIS — F03.90 DEMENTIA WITHOUT BEHAVIORAL DISTURBANCE, PSYCHOTIC DISTURBANCE, MOOD DISTURBANCE, OR ANXIETY, UNSPECIFIED DEMENTIA SEVERITY, UNSPECIFIED DEMENTIA TYPE (HCC): ICD-10-CM

## 2023-10-05 NOTE — PROGRESS NOTES
Tamiko Coker  78 y.o. female  1940  [address] [address]  [id number]   [address] Medicine at [facility]  Recertification Progress Note  Luciano Moreno DO       Encounter Date: 10/5/2023    No chief complaint on file. History of Present Illness   Krishna Killian is a 78 y.o. female who was seen at [facility] today for their 25-/95-FQB recertification. She was last seen 9/7/2023      Reports doing well today with no concerns    Diet: low sodium and regular texture, thin consistency. Out of bed with assistance    Review of Systems   Review of Systems   Constitutional:  Negative for fever. Respiratory:  Negative for shortness of breath. Cardiovascular:  Negative for chest pain. Gastrointestinal:  Negative for abdominal pain and diarrhea. Genitourinary:  Negative for dysuria. Musculoskeletal:  Negative for neck pain. Vitals/Objective: There were no vitals filed for this visit. There is no height or weight on file to calculate BMI. Physical Exam:  Gen: No acute distress, In wheelchair  Cards: No murmurs auscultated, Regular Rhythm  Pulm: CTAB, No increased WOB  LE: No pitting edema  Psych: Alert but oriented to self only    Assessment and Plan:   Patient is COMFORT CARE. Prior discussions with family and providers established that patient would like to continue with medications but does not want for any further hospitalizations and to specifically stop ativan and lipitor. Continue to evaluate for further med changes or discontinuations. Plan as below:    CAD, Hx of NSTEMI. EKG 1st degree AVB. Echo (8/31/23) EF 35%. S/p levophed and heparin gtt. - Plavix 75mg, ASA 81 mg daily     H/o paroxysmal afib: Chronic   - Plavix 75mg, ASA 81 mg daily      H/o CVA in 2017 No deficits  - Plavix 75 mg, lipitor 40 mg     HLD:   - Discontinued Lipitor 40 daily. Stop checking lipid panels. H/o HTN: no meds     Pre diabetes Last A1c 5.7 9/23.  No home

## 2023-11-03 ENCOUNTER — OUTSIDE SERVICES (OUTPATIENT)
Age: 83
End: 2023-11-03

## 2023-11-03 VITALS
BODY MASS INDEX: 29.41 KG/M2 | HEIGHT: 59 IN | SYSTOLIC BLOOD PRESSURE: 140 MMHG | OXYGEN SATURATION: 95 % | RESPIRATION RATE: 18 BRPM | DIASTOLIC BLOOD PRESSURE: 99 MMHG | WEIGHT: 145.9 LBS | HEART RATE: 85 BPM

## 2023-11-03 DIAGNOSIS — Z66 DNR (DO NOT RESUSCITATE): ICD-10-CM

## 2023-11-03 DIAGNOSIS — I63.9 CEREBROVASCULAR ACCIDENT (CVA), UNSPECIFIED MECHANISM (HCC): ICD-10-CM

## 2023-11-03 DIAGNOSIS — I42.9 CARDIOMYOPATHY, UNSPECIFIED TYPE (HCC): ICD-10-CM

## 2023-11-03 DIAGNOSIS — I10 ESSENTIAL HYPERTENSION: ICD-10-CM

## 2023-11-03 DIAGNOSIS — I25.10 CORONARY ARTERY DISEASE INVOLVING NATIVE HEART WITHOUT ANGINA PECTORIS, UNSPECIFIED VESSEL OR LESION TYPE: ICD-10-CM

## 2023-11-03 DIAGNOSIS — E78.5 HYPERLIPIDEMIA, UNSPECIFIED HYPERLIPIDEMIA TYPE: ICD-10-CM

## 2023-11-03 DIAGNOSIS — K59.00 CONSTIPATION, UNSPECIFIED CONSTIPATION TYPE: ICD-10-CM

## 2023-11-03 DIAGNOSIS — L89.152 STAGE II PRESSURE ULCER OF SACRAL REGION (HCC): ICD-10-CM

## 2023-11-03 DIAGNOSIS — R73.03 PREDIABETES: ICD-10-CM

## 2023-11-03 DIAGNOSIS — F03.90 DEMENTIA WITHOUT BEHAVIORAL DISTURBANCE, PSYCHOTIC DISTURBANCE, MOOD DISTURBANCE, OR ANXIETY, UNSPECIFIED DEMENTIA SEVERITY, UNSPECIFIED DEMENTIA TYPE (HCC): Primary | ICD-10-CM

## 2023-11-03 DIAGNOSIS — G47.00 INSOMNIA, UNSPECIFIED TYPE: ICD-10-CM

## 2023-11-10 NOTE — PROGRESS NOTES
I saw and evaluated the patient, performing the key elements of the service. I discussed the findings, assessment and plan with the resident and agree with the resident's findings and plan as documented in the resident's note.
reviewed and updated. Past Medical History:   Diagnosis Date    Acute CVA (cerebrovascular accident) ([address]) 4/10/2017    Per MRI:  Posterior L frontal/parietal territory    Cerebral atrophy ([address]) 4/10/2017    Femoral neck fracture ([address]) 2020    History of CVA (cerebrovascular accident) 4/10/2017    --L frontal acute infarct and LMCA    Hyperlipidemia LDL goal <70 4/10/2017    Hypertension     Rhabdomyolysis 2020    Stage II pressure ulcer of sacral region ([address]) 2020    Status post hip surgery      Left hip bipolar hemiarthroplasty on 20     TIA (transient ischemic attack)     Unresponsive episode 8/3/2017    transient     Past Surgical History:   Procedure Laterality Date    HEENT      Tonsilectomy age 8     Family History   Problem Relation Age of Onset    Dementia Mother 61    Heart Attack Father 79     Social History     Socioeconomic History    Marital status:      Spouse name: Not on file    Number of children: Not on file    Years of education: Not on file    Highest education level: Not on file   Occupational History    Not on file   Tobacco Use    Smoking status: Former     Types: Cigarettes     Quit date: 1999     Years since quittin.3    Smokeless tobacco: Never   Substance and Sexual Activity    Alcohol use:  Yes     Alcohol/week: 17.5 standard drinks of alcohol    Drug use: No    Sexual activity: Not on file   Other Topics Concern    Not on file   Social History Narrative    Not on file     Social Determinants of Health     Financial Resource Strain: Not on file   Food Insecurity: Not on file   Transportation Needs: Not on file   Physical Activity: Not on file   Stress: Not on file   Social Connections: Not on file   Intimate Partner Violence: Not on file   Housing Stability: Not on file            Current Medications/Allergies     Current Outpatient Medications   Medication Sig Dispense Refill    aspirin 81 MG chewable tablet Take 1 tablet by mouth daily 30 tablet

## 2023-11-10 NOTE — ACP (ADVANCE CARE PLANNING)
Advance Care Planning     Advance Care Planning (ACP) Physician/NP/PA Conversation    Date of Conversation: 11/3/2023  Conducted with:  Healthcare Decision Maker: Next of Kin by law (only applies in absence of above) (name)      Healthcare Decision Maker:    Primary Decision Maker (Active): Hood Seth - [phone]    Primary Decision Maker: Virgil Tierney - [phone]    Care Preferences:    Hospitalization: \"If your health worsens and it becomes clear that your chance of recovery is unlikely, what would be your preference regarding hospitalization? \"  The patient would prefer comfort-focused treatment without hospitalization. Ventilation: \"If you were unable to breath on your own and your chance of recovery was unlikely, what would be your preference about the use of a ventilator (breathing machine) if it was available to you? \"  The patient would NOT desire the use of a ventilator. Resuscitation: \"In the event your heart stopped as a result of an underlying serious health condition, would you want attempts made to restart your heart, or would you prefer a natural death? \"  No, do NOT attempt to resuscitate.     Conversation Outcomes / Follow-Up Plan:  ACP in process - information provided, considering goals and options  DNR IN CHART AT [facility]  Reviewed DNR/DNI and patient elects DNR order - completed portable DNR form & placed order    Shade Brooke MD

## 2024-01-01 ENCOUNTER — OUTSIDE SERVICES (OUTPATIENT)
Age: 84
End: 2024-01-01

## 2024-01-01 VITALS
DIASTOLIC BLOOD PRESSURE: 61 MMHG | RESPIRATION RATE: 17 BRPM | TEMPERATURE: 98.1 F | OXYGEN SATURATION: 97 % | WEIGHT: 106 LBS | BODY MASS INDEX: 21.37 KG/M2 | SYSTOLIC BLOOD PRESSURE: 115 MMHG | HEART RATE: 70 BPM | HEIGHT: 59 IN

## 2024-01-01 DIAGNOSIS — I25.10 CORONARY ARTERY DISEASE INVOLVING NATIVE HEART WITHOUT ANGINA PECTORIS, UNSPECIFIED VESSEL OR LESION TYPE: ICD-10-CM

## 2024-01-01 DIAGNOSIS — Z86.73 HISTORY OF CVA (CEREBROVASCULAR ACCIDENT): ICD-10-CM

## 2024-01-01 DIAGNOSIS — Z87.81 HISTORY OF HIP FRACTURE: ICD-10-CM

## 2024-01-01 DIAGNOSIS — L89.152 STAGE II PRESSURE ULCER OF SACRAL REGION (HCC): ICD-10-CM

## 2024-01-01 DIAGNOSIS — I10 ESSENTIAL HYPERTENSION: ICD-10-CM

## 2024-01-01 DIAGNOSIS — K59.00 CONSTIPATION, UNSPECIFIED CONSTIPATION TYPE: ICD-10-CM

## 2024-01-01 DIAGNOSIS — E78.5 HYPERLIPIDEMIA, UNSPECIFIED HYPERLIPIDEMIA TYPE: ICD-10-CM

## 2024-01-01 DIAGNOSIS — F03.90 DEMENTIA WITHOUT BEHAVIORAL DISTURBANCE, PSYCHOTIC DISTURBANCE, MOOD DISTURBANCE, OR ANXIETY, UNSPECIFIED DEMENTIA SEVERITY, UNSPECIFIED DEMENTIA TYPE (HCC): Primary | ICD-10-CM

## 2024-01-01 DIAGNOSIS — G47.00 INSOMNIA, UNSPECIFIED TYPE: ICD-10-CM

## 2024-01-01 DIAGNOSIS — I48.0 PAROXYSMAL ATRIAL FIBRILLATION (HCC): ICD-10-CM

## 2024-01-01 DIAGNOSIS — R73.03 PREDIABETES: ICD-10-CM

## 2024-01-01 DIAGNOSIS — R68.89 FLUCTUATION OF WEIGHT: ICD-10-CM

## 2024-01-01 DIAGNOSIS — I42.9 CARDIOMYOPATHY, UNSPECIFIED TYPE (HCC): ICD-10-CM

## 2024-01-01 NOTE — PROGRESS NOTES
Loulou Coker  83 y.o. female  1940  [address]  [facility] [address]  [id number]   [facility] at [facility] Progress Note  Nan Elmore MD       Encounter Date: 1/1/2024    Chief Complaint   Patient presents with    Follow-up Chronic Condition     History of Present Illness   Loulou Coker is a 83 y.o. female who was seen at [facility] today for their 60-day recertification. She was last seen 11/03/2023      Diet: Regular diet, Regular texture, Thin consistency    Activity: Out of bed with Assistance     Review of Systems   Review of Systems   Unable to perform ROS: Dementia     Vitals/Objective:     Vitals:    01/01/24 1137   BP: 115/61   Pulse: 70   Resp: 17   Temp: 98.1 °F (36.7 °C)   SpO2: 97%   Weight: 48.1 kg (106 lb)   Height: 1.499 m (4' 11\")     Body mass index is 21.41 kg/m².    Weight is fluctuating a bit    Physical Exam  Constitutional:       Appearance: She is ill-appearing.      Comments: Frail   HENT:      Head: Normocephalic and atraumatic.   Cardiovascular:      Rate and Rhythm: Normal rate and regular rhythm.      Heart sounds: No murmur heard.  Pulmonary:      Effort: Pulmonary effort is normal.      Breath sounds: Normal breath sounds. No wheezing.   Abdominal:      General: Abdomen is flat. There is no distension.      Palpations: Abdomen is soft.   Musculoskeletal:      Right lower leg: No edema.      Left lower leg: No edema.      Comments: Wound present over the lower back region covered with bandage   Skin:     General: Skin is warm.   Neurological:      General: No focal deficit present.      Mental Status: She is alert.      Comments: Alert and not oriented   Psychiatric:         Mood and Affect: Mood normal.          Assessment and Plan:   Patient is COMFORT CARE. Prior discussions with family and providers established that patient would like to continue with medications, but does not want for any further

## 2024-01-11 ENCOUNTER — TELEPHONE (OUTPATIENT)
Age: 84
End: 2024-01-11

## 2024-01-11 NOTE — TELEPHONE ENCOUNTER
Called and discussed with Sheela's daughter - Ree Torres, regarding being on DAPT. Will need to only continue one medication. Will consider stopping ldASA, and continue Plavix 75 mg daily.     Daughter agreeable to plan.   Called Katie Huerta to discontinue ldASA for resident, and continue Plavix.

## 2024-02-13 ENCOUNTER — CLINICAL DOCUMENTATION (OUTPATIENT)
Age: 84
End: 2024-02-13

## 2024-02-13 NOTE — PROGRESS NOTES
Patient seen and evaluated s/p fall on 2/12/24 with new left wrist/hand bruising. Patient seen in her bed this morning. She is awake and alert and answering questions appropriately. She is not able to recount the fall, but reports that she does have mild left wrist and hand pain. She has FROM on exam. Ecchymosis is present to the left 2nd digit. No bony point tenderness or deformities present.    Will obtain xray of wrist and hand to evaluate for fracture. Fall mats present at bedside. Existing orders for prn tylenol. Patient encouraged to verbalize any new or worsening pain.    Ian Villalpando MD

## 2024-02-28 ENCOUNTER — OUTSIDE SERVICES (OUTPATIENT)
Age: 84
End: 2024-02-28

## 2024-02-28 VITALS
WEIGHT: 98.6 LBS | BODY MASS INDEX: 19.91 KG/M2 | SYSTOLIC BLOOD PRESSURE: 116 MMHG | DIASTOLIC BLOOD PRESSURE: 81 MMHG | OXYGEN SATURATION: 98 % | RESPIRATION RATE: 16 BRPM | HEART RATE: 87 BPM | TEMPERATURE: 97.1 F

## 2024-02-28 DIAGNOSIS — E78.5 HYPERLIPIDEMIA, UNSPECIFIED HYPERLIPIDEMIA TYPE: ICD-10-CM

## 2024-02-28 DIAGNOSIS — I42.9 CARDIOMYOPATHY, UNSPECIFIED TYPE (HCC): ICD-10-CM

## 2024-02-28 DIAGNOSIS — L89.152 STAGE II PRESSURE ULCER OF SACRAL REGION (HCC): ICD-10-CM

## 2024-02-28 DIAGNOSIS — I25.10 CORONARY ARTERY DISEASE INVOLVING NATIVE HEART WITHOUT ANGINA PECTORIS, UNSPECIFIED VESSEL OR LESION TYPE: ICD-10-CM

## 2024-02-28 DIAGNOSIS — G47.00 INSOMNIA, UNSPECIFIED TYPE: ICD-10-CM

## 2024-02-28 DIAGNOSIS — Z86.73 HISTORY OF CVA (CEREBROVASCULAR ACCIDENT): ICD-10-CM

## 2024-02-28 DIAGNOSIS — R68.89 FLUCTUATION OF WEIGHT: ICD-10-CM

## 2024-02-28 DIAGNOSIS — I48.0 PAROXYSMAL ATRIAL FIBRILLATION (HCC): ICD-10-CM

## 2024-02-28 DIAGNOSIS — R73.03 PREDIABETES: ICD-10-CM

## 2024-02-28 DIAGNOSIS — F03.90 DEMENTIA WITHOUT BEHAVIORAL DISTURBANCE, PSYCHOTIC DISTURBANCE, MOOD DISTURBANCE, OR ANXIETY, UNSPECIFIED DEMENTIA SEVERITY, UNSPECIFIED DEMENTIA TYPE (HCC): Primary | ICD-10-CM

## 2024-02-28 DIAGNOSIS — Z87.81 HISTORY OF HIP FRACTURE: ICD-10-CM

## 2024-02-28 DIAGNOSIS — K59.00 CONSTIPATION, UNSPECIFIED CONSTIPATION TYPE: ICD-10-CM

## 2024-02-28 DIAGNOSIS — I10 ESSENTIAL HYPERTENSION: ICD-10-CM

## 2024-02-28 NOTE — PROGRESS NOTES
Loulou Coker  83 y.o. female  1940  [address]  [facility] [address]  [id number]   [facility] at [facility] Progress Note  Ian Villalpando MD       Encounter Date: 2/28/2024    PCP: Ian Damon MD    Chief Complaint   Patient presents with    Follow-up Chronic Condition     History of Present Illness   Loulou Coker is a 83 y.o. female who was seen at [facility] today for their 60-day recertification. She was last seen 1/1/24      Patient is awake, alert, but not oriented. She answers most questions with brief but seemingly appropriate answers, denying pain or discomfort at this time. She does not remember her fall earlier this month.    Diet: Regular diet, Regular texture, Thin consistency     Out of bed with assistance    Review of Systems   Review of Systems   Unable to perform ROS: Dementia       Vitals/Objective:     Vitals:    02/28/24 1037   BP: 116/81   Pulse: 87   Resp: 16   Temp: 97.1 °F (36.2 °C)   SpO2: 98%   Weight: 44.7 kg (98 lb 9.6 oz)     Body mass index is 19.91 kg/m².    Weight is stable over the last x1 month. Overall, steadily decreasing.    Physical Examination:  General: No acute distress, awake, not alert, thin, frail, not toxic appearing  Head: Atraumatic  Eyes: Conjunctiva pink  Neck: Supple  ENT: No rhinorrhea  CV: Heart: regular rate  Resp: Breathing comfortably on room air  Ext: resolved bruising to left wrist/left second digit. No apparent tenderness on exam. Spontaneously moving left wrist and fingers.  Skin: Warm, dry. Bandage covering sacral wound today.  Neuro: Awake, alert. Not oriented    Pertinent Lab/Test Results:  No new labs    COMFORT CARE - No further hospitalizations. DNR    Assessment and Plan:   Patient is COMFORT CARE. Prior discussions with family and providers established that patient would like to continue with medications, but does not want for any further hospitalizations.    Dementia

## 2024-02-29 NOTE — PROGRESS NOTES
I saw and evaluated the patient on 2/27/24, performing the key elements of the service.  I discussed the findings, assessment and plan with the resident and agree with the findings and plan as documented in the resident's note.     Lamar Bennett MD 2/29/2024

## 2024-03-28 ENCOUNTER — TELEPHONE (OUTPATIENT)
Age: 84
End: 2024-03-28

## 2024-03-28 RX ORDER — HYDROXYZINE HYDROCHLORIDE 25 MG/1
25 TABLET, FILM COATED ORAL 2 TIMES DAILY PRN
Qty: 30 TABLET | Refills: 0
Start: 2024-03-28 | End: 2024-04-27

## 2024-03-28 RX ORDER — ONDANSETRON 4 MG/1
4 TABLET, FILM COATED ORAL DAILY PRN
Qty: 30 TABLET | Refills: 0
Start: 2024-03-28

## 2024-03-28 NOTE — TELEPHONE ENCOUNTER
Patient reported to have ?syncopal event. Was found on floor on R side out of wheelchair. Was hard to communicate with at first, seemed disoriented and pale. BP was normal. Reports that she did come around and was back to baseline after. Nursing spoke with daughter who does not want to send patient out, just wanted patient to be evaluated by in house physician. Of note, patient is comfort care at this time.    Evaluated patient at bedside. Was resting comfortably. Difficult to obtain history as she doesn't recall fall. But was conversing appropriately.  Denies any pain, SOB, chest pain. CTAB, RRR on auscultation. Did have some TTP to LLE on thigh but no obvious deformity. Able to move legs in bed with encouragement. Spoke with nursing from day shift and nurse coming on for night shift - she will closely monitor. May be due to patient just laying on left side in bed for last two hours per nursing. If pain worsens or persists would recommend getting femur XR. Less concerned for fracture as she didn't fall on that side and able to move leg.     Sanjana Santiago, DO  PGY-3 Resident  [facility]

## 2024-05-02 ENCOUNTER — OUTSIDE SERVICES (OUTPATIENT)
Age: 84
End: 2024-05-02

## 2024-05-02 VITALS
RESPIRATION RATE: 20 BRPM | BODY MASS INDEX: 19.72 KG/M2 | SYSTOLIC BLOOD PRESSURE: 141 MMHG | OXYGEN SATURATION: 98 % | HEART RATE: 74 BPM | DIASTOLIC BLOOD PRESSURE: 61 MMHG | TEMPERATURE: 98.1 F | WEIGHT: 97.8 LBS

## 2024-05-02 DIAGNOSIS — R73.03 PREDIABETES: ICD-10-CM

## 2024-05-02 DIAGNOSIS — K59.00 CONSTIPATION, UNSPECIFIED CONSTIPATION TYPE: ICD-10-CM

## 2024-05-02 DIAGNOSIS — Z87.81 HISTORY OF HIP FRACTURE: ICD-10-CM

## 2024-05-02 DIAGNOSIS — F03.90 DEMENTIA WITHOUT BEHAVIORAL DISTURBANCE, PSYCHOTIC DISTURBANCE, MOOD DISTURBANCE, OR ANXIETY, UNSPECIFIED DEMENTIA SEVERITY, UNSPECIFIED DEMENTIA TYPE (HCC): Primary | ICD-10-CM

## 2024-05-02 DIAGNOSIS — I10 ESSENTIAL HYPERTENSION: ICD-10-CM

## 2024-05-02 DIAGNOSIS — I42.9 CARDIOMYOPATHY, UNSPECIFIED TYPE (HCC): ICD-10-CM

## 2024-05-02 DIAGNOSIS — R68.89 FLUCTUATION OF WEIGHT: ICD-10-CM

## 2024-05-02 DIAGNOSIS — Z86.73 HISTORY OF CVA (CEREBROVASCULAR ACCIDENT): ICD-10-CM

## 2024-05-02 DIAGNOSIS — G47.00 INSOMNIA, UNSPECIFIED TYPE: ICD-10-CM

## 2024-05-02 DIAGNOSIS — I25.10 CORONARY ARTERY DISEASE INVOLVING NATIVE HEART WITHOUT ANGINA PECTORIS, UNSPECIFIED VESSEL OR LESION TYPE: ICD-10-CM

## 2024-05-02 DIAGNOSIS — I48.0 PAROXYSMAL ATRIAL FIBRILLATION (HCC): ICD-10-CM

## 2024-05-02 DIAGNOSIS — E78.5 HYPERLIPIDEMIA, UNSPECIFIED HYPERLIPIDEMIA TYPE: ICD-10-CM

## 2024-05-02 DIAGNOSIS — L89.152 STAGE II PRESSURE ULCER OF SACRAL REGION (HCC): ICD-10-CM

## 2024-05-03 ENCOUNTER — TELEPHONE (OUTPATIENT)
Age: 84
End: 2024-05-03

## 2024-05-03 NOTE — PROGRESS NOTES
[facility] Family Medicine Residency Attending Attestation Note:  I was NOT present with the resident during the initial interview & examination of the patient. I personally interviewed the patient & repeated the critical or key portions of the exam.   I agree with the resident's note including their history, physical exam and assessment/plan except with pertinent updates to the patients history, exam, assessment or plan are noted below.      
accident) 4/10/2017    --L frontal acute infarct and LMCA    Hyperlipidemia LDL goal <70 4/10/2017    Hypertension     Rhabdomyolysis 2020    Stage II pressure ulcer of sacral region (HCC) 2020    Status post hip surgery      Left hip bipolar hemiarthroplasty on 20     TIA (transient ischemic attack)     Unresponsive episode 8/3/2017    transient     Past Surgical History:   Procedure Laterality Date    HEENT      Tonsilectomy age 4     Family History   Problem Relation Age of Onset    Dementia Mother 60    Heart Attack Father 70     Social History     Socioeconomic History    Marital status:      Spouse name: Not on file    Number of children: Not on file    Years of education: Not on file    Highest education level: Not on file   Occupational History    Not on file   Tobacco Use    Smoking status: Former     Current packs/day: 0.00     Types: Cigarettes     Quit date: 1999     Years since quittin.8    Smokeless tobacco: Never   Substance and Sexual Activity    Alcohol use: Yes     Alcohol/week: 17.5 standard drinks of alcohol    Drug use: No    Sexual activity: Not on file   Other Topics Concern    Not on file   Social History Narrative    Not on file     Social Determinants of Health     Financial Resource Strain: Not on file   Food Insecurity: Not on file   Transportation Needs: Not on file   Physical Activity: Not on file   Stress: Not on file   Social Connections: Not on file   Intimate Partner Violence: Not on file   Housing Stability: Not on file            Current Medications/Allergies     Current Outpatient Medications   Medication Sig Dispense Refill    ondansetron (ZOFRAN) 4 MG tablet Take 1 tablet by mouth daily as needed for Nausea or Vomiting 30 tablet 0    aspirin 81 MG chewable tablet Take 1 tablet by mouth daily 30 tablet 3    clopidogrel (PLAVIX) 75 MG tablet Take 1 tablet by mouth daily 30 tablet 0    Calcium-Vitamin D-Vitamin K 500-200-40 MG-UNT-MCG CHEW Take 1

## 2024-05-03 NOTE — TELEPHONE ENCOUNTER
Tried to contact Ree Torres, no answer, LVM. Will attempt to reach again at another time.     To discuss potentially discontinuing Abilify 2.5 qhs and then Buspar 5 mg TID.

## 2024-06-27 ENCOUNTER — OUTSIDE SERVICES (OUTPATIENT)
Facility: HOSPITAL | Age: 84
End: 2024-06-27
Payer: MEDICARE

## 2024-06-27 VITALS
RESPIRATION RATE: 18 BRPM | DIASTOLIC BLOOD PRESSURE: 66 MMHG | HEART RATE: 72 BPM | BODY MASS INDEX: 20.24 KG/M2 | OXYGEN SATURATION: 98 % | SYSTOLIC BLOOD PRESSURE: 132 MMHG | WEIGHT: 100.4 LBS | TEMPERATURE: 98.1 F

## 2024-06-27 DIAGNOSIS — E11.9 TYPE 2 DIABETES MELLITUS WITHOUT COMPLICATION, UNSPECIFIED WHETHER LONG TERM INSULIN USE (HCC): ICD-10-CM

## 2024-06-27 DIAGNOSIS — Z86.73 HISTORY OF CVA (CEREBROVASCULAR ACCIDENT): ICD-10-CM

## 2024-06-27 DIAGNOSIS — E78.5 HYPERLIPIDEMIA LDL GOAL <70: ICD-10-CM

## 2024-06-27 DIAGNOSIS — I42.9 CARDIOMYOPATHY, UNSPECIFIED TYPE (HCC): ICD-10-CM

## 2024-06-27 DIAGNOSIS — Z87.81 HISTORY OF HIP FRACTURE: ICD-10-CM

## 2024-06-27 DIAGNOSIS — R68.89 FLUCTUATION OF WEIGHT: ICD-10-CM

## 2024-06-27 DIAGNOSIS — F03.911 DEMENTIA WITH AGITATION, UNSPECIFIED DEMENTIA SEVERITY, UNSPECIFIED DEMENTIA TYPE (HCC): Primary | ICD-10-CM

## 2024-06-27 DIAGNOSIS — K59.00 CONSTIPATION, UNSPECIFIED CONSTIPATION TYPE: ICD-10-CM

## 2024-06-27 DIAGNOSIS — I25.2 H/O NON-ST ELEVATION MYOCARDIAL INFARCTION (NSTEMI): ICD-10-CM

## 2024-06-27 DIAGNOSIS — I48.91 ATRIAL FIBRILLATION, UNSPECIFIED TYPE (HCC): ICD-10-CM

## 2024-06-27 DIAGNOSIS — G47.00 INSOMNIA, UNSPECIFIED TYPE: ICD-10-CM

## 2024-06-27 DIAGNOSIS — I10 ESSENTIAL HYPERTENSION: ICD-10-CM

## 2024-06-27 PROCEDURE — 99309 SBSQ NF CARE MODERATE MDM 30: CPT

## 2024-06-28 NOTE — PROGRESS NOTES
Loulou Coker  83 y.o. female  1940  [address]  [facility] [address]  [id number]   [facility] Progress Note  Ian Villalpando MD       Encounter Date: 6/27/2024    PCP: Ian Damon MD  Responsible Party: Ree Torres  (Daughter - [phone])     No chief complaint on file.    History of Present Illness   Loulou Coker is a 83 y.o. female who was seen at [facility] today for their 30-/60-day recertification. She was last seen on 5/2/24.    Spoke with patient in her room. Denies all symptoms at this time.      Interval updates: No interval updates per staff    Diet: Regular diet, Regular texture, Thin consistency   Ensure supplementation     Out of bed with assistance    Review of Systems   Review of Systems  Unable to perform ROS: Dementia      Patient denies all symptoms at this time.        Vitals/Objective:     Vitals:    06/27/24 2209   BP: 132/66   Pulse: 72   Resp: 18   Temp: 98.1 °F (36.7 °C)   SpO2: 98%   Weight: 45.5 kg (100 lb 6.4 oz)     Body mass index is 20.24 kg/m².    Weight is stable    Physical Examination:  General: No acute distress, awake, alert, thin, frail, not toxic appearing  Head: Atraumatic  Eyes: Conjunctiva pink  Neck: Supple  ENT: No rhinorrhea  CV: Heart: regular rate  Resp: Breathing comfortably on room air  Ext: spontaneously moving all extremities  Skin: Warm, dry. Unable to assess sacral wound today secondary to positioning.  Neuro: Awake, alert. [place] to self     COMFORT CARE - No further hospitalizations. DNR      COMFORT CARE - No further hospitalizations. DNR    Assessment and Plan:   Patient is COMFORT CARE. Prior discussions with family and providers established that patient would like to continue with medications, but does not want for any further hospitalizations.     Dementia without behavioral disturbance: Was previously on Mamantine 10 mg which was discontinued due to

## 2024-07-03 NOTE — PROGRESS NOTES
I saw and evaluated the patient, performing the key elements of the service.  I discussed the findings, assessment and plan with the resident and agree with the resident's findings and plan as documented in the resident's note.     Lamar Bennett MD 7/3/2024

## 2024-08-22 ENCOUNTER — OUTSIDE SERVICES (OUTPATIENT)
Age: 84
End: 2024-08-22

## 2024-08-22 VITALS
BODY MASS INDEX: 21.47 KG/M2 | DIASTOLIC BLOOD PRESSURE: 70 MMHG | RESPIRATION RATE: 18 BRPM | TEMPERATURE: 98.7 F | SYSTOLIC BLOOD PRESSURE: 105 MMHG | WEIGHT: 106.5 LBS | OXYGEN SATURATION: 98 % | HEART RATE: 101 BPM

## 2024-08-22 DIAGNOSIS — E78.5 HYPERLIPIDEMIA, UNSPECIFIED HYPERLIPIDEMIA TYPE: ICD-10-CM

## 2024-08-22 DIAGNOSIS — I25.10 CORONARY ARTERY DISEASE INVOLVING NATIVE HEART WITHOUT ANGINA PECTORIS, UNSPECIFIED VESSEL OR LESION TYPE: ICD-10-CM

## 2024-08-22 DIAGNOSIS — Z87.81 HISTORY OF HIP FRACTURE: ICD-10-CM

## 2024-08-22 DIAGNOSIS — I42.9 CARDIOMYOPATHY, UNSPECIFIED TYPE (HCC): ICD-10-CM

## 2024-08-22 DIAGNOSIS — F03.90 DEMENTIA WITHOUT BEHAVIORAL DISTURBANCE, PSYCHOTIC DISTURBANCE, MOOD DISTURBANCE, OR ANXIETY, UNSPECIFIED DEMENTIA SEVERITY, UNSPECIFIED DEMENTIA TYPE (HCC): Primary | ICD-10-CM

## 2024-08-22 DIAGNOSIS — I48.0 PAROXYSMAL ATRIAL FIBRILLATION (HCC): ICD-10-CM

## 2024-08-22 DIAGNOSIS — R73.03 PREDIABETES: ICD-10-CM

## 2024-08-22 DIAGNOSIS — R68.89 FLUCTUATION OF WEIGHT: ICD-10-CM

## 2024-08-22 DIAGNOSIS — G47.00 INSOMNIA, UNSPECIFIED TYPE: ICD-10-CM

## 2024-08-22 DIAGNOSIS — Z86.73 HISTORY OF CVA (CEREBROVASCULAR ACCIDENT): ICD-10-CM

## 2024-08-22 DIAGNOSIS — K59.00 CONSTIPATION, UNSPECIFIED CONSTIPATION TYPE: ICD-10-CM

## 2024-08-22 DIAGNOSIS — L89.152 STAGE II PRESSURE ULCER OF SACRAL REGION (HCC): ICD-10-CM

## 2024-08-22 DIAGNOSIS — I10 ESSENTIAL HYPERTENSION: ICD-10-CM

## 2024-08-22 PROBLEM — R57.9 SHOCK (HCC): Status: RESOLVED | Noted: 2023-09-01 | Resolved: 2024-08-22

## 2024-08-22 NOTE — PROGRESS NOTES
Loulou Coker  83 y.o. female  1940  [address]  [facility] [address]  [id number]   [facility] Progress Note  Ian Villalpando MD       Encounter Date: 8/22/2024    PCP: Ian Damon MD  Responsible Party: Ree Torres  (Daughter - [phone])     No chief complaint on file.    History of Present Illness   Loulou Coker is a 83 y.o. female who was seen at [facility] today for their 60-day recertification. She was last seen 6/27/24      Interval updates: none    Patient was resting comfortably in her bed at the time of my exam. She woke easily to voice and was alert and oriented to person only. She responds to most questions appropriately. She appears to deny any symptoms at this time. She is compliant with exam and move all extremities spontaneously.    Diet: Regular diet, Regular texture, Thin consistency   Ensure supplementation    Out of bed with assistance    Review of Systems   Review of Systems   Unable to perform ROS: Dementia           Vitals/Objective:     Vitals:    08/22/24 1357   BP: 105/70   Pulse: (!) 101   Resp: 18   Temp: 98.7 °F (37.1 °C)   SpO2: 98%   Weight: 48.3 kg (106 lb 8 oz)     Body mass index is 21.47 kg/m².    Weight is stable    Physical Examination:  General: No acute distress, awake, alert, thin, frail, not toxic appearing  Head: Atraumatic  Eyes: Conjunctiva pink  Neck: Supple  ENT: No rhinorrhea  CV: Heart: regular rate. Rate of 88 on exam today.  Resp: Breathing comfortably on room air. CTAB  Ext: spontaneously moving all extremities  Skin: Warm, dry. no visualized rash, abrasions, or ecchymosis today  Neuro: Awake, alert. Oriented to self    Pertinent Lab/Test Results:  No new labs or imaging.    COMFORT CARE - No further hospitalizations. DNR    Assessment and Plan:     Patient is COMFORT CARE. Prior discussions with family and providers established that patient would like to continue with

## 2024-09-23 ENCOUNTER — CLINICAL DOCUMENTATION (OUTPATIENT)
Age: 84
End: 2024-09-23

## 2024-10-15 NOTE — PROGRESS NOTES
Received nursing communication on 10/11/2024 regarding a nonresponsive episode at 1530 with small foamy emesis patient returned to baseline at that time blood pressure 117/67 upon evaluation, patient awake, alert and feeding herself lunch.  She has no concerns at this time.  Will continue to monitor.  Additionally started on Remeron 15 mg daily on 10/15/2024 to increase appetite given dietitian recommendations.  Deepa De La Fuente MD

## 2024-10-20 ENCOUNTER — OUTSIDE SERVICES (OUTPATIENT)
Age: 84
End: 2024-10-20

## 2024-10-20 VITALS
TEMPERATURE: 97.7 F | OXYGEN SATURATION: 98 % | RESPIRATION RATE: 18 BRPM | WEIGHT: 102.6 LBS | HEART RATE: 73 BPM | DIASTOLIC BLOOD PRESSURE: 65 MMHG | SYSTOLIC BLOOD PRESSURE: 119 MMHG | BODY MASS INDEX: 20.68 KG/M2

## 2024-10-20 DIAGNOSIS — L89.152 STAGE II PRESSURE ULCER OF SACRAL REGION (HCC): ICD-10-CM

## 2024-10-20 DIAGNOSIS — Z86.73 HISTORY OF CVA (CEREBROVASCULAR ACCIDENT): ICD-10-CM

## 2024-10-20 DIAGNOSIS — F03.90 DEMENTIA WITHOUT BEHAVIORAL DISTURBANCE (HCC): Primary | ICD-10-CM

## 2024-10-20 DIAGNOSIS — R68.89 FLUCTUATION OF WEIGHT: ICD-10-CM

## 2024-10-20 DIAGNOSIS — I48.0 PAROXYSMAL ATRIAL FIBRILLATION (HCC): ICD-10-CM

## 2024-10-20 DIAGNOSIS — I10 ESSENTIAL HYPERTENSION: ICD-10-CM

## 2024-10-20 DIAGNOSIS — K59.04 CHRONIC IDIOPATHIC CONSTIPATION: ICD-10-CM

## 2024-10-20 DIAGNOSIS — Z87.81 H/O FRACTURE OF HIP: ICD-10-CM

## 2024-10-20 DIAGNOSIS — R73.03 PREDIABETES: ICD-10-CM

## 2024-10-20 DIAGNOSIS — F51.01 PRIMARY INSOMNIA: ICD-10-CM

## 2024-10-20 DIAGNOSIS — E78.5 HYPERLIPIDEMIA LDL GOAL <70: ICD-10-CM

## 2024-10-20 DIAGNOSIS — I50.20 HEART FAILURE WITH REDUCED EJECTION FRACTION (HCC): ICD-10-CM

## 2024-10-20 NOTE — PROGRESS NOTES
Loulou Coker  83 y.o. female  1940  [address]  [place] [facility] [address]  [id number]   [facility] at [facility] Progress Note  Cally Delcid MD       Encounter Date: 10/20/2024    PCP: Ian Damon MD    No chief complaint on file.    History of Present Illness   Loulou Coker is a 83 y.o. female who was seen at [facility] today for their 60-day recertification. She was last seen 9/23/24.      Interval updates: Pt was found unresponsive on 9/23, pt at time of MD evaluation was found to be responsive once again. Heeding the families wishes for pt to be comfort care and to not escalate any care at the hospital therefore she was not transported to the ED for further evaluation.      Diet: Regular, regular texture, thin consistency. Protein supplementation.    Ambulates in wheelchair.     Evaluated patient as she was finishing up lunch. I accompanied pt back to her room. Pleasant young lady, A&O x 1 ( to self only). Denied any CP/SOB or other symptoms.   Review of Systems   Review of Systems  A+O x 1.   Vitals/Objective:   There were no vitals filed for this visit.  There is no height or weight on file to calculate BMI.    Weight is decreasing steadily from 106.5 (in 08/24) to 102.6.     Physical Exam  Constitutional:       Appearance: Normal appearance.   HENT:      Head: Normocephalic and atraumatic.   Cardiovascular:      Rate and Rhythm: Normal rate and regular rhythm.      Pulses: Normal pulses.      Heart sounds: Normal heart sounds.   Pulmonary:      Effort: Pulmonary effort is normal.      Breath sounds: Normal breath sounds.   Skin:     General: Skin is warm and dry.   Neurological:      Mental Status: She is alert. Mental status is at baseline.       Pertinent Lab/Test Results: None    COMFORT CARE - No further hospitalizations. DNR    Assessment and Plan:     1. Dementia without behavioral disturbance (HCC): Was previously on Atarax 25

## 2024-11-16 ENCOUNTER — CLINICAL DOCUMENTATION (OUTPATIENT)
Age: 84
End: 2024-11-16

## 2024-11-16 NOTE — PROGRESS NOTES
Call received from  regarding unwitnessed fall. No apparent injury or complaint. Patient reportedly at baseline with no concerns per staff. Advised continued routine neuro checks with instructions to take patient to the ER if any change in mental status. Advised to call back with any evolving complaints.    Ian Villalpando MD

## 2024-12-04 ENCOUNTER — CLINICAL DOCUMENTATION (OUTPATIENT)
Age: 84
End: 2024-12-04

## 2024-12-05 NOTE — PROGRESS NOTES
Received a call from Katie Huerta regarding a fall this evening.  She was found on the ground after presumed ground-level fall.  It was just after dinner when she was sitting in her wheelchair, and then shortly thereafter was found on the floor.  She has a hematoma on the left upper forehead with no active bleeding.  She takes Plavix.  She has baseline dementia so her mental status is difficult to discern, but no obvious acute worsening per the nurse I spoke with.  She does not seem more drowsy or tired than usual.  Per the last note on 5/25/2024, the patient's family wishes patient to be comfort care with no escalation to the hospital.  I advised the Katie Huerta staff to continue their protocol after a fall including frequent neurochecks.  I asked them to give the patient's daughter a call and inform her of the fall.  I informed the staff to keep me updated if there are any declines in neurostatus or other acute changes.    Papa Metz MD  Family Medicine Resident

## 2024-12-12 ENCOUNTER — OUTSIDE SERVICES (OUTPATIENT)
Age: 84
End: 2024-12-12

## 2024-12-12 VITALS
WEIGHT: 111.8 LBS | RESPIRATION RATE: 18 BRPM | SYSTOLIC BLOOD PRESSURE: 129 MMHG | DIASTOLIC BLOOD PRESSURE: 64 MMHG | OXYGEN SATURATION: 98 % | TEMPERATURE: 98.9 F | BODY MASS INDEX: 22.54 KG/M2 | HEART RATE: 83 BPM

## 2024-12-12 DIAGNOSIS — Z86.73 HISTORY OF CVA (CEREBROVASCULAR ACCIDENT): ICD-10-CM

## 2024-12-12 DIAGNOSIS — I48.0 PAROXYSMAL ATRIAL FIBRILLATION (HCC): ICD-10-CM

## 2024-12-12 DIAGNOSIS — Z87.81 H/O FRACTURE OF HIP: ICD-10-CM

## 2024-12-12 DIAGNOSIS — R73.03 PREDIABETES: ICD-10-CM

## 2024-12-12 DIAGNOSIS — F51.01 PRIMARY INSOMNIA: ICD-10-CM

## 2024-12-12 DIAGNOSIS — I10 ESSENTIAL HYPERTENSION: ICD-10-CM

## 2024-12-12 DIAGNOSIS — F03.90 DEMENTIA WITHOUT BEHAVIORAL DISTURBANCE (HCC): Primary | ICD-10-CM

## 2024-12-12 DIAGNOSIS — L89.152 STAGE II PRESSURE ULCER OF SACRAL REGION (HCC): ICD-10-CM

## 2024-12-12 DIAGNOSIS — K59.04 CHRONIC IDIOPATHIC CONSTIPATION: ICD-10-CM

## 2024-12-12 DIAGNOSIS — R68.89 FLUCTUATION OF WEIGHT: ICD-10-CM

## 2024-12-12 DIAGNOSIS — I50.20 HEART FAILURE WITH REDUCED EJECTION FRACTION (HCC): ICD-10-CM

## 2024-12-12 DIAGNOSIS — E78.5 HYPERLIPIDEMIA LDL GOAL <70: ICD-10-CM

## 2024-12-12 NOTE — PROGRESS NOTES
Ian Damon.    Electronically Signed: Ian Villalpando MD     History   Patients past medical, surgical and family histories were reviewed and updated.     Past Medical History:   Diagnosis Date    Acute CVA (cerebrovascular accident) ([facility]) 4/10/2017    Per MRI:  Posterior L frontal/parietal territory    Cerebral atrophy ([facility]) 4/10/2017    Femoral neck fracture ([facility]) 2020    History of CVA (cerebrovascular accident) 4/10/2017    --L frontal acute infarct and LMCA    Hyperlipidemia LDL goal <70 4/10/2017    Hypertension     Rhabdomyolysis 2020    Stage II pressure ulcer of sacral region ([facility]) 2020    Status post hip surgery      Left hip bipolar hemiarthroplasty on 20     TIA (transient ischemic attack)     Unresponsive episode 8/3/2017    transient     Past Surgical History:   Procedure Laterality Date    HEENT      Tonsilectomy age 4     Family History   Problem Relation Age of Onset    Dementia Mother 60    Heart Attack Father 70     Social History     Socioeconomic History    Marital status:      Spouse name: Not on file    Number of children: Not on file    Years of education: Not on file    Highest education level: Not on file   Occupational History    Not on file   Tobacco Use    Smoking status: Former     Current packs/day: 0.00     Types: Cigarettes     Quit date: 1999     Years since quittin.4    Smokeless tobacco: Never   Substance and Sexual Activity    Alcohol use: Yes     Alcohol/week: 17.5 standard drinks of alcohol    Drug use: No    Sexual activity: Not on file   Other Topics Concern    Not on file   Social History Narrative    Not on file     Social Determinants of Health     Financial Resource Strain: Not on file   Food Insecurity: Not on file   Transportation Needs: Not on file   Physical Activity: Not on file   Stress: Not on file   Social Connections: Not on file   Intimate Partner Violence: Not on file   Housing Stability: Not on file            Current

## 2025-02-07 ENCOUNTER — OUTSIDE SERVICES (OUTPATIENT)
Age: 85
End: 2025-02-07

## 2025-02-07 VITALS — WEIGHT: 110.7 LBS | BODY MASS INDEX: 22.32 KG/M2

## 2025-02-07 DIAGNOSIS — R73.03 PREDIABETES: ICD-10-CM

## 2025-02-07 DIAGNOSIS — F03.911 DEMENTIA WITH AGITATION, UNSPECIFIED DEMENTIA SEVERITY, UNSPECIFIED DEMENTIA TYPE (HCC): Primary | ICD-10-CM

## 2025-02-07 DIAGNOSIS — K59.04 CHRONIC IDIOPATHIC CONSTIPATION: ICD-10-CM

## 2025-02-07 DIAGNOSIS — F51.01 PRIMARY INSOMNIA: ICD-10-CM

## 2025-02-07 DIAGNOSIS — Z51.5 PALLIATIVE CARE STATUS: ICD-10-CM

## 2025-02-07 DIAGNOSIS — I10 ESSENTIAL HYPERTENSION: Chronic | ICD-10-CM

## 2025-02-07 DIAGNOSIS — Z86.73 HISTORY OF CVA (CEREBROVASCULAR ACCIDENT): ICD-10-CM

## 2025-02-07 DIAGNOSIS — I42.9 CARDIOMYOPATHY, UNSPECIFIED TYPE (HCC): ICD-10-CM

## 2025-02-07 DIAGNOSIS — I48.0 PAROXYSMAL ATRIAL FIBRILLATION (HCC): ICD-10-CM

## 2025-02-07 PROBLEM — L89.152 SACRAL DECUBITUS ULCER, STAGE II (HCC): Status: RESOLVED | Noted: 2020-11-09 | Resolved: 2025-02-07

## 2025-02-07 PROBLEM — R68.89 FLUCTUATION OF WEIGHT: Status: RESOLVED | Noted: 2022-03-30 | Resolved: 2025-02-07

## 2025-02-07 PROBLEM — D64.9 ANEMIA: Status: RESOLVED | Noted: 2020-05-26 | Resolved: 2025-02-07

## 2025-02-07 PROBLEM — N39.0 URINARY TRACT INFECTION WITHOUT HEMATURIA: Status: RESOLVED | Noted: 2020-05-26 | Resolved: 2025-02-07

## 2025-02-07 PROBLEM — S62.301D: Status: RESOLVED | Noted: 2023-08-03 | Resolved: 2025-02-07

## 2025-02-07 PROBLEM — R60.0 BILATERAL LOWER EXTREMITY EDEMA: Status: RESOLVED | Noted: 2022-03-30 | Resolved: 2025-02-07

## 2025-02-07 PROBLEM — R55 SYNCOPE AND COLLAPSE: Status: RESOLVED | Noted: 2023-08-31 | Resolved: 2025-02-07

## 2025-02-07 PROBLEM — R00.1 BRADYCARDIA: Status: RESOLVED | Noted: 2020-05-26 | Resolved: 2025-02-07

## 2025-02-07 PROBLEM — I25.2 HISTORY OF NON-ST ELEVATION MYOCARDIAL INFARCTION (NSTEMI): Status: ACTIVE | Noted: 2020-05-26

## 2025-02-07 PROBLEM — E11.9 TYPE 2 DIABETES MELLITUS WITHOUT COMPLICATION (HCC): Status: RESOLVED | Noted: 2023-09-01 | Resolved: 2025-02-07

## 2025-02-07 RX ORDER — MIRTAZAPINE 15 MG/1
15 TABLET, FILM COATED ORAL NIGHTLY
COMMUNITY

## 2025-02-08 NOTE — PROGRESS NOTES
Loulou Coker  84 y.o. female  1940  [address]  [facility] [address]  [id number]   [facility]  [facility]  Ian Damon MD       Encounter Date: 2/7/2025    PCP: Ian Damon MD    Chief Complaint   Patient presents with    Recertification     History of Present Illness   Loulou Coker is a 84 y.o. female who was seen at [facility] today for their 60-day recertification. She was last seen 12/12/2024    Interval updates: Patient had a unwitnessed fall on 1/10/2025 with no obvious injuries.  She is \"Do not hospitalize\" and therefore was not sent for further ED assessment.  Frequent neurochecks were performed and no worsening in mental status were noted.     Patient was supine in bed today during her assessment.  She was in no acute distress and denied any acute concerns or questions.     Diet: Diet: Regular, regular texture, thin consistency. Protein supplementation.     Ambulates in wheelchair.    Review of Systems   Review of Systems   Unable to perform ROS: Dementia   Denies ROS today      Vitals/Objective:     Vitals:    01/07/25 2100   Weight: 50.2 kg (110 lb 11.2 oz)     Body mass index is 22.32 kg/m².    Wt Readings from Last 5 Encounters:   01/07/25 50.2 kg (110 lb 11.2 oz)   12/12/24 50.7 kg (111 lb 12.8 oz)   10/20/24 46.5 kg (102 lb 9.6 oz)   08/22/24 48.3 kg (106 lb 8 oz)   06/27/24 45.5 kg (100 lb 6.4 oz)       Weight is fluctuating a bit    Physical Exam  Constitutional:       Appearance: Normal appearance.   HENT:      Head: Normocephalic and atraumatic.   Cardiovascular:      Rate and Rhythm: Normal rate and regular rhythm.      Pulses: Normal pulses.      Heart sounds: Normal heart sounds.   Pulmonary:      Effort: Pulmonary effort is normal.      Breath sounds: Normal breath sounds.   Skin:     General: Intact  Neurological:      Mental Status: She is alert. Poor recall.  Speaks few words. Mental status is
